# Patient Record
Sex: FEMALE | Race: WHITE | Employment: PART TIME | URBAN - METROPOLITAN AREA
[De-identification: names, ages, dates, MRNs, and addresses within clinical notes are randomized per-mention and may not be internally consistent; named-entity substitution may affect disease eponyms.]

---

## 2017-01-30 ENCOUNTER — HOSPITAL ENCOUNTER (EMERGENCY)
Age: 47
Discharge: HOME | End: 2017-01-30
Attending: EMERGENCY MEDICINE
Payer: SUBSIDIZED

## 2017-01-30 ENCOUNTER — APPOINTMENT (OUTPATIENT)
Dept: GENERAL RADIOLOGY | Age: 47
End: 2017-01-30
Attending: EMERGENCY MEDICINE
Payer: SUBSIDIZED

## 2017-01-30 VITALS
HEART RATE: 58 BPM | OXYGEN SATURATION: 96 % | BODY MASS INDEX: 32.14 KG/M2 | TEMPERATURE: 97.8 F | RESPIRATION RATE: 18 BRPM | SYSTOLIC BLOOD PRESSURE: 123 MMHG | DIASTOLIC BLOOD PRESSURE: 66 MMHG | WEIGHT: 200 LBS | HEIGHT: 66 IN

## 2017-01-30 DIAGNOSIS — S40.011A CONTUSION OF RIGHT SHOULDER, INITIAL ENCOUNTER: Primary | ICD-10-CM

## 2017-01-30 PROCEDURE — 74011250637 HC RX REV CODE- 250/637: Performed by: EMERGENCY MEDICINE

## 2017-01-30 PROCEDURE — L3650 SO 8 ABD RESTRAINT PRE OTS: HCPCS

## 2017-01-30 PROCEDURE — 73030 X-RAY EXAM OF SHOULDER: CPT

## 2017-01-30 PROCEDURE — 99283 EMERGENCY DEPT VISIT LOW MDM: CPT

## 2017-01-30 RX ORDER — ACETAMINOPHEN AND CODEINE PHOSPHATE 300; 30 MG/1; MG/1
1 TABLET ORAL
Status: DISCONTINUED | OUTPATIENT
Start: 2017-01-30 | End: 2017-01-30

## 2017-01-30 RX ORDER — TRAMADOL HYDROCHLORIDE 50 MG/1
50 TABLET ORAL
Status: COMPLETED | OUTPATIENT
Start: 2017-01-30 | End: 2017-01-30

## 2017-01-30 RX ORDER — HYDROCODONE BITARTRATE AND ACETAMINOPHEN 5; 325 MG/1; MG/1
1 TABLET ORAL
Qty: 8 TAB | Refills: 0 | Status: SHIPPED | OUTPATIENT
Start: 2017-01-30 | End: 2017-04-28

## 2017-01-30 RX ADMIN — TRAMADOL HYDROCHLORIDE 50 MG: 50 TABLET, FILM COATED ORAL at 05:07

## 2017-01-30 NOTE — ED NOTES
Patient given copy of dc instructions and script(s). Patient verbalized understanding of instructions and script (s). Patient given a current medication reconciliation form and verbalized understanding of their medications. Patient verbalized understanding of the importance of discussing medications with  his or her physician or clinic they will be following up with. Patient alert and oriented and in no acute distress. Patient discharged home ambulatory with self.

## 2017-01-30 NOTE — ED TRIAGE NOTES
Pt. States she fell trying to goto the bathroom about 1 hour ago and landed on her shoulder. She denies LOC and complains of pain to the right shoulder.

## 2017-01-30 NOTE — ED PROVIDER NOTES
HPI Comments: Pt c/o rt shoulder pain, says fell just pta, landing on rt shoulder. Denies other injury. No weakness or numbness. No neck or back pain. No elbow or other area of arm pain. No head injury. Patient is a 48 y.o. female presenting with shoulder pain and fall. Shoulder Pain      Fall   Pertinent negatives include no fever and no vomiting. Past Medical History:   Diagnosis Date    Psychiatric disorder      depression    Reflux        Past Surgical History:   Procedure Laterality Date    Pr abdomen surgery proc unlisted       gastric bypass    Hx cholecystectomy      Hx heent       tonsilectomy           Family History:   Problem Relation Age of Onset    Diabetes Mother     Ovarian Cancer Mother     Heart Disease Neg Hx     Hypertension Neg Hx     Stroke Neg Hx        Social History     Social History    Marital status:      Spouse name: N/A    Number of children: N/A    Years of education: N/A     Occupational History    Not on file. Social History Main Topics    Smoking status: Current Every Day Smoker     Packs/day: 0.50     Years: 20.00    Smokeless tobacco: Not on file    Alcohol use No    Drug use: No    Sexual activity: Yes     Partners: Male     Other Topics Concern    Not on file     Social History Narrative         ALLERGIES: Amoxicillin and Codeine    Review of Systems   Constitutional: Negative for fever. HENT: Negative for congestion. Cardiovascular: Negative for chest pain. Gastrointestinal: Negative for vomiting. Musculoskeletal: Positive for arthralgias. Negative for back pain. Skin: Negative for wound. Neurological: Negative for light-headedness. All other systems reviewed and are negative.       Vitals:    01/30/17 0452 01/30/17 0508   BP: 123/66    Pulse:  (!) 58   Resp: 18    Temp: 97.8 °F (36.6 °C)    SpO2: 96%    Weight: 90.7 kg (200 lb)    Height: 5' 6\" (1.676 m)             Physical Exam   Constitutional: She is oriented to person, place, and time. She appears well-developed. HENT:   Head: Normocephalic. Mouth/Throat: Oropharynx is clear and moist.   Eyes: Pupils are equal, round, and reactive to light. Neck: Normal range of motion. Pulmonary/Chest: Effort normal.   Abdominal: Soft. There is no tenderness. Musculoskeletal: Normal range of motion. She exhibits tenderness (+ rt shoulder diffuse ttp, dec abduction > 30 deg due to pain. nvi.  no swelling/erythema). She exhibits no edema. Neurological: She is alert and oriented to person, place, and time. Skin: Skin is warm and dry. Nursing note and vitals reviewed. [facility]  ED Course       Procedures    Vitals:  No data found. Medications ordered:   Medications   traMADol (ULTRAM) tablet 50 mg (50 mg Oral Given 1/30/17 0507)         Lab findings:  No results found for this or any previous visit (from the past 12 hour(s)). X-Ray, CT or other radiology findings or impressions:  XR SHOULDER RT AP/LAT MIN 2 V   Final Result          Progress notes, Consult notes or additional Procedure notes:   No emc, stable for dc and close f/u    Disposition:  Diagnosis:   1. Contusion of right shoulder, initial encounter        Disposition: home    Follow-up Information     Follow up With Details Comments Contact Info    Jose Marte MD Schedule an appointment as soon as possible for a visit in 1 day  [address] Dereck Churchill MD Schedule an appointment as soon as possible for a visit in 2 days  [address] [facility] [address]  [phone]             Discharge Medication List as of 1/30/2017  5:43 AM      START taking these medications    Details   HYDROcodone-acetaminophen (NORCO) 5-325 mg per tablet Take 1 Tab by mouth every six (6) hours as needed for Pain.  Max Daily Amount: 4 Tabs., Print, Disp-8 Tab, R-0         CONTINUE these medications which have NOT CHANGED    Details   baclofen (LIORESAL) 10 mg tablet Take  by mouth three (3) times daily. , Historical Med      multivitamin with iron (FLINTSTONES) chewable tablet Take 1 Tab by mouth daily. , Historical Med      CYANOCOBALAMIN, VITAMIN B-12, (VITAMIN B-12 PO) Take  by mouth daily. , Historical Med      ERGOCALCIFEROL, VITAMIN D2, (VITAMIN D2 PO) Take  by mouth every seven (7) days. , Historical Med      venlafaxine-SR (EFFEXOR-XR) 37.5 mg capsule Take 75 mg by mouth daily. , Historical Med      zolpidem (AMBIEN) 10 mg tablet Take 10 mg by mouth nightly as needed for Sleep., Historical Med      traZODone (DESYREL) 300 mg tablet Take 300 mg by mouth nightly., Historical Med      magnesium 250 mg tab Take  by mouth., Historical Med      ESOMEPRAZOLE MAGNESIUM (NEXIUM PO) Take  by mouth.  , Historical Med

## 2017-01-30 NOTE — DISCHARGE INSTRUCTIONS
Return for any new or worsening pain, shortness of breath, vomiting, decreased fluid intake, weakness, numbness, dizziness, or any change or concerns.

## 2017-02-03 ENCOUNTER — OFFICE VISIT (OUTPATIENT)
Dept: ORTHOPEDIC SURGERY | Facility: CLINIC | Age: 47
End: 2017-02-03

## 2017-02-03 VITALS
DIASTOLIC BLOOD PRESSURE: 63 MMHG | WEIGHT: 200 LBS | BODY MASS INDEX: 32.14 KG/M2 | SYSTOLIC BLOOD PRESSURE: 93 MMHG | HEIGHT: 66 IN | TEMPERATURE: 97.1 F | HEART RATE: 65 BPM

## 2017-02-03 DIAGNOSIS — M75.41 SHOULDER IMPINGEMENT, RIGHT: ICD-10-CM

## 2017-02-03 DIAGNOSIS — M25.511 RIGHT SHOULDER PAIN, UNSPECIFIED CHRONICITY: ICD-10-CM

## 2017-02-03 DIAGNOSIS — S43.401A SPRAIN OF RIGHT SHOULDER, UNSPECIFIED SHOULDER SPRAIN TYPE, INITIAL ENCOUNTER: ICD-10-CM

## 2017-02-03 DIAGNOSIS — M19.011 ARTHROSIS OF RIGHT ACROMIOCLAVICULAR JOINT: ICD-10-CM

## 2017-02-03 DIAGNOSIS — M54.31 BILATERAL SCIATICA: ICD-10-CM

## 2017-02-03 DIAGNOSIS — M54.32 BILATERAL SCIATICA: ICD-10-CM

## 2017-02-03 DIAGNOSIS — S42.291A: Primary | ICD-10-CM

## 2017-02-03 RX ORDER — BUPIVACAINE HYDROCHLORIDE 2.5 MG/ML
4 INJECTION, SOLUTION EPIDURAL; INFILTRATION; INTRACAUDAL ONCE
Qty: 4 ML | Refills: 0
Start: 2017-02-03 | End: 2017-02-03

## 2017-02-03 RX ORDER — BETAMETHASONE SODIUM PHOSPHATE AND BETAMETHASONE ACETATE 3; 3 MG/ML; MG/ML
3 INJECTION, SUSPENSION INTRA-ARTICULAR; INTRALESIONAL; INTRAMUSCULAR; SOFT TISSUE ONCE
Qty: 0.5 ML | Refills: 0
Start: 2017-02-03 | End: 2017-02-03

## 2017-02-03 RX ORDER — HYDROCODONE BITARTRATE AND ACETAMINOPHEN 7.5; 325 MG/1; MG/1
1-2 TABLET ORAL
Qty: 60 TAB | Refills: 0 | Status: SHIPPED | OUTPATIENT
Start: 2017-02-03 | End: 2017-04-28

## 2017-02-03 NOTE — PATIENT INSTRUCTIONS
Joint Injections: Care Instructions  Your Care Instructions  Joint injections are shots into a joint, such as the knee. They may be used to put in medicines, such as pain relievers. Or they can be used to take out fluid. Sometimes the fluid is tested in a lab. This can help find the cause of a joint problem. A corticosteroid, or steroid, shot is used to reduce inflammation in tendons or joints. It is often used to treat problems such as arthritis, tendinitis, and bursitis. Steroids can be injected directly into a painful, inflamed joint. They can also help reduce inflammation of a bursa. A bursa is a sac of fluid. It cushions and lubricates areas where tendons, ligaments, skin, muscles, or bones rub against each other. A steroid shot can sometimes help with short-term pain relief when other treatments haven't worked. If steroid shots help, pain may improve for weeks or months. Follow-up care is a key part of your treatment and safety. Be sure to make and go to all appointments, and call your doctor if you are having problems. It's also a good idea to know your test results and keep a list of the medicines you take. How can you care for yourself at home? · Put ice or a cold pack on the area for 10 to 20 minutes at a time. Put a thin cloth between the ice and your skin. · Take anti-inflammatory medicines to reduce pain, swelling, or inflammation. These include ibuprofen (Advil, Motrin) and naproxen (Aleve). Read and follow all instructions on the label. · Avoid strenuous activities for several days, especially those that put stress on the area where you got the shot. · If you have dressings over the area, keep them clean and dry. You may remove them when your doctor tells you to. When should you call for help? Call your doctor now or seek immediate medical care if:  · You have signs of infection, such as:  ¨ Increased pain, swelling, warmth, or redness. ¨ Red streaks leading from the site.   ¨ Pus draining from the site. ¨ A fever. Watch closely for changes in your health, and be sure to contact your doctor if you have any problems. Where can you learn more? Go to http://lonnie-gagan.info/. Enter N616 in the search box to learn more about \"Joint Injections: Care Instructions. \"  Current as of: May 23, 2016  Content Version: 11.1  © 5809-1851 [employer]. Care instructions adapted under license by [employer] (which disclaims liability or warranty for this information). If you have questions about a medical condition or this instruction, always ask your healthcare professional. [place] [address] any warranty or liability for your use of this information. Shoulder Bursitis: Exercises  Your Care Instructions  Here are some examples of typical rehabilitation exercises for your condition. Start each exercise slowly. Ease off the exercise if you start to have pain. Your doctor or physical therapist will tell you when you can start these exercises and which ones will work best for you. How to do the exercises  Posterior stretching exercise    1. Hold the elbow of your injured arm with your other hand. 2. Use your hand to pull your injured arm gently up and across your body. You will feel a gentle stretch across the back of your injured shoulder. 3. Hold for at least 15 to 30 seconds. Then slowly lower your arm. 4. Repeat 2 to 4 times. Up-the-back stretch    Note: Your doctor or physical therapist may want you to wait to do this stretch until you have regained most of your range of motion and strength. You can do this stretch in different ways. Hold any of these stretches for at least 15 to 30 seconds. Repeat them 2 to 4 times. 1. Put your hand in your back pocket. Let it rest there to stretch your shoulder.   2. With your other hand, hold your injured arm (palm outward) behind your back by the wrist. Pull your arm up gently to stretch your shoulder. 3. Next, put a towel over your other shoulder. Put the hand of your injured arm behind your back. Now hold the back end of the towel. With the other hand, hold the front end of the towel in front of your body. Pull gently on the front end of the towel. This will bring your hand farther up your back to stretch your shoulder. Overhead stretch    1. Standing about an arm's length away, grasp onto a solid surface. You could use a countertop, a doorknob, or the back of a sturdy chair. 2. With your knees slightly bent, bend forward with your arms straight. Lower your upper body, and let your shoulders stretch. 3. As your shoulders are able to stretch farther, you may need to take a step or two backward. 4. Hold for at least 15 to 30 seconds. Then stand up and relax. If you had stepped back during your stretch, step forward so you can keep your hands on the solid surface. 5. Repeat 2 to 4 times. Shoulder flexion (lying down)    Note: To make a wand for this exercise, use a piece of PVC pipe or a broom handle with the broom removed. Make the wand about a foot wider than your shoulders. 1. Lie on your back, holding a wand with both hands. Your palms should face down as you hold the wand. 2. Keeping your elbows straight, slowly raise your arms over your head. Raise them until you feel a stretch in your shoulders, upper back, and chest.  3. Hold for 15 to 30 seconds. 4. Repeat 2 to 4 times. Shoulder rotation (lying down)    Note: To make a wand for this exercise, use a piece of PVC pipe or a broom handle with the broom removed. Make the wand about a foot wider than your shoulders. 1. Lie on your back. Hold a wand with both hands with your elbows bent and palms up. 2. Keep your elbows close to your body, and move the wand across your body toward the sore arm. 3. Hold for 8 to 12 seconds. 4. Repeat 2 to 4 times. Shoulder blade squeeze    1.  Stand with your arms at your sides, and squeeze your shoulder blades together. Do not raise your shoulders up as you squeeze. 2. Hold 6 seconds. 3. Repeat 8 to 12 times. Shoulder flexor and extensor exercise    Note: These are isometric exercises. That means you contract your muscles without actually moving. · Push forward (flex): Stand facing a wall or doorjamb, about 6 inches or less back. Hold your injured arm against your body. Make a closed fist with your thumb on top. Then gently push your hand forward into the wall with about 25% to 50% of your strength. Don't let your body move backward as you push. Hold for about 6 seconds. Relax for a few seconds. Repeat 8 to 12 times. · Push backward (extend): Stand with your back flat against a wall. Your upper arm should be against the wall, with your elbow bent 90 degrees (your hand straight ahead). Push your elbow gently back against the wall with about 25% to 50% of your strength. Don't let your body move forward as you push. Hold for about 6 seconds. Relax for a few seconds. Repeat 8 to 12 times. Scapular exercise: Wall push-ups    Note: This exercise is best done with your fingers somewhat turned out, rather than straight up and down. 1. Stand facing a wall, about 12 inches to 18 inches away. 2. Place your hands on the wall at shoulder height. 3. Slowly bend your elbows and bring your face to the wall. Keep your back and hips straight. 4. Push back to where you started. 5. Repeat 8 to 12 times. 6. When you can do this exercise against a wall comfortably, you can try it against a counter. You can then slowly progress to the end of a couch, then to a sturdy chair, and finally to the floor. Scapular exercise: Retraction    Note: For this exercise, you will need elastic exercise material, such as surgical tubing or Thera-Band. 1. Put the band around a solid object at about waist level. (A bedpost will work well.) Each hand should hold an end of the band.   2. With your elbows at your sides and bent to 90 degrees, pull the band back. Your shoulder blades should move toward each other. Then move your arms back where you started. 3. Repeat 8 to 12 times. 4. If you have good range of motion in your shoulders, try this exercise with your arms lifted out to the sides. Keep your elbows at a 90-degree angle. Raise the elastic band up to about shoulder level. Pull the band back to move your shoulder blades toward each other. Then move your arms back where you started. Internal rotator strengthening exercise    1. Start by tying a piece of elastic exercise material to a doorknob. You can use surgical tubing or Thera-Band. 2. Stand or sit with your shoulder relaxed and your elbow bent 90 degrees. Your upper arm should rest comfortably against your side. Squeeze a rolled towel between your elbow and your body for comfort. This will help keep your arm at your side. 3. Hold one end of the elastic band in the hand of the painful arm. 4. Slowly rotate your forearm toward your body until it touches your belly. Slowly move it back to where you started. 5. Keep your elbow and upper arm firmly tucked against the towel roll or at your side. 6. Repeat 8 to 12 times. External rotator strengthening exercise    1. Start by tying a piece of elastic exercise material to a doorknob. You can use surgical tubing or Thera-Band. (You may also hold one end of the band in each hand.)  2. Stand or sit with your shoulder relaxed and your elbow bent 90 degrees. Your upper arm should rest comfortably against your side. Squeeze a rolled towel between your elbow and your body for comfort. This will help keep your arm at your side. 3. Hold one end of the elastic band with the hand of the painful arm. 4. Start with your forearm across your belly. Slowly rotate the forearm out away from your body. Keep your elbow and upper arm tucked against the towel roll or the side of your body until you begin to feel tightness in your shoulder.  Slowly move your arm back to where you started. 5. Repeat 8 to 12 times. Follow-up care is a key part of your treatment and safety. Be sure to make and go to all appointments, and call your doctor if you are having problems. It's also a good idea to know your test results and keep a list of the medicines you take. Where can you learn more? Go to http://lonnie-gagan.info/. Enter A200 in the search box to learn more about \"Shoulder Bursitis: Exercises. \"  Current as of: May 23, 2016  Content Version: 11.1  © 3005-3178 [employer]. Care instructions adapted under license by [employer] (which disclaims liability or warranty for this information). If you have questions about a medical condition or this instruction, always ask your healthcare professional. [address] any warranty or liability for your use of this information.

## 2017-02-03 NOTE — MR AVS SNAPSHOT
Visit Information Date & Time Provider Department Dept. Phone Encounter #  
 2/3/2017  1:15 PM Snyder Heading, [address] [facility] [phone] Follow-up Instructions Return if symptoms worsen or fail to improve. Upcoming Health Maintenance Date Due Pneumococcal 19-64 Medium Risk (1 of 1 - PPSV23) 5/30/1987 DTaP/Tdap/Td series (1 - Tdap) 5/30/1989 PAP AKA CERVICAL CYTOLOGY 5/30/1989 INFLUENZA AGE 9 TO ADULT 8/1/2016 Allergies as of 2/3/2017  Review Complete On: 2/3/2017 By: Kanejose Son Severity Noted Reaction Type Reactions Amoxicillin  12/29/2012    Other (comments) Does no work Codeine  12/29/2012    Nausea and Vomiting Current Immunizations  Never Reviewed No immunizations on file. Not reviewed this visit You Were Diagnosed With   
  
 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 
ICD-9-CM: 724.3 Arthrosis of right acromioclavicular joint     ICD-10-CM: M19.011 
ICD-9-CM: 715.91 Sprain of right shoulder, unspecified shoulder sprain type, initial encounter     ICD-10-CM: S43.401A ICD-9-CM: 568. 9 Shoulder impingement, right     ICD-10-CM: M75.41 
ICD-9-CM: 726.2 Vitals BP Pulse Temp Height(growth percentile) Weight(growth percentile) BMI  
 93/63 65 97.1 °F (36.2 °C) 5' 6\" (1.676 m) 200 lb (90.7 kg) 32.28 kg/m2 OB Status Smoking Status Having regular periods Current Every Day Smoker Vitals History BMI and BSA Data Body Mass Index Body Surface Area  
 32.28 kg/m 2 2.06 m 2 Your Updated Medication List  
  
   
 This list is accurate as of: 2/3/17  2:17 PM.  Always use your most recent med list.  
  
  
  
  
 baclofen 10 mg tablet Commonly known as:  LIORESAL Take  by mouth three (3) times daily. HYDROcodone-acetaminophen 5-325 mg per tablet Commonly known as:  Wayna Glaze Take 1 Tab by mouth every six (6) hours as needed for Pain. Max Daily Amount: 4 Tabs.  
  
 magnesium 250 mg Tab Take  by mouth.  
  
 multivitamin with iron chewable tablet Commonly known as:  Ruthellen Brush Take 1 Tab by mouth daily. NEXIUM PO Take  by mouth. traZODone 300 mg tablet Commonly known as:  Dara Au Take 300 mg by mouth nightly. venlafaxine-SR 37.5 mg capsule Commonly known as:  EFFEXOR-XR Take 75 mg by mouth daily. VITAMIN B-12 PO Take  by mouth daily. VITAMIN D2 PO Take  by mouth every seven (7) days. zolpidem 10 mg tablet Commonly known as:  AMBIEN Take 10 mg by mouth nightly as needed for Sleep. Follow-up Instructions Return if symptoms worsen or fail to improve. Patient Instructions Joint Injections: Care Instructions Your Care Instructions Joint injections are shots into a joint, such as the knee. They may be used to put in medicines, such as pain relievers. Or they can be used to take out fluid. Sometimes the fluid is tested in a lab. This can help find the cause of a joint problem. A corticosteroid, or steroid, shot is used to reduce inflammation in tendons or joints. It is often used to treat problems such as arthritis, tendinitis, and bursitis. Steroids can be injected directly into a painful, inflamed joint. They can also help reduce inflammation of a bursa. A bursa is a sac of fluid. It cushions and lubricates areas where tendons, ligaments, skin, muscles, or bones rub against each other.  
A steroid shot can sometimes help with short-term pain relief when other treatments haven't worked. If steroid shots help, pain may improve for weeks or months. Follow-up care is a key part of your treatment and safety. Be sure to make and go to all appointments, and call your doctor if you are having problems. It's also a good idea to know your test results and keep a list of the medicines you take. How can you care for yourself at home? · Put ice or a cold pack on the area for 10 to 20 minutes at a time. Put a thin cloth between the ice and your skin. · Take anti-inflammatory medicines to reduce pain, swelling, or inflammation. These include ibuprofen (Advil, Motrin) and naproxen (Aleve). Read and follow all instructions on the label. · Avoid strenuous activities for several days, especially those that put stress on the area where you got the shot. · If you have dressings over the area, keep them clean and dry. You may remove them when your doctor tells you to. When should you call for help? Call your doctor now or seek immediate medical care if: 
· You have signs of infection, such as: 
¨ Increased pain, swelling, warmth, or redness. ¨ Red streaks leading from the site. ¨ Pus draining from the site. ¨ A fever. Watch closely for changes in your health, and be sure to contact your doctor if you have any problems. Where can you learn more? Go to http://lonnie-gagan.info/. Enter N616 in the search box to learn more about \"Joint Injections: Care Instructions. \" Current as of: May 23, 2016 Content Version: 11.1 © 4777-4500 [employer]. Care instructions adapted under license by [employer] (which disclaims liability or warranty for this information). If you have questions about a medical condition or this instruction, always ask your healthcare professional. [address] any warranty or liability for your use of this information. Shoulder Bursitis: Exercises Your Care Instructions Here are some examples of typical rehabilitation exercises for your condition. Start each exercise slowly. Ease off the exercise if you start to have pain. Your doctor or physical therapist will tell you when you can start these exercises and which ones will work best for you. How to do the exercises Posterior stretching exercise 1. Hold the elbow of your injured arm with your other hand. 2. Use your hand to pull your injured arm gently up and across your body. You will feel a gentle stretch across the back of your injured shoulder. 3. Hold for at least 15 to 30 seconds. Then slowly lower your arm. 4. Repeat 2 to 4 times. Up-the-back stretch Note: Your doctor or physical therapist may want you to wait to do this stretch until you have regained most of your range of motion and strength. You can do this stretch in different ways. Hold any of these stretches for at least 15 to 30 seconds. Repeat them 2 to 4 times. 1. Put your hand in your back pocket. Let it rest there to stretch your shoulder. 2. With your other hand, hold your injured arm (palm outward) behind your back by the wrist. Pull your arm up gently to stretch your shoulder. 3. Next, put a towel over your other shoulder. Put the hand of your injured arm behind your back. Now hold the back end of the towel. With the other hand, hold the front end of the towel in front of your body. Pull gently on the front end of the towel. This will bring your hand farther up your back to stretch your shoulder. Overhead stretch 1. Standing about an arm's length away, grasp onto a solid surface. You could use a countertop, a doorknob, or the back of a sturdy chair. 2. With your knees slightly bent, bend forward with your arms straight. Lower your upper body, and let your shoulders stretch. 3. As your shoulders are able to stretch farther, you may need to take a step or two backward. 4. Hold for at least 15 to 30 seconds. Then stand up and relax. If you had stepped back during your stretch, step forward so you can keep your hands on the solid surface. 5. Repeat 2 to 4 times. Shoulder flexion (lying down) Note: To make a wand for this exercise, use a piece of PVC pipe or a broom handle with the broom removed. Make the wand about a foot wider than your shoulders. 1. Lie on your back, holding a wand with both hands. Your palms should face down as you hold the wand. 2. Keeping your elbows straight, slowly raise your arms over your head. Raise them until you feel a stretch in your shoulders, upper back, and chest. 
3. Hold for 15 to 30 seconds. 4. Repeat 2 to 4 times. Shoulder rotation (lying down) Note: To make a wand for this exercise, use a piece of PVC pipe or a broom handle with the broom removed. Make the wand about a foot wider than your shoulders. 1. Lie on your back. Hold a wand with both hands with your elbows bent and palms up. 2. Keep your elbows close to your body, and move the wand across your body toward the sore arm. 3. Hold for 8 to 12 seconds. 4. Repeat 2 to 4 times. Shoulder blade squeeze 1. Stand with your arms at your sides, and squeeze your shoulder blades together. Do not raise your shoulders up as you squeeze. 2. Hold 6 seconds. 3. Repeat 8 to 12 times. Shoulder flexor and extensor exercise Note: These are isometric exercises. That means you contract your muscles without actually moving. · Push forward (flex): Stand facing a wall or doorjamb, about 6 inches or less back. Hold your injured arm against your body. Make a closed fist with your thumb on top. Then gently push your hand forward into the wall with about 25% to 50% of your strength. Don't let your body move backward as you push. Hold for about 6 seconds. Relax for a few seconds. Repeat 8 to 12 times. · Push backward (extend): Stand with your back flat against a wall.  Your upper arm should be against the wall, with your elbow bent 90 degrees (your hand straight ahead). Push your elbow gently back against the wall with about 25% to 50% of your strength. Don't let your body move forward as you push. Hold for about 6 seconds. Relax for a few seconds. Repeat 8 to 12 times. Scapular exercise: Wall push-ups Note: This exercise is best done with your fingers somewhat turned out, rather than straight up and down. 1. Stand facing a wall, about 12 inches to 18 inches away. 2. Place your hands on the wall at shoulder height. 3. Slowly bend your elbows and bring your face to the wall. Keep your back and hips straight. 4. Push back to where you started. 5. Repeat 8 to 12 times. 6. When you can do this exercise against a wall comfortably, you can try it against a counter. You can then slowly progress to the end of a couch, then to a sturdy chair, and finally to the floor. Scapular exercise: Retraction Note: For this exercise, you will need elastic exercise material, such as surgical tubing or Thera-Band. 1. Put the band around a solid object at about waist level. (A bedpost will work well.) Each hand should hold an end of the band. 2. With your elbows at your sides and bent to 90 degrees, pull the band back. Your shoulder blades should move toward each other. Then move your arms back where you started. 3. Repeat 8 to 12 times. 4. If you have good range of motion in your shoulders, try this exercise with your arms lifted out to the sides. Keep your elbows at a 90-degree angle. Raise the elastic band up to about shoulder level. Pull the band back to move your shoulder blades toward each other. Then move your arms back where you started. Internal rotator strengthening exercise 1. Start by tying a piece of elastic exercise material to a doorknob. You can use surgical tubing or Thera-Band. 2. Stand or sit with your shoulder relaxed and your elbow bent 90 degrees. Your upper arm should rest comfortably against your side. Squeeze a rolled towel between your elbow and your body for comfort. This will help keep your arm at your side. 3. Hold one end of the elastic band in the hand of the painful arm. 4. Slowly rotate your forearm toward your body until it touches your belly. Slowly move it back to where you started. 5. Keep your elbow and upper arm firmly tucked against the towel roll or at your side. 6. Repeat 8 to 12 times. External rotator strengthening exercise 1. Start by tying a piece of elastic exercise material to a doorknob. You can use surgical tubing or Thera-Band. (You may also hold one end of the band in each hand.) 2. Stand or sit with your shoulder relaxed and your elbow bent 90 degrees. Your upper arm should rest comfortably against your side. Squeeze a rolled towel between your elbow and your body for comfort. This will help keep your arm at your side. 3. Hold one end of the elastic band with the hand of the painful arm. 4. Start with your forearm across your belly. Slowly rotate the forearm out away from your body. Keep your elbow and upper arm tucked against the towel roll or the side of your body until you begin to feel tightness in your shoulder. Slowly move your arm back to where you started. 5. Repeat 8 to 12 times. Follow-up care is a key part of your treatment and safety. Be sure to make and go to all appointments, and call your doctor if you are having problems. It's also a good idea to know your test results and keep a list of the medicines you take. Where can you learn more? Go to http://lonnie-gagan.info/. Enter L796 in the search box to learn more about \"Shoulder Bursitis: Exercises. \" Current as of: May 23, 2016 Content Version: 11.1 © 1396-6915 [employer].  Care instructions adapted under license by [employer] (which disclaims liability or warranty for this information). If you have questions about a medical condition or this instruction, always ask your healthcare professional. [address] any warranty or liability for your use of this information. Introducing [facility]! [facility] introduces Shhmooze patient portal. Now you can access parts of your medical record, email your doctor's office, and request medication refills online. 1. In your internet browser, go to [email or web address] 2. Click on the First Time User? Click Here link in the Sign In box. You will see the New Member Sign Up page. 3. Enter your Shhmooze Access Code exactly as it appears below. You will not need to use this code after youve completed the sign-up process. If you do not sign up before the expiration date, you must request a new code. · Shhmooze Access Code: [email or web address] Expires: 4/3/2017  1:31 PM 
 
4. Enter the last four digits of your Social Security Number (xxxx) and Date of Birth (mm/dd/yyyy) as indicated and click Submit. You will be taken to the next sign-up page. 5. Create a Shhmooze ID. This will be your Shhmooze login ID and cannot be changed, so think of one that is secure and easy to remember. 6. Create a Shhmooze password. You can change your password at any time. 7. Enter your Password Reset Question and Answer. This can be used at a later time if you forget your password. 8. Enter your e-mail address. You will receive e-mail notification when new information is available in [address] 9. Click Sign Up. You can now view and download portions of your medical record. 10. Click the Download Summary menu link to download a portable copy of your medical information. If you have questions, please visit the Frequently Asked Questions section of the Shhmooze website. Remember, Shhmooze is NOT to be used for urgent needs. For medical emergencies, dial 911. Now available from your iPhone and Android! Please provide this summary of care documentation to your next provider. Your primary care clinician is listed as Excell Spangle. If you have any questions after today's visit, please call [phone].

## 2017-02-03 NOTE — PROGRESS NOTES
Patient: Kiersten Castanon                MRN: [id number]       SSN: [id number]  YOB: 1970        AGE: 48 y.o. SEX: female    PCP: Shannan Gomez MD  02/03/17    Chief Complaint   Patient presents with    Shoulder Pain     right      HISTORY:  Kiersten Castanon is a 48 y.o. female who is seen for right shoulder pain. She notes shoulder pain with overhead activities and at night. She reports increased shoulder pain since she fell on her right arm on 1/30/17. She had x rays taken at [facility] ED the same day. She does not recall any shoulder dislocation. She has pain radiating from her right shoulder to her right upper arm. She is wearing a sling today. Occupation, etc:  Ms. Sofy Monk previously worked as a  at the [employer] in [place] until 2007. 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 She is not currently working. She is not diabetic or hypertensive. Current weight is 200 pounds. She says that she had a 1and a [de-identified] year old daughter who passed away in 2014 from [facility]. She lives with her  and mother in [place]. Weight Metrics 2/3/2017 1/30/2017 3/20/2016 5/29/2015 5/25/2015 1/9/2015 10/5/2014   Weight 200 lb 200 lb 205 lb 196 lb 198 lb 190 lb 182 lb   BMI 32.28 kg/m2 32.28 kg/m2 33.1 kg/m2 31.65 kg/m2 31.97 kg/m2 30.68 kg/m2 29.39 kg/m2     Patient Active Problem List   Diagnosis Code    Bilateral sciatica M54.31, M54.32     REVIEW OF SYSTEMS: All Below are Negative except: See HPI   Constitutional: negative for fever, chills, and weight loss. Cardiovascular: negative for chest pain, claudication, leg swelling, SOB, URBANO   Gastrointestinal: Negative for pain, N/V/C/D, Blood in stool or urine, dysuria,  hematuria, incontinence, pelvic pain. Musculoskeletal: See HPI   Neurological: Negative for dizziness and weakness.    Negative for headaches, Visual changes, confusion, seizures   Phychiatric/Behavioral: Negative for depression, memory loss, substance  abuse. Extremities: Negative for hair changes, rash, or skin lesion changes. Hematologic: Negative for bleeding problems, bruising, pallor or swollen lymph  nodes   Peripheral Vascular: No calf pain, no circulation deficits. Social History     Social History    Marital status:      Spouse name: N/A    Number of children: N/A    Years of education: N/A     Occupational History    Not on file. Social History Main Topics    Smoking status: Current Every Day Smoker     Packs/day: 0.50     Years: 20.00    Smokeless tobacco: Not on file    Alcohol use No    Drug use: No    Sexual activity: Yes     Partners: Male     Other Topics Concern    Not on file     Social History Narrative      Allergies   Allergen Reactions    Amoxicillin Other (comments)     Does no work    Codeine Nausea and Vomiting      Current Outpatient Prescriptions   Medication Sig    HYDROcodone-acetaminophen (NORCO) 5-325 mg per tablet Take 1 Tab by mouth every six (6) hours as needed for Pain. Max Daily Amount: 4 Tabs.  venlafaxine-SR (EFFEXOR-XR) 37.5 mg capsule Take 75 mg by mouth daily.  zolpidem (AMBIEN) 10 mg tablet Take 10 mg by mouth nightly as needed for Sleep.  traZODone (DESYREL) 300 mg tablet Take 300 mg by mouth nightly.  magnesium 250 mg tab Take  by mouth.  baclofen (LIORESAL) 10 mg tablet Take  by mouth three (3) times daily.  ESOMEPRAZOLE MAGNESIUM (NEXIUM PO) Take  by mouth.  multivitamin with iron (FLINTSTONES) chewable tablet Take 1 Tab by mouth daily.  CYANOCOBALAMIN, VITAMIN B-12, (VITAMIN B-12 PO) Take  by mouth daily.  ERGOCALCIFEROL, VITAMIN D2, (VITAMIN D2 PO) Take  by mouth every seven (7) days. No current facility-administered medications for this visit.        PHYSICAL EXAMINATION:  Visit Vitals    BP 93/63    Pulse 65    Temp 97.1 °F (36.2 °C)    Ht 5' 6\" (1.676 m)    Wt 200 lb (90.7 kg)    BMI 32.28 kg/m2      ORTHO EXAMINATION:  Examination Right shoulder Left shoulder   Skin Intact Intact   Effusion - -   Biceps deformity - -   Atrophy - -   AC joint tenderness + -   Acromial tenderness +, anterior +   Biceps tenderness - -   Forward flexion/Elevation  passive 170   Active abduction  passive 160   External rotation ROM 10 30   Internal rotation ROM 80 70   Apprehension - -   Impingement + -   Drop Arm Test + -   Neurovascular Intact Intact   Difficulty actively raising right arm    PROCEDURE:  After discussing treatment options, patient's right shoulder was injected with 4 cc Marcaine and 1/2 cc Celestone. Chart reviewed for the following:   Norah Castro MD, have reviewed the History, Physical and updated the Allergic reactions for [facility] performed immediately prior to start of procedure:  Norah Castro MD, have performed the following reviews on Kiersten Castanon prior to the start of the procedure:            * Patient was identified by name and date of birth   * Agreement on procedure being performed was verified  * Risks and Benefits explained to the patient  * Procedure site verified and marked as necessary  * Patient was positioned for comfort  * Consent was obtained     Time: 2:13 PM     Date of procedure: 2/3/2017    Procedure performed by:  Quincy Ibarra MD    Ms. Sanders tolerated the procedure well with no complications. RADIOGRAPHS:  XR RIGHT SHOULDER 1/30/17  IMPRESSION:  1. No acute fracture or dislocation. 2. Reverse [facility] deformity suggesting prior shoulder dislocation.     IMPRESSION:      ICD-10-CM ICD-9-CM    1. 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      DRAIN/INJECT LARGE JOINT/BURSA      bupivacaine, PF, (MARCAINE, PF,) 0.25 % (2.5 mg/mL) injection HYDROcodone-acetaminophen (NORCO) 7.5-325 mg per tablet    Reverse   2. Right shoulder pain, unspecified chronicity M25.511 719.41 betamethasone (CELESTONE SOLUSPAN) 6 mg/mL injection      BETAMETHASONE ACETATE & SODIUM PHOSPHATE INJECTION 3 MG EA.      DRAIN/INJECT LARGE JOINT/BURSA      bupivacaine, PF, (MARCAINE, PF,) 0.25 % (2.5 mg/mL) injection      XR INJ SHOULDER RT PRE CT/MRI ARTHRO      MRI SHOULDER RT W CONT      HYDROcodone-acetaminophen (NORCO) 7.5-325 mg per tablet   3. Bilateral sciatica M54.31 724.3 HYDROcodone-acetaminophen (NORCO) 7.5-325 mg per tablet    M54.32     4. Arthrosis of right acromioclavicular joint M19.011 715.91 betamethasone (CELESTONE SOLUSPAN) 6 mg/mL injection      BETAMETHASONE ACETATE & SODIUM PHOSPHATE INJECTION 3 MG EA.      DRAIN/INJECT LARGE JOINT/BURSA      bupivacaine, PF, (MARCAINE, PF,) 0.25 % (2.5 mg/mL) injection      HYDROcodone-acetaminophen (NORCO) 7.5-325 mg per tablet   5. Sprain of right shoulder, unspecified shoulder sprain type, initial encounter S43.401A 840.9 betamethasone (CELESTONE SOLUSPAN) 6 mg/mL injection      BETAMETHASONE ACETATE & SODIUM PHOSPHATE INJECTION 3 MG EA.      DRAIN/INJECT LARGE JOINT/BURSA      bupivacaine, PF, (MARCAINE, PF,) 0.25 % (2.5 mg/mL) injection      HYDROcodone-acetaminophen (NORCO) 7.5-325 mg per tablet   6. Shoulder impingement, right M75.41 726.2 betamethasone (CELESTONE SOLUSPAN) 6 mg/mL injection      BETAMETHASONE ACETATE & SODIUM PHOSPHATE INJECTION 3 MG EA.      DRAIN/INJECT LARGE JOINT/BURSA      bupivacaine, PF, (MARCAINE, PF,) 0.25 % (2.5 mg/mL) injection      HYDROcodone-acetaminophen (NORCO) 7.5-325 mg per tablet     PLAN:  After discussing treatment options, patient's right shoulder was injected with 4 cc Marcaine and 1/2 cc Celestone. She will follow up in 2 weeks with the results of her right shoulder MRI arthrogram.  She will take Norco for the pain as needed at night.   She will do shoulder exercises on her own at home.       Scribed by [employer] ([facility]) as dictated by Jaime Hernandez MD

## 2017-02-24 ENCOUNTER — HOSPITAL ENCOUNTER (OUTPATIENT)
Dept: MRI IMAGING | Age: 47
Discharge: HOME | End: 2017-02-24
Attending: SPECIALIST
Payer: MEDICAID

## 2017-02-24 ENCOUNTER — HOSPITAL ENCOUNTER (OUTPATIENT)
Dept: GENERAL RADIOLOGY | Age: 47
Discharge: HOME | End: 2017-02-24
Attending: SPECIALIST
Payer: MEDICAID

## 2017-02-24 DIAGNOSIS — M25.511 RIGHT SHOULDER PAIN, UNSPECIFIED CHRONICITY: ICD-10-CM

## 2017-02-24 PROCEDURE — 74011636320 HC RX REV CODE- 636/320: Performed by: SPECIALIST

## 2017-02-24 PROCEDURE — A9579 GAD-BASE MR CONTRAST NOS,1ML: HCPCS | Performed by: SPECIALIST

## 2017-02-24 PROCEDURE — 73222 MRI JOINT UPR EXTREM W/DYE: CPT

## 2017-02-24 PROCEDURE — 74011000250 HC RX REV CODE- 250: Performed by: SPECIALIST

## 2017-02-24 PROCEDURE — 77002 NEEDLE LOCALIZATION BY XRAY: CPT

## 2017-02-24 RX ORDER — SODIUM CHLORIDE 9 MG/ML
10 INJECTION INTRAMUSCULAR; INTRAVENOUS; SUBCUTANEOUS
Status: COMPLETED | OUTPATIENT
Start: 2017-02-24 | End: 2017-02-24

## 2017-02-24 RX ORDER — SODIUM BICARBONATE 84 MG/ML
50 INJECTION, SOLUTION INTRAVENOUS
Status: COMPLETED | OUTPATIENT
Start: 2017-02-24 | End: 2017-02-24

## 2017-02-24 RX ORDER — LIDOCAINE HYDROCHLORIDE 10 MG/ML
30 INJECTION, SOLUTION EPIDURAL; INFILTRATION; INTRACAUDAL; PERINEURAL
Status: COMPLETED | OUTPATIENT
Start: 2017-02-24 | End: 2017-02-24

## 2017-02-24 RX ADMIN — IOPAMIDOL 2 ML: 408 INJECTION, SOLUTION INTRATHECAL at 10:30

## 2017-02-24 RX ADMIN — LIDOCAINE HYDROCHLORIDE 15 ML: 10 INJECTION, SOLUTION EPIDURAL; INFILTRATION; INTRACAUDAL; PERINEURAL at 10:30

## 2017-02-24 RX ADMIN — SODIUM CHLORIDE 10 ML: 9 INJECTION INTRAMUSCULAR; INTRAVENOUS; SUBCUTANEOUS at 10:30

## 2017-02-24 RX ADMIN — GADOPENTETATE DIMEGLUMINE 0.15 ML: 469.01 INJECTION INTRAVENOUS at 10:30

## 2017-02-24 RX ADMIN — SODIUM BICARBONATE 3 MEQ: 84 INJECTION, SOLUTION INTRAVENOUS at 10:30

## 2017-03-01 ENCOUNTER — OFFICE VISIT (OUTPATIENT)
Dept: ORTHOPEDIC SURGERY | Age: 47
End: 2017-03-01

## 2017-03-01 VITALS
RESPIRATION RATE: 16 BRPM | OXYGEN SATURATION: 98 % | HEIGHT: 66 IN | DIASTOLIC BLOOD PRESSURE: 72 MMHG | BODY MASS INDEX: 33.75 KG/M2 | TEMPERATURE: 98 F | SYSTOLIC BLOOD PRESSURE: 99 MMHG | HEART RATE: 85 BPM | WEIGHT: 210 LBS

## 2017-03-01 DIAGNOSIS — M75.41 SHOULDER IMPINGEMENT, RIGHT: ICD-10-CM

## 2017-03-01 DIAGNOSIS — S43.401D SPRAIN OF RIGHT SHOULDER, UNSPECIFIED SHOULDER SPRAIN TYPE, SUBSEQUENT ENCOUNTER: ICD-10-CM

## 2017-03-01 DIAGNOSIS — M25.511 RIGHT SHOULDER PAIN, UNSPECIFIED CHRONICITY: ICD-10-CM

## 2017-03-01 DIAGNOSIS — M19.011 ARTHROSIS OF RIGHT ACROMIOCLAVICULAR JOINT: ICD-10-CM

## 2017-03-01 DIAGNOSIS — E66.9 OBESITY (BMI 30.0-34.9): ICD-10-CM

## 2017-03-01 DIAGNOSIS — M19.011 PRIMARY OSTEOARTHRITIS OF RIGHT SHOULDER: ICD-10-CM

## 2017-03-01 DIAGNOSIS — M75.121 COMPLETE TEAR OF RIGHT ROTATOR CUFF: Primary | ICD-10-CM

## 2017-03-01 RX ORDER — ESZOPICLONE 3 MG/1
3 TABLET, FILM COATED ORAL
COMMUNITY

## 2017-03-01 RX ORDER — CHLORPROMAZINE HYDROCHLORIDE 100 MG/1
100 TABLET, FILM COATED ORAL 2 TIMES DAILY
COMMUNITY

## 2017-03-01 RX ORDER — LAMOTRIGINE 100 MG/1
TABLET ORAL DAILY
COMMUNITY

## 2017-03-01 RX ORDER — BUPIVACAINE HYDROCHLORIDE 2.5 MG/ML
4 INJECTION, SOLUTION EPIDURAL; INFILTRATION; INTRACAUDAL ONCE
Qty: 4 ML | Refills: 0
Start: 2017-03-01 | End: 2017-03-01

## 2017-03-01 RX ORDER — BETAMETHASONE SODIUM PHOSPHATE AND BETAMETHASONE ACETATE 3; 3 MG/ML; MG/ML
3 INJECTION, SUSPENSION INTRA-ARTICULAR; INTRALESIONAL; INTRAMUSCULAR; SOFT TISSUE ONCE
Qty: 0.5 ML | Refills: 0
Start: 2017-03-01 | End: 2017-03-01

## 2017-03-01 RX ORDER — ARIPIPRAZOLE 10 MG/1
10 TABLET ORAL DAILY
COMMUNITY
End: 2018-05-16

## 2017-03-01 NOTE — PATIENT INSTRUCTIONS
Rotator Cuff Repair: Before Your Surgery  What is rotator cuff repair surgery? Rotator cuff repair surgery is done to fix a tear in the rotator cuff. Your doctor may also clean the space between the rotator cuff tendons and the shoulder blade. This is called subacromial smoothing. Your doctor will do either arthroscopic or open surgery. With arthroscopic surgery, your doctor uses a lighted tube with a tiny camera. This tube is called an arthroscope. Your doctor puts it and other surgical tools through small cuts (incisions) in your shoulder. Most people go home the same day they have this surgery. With open surgery, your doctor will make a 2- to 4-inch incision in your shoulder. Most people go home the same day they have this surgery. In both surgeries, the scars usually fade with time. You will wear a sling for a few weeks. You will need physical rehabilitation (rehab). At first, you will get help with the exercises. Later, your doctor or physical therapist will give you exercises to do on your own. Rehab will last several months. It will take 3 to 4 months before you will be able to use your arm normally. It will take 4 to 6 months before you will be able to throw objects or play sports. After surgery and rehab, you will probably have less pain and more strength in your shoulder. You should be able to lift and rotate your arm better. Some people have to avoid lifting heavy objects. If you have a desk job, you will probably be able to return to work or your normal routine in 1 to 2 weeks. If you push, pull, or lift at work, you may be away from work for 3 to 4 months or longer. If you work at a job that involves heavy manual labor, lifting your arms above your head, or using heavy tools, you may have to think about finding other work. Follow-up care is a key part of your treatment and safety. Be sure to make and go to all appointments, and call your doctor if you are having problems.  It's also a good idea to know your test results and keep a list of the medicines you take. What happens before surgery? Surgery can be stressful. This information will help you understand what you can expect. And it will help you safely prepare for surgery. Preparing for surgery  · Understand exactly what surgery is planned, along with the risks, benefits, and other options. · Tell your doctors ALL the medicines, vitamins, supplements, and herbal remedies you take. Some of these can increase the risk of bleeding or interact with anesthesia. · If you take blood thinners, such as warfarin (Coumadin), clopidogrel (Plavix), or aspirin, be sure to talk to your doctor. He or she will tell you if you should stop taking these medicines before your surgery. Make sure that you understand exactly what your doctor wants you to do. · Your doctor will tell you which medicines to take or stop before your surgery. You may need to stop taking certain medicines a week or more before surgery. So talk to your doctor as soon as you can. · If you have an advance directive, let your doctor know. It may include a living will and a durable power of  for health care. Bring a copy to the hospital. If you don't have one, you may want to prepare one. It lets your doctor and loved ones know your health care wishes. Doctors advise that everyone prepare these papers before any type of surgery or procedure. What happens on the day of surgery? · Follow the instructions exactly about when to stop eating and drinking. If you don't, your surgery may be canceled. If your doctor told you to take your medicines on the day of surgery, take them with only a sip of water. · Take a bath or shower before you come in for your surgery. Do not apply lotions, perfumes, deodorants, or nail polish. · Do not shave the surgical site yourself. · Take off all jewelry and piercings. And take out contact lenses, if you wear them.   At the hospital or surgery center  · Bring a picture ID. · The area for surgery is often marked to make sure there are no errors. · You will be kept comfortable and safe by your anesthesia provider. The anesthesia may make you sleep. Or it may just numb the area being worked on. · The surgery will take about 2 hours. Going home  · Be sure you have someone to drive you home. Anesthesia and pain medicine make it unsafe for you to drive. · You will be given more specific instructions about recovering from your surgery. They will cover things like diet, wound care, follow-up care, driving, and getting back to your normal routine. When should you call your doctor? · You have questions or concerns. · You don't understand how to prepare for your surgery. · You become ill before the surgery (such as fever, flu, or a cold). · You need to reschedule or have changed your mind about having the surgery. Where can you learn more? Go to http://lonnie-gagan.info/. Enter B272 in the search box to learn more about \"Rotator Cuff Repair: Before Your Surgery. \"  Current as of: May 23, 2016  Content Version: 11.1  © 8391-5470 [employer]. Care instructions adapted under license by [employer] (which disclaims liability or warranty for this information). If you have questions about a medical condition or this instruction, always ask your healthcare professional. [address] any warranty or liability for your use of this information. Rotator Cuff Injury: Care Instructions  Your Care Instructions  The rotator cuff is a group of tendons and muscles around the shoulder that keeps the upper arm bone in place. It keeps the shoulder joint stable and allows you to raise and rotate your arm. Damage to the rotator cuff can be caused by overuse, a fall, or a direct blow to the shoulder area, which can tear the tendons.  Over time, everyday wear can damage the tendons and make injury more likely. Treatment can depend upon the amount of damage to the tendons. In a younger person, surgery may be the first choice. But if you are older than 25, you likely have some wear on your rotator cuff. Surgery may not be the most effective treatment. Your doctor may have you try physical therapy and exercise first.  Follow-up care is a key part of your treatment and safety. Be sure to make and go to all appointments, and call your doctor if you are having problems. It's also a good idea to know your test results and keep a list of the medicines you take. How can you care for yourself at home? · Rest your shoulder as much as you can. If your doctor put your arm in a sling or shoulder immobilizer, wear it as directed. Do not take it off before your doctor tells you to. If it is too tight, loosen it. · Be safe with medicines. Read and follow all instructions on the label. ¨ If the doctor gave you a prescription medicine for pain, take it as prescribed. ¨ If you are not taking a prescription pain medicine, ask your doctor if you can take an over-the-counter medicine. · Put ice or a cold pack on your shoulder for 10 to 20 minutes at a time. Try to do this every 1 to 2 hours for the next 3 days (when you are awake). Put a thin cloth between the ice pack and your skin. · After 3 days, put a warm, wet towel on your shoulder. This is to relax the muscles and help blood flow. · While holding a warm, wet towel on your shoulder, lean forward so your arm hangs freely, and gently swing your arm back and forth like a pendulum. You also can do this standing under a warm shower. · Do not do anything that makes your pain worse. · Follow your doctor's advice about whether you need physical therapy. When should you call for help? Call your doctor now or seek immediate medical care if:  · You have severe pain. · You cannot move your shoulder or arm. · You have tingling or numbness in your arm or hand.   · Your arm or hand is cool or pale. Watch closely for changes in your health, and be sure to contact your doctor if:  · Your pain gets worse. · You have new or worse swelling in your arm or hand. · You do not get better as expected. Where can you learn more? Go to http://lonnie-gagan.info/. Enter 370 [id number] in the search box to learn more about \"Rotator Cuff Injury: Care Instructions. \"  Current as of: May 23, 2016  Content Version: 11.1  © 8488-5799 [employer]. Care instructions adapted under license by [employer] (which disclaims liability or warranty for this information). If you have questions about a medical condition or this instruction, always ask your healthcare professional. [address] any warranty or liability for your use of this information.

## 2017-03-01 NOTE — PROGRESS NOTES
Patient: Becki Salgado                MRN: [id number]       SSN: [id number]  YOB: 1970        AGE: 48 y.o. SEX: female    PCP: Alex Aguilar MD  03/01/17    Chief Complaint   Patient presents with    Shoulder Pain     MRI f/u right     HISTORY:  Becki Salgado is a 48 y.o. female who is seen for increased right shoulder pain. She notes shoulder pain with overhead activities and at night. She reports increased shoulder pain since she fell on her right arm at home in the dark on 1/30/17. She had x rays taken at [place] [address] ED the same day. She does not recall any shoulder dislocation. She has pain radiating from her right shoulder to her right upper arm. She responded to a previous right shoulder cortisone injection. She notes shoulder pain with overhead activities and at night. Pain Assessment  3/1/2017   Location of Pain Shoulder   Location Modifiers Right   Severity of Pain 7   Quality of Pain Throbbing   Duration of Pain -   Frequency of Pain Constant     Occupation, etc:  Ms. Rhoan Sanchez previously worked as a  at the [employer] in [place] until 2007. 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 She is not currently working. She is not diabetic or hypertensive. She reports that she has gained 10 pounds recently. Current weight is 210 pounds. She says that she had a 1and a [de-identified] year old daughter who passed away in 2014 from [facility]. She lives with her  and mother in [place]. Weight Metrics 3/1/2017 2/3/2017 1/30/2017 3/20/2016 5/29/2015 5/25/2015 1/9/2015   Weight 210 lb 200 lb 200 lb 205 lb 196 lb 198 lb 190 lb   BMI 33.89 kg/m2 32.28 kg/m2 32.28 kg/m2 33.1 kg/m2 31.65 kg/m2 31.97 kg/m2 30.68 kg/m2     Patient Active Problem List   Diagnosis Code    Bilateral sciatica M54.31, M54.32    Obesity (BMI 30.0-34. 9) E66.9     REVIEW OF SYSTEMS: All Below are Negative except: See HPI   Constitutional: negative for fever, chills, and weight loss.   Cardiovascular: negative for chest pain, claudication, leg swelling, SOB, URBANO   Gastrointestinal: Negative for pain, N/V/C/D, Blood in stool or urine, dysuria,  hematuria, incontinence, pelvic pain. Musculoskeletal: See HPI   Neurological: Negative for dizziness and weakness. Negative for headaches, Visual changes, confusion, seizures   Phychiatric/Behavioral: Negative for depression, memory loss, substance  abuse. Extremities: Negative for hair changes, rash, or skin lesion changes. Hematologic: Negative for bleeding problems, bruising, pallor or swollen lymph  nodes   Peripheral Vascular: No calf pain, no circulation deficits. Social History     Social History    Marital status:      Spouse name: N/A    Number of children: N/A    Years of education: N/A     Occupational History    Not on file. Social History Main Topics    Smoking status: Current Every Day Smoker     Packs/day: 0.50     Years: 20.00    Smokeless tobacco: Not on file    Alcohol use No    Drug use: No    Sexual activity: Yes     Partners: Male     Other Topics Concern    Not on file     Social History Narrative      Allergies   Allergen Reactions    Amoxicillin Other (comments)     Does no work    Codeine Nausea and Vomiting      Current Outpatient Prescriptions   Medication Sig    eszopiclone (LUNESTA) 3 mg tablet Take  by mouth nightly.  lamoTRIgine (LAMICTAL) 100 mg tablet Take  by mouth daily. 100 mg am and 200mg q hs    chlorproMAZINE (THORAZINE) 100 mg tablet Take 100 mg by mouth three (3) times daily.  ARIPiprazole (ABILIFY) 10 mg tablet Take 10 mg by mouth daily. 10mg am and 15mg q hs    betamethasone (CELESTONE SOLUSPAN) 6 mg/mL injection 0.5 mL by Intra artICUlar route once for 1 dose.  bupivacaine, PF, (MARCAINE, PF,) 0.25 % (2.5 mg/mL) injection 4 mL by Intra artICUlar route once for 1 dose.     HYDROcodone-acetaminophen (NORCO) 7.5-325 mg per tablet Take 1-2 Tabs by mouth nightly as needed for Pain. Max Daily Amount: 2 Tabs.  magnesium 250 mg tab Take  by mouth.  baclofen (LIORESAL) 10 mg tablet Take  by mouth three (3) times daily.  multivitamin with iron (FLINTSTONES) chewable tablet Take 1 Tab by mouth daily.  CYANOCOBALAMIN, VITAMIN B-12, (VITAMIN B-12 PO) Take  by mouth daily.  ERGOCALCIFEROL, VITAMIN D2, (VITAMIN D2 PO) Take  by mouth every seven (7) days.  HYDROcodone-acetaminophen (NORCO) 5-325 mg per tablet Take 1 Tab by mouth every six (6) hours as needed for Pain. Max Daily Amount: 4 Tabs.  venlafaxine-SR (EFFEXOR-XR) 37.5 mg capsule Take 75 mg by mouth daily.  zolpidem (AMBIEN) 10 mg tablet Take 10 mg by mouth nightly as needed for Sleep.  traZODone (DESYREL) 300 mg tablet Take 300 mg by mouth nightly.  ESOMEPRAZOLE MAGNESIUM (NEXIUM PO) Take  by mouth. No current facility-administered medications for this visit. PHYSICAL EXAMINATION:  Visit Vitals    BP 99/72 (BP 1 Location: Left arm, BP Patient Position: Sitting)    Pulse 85    Temp 98 °F (36.7 °C) (Oral)    Resp 16    Ht 5' 6\" (1.676 m)    Wt 210 lb (95.3 kg)    SpO2 98%    BMI 33.89 kg/m2      ORTHO EXAMINATION:  Examination Right shoulder Left shoulder   Skin Intact Intact   Effusion - -   Biceps deformity - -   Atrophy - -   AC joint tenderness + -   Acromial tenderness +, anterior +   Biceps tenderness - -   Forward flexion/Elevation  passive 170   Active abduction  passive 160   External rotation ROM 25 30   Internal rotation ROM 60 70   Apprehension - -   Impingement + -   Drop Arm Test - -   Neurovascular Intact Intact   Difficulty actively raising right arm    PROCEDURE:  After discussing treatment options, patient's right shoulder was injected with 4 cc Marcaine and 1/2 cc Celestone.     Chart reviewed for the following:   Shanna Mejia MD, have reviewed the History, Physical and updated the Allergic reactions for [address] [place] [address] performed immediately prior to start of procedure:  Magy Silva MD, have performed the following reviews on Stewart Garcia prior to the start of the procedure:            * Patient was identified by name and date of birth   * Agreement on procedure being performed was verified  * Risks and Benefits explained to the patient  * Procedure site verified and marked as necessary  * Patient was positioned for comfort  * Consent was obtained     Time: 11:00 AM     Date of procedure: 3/1/2017    Procedure performed by:  Aileen Leahy MD    Ms. Sanders tolerated the procedure well with no complications. MRI RIGHT SHOULDER W CONT 2/24/17  IMPRESSIONS:  1. Completely gapping tear with retraction involving the infraspinatus and supraspinatus components of rotator cuff all right shoulder. 2. The teres minor component and the subscapularis component of rotator cuff appear to be grossly intact but with some abnormal signals, suggestive of partial tear or sprain. 3. Extravasation of contrast and including portions of the deltoid muscle at the posterior and anterolateral aspect of right shoulder joint. Partial tear or sprain of the deltoid muscle suspected. 4. The tendon of long head of biceps brachii muscle appears to be subluxed medially but grossly intact. The tendon of short head of biceps brachii muscle appears to be intact. 5. Posterior aspect of capsule of right glenohumeral joint is poorly defined and likely to be torn. 6. The glenoid labrum is grossly intact. RADIOGRAPHS:  XR RIGHT SHOULDER 3/1/17  IMPRESSION:  No fractures, acromioclavicular narrowing, mild glenohumeral narrowing, no calcific densities, very small inferior humeral head osteophyte, subacromial narrowing with internal rotation. XR RIGHT SHOULDER 1/30/17  IMPRESSION:  1. No acute fracture or dislocation. 2. Reverse [facility] deformity suggesting prior shoulder dislocation. IMPRESSION:      ICD-10-CM ICD-9-CM    1.  Complete tear of right rotator cuff M75.121 727.61 betamethasone (CELESTONE SOLUSPAN) 6 mg/mL injection      BETAMETHASONE ACETATE & SODIUM PHOSPHATE INJECTION 3 MG EA.      DRAIN/INJECT LARGE JOINT/BURSA      bupivacaine, PF, (MARCAINE, PF,) 0.25 % (2.5 mg/mL) injection    Infraspinatus and supraspinatus tendons   2. Arthrosis of right acromioclavicular joint M19.011 715.91 betamethasone (CELESTONE SOLUSPAN) 6 mg/mL injection      BETAMETHASONE ACETATE & SODIUM PHOSPHATE INJECTION 3 MG EA.      DRAIN/INJECT LARGE JOINT/BURSA      bupivacaine, PF, (MARCAINE, PF,) 0.25 % (2.5 mg/mL) injection   3. Right shoulder pain, unspecified chronicity M25.511 719.41 betamethasone (CELESTONE SOLUSPAN) 6 mg/mL injection      BETAMETHASONE ACETATE & SODIUM PHOSPHATE INJECTION 3 MG EA.      DRAIN/INJECT LARGE JOINT/BURSA      bupivacaine, PF, (MARCAINE, PF,) 0.25 % (2.5 mg/mL) injection      AMB POC X-RAY SHOULDER 1 VW   4. Sprain of right shoulder, unspecified shoulder sprain type, subsequent encounter S43.401D 840.9 betamethasone (CELESTONE SOLUSPAN) 6 mg/mL injection      BETAMETHASONE ACETATE & SODIUM PHOSPHATE INJECTION 3 MG EA.      DRAIN/INJECT LARGE JOINT/BURSA      bupivacaine, PF, (MARCAINE, PF,) 0.25 % (2.5 mg/mL) injection   5. Shoulder impingement, right M75.41 726.2 betamethasone (CELESTONE SOLUSPAN) 6 mg/mL injection      BETAMETHASONE ACETATE & SODIUM PHOSPHATE INJECTION 3 MG EA.      DRAIN/INJECT LARGE JOINT/BURSA      bupivacaine, PF, (MARCAINE, PF,) 0.25 % (2.5 mg/mL) injection   6. Obesity (BMI 30.0-34. 9) E66.9 278.00    7. Primary osteoarthritis of right shoulder M19.011 715.11     Mild     PLAN:  After discussing treatment options, patient's right shoulder was injected with 4 cc Marcaine and 1/2 cc Celestone. She will be scheduled for open right RCR. Risks of surgery outlined and informed consent obtained. Based on the size of her tear she knows that her tear may not be repairable.      Scribed by [employer] ([place]pe [place]) as dictated by Brittanie Kaba MD

## 2017-03-29 DIAGNOSIS — Z01.810 PRE-OPERATIVE CARDIOVASCULAR EXAMINATION: ICD-10-CM

## 2017-03-29 DIAGNOSIS — Z01.812 BLOOD TESTS PRIOR TO TREATMENT OR PROCEDURE: ICD-10-CM

## 2017-03-29 DIAGNOSIS — M75.121 COMPLETE TEAR OF RIGHT ROTATOR CUFF: Primary | ICD-10-CM

## 2017-03-29 DIAGNOSIS — Z01.811 PRE-OPERATIVE RESPIRATORY EXAMINATION: ICD-10-CM

## 2017-04-01 ENCOUNTER — HOSPITAL ENCOUNTER (OUTPATIENT)
Dept: GENERAL RADIOLOGY | Age: 47
Discharge: HOME | End: 2017-04-01
Payer: MEDICAID

## 2017-04-01 ENCOUNTER — HOSPITAL ENCOUNTER (OUTPATIENT)
Dept: PREADMISSION TESTING | Age: 47
Discharge: HOME | End: 2017-04-01
Payer: MEDICAID

## 2017-04-01 DIAGNOSIS — Z01.811 PRE-OPERATIVE RESPIRATORY EXAMINATION: ICD-10-CM

## 2017-04-01 DIAGNOSIS — Z01.812 BLOOD TESTS PRIOR TO TREATMENT OR PROCEDURE: ICD-10-CM

## 2017-04-01 DIAGNOSIS — M75.121 COMPLETE TEAR OF RIGHT ROTATOR CUFF: ICD-10-CM

## 2017-04-01 DIAGNOSIS — Z01.810 PRE-OPERATIVE CARDIOVASCULAR EXAMINATION: ICD-10-CM

## 2017-04-01 LAB
ANION GAP BLD CALC-SCNC: 6 MMOL/L (ref 3–18)
BUN SERPL-MCNC: 7 MG/DL (ref 7–18)
BUN/CREAT SERPL: 9 (ref 12–20)
CALCIUM SERPL-MCNC: 9.1 MG/DL (ref 8.5–10.1)
CHLORIDE SERPL-SCNC: 107 MMOL/L (ref 100–108)
CO2 SERPL-SCNC: 31 MMOL/L (ref 21–32)
CREAT SERPL-MCNC: 0.74 MG/DL (ref 0.6–1.3)
GLUCOSE SERPL-MCNC: 48 MG/DL (ref 74–99)
POTASSIUM SERPL-SCNC: 3.5 MMOL/L (ref 3.5–5.5)
SODIUM SERPL-SCNC: 144 MMOL/L (ref 136–145)

## 2017-04-01 PROCEDURE — 93005 ELECTROCARDIOGRAM TRACING: CPT

## 2017-04-01 PROCEDURE — 36415 COLL VENOUS BLD VENIPUNCTURE: CPT | Performed by: PHYSICIAN ASSISTANT

## 2017-04-01 PROCEDURE — 71020 XR CHEST PA LAT: CPT

## 2017-04-01 PROCEDURE — 80048 BASIC METABOLIC PNL TOTAL CA: CPT | Performed by: PHYSICIAN ASSISTANT

## 2017-04-03 LAB
ATRIAL RATE: 75 BPM
CALCULATED P AXIS, ECG09: 26 DEGREES
CALCULATED R AXIS, ECG10: -18 DEGREES
CALCULATED T AXIS, ECG11: 18 DEGREES
DIAGNOSIS, 93000: NORMAL
P-R INTERVAL, ECG05: 150 MS
Q-T INTERVAL, ECG07: 418 MS
QRS DURATION, ECG06: 88 MS
QTC CALCULATION (BEZET), ECG08: 466 MS
VENTRICULAR RATE, ECG03: 75 BPM

## 2017-04-03 RX ORDER — OMEPRAZOLE 20 MG/1
20 CAPSULE, DELAYED RELEASE ORAL DAILY
COMMUNITY

## 2017-04-26 DIAGNOSIS — Z01.812 BLOOD TESTS PRIOR TO TREATMENT OR PROCEDURE: ICD-10-CM

## 2017-04-26 DIAGNOSIS — M75.121 COMPLETE TEAR OF RIGHT ROTATOR CUFF: Primary | ICD-10-CM

## 2017-04-26 NOTE — TELEPHONE ENCOUNTER
Last Visit: 03/01/2017 with MD Milli Moyer    Next Appointment: 05/15/2017 with HAMZAH Soto   Previous Refill Encounters: 02/03/2017 per MD Hemphill #[id number]     Requested Prescriptions     Pending Prescriptions Disp Refills    HYDROcodone-acetaminophen (NORCO) 7.5-325 mg per tablet 60 Tab 0     Sig: Take 1-2 Tabs by mouth nightly as needed for Pain. Max Daily Amount: 2 Tabs.

## 2017-04-27 RX ORDER — HYDROCODONE BITARTRATE AND ACETAMINOPHEN 7.5; 325 MG/1; MG/1
1-2 TABLET ORAL
Qty: 26 TAB | Refills: 0 | Status: SHIPPED | OUTPATIENT
Start: 2017-04-27 | End: 2017-04-28

## 2017-04-28 ENCOUNTER — HOSPITAL ENCOUNTER (OUTPATIENT)
Dept: PREADMISSION TESTING | Age: 47
Discharge: HOME | End: 2017-04-28
Payer: MEDICAID

## 2017-04-28 DIAGNOSIS — Z01.812 BLOOD TESTS PRIOR TO TREATMENT OR PROCEDURE: ICD-10-CM

## 2017-04-28 DIAGNOSIS — M75.121 COMPLETE TEAR OF RIGHT ROTATOR CUFF: ICD-10-CM

## 2017-04-28 LAB
ANION GAP BLD CALC-SCNC: 5 MMOL/L (ref 3–18)
BUN SERPL-MCNC: 8 MG/DL (ref 7–18)
BUN/CREAT SERPL: 12 (ref 12–20)
CALCIUM SERPL-MCNC: 8.9 MG/DL (ref 8.5–10.1)
CHLORIDE SERPL-SCNC: 101 MMOL/L (ref 100–108)
CO2 SERPL-SCNC: 34 MMOL/L (ref 21–32)
CREAT SERPL-MCNC: 0.67 MG/DL (ref 0.6–1.3)
GLUCOSE SERPL-MCNC: 87 MG/DL (ref 74–99)
POTASSIUM SERPL-SCNC: 4.2 MMOL/L (ref 3.5–5.5)
SODIUM SERPL-SCNC: 140 MMOL/L (ref 136–145)

## 2017-04-28 PROCEDURE — 36415 COLL VENOUS BLD VENIPUNCTURE: CPT | Performed by: PHYSICIAN ASSISTANT

## 2017-04-28 PROCEDURE — 80048 BASIC METABOLIC PNL TOTAL CA: CPT | Performed by: PHYSICIAN ASSISTANT

## 2017-04-28 RX ORDER — HYDROCODONE BITARTRATE AND ACETAMINOPHEN 7.5; 325 MG/1; MG/1
TABLET ORAL
Qty: 26 TAB | Refills: 0 | Status: SHIPPED | OUTPATIENT
Start: 2017-04-28 | End: 2017-05-17

## 2017-05-01 RX ORDER — HYDROCODONE BITARTRATE AND ACETAMINOPHEN 7.5; 325 MG/1; MG/1
1-2 TABLET ORAL
Qty: 26 TAB | Refills: 0 | Status: SHIPPED | OUTPATIENT
Start: 2017-05-01 | End: 2017-05-17

## 2017-05-01 NOTE — TELEPHONE ENCOUNTER
It appears the prescription printed at the wrong location. Pending order to HS print. Requested Prescriptions     Pending Prescriptions Disp Refills    HYDROcodone-acetaminophen (NORCO) 7.5-325 mg per tablet 26 Tab 0     Sig: Take 1-2 Tabs by mouth nightly as needed for Pain. Max Daily Amount: 2 Tabs.

## 2017-05-01 NOTE — TELEPHONE ENCOUNTER
Contacted pt at her home number. 2 pt identifiers confirmed. Pt informed of prescription available to be picked up at the  [address].

## 2017-05-15 ENCOUNTER — ANESTHESIA EVENT (OUTPATIENT)
Dept: SURGERY | Age: 47
DRG: 502 | End: 2017-05-15
Payer: SUBSIDIZED

## 2017-05-15 ENCOUNTER — OFFICE VISIT (OUTPATIENT)
Dept: ORTHOPEDIC SURGERY | Facility: CLINIC | Age: 47
End: 2017-05-15

## 2017-05-15 VITALS
TEMPERATURE: 97.1 F | SYSTOLIC BLOOD PRESSURE: 102 MMHG | WEIGHT: 213 LBS | BODY MASS INDEX: 34.23 KG/M2 | DIASTOLIC BLOOD PRESSURE: 63 MMHG | HEIGHT: 66 IN | HEART RATE: 73 BPM

## 2017-05-15 DIAGNOSIS — Z01.818 PREOP GENERAL PHYSICAL EXAM: Primary | ICD-10-CM

## 2017-05-15 DIAGNOSIS — M75.121 COMPLETE TEAR OF RIGHT ROTATOR CUFF: ICD-10-CM

## 2017-05-15 DIAGNOSIS — M25.511 RIGHT SHOULDER PAIN, UNSPECIFIED CHRONICITY: ICD-10-CM

## 2017-05-15 RX ORDER — PROMETHAZINE HYDROCHLORIDE 25 MG/1
25 TABLET ORAL
Qty: 24 TAB | Refills: 0 | Status: SHIPPED | OUTPATIENT
Start: 2017-05-15 | End: 2017-08-04 | Stop reason: CLARIF

## 2017-05-15 RX ORDER — HYDROMORPHONE HYDROCHLORIDE 2 MG/1
2 TABLET ORAL
Qty: 44 TAB | Refills: 0 | Status: SHIPPED | OUTPATIENT
Start: 2017-05-15 | End: 2017-05-24 | Stop reason: SDUPTHER

## 2017-05-15 NOTE — PROGRESS NOTES
Ms. Young Gonzales returns for History and Physical in preparation of upcoming right shoulder open rotator cuff exploration with probable repair. Please see the attached forms in Epic for History, Physical, and Review of systems.

## 2017-05-15 NOTE — H&P
History and Physical        46 year severely obese CC female seen in preparation for right shoulder open rotator cuff repair  Review of Systems   Constitutional: Positive for activity change (decreased use right shoulder secondary to pain with MRI evidence internal derangement). Negative for chills and fatigue. HENT: Negative for ear pain. Eyes: Negative for pain. Respiratory: Negative for shortness of breath. Nicotine use hx   Cardiovascular: Negative. Gastrointestinal: Negative for nausea and vomiting. Endocrine: Negative. Genitourinary: Negative for flank pain. Musculoskeletal: Positive for arthralgias, joint swelling and myalgias. Right shoulder   Skin: Negative. Allergic/Immunologic: Negative. Neurological: Positive for weakness (Right upper extremity secondary to disuse). Hematological: Negative. Psychiatric/Behavioral: Negative. Physical Exam   Nursing note and vitals reviewed. Constitutional: She is oriented to person, place, and time. She appears well-developed and well-nourished. HENT:   Head: Normocephalic and atraumatic. Eyes: Conjunctivae and EOM are normal. Pupils are equal, round, and reactive to light. Neck: Normal range of motion. Cardiovascular: Normal rate and regular rhythm. Murmur heard. Crescendo systolic murmur is present with a grade of 2/6   Pulmonary/Chest: Effort normal and breath sounds normal.   Musculoskeletal:        Right shoulder: She exhibits decreased range of motion, tenderness, swelling, pain and decreased strength. Arms:  Neurological: She is alert and oriented to person, place, and time. Skin: Skin is warm and dry. Psychiatric: She has a normal mood and affect.  Her behavior is normal. Judgment and thought content normal.     Right shoulder pain  Right shoulder internal derangement    Right shoulder open rotator cuff exploration with probable repair    Risk / Benefit: Surgeon will discuss in \"face to face\" encounter on day of surgery.

## 2017-05-16 ENCOUNTER — ANESTHESIA (OUTPATIENT)
Dept: SURGERY | Age: 47
DRG: 502 | End: 2017-05-16
Payer: SUBSIDIZED

## 2017-05-16 ENCOUNTER — HOSPITAL ENCOUNTER (INPATIENT)
Age: 47
LOS: 1 days | Discharge: HOME | DRG: 502 | End: 2017-05-17
Attending: SPECIALIST | Admitting: SPECIALIST
Payer: SUBSIDIZED

## 2017-05-16 PROBLEM — Z98.890 S/P ROTATOR CUFF REPAIR: Status: ACTIVE | Noted: 2017-05-16

## 2017-05-16 LAB — HCG SERPL QL: NEGATIVE

## 2017-05-16 PROCEDURE — 77030003666 HC NDL SPINAL BD -A: Performed by: SPECIALIST

## 2017-05-16 PROCEDURE — 74011250637 HC RX REV CODE- 250/637: Performed by: SPECIALIST

## 2017-05-16 PROCEDURE — 76010000171 HC OR TIME 2 TO 2.5 HR INTENSV-TIER 1: Performed by: SPECIALIST

## 2017-05-16 PROCEDURE — 76942 ECHO GUIDE FOR BIOPSY: CPT | Performed by: ANESTHESIOLOGY

## 2017-05-16 PROCEDURE — 77030011640 HC PAD GRND REM COVD -A: Performed by: SPECIALIST

## 2017-05-16 PROCEDURE — 77030008467 HC STPLR SKN COVD -B: Performed by: SPECIALIST

## 2017-05-16 PROCEDURE — A6214 FOAM DRG > 48 SQ IN W/BORDER: HCPCS | Performed by: SPECIALIST

## 2017-05-16 PROCEDURE — 76210000016 HC OR PH I REC 1 TO 1.5 HR: Performed by: SPECIALIST

## 2017-05-16 PROCEDURE — 74011258636 HC RX REV CODE- 258/636: Performed by: SPECIALIST

## 2017-05-16 PROCEDURE — 77030003029 HC SUT VCRL J&J -B: Performed by: SPECIALIST

## 2017-05-16 PROCEDURE — C1713 ANCHOR/SCREW BN/BN,TIS/BN: HCPCS | Performed by: SPECIALIST

## 2017-05-16 PROCEDURE — 74011250636 HC RX REV CODE- 250/636: Performed by: NURSE ANESTHETIST, CERTIFIED REGISTERED

## 2017-05-16 PROCEDURE — 77030004413 HC BUR OVL STRY -B: Performed by: SPECIALIST

## 2017-05-16 PROCEDURE — 77030033269 HC SLV COMPR SCD KNE2 CARD -B: Performed by: SPECIALIST

## 2017-05-16 PROCEDURE — 0PB90ZZ EXCISION OF RIGHT CLAVICLE, OPEN APPROACH: ICD-10-PCS | Performed by: SPECIALIST

## 2017-05-16 PROCEDURE — 74011250636 HC RX REV CODE- 250/636

## 2017-05-16 PROCEDURE — 74011250637 HC RX REV CODE- 250/637: Performed by: PHYSICIAN ASSISTANT

## 2017-05-16 PROCEDURE — 77030008189 HC RTVR SUT ARTH CNMD -B: Performed by: SPECIALIST

## 2017-05-16 PROCEDURE — 77030006773 HC BLD SAW OSC BRSM -A: Performed by: SPECIALIST

## 2017-05-16 PROCEDURE — 77030019908 HC STETH ESOPH SIMS -A: Performed by: ANESTHESIOLOGY

## 2017-05-16 PROCEDURE — 74011250636 HC RX REV CODE- 250/636: Performed by: PHYSICIAN ASSISTANT

## 2017-05-16 PROCEDURE — 77030018836 HC SOL IRR NACL ICUM -A: Performed by: SPECIALIST

## 2017-05-16 PROCEDURE — 65270000029 HC RM PRIVATE

## 2017-05-16 PROCEDURE — 77030013079 HC BLNKT BAIR HGGR 3M -A: Performed by: ANESTHESIOLOGY

## 2017-05-16 PROCEDURE — 0LM10ZZ REATTACHMENT OF RIGHT SHOULDER TENDON, OPEN APPROACH: ICD-10-PCS | Performed by: SPECIALIST

## 2017-05-16 PROCEDURE — 77030027138 HC INCENT SPIROMETER -A

## 2017-05-16 PROCEDURE — 77010033678 HC OXYGEN DAILY

## 2017-05-16 PROCEDURE — 76060000035 HC ANESTHESIA 2 TO 2.5 HR: Performed by: SPECIALIST

## 2017-05-16 PROCEDURE — 74011250636 HC RX REV CODE- 250/636: Performed by: SPECIALIST

## 2017-05-16 PROCEDURE — 74011250637 HC RX REV CODE- 250/637: Performed by: NURSE ANESTHETIST, CERTIFIED REGISTERED

## 2017-05-16 PROCEDURE — 84703 CHORIONIC GONADOTROPIN ASSAY: CPT | Performed by: SPECIALIST

## 2017-05-16 PROCEDURE — 64415 NJX AA&/STRD BRCH PLXS IMG: CPT | Performed by: ANESTHESIOLOGY

## 2017-05-16 PROCEDURE — 74011000250 HC RX REV CODE- 250

## 2017-05-16 PROCEDURE — 77030031139 HC SUT VCRL2 J&J -A: Performed by: SPECIALIST

## 2017-05-16 PROCEDURE — 77030008683 HC TU ET CUF COVD -A: Performed by: ANESTHESIOLOGY

## 2017-05-16 PROCEDURE — 77030002922 HC SUT FBRWRE ARTH -B: Performed by: SPECIALIST

## 2017-05-16 DEVICE — ANCHOR SUT DIA2.9MM NO2 MAXBRAID DBL LD JUGGERKNOT: Type: IMPLANTABLE DEVICE | Site: SHOULDER | Status: FUNCTIONAL

## 2017-05-16 RX ORDER — FENTANYL CITRATE 50 UG/ML
100 INJECTION, SOLUTION INTRAMUSCULAR; INTRAVENOUS ONCE
Status: COMPLETED | OUTPATIENT
Start: 2017-05-16 | End: 2017-05-16

## 2017-05-16 RX ORDER — PANTOPRAZOLE SODIUM 40 MG/1
40 TABLET, DELAYED RELEASE ORAL
Status: DISCONTINUED | OUTPATIENT
Start: 2017-05-17 | End: 2017-05-17 | Stop reason: HOSPADM

## 2017-05-16 RX ORDER — SODIUM CHLORIDE, SODIUM LACTATE, POTASSIUM CHLORIDE, CALCIUM CHLORIDE 600; 310; 30; 20 MG/100ML; MG/100ML; MG/100ML; MG/100ML
50 INJECTION, SOLUTION INTRAVENOUS CONTINUOUS
Status: DISCONTINUED | OUTPATIENT
Start: 2017-05-16 | End: 2017-05-16 | Stop reason: HOSPADM

## 2017-05-16 RX ORDER — CHLORPROMAZINE HYDROCHLORIDE 100 MG/1
100 TABLET, FILM COATED ORAL 3 TIMES DAILY
Status: DISCONTINUED | OUTPATIENT
Start: 2017-05-16 | End: 2017-05-17 | Stop reason: HOSPADM

## 2017-05-16 RX ORDER — SODIUM CHLORIDE 0.9 % (FLUSH) 0.9 %
5-10 SYRINGE (ML) INJECTION AS NEEDED
Status: DISCONTINUED | OUTPATIENT
Start: 2017-05-16 | End: 2017-05-17 | Stop reason: HOSPADM

## 2017-05-16 RX ORDER — PROMETHAZINE HYDROCHLORIDE 25 MG/1
25 TABLET ORAL
Status: DISCONTINUED | OUTPATIENT
Start: 2017-05-16 | End: 2017-05-17 | Stop reason: HOSPADM

## 2017-05-16 RX ORDER — ONDANSETRON 2 MG/ML
4 INJECTION INTRAMUSCULAR; INTRAVENOUS ONCE
Status: COMPLETED | OUTPATIENT
Start: 2017-05-16 | End: 2017-05-16

## 2017-05-16 RX ORDER — HYDROMORPHONE HYDROCHLORIDE 1 MG/ML
1 INJECTION, SOLUTION INTRAMUSCULAR; INTRAVENOUS; SUBCUTANEOUS
Status: DISCONTINUED | OUTPATIENT
Start: 2017-05-16 | End: 2017-05-17 | Stop reason: HOSPADM

## 2017-05-16 RX ORDER — SUCCINYLCHOLINE CHLORIDE 20 MG/ML
INJECTION INTRAMUSCULAR; INTRAVENOUS AS NEEDED
Status: DISCONTINUED | OUTPATIENT
Start: 2017-05-16 | End: 2017-05-16 | Stop reason: HOSPADM

## 2017-05-16 RX ORDER — ARIPIPRAZOLE 10 MG/1
10 TABLET ORAL DAILY
Status: DISCONTINUED | OUTPATIENT
Start: 2017-05-17 | End: 2017-05-17 | Stop reason: HOSPADM

## 2017-05-16 RX ORDER — LIDOCAINE HYDROCHLORIDE 20 MG/ML
INJECTION, SOLUTION EPIDURAL; INFILTRATION; INTRACAUDAL; PERINEURAL AS NEEDED
Status: DISCONTINUED | OUTPATIENT
Start: 2017-05-16 | End: 2017-05-16 | Stop reason: HOSPADM

## 2017-05-16 RX ORDER — UREA 10 %
1 LOTION (ML) TOPICAL 3 TIMES DAILY
Status: DISCONTINUED | OUTPATIENT
Start: 2017-05-16 | End: 2017-05-17 | Stop reason: HOSPADM

## 2017-05-16 RX ORDER — ROCURONIUM BROMIDE 10 MG/ML
INJECTION, SOLUTION INTRAVENOUS AS NEEDED
Status: DISCONTINUED | OUTPATIENT
Start: 2017-05-16 | End: 2017-05-16 | Stop reason: HOSPADM

## 2017-05-16 RX ORDER — MIDAZOLAM HYDROCHLORIDE 1 MG/ML
INJECTION, SOLUTION INTRAMUSCULAR; INTRAVENOUS AS NEEDED
Status: DISCONTINUED | OUTPATIENT
Start: 2017-05-16 | End: 2017-05-16 | Stop reason: HOSPADM

## 2017-05-16 RX ORDER — FAMOTIDINE 20 MG/1
20 TABLET, FILM COATED ORAL ONCE
Status: COMPLETED | OUTPATIENT
Start: 2017-05-16 | End: 2017-05-16

## 2017-05-16 RX ORDER — OXYCODONE AND ACETAMINOPHEN 5; 325 MG/1; MG/1
1 TABLET ORAL
Status: DISCONTINUED | OUTPATIENT
Start: 2017-05-16 | End: 2017-05-16

## 2017-05-16 RX ORDER — OXYCODONE HYDROCHLORIDE 5 MG/1
10-15 TABLET ORAL
Status: DISCONTINUED | OUTPATIENT
Start: 2017-05-16 | End: 2017-05-17 | Stop reason: HOSPADM

## 2017-05-16 RX ORDER — LIDOCAINE HYDROCHLORIDE 10 MG/ML
0.1 INJECTION, SOLUTION EPIDURAL; INFILTRATION; INTRACAUDAL; PERINEURAL AS NEEDED
Status: DISCONTINUED | OUTPATIENT
Start: 2017-05-16 | End: 2017-05-16 | Stop reason: HOSPADM

## 2017-05-16 RX ORDER — SODIUM CHLORIDE 0.9 % (FLUSH) 0.9 %
5-10 SYRINGE (ML) INJECTION EVERY 8 HOURS
Status: DISCONTINUED | OUTPATIENT
Start: 2017-05-16 | End: 2017-05-16 | Stop reason: HOSPADM

## 2017-05-16 RX ORDER — FENTANYL CITRATE 50 UG/ML
INJECTION, SOLUTION INTRAMUSCULAR; INTRAVENOUS AS NEEDED
Status: DISCONTINUED | OUTPATIENT
Start: 2017-05-16 | End: 2017-05-16 | Stop reason: HOSPADM

## 2017-05-16 RX ORDER — DIPHENHYDRAMINE HYDROCHLORIDE 50 MG/ML
12.5 INJECTION, SOLUTION INTRAMUSCULAR; INTRAVENOUS
Status: DISCONTINUED | OUTPATIENT
Start: 2017-05-16 | End: 2017-05-16 | Stop reason: HOSPADM

## 2017-05-16 RX ORDER — HYDROGEN PEROXIDE 3 %
20 SOLUTION, NON-ORAL MISCELLANEOUS DAILY
Status: DISCONTINUED | OUTPATIENT
Start: 2017-05-17 | End: 2017-05-16

## 2017-05-16 RX ORDER — SODIUM CHLORIDE 0.9 % (FLUSH) 0.9 %
5-10 SYRINGE (ML) INJECTION EVERY 8 HOURS
Status: DISCONTINUED | OUTPATIENT
Start: 2017-05-16 | End: 2017-05-17 | Stop reason: HOSPADM

## 2017-05-16 RX ORDER — GLYCOPYRROLATE 0.2 MG/ML
INJECTION INTRAMUSCULAR; INTRAVENOUS AS NEEDED
Status: DISCONTINUED | OUTPATIENT
Start: 2017-05-16 | End: 2017-05-16 | Stop reason: HOSPADM

## 2017-05-16 RX ORDER — PROPOFOL 10 MG/ML
INJECTION, EMULSION INTRAVENOUS AS NEEDED
Status: DISCONTINUED | OUTPATIENT
Start: 2017-05-16 | End: 2017-05-16 | Stop reason: HOSPADM

## 2017-05-16 RX ORDER — ALBUTEROL SULFATE 0.83 MG/ML
2.5 SOLUTION RESPIRATORY (INHALATION) AS NEEDED
Status: DISCONTINUED | OUTPATIENT
Start: 2017-05-16 | End: 2017-05-16 | Stop reason: HOSPADM

## 2017-05-16 RX ORDER — TRAZODONE HYDROCHLORIDE 100 MG/1
300 TABLET ORAL
Status: DISCONTINUED | OUTPATIENT
Start: 2017-05-16 | End: 2017-05-17 | Stop reason: HOSPADM

## 2017-05-16 RX ORDER — ONDANSETRON 2 MG/ML
INJECTION INTRAMUSCULAR; INTRAVENOUS AS NEEDED
Status: DISCONTINUED | OUTPATIENT
Start: 2017-05-16 | End: 2017-05-16 | Stop reason: HOSPADM

## 2017-05-16 RX ORDER — SODIUM CHLORIDE 0.9 % (FLUSH) 0.9 %
5-10 SYRINGE (ML) INJECTION AS NEEDED
Status: DISCONTINUED | OUTPATIENT
Start: 2017-05-16 | End: 2017-05-16 | Stop reason: HOSPADM

## 2017-05-16 RX ORDER — DEXAMETHASONE SODIUM PHOSPHATE 4 MG/ML
INJECTION, SOLUTION INTRA-ARTICULAR; INTRALESIONAL; INTRAMUSCULAR; INTRAVENOUS; SOFT TISSUE AS NEEDED
Status: DISCONTINUED | OUTPATIENT
Start: 2017-05-16 | End: 2017-05-16 | Stop reason: HOSPADM

## 2017-05-16 RX ORDER — SODIUM CHLORIDE, SODIUM LACTATE, POTASSIUM CHLORIDE, CALCIUM CHLORIDE 600; 310; 30; 20 MG/100ML; MG/100ML; MG/100ML; MG/100ML
75 INJECTION, SOLUTION INTRAVENOUS CONTINUOUS
Status: DISCONTINUED | OUTPATIENT
Start: 2017-05-16 | End: 2017-05-16 | Stop reason: HOSPADM

## 2017-05-16 RX ORDER — DEXTROSE, SODIUM CHLORIDE, SODIUM LACTATE, POTASSIUM CHLORIDE, AND CALCIUM CHLORIDE 5; .6; .31; .03; .02 G/100ML; G/100ML; G/100ML; G/100ML; G/100ML
75 INJECTION, SOLUTION INTRAVENOUS CONTINUOUS
Status: DISCONTINUED | OUTPATIENT
Start: 2017-05-16 | End: 2017-05-17 | Stop reason: HOSPADM

## 2017-05-16 RX ORDER — EPHEDRINE SULFATE/0.9% NACL/PF 25 MG/5 ML
SYRINGE (ML) INTRAVENOUS AS NEEDED
Status: DISCONTINUED | OUTPATIENT
Start: 2017-05-16 | End: 2017-05-16 | Stop reason: HOSPADM

## 2017-05-16 RX ORDER — ZOLPIDEM TARTRATE 5 MG/1
10 TABLET ORAL
Status: DISCONTINUED | OUTPATIENT
Start: 2017-05-16 | End: 2017-05-16

## 2017-05-16 RX ORDER — ACETAMINOPHEN 325 MG/1
650 TABLET ORAL
Status: DISCONTINUED | OUTPATIENT
Start: 2017-05-16 | End: 2017-05-17 | Stop reason: HOSPADM

## 2017-05-16 RX ORDER — HYDROMORPHONE HYDROCHLORIDE 2 MG/ML
0.5 INJECTION, SOLUTION INTRAMUSCULAR; INTRAVENOUS; SUBCUTANEOUS
Status: DISCONTINUED | OUTPATIENT
Start: 2017-05-16 | End: 2017-05-16 | Stop reason: HOSPADM

## 2017-05-16 RX ORDER — ROPIVACAINE HYDROCHLORIDE 5 MG/ML
30 INJECTION, SOLUTION EPIDURAL; INFILTRATION; PERINEURAL
Status: COMPLETED | OUTPATIENT
Start: 2017-05-16 | End: 2017-05-16

## 2017-05-16 RX ORDER — HYDROMORPHONE HYDROCHLORIDE 2 MG/1
2 TABLET ORAL
Status: DISCONTINUED | OUTPATIENT
Start: 2017-05-16 | End: 2017-05-16

## 2017-05-16 RX ORDER — MIDAZOLAM HYDROCHLORIDE 1 MG/ML
2 INJECTION, SOLUTION INTRAMUSCULAR; INTRAVENOUS ONCE
Status: COMPLETED | OUTPATIENT
Start: 2017-05-16 | End: 2017-05-16

## 2017-05-16 RX ORDER — NALOXONE HYDROCHLORIDE 0.4 MG/ML
0.4 INJECTION, SOLUTION INTRAMUSCULAR; INTRAVENOUS; SUBCUTANEOUS AS NEEDED
Status: DISCONTINUED | OUTPATIENT
Start: 2017-05-16 | End: 2017-05-16 | Stop reason: HOSPADM

## 2017-05-16 RX ORDER — ESZOPICLONE 1 MG/1
3 TABLET, FILM COATED ORAL
Status: DISCONTINUED | OUTPATIENT
Start: 2017-05-16 | End: 2017-05-16

## 2017-05-16 RX ORDER — LAMOTRIGINE 100 MG/1
100 TABLET ORAL DAILY
Status: DISCONTINUED | OUTPATIENT
Start: 2017-05-17 | End: 2017-05-17 | Stop reason: HOSPADM

## 2017-05-16 RX ORDER — BACLOFEN 10 MG/1
10 TABLET ORAL 3 TIMES DAILY
Status: DISCONTINUED | OUTPATIENT
Start: 2017-05-16 | End: 2017-05-17 | Stop reason: HOSPADM

## 2017-05-16 RX ORDER — DOCUSATE SODIUM 100 MG/1
100 CAPSULE, LIQUID FILLED ORAL 2 TIMES DAILY
Status: DISCONTINUED | OUTPATIENT
Start: 2017-05-16 | End: 2017-05-17 | Stop reason: HOSPADM

## 2017-05-16 RX ORDER — VENLAFAXINE HYDROCHLORIDE 75 MG/1
75 CAPSULE, EXTENDED RELEASE ORAL DAILY
Status: DISCONTINUED | OUTPATIENT
Start: 2017-05-17 | End: 2017-05-17 | Stop reason: HOSPADM

## 2017-05-16 RX ADMIN — CHLORPROMAZINE HYDROCHLORIDE 100 MG: 100 TABLET, SUGAR COATED ORAL at 17:37

## 2017-05-16 RX ADMIN — HYDROMORPHONE HYDROCHLORIDE 1 MG: 1 INJECTION, SOLUTION INTRAMUSCULAR; INTRAVENOUS; SUBCUTANEOUS at 17:34

## 2017-05-16 RX ADMIN — Medication 25 MG: at 11:56

## 2017-05-16 RX ADMIN — MIDAZOLAM HYDROCHLORIDE 1 MG: 1 INJECTION, SOLUTION INTRAMUSCULAR; INTRAVENOUS at 09:43

## 2017-05-16 RX ADMIN — TRAZODONE HYDROCHLORIDE 300 MG: 100 TABLET ORAL at 21:56

## 2017-05-16 RX ADMIN — SODIUM CHLORIDE, SODIUM LACTATE, POTASSIUM CHLORIDE, AND CALCIUM CHLORIDE: 600; 310; 30; 20 INJECTION, SOLUTION INTRAVENOUS at 12:00

## 2017-05-16 RX ADMIN — Medication 10 ML: at 17:00

## 2017-05-16 RX ADMIN — HYDROMORPHONE HYDROCHLORIDE 1 MG: 1 INJECTION, SOLUTION INTRAMUSCULAR; INTRAVENOUS; SUBCUTANEOUS at 22:06

## 2017-05-16 RX ADMIN — SODIUM CHLORIDE 1 G: 900 INJECTION, SOLUTION INTRAVENOUS at 11:00

## 2017-05-16 RX ADMIN — OXYCODONE HYDROCHLORIDE 15 MG: 5 TABLET ORAL at 20:52

## 2017-05-16 RX ADMIN — FENTANYL CITRATE 100 MCG: 50 INJECTION, SOLUTION INTRAMUSCULAR; INTRAVENOUS at 11:07

## 2017-05-16 RX ADMIN — FENTANYL CITRATE 50 MCG: 50 INJECTION, SOLUTION INTRAMUSCULAR; INTRAVENOUS at 12:45

## 2017-05-16 RX ADMIN — DEXAMETHASONE SODIUM PHOSPHATE 4 MG: 4 INJECTION, SOLUTION INTRA-ARTICULAR; INTRALESIONAL; INTRAMUSCULAR; INTRAVENOUS; SOFT TISSUE at 11:12

## 2017-05-16 RX ADMIN — ROCURONIUM BROMIDE 25 MG: 10 INJECTION, SOLUTION INTRAVENOUS at 11:12

## 2017-05-16 RX ADMIN — Medication 10 ML: at 21:59

## 2017-05-16 RX ADMIN — SODIUM CHLORIDE 1000 MG: 900 INJECTION, SOLUTION INTRAVENOUS at 09:54

## 2017-05-16 RX ADMIN — FAMOTIDINE 20 MG: 20 TABLET ORAL at 08:47

## 2017-05-16 RX ADMIN — ROPIVACAINE HYDROCHLORIDE 150 MG: 5 INJECTION, SOLUTION EPIDURAL; INFILTRATION; PERINEURAL at 09:51

## 2017-05-16 RX ADMIN — LIDOCAINE HYDROCHLORIDE 60 MG: 20 INJECTION, SOLUTION EPIDURAL; INFILTRATION; INTRACAUDAL; PERINEURAL at 11:07

## 2017-05-16 RX ADMIN — GLYCOPYRROLATE 0.2 MG: 0.2 INJECTION INTRAMUSCULAR; INTRAVENOUS at 11:00

## 2017-05-16 RX ADMIN — FENTANYL CITRATE 50 MCG: 50 INJECTION, SOLUTION INTRAMUSCULAR; INTRAVENOUS at 12:12

## 2017-05-16 RX ADMIN — BACLOFEN 10 MG: 10 TABLET ORAL at 17:37

## 2017-05-16 RX ADMIN — ONDANSETRON 4 MG: 2 INJECTION INTRAMUSCULAR; INTRAVENOUS at 13:18

## 2017-05-16 RX ADMIN — MIDAZOLAM HYDROCHLORIDE 2 MG: 1 INJECTION, SOLUTION INTRAMUSCULAR; INTRAVENOUS at 11:00

## 2017-05-16 RX ADMIN — SODIUM CHLORIDE, SODIUM LACTATE, POTASSIUM CHLORIDE, CALCIUM CHLORIDE, AND DEXTROSE MONOHYDRATE 75 ML/HR: 600; 310; 30; 20; 5 INJECTION, SOLUTION INTRAVENOUS at 17:39

## 2017-05-16 RX ADMIN — CHLORPROMAZINE HYDROCHLORIDE 100 MG: 100 TABLET, SUGAR COATED ORAL at 21:56

## 2017-05-16 RX ADMIN — Medication 27 MG: at 21:56

## 2017-05-16 RX ADMIN — SODIUM CHLORIDE, SODIUM LACTATE, POTASSIUM CHLORIDE, AND CALCIUM CHLORIDE 75 ML/HR: 600; 310; 30; 20 INJECTION, SOLUTION INTRAVENOUS at 08:47

## 2017-05-16 RX ADMIN — ONDANSETRON 4 MG: 2 INJECTION INTRAMUSCULAR; INTRAVENOUS at 11:00

## 2017-05-16 RX ADMIN — FENTANYL CITRATE 100 MCG: 50 INJECTION, SOLUTION INTRAMUSCULAR; INTRAVENOUS at 11:13

## 2017-05-16 RX ADMIN — SUCCINYLCHOLINE CHLORIDE 140 MG: 20 INJECTION INTRAMUSCULAR; INTRAVENOUS at 11:07

## 2017-05-16 RX ADMIN — Medication 27 MG: at 17:37

## 2017-05-16 RX ADMIN — FENTANYL CITRATE 50 MCG: 50 INJECTION INTRAMUSCULAR; INTRAVENOUS at 09:43

## 2017-05-16 RX ADMIN — ROCURONIUM BROMIDE 5 MG: 10 INJECTION, SOLUTION INTRAVENOUS at 11:07

## 2017-05-16 RX ADMIN — SODIUM CHLORIDE, SODIUM LACTATE, POTASSIUM CHLORIDE, AND CALCIUM CHLORIDE: 600; 310; 30; 20 INJECTION, SOLUTION INTRAVENOUS at 11:00

## 2017-05-16 RX ADMIN — DOCUSATE SODIUM 100 MG: 100 CAPSULE, LIQUID FILLED ORAL at 17:37

## 2017-05-16 RX ADMIN — PROPOFOL 150 MG: 10 INJECTION, EMULSION INTRAVENOUS at 11:07

## 2017-05-16 RX ADMIN — HYDROMORPHONE HYDROCHLORIDE 0.5 MG: 2 INJECTION, SOLUTION INTRAMUSCULAR; INTRAVENOUS; SUBCUTANEOUS at 13:16

## 2017-05-16 RX ADMIN — BACLOFEN 10 MG: 10 TABLET ORAL at 21:58

## 2017-05-16 NOTE — IP AVS SNAPSHOT
Current Discharge Medication List  
  
CONTINUE these medications which have NOT CHANGED Dose & Instructions Dispensing Information Comments Morning Noon Evening Bedtime ABILIFY 10 mg tablet Generic drug:  ARIPiprazole Your last dose was: Your next dose is:    
   
   
 Dose:  10 mg Take 10 mg by mouth daily. 10mg am and 15mg q hs Refills:  0  
     
   
   
   
  
 baclofen 10 mg tablet Commonly known as:  LIORESAL Your last dose was: Your next dose is: Take  by mouth three (3) times daily. Refills:  0  
     
   
   
   
  
 chlorproMAZINE 100 mg tablet Commonly known as:  THORAZINE Your last dose was: Your next dose is:    
   
   
 Dose:  100 mg Take 100 mg by mouth three (3) times daily. Refills:  0 HYDROmorphone 2 mg tablet Commonly known as:  DILAUDID Your last dose was: Your next dose is:    
   
   
 Dose:  2 mg Take 1 Tab by mouth every six (6) hours as needed for Pain. Max Daily Amount: 8 mg. Indications: Pain, Severe Pain Quantity:  44 Tab Refills:  0 LaMICtal 100 mg tablet Generic drug:  lamoTRIgine Your last dose was: Your next dose is: Take  by mouth daily. 100 mg am and 200mg q hs Refills:  0 LUNESTA 3 mg tablet Generic drug:  eszopiclone Your last dose was: Your next dose is: Take  by mouth nightly. Refills:  0  
     
   
   
   
  
 magnesium 250 mg Tab Your last dose was: Your next dose is: Take  by mouth. Refills:  0  
     
   
   
   
  
 multivitamin with iron chewable tablet Commonly known as:  Brunetta Matthew Your last dose was: Your next dose is:    
   
   
 Dose:  1 Tab Take 1 Tab by mouth daily. Refills:  0 NEXIUM PO Your last dose was: Your next dose is: Take  by mouth. Refills:  0 PriLOSEC 20 mg capsule Generic drug:  omeprazole Your last dose was: Your next dose is:    
   
   
 Dose:  20 mg Take 20 mg by mouth daily. Refills:  0  
     
   
   
   
  
 promethazine 25 mg tablet Commonly known as:  PHENERGAN Your last dose was: Your next dose is:    
   
   
 Dose:  25 mg Take 1 Tab by mouth every six (6) hours as needed for Nausea. Indications: POST-OPERATIVE NAUSEA AND VOMITING Quantity:  24 Tab Refills:  0  
     
   
   
   
  
 traZODone 300 mg tablet Commonly known as:  Memo Beauchamp Your last dose was: Your next dose is:    
   
   
 Dose:  300 mg Take 300 mg by mouth nightly. Refills:  0  
     
   
   
   
  
 venlafaxine-SR 37.5 mg capsule Commonly known as:  EFFEXOR-XR Your last dose was: Your next dose is:    
   
   
 Dose:  75 mg Take 75 mg by mouth daily. Refills:  0  
     
   
   
   
  
 VITAMIN B-12 PO Your last dose was: Your next dose is: Take  by mouth daily. Refills:  0  
     
   
   
   
  
 VITAMIN D2 PO Your last dose was: Your next dose is: Take  by mouth every seven (7) days. Refills:  0  
     
   
   
   
  
 zolpidem 10 mg tablet Commonly known as:  AMBIEN Your last dose was: Your next dose is:    
   
   
 Dose:  10 mg Take 10 mg by mouth nightly as needed for Sleep. Refills:  0 STOP taking these medications HYDROcodone-acetaminophen 7.5-325 mg per tablet Commonly known as:  Harman Caraballo

## 2017-05-16 NOTE — BRIEF OP NOTE
BRIEF OPERATIVE NOTE    Date of Procedure: 5/16/2017   Preoperative Diagnosis: Complete tear of right rotator cuff, impingement syndrome, acromioclavicular arthrosis  Postoperative Diagnosis: Same  Procedure(s):  OPEN RIGHT SHOULDER DISTAL CLAVICLE EXCISION/, ACROMIOPLASTY WITH ROTATOR CUFF REPAIR  Surgeon(s) and Role:     * Raúl Garcia MD - Primary         Assistant Staff:       Surgical Staff:  Circ-1: Patricia Melchor RN  Scrub Tech-Relief: Edu Poe  Scrub RN-1: Cristopher Medina RN  Surg Asst-1: Bud Atkins  Surg Asst-2: Sharee Huge  Event Time In   Incision Start 1140   Incision Close      Anesthesia: General   Estimated Blood Loss: 40 cc  Specimens: * No specimens in log *   Findings: above   Complications: none  Implants:   Implant Name Type Inv.  Item Serial No.  Lot No. LRB No. Used Action   ANCHOR SUT JUGGERKNOT 2.9MM --  - [id number]   ANCHOR SUT JUGGERKNOT 2.9MM --    [employer] [id number] Right 1 Implanted

## 2017-05-16 NOTE — ANESTHESIA POSTPROCEDURE EVALUATION
Post-Anesthesia Evaluation and Assessment    Patient: Claudine Valdes MRN: [id number]  SSN: [id number]    YOB: 1970  Age: 48 y.o. Sex: female       Cardiovascular Function/Vital Signs  Visit Vitals    /71    Pulse 82    Temp 36.8 °C (98.2 °F)    Resp 12    Ht 5' 6\" (1.676 m)    Wt 96.8 kg (213 lb 6 oz)    SpO2 96%    BMI 34.44 kg/m2       Patient is status post general, regional anesthesia for Procedure(s):  OPEN RIGHT SHOULDER DISTAL CLAVICLE EXCISION/, ACROMIOPLASTY WITH ROTATOR CUFF REPAIR. Nausea/Vomiting: None    Postoperative hydration reviewed and adequate. Pain:  Pain Scale 1: Numeric (0 - 10) (05/16/17 0826)  Pain Intensity 1: 6 (05/16/17 0826)   Managed    Neurological Status:   Neuro (WDL): Within Defined Limits (05/16/17 0859)   At baseline    Mental Status and Level of Consciousness: Arousable    Pulmonary Status:   O2 Device: Oxygen mask (05/16/17 1307)   Adequate oxygenation and airway patent    Complications related to anesthesia: None    Post-anesthesia assessment completed.  No concerns        Signed By: Tasneem Bowman MD     May 16, 2017

## 2017-05-16 NOTE — ANESTHESIA PREPROCEDURE EVALUATION
Anesthetic History   No history of anesthetic complications            Review of Systems / Medical History  Patient summary reviewed and pertinent labs reviewed    Pulmonary          Smoker         Neuro/Psych         Psychiatric history     Cardiovascular                  Exercise tolerance: >4 METS     GI/Hepatic/Renal     GERD           Endo/Other        Morbid obesity     Other Findings   Comments:   Risk Factors for Postoperative nausea/vomiting:       History of postoperative nausea/vomiting? NO       Female? YES       Motion sickness? NO       Intended opioid administration for postoperative analgesia? YES      Smoking Abstinence  Current Smoker? YES  Elective Surgery? YES  Seen preoperatively by anesthesiologist or proxy prior to day of surgery? YES  Pt abstained from smoking 24 hours prior to anesthesia?  NO           Physical Exam    Airway  Mallampati: II  TM Distance: 4 - 6 cm  Neck ROM: normal range of motion   Mouth opening: Normal     Cardiovascular  Regular rate and rhythm,  S1 and S2 normal,  no murmur, click, rub, or gallop             Dental  No notable dental hx       Pulmonary  Breath sounds clear to auscultation               Abdominal  GI exam deferred       Other Findings            Anesthetic Plan    ASA: 2  Anesthesia type: general and regional - interscalene block          Induction: Intravenous  Anesthetic plan and risks discussed with: Patient

## 2017-05-16 NOTE — ANESTHESIA PROCEDURE NOTES
Peripheral Block    Start time: 5/16/2017 9:49 AM  End time: 5/16/2017 9:57 AM  Performed by: Sherine Bautista  Authorized by: Sherine Bautista       Pre-procedure: Indications: at surgeon's request, post-op pain management and procedure for pain    Preanesthetic Checklist: patient identified, risks and benefits discussed, site marked, timeout performed, anesthesia consent given and patient being monitored      Block Type:   Block Type:  Brachial plexus  Laterality:  Right  Monitoring:  Standard ASA monitoring, continuous pulse ox, frequent vital sign checks, oxygen, responsive to questions and heart rate  Injection Technique:  Single shot  Procedures: ultrasound guided    Patient Position: seated  Prep: chlorhexidine    Needle Type:  Stimuplex  Needle Gauge:  22 G  Needle Localization:  Ultrasound guidance  Medication Injected:  0.5%  ropivacaine  Volume (mL):  25    Assessment:  Number of attempts:  1  Injection Assessment:  No intravascular symptoms, negative aspiration for blood, local visualized surrounding nerve on ultrasound, ultrasound image on chart, no paresthesia and incremental injection every 5 mL  Patient tolerance:  Patient tolerated the procedure well with no immediate complications  Location:  PREOP HOLDING    Patient given 1 mg IV Versed and 50 mcg IV Fentanyl for sedation.     5/16/2017     9:57 AM     Gilda Lugo MD

## 2017-05-16 NOTE — PERIOP NOTES
TRANSFER - OUT REPORT:    Verbal report given to [facility] RN on [facility]  being transferred to [address] [place] [address] for routine post - op       Report consisted of patients Situation, Background, Assessment and   Recommendations(SBAR). Information from the following report(s) SBAR, OR Summary and MAR was reviewed with the receiving nurse. Lines:   Peripheral IV 05/16/17 Left Hand (Active)   Site Assessment Clean, dry, & intact 5/16/2017  1:07 PM   Phlebitis Assessment 0 5/16/2017  1:07 PM   Infiltration Assessment 0 5/16/2017  1:07 PM   Dressing Status Clean, dry, & intact 5/16/2017  1:07 PM   Dressing Type Transparent;Tape 5/16/2017  1:07 PM   Hub Color/Line Status Pink; Infusing 5/16/2017  1:07 PM        Opportunity for questions and clarification was provided.       Patient transported with:   O2 @ 3 liters  Registered Nurse  [facility]

## 2017-05-16 NOTE — OP NOTES
Operative Note      Patient: Brandyn Talbot     Date of Surgery: 5/16/2017         YOB: 1970      Age:  48 y.o.        LOS:  LOS: 0 days       Preoperative Diagnosis:  Complete tear of right rotator cuff, impingement syndrome, acromioclavicular arthrosis    Postoperative Diagnosis: Same    Surgeon:  Yany Early MD    Anesthesia:  General anesthesia    Procedure:  Procedure(s):  OPEN RIGHT SHOULDER DISTAL CLAVICLE EXCISION/, ACROMIOPLASTY WITH ROTATOR CUFF REPAIR    Time out performed: YES    Estimated Blood Loss:  40 ML           Implants:    Implant Name Type Inv. Item Serial No.  Lot No. LRB No. Used Action   ANCHOR SUT JUGGERKNOT 2.9MM --  - [id number]   ANCHOR SUT JUGGERKNOT 2.9MM --    [employer] [id number] Right 1 Implanted       Specimens: None         Complications:  None    DESCRIPTION OF PROCEDURE: After satisfactory general plus interscalene nerve block anesthesia, with the patient in the supine beach chair position, the patient's shoulder and upper extremity were prepped with ChloraPrep solution and draped in the usual fashion for rotator cuff surgery. An anterolateral incision was made from the lateral  acromion to the coracoid process. The incision was carried down through the  subcutaneous tissues to the underlying deltoid. The deltoid was detached  from the distal clavicle and anterior acromion. The distal clavicle was  excised using an oscillating saw. An anterior inferior 1/3 acromionectomy  was performed using the oscillating saw, and a smooth undersurface of the  acromion was achieved. A supraspinatus tendon tear was noted. There was significantg retraction of the tendon edge to the glenoid rim. Adhesions were freed with digital palpation. The free edge of the rotator cuff was carefully trimmed with a #15 blade. A small bone trough was made just medial to the greater tuberosity.  The free edge of supraspinatus and infraspinatous tendons were repaired back into the bony trough using #2 FiberWire and #2 Vicryl sutures through bone tunnels made with the TxVia system. The sutures were tied over the greater tuberosity. A 2.9 Juggerknot suture anchor was used for double row fixation. The sutures were tied, and a strong repair was obtained. The wound was irrigated thoroughly. Closure was obtained using #2 Vicryl for repair of the deltoid to trapezius and deltoid to acromion. The subcutaneous tissue was closed with 2-0 Vicryl and staples were used for the skin. Sterile gauze dressings and a sling were applied. Ms. Baljinder Alas was transported back to the recovery room in good condition. Sponge and needle count was correct.

## 2017-05-16 NOTE — IP AVS SNAPSHOT
Chelsea Chang 
 
 
 [address] Patient: Bhupinder Gonzalez MRN: [id number] AFR:3/16/9580 You are allergic to the following Allergen Reactions Amoxicillin Other (comments) Does no work Codeine Nausea and Vomiting Recent Documentation Height Weight BMI OB Status Smoking Status 1.676 m 96.8 kg 34.44 kg/m2 Having regular periods Current Every Day Smoker Emergency Contacts Name Discharge Info Relation Home Work Mobile [facility] DISCHARGE CAREGIVER [3] Spouse [3] [phone] About your hospitalization You were admitted on:  May 16, 2017 You last received care in the:  [facility] [address] You were discharged on:  May 17, 2017 Unit phone number:  [phone] Why you were hospitalized Your primary diagnosis was:  Not on File Your diagnoses also included:  S/P Rotator Cuff Repair Providers Seen During Your Hospitalizations Provider Role Specialty Primary office phone James Garcia MD Attending Provider Orthopedic Surgery [phone] Your Primary Care Physician (PCP) Primary Care Physician Office Phone Office Fax Shelton Abbott [phone] Follow-up Information Follow up With Details Comments Contact Info Chas Mcdermott MD On 5/24/2017 Appointment at 2;20 pm [address] [place] [address] 
[phone] Mara Diallo PA-C On 5/18/2017 Appointment at 10:30 am [address] [facility] 
[phone] Your Appointments Thursday May 18, 2017 10:30 AM EDT HISTORY AND PHYSICAL with Mara Diallo PA-C  
[facility] [address], [address] [facility]  
[phone] Current Discharge Medication List  
  
 CONTINUE these medications which have NOT CHANGED Dose & Instructions Dispensing Information Comments Morning Noon Evening Bedtime ABILIFY 10 mg tablet Generic drug:  ARIPiprazole Your last dose was: Your next dose is:    
   
   
 Dose:  10 mg Take 10 mg by mouth daily. 10mg am and 15mg q hs Refills:  0  
     
   
   
   
  
 baclofen 10 mg tablet Commonly known as:  LIORESAL Your last dose was: Your next dose is: Take  by mouth three (3) times daily. Refills:  0  
     
   
   
   
  
 chlorproMAZINE 100 mg tablet Commonly known as:  THORAZINE Your last dose was: Your next dose is:    
   
   
 Dose:  100 mg Take 100 mg by mouth three (3) times daily. Refills:  0 HYDROmorphone 2 mg tablet Commonly known as:  DILAUDID Your last dose was: Your next dose is:    
   
   
 Dose:  2 mg Take 1 Tab by mouth every six (6) hours as needed for Pain. Max Daily Amount: 8 mg. Indications: Pain, Severe Pain Quantity:  44 Tab Refills:  0 LaMICtal 100 mg tablet Generic drug:  lamoTRIgine Your last dose was: Your next dose is: Take  by mouth daily. 100 mg am and 200mg q hs Refills:  0 LUNESTA 3 mg tablet Generic drug:  eszopiclone Your last dose was: Your next dose is: Take  by mouth nightly. Refills:  0  
     
   
   
   
  
 magnesium 250 mg Tab Your last dose was: Your next dose is: Take  by mouth. Refills:  0  
     
   
   
   
  
 multivitamin with iron chewable tablet Commonly known as:  [place] Your last dose was: Your next dose is:    
   
   
 Dose:  1 Tab Take 1 Tab by mouth daily. Refills:  0 NEXIUM PO Your last dose was: Your next dose is: Take  by mouth. Refills:  0 PriLOSEC 20 mg capsule Generic drug:  omeprazole Your last dose was: Your next dose is:    
   
   
 Dose:  20 mg Take 20 mg by mouth daily. Refills:  0  
     
   
   
   
  
 promethazine 25 mg tablet Commonly known as:  PHENERGAN Your last dose was: Your next dose is:    
   
   
 Dose:  25 mg Take 1 Tab by mouth every six (6) hours as needed for Nausea. Indications: POST-OPERATIVE NAUSEA AND VOMITING Quantity:  24 Tab Refills:  0  
     
   
   
   
  
 traZODone 300 mg tablet Commonly known as:  Ruthe Boga Your last dose was: Your next dose is:    
   
   
 Dose:  300 mg Take 300 mg by mouth nightly. Refills:  0  
     
   
   
   
  
 venlafaxine-SR 37.5 mg capsule Commonly known as:  EFFEXOR-XR Your last dose was: Your next dose is:    
   
   
 Dose:  75 mg Take 75 mg by mouth daily. Refills:  0  
     
   
   
   
  
 VITAMIN B-12 PO Your last dose was: Your next dose is: Take  by mouth daily. Refills:  0  
     
   
   
   
  
 VITAMIN D2 PO Your last dose was: Your next dose is: Take  by mouth every seven (7) days. Refills:  0  
     
   
   
   
  
 zolpidem 10 mg tablet Commonly known as:  AMBIEN Your last dose was: Your next dose is:    
   
   
 Dose:  10 mg Take 10 mg by mouth nightly as needed for Sleep. Refills:  0 STOP taking these medications HYDROcodone-acetaminophen 7.5-325 mg per tablet Commonly known as:  Reshma Mane Discharge Instructions Patient armband removed and shredded [employer] Activation Thank you for requesting access to "[employer]. Please follow the instructions below to securely access and download your online medical record.  "[employer] allows you to send messages to your doctor, view your test results, renew your prescriptions, schedule appointments, and more. How Do I Sign Up? 1. In your internet browser, go to [email or web address] 
2. Click on the First Time User? Click Here link in the Sign In box. You will be redirect to the New Member Sign Up page. 3. Enter your [employer] Access Code exactly as it appears below. You will not need to use this code after youve completed the sign-up process. If you do not sign up before the expiration date, you must request a new code. MyChart Access Code: Activation code not generated Current [employer] Status: Patient Declined (This is the date your MyChart access code will ) 4. Enter the last four digits of your Social Security Number (xxxx) and Date of Birth (mm/dd/yyyy) as indicated and click Submit. You will be taken to the next sign-up page. 5. Create a [employer] ID. This will be your [employer] login ID and cannot be changed, so think of one that is secure and easy to remember. 6. Create a [employer] password. You can change your password at any time. 7. Enter your Password Reset Question and Answer. This can be used at a later time if you forget your password. 8. Enter your e-mail address. You will receive e-mail notification when new information is available in [address] 9. Click Sign Up. You can now view and download portions of your medical record. 10. Click the Download Summary menu link to download a portable copy of your medical information. Additional Information If you have questions, please visit the Frequently Asked Questions section of the [employer] website at [email or web address] Remember, [employer] is NOT to be used for urgent needs. For medical emergencies, dial 911. DISCHARGE SUMMARY from Nurse The following personal items are in your possession at time of discharge: 
 
Dental Appliances: None Visual Aid: None Home Medications: None Jewelry: Ralph Elias Clothing: Jacket/Coat, Footwear, Pants, Shirt, Undergarments PATIENT INSTRUCTIONS: 
 
 
F-face looks uneven A-arms unable to move or move unevenly S-speech slurred or non-existent T-time-call 911 as soon as signs and symptoms begin-DO NOT go Back to bed or wait to see if you get better-TIME IS BRAIN. Warning Signs of HEART ATTACK Call 911 if you have these symptoms: 
? Chest discomfort. Most heart attacks involve discomfort in the center of the chest that lasts more than a few minutes, or that goes away and comes back. It can feel like uncomfortable pressure, squeezing, fullness, or pain. ? Discomfort in other areas of the upper body. Symptoms can include pain or discomfort in one or both arms, the back, neck, jaw, or stomach. ? Shortness of breath with or without chest discomfort. ? Other signs may include breaking out in a cold sweat, nausea, or lightheadedness. Don't wait more than five minutes to call [address]! Fast action can save your life. Calling 911 is almost always the fastest way to get lifesaving treatment. Emergency Medical Services staff can begin treatment when they arrive  up to an hour sooner than if someone gets to the hospital by car. The discharge information has been reviewed with the patient. The patient verbalized understanding. Discharge medications reviewed with the patient and appropriate educational materials and side effects teaching were provided. Discharge Orders None General Information Please provide this summary of care documentation to your next provider. Patient Signature:  ____________________________________________________________ Date:  ____________________________________________________________  
  
Konstantin Search  Provider Signature: ____________________________________________________________ Date:  ____________________________________________________________

## 2017-05-16 NOTE — INTERVAL H&P NOTE
H&P Update:  Claudine Valdes was seen and examined. History and physical has been reviewed. The patient has been examined. There have been no significant clinical changes since the completion of the originally dated History and Physical.  Patient identified by surgeon; surgical site was confirmed by patient and surgeon.     Signed By: Agusto Mar MD     May 16, 2017 10:05 AM

## 2017-05-17 VITALS
RESPIRATION RATE: 16 BRPM | WEIGHT: 213.38 LBS | DIASTOLIC BLOOD PRESSURE: 63 MMHG | HEIGHT: 66 IN | OXYGEN SATURATION: 95 % | SYSTOLIC BLOOD PRESSURE: 101 MMHG | BODY MASS INDEX: 34.29 KG/M2 | TEMPERATURE: 99.2 F | HEART RATE: 99 BPM

## 2017-05-17 PROCEDURE — 74011250637 HC RX REV CODE- 250/637: Performed by: PHYSICIAN ASSISTANT

## 2017-05-17 PROCEDURE — 74011250636 HC RX REV CODE- 250/636: Performed by: SPECIALIST

## 2017-05-17 PROCEDURE — 77030012935 HC DRSG AQUACEL BMS -B

## 2017-05-17 PROCEDURE — 74011250637 HC RX REV CODE- 250/637: Performed by: SPECIALIST

## 2017-05-17 RX ADMIN — BACLOFEN 10 MG: 10 TABLET ORAL at 08:35

## 2017-05-17 RX ADMIN — Medication 27 MG: at 08:35

## 2017-05-17 RX ADMIN — OXYCODONE HYDROCHLORIDE 15 MG: 5 TABLET ORAL at 01:18

## 2017-05-17 RX ADMIN — NALOXEGOL OXALATE 25 MG: 25 TABLET, FILM COATED ORAL at 08:36

## 2017-05-17 RX ADMIN — OXYCODONE HYDROCHLORIDE 15 MG: 5 TABLET ORAL at 12:58

## 2017-05-17 RX ADMIN — VANCOMYCIN HYDROCHLORIDE 1500 MG: 10 INJECTION, POWDER, LYOPHILIZED, FOR SOLUTION INTRAVENOUS at 01:23

## 2017-05-17 RX ADMIN — VENLAFAXINE HYDROCHLORIDE 75 MG: 75 CAPSULE, EXTENDED RELEASE ORAL at 08:36

## 2017-05-17 RX ADMIN — OXYCODONE HYDROCHLORIDE 15 MG: 5 TABLET ORAL at 08:35

## 2017-05-17 RX ADMIN — DOCUSATE SODIUM 100 MG: 100 CAPSULE, LIQUID FILLED ORAL at 08:35

## 2017-05-17 RX ADMIN — PANTOPRAZOLE SODIUM 40 MG: 40 TABLET, DELAYED RELEASE ORAL at 08:35

## 2017-05-17 RX ADMIN — LAMOTRIGINE 100 MG: 100 TABLET ORAL at 08:35

## 2017-05-17 RX ADMIN — CHLORPROMAZINE HYDROCHLORIDE 100 MG: 100 TABLET, SUGAR COATED ORAL at 08:35

## 2017-05-17 RX ADMIN — HYDROMORPHONE HYDROCHLORIDE 1 MG: 1 INJECTION, SOLUTION INTRAMUSCULAR; INTRAVENOUS; SUBCUTANEOUS at 04:42

## 2017-05-17 RX ADMIN — Medication 10 ML: at 08:36

## 2017-05-17 NOTE — ROUTINE PROCESS
Patient AOX4, pain 8/10, pain med given. denies SOB, pedal pulses palpable, cap refill < 3 sec, sensation present, denies tingling, trace edema noted, dressing to right shoulder is clean , dry and intact,  ice pack in in use, resting in bed in low position. No sign and symptoms of distress. Call bell in reach. Family with pt.

## 2017-05-17 NOTE — PROGRESS NOTES
Care Management Interventions  PCP Verified by CM: Yes ALINA Jarrell  Last Visit to PCP: 04/17/17  Palliative Care Consult (Criteria: CHF and RRAT>21): No (No order RRAT = 4)  Reason for No Palliative Care Consult:  Other (see comment)  Mode of Transport at Discharge: BLS  Transition of Care Consult (CM Consult): Discharge Planning  MyChart Signup: No  Discharge Durable Medical Equipment: No (No DME needs PTA or currently)  Physical Therapy Consult: No  Occupational Therapy Consult: No  Speech Therapy Consult: No  Current Support Network: Lives with Spouse (one stort home with a ramp with , mother nephew & 2 small dogs- huasband & mother are primary supports)  Confirm Follow Up Transport: Family ( - will assist with transportation to appointment tomorrow)  Plan discussed with Pt/Family/Caregiver: Yes (with pt ans with her permission her mother who was at bedside)  Discharge Location  Discharge Placement: Home with outpatient services

## 2017-05-17 NOTE — DISCHARGE INSTRUCTIONS
Patient armband removed and shredded  MyChart Activation    Thank you for requesting access to [employer]. Please follow the instructions below to securely access and download your online medical record. [employer] allows you to send messages to your doctor, view your test results, renew your prescriptions, schedule appointments, and more. How Do I Sign Up? 1. In your internet browser, go to [email or web address]  2. Click on the First Time User? Click Here link in the Sign In box. You will be redirect to the New Member Sign Up page. 3. Enter your [employer] Access Code exactly as it appears below. You will not need to use this code after youve completed the sign-up process. If you do not sign up before the expiration date, you must request a new code. [employer] Access Code: Activation code not generated  Current [employer] Status: Patient Declined (This is the date your [employer] access code will )    4. Enter the last four digits of your Social Security Number (xxxx) and Date of Birth (mm/dd/yyyy) as indicated and click Submit. You will be taken to the next sign-up page. 5. Create a [employer] ID. This will be your [employer] login ID and cannot be changed, so think of one that is secure and easy to remember. 6. Create a [employer] password. You can change your password at any time. 7. Enter your Password Reset Question and Answer. This can be used at a later time if you forget your password. 8. Enter your e-mail address. You will receive e-mail notification when new information is available in [address] 9. Click Sign Up. You can now view and download portions of your medical record. 10. Click the Download Summary menu link to download a portable copy of your medical information. Additional Information    If you have questions, please visit the Frequently Asked Questions section of the [employer] website at [email or web address] [email or web address] Remember, [employer] is NOT to be used for urgent needs.  For medical emergencies, dial 911. DISCHARGE SUMMARY from Nurse    The following personal items are in your possession at time of discharge:    Dental Appliances: None  Visual Aid: None     Home Medications: None  Jewelry: Bracelet  Clothing: Jacket/Coat, Footwear, Pants, Shirt, Undergarments                PATIENT INSTRUCTIONS:    After general anesthesia or intravenous sedation, for 24 hours or while taking prescription Narcotics:  · Limit your activities  · Do not drive and operate hazardous machinery  · Do not make important personal or business decisions  · Do  not drink alcoholic beverages  · If you have not urinated within 8 hours after discharge, please contact your surgeon on call. Report the following to your surgeon:  · Excessive pain, swelling, redness or odor of or around the surgical area  · Temperature over 100.5  · Nausea and vomiting lasting longer than 4 hours or if unable to take medications  · Any signs of decreased circulation or nerve impairment to extremity: change in color, persistent  numbness, tingling, coldness or increase pain  · Any questions        What to do at Home:  Recommended activity: Activity as tolerated,      *  Please give a list of your current medications to your Primary Care Provider. *  Please update this list whenever your medications are discontinued, doses are      changed, or new medications (including over-the-counter products) are added. *  Please carry medication information at all times in case of emergency situations. These are general instructions for a healthy lifestyle:    No smoking/ No tobacco products/ Avoid exposure to second hand smoke    Surgeon General's Warning:  Quitting smoking now greatly reduces serious risk to your health.     Obesity, smoking, and sedentary lifestyle greatly increases your risk for illness    A healthy diet, regular physical exercise & weight monitoring are important for maintaining a healthy lifestyle    You may be retaining fluid if you have a history of heart failure or if you experience any of the following symptoms:  Weight gain of 3 pounds or more overnight or 5 pounds in a week, increased swelling in our hands or feet or shortness of breath while lying flat in bed. Please call your doctor as soon as you notice any of these symptoms; do not wait until your next office visit. Recognize signs and symptoms of STROKE:    F-face looks uneven    A-arms unable to move or move unevenly    S-speech slurred or non-existent    T-time-call 911 as soon as signs and symptoms begin-DO NOT go       Back to bed or wait to see if you get better-TIME IS BRAIN. Warning Signs of HEART ATTACK     Call 911 if you have these symptoms:   Chest discomfort. Most heart attacks involve discomfort in the center of the chest that lasts more than a few minutes, or that goes away and comes back. It can feel like uncomfortable pressure, squeezing, fullness, or pain.  Discomfort in other areas of the upper body. Symptoms can include pain or discomfort in one or both arms, the back, neck, jaw, or stomach.  Shortness of breath with or without chest discomfort.  Other signs may include breaking out in a cold sweat, nausea, or lightheadedness. Don't wait more than five minutes to call 911 - MINUTES MATTER! Fast action can save your life. Calling 911 is almost always the fastest way to get lifesaving treatment. Emergency Medical Services staff can begin treatment when they arrive -- up to an hour sooner than if someone gets to the hospital by car. The discharge information has been reviewed with the patient. The patient verbalized understanding. Discharge medications reviewed with the patient and appropriate educational materials and side effects teaching were provided.

## 2017-05-17 NOTE — PROGRESS NOTES
Patient is not available to be assessed at this time.      [facility] Certified [address] [place] [address]   [phone]

## 2017-05-17 NOTE — ROUTINE PROCESS
Bedside and Verbal shift change report given to Sofía Baron (oncoming nurse) by Belle Cuellar RN (offgoing nurse). Report included the following information SBAR, Kardex, MAR and Recent Results.     SITUATION:    Code Status: No Order   Reason for Admission: Complete tear of right rotator cuff [address]: [address]   Problem List:       Hospital Problems  Date Reviewed: 5/15/2017          Codes Class Noted POA    S/P rotator cuff repair ICD-10-CM: U22.799  ICD-9-CM: V45.89  5/16/2017 Unknown              BACKGROUND:    Past Medical History:   Past Medical History:   Diagnosis Date    GERD (gastroesophageal reflux disease)     Obesity (BMI 30.0-34.9) 3/1/2017    Psychiatric disorder     depression    Reflux          Patient taking anticoagulants no     ASSESSMENT:    Changes in Assessment Throughout Shift: no     Patient has Central Line: no Reasons if yes: no   Patient has Dominguez Cath: no Reasons if yes: no      Last Vitals:     Vitals:    05/16/17 1619 05/16/17 2127 05/17/17 0142 05/17/17 0613   BP: 110/70 98/68 109/73 114/74   Pulse: 61 74 76 82   Resp: 18 16 14 16   Temp: 98.5 °F (36.9 °C) 98 °F (36.7 °C) 99 °F (37.2 °C) 97.6 °F (36.4 °C)   SpO2: 92% 90% 90% 90%   Weight:       Height:       LMP: 04/06/2017        IV and DRAINS (will only show if present)   Peripheral IV 05/16/17 Left Hand-Site Assessment: Clean, dry, & intact     WOUND (if present)   Wound Type:  none   Dressing present Dressing Present : Yes   Wound Concerns/Notes:  none     PAIN    Pain Assessment    Pain Intensity 1: 7 (05/16/17 2054)    Pain Location 1: Shoulder    Pain Intervention(s) 1: Medication (see MAR)    Patient Stated Pain Goal: 0  o Interventions for Pain:  none  o Intervention effective: no  o Time of last intervention: 0442   o Reassessment Completed: no      Last 3 Weights:  Last 3 Recorded Weights in this Encounter    04/03/17 1234 05/16/17 0826   Weight: 92.5 kg (204 lb) 96.8 kg (213 lb 6 oz)     Weight change:      INTAKE/OUPUT    Current Shift: 05/16 1901 - 05/17 0700  In: 1719.6 [P.O.:480; I.V.:1239.6]  Out: 900 [Urine:900]    Last three shifts: 05/15 0701 - 05/16 1900  In: 682.5 [I.V.:682.5]  Out: 0      LAB RESULTS   No results for input(s): WBC, HGB, HCT, PLT, HGBEXT, HCTEXT, PLTEXT in the last 72 hours. No results for input(s): NA, K, GLU, BUN, CREA, CA, MG, INR in the last 72 hours. No lab exists for component: PT, PTT, INREXT    RECOMMENDATIONS AND DISCHARGE PLANNING     1. Pending tests/procedures/ Plan of Care or Other Needs: PT     2. Discharge plan for patient and Needs/Barriers: Home    3. Estimated Discharge Date: 5/18/17 Posted on Whiteboard in Patients Room: no      4. The patient's care plan was reviewed with the oncoming nurse. \"HEALS\" SAFETY CHECK      Fall Risk    Total Score: 4    Safety Measures: Safety Measures: Bed in low position, Call light within reach    A safety check occurred in the patient's room between off going nurse and oncoming nurse listed above. The safety check included the below items  Area Items   H  High Alert Medications - Verify all high alert medication drips (heparin, PCA, etc.)   E  Equipment - Suction is set up for ALL patients (with janieker)  - Red plugs utilized for all equipment (IV pumps, etc.)  - WOWs wiped down at end of shift.  - Room stocked with oxygen, suction, and other unit-specific supplies   A  Alarms - Bed alarm is set for fall risk patients  - Ensure chair alarm is in place and activated if patient is up in a chair   L  Lines - Check IV for any infiltration  - Dominguez bag is empty if patient has a Dominguez   - Tubing and IV bags are labeled   S  Safety   - Room is clean, patient is clean, and equipment is clean. - Hallways are clear from equipment besides carts.    - Fall bracelet on for fall risk patients  - Ensure room is clear and free of clutter  - Suction is set up for ALL patients (with kimberly)  - Hallways are clear from equipment besides carts.    - Isolation precautions followed, supplies available outside room, sign posted     Mau Hanks RN

## 2017-05-17 NOTE — PROGRESS NOTES
Patient is alert and oriented x4. Denies any acute distress, SOB or chest pain. VSS. Palpable radial pulses. Denies any tingling. Dressing to right shoulder is CDI. Sling is on. Lungs are clear. Bowel sounds are active in all 4 quadrants. Passing flatus. Patient tolerating regular diet. No complaints of nausea or vomiting. Voiding without any difficulty. No neurological deficits noted. Bed is in low position, call light is within reach. Will continue to monitor.

## 2017-05-17 NOTE — ROUTINE PROCESS
Pt alert and oriented x 4. Pain of 10/10, pain med given. Dressing change done. The incision site is clean dry and intact. Incision site is Also well approximated. No redness or swelling noticed. Aquacel dressing applied. Pt tolerated fairly. The discharge information has been reviewed with the patient in the presence of the spouse and mother. They verbalized understanding. Discharge medications reviewed with the patient and appropriate educational materials and side effects teaching were provided. Pt taken to the main entrance in a wheelchair, pt stable.

## 2017-05-17 NOTE — DISCHARGE SUMMARY
Subjective: 48 y.o., female, 1 Day Post-Op, Procedure(s):  OPEN RIGHT SHOULDER DISTAL CLAVICLE EXCISION/, ACROMIOPLASTY WITH ROTATOR CUFF REPAIR     Admitting date:16 May 17    Date of Discharge/Transfer: 17 May 17    Physical Exam (day of transfer / discharge):17 May 17            History:  Past Medical History:   Diagnosis Date    GERD (gastroesophageal reflux disease)     Obesity (BMI 30.0-34.9) 3/1/2017    Psychiatric disorder     depression    Reflux        Allergies: Allergies   Allergen Reactions    Amoxicillin Other (comments)     Does no work    Codeine Nausea and Vomiting         History of Present Illness:     Ms. Betty Abebe is a pleasant female who suffered a well documented radiographic history of right shoulder internal derangement. Betty Abebe had failed all conservative measures as directed by Dr. Bryanna Lyles, and in light of Betty Abebe progressive pain and difficultly performing her  necessary Activities of Daily Living, Dr. Kirstie Hill recommended right shoulder open rotator cuff repair. Social History     Occupational History    Not on file. Social History Main Topics    Smoking status: Current Every Day Smoker     Packs/day: 1.00     Years: 20.00    Smokeless tobacco: Not on file    Alcohol use No    Drug use: No    Sexual activity: Yes     Partners: Male       Hospital Course:     Ms. Betty Abebe was admitted to [facility] on the morning of 16 May 17 under the care of Dr. Bryanna Lyles. she was taken to the operating theater under Dr. Mark Carballo direction, first assisted by trained surgical assistant's where she underwent a right shoulder open rotator cuff repair. she tolerated the procedure well, and there were no intra operative complications. Post operatively she was transferred medically stable to post op holding.  After all safety criteria had been met during her immediate post op recovery phase she was transferred medical stable to [address] [place] to begin comprehensive physical and occupational therapies, restorative nursing and thrombo embolism (DVT/PE) prophylaxis. Ms. Debi Alfred post operative course progressed slow but directed. The focus throughout the post op period focused on range of motion and pain control. . Medically she remained stable through the remainder of the hospital stay, and was deemed appropriate for discharge home on 17 May 17. DISCHARGE PLAN:      The patient will be discharged to home. DIET:  Low Calorie High Protein Diet    NUTRITION: OTC Nutritional supplements, multivitamins      ACTIVITY: No lifting, Driving, or Strenuous exercise and No heavy lifting, pushing, pulling. Weight Bearing/Range of Motion Restrictions: Per Protocol    WOUND / INCISION CARE: Keep wound clean and dry, Reinforce dressing PRN and Ice to area for comfort Keep the current dressings on and in place. There is no need to change these current dressings. Dressings to please be changed by home nurse or nursing home staff each day. Keep all pets away from any wound present in order to prevent infection. PAIN CONTROL: Prescriptions written: On chart    PRECAUTIONS: Weight Bearing/Range of Motion per Restrictions:     VTE PROPHYLAXIS: with ambulation     ANTIBIOTICS: None at this time. Physical and Occupational therapies:    will continue with attention to standard postop precautions / restrictions and below:    [XX] RUE [ ] RLE  [ ] LUE  [ ] LLE    [ ] Full WBAT   [ ] Partial WBAT   [ ] Toetouch WB   [XX] Non Weight Bearing: Follow up:    [ ] 1 week  [XX] 10 days  [ ] Specific Date:       Per [facility] Protocol this  case was discussed with attending surgeon and reflects their orders as confirmed by their co signature.      Very Respectfully,        Jef Ritchie, APA, APC, MPAS  Surical Physician Assistant-C  Department [place] [address] [address] [facility]   Contact Cell [phone]

## 2017-05-17 NOTE — PROGRESS NOTES
Patient arrived from PACU in stable condition. Alert and oriented x 4. Easily arousable. Speech is clear. Denies any acute distress, SOB or chest pain. Patient is on 3 L of O2 NC. Patient is oriented to the room, call bell use. Will continue to monitor.

## 2017-05-17 NOTE — PROGRESS NOTES
Bedside and Verbal shift change report given to Mery Montemayor RN (oncoming nurse) by Jason Miller RN (offgoing nurse). Report included the following information SBAR, Kardex, MAR and Recent Results.     SITUATION:    Code Status: No Order   Reason for Admission: Complete tear of right rotator cuff [address] day: 0   Problem List:       Hospital Problems  Date Reviewed: 5/15/2017          Codes Class Noted POA    S/P rotator cuff repair ICD-10-CM: Z32.998  ICD-9-CM: V45.89  5/16/2017 Unknown              BACKGROUND:    Past Medical History:   Past Medical History:   Diagnosis Date    GERD (gastroesophageal reflux disease)     Obesity (BMI 30.0-34.9) 3/1/2017    Psychiatric disorder     depression    Reflux          Patient taking anticoagulants no     ASSESSMENT:    Changes in Assessment Throughout Shift: none     Patient has Central Line: no Reasons if yes:    Patient has Dominguez Cath: no Reasons if yes:       Last Vitals:     Vitals:    05/16/17 1400 05/16/17 1404 05/16/17 1414 05/16/17 1619   BP:  115/66 117/67 110/70   Pulse: 70 67 68 61   Resp: 14 19 23 18   Temp:    98.5 °F (36.9 °C)   SpO2: 94% 93% 93% 92%   Weight:       Height:       LMP: 04/06/2017        IV and DRAINS (will only show if present)   Peripheral IV 05/16/17 Left Hand-Site Assessment: Clean, dry, & intact     WOUND (if present)   Wound Type:  Right shoulder incision   Dressing present Dressing Present : Yes   Wound Concerns/Notes:  none     PAIN    Pain Assessment    Pain Intensity 1: 3 (05/16/17 1722)    Pain Location 1: Shoulder    Pain Intervention(s) 1: Medication (see MAR)    Patient Stated Pain Goal: 0  o Interventions for Pain:  See MAR  o Intervention effective: yes  o Time of last intervention: 1734   o Reassessment Completed: yes      Last 3 Weights:  Last 3 Recorded Weights in this Encounter    04/03/17 1234 05/16/17 0826   Weight: 92.5 kg (204 lb) 96.8 kg (213 lb 6 oz)     Weight change:  INTAKE/OUPUT    Current Shift:      Last three shifts: 05/15 0701 - 05/16 1900  In: 682.5 [I.V.:682.5]  Out: 0      LAB RESULTS   No results for input(s): WBC, HGB, HCT, PLT, HGBEXT, HCTEXT, PLTEXT in the last 72 hours. No results for input(s): NA, K, GLU, BUN, CREA, CA, MG, INR in the last 72 hours. No lab exists for component: PT, PTT, INREXT    RECOMMENDATIONS AND DISCHARGE PLANNING     1. Pending tests/procedures/ Plan of Care or Other Needs: yes     2. Discharge plan for patient and Needs/Barriers: yes    3. Estimated Discharge Date: 5/17/17 Posted on Whiteboard in [address] [place] [address] Room: yes      4. The patient's care plan was reviewed with the oncoming nurse. \"HEALS\" SAFETY CHECK      Fall Risk    Total Score: 2    Safety Measures: Safety Measures: Bed/Chair-Wheels locked, Bed in low position, Call light within reach, Gripper socks    A safety check occurred in the patient's room between off going nurse and oncoming nurse listed above. The safety check included the below items  Area Items   H  High Alert Medications - Verify all high alert medication drips (heparin, PCA, etc.)   E  Equipment - Suction is set up for ALL patients (with kimberly)  - Red plugs utilized for all equipment (IV pumps, etc.)  - WOWs wiped down at end of shift.  - Room stocked with oxygen, suction, and other unit-specific supplies   A  Alarms - Bed alarm is set for fall risk patients  - Ensure chair alarm is in place and activated if patient is up in a chair   L  Lines - Check IV for any infiltration  - Dominguez bag is empty if patient has a Dominguez   - Tubing and IV bags are labeled   S  Safety   - Room is clean, patient is clean, and equipment is clean. - Hallways are clear from equipment besides carts. - Fall bracelet on for fall risk patients  - Ensure room is clear and free of clutter  - Suction is set up for ALL patients (with kimberly)  - Hallways are clear from equipment besides carts.    - Isolation precautions followed, supplies available outside room, sign posted     Arie Claire RN

## 2017-05-18 ENCOUNTER — OFFICE VISIT (OUTPATIENT)
Dept: ORTHOPEDIC SURGERY | Age: 47
End: 2017-05-18

## 2017-05-18 VITALS
SYSTOLIC BLOOD PRESSURE: 100 MMHG | WEIGHT: 217.4 LBS | TEMPERATURE: 98.9 F | HEIGHT: 66 IN | DIASTOLIC BLOOD PRESSURE: 64 MMHG | BODY MASS INDEX: 34.94 KG/M2 | HEART RATE: 87 BPM

## 2017-05-18 DIAGNOSIS — Z98.890 S/P ROTATOR CUFF REPAIR: ICD-10-CM

## 2017-05-18 DIAGNOSIS — M75.121 COMPLETE TEAR OF RIGHT ROTATOR CUFF: Primary | ICD-10-CM

## 2017-05-18 NOTE — PROGRESS NOTES
HISTORY OF PRESENT ILLNESS:  Cleve Larry returns two days status post her right shoulder open rotator cuff exploration with repair. She is doing fair today. Her pain is at 30/10. She is only using 2 mg of Dilaudid q 4 hours. She has enough medication to double the dose to 4 mg, and was recommended. The surgical site has remained covered. She is wearing her sling and participating in self-guided gentle stretching and Codman exercises. PHYSICAL EXAM:  The wound is uncovered today to reveal 9 cm of the proximal anterior shoulder. The wound borders are well approximated. The wound is clean and dry. Staples are noted. Over the right breast upper quadrant, there was an area of some skin tearing, likely secondary to the adhesive from the sterile field draping. The area was cleaned with alcohol. Neosporin was applied and a large Band-Aid was positioned over the area. A simple, short cover derm was applied over the right shoulder. PLAN:  The patient may shower. She will avoid getting the surgical site wet. She is to change the dressing daily. She is going to participate in physical therapy starting as early as this week and certainly no later than early next week. All her questions were answered to her satisfaction.

## 2017-05-22 ENCOUNTER — HOSPITAL ENCOUNTER (OUTPATIENT)
Dept: PHYSICAL THERAPY | Age: 47
Discharge: HOME | End: 2017-05-22
Payer: SUBSIDIZED

## 2017-05-22 PROCEDURE — 97163 PT EVAL HIGH COMPLEX 45 MIN: CPT

## 2017-05-22 PROCEDURE — 97140 MANUAL THERAPY 1/> REGIONS: CPT

## 2017-05-22 PROCEDURE — 97110 THERAPEUTIC EXERCISES: CPT

## 2017-05-22 NOTE — PROGRESS NOTES
PT DAILY TREATMENT NOTE     Patient Name: Malena Sheppard  Date:2017  : 1970  [x]  Patient  Verified  Payor: Abhay Hui / Plan: [facility] % / Product Type: [id number] /    In time:600  Out time:630  Total Treatment Time (min): 30  Visit #: 1 of 8    Treatment Area: Complete rotator cuff tear or rupture of right shoulder, not specified as traumatic [M75.121]    SUBJECTIVE  Pain Level (0-10 scale): 9  Any medication changes, allergies to medications, adverse drug reactions, diagnosis change, or new procedure performed?: [x] No    [] Yes (see summary sheet for update)  Subjective functional status/changes:   [] No changes reported  S/p open RTC . Patient is seen using her right arm extensively. She reports to therapist she was told to use it a lot and that she holds her right arm behind her back because that's the only way she can make it feel better. Extensively educated pt and clarified precautions. She responded back that she would do her best but she was right handed so she was going to have to use her right arm. Offered suggestions to assist.  Pt was resistant. OBJECTIVE    8 min Therapeutic Exercise:  [x] See flow sheet :   Rationale: increase ROM to improve the patients ability to prevent shoulder stiffness  8 min Manual Therapy:  Per flow sheet   Rationale: increase ROM and increase tissue extensibility to improve shoulder stiffness          With   [] TE   [] TA   [] neuro   [] other: Patient Education: [x] Review HEP    [] Progressed/Changed HEP based on:   [] positioning   [] body mechanics   [] transfers   [] heat/ice application    [] other:      Other Objective/Functional Measures: Heavy muscle guarding -pt doesn't understand how to relax. She held her arm up the entire PROM assessment. Pain Level (0-10 scale) post treatment: 9    ASSESSMENT/Changes in Function: Very poor understanding of RTC precautions.   Given her subjective accounts and what therapist observed during evaluation, pt has likely already compromised repair    Patient will continue to benefit from skilled PT services to modify and progress therapeutic interventions, address functional mobility deficits, address ROM deficits, address strength deficits, analyze and address soft tissue restrictions, analyze and cue movement patterns, analyze and modify body mechanics/ergonomics and assess and modify postural abnormalities to attain remaining goals. [x]  See Plan of Care  []  See progress note/recertification  []  See Discharge Summary         Progress towards goals  Short Term Goals: To be accomplished in 2 weeks:  1. Pt will be compliant with HEP to prevent shoulder stiffness and increase ease of ADLs  2. Pt will be able to demonstrate compliance with shoulder precautions including reaching behind her back to get dressed, carrying heavy objects in the right arm, and moving her elbow away from her side to ensure she regains use of her arm for ADLs     Long Term Goals: To be accomplished in 4 weeks:  1. Pt will improve FOTO to 37 to increase ease of ADLs  2.  Pt will increase passive flexion to 140 degrees to prepare for next phase of protocol    PLAN  []  Upgrade activities as tolerated     [x]  Continue plan of care  []  Update interventions per flow sheet       []  Discharge due to:_  []  Other:_      Vignesh Ambriz PT 5/22/2017  6:39 PM    Future Appointments  Date Time Provider Colt Jovel   5/24/2017 10:00 AM Vignesh Ambriz PT [facility]   5/24/2017 11:00 AM ERIC [address]ley [address]   5/31/2017 10:30 AM Bi Sanchez PTA [facility]   6/2/2017 4:00 PM Bi Sanchez PTA [facility]   6/5/2017 2:00 PM Fabrice Norwood PTA [facility]   6/8/2017 10:00 AM Fabrice Norwood PTA [facility]   6/12/2017 12:30 PM Bi Sanchez PTA [facility]   6/14/2017 12:00 PM Vignesh Ambriz PT [facility]

## 2017-05-22 NOTE — PROGRESS NOTES
[facility]  [address]  [place], [address]  [phone] fax    Plan of Care/ Statement of Necessity for Physical Therapy Services    Patient name: Hilario Goff Start of Care: 2017   Referral source: Darya Dunham,* : 1970    Medical Diagnosis: Complete rotator cuff tear or rupture of right shoulder, not specified as traumatic [M75.121]   Onset Date:2017    Treatment Diagnosis: right shoulder pain   Prior Hospitalization: see medical history Provider#: [id number]   Medications: Verified on Patient summary List    Comorbidities: anxiety, OA, asthma, obesity, depression, GERD, HA, hearing impairment, incontinence   Prior Level of Function: RHD female     The Plan of Care and following information is based on the information from the initial evaluation. Assessment/ key information: Pt presents s/p open RTC repair on 2017. Upon initial examination, pt is witness using her right shoulder for dressing and carrying objects including the act of lifting the right arm away from her body. Reviewed precautions with her and clarified that she should not be moving her elbow away from the body or lifting her arm. Pt reports she only gets pain relief with holding her right arm behind her back. Again therapist educated pt that this is a direct contraindication to her repair - especially with the open procedure where she could dislocate. Pt's only response was, \"Well I'm doing the best I can. \"  Continue PT to protect repair, restore PROM, and progress to active strengthening as medically necessary to restore functional use of the right UE.     Evaluation Complexity History HIGH Complexity :3+ comorbidities / personal factors will impact the outcome/ POC ; Examination LOW Complexity : 1-2 Standardized tests and measures addressing body structure, function, activity limitation and / or participation in recreation  ;Presentation MEDIUM Complexity : Evolving with changing characteristics  ; Clinical Decision Making MEDIUM Complexity : FOTO score of 26-74  Overall Complexity Rating: HIGH   Problem List: pain affecting function, decrease ROM, decrease ADL/ functional abilitiies, decrease activity tolerance and decrease flexibility/ joint mobility   Treatment Plan may include any combination of the following: Therapeutic exercise, Therapeutic activities, Neuromuscular re-education, Physical agent/modality, Manual therapy, Patient education and Self Care training  Patient / Family readiness to learn indicated by: asking questions, trying to perform skills and interest  Persons(s) to be included in education: patient (P)  Barriers to Learning/Limitations: None  Patient Goal (s): To regain the use of my arm  Patient Self Reported Health Status: good  Rehabilitation Potential: poor    Short Term Goals: To be accomplished in 2 weeks:  1. Pt will be compliant with HEP to prevent shoulder stiffness and increase ease of ADLs  2. Pt will be able to demonstrate compliance with shoulder precautions including reaching behind her back to get dressed, carrying heavy objects in the right arm, and moving her elbow away from her side to ensure she regains use of her arm for ADLs    Long Term Goals: To be accomplished in 4 weeks:  1. Pt will improve FOTO to 37 to increase ease of ADLs  2. Pt will increase passive flexion to 140 degrees to prepare for next phase of protocol    Frequency / Duration: Patient to be seen 2 times per week for 4 weeks. Patient/ Caregiver education and instruction: Diagnosis, prognosis, self care, activity modification, brace/ splint application and exercises. EXTENSIVE EDUCATION GIVEN ON SHOULDER PRECAUTIONS, DISLOCATION RISK, TECHNIQUES TO DON/DOFF CLOTHING APPROPRIATELY, AND HOW TO WEAR HER SLING. PATIENT WAS NOT RECEPTIVE.     [x]  Plan of care has been reviewed with DAYAMI Manzo PT 5/22/2017 6:28 PM    ________________________________________________________________________    I certify that the above Therapy Services are being furnished while the patient is under my care. I agree with the treatment plan and certify that this therapy is necessary.     [de-identified] Signature:____________________  Date:____________Time: _________    Please sign and return to [facility] [address] [address] Bridgett Huang [address] [place] [address], [facility] [address]  [phone] fax

## 2017-05-24 ENCOUNTER — OFFICE VISIT (OUTPATIENT)
Dept: ORTHOPEDIC SURGERY | Age: 47
End: 2017-05-24

## 2017-05-24 ENCOUNTER — HOSPITAL ENCOUNTER (OUTPATIENT)
Dept: PHYSICAL THERAPY | Age: 47
Discharge: HOME | End: 2017-05-24
Payer: SUBSIDIZED

## 2017-05-24 VITALS
BODY MASS INDEX: 32.78 KG/M2 | DIASTOLIC BLOOD PRESSURE: 63 MMHG | WEIGHT: 204 LBS | SYSTOLIC BLOOD PRESSURE: 108 MMHG | HEIGHT: 66 IN | HEART RATE: 73 BPM | TEMPERATURE: 97.7 F

## 2017-05-24 DIAGNOSIS — Z98.890 S/P ROTATOR CUFF REPAIR: ICD-10-CM

## 2017-05-24 DIAGNOSIS — M25.511 RIGHT SHOULDER PAIN, UNSPECIFIED CHRONICITY: Primary | ICD-10-CM

## 2017-05-24 PROCEDURE — 97140 MANUAL THERAPY 1/> REGIONS: CPT

## 2017-05-24 PROCEDURE — 97110 THERAPEUTIC EXERCISES: CPT

## 2017-05-24 RX ORDER — HYDROMORPHONE HYDROCHLORIDE 2 MG/1
2 TABLET ORAL
Qty: 44 TAB | Refills: 0 | Status: SHIPPED | OUTPATIENT
Start: 2017-05-24 | End: 2017-06-14 | Stop reason: SDUPTHER

## 2017-05-24 NOTE — PROGRESS NOTES
HISTORY OF PRESENT ILLNESS:  Moon Cordoba returns for postop wound check for her right, anterior shoulder. She is wearing a poorly-fitting sling on the right. She has begun participation in outpatient physical therapy per status post rotator cuff protocol. I did talk to the therapist this morning about the patients general noncompliance with ranging and the position she holds her arm in. The therapist was concerned that there may have been damage already in such a short time since surgery since the patient was noncompliant with direction. Ms. Shelton Skiff said she did slip down some steps within the past couple of days striking her upper arm against the side of the railing. She had her sling on. PHYSICAL EXAM:  She is a healthy-appearing, 57-year-old, morbidly obese, malodorous,  female, atraumatic, normocephalic, alert and oriented times three sitting on the table. She lies supine without difficulty. Her  is present. The right anterior proximal shoulder reveals a 10-cm surgical incision intact. Surgical staples are noted. All staples are removed with no complications. Steri-Strips were placed. RADIOGRAPHS:  X-rays today reveal right upper arm and shoulder degenerative changes noted in the glenohumeral joint. There is no evidence of fracture or dislocation associated with the shoulder. There is AC joint arthropathy well noted. PLAN:  The patient was placed in a new, well-fitting, well-padded, sling. She was shown appropriately which position the sling should be secured at. She will followup in about one month. She will continue physical therapy as previous with protocol discussed. She may get the surgical site wet but will avoid any prolonged submersion.

## 2017-05-24 NOTE — PROGRESS NOTES
PT DAILY TREATMENT NOTE 8-    Patient Name: Farshad Brooks  Date:2017  : 1970  [x]  Patient  Verified  Payor: SARA / Plan: [facility] % / Product Type: Sara /    In time:1002  Out time:1033  Total Treatment Time (min): 31  Visit #: 2 of 8    Treatment Area: Complete rotator cuff tear or rupture of right shoulder, not specified as traumatic [M75.121]    SUBJECTIVE  Pain Level (0-10 scale): 8  Any medication changes, allergies to medications, adverse drug reactions, diagnosis change, or new procedure performed?: [x] No    [] Yes (see summary sheet for update)  Subjective functional status/changes:   [] No changes reported  I fell right on my shoulder going up stairs yesterday.   Pt reported she was unable to perform her HEP secondary to issues with the height of her furniture/counters etc  Offered explanations as to how she could  modify    OBJECTIVE  10 min Manual Therapy:  Per flow sheet   Rationale: decrease pain, increase ROM and increase tissue extensibility to improve ROM  11 min Therapeutic Exercise:  [x] See flow sheet :   Rationale: increase ROM and increase strength to improve the patients ability to improve capsular mobility  Modality rationale: decrease edema and decrease pain to improve the patients ability to increase ease of ADLs   Min Type Additional Details    [] Estim:  []Unatt       []IFC  []Premod                        []Other:  []w/ice   []w/heat  Position:  Location:    [] Estim: []Att    []TENS instruct  []NMES                    []Other:  []w/US   []w/ice   []w/heat  Position:  Location:    []  Traction: [] Cervical       []Lumbar                       [] Prone          []Supine                       []Intermittent   []Continuous Lbs:  [] before manual  [] after manual    []  Ultrasound: []Continuous   [] Pulsed                           []1MHz   []3MHz Location:  W/cm2:    []  Iontophoresis with dexamethasone         Location: [] Take home patch   [] In clinic   10 [x]  Ice     []  heat  []  Ice massage  []  Laser   []  Anodyne Position:seated to right shoulder Location:    []  Laser with stim  []  Other: Position:  Location:    []  Vasopneumatic Device Pressure:       [] lo [] med [] hi   Temperature: [] lo [] med [] hi   [x] Skin assessment post-treatment:  [x]intact []redness- no adverse reaction    []redness  adverse reaction:             With   [] TE   [] TA   [] neuro   [] other: Patient Education: [x] Review HEP    [] Progressed/Changed HEP based on:   [] positioning   [] body mechanics   [] transfers   [] heat/ice application    [] other:      Other Objective/Functional Measures: Very poor capsular mobility. Less muscle guarding this session but patient is starting to adhese secondary to poor mobility, delayed start to PT and HEP noncompliance. Walked next door to inform PA of fall, poor mobility and delayed start to PT as obstacles     Pain Level (0-10 scale) post treatment: 8    ASSESSMENT/Changes in Function: Very poor shoulder mobility. Extensively educated pt on needs to self mobilize safely    Patient will continue to benefit from skilled PT services to modify and progress therapeutic interventions, address functional mobility deficits, address ROM deficits, address strength deficits, analyze and address soft tissue restrictions, analyze and cue movement patterns, analyze and modify body mechanics/ergonomics and assess and modify postural abnormalities to attain remaining goals. []  See Plan of Care  []  See progress note/recertification  []  See Discharge Summary         Progress towards goals  Short Term Goals: To be accomplished in 2 weeks:  1. Pt will be compliant with HEP to prevent shoulder stiffness and increase ease of ADLs  Not met- unable to figure out how to modify exercises given her furniture/counter height. Educated pt on modifications and requested she be more aggressive with HEP 5/24/2017  2.  Pt will be able to demonstrate compliance with shoulder precautions including reaching behind her back to get dressed, carrying heavy objects in the right arm, and moving her elbow away from her side to ensure she regains use of her arm for ADLs      Long Term Goals: To be accomplished in 4 weeks:  1. Pt will improve FOTO to 37 to increase ease of ADLs  2.  Pt will increase passive flexion to 140 degrees to prepare for next phase of protocol    PLAN  []  Upgrade activities as tolerated     [x]  Continue plan of care  []  Update interventions per flow sheet       []  Discharge due to:_  []  Other:_      Jzamyn Agrawal, PT 5/24/2017  10:29 AM    Future Appointments  Date Time Provider Colt Jovel   5/24/2017 11:00 AM ERIC [address]   5/31/2017 10:30 AM Jaclyn Chu, PTA [facility]   6/2/2017 4:00 PM Jaclyn Chu, PTA [facility]   6/5/2017 2:00 PM Bennie vAalos, PTA [facility]   6/8/2017 10:00 AM Bennie Avalos, PTA [facility]   6/12/2017 12:30 PM Jaclyn Chu, PTA [facility]   6/14/2017 12:00 PM Jazmyn Agrawal, MULU [facility]

## 2017-05-31 ENCOUNTER — HOSPITAL ENCOUNTER (OUTPATIENT)
Dept: PHYSICAL THERAPY | Age: 47
Discharge: HOME | End: 2017-05-31
Payer: SUBSIDIZED

## 2017-05-31 PROCEDURE — 97140 MANUAL THERAPY 1/> REGIONS: CPT

## 2017-05-31 PROCEDURE — 97110 THERAPEUTIC EXERCISES: CPT

## 2017-05-31 NOTE — PROGRESS NOTES
PT DAILY TREATMENT NOTE     Patient Name: Roberto Colvin  Date:2017  : 1970  [x]  Patient  Verified  Payor: SARA / Plan: [facility] / Product Type: Sara /    In time:10:31  Out time:11:09  Total Treatment Time (min): 38  Visit #: 3 of 8    Treatment Area: Complete rotator cuff tear or rupture of right shoulder, not specified as traumatic [M75.121]    SUBJECTIVE  Pain Level (0-10 scale): 610  Any medication changes, allergies to medications, adverse drug reactions, diagnosis change, or new procedure performed?: [x] No    [] Yes (see summary sheet for update)  Subjective functional status/changes:   [] No changes reported  Pt reports sleeping on her (R) shoulder by mistake. Pt also reports using (R) arm for things at home due to it being her dominant arm. OBJECTIVE    Modality rationale: decrease inflammation and decrease pain to improve the patients ability to tolerate ADLs.     Min Type Additional Details    [] Estim:  []Unatt       []IFC  []Premod                        []Other:  []w/ice   []w/heat  Position:  Location:    [] Estim: []Att    []TENS instruct  []NMES                    []Other:  []w/US   []w/ice   []w/heat  Position:  Location:    []  Traction: [] Cervical       []Lumbar                       [] Prone          []Supine                       []Intermittent   []Continuous Lbs:  [] before manual  [] after manual    []  Ultrasound: []Continuous   [] Pulsed                           []1MHz   []3MHz W/cm2:  Location:    []  Iontophoresis with dexamethasone         Location: [] Take home patch   [] In clinic   10 [x]  Ice     []  heat  []  Ice massage  []  Laser   []  Anodyne Position:supine  Location:(R) shoulder    []  Laser with stim  []  Other:  Position:  Location:    []  Vasopneumatic Device Pressure:       [] lo [] med [] hi   Temperature: [] lo [] med [] hi   [] Skin assessment post-treatment:  []intact []redness- no adverse reaction    []redness  adverse reaction:     20 min Therapeutic Exercise:  [x] See flow sheet :   Rationale: increase ROM and increase strength to improve the patients ability to tolerate ADLs. 8 min Manual Therapy:  PROM (R) shoulder per protocol   Rationale: decrease pain, increase ROM and increase tissue extensibility to improve tolerance tolerance to ADLs. With   [] TE   [] TA   [] neuro   [] other: Patient Education: [x] Review HEP    [] Progressed/Changed HEP based on:   [] positioning   [] body mechanics   [] transfers   [] heat/ice application    [] other:      Other Objective/Functional Measures: frequent vc's to reduce guarding. Pain Level (0-10 scale) post treatment:  5/10    ASSESSMENT/Changes in Function: Pt demonstrates poor understanding of instructions to avoid use of (R) UE and avoid sleeping on (R) side. Patient will continue to benefit from skilled PT services to modify and progress therapeutic interventions, address functional mobility deficits, address ROM deficits, address strength deficits, analyze and address soft tissue restrictions, analyze and cue movement patterns and analyze and modify body mechanics/ergonomics to attain remaining goals. []  See Plan of Care  []  See progress note/recertification  []  See Discharge Summary         Progress towards goals / Updated goals:  Short Term Goals: To be accomplished in 2 weeks:  1. Pt will be compliant with HEP to prevent shoulder stiffness and increase ease of ADLs Not met- unable to figure out how to modify exercises given her furniture/counter height. Educated pt on modifications and requested she be more aggressive with HEP 5/24/2017  2. Pt will be able to demonstrate compliance with shoulder precautions including reaching behind her back to get dressed, carrying heavy objects in the right arm, and moving her elbow away from her side to ensure she regains use of her arm for ADLs      Long Term Goals: To be accomplished in 4 weeks:  1.  Pt will improve FOTO to 37 to increase ease of ADLs  2.  Pt will increase passive flexion to 140 degrees to prepare for next phase of protocol    PLAN  []  Upgrade activities as tolerated     [x]  Continue plan of care  []  Update interventions per flow sheet       []  Discharge due to:_  []  Other:_      Leah Landa, DAYAMI 5/31/2017  11:15 AM    Future Appointments  Date Time Provider Colt Sanchezi   6/2/2017 4:00 PM Leah Landa PTA [facility]   6/5/2017 2:00 PM Trino Mortensen PTA [facility]   6/8/2017 10:00 AM Trino Mortensen PTA [facility]   6/12/2017 12:30 PM Leah Landa PTA [facility]   6/14/2017 11:00 AM ERIC Khan [address]   6/14/2017 12:00 PM Jayden Castillo, PT [facility]

## 2017-06-02 ENCOUNTER — HOSPITAL ENCOUNTER (OUTPATIENT)
Dept: PHYSICAL THERAPY | Age: 47
Discharge: HOME | End: 2017-06-02
Payer: SUBSIDIZED

## 2017-06-02 PROCEDURE — 97140 MANUAL THERAPY 1/> REGIONS: CPT

## 2017-06-02 PROCEDURE — 97110 THERAPEUTIC EXERCISES: CPT

## 2017-06-02 NOTE — PROGRESS NOTES
PT DAILY TREATMENT NOTE     Patient Name: Warden Stokes  Date:2017  : 1970  [x]  Patient  Verified  Payor: Marguerite Ma / Plan: [facility] % / Product Type: Celia /    In time:4:01  Out time:4:31  Total Treatment Time (min): 30  Visit #: 4 of 8    Treatment Area: Complete rotator cuff tear or rupture of right shoulder, not specified as traumatic [M75.121]    SUBJECTIVE  Pain Level (0-10 scale): 5/10  Any medication changes, allergies to medications, adverse drug reactions, diagnosis change, or new procedure performed?: [x] No    [] Yes (see summary sheet for update)  Subjective functional status/changes:   [] No changes reported  Pt reports compliance with HEP the past few days. Pt reports burning pain in shoulder    OBJECTIVE    Modality rationale: decrease inflammation and decrease pain to improve the patients ability to tolerate ADLs.     Min Type Additional Details    [] Estim:  []Unatt       []IFC  []Premod                        []Other:  []w/ice   []w/heat  Position:  Location:    [] Estim: []Att    []TENS instruct  []NMES                    []Other:  []w/US   []w/ice   []w/heat  Position:  Location:    []  Traction: [] Cervical       []Lumbar                       [] Prone          []Supine                       []Intermittent   []Continuous Lbs:  [] before manual  [] after manual    []  Ultrasound: []Continuous   [] Pulsed                           []1MHz   []3MHz W/cm2:  Location:    []  Iontophoresis with dexamethasone         Location: [] Take home patch   [] In clinic   10 [x]  Ice     []  heat  []  Ice massage  []  Laser   []  Anodyne Position:supine  Location:(R) shoulder    []  Laser with stim  []  Other:  Position:  Location:    []  Vasopneumatic Device Pressure:       [] lo [] med [] hi   Temperature: [] lo [] med [] hi   [] Skin assessment post-treatment:  []intact []redness- no adverse reaction    []redness  adverse reaction:     10 min Therapeutic Exercise:  [x] See flow sheet :   Rationale: increase ROM and increase strength to improve the patients ability to tolerate ADLs. 10 min Manual Therapy:  PROM (R) shoulder per protocol. Rationale: decrease pain, increase ROM and increase tissue extensibility to improve tolerance to functional activities. With   [] TE   [] TA   [] neuro   [] other: Patient Education: [x] Review HEP    [] Progressed/Changed HEP based on:   [] positioning   [] body mechanics   [] transfers   [] heat/ice application    [] other:      Other Objective/Functional Measures: minimal guarding with PROM. Pain Level (0-10 scale) post treatment: 5/10    ASSESSMENT/Changes in Function: educated patient about protocol and restrictions for (R) shoulder due to surgery with fair to poor comprehension and understanding from patient. Patient will continue to benefit from skilled PT services to modify and progress therapeutic interventions, address functional mobility deficits, address ROM deficits, address strength deficits, analyze and address soft tissue restrictions, analyze and cue movement patterns and analyze and modify body mechanics/ergonomics to attain remaining goals. []  See Plan of Care  []  See progress note/recertification  []  See Discharge Summary         Progress towards goals / Updated goals:  Short Term Goals: To be accomplished in 2 weeks:  1. Pt will be compliant with HEP to prevent shoulder stiffness and increase ease of ADLs Not met- unable to figure out how to modify exercises given her furniture/counter height. Educated pt on modifications and requested she be more aggressive with HEP 5/24/2017; Pt reports perform prescribed HEP daily. 06/02/17  2.  Pt will be able to demonstrate compliance with shoulder precautions including reaching behind her back to get dressed, carrying heavy objects in the right arm, and moving her elbow away from her side to ensure she regains use of her arm for ADLs- not met patient continues to report use of (R) UE.  06/02/17      Long Term Goals: To be accomplished in 4 weeks:  1. Pt will improve FOTO to 37 to increase ease of ADLs  2.  Pt will increase passive flexion to 140 degrees to prepare for next phase of protocol    PLAN  []  Upgrade activities as tolerated     [x]  Continue plan of care  []  Update interventions per flow sheet       []  Discharge due to:_  []  Other:_      Jaclyn Chu, DAYAMI 6/2/2017  4:27 PM    Future Appointments  Date Time Provider Colt Jovel   6/5/2017 2:00 PM [facility]   6/8/2017 10:00 AM Bennie Avalos PTA [facility]   6/12/2017 12:30 PM Jaclyn Chu PTA [facility]   6/14/2017 11:00 AM ERIC Swain 67   6/14/2017 12:00 PM Jazmyn Agrawal, PT [facility]

## 2017-06-05 ENCOUNTER — HOSPITAL ENCOUNTER (OUTPATIENT)
Dept: PHYSICAL THERAPY | Age: 47
Discharge: HOME | End: 2017-06-05
Payer: SUBSIDIZED

## 2017-06-05 PROCEDURE — 97140 MANUAL THERAPY 1/> REGIONS: CPT

## 2017-06-05 PROCEDURE — 97110 THERAPEUTIC EXERCISES: CPT

## 2017-06-05 NOTE — PROGRESS NOTES
PT DAILY TREATMENT NOTE     Patient Name: Tommy Acosta  Date:2017  : 1970  [x]  Patient  Verified  Payor: SARA / [facility] / Product Type: Sara /    In time:3:46  Out time:4:15  Total Treatment Time (min): 29  Visit #: 5 of 8    Treatment Area: Complete rotator cuff tear or rupture of right shoulder, not specified as traumatic [M75.121]    SUBJECTIVE  Pain Level (0-10 scale): 1/10  Any medication changes, allergies to medications, adverse drug reactions, diagnosis change, or new procedure performed?: [x] No    [] Yes (see summary sheet for update)  Subjective functional status/changes:   [x] No changes reported    OBJECTIVE    Modality rationale: PD   Min Type Additional Details    [] Estim:  []Unatt       []IFC  []Premod                        []Other:  []w/ice   []w/heat  Position:  Location:    [] Estim: []Att    []TENS instruct  []NMES                    []Other:  []w/US   []w/ice   []w/heat  Position:  Location:    []  Traction: [] Cervical       []Lumbar                       [] Prone          []Supine                       []Intermittent   []Continuous Lbs:  [] before manual  [] after manual    []  Ultrasound: []Continuous   [] Pulsed                           []1MHz   []3MHz W/cm2:  Location:    []  Iontophoresis with dexamethasone         Location: [] Take home patch   [] In clinic    []  Ice     []  heat  []  Ice massage  []  Laser   []  Anodyne Position:  Location:    []  Laser with stim  []  Other:  Position:  Location:    []  Vasopneumatic Device Pressure:       [] lo [] med [] hi   Temperature: [] lo [] med [] hi   [] Skin assessment post-treatment:  []intact []redness- no adverse reaction    []redness  adverse reaction:       21 min Therapeutic Exercise:  [x] See flow sheet :   Rationale: increase ROM and increase strength to improve the patients ability to perform ADL's.    8 min Manual Therapy:  (R) GH joint mobs, PROM per protocol.    Rationale: decrease pain, increase ROM and increase tissue extensibility to prepare for AROM. With   [x] TE   [] TA   [] neuro   [] other: Patient Education: [x] Review HEP    [] Progressed/Changed HEP based on:   [] positioning   [] body mechanics   [] transfers   [] heat/ice application    [] other:      Other Objective/Functional Measures: Reviewed protocol, pt reports occasionally lifting a water bottle with (R) hand. Pain Level (0-10 scale) post treatment: 3/10    ASSESSMENT/Changes in Function: FOTO 27%, no change. Patient will continue to benefit from skilled PT services to modify and progress therapeutic interventions, address functional mobility deficits, address ROM deficits and address strength deficits to attain remaining goals. [x]  See Plan of Care  []  See progress note/recertification  []  See Discharge Summary         Progress towards goals / Updated goals:  Short Term Goals: To be accomplished in 2 weeks:  1. Pt will be compliant with HEP to prevent shoulder stiffness and increase ease of ADLs Not met- unable to figure out how to modify exercises given her furniture/counter height. Educated pt on modifications and requested she be more aggressive with HEP 5/24/2017; Pt reports perform prescribed HEP daily. 06/02/17  2. Pt will be able to demonstrate compliance with shoulder precautions including reaching behind her back to get dressed, carrying heavy objects in the right arm, and moving her elbow away from her side to ensure she regains use of her arm for ADLs- not met patient continues to report use of (R) UE. 06/02/17      Long Term Goals: To be accomplished in 4 weeks:  1. Pt will improve FOTO to 37 to increase ease of ADLs - No change, 27%. 6/5/2017  2.  Pt will increase passive flexion to 140 degrees to prepare for next phase of protocol    PLAN  []  Upgrade activities as tolerated     [x]  Continue plan of care  []  Update interventions per flow sheet       []  Discharge due to:_  []  Other:_      Marquita Mcpherson Magy Ayala, PTA 6/5/2017  3:37 PM    Future Appointments  Date Time Provider Colt Jovel   6/8/2017 10:00 AM Katherine Rubin, PTA [facility]   6/12/2017 12:30 PM Mariajose Bautista, PTA [facility]   6/14/2017 11:00 AM ERIC Reeder Lee 67   6[facility]14/2017 12:00 [facility]

## 2017-06-08 ENCOUNTER — HOSPITAL ENCOUNTER (OUTPATIENT)
Dept: PHYSICAL THERAPY | Age: 47
Discharge: HOME | End: 2017-06-08
Payer: SUBSIDIZED

## 2017-06-08 PROCEDURE — 97110 THERAPEUTIC EXERCISES: CPT

## 2017-06-08 PROCEDURE — 97140 MANUAL THERAPY 1/> REGIONS: CPT

## 2017-06-08 NOTE — PROGRESS NOTES
PT DAILY TREATMENT NOTE 12    Patient Name: Rubina Dyer  Date:2017  : 1970  [x]  Patient  Verified  Payor: SARA / Plan: [facility] % / Product Type: [place] /    In time:10:02  Out time:10:38  Total Treatment Time (min): 36  Visit #: 6 of 8    Treatment Area: Complete rotator cuff tear or rupture of right shoulder, not specified as traumatic [M75.121]    SUBJECTIVE  Pain Level (0-10 scale): 3-4/10  Any medication changes, allergies to medications, adverse drug reactions, diagnosis change, or new procedure performed?: [x] No    [] Yes (see summary sheet for update)  Subjective functional status/changes:   [x] No changes reported    OBJECTIVE    Modality rationale: decrease pain to improve the patients ability to perform ADL's.    Min Type Additional Details    [] Estim:  []Unatt       []IFC  []Premod                        []Other:  []w/ice   []w/heat  Position:  Location:    [] Estim: []Att    []TENS instruct  []NMES                    []Other:  []w/US   []w/ice   []w/heat  Position:  Location:    []  Traction: [] Cervical       []Lumbar                       [] Prone          []Supine                       []Intermittent   []Continuous Lbs:  [] before manual  [] after manual    []  Ultrasound: []Continuous   [] Pulsed                           []1MHz   []3MHz W/cm2:  Location:    []  Iontophoresis with dexamethasone         Location: [] Take home patch   [] In clinic   10 [x]  Ice     []  heat  []  Ice massage  []  Laser   []  Anodyne Position: Seated  Location:(R) Shoulder    []  Laser with stim  []  Other:  Position:  Location:    []  Vasopneumatic Device Pressure:       [] lo [] med [] hi   Temperature: [] lo [] med [] hi   [] Skin assessment post-treatment:  []intact []redness- no adverse reaction    []redness  adverse reaction:     18 min Therapeutic Exercise:  [x] See flow sheet :   Rationale: increase ROM and increase strength to improve the patients ability to perform ADL's.    8 min Manual Therapy:  (R) GH joint mobs, shoulder PROM per protocol. Rationale: decrease pain, increase ROM and increase tissue extensibility to prepare for AAROM. With   [x] TE   [] TA   [] neuro   [] other: Patient Education: [x] Review HEP    [] Progressed/Changed HEP based on:   [] positioning   [] body mechanics   [] transfers   [] heat/ice application    [] other:      Other Objective/Functional Measures: Significant cueing to perform pendulums correctly, pt able to partially correct, significant guarding. PROM (R) Shoulder flexion 125 degrees. Pain Level (0-10 scale) post treatment: 4/10    ASSESSMENT/Changes in Function:     Patient will continue to benefit from skilled PT services to modify and progress therapeutic interventions, address functional mobility deficits, address ROM deficits, address strength deficits, analyze and address soft tissue restrictions and analyze and cue movement patterns to attain remaining goals. [x]  See Plan of Care  []  See progress note/recertification  []  See Discharge Summary         Progress towards goals / Updated goals:  Short Term Goals: To be accomplished in 2 weeks:  1. Pt will be compliant with HEP to prevent shoulder stiffness and increase ease of ADLs Not met- unable to figure out how to modify exercises given her furniture/counter height. Educated pt on modifications and requested she be more aggressive with HEP 5/24/2017; Pt reports perform prescribed HEP daily. 06/02/17  2. Pt will be able to demonstrate compliance with shoulder precautions including reaching behind her back to get dressed, carrying heavy objects in the right arm, and moving her elbow away from her side to ensure she regains use of her arm for ADLs- not met patient continues to report use of (R) UE. 06/02/17  Long Term Goals: To be accomplished in 4 weeks:  1. Pt will improve FOTO to 37 to increase ease of ADLs - No change, 27%. 6/5/2017  2.  Pt will increase passive flexion to 140 degrees to prepare for next phase of protocol - PROM (R) Shoulder flexion 125 degrees.  6/8/2017    PLAN  []  Upgrade activities as tolerated     [x]  Continue plan of care  []  Update interventions per flow sheet       []  Discharge due to:_  []  Other:_      Bam Weaver PTA 6/8/2017  10:04 AM    Future Appointments  Date Time Provider Colt Jovel   6/12/2017 12:30 PM [facility]   6/14/2017 11:00 AM ERIC Sheppard 67   6/14/2017 12:00 PM [facility]aitan[facility]

## 2017-06-12 ENCOUNTER — HOSPITAL ENCOUNTER (OUTPATIENT)
Dept: PHYSICAL THERAPY | Age: 47
Discharge: HOME | End: 2017-06-12
Payer: SUBSIDIZED

## 2017-06-12 PROCEDURE — 97110 THERAPEUTIC EXERCISES: CPT

## 2017-06-12 PROCEDURE — 97140 MANUAL THERAPY 1/> REGIONS: CPT

## 2017-06-12 NOTE — PROGRESS NOTES
PT DAILY TREATMENT NOTE     Patient Name: Ferdinand Saxena  Date:2017  : 1970  [x]  Patient  Verified  Payor: Esmer Mckinley / Plan: [facility] % / Product Type: Dorcas Godinez /    In time:12:30  Out time:1:00  Total Treatment Time (min): 30  Visit #: 7 of 8    Treatment Area: Complete rotator cuff tear or rupture of right shoulder, not specified as traumatic [M75.121]    SUBJECTIVE  Pain Level (0-10 scale): 1/10  Any medication changes, allergies to medications, adverse drug reactions, diagnosis change, or new procedure performed?: [x] No    [] Yes (see summary sheet for update)  Subjective functional status/changes:   [] No changes reported  Pt reports continued difficulty performing table slides at home because her table has \"too much junk on it. \"  Pt also states she was informed she was doing her scap squeezes incorrectly and should not do them at home because she was moving her (R) shoulder. Pt was actively using her (R) shoulder when walking into treatment area while trying to use her cell phone. OBJECTIVE    Modality rationale: decrease inflammation and decrease pain to improve the patients ability to tolerate ADLs.     Min Type Additional Details    [] Estim:  []Unatt       []IFC  []Premod                        []Other:  []w/ice   []w/heat  Position:  Location:    [] Estim: []Att    []TENS instruct  []NMES                    []Other:  []w/US   []w/ice   []w/heat  Position:  Location:    []  Traction: [] Cervical       []Lumbar                       [] Prone          []Supine                       []Intermittent   []Continuous Lbs:  [] before manual  [] after manual    []  Ultrasound: []Continuous   [] Pulsed                           []1MHz   []3MHz W/cm2:  Location:    []  Iontophoresis with dexamethasone         Location: [] Take home patch   [] In clinic   10 [x]  Ice     []  heat  []  Ice massage  []  Laser   []  Anodyne Position: supine  Location:(R) shoulder    []  Laser with stim  [] Other:  Position:  Location:    []  Vasopneumatic Device Pressure:       [] lo [] med [] hi   Temperature: [] lo [] med [] hi   [] Skin assessment post-treatment:  []intact []redness- no adverse reaction    []redness  adverse reaction:     12 min Therapeutic Exercise:  [x] See flow sheet :   Rationale: increase ROM and increase strength to improve the patients ability to tolerate ADLs. 8 min Manual Therapy:  PROM (R) shoulder per protocol   Rationale: decrease pain, increase ROM and increase tissue extensibility to improve tolerance to functional activities. With   [] TE   [] TA   [] neuro   [] other: Patient Education: [x] Review HEP    [] Progressed/Changed HEP based on:   [] positioning   [] body mechanics   [] transfers   [] heat/ice application    [] other:      Other Objective/Functional Measures: constant vc's to follow surgical precautions. Pain Level (0-10 scale) post treatment: 0/10    ASSESSMENT/Changes in Function: counseled patient about surgical precautions and bathing. Counseled patient about proper HEP compliance and form with all HEP exercises. Patient will continue to benefit from skilled PT services to modify and progress therapeutic interventions, address functional mobility deficits, address ROM deficits, address strength deficits, analyze and address soft tissue restrictions, analyze and cue movement patterns and analyze and modify body mechanics/ergonomics to attain remaining goals. []  See Plan of Care  []  See progress note/recertification  []  See Discharge Summary         Progress towards goals / Updated goals:  Short Term Goals: To be accomplished in 2 weeks:  1. Pt will be compliant with HEP to prevent shoulder stiffness and increase ease of ADLs Not met- unable to figure out how to modify exercises given her furniture/counter height.  Educated pt on modifications and requested she be more aggressive with HEP 5/24/2017; Pt reports perform prescribed HEP daily. 06/02/17  2. Pt will be able to demonstrate compliance with shoulder precautions including reaching behind her back to get dressed, carrying heavy objects in the right arm, and moving her elbow away from her side to ensure she regains use of her arm for ADLs- not met patient continues to report use of (R) UE. 06/02/17  Long Term Goals: To be accomplished in 4 weeks:  1. Pt will improve FOTO to 37 to increase ease of ADLs - No change, 27%. 6/5/2017  2. Pt will increase passive flexion to 140 degrees to prepare for next phase of protocol - PROM (R) Shoulder flexion 125 degrees.  6/8/2017    PLAN  []  Upgrade activities as tolerated     [x]  Continue plan of care  []  Update interventions per flow sheet       []  Discharge due to:_  []  Other:_      Kings Macdonald PTA 6/12/2017  1:51 PM    Future Appointments  Date Time Provider Colt Jovel   6/14/2017 11:00 AM ERIC Gao   6/14/2017 12:00 PM [id number] [facility]

## 2017-06-14 ENCOUNTER — HOSPITAL ENCOUNTER (OUTPATIENT)
Dept: PHYSICAL THERAPY | Age: 47
Discharge: HOME | End: 2017-06-14
Payer: SUBSIDIZED

## 2017-06-14 ENCOUNTER — OFFICE VISIT (OUTPATIENT)
Dept: ORTHOPEDIC SURGERY | Age: 47
End: 2017-06-14

## 2017-06-14 VITALS — HEART RATE: 69 BPM | SYSTOLIC BLOOD PRESSURE: 113 MMHG | DIASTOLIC BLOOD PRESSURE: 69 MMHG | TEMPERATURE: 98.6 F

## 2017-06-14 DIAGNOSIS — Z98.890 S/P ROTATOR CUFF REPAIR: Primary | ICD-10-CM

## 2017-06-14 PROCEDURE — 97016 VASOPNEUMATIC DEVICE THERAPY: CPT

## 2017-06-14 PROCEDURE — 97110 THERAPEUTIC EXERCISES: CPT

## 2017-06-14 PROCEDURE — 97140 MANUAL THERAPY 1/> REGIONS: CPT

## 2017-06-14 RX ORDER — HYDROMORPHONE HYDROCHLORIDE 2 MG/1
2 TABLET ORAL
Qty: 26 TAB | Refills: 0 | Status: SHIPPED | OUTPATIENT
Start: 2017-06-14 | End: 2017-08-04 | Stop reason: CLARIF

## 2017-06-14 NOTE — PROGRESS NOTES
PT DAILY TREATMENT NOTE     Patient Name: Tommy Acosta  Date:2017  : 1970  [x]  Patient  Verified  Payor: Hellen Lubin / Plan: [facility] % / Product Type: [place] /    In time:12:00  Out time:12:37  Total Treatment Time (min): 37  Visit #: 8 of 8    Treatment Area: Complete rotator cuff tear or rupture of right shoulder, not specified as traumatic [M75.121]    SUBJECTIVE  Pain Level (0-10 scale):  2  Any medication changes, allergies to medications, adverse drug reactions, diagnosis change, or new procedure performed?: [x] No    [] Yes (see summary sheet for update)  Subjective functional status/changes:   [] No changes reported  \"I just saw the doctor, he said he wants me to start lifting up to 3 to 5 pounds\"    OBJECTIVE    Modality rationale: decrease inflammation and decrease pain to improve the patients ability to perform daily tasks   Min Type Additional Details    [] Estim:  []Unatt       []IFC  []Premod                        []Other:  []w/ice   []w/heat  Position:  Location:    [] Estim: []Att    []TENS instruct  []NMES                    []Other:  []w/US   []w/ice   []w/heat  Position:  Location:    []  Traction: [] Cervical       []Lumbar                       [] Prone          []Supine                       []Intermittent   []Continuous Lbs:  [] before manual  [] after manual    []  Ultrasound: []Continuous   [] Pulsed                           []1MHz   []3MHz W/cm2:  Location:    []  Iontophoresis with dexamethasone         Location: [] Take home patch   [] In clinic    []  Ice     []  heat  []  Ice massage  []  Laser   []  Anodyne Position:  Location:    []  Laser with stim  []  Other:  Position:  Location:   10 [x]  Vasopneumatic Device Pressure:       [x] lo [] med [] hi   Temperature: [x] lo [] med [] hi   [] Skin assessment post-treatment:  []intact []redness- no adverse reaction    []redness  adverse reaction:       19 min Therapeutic Exercise:  [] See flow sheet : Rationale: increase ROM and improve coordination to improve the patients ability to perform self care activities    8 min Manual Therapy:  Per flowsheet   Rationale: increase ROM and increase tissue extensibility to improve functional mobility and joint mechanics          With   [] TE   [] TA   [] neuro   [] other: Patient Education: [x] Review HEP    [] Progressed/Changed HEP based on:   [] positioning   [] body mechanics   [] transfers   [] heat/ice application    [] other:      Other Objective/Functional Measures:   Held ulnar and radial deviation today as pt performs shoulder ER/IR actively    She actively uses R UE to hold and use phone pre and post session     Pain Level (0-10 scale) post treatment: 4    ASSESSMENT/Changes in Function: Advised pt to not begin lifting at this time but to wait until she has tried actively controlling arm in space. She is still tight into abduction at this time, gentle ER with arm by the side tolerated well. Continue PT progressing per protocol. Patient will continue to benefit from skilled PT services to modify and progress therapeutic interventions, address functional mobility deficits, address ROM deficits, address strength deficits, analyze and address soft tissue restrictions, analyze and cue movement patterns and analyze and modify body mechanics/ergonomics to attain remaining goals. []  See Plan of Care  [x]  See progress note/recertification  []  See Discharge Summary         Progress towards goals / Updated goals:  Short Term Goals: To be accomplished in 2 weeks:  1. Pt will be compliant with HEP to prevent shoulder stiffness and increase ease of ADLs Not met- unable to figure out how to modify exercises given her furniture/counter height. Educated pt on modifications and requested she be more aggressive with HEP 5/24/2017; Pt reports perform prescribed HEP daily. 06/02/17  2.  Pt will be able to demonstrate compliance with shoulder precautions including reaching behind her back to get dressed, carrying heavy objects in the right arm, and moving her elbow away from her side to ensure she regains use of her arm for ADLs- not met patient continues to report use of (R) UE. 06/02/17  Long Term Goals: To be accomplished in 4 weeks:  1. Pt will improve FOTO to 37 to increase ease of ADLs - No change, 27%. 6/5/2017  2. Pt will increase passive flexion to 140 degrees to prepare for next phase of protocol - PROM (R) Shoulder flexion 125 degrees.  6/8/2017    PLAN  []  Upgrade activities as tolerated     [x]  Continue plan of care  []  Update interventions per flow sheet       []  Discharge due to:_  []  Other:_      Remy Obregon DPT 6/14/2017  12:00 PM    Future Appointments  Date Time Provider Colt Jovel   7/6/2017 11:00 AM ERIC Henriquez Lee 67

## 2017-06-14 NOTE — MR AVS SNAPSHOT
Visit Information Date & Time Provider Department Dept. Phone Encounter #  
 6/14/2017 11:00 AM Ezequiel Ortiz, [address] [facility] [phone] [id number] Upcoming Health Maintenance Date Due Pneumococcal 19-64 Medium Risk (1 of 1 - PPSV23) 5/30/1987 DTaP/Tdap/Td series (1 - Tdap) 5/30/1989 PAP AKA CERVICAL CYTOLOGY 5/30/1989 INFLUENZA AGE 9 TO ADULT 8/1/2017 Allergies as of 6/14/2017  Review Complete On: 6/14/2017 By: Sylvester Llamas LPN Severity Noted Reaction Type Reactions Amoxicillin  12/29/2012    Other (comments) Does no work Codeine  12/29/2012    Nausea and Vomiting Current Immunizations  Never Reviewed No immunizations on file. Not reviewed this visit Vitals BP Pulse Temp OB Status Smoking Status 113/69 (BP 1 Location: Left arm, BP Patient Position: Sitting) 69 98.6 °F (37 °C) (Oral) Unknown Current Every Day Smoker Preferred Pharmacy Pharmacy Name Phone Svitlana Dash, [address] Your Updated Medication List  
  
   
This list is accurate as of: 6/14/17 11:12 AM.  Always use your most recent med list.  
  
  
  
  
 ABILIFY 10 mg tablet Generic drug:  ARIPiprazole Take 10 mg by mouth daily. 10mg am and 15mg q hs  
  
 baclofen 10 mg tablet Commonly known as:  LIORESAL Take  by mouth three (3) times daily. chlorproMAZINE 100 mg tablet Commonly known as:  THORAZINE Take 100 mg by mouth three (3) times daily. HYDROmorphone 2 mg tablet Commonly known as:  DILAUDID Take 1 Tab by mouth every six (6) hours as needed for Pain. Max Daily Amount: 8 mg. Indications: Pain, Severe Pain LaMICtal 100 mg tablet Generic drug:  lamoTRIgine Take  by mouth daily. 100 mg am and 200mg q hs LUNESTA 3 mg tablet Generic drug:  eszopiclone Take  by mouth nightly.  
  
 magnesium 250 mg Tab Take  by mouth.  
  
 multivitamin with iron chewable tablet Commonly known as:  Samella Citlalli Take 1 Tab by mouth daily. naloxegol 25 mg Tab tablet Commonly known as:  Shilpa Birch Take 1 Tab by mouth Daily (before breakfast). Indications: OPIOID-INDUCED CONSTIPATION  
  
 NEXIUM PO Take  by mouth. PriLOSEC 20 mg capsule Generic drug:  omeprazole Take 20 mg by mouth daily. promethazine 25 mg tablet Commonly known as:  PHENERGAN Take 1 Tab by mouth every six (6) hours as needed for Nausea. Indications: POST-OPERATIVE NAUSEA AND VOMITING  
  
 traZODone 300 mg tablet Commonly known as:  Lequita Gallus Take 300 mg by mouth nightly. venlafaxine-SR 37.5 mg capsule Commonly known as:  EFFEXOR-XR Take 75 mg by mouth daily. VITAMIN B-12 PO Take  by mouth daily. VITAMIN D2 PO Take  by mouth every seven (7) days. zolpidem 10 mg tablet Commonly known as:  AMBIEN Take 10 mg by mouth nightly as needed for Sleep. To-Do List   
 06/14/2017 12:00 PM  
  Appointment with Jojo Schultz at [facility]  [facility] ([phone]) Please provide this summary of care documentation to your next provider. Your primary care clinician is listed as Maria Del Rosario Infante. If you have any questions after today's visit, please call [phone].

## 2017-06-14 NOTE — PROGRESS NOTES
In [address]  [address]  [place], [address]  [phone] fax    Physical Therapy Progress Note  Patient name: Priyank Villegas Start of Care: 2017   Referral source: Gayle Vázquez,* : 1970    Medical Diagnosis: Complete rotator cuff tear or rupture of right shoulder, not specified as traumatic [M75.121] Onset Date:2017    Treatment Diagnosis: right shoulder pain   Prior Hospitalization: see medical history Provider#: [id number]   Medications: Verified on Patient summary List   Comorbidities: anxiety, OA, asthma, obesity, depression, GERD, HA, hearing impairment, incontinence  Prior Level of Function: RHD female  Visits from Start of Care: 8    Missed Visits: 0      Established Goals:        Excellent         Good         Limited            None  [x] Increased ROM   []  []  [x]  []  [] Increased Strength  []  []  []  []  [] Increased Mobility  []  []  []  []   [] Decreased Pain   []  []  []  []  [] Decreased Swelling  []  []  []  []    Key Functional Changes: some improvement in shoulder PROM flexion  Short Term Goals: To be accomplished in 2 weeks:  1. Pt will be compliant with HEP to prevent shoulder stiffness and increase ease of ADLs Not met- unable to figure out how to modify exercises given her furniture/counter height. Educated pt on modifications and requested she be more aggressive with HEP 2017; Pt reports perform prescribed HEP daily. 17  2. Pt will be able to demonstrate compliance with shoulder precautions including reaching behind her back to get dressed, carrying heavy objects in the right arm, and moving her elbow away from her side to ensure she regains use of her arm for ADLs- not met patient continues to report use of (R) UE. 17  Long Term Goals: To be accomplished in 4 weeks:  1. Pt will improve FOTO to 37 to increase ease of ADLs - No change, 27%. 2017  2.  Pt will increase passive flexion to 140 degrees to prepare for next phase of protocol - PROM (R) Shoulder flexion 125 degrees. 6/8/2017    Updated Goals: to be achieved in 4 weeks:  1. Pt will improve FOTO to 37 to increase ease of ADLs  2. Pt will improve AAROM shoulder elevation to 130 deg for improved ADL performance. 3. Pt will demonstrate PROM ER to 45 deg for improved capsular mobility with daily tasks. ASSESSMENT/RECOMMENDATIONS:  [x]Continue therapy per initial plan/protocol at a frequency of  2 x per week for 4 weeks  []Continue therapy with the following recommended changes:_____________________      _____________________________________________________________________  []Discontinue therapy progressing towards or have reached established goals  []Discontinue therapy due to lack of appreciable progress towards goals  []Discontinue therapy due to lack of attendance or compliance  []Await Physician's recommendations/decisions regarding therapy  []Other:________________________________________________________________    Thank you for this referral.    Juwan Everett DPT 6/14/2017 12:59 PM  NOTE TO PHYSICIAN:  PLEASE COMPLETE THE ORDERS BELOW AND   FAX TO [facility]: [phone]  If you are unable to process this request in 24 hours please contact our office: [phone]    ? I have read the above report and request that my patient continue as recommended. ? I have read the above report and request that my patient continue therapy with the following changes/special instructions:__________________________________________________________  ? I have read the above report and request that my patient be discharged from therapy.     Physicians signature: ______________________________Date: ______Time:______

## 2017-06-16 NOTE — PROGRESS NOTES
HISTORY:  Ignacio Han returns for follow-up regarding her right shoulder open rotator cuff repair. She is doing fair today. She still is using her sling and swath. She is continuing physical therapy. She would like to discontinue use of the sling as soon as possible. She has trouble getting it on and off by herself. She also has trouble with bathing and grooming, which her  is thankfully present to assist with. She is joined by her  today. EXAMINATION:  Physical examination today reveals a healthy appearing, obese, 20-year-old,  female, atraumatic and normocephalic. She is alert and oriented x 3. She is found sitting comfortably on the table. She is joined by her . Examination to the right shoulder reveals intact surgical incision. She has a small area of suture material protruding associated with the lower anterior third of the surgical site. The area was prepped with Betadine, and using sterile forceps gently tugged to reveal 3 mm in length. The suture was cut cleanly at its base. A small amount of Neosporin was placed with a Band-Aid over the area. There was no bleeding associated. There was no evidence of wound breakdown or infection. The patient's forward flexion is barely at 40 degrees actively with assistance. Her passive external rotation is 25 degrees. Her internal rotation, ASIS. Distal sensation intact fully, right upper extremity. The right elbow range of motion is 100 degrees - 5 today. PLAN:  Patient will continue physical therapy. She may discontinue the use of her sling. She is going to limit herself to no more than 3 to 5 pounds push-pull-lift-carry of the right upper extremity. We will see her back in about one month. She may continue her pain medication as previous. No driving.

## 2017-06-20 ENCOUNTER — HOSPITAL ENCOUNTER (OUTPATIENT)
Dept: PHYSICAL THERAPY | Age: 47
Discharge: HOME | End: 2017-06-20
Payer: SUBSIDIZED

## 2017-06-20 PROCEDURE — 97140 MANUAL THERAPY 1/> REGIONS: CPT

## 2017-06-20 PROCEDURE — 97110 THERAPEUTIC EXERCISES: CPT

## 2017-06-20 NOTE — PROGRESS NOTES
PT DAILY TREATMENT NOTE     Patient Name: Richy Banerjee  Date:2017  : 1970  [x]  Patient  Verified  [facility]or: Elizabeth Peterson / [facility] / Product Type: Tania Sis /    In time:2:00  Out time:2:41  Total Treatment Time (min): 41  Visit #: 1 of 8    Treatment Area: Complete rotator cuff tear or rupture of right shoulder, not specified as traumatic [M75.121]    SUBJECTIVE  Pain Level (0-10 scale): 3/10  Any medication changes, allergies to medications, adverse drug reactions, diagnosis change, or new procedure performed?: [x] No    [] Yes (see summary sheet for update)  Subjective functional status/changes:   [x] No changes reported  \"It's getting there. \"    OBJECTIVE    Modality rationale: decrease inflammation and decrease pain to improve the patients ability to perform ADL's. Pt requested ice pack, not game ready.    Min Type Additional Details    [] Estim:  []Unatt       []IFC  []Premod                        []Other:  []w/ice   []w/heat  Position:  Location:    [] Estim: []Att    []TENS instruct  []NMES                    []Other:  []w/US   []w/ice   []w/heat  Position:  Location:    []  Traction: [] Cervical       []Lumbar                       [] Prone          []Supine                       []Intermittent   []Continuous Lbs:  [] before manual  [] after manual    []  Ultrasound: []Continuous   [] Pulsed                           []1MHz   []3MHz W/cm2:  Location:    []  Iontophoresis with dexamethasone         Location: [] Take home patch   [] In clinic   10 [x]  Ice     []  heat  []  Ice massage  []  Laser   []  Anodyne Position: Seated  Location:(R) shoulder    []  Laser with stim  []  Other:  Position:  Location:    []  Vasopneumatic Device Pressure:       [] lo [] med [] hi   Temperature: [] lo [] med [] hi   [] Skin assessment post-treatment:  []intact []redness- no adverse reaction    []redness  adverse reaction:     23 min Therapeutic Exercise:  [x] See flow sheet :   Rationale: increase ROM, increase strength and increase proprioception to improve the patients ability to perform ADL's.    8 min Manual Therapy:  (R) Shoulder PROM. Rationale: decrease pain, increase ROM and increase tissue extensibility to improve ease of performing self care. With   [x] TE   [] TA   [] neuro   [] other: Patient Education: [x] Review HEP    [] Progressed/Changed HEP based on:   [] positioning   [] body mechanics   [] transfers   [] heat/ice application    [] other:      Other Objective/Functional Measures: Initiated AAROM phase. Added pulleys flexion/scaption, finger ladder flexion and isometric 6-way 5x3\". Pain Level (0-10 scale) post treatment: 8/10    ASSESSMENT/Changes in Function: Cueing to perform new exercises correctly without (R) UE guarding. Patient will continue to benefit from skilled PT services to modify and progress therapeutic interventions, address functional mobility deficits, address ROM deficits, address strength deficits, analyze and address soft tissue restrictions and analyze and cue movement patterns to attain remaining goals. [x]  See Plan of Care  []  See progress note/recertification  []  See Discharge Summary         Progress towards goals / Updated goals:  Updated Goals: to be achieved in 4 weeks:  1. Pt will improve FOTO to 37 to increase ease of ADLs  2. Pt will improve AAROM shoulder elevation to 130 deg for improved ADL performance. 3. Pt will demonstrate PROM ER to 45 deg for improved capsular mobility with daily tasks.     PLAN  []  Upgrade activities as tolerated     [x]  Continue plan of care  []  Update interventions per flow sheet       []  Discharge due to:_  []  Other:_      Cristine Montemayor PTA 6/20/2017  1:58 PM    Future Appointments  Date Time Provider Colt Jovel   6/20/2017 2:00 PM Cristine Montemayor PTA [facility]   6/22/2017 3:00 PM Chato Alva [facility]   6/27/2017 2:00 PM Cristine Montemayor [facility] 6/29/2017 1:00 PM Milady Blunt, PTA [facility]   7/3/2017 2:00 PM Adela Schillingsin, PTA [facility]   7/6/2017 9:30 AM Adela Schillingsin, PTA [facility]   7/6/2017 11:00 AM ERIC [address]   7/11/2017 12:00 PM Adela Schillingsin, PTA [facility]   7/13/2017 11:00 AM Milady Blunt, PTA [facility]

## 2017-06-22 ENCOUNTER — HOSPITAL ENCOUNTER (OUTPATIENT)
Dept: PHYSICAL THERAPY | Age: 47
Discharge: HOME | End: 2017-06-22
Payer: SUBSIDIZED

## 2017-06-22 PROCEDURE — 97110 THERAPEUTIC EXERCISES: CPT

## 2017-06-22 PROCEDURE — 97140 MANUAL THERAPY 1/> REGIONS: CPT

## 2017-06-22 NOTE — PROGRESS NOTES
PT DAILY TREATMENT NOTE 16    Patient Name: Debi Tima  Date:2017  : 1970  [x]  Patient  Verified  Payor: SARA / Plan: [facility] % / Product Type: Sara /    In time:3:01  Out time:3:33  Total Treatment Time (min): 32  Visit #: 2 of 8    Treatment Area: Complete rotator cuff tear or rupture of right shoulder, not specified as traumatic [M75.121]    SUBJECTIVE  Pain Level (0-10 scale): 5-6/10  Any medication changes, allergies to medications, adverse drug reactions, diagnosis change, or new procedure performed?: [x] No    [] Yes (see summary sheet for update)  Subjective functional status/changes:   [] No changes reported  Pt reports increased pain in shoulder since the day her sling was DC'd    OBJECTIVE    Modality rationale: decrease inflammation and decrease pain to improve the patients ability to tolerate ADLs.     Min Type Additional Details    [] Estim:  []Unatt       []IFC  []Premod                        []Other:  []w/ice   []w/heat  Position:  Location:    [] Estim: []Att    []TENS instruct  []NMES                    []Other:  []w/US   []w/ice   []w/heat  Position:  Location:    []  Traction: [] Cervical       []Lumbar                       [] Prone          []Supine                       []Intermittent   []Continuous Lbs:  [] before manual  [] after manual    []  Ultrasound: []Continuous   [] Pulsed                           []1MHz   []3MHz W/cm2:  Location:    []  Iontophoresis with dexamethasone         Location: [] Take home patch   [] In clinic   10 [x]  Ice     []  heat  []  Ice massage  []  Laser   []  Anodyne Position:supine  Location:(R) shoulder    []  Laser with stim  []  Other:  Position:  Location:    []  Vasopneumatic Device Pressure:       [] lo [] med [] hi   Temperature: [] lo [] med [] hi   [] Skin assessment post-treatment:  []intact []redness- no adverse reaction    []redness  adverse reaction:     14 min Therapeutic Exercise:  [x] See flow sheet : Rationale: increase ROM and increase strength to improve the patients ability to tolerate ADLs. 8 min Manual Therapy:  PROM (R) shoulder   Rationale: decrease pain, increase ROM and increase tissue extensibility to improve tolerance to functional activities. With   [] TE   [] TA   [] neuro   [] other: Patient Education: [x] Review HEP    [] Progressed/Changed HEP based on:   [] positioning   [] body mechanics   [] transfers   [] heat/ice application    [] other:      Other Objective/Functional Measures: continued guarding with manual treatment. Pain Level (0-10 scale) post treatment: 7/10    ASSESSMENT/Changes in Function: Pt requires constant vc's to reduce guarding with manual.  Counseled patient about hygiene ADLs and compliance with protocol. Patient will continue to benefit from skilled PT services to modify and progress therapeutic interventions, address functional mobility deficits, address ROM deficits, address strength deficits, analyze and address soft tissue restrictions and analyze and cue movement patterns to attain remaining goals. []  See Plan of Care  []  See progress note/recertification  []  See Discharge Summary         Progress towards goals / Updated goals:  Updated Goals: to be achieved in 4 weeks:  1. Pt will improve FOTO to 37 to increase ease of ADLs  2. Pt will improve AAROM shoulder elevation to 130 deg for improved ADL performance. 3. Pt will demonstrate PROM ER to 45 deg for improved capsular mobility with daily tasks.     PLAN  []  Upgrade activities as tolerated     [x]  Continue plan of care  []  Update interventions per flow sheet       []  Discharge due to:_  []  Other:_      Fabio Mcwilliams PTA 6/22/2017  3:07 PM    Future Appointments  Date Time Provider Colt Jovel   6/27/2017 2:00 PM Maribel Desouza PTA [facility]   6/29/2017 1:00 PM Fabio Mcwilliams PTA [facility]   7/3/2017 2:00 PM Maribel Desouza PTA [facility] 7/6/2017 9:30 AM Carlos Dejesus PTA [facility]   7/6/2017 11:00 AM Hortensia Lugo PA-C [id number]   7/11/2017 12:00 PM Carlos Dejesus PTA [facility]   7/13/2017 11:00 AM [facility]ast[facility] Clay [facility]

## 2017-06-27 ENCOUNTER — HOSPITAL ENCOUNTER (OUTPATIENT)
Dept: PHYSICAL THERAPY | Age: 47
Discharge: HOME | End: 2017-06-27
Payer: SUBSIDIZED

## 2017-06-27 PROCEDURE — 97140 MANUAL THERAPY 1/> REGIONS: CPT

## 2017-06-27 PROCEDURE — 97110 THERAPEUTIC EXERCISES: CPT

## 2017-06-27 NOTE — PROGRESS NOTES
PT DAILY TREATMENT NOTE     Patient Name: Candelario Sam  Date:2017  : 1970  [x]  Patient  Verified  Payor: Alysha Hawk / [facility] / Product Type: [employer] /    In time:2:00  Out time:2:33  Total Treatment Time (min): 33  Visit #: 3 of 8    Treatment Area: Complete rotator cuff tear or rupture of right shoulder, not specified as traumatic [M75.121]     SUBJECTIVE  Pain Level (0-10 scale): 1/10  Any medication changes, allergies to medications, adverse drug reactions, diagnosis change, or new procedure performed?: [x] No    [] Yes (see summary sheet for update)  Subjective functional status/changes:   [] No changes reported  \"Sore for about four days. \"    OBJECTIVE    Modality rationale: decrease inflammation and decrease pain to improve the patients ability to perform ADL's.    Min Type Additional Details    [] Estim:  []Unatt       []IFC  []Premod                        []Other:  []w/ice   []w/heat  Position:  Location:    [] Estim: []Att    []TENS instruct  []NMES                    []Other:  []w/US   []w/ice   []w/heat  Position:  Location:    []  Traction: [] Cervical       []Lumbar                       [] Prone          []Supine                       []Intermittent   []Continuous Lbs:  [] before manual  [] after manual    []  Ultrasound: []Continuous   [] Pulsed                           []1MHz   []3MHz W/cm2:  Location:    []  Iontophoresis with dexamethasone         Location: [] Take home patch   [] In clinic   10 [x]  Ice     []  heat  []  Ice massage  []  Laser   []  Anodyne Position: Seated  Location:(R) Shoulder    []  Laser with stim  []  Other:  Position:  Location:    []  Vasopneumatic Device Pressure:       [] lo [] med [] hi   Temperature: [] lo [] med [] hi   [] Skin assessment post-treatment:  []intact []redness- no adverse reaction    []redness  adverse reaction:     15 min Therapeutic Exercise:  [x] See flow sheet :   Rationale: increase ROM, increase strength and increase proprioception to improve the patients ability to perform ADL's.    8 min Manual Therapy:  (R) Shoulder PROM per protocol. Rationale: decrease pain, increase ROM and increase tissue extensibility to improve ease of AAROM phase. With   [x] TE   [] TA   [] neuro   [] other: Patient Education: [x] Review HEP    [] Progressed/Changed HEP based on:   [] positioning   [] body mechanics   [] transfers   [] heat/ice application    [] other:      Other Objective/Functional Measures: Frequent cueing to perform exercises correctly, able to partially correct form and posture with exercises. Significant guarding with pendulums and pulleys. Pain Level (0-10 scale) post treatment: 4/10    ASSESSMENT/Changes in Function: Pt stated \"I can't lift my arm, it hurts to lift it. \" Instructed pt she is not allowed to perform AROM at this time. Patient will continue to benefit from skilled PT services to modify and progress therapeutic interventions, address functional mobility deficits, address ROM deficits, address strength deficits, analyze and address soft tissue restrictions and analyze and cue movement patterns to attain remaining goals. [x]  See Plan of Care  []  See progress note/recertification  []  See Discharge Summary         Progress towards goals / Updated goals:  Updated Goals: to be achieved in 4 weeks:  1. Pt will improve FOTO to 37 to increase ease of ADLs  2. Pt will improve AAROM shoulder elevation to 130 deg for improved ADL performance. 3. Pt will demonstrate PROM ER to 45 deg for improved capsular mobility with daily tasks.     PLAN  []  Upgrade activities as tolerated     [x]  Continue plan of care  []  Update interventions per flow sheet       []  Discharge due to:_  []  Other:_      Thor Bernabe PTA 6/27/2017  1:55 PM    Future Appointments  Date Time Provider Colt Jovel   6/27/2017 2:00 PM Thor Bernabe PTA [facility]   6/29/2017 1:00 PM Matthew Holloway PTA [facility]   7/3/2017 2:00 PM Cathy Kj, PTA [facility]   7/6/2017 9:30 AM Cathy Kj, PTA [facility]   7/6/2017 11:00 AM ERIC [address]   7/11/2017 12:00 PM Cathy Kj, PTA [facility]   7/13/2017 11:00 AM Gerardo Rabago, PTA [facility]

## 2017-06-29 ENCOUNTER — HOSPITAL ENCOUNTER (OUTPATIENT)
Dept: PHYSICAL THERAPY | Age: 47
Discharge: HOME | End: 2017-06-29
Payer: SUBSIDIZED

## 2017-06-29 PROCEDURE — 97140 MANUAL THERAPY 1/> REGIONS: CPT

## 2017-06-29 PROCEDURE — 97110 THERAPEUTIC EXERCISES: CPT

## 2017-06-29 NOTE — PROGRESS NOTES
PT DAILY TREATMENT NOTE     Patient Name: Blanca Louise  Date:2017  : 1970  [x]  Patient  Verified  Payor: Brock Peng / Plan: [facility] % / Product Type: [place] [employer] /    In time:1:00  Out time:1:36  Total Treatment Time (min): 36  Visit #: 4 of 8    Treatment Area: Complete rotator cuff tear or rupture of right shoulder, not specified as traumatic [M75.121]    SUBJECTIVE  Pain Level (0-10 scale): 1/10  Any medication changes, allergies to medications, adverse drug reactions, diagnosis change, or new procedure performed?: [x] No    [] Yes (see summary sheet for update)  Subjective functional status/changes:   [] No changes reported  Pt reports no new complaints. Pt is concerned because she cannot lift her (R) arm away from her body. OBJECTIVE    Modality rationale: decrease inflammation and decrease pain to improve the patients ability to tolerate ADLs.     Min Type Additional Details    [] Estim:  []Unatt       []IFC  []Premod                        []Other:  []w/ice   []w/heat  Position:  Location:    [] Estim: []Att    []TENS instruct  []NMES                    []Other:  []w/US   []w/ice   []w/heat  Position:  Location:    []  Traction: [] Cervical       []Lumbar                       [] Prone          []Supine                       []Intermittent   []Continuous Lbs:  [] before manual  [] after manual    []  Ultrasound: []Continuous   [] Pulsed                           []1MHz   []3MHz W/cm2:  Location:    []  Iontophoresis with dexamethasone         Location: [] Take home patch   [] In clinic   10 [x]  Ice     []  heat  []  Ice massage  []  Laser   []  Anodyne Position: supine  Location:(R) shoulder    []  Laser with stim  []  Other:  Position:  Location:    []  Vasopneumatic Device Pressure:       [] lo [] med [] hi   Temperature: [] lo [] med [] hi   [] Skin assessment post-treatment:  []intact []redness- no adverse reaction    []redness  adverse reaction:     18 min Therapeutic Exercise:  [x] See flow sheet :   Rationale: increase ROM and increase strength to improve the patients ability to tolerate ADLs. 8 min Manual Therapy:  PROM per protocol   Rationale: decrease pain, increase ROM and increase tissue extensibility to improve tolerance to ADLs. With   [] TE   [] TA   [] neuro   [] other: Patient Education: [x] Review HEP    [] Progressed/Changed HEP based on:   [] positioning   [] body mechanics   [] transfers   [] heat/ice application    [] other:      Other Objective/Functional Measures: Frequent vc's to avoid guarding with manual treatment. Pain Level (0-10 scale) post treatment: 3/10    ASSESSMENT/Changes in Function: Educated patient about surgery performed and her precautions regarding surgical protocol. Unclear if patient comprehended instructions     Patient will continue to benefit from skilled PT services to modify and progress therapeutic interventions, address functional mobility deficits, address ROM deficits, address strength deficits, analyze and address soft tissue restrictions, analyze and cue movement patterns and analyze and modify body mechanics/ergonomics to attain remaining goals. []  See Plan of Care  []  See progress note/recertification  []  See Discharge Summary         Progress towards goals / Updated goals:  Updated Goals: to be achieved in 4 weeks:  1. Pt will improve FOTO to 37 to increase ease of ADLs  2. Pt will improve AAROM shoulder elevation to 130 deg for improved ADL performance. 3. Pt will demonstrate PROM ER to 45 deg for improved capsular mobility with daily tasks.     PLAN  []  Upgrade activities as tolerated     [x]  Continue plan of care  []  Update interventions per flow sheet       []  Discharge due to:_  []  Other:_      Gerardo Rabago PTA 6/29/2017  1:37 PM    Future Appointments  Date Time Provider Colt Jovel   7/3/2017 2:00 PM Cathy Underwood PTA [facility]   7/6/2017 9:30 AM Whit Garza Jaleesa, PTA [facility]   7/6/2017 11:00 AM ERIC Pimentel [address]   7/11/2017 12:00 PM [facility]   7/13/2017 11:00 AM [facility] Herrera[facility]

## 2017-07-03 ENCOUNTER — HOSPITAL ENCOUNTER (OUTPATIENT)
Dept: PHYSICAL THERAPY | Age: 47
Discharge: HOME | End: 2017-07-03
Payer: SUBSIDIZED

## 2017-07-03 PROCEDURE — 97110 THERAPEUTIC EXERCISES: CPT

## 2017-07-03 PROCEDURE — 97140 MANUAL THERAPY 1/> REGIONS: CPT

## 2017-07-03 NOTE — PROGRESS NOTES
PT DAILY TREATMENT NOTE     Patient Name: Jyotsna Ibrahim  Date:7/3/2017  : 1970  [x]  Patient  Verified  Payor: SARA / Plan: [facility] % / Product Type: Nicol Brown /    In time:2:00  Out time:2:43  Total Treatment Time (min): 43  Visit #: 5 of 8    Treatment Area: Complete rotator cuff tear or rupture of right shoulder, not specified as traumatic [M75.121]    SUBJECTIVE  Pain Level (0-10 scale): 210  Any medication changes, allergies to medications, adverse drug reactions, diagnosis change, or new procedure performed?: [x] No    [] Yes (see summary sheet for update)  Subjective functional status/changes:   [] No changes reported  \"I just want to be to lift my arm. \"    OBJECTIVE    Modality rationale: decrease pain to improve the patients ability to perform ADL's.    Min Type Additional Details    [] Estim:  []Unatt       []IFC  []Premod                        []Other:  []w/ice   []w/heat  Position:  Location:    [] Estim: []Att    []TENS instruct  []NMES                    []Other:  []w/US   []w/ice   []w/heat  Position:  Location:    []  Traction: [] Cervical       []Lumbar                       [] Prone          []Supine                       []Intermittent   []Continuous Lbs:  [] before manual  [] after manual    []  Ultrasound: []Continuous   [] Pulsed                           []1MHz   []3MHz W/cm2:  Location:    []  Iontophoresis with dexamethasone         Location: [] Take home patch   [] In clinic   10 [x]  Ice     []  heat  []  Ice massage  []  Laser   []  Anodyne Position: Seated  Location: (R) Shoulder    []  Laser with stim  []  Other:  Position:  Location:    []  Vasopneumatic Device Pressure:       [] lo [] med [] hi   Temperature: [] lo [] med [] hi   [] Skin assessment post-treatment:  []intact []redness- no adverse reaction    []redness  adverse reaction:     25 min Therapeutic Exercise:  [x] See flow sheet :   Rationale: increase ROM and increase strength to improve the patients ability to perform ADL's.    8 min Manual Therapy:  (R) GH joint mobs, PROM. Rationale: decrease pain, increase ROM and increase tissue extensibility to improve ease of ADL's. With   [x] TE   [] TA   [] neuro   [] other: Patient Education: [x] Review HEP    [] Progressed/Changed HEP based on:   [] positioning   [] body mechanics   [] transfers   [] heat/ice application    [] other:      Other Objective/Functional Measures: Added supine wand flexion, ER and scaption, required additional mod(A) to perform scaption. Pain Level (0-10 scale) post treatment: 3-4/10    ASSESSMENT/Changes in Function: Limited AAROM. Pt reports significant \"popping/crunching\" with AAROM. Patient will continue to benefit from skilled PT services to modify and progress therapeutic interventions, address functional mobility deficits, address ROM deficits, address strength deficits, analyze and address soft tissue restrictions and analyze and cue movement patterns to attain remaining goals. [x]  See Plan of Care  []  See progress note/recertification  []  See Discharge Summary         Progress towards goals / Updated goals:  Updated Goals: to be achieved in 4 weeks:  1. Pt will improve FOTO to 37 to increase ease of ADLs  2. Pt will improve AAROM shoulder elevation to 130 deg for improved ADL performance. - slow progress. 7/3/2017  3. Pt will demonstrate PROM ER to 45 deg for improved capsular mobility with daily tasks.     PLAN  []  Upgrade activities as tolerated     [x]  Continue plan of care  []  Update interventions per flow sheet       []  Discharge due to:_  []  Other:_      Daniela Hamilton PTA 7/3/2017  2:20 PM    Future Appointments  Date Time Provider Colt Jovel   7/6/2017 9:30 AM [facility]   7/6/2017 11:00 AM ERIC Betancur   7/11/2017 12:00 PM [facility]   7/13/2017 11:00 AM Ruben Bailey PTA [facility]

## 2017-07-06 ENCOUNTER — HOSPITAL ENCOUNTER (OUTPATIENT)
Dept: PHYSICAL THERAPY | Age: 47
Discharge: HOME | End: 2017-07-06
Payer: SUBSIDIZED

## 2017-07-06 PROCEDURE — 97110 THERAPEUTIC EXERCISES: CPT

## 2017-07-06 PROCEDURE — 97140 MANUAL THERAPY 1/> REGIONS: CPT

## 2017-07-06 NOTE — PROGRESS NOTES
PT DAILY TREATMENT NOTE     Patient Name: Antoine Martinez  Date:2017  : 1970  [x]  Patient  Verified  Payor: SARA / Plan: [facility] % / Product Type: Sara /    In time:9:32  Out time:10:14  Total Treatment Time (min): 42  Visit #: 6 of 8    Treatment Area: Complete rotator cuff tear or rupture of right shoulder, not specified as traumatic [M75.121]    SUBJECTIVE  Pain Level (0-10 scale): 1/10  Any medication changes, allergies to medications, adverse drug reactions, diagnosis change, or new procedure performed?: [x] No    [] Yes (see summary sheet for update)  Subjective functional status/changes:   [] No changes reported  \"\"I want to be able to lift my arm. \"    OBJECTIVE    Modality rationale: decrease inflammation and decrease pain to improve the patients ability to perform ADL's.    Min Type Additional Details    [] Estim:  []Unatt       []IFC  []Premod                        []Other:  []w/ice   []w/heat  Position:  Location:    [] Estim: []Att    []TENS instruct  []NMES                    []Other:  []w/US   []w/ice   []w/heat  Position:  Location:    []  Traction: [] Cervical       []Lumbar                       [] Prone          []Supine                       []Intermittent   []Continuous Lbs:  [] before manual  [] after manual    []  Ultrasound: []Continuous   [] Pulsed                           []1MHz   []3MHz W/cm2:  Location:    []  Iontophoresis with dexamethasone         Location: [] Take home patch   [] In clinic   10 [x]  Ice     []  heat  []  Ice massage  []  Laser   []  Anodyne Position: Seated  Location: (R) Shoulder    []  Laser with stim  []  Other:  Position:  Location:    []  Vasopneumatic Device Pressure:       [] lo [] med [] hi   Temperature: [] lo [] med [] hi   [] Skin assessment post-treatment:  []intact []redness- no adverse reaction    []redness  adverse reaction:     24 min Therapeutic Exercise:  [x] See flow sheet :   Rationale: increase ROM and increase strength to improve the patients ability to perform ADL's.    8 min Manual Therapy:  (R) GH joint mobs, PROM, (L) pec release. Rationale: decrease pain, increase ROM and increase tissue extensibility to improve AAROM activities. With   [x] TE   [] TA   [] neuro   [] other: Patient Education: [x] Review HEP    [] Progressed/Changed HEP based on:   [] positioning   [] body mechanics   [] transfers   [] heat/ice application    [] other:      Other Objective/Functional Measures: (R) UE strength grade 1-2/5 grossly. Poor tolerance with isometrics on wall and supine wand exercises. (R) Shoulder PROM ER 50 degrees. Pain Level (0-10 scale) post treatment: 2/10    ASSESSMENT/Changes in Function: Pain primarily at subacromial space. FOTO improved to 38%. Pt reports falling and occasionally using (R) UE shortly after surgery, surgical site my have been compromised. Patient will continue to benefit from skilled PT services to modify and progress therapeutic interventions, address functional mobility deficits, address ROM deficits, address strength deficits, analyze and address soft tissue restrictions and analyze and cue movement patterns to attain remaining goals. [x]  See Plan of Care  []  See progress note/recertification  []  See Discharge Summary         Progress towards goals / Updated goals:  Updated Goals: to be achieved in 4 weeks:  1. Pt will improve FOTO to 37 to increase ease of ADLs - FOTO improved to 38%. 7/6/2017  2. Pt will improve AAROM shoulder elevation to 130 deg for improved ADL performance. - slow progress. 7/3/2017  3. Pt will demonstrate PROM ER to 45 deg for improved capsular mobility with daily tasks. - Met, 50 degrees.  7/6/2017    PLAN  []  Upgrade activities as tolerated     [x]  Continue plan of care  []  Update interventions per flow sheet       []  Discharge due to:_  []  Other:_      Whitney Matthew PTA 7/6/2017  9:29 AM    Future Appointments  Date Time Provider Department [place]   7/6/2017 9:30 AM Epifanio Oscar PTA [facility]   7/11/2017 12:00 PM [facility]   7/13/2017 9:45 AM Alia Guzman PA-C [facility]   7/13/2017 11:00 AM [facility]

## 2017-07-11 ENCOUNTER — HOSPITAL ENCOUNTER (OUTPATIENT)
Dept: PHYSICAL THERAPY | Age: 47
Discharge: HOME | End: 2017-07-11
Payer: SUBSIDIZED

## 2017-07-11 PROCEDURE — 97110 THERAPEUTIC EXERCISES: CPT

## 2017-07-13 ENCOUNTER — HOSPITAL ENCOUNTER (OUTPATIENT)
Dept: PHYSICAL THERAPY | Age: 47
Discharge: HOME | End: 2017-07-13
Payer: SUBSIDIZED

## 2017-07-13 ENCOUNTER — OFFICE VISIT (OUTPATIENT)
Dept: ORTHOPEDIC SURGERY | Age: 47
End: 2017-07-13

## 2017-07-13 VITALS
TEMPERATURE: 96.9 F | WEIGHT: 197.8 LBS | DIASTOLIC BLOOD PRESSURE: 67 MMHG | HEIGHT: 66 IN | BODY MASS INDEX: 31.79 KG/M2 | SYSTOLIC BLOOD PRESSURE: 107 MMHG | HEART RATE: 72 BPM

## 2017-07-13 DIAGNOSIS — Z98.890 S/P ROTATOR CUFF REPAIR: Primary | ICD-10-CM

## 2017-07-13 DIAGNOSIS — R20.0 NUMBNESS AND TINGLING IN RIGHT HAND: ICD-10-CM

## 2017-07-13 DIAGNOSIS — R20.2 NUMBNESS AND TINGLING IN RIGHT HAND: ICD-10-CM

## 2017-07-13 PROCEDURE — 97110 THERAPEUTIC EXERCISES: CPT

## 2017-07-13 PROCEDURE — 97140 MANUAL THERAPY 1/> REGIONS: CPT

## 2017-07-13 NOTE — PROGRESS NOTES
PT DAILY TREATMENT NOTE     Patient Name: Josephine Gentile  Date:2017  : 1970  [x]  Patient  Verified  Payor: Isabella Abraham / Plan: [facility] % / Product Type: Mandi Fernandez /    In time:11:00  Out time:11:47  Total Treatment Time (min): 52  Visit #: 8 of 8    Treatment Area: Complete rotator cuff tear or rupture of right shoulder, not specified as traumatic [M75.121]    SUBJECTIVE  Pain Level (0-10 scale): 2/10  Any medication changes, allergies to medications, adverse drug reactions, diagnosis change, or new procedure performed?: [x] No    [] Yes (see summary sheet for update)  Subjective functional status/changes:   [] No changes reported  Pt reports she cannot move her arm on her own and she is unable to wash her own hair still. Pt states she is able     OBJECTIVE    Modality rationale: decrease inflammation and decrease pain to improve the patients ability to tolerate ADLs.     Min Type Additional Details    [] Estim:  []Unatt       []IFC  []Premod                        []Other:  []w/ice   []w/heat  Position:  Location:    [] Estim: []Att    []TENS instruct  []NMES                    []Other:  []w/US   []w/ice   []w/heat  Position:  Location:    []  Traction: [] Cervical       []Lumbar                       [] Prone          []Supine                       []Intermittent   []Continuous Lbs:  [] before manual  [] after manual    []  Ultrasound: []Continuous   [] Pulsed                           []1MHz   []3MHz W/cm2:  Location:    []  Iontophoresis with dexamethasone         Location: [] Take home patch   [] In clinic   10 [x]  Ice     []  heat  []  Ice massage  []  Laser   []  Anodyne Position: sitting  Location:(R) shoulder    []  Laser with stim  []  Other:  Position:  Location:    []  Vasopneumatic Device Pressure:       [] lo [] med [] hi   Temperature: [] lo [] med [] hi   [] Skin assessment post-treatment:  []intact []redness- no adverse reaction    []redness  adverse reaction:       29 min Therapeutic Exercise:  [x] See flow sheet :   Rationale: increase ROM and increase strength to improve the patients ability to tolerate ADLs. 8 min Manual Therapy:  PROM (R) shoulder   Rationale: decrease pain, increase ROM and increase tissue extensibility to improve functional mobility. With   [] TE   [] TA   [] neuro   [] other: Patient Education: [x] Review HEP    [] Progressed/Changed HEP based on:   [] positioning   [] body mechanics   [] transfers   [] heat/ice application    [] other:      Other Objective/Functional Measures: AAROM shoulder flexion with cane 170 degrees; AROM shoulder flexion approx 75 degrees. PROM ER 75 degrees. Pain Level (0-10 scale) post treatment: 2/10    ASSESSMENT/Changes in Function: Pt demonstrates poor proprioception and (R) shoulder weakness with inability to perform shoulder elevation against gravity. Pt was able to achieve 170 degrees of shoulder flexion with assistance while performing wall slides and cane exercises. Patient will continue to benefit from skilled PT services to modify and progress therapeutic interventions, address functional mobility deficits, address ROM deficits, address strength deficits, analyze and address soft tissue restrictions, analyze and cue movement patterns and analyze and modify body mechanics/ergonomics to attain remaining goals. []  See Plan of Care  []  See progress note/recertification  []  See Discharge Summary         Progress towards goals / Updated goals:  Updated Goals: to be achieved in 4 weeks:  1. Pt will improve FOTO to 37 to increase ease of ADLs - FOTO improved to 38%. 7/6/2017  2. Pt will improve AAROM shoulder elevation to 130 deg for improved ADL performance. - slow progress. 7/3/2017  3. Pt will demonstrate PROM ER to 45 deg for improved capsular mobility with daily tasks. - Met, 50 degrees.  7/6/2017       PLAN  []  Upgrade activities as tolerated     [x]  Continue plan of care  []  Update interventions per flow sheet       []  Discharge due to:_  []  Other:_      Jimmy Ray, DAYAMI 7/13/2017  11:24 AM    Future Appointments  Date Time Provider Colt Jovel   8/10/2017 10:45 AM Ovi MondayERIC 69

## 2017-07-13 NOTE — PROGRESS NOTES
HISTORY OF PRESENT ILLNESS:  Tereza Becerra returns two months status post her right shoulder open rotator cuff repair. She is having a tough time with her range of motion. Her pain, however, has improved to the surgical site. She has had some popping and cracking of the joint, which she was worried that the joint may be unstable. She attends physical therapy at [facility]. A discussion with the [facility] therapist today indicated that she is not participating in any carryover, no home exercises, and of course, immediately following surgery, she was fully using her right upper extremity actively, thereby, perhaps stressing the surgical construct. PHYSICAL EXAM:  Today, she can barely hold her arm in the forward flexion plane at 40°. Passively, I can get her to 160°. She has a tiny area of fluctuance associated with the right shoulder anteriorly. There is no evidence of cellulitis or skin breakdown. Passively, I can externally rotate her at 15°. Her right elbow range of motion is 110-10°. She still has some numbness, she complains pins and needles-like to the right fifth digit. She indicates this was not present prior to surgery. She is worried that the nerve is dead or dying. PLAN:  Today, for the pins and needles sensation that has been described, I am going to order an EMG nerve conduction test for the right upper extremity. She has no positive Tinels sign of the wrist or the ulnar groove. We are going to see her back following the EMG. PT is to continue in advanced with strengthening and range of motion of all modalities.

## 2017-07-13 NOTE — PROGRESS NOTES
[facility]  [address]  [place], [address]  [phone] fax    Physical Therapy Progress Note  Patient name: Carolene Boas Start of Care: 2017   Referral source: Dylon Sotelo MD,* : 1970    Medical Diagnosis: Complete rotator cuff tear or rupture of right shoulder, not specified as traumatic [M75.121] Onset Date:2017    Treatment Diagnosis: right shoulder pain   Prior Hospitalization: see medical history Provider#: [id number]   Medications: Verified on Patient summary List   Comorbidities: anxiety, OA, asthma, obesity, depression, GERD, HA, hearing impairment, incontinence  Prior Level of Function: RHD female    Visits from Start of Care: 16    Missed Visits: 0      Key Functional Changes: AAROM shoulder flexion with cane 170 degrees; AROM shoulder flexion approx 75 degrees. PROM ER 75 degrees. Pt demonstrates poor proprioception and (R) shoulder weakness with inability to perform shoulder elevation against gravity. Pt was able to achieve 170 degrees of shoulder flexion with assistance while performing wall slides and cane exercises. Updated Goals: to be achieved in 4 weeks:  1. Pt will improve FOTO to 37 to increase ease of ADLs - FOTO improved to 38%. 2017  2. Pt will improve AAROM shoulder elevation to 130 deg for improved ADL performance. - slow progress. 7/3/2017  3. Pt will demonstrate PROM ER to 45 deg for improved capsular mobility with daily tasks. - Met, 50 degrees. 2017    Updated Goals: to be achieved in 4 weeks:  1. Pt will improve active elevation to 90 degrees to increase ease of ADLs  2.   Pt will increase ER to 90 degrees to increase ease of ADLs    ASSESSMENT/RECOMMENDATIONS:  [x]Continue therapy per initial plan/protocol at a frequency of  2 x per week for 4 weeks  []Continue therapy with the following recommended changes:_____________________ _____________________________________________________________________  []Discontinue therapy progressing towards or have reached established goals  []Discontinue therapy due to lack of appreciable progress towards goals  []Discontinue therapy due to lack of attendance or compliance  []Await Physician's recommendations/decisions regarding therapy  []Other:________________________________________________________________    Thank you for this referral.    Brook Hooker, PT 7/13/2017 2:45 PM  NOTE TO PHYSICIAN:  PLEASE COMPLETE THE ORDERS BELOW AND   [facility]: [phone]  If you are unable to process this request in 24 hours please contact our office: [phone]    ? I have read the above report and request that my patient continue as recommended. ? I have read the above report and request that my patient continue therapy with the following changes/special instructions:__________________________________________________________  ? I have read the above report and request that my patient be discharged from therapy.     Physicians signature: ______________________________Date: ______Time:______

## 2017-07-13 NOTE — MR AVS SNAPSHOT
Visit Information Date & Time Provider Department Dept. Phone Encounter #  
 7/13/2017  9:45 AM Hanna Willoughby, [address] [facility] [phone] [id number] Upcoming Health Maintenance Date Due Pneumococcal 19-64 Medium Risk (1 of 1 - PPSV23) 5/30/1987 DTaP/Tdap/Td series (1 - Tdap) 5/30/1989 PAP AKA CERVICAL CYTOLOGY 5/30/1989 INFLUENZA AGE 9 TO ADULT 8/1/2017 Allergies as of 7/13/2017  Review Complete On: 7/13/2017 By: Hanna Willoughby PA-C Severity Noted Reaction Type Reactions Amoxicillin  12/29/2012    Other (comments) Does no work Codeine  12/29/2012    Nausea and Vomiting Current Immunizations  Never Reviewed No immunizations on file. Not reviewed this visit You Were Diagnosed With   
  
 Codes Comments S/P rotator cuff repair    -  Primary ICD-10-CM: U43.661 ICD-9-CM: V45.89 Numbness and tingling in right hand     ICD-10-CM: R20.0, R20.2 ICD-9-CM: 782.0 little finger Vitals BP Pulse Temp Height(growth percentile) Weight(growth percentile) BMI  
 107/67 72 96.9 °F (36.1 °C) (Oral) 5' 6\" (1.676 m) 197 lb 12.8 oz (89.7 kg) 31.93 kg/m2 OB Status Smoking Status Unknown Current Every Day Smoker BMI and BSA Data Body Mass Index Body Surface Area  
 31.93 kg/m 2 2.04 m 2 Preferred Pharmacy Pharmacy Name Phone Svitlana Dash, [address] Your Updated Medication List  
  
   
This list is accurate as of: 7/13/17 10:45 AM.  Always use your most recent med list.  
  
  
  
  
 ABILIFY 10 mg tablet Generic drug:  ARIPiprazole Take 10 mg by mouth daily. 10mg am and 15mg q hs  
  
 baclofen 10 mg tablet Commonly known as:  LIORESAL Take  by mouth three (3) times daily. chlorproMAZINE 100 mg tablet Commonly known as:  THORAZINE Take 100 mg by mouth three (3) times daily. HYDROmorphone 2 mg tablet Commonly known as:  DILAUDID Take 1 Tab by mouth two (2) times daily as needed for Pain. Max Daily Amount: 4 mg. Indications: Pain, Severe Pain LaMICtal 100 mg tablet Generic drug:  lamoTRIgine Take  by mouth daily. 100 mg am and 200mg q hs LUNESTA 3 mg tablet Generic drug:  eszopiclone Take  by mouth nightly.  
  
 magnesium 250 mg Tab Take  by mouth.  
  
 multivitamin with iron chewable tablet Commonly known as:  Alex Ange Take 1 Tab by mouth daily. naloxegol 25 mg Tab tablet Commonly known as:  Evalina Jonah Take 1 Tab by mouth Daily (before breakfast). Indications: OPIOID-INDUCED CONSTIPATION  
  
 NEXIUM PO Take  by mouth. PriLOSEC 20 mg capsule Generic drug:  omeprazole Take 20 mg by mouth daily. promethazine 25 mg tablet Commonly known as:  PHENERGAN Take 1 Tab by mouth every six (6) hours as needed for Nausea. Indications: POST-OPERATIVE NAUSEA AND VOMITING  
  
 traZODone 300 mg tablet Commonly known as:  Tejas Moron Take 300 mg by mouth nightly. venlafaxine-SR 37.5 mg capsule Commonly known as:  EFFEXOR-XR Take 75 mg by mouth daily. VITAMIN B-12 PO Take  by mouth daily. VITAMIN D2 PO Take  by mouth every seven (7) days. zolpidem 10 mg tablet Commonly known as:  AMBIEN Take 10 mg by mouth nightly as needed for Sleep. To-Do List   
 07/13/2017 11:00 AM  
  Appointment with Michaelle Hull PTA at [address] ([phone])  
  
 07/20/2017 Neurology:  EMG ONE EXTREMITY UPPER RT   
  
 07/20/2017 Neurology:  NCV/LAT MOTOR PER NERVE UP/RT Please provide this summary of care documentation to your next provider. Your primary care clinician is listed as Ramona Bailey. If you have any questions after today's visit, please call [phone].

## 2017-07-17 ENCOUNTER — HOSPITAL ENCOUNTER (OUTPATIENT)
Dept: PHYSICAL THERAPY | Age: 47
Discharge: HOME | End: 2017-07-17
Payer: SUBSIDIZED

## 2017-07-17 PROCEDURE — 97112 NEUROMUSCULAR REEDUCATION: CPT

## 2017-07-17 PROCEDURE — 97110 THERAPEUTIC EXERCISES: CPT

## 2017-07-17 NOTE — PROGRESS NOTES
PT DAILY TREATMENT NOTE     Patient Name: Delfina Powell  Date:2017  : 1970  [x]  Patient  Verified  Payor: Won Padron / Plan: [facility] % / Product Type: Irl Halsted /    In time:10:40  Out time:11:15  Total Treatment Time (min): 35  Visit #: 1 of 8    Treatment Area: Complete rotator cuff tear or rupture of right shoulder, not specified as traumatic [M75.121]    SUBJECTIVE  Pain Level (0-10 scale): 3/10  Any medication changes, allergies to medications, adverse drug reactions, diagnosis change, or new procedure performed?: [x] No    [] Yes (see summary sheet for update)  Subjective functional status/changes:   [] No changes reported  Pt reports no new complaints. Pt reports performing HEP daily. OBJECTIVE    Modality rationale: decrease inflammation and decrease pain to improve the patients ability to tolerate ADLs.     Min Type Additional Details    [] Estim:  []Unatt       []IFC  []Premod                        []Other:  []w/ice   []w/heat  Position:  Location:    [] Estim: []Att    []TENS instruct  []NMES                    []Other:  []w/US   []w/ice   []w/heat  Position:  Location:    []  Traction: [] Cervical       []Lumbar                       [] Prone          []Supine                       []Intermittent   []Continuous Lbs:  [] before manual  [] after manual    []  Ultrasound: []Continuous   [] Pulsed                           []1MHz   []3MHz W/cm2:  Location:    []  Iontophoresis with dexamethasone         Location: [] Take home patch   [] In clinic   10 [x]  Ice     []  heat  []  Ice massage  []  Laser   []  Anodyne Position: supine  Location:(R) shoulder      []  Laser with stim  []  Other:  Position:  Location:    []  Vasopneumatic Device Pressure:       [] lo [] med [] hi   Temperature: [] lo [] med [] hi   [] Skin assessment post-treatment:  []intact []redness- no adverse reaction    []redness  adverse reaction:     15 min Therapeutic Exercise:  [x] See flow sheet :   Rationale: increase ROM and increase strength to improve the patients ability to tolerate ADLs    10 min Neuromuscular Re-education:  [x]  See flow sheet :   Rationale: increase strength, improve coordination and increase proprioception  to improve the patients ability to perform functional activities. With   [] TE   [] TA   [] neuro   [] other: Patient Education: [x] Review HEP    [] Progressed/Changed HEP based on:   [] positioning   [] body mechanics   [] transfers   [] heat/ice application    [] other:      Other Objective/Functional Measures: Added UBE and bent over rows, shoulder extension and elbow extension. Pain Level (0-10 scale) post treatment: 4/10    ASSESSMENT/Changes in Function: Pt continues to demonstrate poor scapulohumeral rhythm when performing shoulder elevation. Patient will continue to benefit from skilled PT services to modify and progress therapeutic interventions, address functional mobility deficits, address ROM deficits, address strength deficits, analyze and address soft tissue restrictions, analyze and cue movement patterns and analyze and modify body mechanics/ergonomics to attain remaining goals. []  See Plan of Care  []  See progress note/recertification  []  See Discharge Summary         Progress towards goals / Updated goals:  1. Pt will improve active elevation to 90 degrees to increase ease of ADLs  2.   Pt will increase ER to 90 degrees to increase ease of ADLs       PLAN  []  Upgrade activities as tolerated     [x]  Continue plan of care  []  Update interventions per flow sheet       []  Discharge due to:_  []  Other:_      Chema Stapleton PTA 7/17/2017  10:43 AM    Future Appointments  Date Time Provider Colt Jovel   7/19/2017 11:00 AM Chema Stapleton PTA [facility]   7/24/2017 11:30 AM Milly Romano PTA [facility]   7/26/2017 12:00 PM Milly Romano PTA [facility]   7/31/2017 12:00 PM Milly Romano PTA [facility]   8/2/2017 12:00 PM Inés Ibarra, PTA [facility]   8/7/2017 12:00 PM Yamileth Yee, PTA [facility]   8/9/2017 12:00 PM Yamileth Yee, PTA [facility]   8/10/2017 10:45 AM ERIC Aly 67

## 2017-07-19 ENCOUNTER — HOSPITAL ENCOUNTER (OUTPATIENT)
Dept: PHYSICAL THERAPY | Age: 47
Discharge: HOME | End: 2017-07-19
Payer: SUBSIDIZED

## 2017-07-19 PROCEDURE — 97112 NEUROMUSCULAR REEDUCATION: CPT

## 2017-07-19 PROCEDURE — 97110 THERAPEUTIC EXERCISES: CPT

## 2017-07-19 NOTE — PROGRESS NOTES
PT DAILY TREATMENT NOTE     Patient Name: Delfina Powell  Date:2017  : 1970  [x]  Patient  Verified  Payor: Won Padron / Plan: [facility] % / Product Type: Irl Halsted /    In time:11:00  Out time:11:38  Total Treatment Time (min): 38  Visit #: 2 of 8    Treatment Area: Complete rotator cuff tear or rupture of right shoulder, not specified as traumatic [M75.121]    SUBJECTIVE  Pain Level (0-10 scale): 1/10  Any medication changes, allergies to medications, adverse drug reactions, diagnosis change, or new procedure performed?: [x] No    [] Yes (see summary sheet for update)  Subjective functional status/changes:   [] No changes reported  Pt reports no new complaints. Pt reports performing HEP daily. OBJECTIVE    Modality rationale: decrease inflammation and decrease pain to improve the patients ability to tolerate ADLs.     Min Type Additional Details    [] Estim:  []Unatt       []IFC  []Premod                        []Other:  []w/ice   []w/heat  Position:  Location:    [] Estim: []Att    []TENS instruct  []NMES                    []Other:  []w/US   []w/ice   []w/heat  Position:  Location:    []  Traction: [] Cervical       []Lumbar                       [] Prone          []Supine                       []Intermittent   []Continuous Lbs:  [] before manual  [] after manual    []  Ultrasound: []Continuous   [] Pulsed                           []1MHz   []3MHz W/cm2:  Location:    []  Iontophoresis with dexamethasone         Location: [] Take home patch   [] In clinic   10 [x]  Ice     []  heat  []  Ice massage  []  Laser   []  Anodyne Position: supine  Location:(R) shoulder    []  Laser with stim  []  Other:  Position:  Location:    []  Vasopneumatic Device Pressure:       [] lo [] med [] hi   Temperature: [] lo [] med [] hi   [] Skin assessment post-treatment:  []intact []redness- no adverse reaction    []redness  adverse reaction:     28 min Therapeutic Exercise:  [x] See flow sheet :   Rationale: increase ROM and increase strength to improve the patients ability to tolerate ADLs. 10 min Neuromuscular Re-education:  [x]  See flow sheet :   Rationale: increase strength, improve coordination and increase proprioception  to improve the patients ability to perform functional activities. With   [] TE   [] TA   [] neuro   [] other: Patient Education: [x] Review HEP    [] Progressed/Changed HEP based on:   [] positioning   [] body mechanics   [] transfers   [] heat/ice application    [] other:      Other Objective/Functional Measures: Decreased UT compensations with wall slides. Pain Level (0-10 scale) post treatment: 3/10    ASSESSMENT/Changes in Function: Pt demonstrates improved shoulder strength with AAROM shoulder elevation exercises with decreased compensations noted. Patient will continue to benefit from skilled PT services to modify and progress therapeutic interventions, address functional mobility deficits, address ROM deficits, address strength deficits, analyze and address soft tissue restrictions, analyze and cue movement patterns and analyze and modify body mechanics/ergonomics to attain remaining goals.      []  See Plan of Care  []  See progress note/recertification  []  See Discharge Summary         Progress towards goals / Updated goals:  1.  Pt will improve active elevation to 90 degrees to increase ease of ADLs  2.  Pt will increase ER to 90 degrees to increase ease of ADLs    PLAN  []  Upgrade activities as tolerated     [x]  Continue plan of care  []  Update interventions per flow sheet       []  Discharge due to:_  []  Other:_      Michaelle Hull PTA 7/19/2017  11:11 AM    Future Appointments  Date Time Provider Colt Jovel   7/24/2017 11:30 AM [facility]   7/26/2017 12:00 PM [facility]   7/31/2017 12:00 PM [facility]   [facility]/7/2017 12:00 PM Joanne Peers, PTA [facility]   8/9/2017 12:00 PM Joanne Peers, PTA [facility]   8/18/2017 11:00 AM Sonia Scott  [facility]   8/23/2017 11:00 AM ERIC Mckeon Lee 67

## 2017-07-24 ENCOUNTER — HOSPITAL ENCOUNTER (OUTPATIENT)
Dept: PHYSICAL THERAPY | Age: 47
Discharge: HOME | End: 2017-07-24
Payer: SUBSIDIZED

## 2017-07-24 PROCEDURE — 97112 NEUROMUSCULAR REEDUCATION: CPT

## 2017-07-24 PROCEDURE — 97110 THERAPEUTIC EXERCISES: CPT

## 2017-07-24 NOTE — PROGRESS NOTES
PT DAILY TREATMENT NOTE     Patient Name: Eleanor Brannon  Date:2017  : 1970  [x]  Patient  Verified  Payor: Mike Rodríguez / Plan: [facility] % / Product Type: Autumn Ortiz /    In time:11:29  Out time:12:07  Total Treatment Time (min): 38  Visit #: 3 of 8    Treatment Area: Complete rotator cuff tear or rupture of right shoulder, not specified as traumatic [M75.121]    SUBJECTIVE  Pain Level (0-10 scale): 3/10  Any medication changes, allergies to medications, adverse drug reactions, diagnosis change, or new procedure performed?: [x] No    [] Yes (see summary sheet for update)  Subjective functional status/changes:   [] No changes reported  \"I fell in the yard either Friday night or Saturday night. Landed in the grass on my shoulder. Not much pain. \"    OBJECTIVE    Modality rationale: decrease pain to improve the patients ability to perform ADL's.    Min Type Additional Details    [] Estim:  []Unatt       []IFC  []Premod                        []Other:  []w/ice   []w/heat  Position:  Location:    [] Estim: []Att    []TENS instruct  []NMES                    []Other:  []w/US   []w/ice   []w/heat  Position:  Location:    []  Traction: [] Cervical       []Lumbar                       [] Prone          []Supine                       []Intermittent   []Continuous Lbs:  [] before manual  [] after manual    []  Ultrasound: []Continuous   [] Pulsed                           []1MHz   []3MHz W/cm2:  Location:    []  Iontophoresis with dexamethasone         Location: [] Take home patch   [] In clinic   10 [x]  Ice     []  heat  []  Ice massage  []  Laser   []  Anodyne Position: Seated  Location:(R) shoulder    []  Laser with stim  []  Other:  Position:  Location:    []  Vasopneumatic Device Pressure:       [] lo [] med [] hi   Temperature: [] lo [] med [] hi   [] Skin assessment post-treatment:  []intact []redness- no adverse reaction    []redness  adverse reaction:     20 min Therapeutic Exercise:  [x] See flow sheet :   Rationale: increase ROM and increase strength to improve the patients ability to perform ADL's.    8 min Neuromuscular Re-education:  [x]  See flow sheet :   Rationale: increase strength and increase proprioception  to improve the patients ability to perform ADL's. With   [x] TE   [] TA   [] neuro   [] other: Patient Education: [x] Review HEP    [] Progressed/Changed HEP based on:   [] positioning   [] body mechanics   [] transfers   [] heat/ice application    [] other:      Other Objective/Functional Measures: Added 1# to bent of rows, ext and tricep kickbacks. Pain Level (0-10 scale) post treatment: 5    ASSESSMENT/Changes in Function: Very limited (R) shoulder AROM, 10-15 degrees flexion with wall slides, requires (L) UE assist to perform. PROM WNL's. Pt reports falling over the weekend, this is the 2nd fall noted. Surgery site may have been compromised     Patient will continue to benefit from skilled PT services to modify and progress therapeutic interventions, address functional mobility deficits, address ROM deficits, address strength deficits and analyze and modify body mechanics/ergonomics to attain remaining goals. [x]  See Plan of Care  []  See progress note/recertification  []  See Discharge Summary         Progress towards goals / Updated goals:  1.  Pt will improve active elevation to 90 degrees to increase ease of ADLs - Not met.  7/24/2017  2.  Pt will increase ER to 90 degrees to increase ease of ADLs     PLAN  []  Upgrade activities as tolerated     [x]  Continue plan of care  []  Update interventions per flow sheet       []  Discharge due to:_  []  Other:_      Shira Cardona PTA 7/24/2017  11:42 AM    Future Appointments  Date Time Provider Colt Jovel   7/26/2017 12:00 PM Shira Cardona PTA [facility]   7/31/2017 12:00 PM [address] [place] [address]   8/2/2017 12:00 PM Shira Cardona PTA [facility]   8/7/2017 12:00 PM Yoseph Castillo PTA [facility]   8/9/2017 12:00 PM Edmundo Leiva PTA [facility]   8/18/2017 11:00 AM Irene Arriaga  [address] [facility]   8/24/2017 11:15 AM ERIC Hernandez Lee 67

## 2017-07-26 ENCOUNTER — HOSPITAL ENCOUNTER (OUTPATIENT)
Dept: PHYSICAL THERAPY | Age: 47
Discharge: HOME | End: 2017-07-26
Payer: SUBSIDIZED

## 2017-07-26 PROCEDURE — 97110 THERAPEUTIC EXERCISES: CPT

## 2017-07-26 PROCEDURE — 97112 NEUROMUSCULAR REEDUCATION: CPT

## 2017-07-26 NOTE — PROGRESS NOTES
PT DAILY TREATMENT NOTE     Patient Name: Delfina Powell  Date:2017  : 1970  [x]  Patient  Verified  Payor: Won Padron / Plan: [facility] % / Product Type: Irl Halsted /    In time:12:03  Out time:12:47  Total Treatment Time (min): 44  Visit #: 4 of 8    Treatment Area: Complete rotator cuff tear or rupture of right shoulder, not specified as traumatic [M75.121]    SUBJECTIVE  Pain Level (0-10 scale): 3/10  Any medication changes, allergies to medications, adverse drug reactions, diagnosis change, or new procedure performed?: [x] No    [] Yes (see summary sheet for update)  Subjective functional status/changes:   [x] No changes reported    OBJECTIVE    Modality rationale: decrease pain to improve the patients ability to perform ADL's.    Min Type Additional Details    [] Estim:  []Unatt       []IFC  []Premod                        []Other:  []w/ice   []w/heat  Position:  Location:    [] Estim: []Att    []TENS instruct  []NMES                    []Other:  []w/US   []w/ice   []w/heat  Position:  Location:    []  Traction: [] Cervical       []Lumbar                       [] Prone          []Supine                       []Intermittent   []Continuous Lbs:  [] before manual  [] after manual    []  Ultrasound: []Continuous   [] Pulsed                           []1MHz   []3MHz W/cm2:  Location:    []  Iontophoresis with dexamethasone         Location: [] Take home patch   [] In clinic   10 [x]  Ice     []  heat  []  Ice massage  []  Laser   []  Anodyne Position: Reclined  Location: (R) Shoulder    []  Laser with stim  []  Other:  Position:  Location:    []  Vasopneumatic Device Pressure:       [] lo [] med [] hi   Temperature: [] lo [] med [] hi   [] Skin assessment post-treatment:  []intact []redness- no adverse reaction    []redness  adverse reaction:     26 min Therapeutic Exercise:  [x] See flow sheet :   Rationale: increase ROM and increase strength to improve the patients ability to perform ADL's.    8 min Neuromuscular Re-education:  [x]  See flow sheet :   Rationale: increase strength and increase proprioception  to improve the patients ability to perform functional activities. With   [x] TE   [] TA   [] neuro   [] other: Patient Education: [x] Review HEP    [] Progressed/Changed HEP based on:   [] positioning   [] body mechanics   [] transfers   [] heat/ice application    [] other:      Other Objective/Functional Measures:  AROM (R) shoulder flexion 15 degrees standing, ER 46 degrees while reclined. Pain Level (0-10 scale) post treatment: 5/10    ASSESSMENT/Changes in Function: Very limited eccentric control with supine wand flexion, unable to smoothly perform. Suspect surgical site may have been compromised by pt's reports of falling 3x since surgery. Patient will continue to benefit from skilled PT services to modify and progress therapeutic interventions, address functional mobility deficits, address ROM deficits, address strength deficits, analyze and cue movement patterns and analyze and modify body mechanics/ergonomics to attain remaining goals. [x]  See Plan of Care  []  See progress note/recertification  []  See Discharge Summary         Progress towards goals / Updated goals:  1.  Pt will improve active elevation to 90 degrees to increase ease of ADLs - Not met. 7/24/2017  2.  Pt will increase ER to 90 degrees to increase ease of ADLs  - ER 46 degrees while reclined.  7/26/2017    PLAN  []  Upgrade activities as tolerated     [x]  Continue plan of care  []  Update interventions per flow sheet       []  Discharge due to:_  []  Other:_      Francheska Rojo PTA 7/26/2017  12:04 PM    Future Appointments  Date Time Provider Colt Jovel   7/31/2017 12:00 PM [address] [place] [address]   8/2/2017 12:00 PM Francheska Rojo PTA [facility]   8/7/2017 12:00 PM Camilo Quinteros PTA [facility]   8/9/2017 12:00 PM Camilo Quinteros PTA [facility]   8/18/2017 11:00 AM Rainer Randolph  [address]   8/24/2017 11:15 AM ERIC Pimentel Lee 67

## 2017-07-31 ENCOUNTER — HOSPITAL ENCOUNTER (OUTPATIENT)
Dept: PHYSICAL THERAPY | Age: 47
Discharge: HOME | End: 2017-07-31
Payer: SUBSIDIZED

## 2017-07-31 ENCOUNTER — OFFICE VISIT (OUTPATIENT)
Dept: ORTHOPEDIC SURGERY | Facility: CLINIC | Age: 47
End: 2017-07-31

## 2017-07-31 VITALS
HEIGHT: 66 IN | DIASTOLIC BLOOD PRESSURE: 63 MMHG | RESPIRATION RATE: 16 BRPM | SYSTOLIC BLOOD PRESSURE: 93 MMHG | HEART RATE: 74 BPM

## 2017-07-31 DIAGNOSIS — M25.511 CHRONIC RIGHT SHOULDER PAIN: Primary | ICD-10-CM

## 2017-07-31 DIAGNOSIS — G89.29 CHRONIC RIGHT SHOULDER PAIN: Primary | ICD-10-CM

## 2017-07-31 DIAGNOSIS — Z98.890 STATUS POST ROTATOR CUFF REPAIR: ICD-10-CM

## 2017-07-31 PROCEDURE — 97112 NEUROMUSCULAR REEDUCATION: CPT

## 2017-07-31 PROCEDURE — 97110 THERAPEUTIC EXERCISES: CPT

## 2017-07-31 NOTE — PROGRESS NOTES
[facility]  [address]  [place], [address]  [phone] fax    Physical Therapy Discharge Summary  Patient name: Lucy Colin Start of Care: 2017   Referral source: Torey Estrella MD : 1970   Medical/Treatment Diagnosis: Complete rotator cuff tear or rupture of right shoulder, not specified as traumatic [M75.121] Onset Date:2017     Prior Hospitalization: see medical history Provider#: [id number]   Medications: Verified on Patient Summary List    Comorbidities: anxiety, OA, asthma, obesity, depression, GERD, HA, hearing impairment, incontinence  Prior Level of Function: RHD female  Visits from Start of Care: 21    Missed Visits: 0  Reporting Period : 2017 to 2017    Summary of Care:  Progress towards goals / Updated goals:  1.  Pt will improve active elevation to 90 degrees to increase ease of ADLs - Not met. 2017. Not met 14 degrees 2017  2.  Pt will increase ER to 90 degrees to increase ease of ADLs  - ER 46 degrees while reclined. 2017 Declined to 42 degrees while reclined 2017    ASSESSMENT/RECOMMENDATIONS: Pt demonstrates WFL shoulder passive ROM limited by pain with empty end feels, but demonstrates minimal AROM limited to 14 degrees elevation. At this time, d/t lack of progress, she has been advised to f/u with orthopaedics regarding the integrity of her shoulder repair, particularly given her multiple falls affecting that shoulder. At this time, she will be DC from skilled PT pending ortho f/u.       [x]Discontinue therapy: []Patient has reached or is progressing toward set goals      []Patient is non-compliant or has abdicated      [x]Due to lack of appreciable progress towards set goals    Janet Walters, PT, DPT, ATC, CSCS 2017 1:39 PM

## 2017-07-31 NOTE — MR AVS SNAPSHOT
Visit Information Date & Time Provider Department Dept. Phone Encounter #  
 7/31/2017  2:30 PM Kathleen Coffey, [address] [facility] - [facility] [phone] [id number] Your Appointments 8/18/2017 11:00 AM  
EMG with Ciara De Oliveira MD  
[facility]) Appt Note: Mandi Marsh, PAC; right upper extremity; order in cc; new pt pkt mld. ..ywp  
 [address] [id number] [phone]  
  
   
 [place] [id number]  
  
    
 8/24/2017 11:15 AM  
Follow Up with Kathleen Coffey PA-C  
[facility] ([facility]) Appt Note: rt arm 1 mo fu after emg - global; emg 8-18 gebel; .  
 [facility], [address] [place] [address]  
[phone] [address] [place] [address], [facility] [address] Upcoming Health Maintenance Date Due Pneumococcal 19-64 Medium Risk (1 of 1 - PPSV23) 5/30/1987 DTaP/Tdap/Td series (1 - Tdap) 5/30/1989 PAP AKA CERVICAL CYTOLOGY 5/30/1989 INFLUENZA AGE 9 TO ADULT 8/1/2017 Allergies as of 7/31/2017  Review Complete On: 7/31/2017 By: Kathleen Coffey PA-C Severity Noted Reaction Type Reactions Amoxicillin  12/29/2012    Other (comments) Does no work Codeine  12/29/2012    Nausea and Vomiting Current Immunizations  Never Reviewed No immunizations on file. Not reviewed this visit You Were Diagnosed With   
  
 Codes Comments Chronic right shoulder pain    -  Primary ICD-10-CM: M25.511, G89.29 ICD-9-CM: 719.41, 338.29 Status post rotator cuff repair     ICD-10-CM: A94.757 ICD-9-CM: V45.89 Vitals BP Pulse Resp Height(growth percentile) OB Status Smoking Status 93/63 74 16 5' 6\" (1.676 m) Unknown Current Every Day Smoker Preferred Pharmacy Pharmacy Name Phone [facility] [address] [place] [address] Your Updated Medication List  
  
   
This list is accurate as of: 7/31/17  2:54 PM.  Always use your most recent med list.  
  
  
  
  
 ABILIFY 10 mg tablet Generic drug:  ARIPiprazole Take 10 mg by mouth daily. 10mg am and 15mg q hs  
  
 baclofen 10 mg tablet Commonly known as:  LIORESAL Take  by mouth three (3) times daily. chlorproMAZINE 100 mg tablet Commonly known as:  THORAZINE Take 100 mg by mouth two (2) times a day. Take 100 mg in the am and 200 mg at night time. HYDROmorphone 2 mg tablet Commonly known as:  DILAUDID Take 1 Tab by mouth two (2) times daily as needed for Pain. Max Daily Amount: 4 mg. Indications: Pain, Severe Pain KLONOPIN PO Take  by mouth. LaMICtal 100 mg tablet Generic drug:  lamoTRIgine Take  by mouth daily. 100 mg am and 200mg q hs LUNESTA 3 mg tablet Generic drug:  eszopiclone Take  by mouth nightly.  
  
 magnesium 250 mg Tab Take  by mouth.  
  
 multivitamin with iron chewable tablet Commonly known as:  Barry Albert Take 1 Tab by mouth daily. naloxegol 25 mg Tab tablet Commonly known as:  Mackenzie Mention Take 1 Tab by mouth Daily (before breakfast). Indications: OPIOID-INDUCED CONSTIPATION  
  
 NEXIUM PO Take  by mouth. PriLOSEC 20 mg capsule Generic drug:  omeprazole Take 20 mg by mouth daily. promethazine 25 mg tablet Commonly known as:  PHENERGAN Take 1 Tab by mouth every six (6) hours as needed for Nausea. Indications: POST-OPERATIVE NAUSEA AND VOMITING  
  
 traZODone 300 mg tablet Commonly known as:  Sonny Dorsey Take 300 mg by mouth nightly. venlafaxine-SR 37.5 mg capsule Commonly known as:  EFFEXOR-XR Take 75 mg by mouth daily. VITAMIN B-12 PO Take  by mouth daily. VITAMIN D2 PO Take  by mouth every seven (7) days. zolpidem 10 mg tablet Commonly known as:  AMBIEN  
 Take 10 mg by mouth nightly as needed for Sleep. To-Do List   
 08/02/2017 12:00 PM  
  Appointment with Milly Romano PTA at [facility]  [address] ([phone])  
  
 08/07/2017 Imaging:  MRI SHOULDER RT W CONT   
  
 08/07/2017 12:00 PM  
  Appointment with Chema Stapleton PTA at [address] [facility] ([phone])  
  
 08/09/2017 12:00 PM  
  Appointment with Chema Stapleton PTA at [facility]  [address] ([phone]) Patient Instructions MRI of the Shoulder: About This Test 
What is it? MRI (magnetic resonance imaging) is a test that uses a magnetic field and pulses of radio wave energy to make pictures of the organs and structures inside the body. An MRI can give your doctor information about your shoulder, the bones around it, and the tissues around it, such as cartilage, ligaments, and tendons. When you have an MRI, you lie on a table and the table moves into the MRI machine. Why is this test done? An MRI of the shoulder can find problems such as damage to the cartilage, tendons, and ligaments around the shoulder. It can also look for the cause of shoulder pain and find rotator cuff problems. How can you prepare for the test? 
Talk to your doctor about all your health conditions before the test. For example, tell your doctor if: 
· You are allergic to any medicines. · You are or might be pregnant. · You have a pacemaker, an artificial limb, any metal pins or metal parts in your body, metal heart valves, metal clips in your brain, metal implants in your ears, or any other implanted or prosthetic medical device. · You have an intrauterine device (IUD) in place. · You get nervous in confined spaces. You may need medicine to help you relax. · You wear a patch that contains medicine. · You have kidney disease. What happens before the test? 
· You will remove all metal objects, such as hearing aids, dentures, jewelry, watches, and hairpins. · You will take off all or most of your clothes and change into a gown. If you do leave some clothes on, make sure you take everything out of your pockets. · You may have contrast material (dye) put into your arm through a tube called an IV or directly into your shoulder joint. Contrast material helps doctors see specific organs, blood vessels, and most tumors. What happens during the test? 
· You will lie on your back on a table that is part of the MRI scanner. Your head, chest, and arms may be held with straps to help you remain still. · The table will slide into the space that contains the magnet. A device called a coil may be placed over or wrapped around your shoulder. · Inside the scanner you will hear a fan and feel air moving. You may hear tapping, thumping, or snapping noises. You may be given earplugs or headphones to reduce the noise. · You will be asked to hold still during the scan. You may be asked to hold your breath for short periods. · You may be alone in the scanning room, but a technologist will be watching you through a window and talking with you during the test. 
What else should you know about the test? 
· An MRI does not hurt. · If a dye is used, you may feel a quick sting or pinch and some coolness when the IV is started. The dye may give you a metallic taste in your mouth. Some people feel sick to their stomach or get a headache. · If you breastfeed and are concerned about whether the dye used in this test is safe, talk to your doctor. Most experts believe that very little dye passes into breast milk and even less is passed on to the baby. But if you prefer, you can store some of your breast milk ahead of time and use it for a day or two after the test. 
· You may feel warmth in the area being examined. This is normal. 
How long does the test take? · The test usually takes 30 to 60 minutes but can take as long as 2 hours.  
What happens after the test? 
 · You will probably be able to go home right away, depending on the reason for the test. 
· You can go back to your usual activities right away. Follow-up care is a key part of your treatment and safety. Be sure to make and go to all appointments, and call your doctor if you are having problems. It's also a good idea to keep a list of the medicines you take. Ask your doctor when you can expect to have your test results. Where can you learn more? Go to http://lonnie-gagan.info/. Enter U122 in the search box to learn more about \"MRI of the Shoulder: About This Test.\" Current as of: October 14, 2016 Content Version: 11.3 © 5840-1005 [employer]. Care instructions adapted under license by [employer] (which disclaims liability or warranty for this information). If you have questions about a medical condition or this instruction, always ask your healthcare professional. [address] any warranty or liability for your use of this information. Please provide this summary of care documentation to your next provider. Your primary care clinician is listed as Marvel Garcia. If you have any questions after today's visit, please call [phone].

## 2017-07-31 NOTE — PROGRESS NOTES
HISTORY OF PRESENT ILLNESS:  Debra Ku returns 10 weeks status post her right shoulder open rotator cuff repair. She was noncompliant just following surgery with her activities and has failed to progress through outpatient physical therapy training with cueing and with her motion since the initiation of her care. She has pain with both passive and active motion and is severely limited in her range with any passive motion. PHYSICAL EXAM:  She has a very painful surgical site more anterior than posterior. Her passive external rotation is barely 10° with pain throughout. Internal rotation is to the posterior aspect of the right iliac crest with pain throughout. Her passive assisted forward flexion is barely 25° with pain throughout. Distal sensation is intact fully to the right upper extremity. PLAN:  Physical therapy discharge instructions note dated July 31, 2017, hold PT and followup with Orthopedics due to concerns regarding the repair integrity. Very poor active range of motion despite fair passive range of motion with pain. With the patients current presentation and her progressive difficulties with her motion and the noncompliance that occurred initially postoperatively, I am concerned there may be an integrity problem with the complex rotator cuff repair. I am going to order an MRI with contrast to assess. The patient is to follow once the MRI is completed.

## 2017-07-31 NOTE — PATIENT INSTRUCTIONS
MRI of the Shoulder: About This Test  What is it? MRI (magnetic resonance imaging) is a test that uses a magnetic field and pulses of radio wave energy to make pictures of the organs and structures inside the body. An MRI can give your doctor information about your shoulder, the bones around it, and the tissues around it, such as cartilage, ligaments, and tendons. When you have an MRI, you lie on a table and the table moves into the MRI machine. Why is this test done? An MRI of the shoulder can find problems such as damage to the cartilage, tendons, and ligaments around the shoulder. It can also look for the cause of shoulder pain and find rotator cuff problems. How can you prepare for the test?  Talk to your doctor about all your health conditions before the test. For example, tell your doctor if:  · You are allergic to any medicines. · You are or might be pregnant. · You have a pacemaker, an artificial limb, any metal pins or metal parts in your body, metal heart valves, metal clips in your brain, metal implants in your ears, or any other implanted or prosthetic medical device. · You have an intrauterine device (IUD) in place. · You get nervous in confined spaces. You may need medicine to help you relax. · You wear a patch that contains medicine. · You have kidney disease. What happens before the test?  · You will remove all metal objects, such as hearing aids, dentures, jewelry, watches, and hairpins. · You will take off all or most of your clothes and change into a gown. If you do leave some clothes on, make sure you take everything out of your pockets. · You may have contrast material (dye) put into your arm through a tube called an IV or directly into your shoulder joint. Contrast material helps doctors see specific organs, blood vessels, and most tumors. What happens during the test?  · You will lie on your back on a table that is part of the MRI scanner.  Your head, chest, and arms may be held with straps to help you remain still. · The table will slide into the space that contains the magnet. A device called a coil may be placed over or wrapped around your shoulder. · Inside the scanner you will hear a fan and feel air moving. You may hear tapping, thumping, or snapping noises. You may be given earplugs or headphones to reduce the noise. · You will be asked to hold still during the scan. You may be asked to hold your breath for short periods. · You may be alone in the scanning room, but a technologist will be watching you through a window and talking with you during the test.  What else should you know about the test?  · An MRI does not hurt. · If a dye is used, you may feel a quick sting or pinch and some coolness when the IV is started. The dye may give you a metallic taste in your mouth. Some people feel sick to their stomach or get a headache. · If you breastfeed and are concerned about whether the dye used in this test is safe, talk to your doctor. Most experts believe that very little dye passes into breast milk and even less is passed on to the baby. But if you prefer, you can store some of your breast milk ahead of time and use it for a day or two after the test.  · You may feel warmth in the area being examined. This is normal.  How long does the test take? · The test usually takes 30 to 60 minutes but can take as long as 2 hours. What happens after the test?  · You will probably be able to go home right away, depending on the reason for the test.  · You can go back to your usual activities right away. Follow-up care is a key part of your treatment and safety. Be sure to make and go to all appointments, and call your doctor if you are having problems. It's also a good idea to keep a list of the medicines you take. Ask your doctor when you can expect to have your test results. Where can you learn more? Go to http://lonnie-gagan.info/.   Enter G744 in the search box to learn more about \"MRI of the Shoulder: About This Test.\"  Current as of: October 14, 2016  Content Version: 11.3  © 8606-1805 [employer]. Care instructions adapted under license by [employer] (which disclaims liability or warranty for this information). If you have questions about a medical condition or this instruction, always ask your healthcare professional. [place] [address] any warranty or liability for your use of this information.

## 2017-07-31 NOTE — PROGRESS NOTES
PT DAILY TREATMENT NOTE     Patient Name: Delfina Powell  Date:2017  : 1970  [x]  Patient  Verified  Payor: SARA / Plan: [facility] 100% / Product Type: Sara /    In time:12:00pm  Out time:12:44pm  Total Treatment Time (min): 44  Visit #: 5 of 8    Treatment Area: Complete rotator cuff tear or rupture of right shoulder, not specified as traumatic [M75.121]    SUBJECTIVE  Pain Level (0-10 scale): 2  Any medication changes, allergies to medications, adverse drug reactions, diagnosis change, or new procedure performed?: [x] No    [] Yes (see summary sheet for update)  Subjective functional status/changes:   [] No changes reported  Pt reports continued difficulty lifting her shoulder. She reports she is f/u with orthopaedics this afternoon. OBJECTIVE    Modality rationale: decrease pain to improve the patients ability to improve QOL.    Min Type Additional Details    [] Estim:  []Unatt       []IFC  []Premod                        []Other:  []w/ice   []w/heat  Position:  Location:    [] Estim: []Att    []TENS instruct  []NMES                    []Other:  []w/US   []w/ice   []w/heat  Position:  Location:    []  Traction: [] Cervical       []Lumbar                       [] Prone          []Supine                       []Intermittent   []Continuous Lbs:  [] before manual  [] after manual    []  Ultrasound: []Continuous   [] Pulsed                           []1MHz   []3MHz W/cm2:  Location:    []  Iontophoresis with dexamethasone         Location: [] Take home patch   [] In clinic   10 [x]  Ice     []  heat  []  Ice massage  []  Laser   []  Anodyne Position: supine  Location: R shoulder    []  Laser with stim  []  Other:  Position:  Location:    []  Vasopneumatic Device Pressure:       [] lo [] med [] hi   Temperature: [] lo [] med [] hi   [] Skin assessment post-treatment:  []intact []redness- no adverse reaction    []redness  adverse reaction:   26 min Therapeutic Exercise:  [x] See flow sheet :   Rationale: increase ROM and increase strength to improve the patients ability to perform ADls. 8 min Neuromuscular Re-education:  [x]  See flow sheet :   Rationale: increase strength and increase proprioception  to improve the patients ability to perform functional activities. With   [] TE   [] TA   [] neuro   [] other: Patient Education: [x] Review HEP    [] Progressed/Changed HEP based on:   [] positioning   [] body mechanics   [] transfers   [] heat/ice application    [] other:      Other Objective/Functional Measures:    AROM elevation R shoulder 14 degrees with severe scapular elevation compensation    PROM R shoulder: ABD 78 pain limited empty end feel, ER in scaption 42 degrees empty end feel pain limited, flexion limited to 117 degrees pain limited empty end feel    Pain Level (0-10 scale) post treatment: 5    ASSESSMENT/Changes in Function: Pt demonstrates WFL shoulder passive ROM limited by pain with empty end feels, but demonstrates minimal AROM limited to 14 degrees elevation. At this time, d/t lack of progress, she has been advised to f/u with orthopaedics regarding the integrity of her shoulder repair, particularly given her multiple falls affecting that shoulder. At this time, she will be DC from skilled PT pending ortho f/u.     []  See Plan of Care  []  See progress note/recertification  []  See Discharge Summary         Progress towards goals / Updated goals:  1.  Pt will improve active elevation to 90 degrees to increase ease of ADLs - Not met. 7/24/2017. Not met 14 degrees 7/31/2017  2.  Pt will increase ER to 90 degrees to increase ease of ADLs  - ER 46 degrees while reclined.  7/26/2017 Declined to 42 degrees while reclined 7/31/2017    PLAN  []  Upgrade activities as tolerated     [x]  Continue plan of care  []  Update interventions per flow sheet       []  Discharge due to:_  []  Other:_      Rehana Cline, PT, DPT, ATC, CSCS 7/31/2017  12:18 PM    Future Appointments  Date Time Provider Colt Sanchezi   7/31/2017 2:30 PM [facility]   8/2/2017 12:00 PM [facility]   8/7/2017 12:00 PM Edmundo Leiva PTA [facility]   8/9/2017 12:00 PM Edmundo Leiva, DAYAMI [facility]   8/18/2017 11:00 AM Irene Arriaga  E [facility]   8/24/2017 11:15 AM Sharmaine Chen PA-C [facility] 69

## 2017-08-02 ENCOUNTER — APPOINTMENT (OUTPATIENT)
Dept: PHYSICAL THERAPY | Age: 47
End: 2017-08-02

## 2017-08-04 ENCOUNTER — APPOINTMENT (OUTPATIENT)
Dept: GENERAL RADIOLOGY | Age: 47
End: 2017-08-04
Attending: EMERGENCY MEDICINE
Payer: SUBSIDIZED

## 2017-08-04 ENCOUNTER — HOSPITAL ENCOUNTER (EMERGENCY)
Age: 47
Discharge: HOME | End: 2017-08-04
Attending: EMERGENCY MEDICINE
Payer: SUBSIDIZED

## 2017-08-04 VITALS
DIASTOLIC BLOOD PRESSURE: 86 MMHG | TEMPERATURE: 99.1 F | HEART RATE: 77 BPM | BODY MASS INDEX: 31.18 KG/M2 | SYSTOLIC BLOOD PRESSURE: 101 MMHG | WEIGHT: 194 LBS | HEIGHT: 66 IN | OXYGEN SATURATION: 98 % | RESPIRATION RATE: 16 BRPM

## 2017-08-04 DIAGNOSIS — L08.9 INFECTED BLISTER OF LEFT FOOT, INITIAL ENCOUNTER: Primary | ICD-10-CM

## 2017-08-04 DIAGNOSIS — S90.822A INFECTED BLISTER OF LEFT FOOT, INITIAL ENCOUNTER: Primary | ICD-10-CM

## 2017-08-04 PROCEDURE — 99283 EMERGENCY DEPT VISIT LOW MDM: CPT

## 2017-08-04 PROCEDURE — 77030013175 HC SHOE PSTOP DJOR -A

## 2017-08-04 PROCEDURE — 73620 X-RAY EXAM OF FOOT: CPT

## 2017-08-04 PROCEDURE — 74011250637 HC RX REV CODE- 250/637: Performed by: EMERGENCY MEDICINE

## 2017-08-04 RX ORDER — ACETAMINOPHEN AND CODEINE PHOSPHATE 300; 30 MG/1; MG/1
1 TABLET ORAL
Qty: 12 TAB | Refills: 0 | Status: SHIPPED | OUTPATIENT
Start: 2017-08-04 | End: 2018-05-16

## 2017-08-04 RX ORDER — DOXYCYCLINE HYCLATE 100 MG
100 TABLET ORAL 2 TIMES DAILY
Qty: 14 TAB | Refills: 0 | Status: SHIPPED | OUTPATIENT
Start: 2017-08-04 | End: 2017-08-04

## 2017-08-04 RX ORDER — DOXYCYCLINE HYCLATE 100 MG
100 TABLET ORAL 2 TIMES DAILY
Qty: 14 TAB | Refills: 0 | Status: SHIPPED | OUTPATIENT
Start: 2017-08-04 | End: 2017-08-11

## 2017-08-04 RX ORDER — OXYCODONE AND ACETAMINOPHEN 5; 325 MG/1; MG/1
1 TABLET ORAL
Status: COMPLETED | OUTPATIENT
Start: 2017-08-04 | End: 2017-08-04

## 2017-08-04 RX ADMIN — OXYCODONE HYDROCHLORIDE AND ACETAMINOPHEN 1 TABLET: 5; 325 TABLET ORAL at 09:13

## 2017-08-04 NOTE — ED NOTES
Harika Jiang is a 50 y.o. female that was discharged in stable condition. The patients diagnosis, condition and treatment were explained to  patient and aftercare instructions were given. The patient verbalized understanding. Patient armband removed and shredded.

## 2017-08-04 NOTE — ED NOTES
Purposeful rounding completed:    Side rails up x 2:  YES  Bed in low position and wheels locked: YES  Call bell within reach: YES  Comfort addressed: YES    Toileting needs addressed: YES  Plan of care reviewed/updated with patient and or family members: YES  IV site assessed: N/A  Pain assessed and addressed: YES    Family at bedside

## 2017-08-04 NOTE — ED PROVIDER NOTES
HPI Comments: 8:56 AM  Maci Call is a 50 y.o. female presents to the ED C/O left foot pain (rated 9/10), onset last night s/p stepping on a pinecone yesterday morning. Pain is worse with with walking. Notes last Tetanus shot was 3 years ago. States allergies to Codeine and Amoxicillin. Pt denies hx DM. Also denies itching and arthralgias. No other acute symptoms or complaints were noted. PCP: Dr. Brook Hwang           Past Medical History:   Diagnosis Date    Arthritis     Asthma     GERD (gastroesophageal reflux disease)     Obesity (BMI 30.0-34.9) 3/1/2017    Psychiatric disorder     depression    Reflux        Past Surgical History:   Procedure Laterality Date    ABDOMEN SURGERY PROC UNLISTED      gastric bypass    HX CHOLECYSTECTOMY      HX HEENT      tonsilectomy      CT ANESTH,SURGERY OF SHOULDER           Family History:   Problem Relation Age of Onset    Diabetes Mother     Ovarian Cancer Mother     Heart Disease Neg Hx     Hypertension Neg Hx     Stroke Neg Hx        Social History     Social History    Marital status:      Spouse name: N/A    Number of children: N/A    Years of education: N/A     Occupational History    Not on file. Social History Main Topics    Smoking status: Current Every Day Smoker     Packs/day: 1.00     Years: 20.00    Smokeless tobacco: Never Used    Alcohol use No    Drug use: No    Sexual activity: Yes     Partners: Male     Other Topics Concern    Not on file     Social History Narrative         ALLERGIES: Amoxicillin and Codeine    Review of Systems   Constitutional: Negative for fever. HENT: Negative for sore throat. Eyes: Negative for redness. Respiratory: Negative for shortness of breath and wheezing. Gastrointestinal: Negative for abdominal pain and nausea. Genitourinary: Negative for dysuria. Musculoskeletal: Negative for arthralgias and neck stiffness. Positive left foot pain.  Negative itching       Skin: Negative for pallor. Neurological: Negative for headaches. Hematological: Does not bruise/bleed easily. All other systems reviewed and are negative. Vitals:    08/04/17 0834 08/04/17 1026   BP: 104/68 101/86   Pulse: 92 77   Resp: 16 16   Temp: 99.1 °F (37.3 °C)    SpO2: 99% 98%   Weight: 88 kg (194 lb)    Height: 5' 6\" (1.676 m)             Physical Exam   Constitutional: She is oriented to person, place, and time. She appears well-developed and well-nourished. No distress. HENT:   Head: Normocephalic and atraumatic. Mouth/Throat: Oropharynx is clear and moist.   Eyes: Conjunctivae are normal. Pupils are equal, round, and reactive to light. No scleral icterus. Neck: Normal range of motion. Neck supple. Cardiovascular: Intact distal pulses. Capillary refill < 3 seconds    Good pedal pulses   Pulmonary/Chest: Effort normal and breath sounds normal. No respiratory distress. She has no wheezes. Abdominal: Soft. Bowel sounds are normal. She exhibits no distension. There is no tenderness. Musculoskeletal: Normal range of motion. She exhibits no edema. Left ankle: No tenderness. Left foot: There is tenderness ( on calcaneal area ). No drainage. Good desan plections. Lymphadenopathy:     She has no cervical adenopathy. Neurological: She is alert and oriented to person, place, and time. No cranial nerve deficit. Skin: Skin is warm and dry. Rash noted. She is not diaphoretic. Palular rash that is not new     Nursing note and vitals reviewed. MDM  Number of Diagnoses or Management Options  Infected blister of left foot, initial encounter:   Diagnosis management comments: ddx infected blister, abscess, FB, cellulits    Stepped on pine cone, now with fluctuant painful fluid collection on heal    Xray, analgesia    I&D    I have reassessed the patient. I have discussed the workup, results and plan with the patient and patient is in agreement. Patient has no new complaint. Patient will be prescribed doxycyclin, tylenol 3. Patient was discharge in stable condition. Patient was given outpatient follow up. Patient is to return to emergency department if any new or worsening condition. Amount and/or Complexity of Data Reviewed  Tests in the radiology section of CPT®: ordered and reviewed  Tests in the medicine section of CPT®: ordered and reviewed    Risk of Complications, Morbidity, and/or Mortality  Presenting problems: low  Diagnostic procedures: low  Management options: low    Patient Progress  Patient progress: stable    ED Course       I&D Abcess Simple  Date/Time: 8/4/2017 11:43 AM  Performed by: Nereyda Candelaria  Authorized by: Nereyda Candelaria     Consent:     Consent obtained:  Written    Consent given by:  Patient    Risks discussed:  Bleeding, pain and infection    Alternatives discussed:  Observation and referral  Location:     Type:  Fluid collection    Size:  3x3 cm    Location: left foot heel. Pre-procedure details:     Skin preparation:  Betadine  Anesthesia (see MAR for exact dosages): Anesthesia method:  Local infiltration    Local anesthetic:  Lidocaine 1% w/o epi  Procedure type:     Complexity:  Simple  Procedure details:     Needle aspiration: yes      Needle size:  18 G    Incision depth:  Dermal    Drainage:  Serosanguinous    Drainage amount: Moderate    Packing materials:  None  Post-procedure details:     Patient tolerance of procedure:   Tolerated well, no immediate complications        Vitals:  Patient Vitals for the past 12 hrs:   Temp Pulse Resp BP SpO2   08/04/17 1026 - 77 16 101/86 98 %   08/04/17 0834 99.1 °F (37.3 °C) 92 16 104/68 99 %       Medications Ordered:  Medications   oxyCODONE-acetaminophen (PERCOCET) 5-325 mg per tablet 1 Tab (1 Tab Oral Given 8/4/17 0913)         X-ray, CT or radiology findings or impressions:  Xr Foot Lt Ap/lat    Result Date: 8/4/2017  Left Foot Two Views CPT CODE: 22062 HISTORY: Left foot pain onset evening prior to arrival after stepping on parenchymal cone one day ago, blister, pain is exacerbated by weightbearing. COMPARISON: None. FINDINGS: Two views obtained. There is no evidence of fracture or dislocation. There is mild narrowing and spurring at the cuneiform metatarsal joints. Small calcaneal enthesophyte at the insertion of the plantar aponeurosis. Mineralization is normal. There is no radiopaque foreign body. IMPRESSION: No radiopaque foreign body. Degenerative arthritis. Diagnosis:   1. Infected blister of left foot, initial encounter          Disposition: discharged    Follow-up Information     Follow up With Details Comments Contact Info    Annie Vickers MD Schedule an appointment as soon as possible for a visit in 3 days for PCP follow up [address] [facility] [id number] [facility]      [address] [facility] EMERGENCY DEPT Go to As needed, If symptoms worsen [address] [facility]  [phone]          Discharge Medication List as of 8/4/2017 11:59 AM      START taking these medications    Details   acetaminophen-codeine (TYLENOL-CODEINE #3) 300-30 mg per tablet Take 1 Tab by mouth every four (4) hours as needed for Pain. Max Daily Amount: 6 Tabs., Print, Disp-12 Tab, R-0      doxycycline (VIBRA-TABS) 100 mg tablet Take 1 Tab by mouth two (2) times a day for 7 days. Indications: Skin and Skin Structure Infection, Print, Disp-14 Tab, R-0         CONTINUE these medications which have NOT CHANGED    Details   CLONAZEPAM (KLONOPIN PO) Take  by mouth., Historical Med      omeprazole (PRILOSEC) 20 mg capsule Take 20 mg by mouth daily. , Historical Med      eszopiclone (LUNESTA) 3 mg tablet Take  by mouth nightly., Historical Med      lamoTRIgine (LAMICTAL) 100 mg tablet Take  by mouth daily. 100 mg am and 200mg q hs, Historical Med      chlorproMAZINE (THORAZINE) 100 mg tablet Take 100 mg by mouth two (2) times a day. Take 100 mg in the am and 200 mg at night time. , Historical Med      ARIPiprazole (ABILIFY) 10 mg tablet Take 10 mg by mouth daily. 10mg am and 15mg q hs, Historical Med      venlafaxine-SR (EFFEXOR-XR) 37.5 mg capsule Take 75 mg by mouth daily. , Historical Med      zolpidem (AMBIEN) 10 mg tablet Take 10 mg by mouth nightly as needed for Sleep., Historical Med      traZODone (DESYREL) 300 mg tablet Take 300 mg by mouth nightly., Historical Med      baclofen (LIORESAL) 10 mg tablet Take  by mouth three (3) times daily. , Historical Med      CYANOCOBALAMIN, VITAMIN B-12, (VITAMIN B-12 PO) Take  by mouth daily. , Historical Med      ERGOCALCIFEROL, VITAMIN D2, (VITAMIN D2 PO) Take  by mouth every seven (7) days. , Historical Med      multivitamin with iron (FLINTSTONES) chewable tablet Take 1 Tab by mouth daily. , Historical Med             Scribe Attestation  Grant Snell for and in the presence of Susanna Ramirez DO (08/04/17)      Physician Attestation  I personally performed the services described in this documentation, reviewed and edited the documentation which was dictated to the scribe in my presence, and it accurately records my words and actions.     Susanna Ramirez DO (08/04/17)      Signed by: Marci Rey, August 04, 2017 at 8:54 AM

## 2017-08-07 ENCOUNTER — APPOINTMENT (OUTPATIENT)
Dept: PHYSICAL THERAPY | Age: 47
End: 2017-08-07

## 2017-08-09 ENCOUNTER — APPOINTMENT (OUTPATIENT)
Dept: PHYSICAL THERAPY | Age: 47
End: 2017-08-09

## 2017-08-11 DIAGNOSIS — M25.511 CHRONIC RIGHT SHOULDER PAIN: Primary | ICD-10-CM

## 2017-08-11 DIAGNOSIS — Z98.890 STATUS POST ROTATOR CUFF REPAIR: ICD-10-CM

## 2017-08-11 DIAGNOSIS — G89.29 CHRONIC RIGHT SHOULDER PAIN: Primary | ICD-10-CM

## 2017-08-18 ENCOUNTER — OFFICE VISIT (OUTPATIENT)
Dept: NEUROLOGY | Age: 47
End: 2017-08-18

## 2017-08-18 DIAGNOSIS — G56.20 ULNAR NEUROPATHY AT ELBOW, UNSPECIFIED LATERALITY: Primary | ICD-10-CM

## 2017-08-18 NOTE — COMMUNICATION BODY
[facility]  [address] [facility], [address] [phone]      [address]ank[address]    Neurophysiology Report      Patient: Avis Mass     ID: [id number] Physician: Pelon Wilson. Freida Demarco MD   Gender: Female Ref Phys: HAMZAH Cespedes   Handedness:      Study Date: August 18, 2017         Patient History: This 28-year-old woman has been complaining of numbness of digits 4 and 5 on both hands for the last several months. On brief exam she has tenderness of the ulnar nerve across the elbow bilaterally. Decreased sensation to pinprick in digits #4 and 5 bilaterally.         Nerve Conduction Studies  Anti Sensory Summary Table     Stim Site NR Peak (ms) Norm Peak (ms) O-P Amp (µV) Norm O-P Amp Dist (cm) Michael (m/s)   Left Median 2nd Digit Anti Sensory (2nd Digit)   Wrist    3.1 <3.5 22.4 >20 13.0 52.0   Site 2    3.1  23.3      Right Median 2nd Digit Anti Sensory (2nd Digit)   Wrist    3.2 <3.5 26.7 >20 13.0 54.2   Site 2    3.2  26.9      Left Ulnar Anti Sensory (5th Digit )   Wrist    3.0 <3.1 4.6 >17.0 11.0 44.0   Site 2    3.3  1.7      Site 3    3.1  5.2      Site 4    3.0  4.7      Site 5    2.9  2.7      Site 6    3.2  4.2      Right Ulnar Anti Sensory (5th Digit )   Wrist    3.4 <3.1 0.4 >17.0 11.0 39.3   Site 2    3.4  0.8      Site 3    3.2  0.7        Motor Summary Table     Stim Site NR Onset (ms) Norm Onset (ms) O-P Amp (mV) Norm O-P Amp Dist (cm) Michael (m/s) Norm Michael (m/s)   Left Median Motor (Abd Poll Brev)   Wrist    4.2 <4.4 8.0 >4.0 19.0 50.0 >49   Elbow    8.0  6.5       Right Median Motor (Abd Poll Brev)   Wrist    3.4 <4.4 8.9 >4.0 20.0 55.6 >49   Elbow    7.0  9.0       Left Ulnar Motor (Abd Dig Minimi )   Wrist    2.8 <3.3 3.6 >6.0 20.0 51.3 >49   B Elbow    6.7  3.2  10.0 28.6 >50   A Elbow    10.2  3.0       Right Ulnar Motor (Abd Dig Minimi )   Wrist    2.9 <3.3 3.6 >6.0 19.0 45.2 >49   B Elbow    7.1  1.6  10.0 28.6 >50   A Elbow    10.6  2.4           EMG     Side Muscle Nerve Root Ins Act Fibs Psw Fasc Amp Dur Poly Recrt Int Georgina Cramer Comment   Right Deltoid Axillary C5-6 Nml Nml Nml None Nml Nml 0 Nml Nml    Right Biceps Musculocut C5-6 Nml Nml Nml None Nml Nml 0 Nml Nml    Right Triceps Radial C6-7-8 Nml Nml Nml None Nml Nml 0 Nml Nml    Right FlexCarRad Median C6-7 Nml Nml Nml None Nml Nml 0 Nml Nml    Right 1stDorInt Ulnar C8-T1 Nml Nml Nml None Nml Nml 0 Nml Nml    Right Abd Poll Brev Median C8-T1 Nml Nml Nml None Nml Nml 0 Nml Nml    Right Cervical Parasp Up Rami C1-3 Nml Nml Nml          Right Cervical Parasp Mid Rami C4-6 Nml Nml Nml          Right Cervical Parasp Low Rami C7-8 Nml Nml Nml            NCS/EMG FINDINGS:     Evaluation of the Left median motor, the Right median motor, the Left Median 2nd Digit sensory, and the Right Median 2nd Digit sensory nerves were unremarkable.  The Left ulnar motor nerve showed reduced amplitude (3.6 mV) and decreased conduction velocity (A Elbow-B Elbow, 28.6 m/s).  The Right ulnar motor nerve showed reduced amplitude (3.6 mV), decreased conduction velocity (B Elbow-Wrist, 45.2 m/s), and decreased conduction velocity (A Elbow-B Elbow, 28.6 m/s).  The Left ulnar sensory nerve showed reduced amplitude (4.6 µV) and decreased conduction velocity (Wrist-5th Digit, 44.0 m/s).  The Right ulnar sensory nerve showed prolonged distal peak latency (3.4 ms), reduced amplitude (0.4 µV), and decreased conduction velocity (Wrist-5th Digit, 39.3 m/s). INTERPRETATION: This was an abnormal nerve conduction and EMG study showing slowing of the motor conduction of the ulnar nerve across the elbow bilaterally. 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 No sign of diffuse neuropathy was identified. ___________________________  Abbie Fountain MD          Waveforms:

## 2017-08-18 NOTE — PROGRESS NOTES
[facility]  [address] [facility] [address] [place], [address]  [phone]      [facility] [address]    Neurophysiology Report      Patient: Dariana Parrish     ID: [id number] Physician: Paula Sherman. Ny Howard MD   Gender: Female Ref Phys: HAMZAH Maloney   Handedness:      Study Date: August 18, 2017         Patient History: This 26-year-old woman has been complaining of numbness of digits 4 and 5 on both hands for the last several months. On brief exam she has tenderness of the ulnar nerve across the elbow bilaterally. Decreased sensation to pinprick in digits #4 and 5 bilaterally.         Nerve Conduction Studies  Anti Sensory Summary Table     Stim Site NR Peak (ms) Norm Peak (ms) O-P Amp (µV) Norm O-P Amp Dist (cm) Michael (m/s)   Left Median 2nd Digit Anti Sensory (2nd Digit)   Wrist    3.1 <3.5 22.4 >20 13.0 52.0   Site 2    3.1  23.3      Right Median 2nd Digit Anti Sensory (2nd Digit)   Wrist    3.2 <3.5 26.7 >20 13.0 54.2   Site 2    3.2  26.9      Left Ulnar Anti Sensory (5th Digit )   Wrist    3.0 <3.1 4.6 >17.0 11.0 44.0   Site 2    3.3  1.7      Site 3    3.1  5.2      Site 4    3.0  4.7      Site 5    2.9  2.7      Site 6    3.2  4.2      Right Ulnar Anti Sensory (5th Digit )   Wrist    3.4 <3.1 0.4 >17.0 11.0 39.3   Site 2    3.4  0.8      Site 3    3.2  0.7        Motor Summary Table     Stim Site NR Onset (ms) Norm Onset (ms) O-P Amp (mV) Norm O-P Amp Dist (cm) Michael (m/s) Norm Michael (m/s)   Left Median Motor (Abd Poll Brev)   Wrist    4.2 <4.4 8.0 >4.0 19.0 50.0 >49   Elbow    8.0  6.5       Right Median Motor (Abd Poll Brev)   Wrist    3.4 <4.4 8.9 >4.0 20.0 55.6 >49   Elbow    7.0  9.0       Left Ulnar Motor (Abd Dig Minimi )   Wrist    2.8 <3.3 3.6 >6.0 20.0 51.3 >49   B Elbow    6.7  3.2  10.0 28.6 >50   A Elbow    10.2  3.0       Right Ulnar Motor (Abd Dig Minimi )   Wrist    2.9 <3.3 3.6 >6.0 19.0 45.2 >49   B Elbow    7.1  1.6  10.0 28.6 >50   A Elbow    10.6  2.4           EMG     Side Muscle Nerve Root Ins Act Fibs Psw Fasc Amp Dur Poly Recrt Int Lala Cloud Comment   Right Deltoid Axillary C5-6 Nml Nml Nml None Nml Nml 0 Nml Nml    Right Biceps Musculocut C5-6 Nml Nml Nml None Nml Nml 0 Nml Nml    Right Triceps Radial C6-7-8 Nml Nml Nml None Nml Nml 0 Nml Nml    Right FlexCarRad Median C6-7 Nml Nml Nml None Nml Nml 0 Nml Nml    Right 1stDorInt Ulnar C8-T1 Nml Nml Nml None Nml Nml 0 Nml Nml    Right Abd Poll Brev Median C8-T1 Nml Nml Nml None Nml Nml 0 Nml Nml    Right Cervical Parasp Up Rami C1-3 Nml Nml Nml          Right Cervical Parasp Mid Rami C4-6 Nml Nml Nml          Right Cervical Parasp Low Rami C7-8 Nml Nml Nml            NCS/EMG FINDINGS:     Evaluation of the Left median motor, the Right median motor, the Left Median 2nd Digit sensory, and the Right Median 2nd Digit sensory nerves were unremarkable.  The Left ulnar motor nerve showed reduced amplitude (3.6 mV) and decreased conduction velocity (A Elbow-B Elbow, 28.6 m/s).  The Right ulnar motor nerve showed reduced amplitude (3.6 mV), decreased conduction velocity (B Elbow-Wrist, 45.2 m/s), and decreased conduction velocity (A Elbow-B Elbow, 28.6 m/s).  The Left ulnar sensory nerve showed reduced amplitude (4.6 µV) and decreased conduction velocity (Wrist-5th Digit, 44.0 m/s).  The Right ulnar sensory nerve showed prolonged distal peak latency (3.4 ms), reduced amplitude (0.4 µV), and decreased conduction velocity (Wrist-5th Digit, 39.3 m/s). INTERPRETATION: This was an abnormal nerve conduction and EMG study showing slowing of the motor conduction of the ulnar nerve across the elbow bilaterally. 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 No sign of diffuse neuropathy was identified. ___________________________  Sterling Santana MD          Waveforms:                      [facility] AT [facility]  OFFICE PROCEDURE PROGRESS NOTE        Chart reviewed for the following:   Ruddy Guerin MD, have reviewed the History, Physical and updated the Allergic reactions for [address] performed immediately prior to start of procedure:   Phyllis Butt MD, have performed the following reviews on Denis Jhaveri prior to the start of the procedure:            * Patient was identified by name and date of birth   * Agreement on procedure being performed was verified  * Risks and Benefits explained to the patient  * Procedure site verified and marked as necessary  * Patient was positioned for comfort  * Consent was signed and verified     Time: 1:19 PM    Date of procedure: 8/18/2017    Procedure performed by:  Erin Howe MD    Provider assisted by: Neymar Babin MA    Patient assisted by: self    How tolerated by patient: tolerated the procedure well with no complications    Post Procedural Pain Scale: 0 - No Hurt    Comments: none

## 2017-08-28 ENCOUNTER — HOSPITAL ENCOUNTER (OUTPATIENT)
Dept: GENERAL RADIOLOGY | Age: 47
Discharge: HOME | End: 2017-08-28
Attending: PHYSICIAN ASSISTANT
Payer: SUBSIDIZED

## 2017-08-28 ENCOUNTER — HOSPITAL ENCOUNTER (OUTPATIENT)
Dept: MRI IMAGING | Age: 47
Discharge: HOME | End: 2017-08-28
Attending: PHYSICIAN ASSISTANT
Payer: SUBSIDIZED

## 2017-08-28 DIAGNOSIS — M25.511 CHRONIC RIGHT SHOULDER PAIN: ICD-10-CM

## 2017-08-28 DIAGNOSIS — Z98.890 STATUS POST ROTATOR CUFF REPAIR: ICD-10-CM

## 2017-08-28 DIAGNOSIS — G89.29 CHRONIC RIGHT SHOULDER PAIN: ICD-10-CM

## 2017-08-28 PROCEDURE — A9579 GAD-BASE MR CONTRAST NOS,1ML: HCPCS | Performed by: PHYSICIAN ASSISTANT

## 2017-08-28 PROCEDURE — 23350 INJECTION FOR SHOULDER X-RAY: CPT

## 2017-08-28 PROCEDURE — 77002 NEEDLE LOCALIZATION BY XRAY: CPT

## 2017-08-28 PROCEDURE — 74011000250 HC RX REV CODE- 250: Performed by: PHYSICIAN ASSISTANT

## 2017-08-28 PROCEDURE — 73222 MRI JOINT UPR EXTREM W/DYE: CPT

## 2017-08-28 PROCEDURE — 74011636320 HC RX REV CODE- 636/320: Performed by: PHYSICIAN ASSISTANT

## 2017-08-28 RX ORDER — SODIUM CHLORIDE 9 MG/ML
10 INJECTION INTRAMUSCULAR; INTRAVENOUS; SUBCUTANEOUS
Status: COMPLETED | OUTPATIENT
Start: 2017-08-28 | End: 2017-08-28

## 2017-08-28 RX ORDER — LIDOCAINE HYDROCHLORIDE 10 MG/ML
30 INJECTION, SOLUTION EPIDURAL; INFILTRATION; INTRACAUDAL; PERINEURAL
Status: COMPLETED | OUTPATIENT
Start: 2017-08-28 | End: 2017-08-28

## 2017-08-28 RX ADMIN — IOPAMIDOL 10 ML: 408 INJECTION, SOLUTION INTRATHECAL at 09:00

## 2017-08-28 RX ADMIN — LIDOCAINE HYDROCHLORIDE 12 ML: 10 INJECTION, SOLUTION EPIDURAL; INFILTRATION; INTRACAUDAL; PERINEURAL at 09:00

## 2017-08-28 RX ADMIN — GADOPENTETATE DIMEGLUMINE 0.15 ML: 469.01 INJECTION INTRAVENOUS at 09:00

## 2017-08-28 RX ADMIN — SODIUM CHLORIDE 10 ML: 9 INJECTION, SOLUTION INTRAMUSCULAR; INTRAVENOUS; SUBCUTANEOUS at 09:00

## 2017-08-31 ENCOUNTER — OFFICE VISIT (OUTPATIENT)
Dept: ORTHOPEDIC SURGERY | Age: 47
End: 2017-08-31

## 2017-08-31 VITALS
DIASTOLIC BLOOD PRESSURE: 62 MMHG | WEIGHT: 188.4 LBS | HEIGHT: 66 IN | OXYGEN SATURATION: 98 % | TEMPERATURE: 98 F | SYSTOLIC BLOOD PRESSURE: 102 MMHG | HEART RATE: 60 BPM | BODY MASS INDEX: 30.28 KG/M2

## 2017-08-31 DIAGNOSIS — Z98.890 S/P ROTATOR CUFF REPAIR: ICD-10-CM

## 2017-08-31 DIAGNOSIS — M25.511 RIGHT SHOULDER PAIN, UNSPECIFIED CHRONICITY: Primary | ICD-10-CM

## 2017-08-31 DIAGNOSIS — Z98.890 HISTORY OF FAILED REPAIR OF ROTATOR CUFF: ICD-10-CM

## 2017-08-31 NOTE — PROGRESS NOTES
HISTORY OF PRESENT ILLNESS:  Katty Lewis returns just over three months status post her right shoulder open rotator cuff repair. She was ordered an MRI secondary to severe worsening pain to the right shoulder with a poor range of motion at just over two months postoperatively. The study findings demonstrate marked superior migration of the head of the humerus with significant widening of the glenohumeral joint. The contrast that was injected into the joint space extended into the subacromial/subdeltoid bursa. Therefore, indicating a large gapping tear of the rotator cuff. There is no evidence of spread of the injected contrast also without contrast inferiorly and into the muscles anteriorly indicating significant disruption of the capsule of the joint. The patient had no sentinel event postoperatively from her original surgery that explains the rotator cuff tear. She was noted to be noncompliant with her exercises and there is a strong believe, as reported by the therapist, that she was overdoing her activities at home immediately following the surgery. She continued to complete as much physical therapy, outpatient, as she could but had to stop due to increased pain. PHYSICAL EXAM:  Over the anterior portion of the right shoulder, the surgical site has healed nicely. The area is nontender. The wound borders are well approximated. She has very poor, active range of motion of the right shoulder. Forward flexion is barely 20°. Lateral raise is barely 10° actively. Internal rotation is to barely the anterior ASIS or just the anterior iliac crest.  Internal rotation is untested today. Passively, she can be raised to 140° on forward flexion but cannot hold the position. PLAN:  In light of, obviously, her failure of the open rotator cuff repair via arthrogram and clinically evident today, I would like her to see Dr. Lea Blanco next week for alternatives to her current condition.   Today, we discussed the possibility of an arthroscopic attempt to repair her rotator cuff versus a repeat open attempt versus a total shoulder/reverse total shoulder replacement. Today, all her questions were answered to her satisfaction. EMGs were also ordered, which reveals bilateral latencies across the ulnar nerve bilaterally.

## 2017-08-31 NOTE — MR AVS SNAPSHOT
Visit Information Date & Time Provider Department Dept. Phone Encounter #  
 8/31/2017  9:15 AM August Vo, [facility] [phone] Upcoming Health Maintenance Date Due Pneumococcal 19-64 Medium Risk (1 of 1 - PPSV23) 5/30/1987 DTaP/Tdap/Td series (1 - Tdap) 5/30/1989 PAP AKA CERVICAL CYTOLOGY 5/30/1989 INFLUENZA AGE 9 TO ADULT 8/1/2017 Allergies as of 8/31/2017  Review Complete On: 8/28/2017 By: Daniel Hernandez Severity Noted Reaction Type Reactions Amoxicillin  12/29/2012    Other (comments) Does no work Codeine  12/29/2012    Nausea and Vomiting Current Immunizations  Never Reviewed No immunizations on file. Not reviewed this visit Vitals BP Pulse Temp Height(growth percentile) Weight(growth percentile) SpO2  
 102/62 60 98 °F (36.7 °C) 5' 6\" (1.676 m) 188 lb 6.4 oz (85.5 kg) 98% BMI OB Status Smoking Status 30.41 kg/m2 Unknown Current Every Day Smoker Vitals History BMI and BSA Data Body Mass Index Body Surface Area  
 30.41 kg/m 2 2 m 2 [facility]erred Pharm[facility] [address] [facility] Your Updated Medication List  
  
   
This list is accurate as of: 8/31/17 10:03 AM.  Always use your most recent med list.  
  
  
  
  
 ABILIFY 10 mg tablet Generic drug:  ARIPiprazole Take 10 mg by mouth daily. 10mg am and 15mg q hs  
  
 acetaminophen-codeine 300-30 mg per tablet Commonly known as:  TYLENOL-CODEINE #3 Take 1 Tab by mouth every four (4) hours as needed for Pain. Max Daily Amount: 6 Tabs. baclofen 10 mg tablet Commonly known as:  LIORESAL Take  by mouth three (3) times daily. chlorproMAZINE 100 mg tablet Commonly known as:  THORAZINE Take 100 mg by mouth two (2) times a day. Take 100 mg in the am and 200 mg at night time. KLONOPIN PO Take  by mouth. LaMICtal 100 mg tablet Generic drug:  lamoTRIgine Take  by mouth daily. 100 mg am and 200mg q hs LUNESTA 3 mg tablet Generic drug:  eszopiclone Take  by mouth nightly. multivitamin with iron chewable tablet Commonly known as:  [place] Stair Take 1 Tab by mouth daily. PriLOSEC 20 mg capsule Generic drug:  omeprazole Take 20 mg by mouth daily. traZODone 300 mg tablet Commonly known as:  Andreia Faes Take 300 mg by mouth nightly. venlafaxine-SR 37.5 mg capsule Commonly known as:  EFFEXOR-XR Take 75 mg by mouth daily. VITAMIN B-12 PO Take  by mouth daily. VITAMIN D2 PO Take  by mouth every seven (7) days. zolpidem 10 mg tablet Commonly known as:  AMBIEN Take 10 mg by mouth nightly as needed for Sleep. Please provide this summary of care documentation to your next provider. Your primary care clinician is listed as Kishan Connelly. If you have any questions after today's visit, please call [phone].

## 2017-09-06 ENCOUNTER — OFFICE VISIT (OUTPATIENT)
Dept: ORTHOPEDIC SURGERY | Age: 47
End: 2017-09-06

## 2017-09-06 VITALS
DIASTOLIC BLOOD PRESSURE: 60 MMHG | BODY MASS INDEX: 30.79 KG/M2 | HEART RATE: 72 BPM | WEIGHT: 191.6 LBS | HEIGHT: 66 IN | RESPIRATION RATE: 18 BRPM | TEMPERATURE: 97.2 F | SYSTOLIC BLOOD PRESSURE: 98 MMHG | OXYGEN SATURATION: 98 %

## 2017-09-06 DIAGNOSIS — M25.511 CHRONIC RIGHT SHOULDER PAIN: Primary | ICD-10-CM

## 2017-09-06 DIAGNOSIS — G89.29 CHRONIC RIGHT SHOULDER PAIN: Primary | ICD-10-CM

## 2017-09-06 DIAGNOSIS — M12.811 ROTATOR CUFF ARTHROPATHY, RIGHT: ICD-10-CM

## 2017-09-06 DIAGNOSIS — M75.121 COMPLETE TEAR OF RIGHT ROTATOR CUFF: ICD-10-CM

## 2017-09-06 DIAGNOSIS — Z98.890 S/P ROTATOR CUFF REPAIR: ICD-10-CM

## 2017-09-06 DIAGNOSIS — G56.23 ULNAR NEUROPATHY OF BOTH UPPER EXTREMITIES: ICD-10-CM

## 2017-09-06 DIAGNOSIS — Z98.890 HISTORY OF FAILED REPAIR OF ROTATOR CUFF: ICD-10-CM

## 2017-09-06 RX ORDER — BUPIVACAINE HYDROCHLORIDE 2.5 MG/ML
4 INJECTION, SOLUTION EPIDURAL; INFILTRATION; INTRACAUDAL ONCE
Qty: 4 ML | Refills: 0
Start: 2017-09-06 | End: 2017-09-06

## 2017-09-06 RX ORDER — BETAMETHASONE SODIUM PHOSPHATE AND BETAMETHASONE ACETATE 3; 3 MG/ML; MG/ML
3 INJECTION, SUSPENSION INTRA-ARTICULAR; INTRALESIONAL; INTRAMUSCULAR; SOFT TISSUE ONCE
Qty: 0.5 ML | Refills: 0
Start: 2017-09-06 | End: 2017-09-06

## 2017-09-06 NOTE — PROGRESS NOTES
Patient: Danita Powell                MRN: [id number]       SSN: [id number]  YOB: 1970        AGE: 50 y.o. SEX: female    PCP: Kishan Connelly MD  09/06/17    Chief Complaint   Patient presents with    Shoulder Pain     Right     HISTORY:  Danita Powell is a 50 y.o. female who is seen for increased right shoulder pain. She is s/p right open rotator cuff repair on 5/16/17. She reports her pain is now 5/10. She is unable to raise her arm over her head. She notes shoulder pain with overhead activities and at night. She reports increased shoulder pain since she fell on her right arm at home in the dark on 1/30/17. She had x rays taken at [facility] ED the same day. She does not recall any shoulder dislocation. She has pain radiating from her right shoulder to her right upper arm. She responded to a previous right shoulder cortisone injection. She reports her pain is 5/10. Pain Assessment  9/6/2017   Location of Pain Shoulder   Location Modifiers Right   Severity of Pain 5   Quality of Pain Aching;Burning   Duration of Pain Persistent   Frequency of Pain Intermittent   Aggravating Factors Bending;Stretching;Straightening   Limiting Behavior Yes   Relieving Factors Rest   Relieving Factors Comment -   Result of Injury Yes   Work-Related Injury No   Type of Injury Fall     Occupation, etc:  Ms. Cecily Hendrix previously worked as a  at the [employer] in [place] until 2007. 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 She is not currently working. She is not diabetic or hypertensive. She reports that she has gained 10 pounds recently. Current weight is 191 pounds. She says that she had a 1and a [de-identified] year old daughter who passed away in 2014 from [address]. She lives with her  and mother in [place].       Weight Metrics 9/6/2017 8/31/2017 8/4/2017 7/13/2017 5/24/2017 5/18/2017 5/16/2017   Weight 191 lb 9.6 oz 188 lb 6.4 oz 194 lb 197 lb 12.8 oz 204 lb 217 lb 6.4 oz 213 lb 6 oz   BMI 30.93 kg/m2 30.41 kg/m2 31.31 kg/m2 31.93 kg/m2 32.93 kg/m2 35.09 kg/m2 34.44 kg/m2     Patient Active Problem List   Diagnosis Code    Bilateral sciatica M54.31, M54.32    Obesity (BMI 30.0-34. 9) E66.9    S/P rotator cuff repair Z98.890     REVIEW OF SYSTEMS: All Below are Negative except: See HPI   Constitutional: negative for fever, chills, and weight loss. Cardiovascular: negative for chest pain, claudication, leg swelling, SOB, URBANO   Gastrointestinal: Negative for pain, N/V/C/D, Blood in stool or urine, dysuria,  hematuria, incontinence, pelvic pain. Musculoskeletal: See HPI   Neurological: Negative for dizziness and weakness. Negative for headaches, Visual changes, confusion, seizures   Phychiatric/Behavioral: Negative for depression, memory loss, substance  abuse. Extremities: Negative for hair changes, rash, or skin lesion changes. Hematologic: Negative for bleeding problems, bruising, pallor or swollen lymph  nodes   Peripheral Vascular: No calf pain, no circulation deficits. Social History     Social History    Marital status:      Spouse name: N/A    Number of children: N/A    Years of education: N/A     Occupational History    Not on file. Social History Main Topics    Smoking status: Current Every Day Smoker     Packs/day: 1.00     Years: 20.00    Smokeless tobacco: Never Used    Alcohol use No    Drug use: No    Sexual activity: Yes     Partners: Male     Other Topics Concern    Not on file     Social History Narrative      Allergies   Allergen Reactions    Amoxicillin Other (comments)     Does no work    Codeine Nausea and Vomiting      Current Outpatient Prescriptions   Medication Sig    betamethasone (CELESTONE SOLUSPAN) 6 mg/mL injection 0.5 mL by Intra artICUlar route once for 1 dose.  bupivacaine, PF, (MARCAINE, PF,) 0.25 % (2.5 mg/mL) injection 4 mL by Intra artICUlar route once for 1 dose.     CLONAZEPAM (KLONOPIN PO) Take  by mouth.    omeprazole (PRILOSEC) 20 mg capsule Take 20 mg by mouth daily.  eszopiclone (LUNESTA) 3 mg tablet Take  by mouth nightly.  lamoTRIgine (LAMICTAL) 100 mg tablet Take  by mouth daily. 100 mg am and 200mg q hs    chlorproMAZINE (THORAZINE) 100 mg tablet Take 100 mg by mouth two (2) times a day. Take 100 mg in the am and 200 mg at night time.  traZODone (DESYREL) 300 mg tablet Take 300 mg by mouth nightly.  baclofen (LIORESAL) 10 mg tablet Take  by mouth three (3) times daily.  multivitamin with iron (FLINTSTONES) chewable tablet Take 1 Tab by mouth daily.  CYANOCOBALAMIN, VITAMIN B-12, (VITAMIN B-12 PO) Take  by mouth daily.  ERGOCALCIFEROL, VITAMIN D2, (VITAMIN D2 PO) Take  by mouth every seven (7) days.  acetaminophen-codeine (TYLENOL-CODEINE #3) 300-30 mg per tablet Take 1 Tab by mouth every four (4) hours as needed for Pain. Max Daily Amount: 6 Tabs.  ARIPiprazole (ABILIFY) 10 mg tablet Take 10 mg by mouth daily. 10mg am and 15mg q hs    venlafaxine-SR (EFFEXOR-XR) 37.5 mg capsule Take 75 mg by mouth daily.  zolpidem (AMBIEN) 10 mg tablet Take 10 mg by mouth nightly as needed for Sleep. No current facility-administered medications for this visit.        PHYSICAL EXAMINATION:  Visit Vitals    BP 98/60    Pulse 72    Temp 97.2 °F (36.2 °C) (Oral)    Resp 18    Ht 5' 6\" (1.676 m)    Wt 191 lb 9.6 oz (86.9 kg)    SpO2 98%    BMI 30.93 kg/m2      ORTHO EXAMINATION:  Examination Right shoulder Left shoulder   Skin Healed incision from RCR Intact   Effusion - -   Biceps deformity - -   Atrophy +, deltoid -   AC joint tenderness + -   Acromial tenderness +, anterior +   Biceps tenderness - -   Forward flexion/Elevation  passive 170   Active abduction  passive 160   External rotation ROM 45 30   Internal rotation ROM 75 70   Apprehension - -   Impingement + -   Drop Arm Test + -   Neurovascular Intact Intact   Difficulty actively raising right arm  Fasciculations in right deltoid     PROCEDURE:  After discussing treatment options, patient's right shoulder was injected with 4 cc Marcaine and 1/2 cc Celestone. Chart reviewed for the following:   Kory Cancino MD, have reviewed the History, Physical and updated the Allergic reactions for [address] performed immediately prior to start of procedure:  Kory Cancino MD, have performed the following reviews on Lucy Colin prior to the start of the procedure:            * Patient was identified by name and date of birth   * Agreement on procedure being performed was verified  * Risks and Benefits explained to the patient  * Procedure site verified and marked as necessary  * Patient was positioned for comfort  * Consent was obtained     Time: 12:13 PM     Date of procedure: 9/6/2017    Procedure performed by:  Cheryl Nieto MD    Ms. Sanders tolerated the procedure well with no complications. EMG/NCV STUDY BY DR. Igor De Guzman 8/18/17  INTERPRETATION: This was an abnormal nerve conduction and EMG study showing slowing of the motor conduction of the ulnar nerve across the elbow bilaterally. 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 No sign of diffuse neuropathy was identified. MRI RIGHT SHOULDER WO CONT 8/28/17  IMPRESSION:   Limited examination due to patient positioning. Massive rotator cuff tear is seen involving supraspinatus, infraspinatus and superior fibers subscapularis. There is retraction to the level of the glenoid. Marked atrophy of the associated muscle bellies is present. Additional interstitial tearing and tendinopathy of the teres minor noted. Associated communication between the glenohumeral joint and subacromial subdeltoid bursa is present with irregular bursal borders. Correlate for bursitis. Associated high riding humeral head also noted. Please see report for multiple additional findings and details.     MRI RIGHT SHOULDER W CONT 2/24/17  IMPRESSIONS:  1. Completely gapping tear with retraction involving the infraspinatus and supraspinatus components of rotator cuff all right shoulder. 2. The teres minor component and the subscapularis component of rotator cuff appear to be grossly intact but with some abnormal signals, suggestive of partial tear or sprain. 3. Extravasation of contrast and including portions of the deltoid muscle at the posterior and anterolateral aspect of right shoulder joint. Partial tear or sprain of the deltoid muscle suspected. 4. The tendon of long head of biceps brachii muscle appears to be subluxed medially but grossly intact. The tendon of short head of biceps brachii muscle appears to be intact. 5. Posterior aspect of capsule of right glenohumeral joint is poorly defined and likely to be torn. 6. The glenoid labrum is grossly intact. RADIOGRAPHS:  XR RIGHT SHOULDER 3/1/17  IMPRESSION:  No fractures, acromioclavicular narrowing, mild glenohumeral narrowing, no calcific densities, very small inferior humeral head osteophyte, subacromial narrowing with internal rotation. XR RIGHT SHOULDER 1/30/17  IMPRESSION:  1. No acute fracture or dislocation. 2. Reverse [facility] deformity suggesting prior shoulder dislocation. IMPRESSION:      ICD-10-CM ICD-9-CM    1. Chronic right shoulder pain M25.511 719.41 REFERRAL TO ORTHOPEDICS    G89.29 338.29 betamethasone (CELESTONE SOLUSPAN) 6 mg/mL injection      BETAMETHASONE ACETATE & SODIUM PHOSPHATE INJECTION 3 MG EA.      DRAIN/INJECT LARGE JOINT/BURSA      bupivacaine, PF, (MARCAINE, PF,) 0.25 % (2.5 mg/mL) injection   2. S/P rotator cuff repair Z98.890 V45.89    3. Ulnar neuropathy of both upper extremities G56.23 354.2 REFERRAL TO ORTHOPEDICS   4. History of failed repair of rotator cuff Z98.890 V45.89    5.  Complete tear of right rotator cuff M75.121 727.61 REFERRAL TO ORTHOPEDICS      betamethasone (CELESTONE SOLUSPAN) 6 mg/mL injection      BETAMETHASONE ACETATE & SODIUM PHOSPHATE INJECTION 3 MG EA.      DRAIN/INJECT LARGE JOINT/BURSA      bupivacaine, PF, (MARCAINE, PF,) 0.25 % (2.5 mg/mL) injection   6. Rotator cuff arthropathy, right M12.818 716.81      PLAN:  After discussing treatment options, patient's right shoulder was injected with 4 cc Marcaine and 1/2 cc Celestone. We discussed a possible reverse right shoulder replacement in the future. There is no need for further surgery at this time. She will follow up as needed. She will follow up with a hand/elbow surgeon for her ulnar neuropathy.      Scribed by Cristine Lopez  ([facility]) as dictated by Ginette Bryan MD

## 2017-09-06 NOTE — PATIENT INSTRUCTIONS
Rotator Cuff: Exercises  Your Care Instructions  Here are some examples of typical rehabilitation exercises for your condition. Start each exercise slowly. Ease off the exercise if you start to have pain. Your doctor or physical therapist will tell you when you can start these exercises and which ones will work best for you. How to do the exercises  Pendulum swing    Note: If you have pain in your back, do not do this exercise. 1. Hold on to a table or the back of a chair with your good arm. Then bend forward a little and let your sore arm hang straight down. This exercise does not use the arm muscles. Rather, use your legs and your hips to create movement that makes your arm swing freely. 2. Use the movement from your hips and legs to guide the slightly swinging arm back and forth like a pendulum (or elephant trunk). Then guide it in circles that start small (about the size of a dinner plate). Make the circles a bit larger each day, as your pain allows. 3. Do this exercise for 5 minutes, 5 to 7 times each day. 4. As you have less pain, try bending over a little farther to do this exercise. This will increase the amount of movement at your shoulder. Posterior stretching exercise    1. Hold the elbow of your injured arm with your other hand. 2. Use your hand to pull your injured arm gently up and across your body. You will feel a gentle stretch across the back of your injured shoulder. 3. Hold for at least 15 to 30 seconds. Then slowly lower your arm. 4. Repeat 2 to 4 times. Up-the-back stretch    Note: Your doctor or physical therapist may want you to wait to do this stretch until you have regained most of your range of motion and strength. You can do this stretch in different ways. Hold any of these stretches for at least 15 to 30 seconds. Repeat them 2 to 4 times. 1. Put your hand in your back pocket. Let it rest there to stretch your shoulder.   2. With your other hand, hold your injured arm (palm outward) behind your back by the wrist. Pull your arm up gently to stretch your shoulder. 3. Next, put a towel over your other shoulder. Put the hand of your injured arm behind your back. Now hold the back end of the towel. With the other hand, hold the front end of the towel in front of your body. Pull gently on the front end of the towel. This will bring your hand farther up your back to stretch your shoulder. Overhead stretch    1. Standing about an arm's length away, grasp onto a solid surface. You could use a countertop, a doorknob, or the back of a sturdy chair. 2. With your knees slightly bent, bend forward with your arms straight. Lower your upper body, and let your shoulders stretch. 3. As your shoulders are able to stretch farther, you may need to take a step or two backward. 4. Hold for at least 15 to 30 seconds. Then stand up and relax. If you had stepped back during your stretch, step forward so you can keep your hands on the solid surface. 5. Repeat 2 to 4 times. Shoulder flexion (lying down)    Note: To make a wand for this exercise, use a piece of PVC pipe or a broom handle with the broom removed. Make the wand about a foot wider than your shoulders. 1. Lie on your back, holding a wand with both hands. Your palms should face down as you hold the wand. 2. Keeping your elbows straight, slowly raise your arms over your head. Raise them until you feel a stretch in your shoulders, upper back, and chest.  3. Hold for 15 to 30 seconds. 4. Repeat 2 to 4 times. Shoulder rotation (lying down)    Note: To make a wand for this exercise, use a piece of PVC pipe or a broom handle with the broom removed. Make the wand about a foot wider than your shoulders. 1. Lie on your back. Hold a wand with both hands with your elbows bent and palms up. 2. Keep your elbows close to your body, and move the wand across your body toward the sore arm. 3. Hold for 8 to 12 seconds. 4. Repeat 2 to 4 times.   Nayeli Shorten climbing (to the side)    Note: Avoid any movement that is straight to your side, and be careful not to arch your back. Your arm should stay about 30 degrees to the front of your side. 1. Stand with your side to a wall so that your fingers can just touch it at an angle about 30 degrees toward the front of your body. 2. Walk the fingers of your injured arm up the wall as high as pain permits. Try not to shrug your shoulder up toward your ear as you move your arm up. 3. Hold that position for a count of at least 15 to 20.  4. Walk your fingers back down to the starting position. 5. Repeat at least 2 to 4 times. Try to reach higher each time. Wall climbing (to the front)    Note: During this stretching exercise, be careful not to arch your back. 1. Face a wall, and stand so your fingers can just touch it. 2. Keeping your shoulder down, walk the fingers of your injured arm up the wall as high as pain permits. (Don't shrug your shoulder up toward your ear.)  3. Hold your arm in that position for at least 15 to 30 seconds. 4. Slowly walk your fingers back down to where you started. 5. Repeat at least 2 to 4 times. Try to reach higher each time. Shoulder blade squeeze    1. Stand with your arms at your sides, and squeeze your shoulder blades together. Do not raise your shoulders up as you squeeze. 2. Hold 6 seconds. 3. Repeat 8 to 12 times. Scapular exercise: Arm reach    1. Lie flat on your back. This exercise is a very slight motion that starts with your arms raised (elbows straight, arms straight). 2. From this position, reach higher toward the jaylyn or ceiling. Keep your elbows straight. All motion should be from your shoulder blade only. 3. Relax your arms back to where you started. 4. Repeat 8 to 12 times. Arm raise to the side    Note: During this strengthening exercise, your arm should stay about 30 degrees to the front of your side.   1. Slowly raise your injured arm to the side, with your thumb facing up. Raise your arm 60 degrees at the most (shoulder level is 90 degrees). 2. Hold the position for 3 to 5 seconds. Then lower your arm back to your side. If you need to, bring your \"good\" arm across your body and place it under the elbow as you lower your injured arm. Use your good arm to keep your injured arm from dropping down too fast.  3. Repeat 8 to 12 times. 4. When you first start out, don't hold any extra weight in your hand. As you get stronger, you may use a 1-pound to 2-pound dumbbell or a small can of food. Shoulder flexor and extensor exercise    Note: These are isometric exercises. That means you contract your muscles without actually moving. · Push forward (flex): Stand facing a wall or doorjamb, about 6 inches or less back. Hold your injured arm against your body. Make a closed fist with your thumb on top. Then gently push your hand forward into the wall with about 25% to 50% of your strength. Don't let your body move backward as you push. Hold for about 6 seconds. Relax for a few seconds. Repeat 8 to 12 times. · Push backward (extend): Stand with your back flat against a wall. Your upper arm should be against the wall, with your elbow bent 90 degrees (your hand straight ahead). Push your elbow gently back against the wall with about 25% to 50% of your strength. Don't let your body move forward as you push. Hold for about 6 seconds. Relax for a few seconds. Repeat 8 to 12 times. Scapular exercise: Wall push-ups    Note: This exercise is best done with your fingers somewhat turned out, rather than straight up and down. 1. Stand facing a wall, about 12 inches to 18 inches away. 2. Place your hands on the wall at shoulder height. 3. Slowly bend your elbows and bring your face to the wall. Keep your back and hips straight. 4. Push back to where you started. 5. Repeat 8 to 12 times. 6. When you can do this exercise against a wall comfortably, you can try it against a counter.  You can then slowly progress to the end of a couch, then to a sturdy chair, and finally to the floor. Scapular exercise: Retraction    Note: For this exercise, you will need elastic exercise material, such as surgical tubing or Thera-Band. 1. Put the band around a solid object at about waist level. (A bedpost will work well.) Each hand should hold an end of the band. 2. With your elbows at your sides and bent to 90 degrees, pull the band back. Your shoulder blades should move toward each other. Then move your arms back where you started. 3. Repeat 8 to 12 times. 4. If you have good range of motion in your shoulders, try this exercise with your arms lifted out to the sides. Keep your elbows at a 90-degree angle. Raise the elastic band up to about shoulder level. Pull the band back to move your shoulder blades toward each other. Then move your arms back where you started. Internal rotator strengthening exercise    1. Start by tying a piece of elastic exercise material to a doorknob. You can use surgical tubing or Thera-Band. 2. Stand or sit with your shoulder relaxed and your elbow bent 90 degrees. Your upper arm should rest comfortably against your side. Squeeze a rolled towel between your elbow and your body for comfort. This will help keep your arm at your side. 3. Hold one end of the elastic band in the hand of the painful arm. 4. Slowly rotate your forearm toward your body until it touches your belly. Slowly move it back to where you started. 5. Keep your elbow and upper arm firmly tucked against the towel roll or at your side. 6. Repeat 8 to 12 times. External rotator strengthening exercise    1. Start by tying a piece of elastic exercise material to a doorknob. You can use surgical tubing or Thera-Band. (You may also hold one end of the band in each hand.)  2. Stand or sit with your shoulder relaxed and your elbow bent 90 degrees. Your upper arm should rest comfortably against your side.  Squeeze a rolled towel between your elbow and your body for comfort. This will help keep your arm at your side. 3. Hold one end of the elastic band with the hand of the painful arm. 4. Start with your forearm across your belly. Slowly rotate the forearm out away from your body. Keep your elbow and upper arm tucked against the towel roll or the side of your body until you begin to feel tightness in your shoulder. Slowly move your arm back to where you started. 5. Repeat 8 to 12 times. Follow-up care is a key part of your treatment and safety. Be sure to make and go to all appointments, and call your doctor if you are having problems. It's also a good idea to know your test results and keep a list of the medicines you take. Where can you learn more? Go to http://lonnie-gagan.info/. Enter Annemarie Saldivar in the search box to learn more about \"Rotator Cuff: Exercises. \"  Current as of: March 21, 2017  Content Version: 11.3  © 6026-2950 [employer]. Care instructions adapted under license by [employer] (which disclaims liability or warranty for this information). If you have questions about a medical condition or this instruction, always ask your healthcare professional. [address] any warranty or liability for your use of this information. Joint Injections: Care Instructions  Your Care Instructions  Joint injections are shots into a joint, such as the knee. They may be used to put in medicines, such as pain relievers. Or they can be used to take out fluid. Sometimes the fluid is tested in a lab. This can help find the cause of a joint problem. A corticosteroid, or steroid, shot is used to reduce inflammation in tendons or joints. It is often used to treat problems such as arthritis, tendinitis, and bursitis. Steroids can be injected directly into a painful, inflamed joint. They can also help reduce inflammation of a bursa. A bursa is a sac of fluid.  It cushions and lubricates areas where tendons, ligaments, skin, muscles, or bones rub against each other. A steroid shot can sometimes help with short-term pain relief when other treatments haven't worked. If steroid shots help, pain may improve for weeks or months. Follow-up care is a key part of your treatment and safety. Be sure to make and go to all appointments, and call your doctor if you are having problems. It's also a good idea to know your test results and keep a list of the medicines you take. How can you care for yourself at home? · Put ice or a cold pack on the area for 10 to 20 minutes at a time. Put a thin cloth between the ice and your skin. · Take anti-inflammatory medicines to reduce pain, swelling, or inflammation. These include ibuprofen (Advil, Motrin) and naproxen (Aleve). Read and follow all instructions on the label. · Avoid strenuous activities for several days, especially those that put stress on the area where you got the shot. · If you have dressings over the area, keep them clean and dry. You may remove them when your doctor tells you to. When should you call for help? Call your doctor now or seek immediate medical care if:  · You have signs of infection, such as:  ¨ Increased pain, swelling, warmth, or redness. ¨ Red streaks leading from the site. ¨ Pus draining from the site. ¨ A fever. Watch closely for changes in your health, and be sure to contact your doctor if you have any problems. Where can you learn more? Go to http://lonnie-gagan.info/. Enter N616 in the search box to learn more about \"Joint Injections: Care Instructions. \"  Current as of: March 21, 2017  Content Version: 11.3  © 3906-8805 [employer]. Care instructions adapted under license by [employer] (which disclaims liability or warranty for this information).  If you have questions about a medical condition or this instruction, always ask your healthcare professional. [employer] disclaims any warranty or liability for your use of this information.

## 2017-10-17 ENCOUNTER — OFFICE VISIT (OUTPATIENT)
Dept: ORTHOPEDIC SURGERY | Age: 47
End: 2017-10-17

## 2017-10-17 VITALS
SYSTOLIC BLOOD PRESSURE: 92 MMHG | BODY MASS INDEX: 31.31 KG/M2 | DIASTOLIC BLOOD PRESSURE: 62 MMHG | RESPIRATION RATE: 18 BRPM | TEMPERATURE: 98.2 F | WEIGHT: 194.8 LBS | HEART RATE: 84 BPM | OXYGEN SATURATION: 96 % | HEIGHT: 66 IN

## 2017-10-17 DIAGNOSIS — M75.121 COMPLETE TEAR OF RIGHT ROTATOR CUFF: Primary | ICD-10-CM

## 2017-10-17 NOTE — PROGRESS NOTES
Maurilio Schwartz  1970   Chief Complaint   Patient presents with    Shoulder Pain     Right        HISTORY OF PRESENT ILLNESS  Maurilio Schwartz is a 50 y.o. female who presents today for evaluation of right shoulder pain. She was initially seen by Dr. Teodora Wagner. She is s/p right open rotator cuff repair on 5/16/17. She states that she somehow re injured the rotator cuff but does not recall an injury. She states that Dr. Teodora Wagner suggested a shoulder replacement. She rates her pain 4/10 today. She has trouble with lifting the arm overhead. She has been in continued pain. She has attended PT for the problem. She feels the pain is severe enough to proceed with surgical intervention. She has had a cortisone injection in the right shoulder with no relief. Allergies   Allergen Reactions    Amoxicillin Other (comments)     Does no work    Codeine Nausea and Vomiting        Past Medical History:   Diagnosis Date    Arthritis     Asthma     GERD (gastroesophageal reflux disease)     Obesity (BMI 30.0-34.9) 3/1/2017    Psychiatric disorder     depression    Reflux       Social History     Social History    Marital status:      Spouse name: N/A    Number of children: N/A    Years of education: N/A     Occupational History    Not on file.      Social History Main Topics    Smoking status: Current Every Day Smoker     Packs/day: 1.00     Years: 20.00    Smokeless tobacco: Never Used    Alcohol use No    Drug use: No    Sexual activity: Yes     Partners: Male     Other Topics Concern    Not on file     Social History Narrative      Past Surgical History:   Procedure Laterality Date    ABDOMEN SURGERY PROC UNLISTED      gastric bypass    HX CHOLECYSTECTOMY      HX HEENT      tonsilectomy      MI ANESTH,SURGERY OF SHOULDER        Family History   Problem Relation Age of Onset    Diabetes Mother     Ovarian Cancer Mother     Heart Disease Neg Hx     Hypertension Neg Hx     Stroke Neg Hx Current Outpatient Prescriptions   Medication Sig    acetaminophen-codeine (TYLENOL-CODEINE #3) 300-30 mg per tablet Take 1 Tab by mouth every four (4) hours as needed for Pain. Max Daily Amount: 6 Tabs.  CLONAZEPAM (KLONOPIN PO) Take  by mouth.  omeprazole (PRILOSEC) 20 mg capsule Take 20 mg by mouth daily.  eszopiclone (LUNESTA) 3 mg tablet Take  by mouth nightly.  lamoTRIgine (LAMICTAL) 100 mg tablet Take  by mouth daily. 100 mg am and 200mg q hs    chlorproMAZINE (THORAZINE) 100 mg tablet Take 100 mg by mouth two (2) times a day. Take 100 mg in the am and 200 mg at night time.  ARIPiprazole (ABILIFY) 10 mg tablet Take 10 mg by mouth daily. 10mg am and 15mg q hs    venlafaxine-SR (EFFEXOR-XR) 37.5 mg capsule Take 75 mg by mouth daily.  zolpidem (AMBIEN) 10 mg tablet Take 10 mg by mouth nightly as needed for Sleep.  traZODone (DESYREL) 300 mg tablet Take 300 mg by mouth nightly.  baclofen (LIORESAL) 10 mg tablet Take  by mouth three (3) times daily.  multivitamin with iron (FLINTSTONES) chewable tablet Take 1 Tab by mouth daily.  CYANOCOBALAMIN, VITAMIN B-12, (VITAMIN B-12 PO) Take  by mouth daily.  ERGOCALCIFEROL, VITAMIN D2, (VITAMIN D2 PO) Take  by mouth every seven (7) days. No current facility-administered medications for this visit. REVIEW OF SYSTEM   Patient denies: Weight loss, Fever/Chills, HA, Visual changes, Fatigue, Chest pain, SOB, Abdominal pain, N/V/D/C, Blood in stool or urine, Edema. Pertinent positive as above in HPI. All others were negative    PHYSICAL EXAM:   Visit Vitals    BP 92/62 (BP 1 Location: Left arm, BP Patient Position: Sitting)    Pulse 84    Temp 98.2 °F (36.8 °C) (Oral)    Resp 18    Ht 5' 6\" (1.676 m)    Wt 194 lb 12.8 oz (88.4 kg)    SpO2 96%    BMI 31.44 kg/m2     The patient is a well-developed, well-nourished female   in no acute distress. The patient is alert and oriented times three.   The patient is alert and oriented times three. Mood and affect are normal.  LYMPHATIC: lymph nodes are not enlarged and are within normal limits  SKIN: normal in color and non tender to palpation. There are no bruises or abrasions noted. NEUROLOGICAL: Motor sensory exam is within normal limits. Reflexes are equal bilaterally. There is normal sensation to pinprick and light touch  MUSCULOSKELETAL:  Examination Right shoulder   Skin Intact   AC joint tenderness -   Biceps tenderness -   Forward flexion/Elevation ROM 60   Active abduction ROM 60   Glenohumeral abduction 90   External rotation ROM 30   Internal rotation ROM 0   Apprehension -   Tadeos Relocation -   Jerk -   Load and Shift -   Obriens -   Speeds -   Impingement sign -   Supraspinatus/Empty Can +++   External Rotation Strength +++   Lift Off/Belly Press -, 5/5   + Intact       IMAGING:   MRI arthrogram right shoulder dated 8/28/17 was reviewed and read: Impressions:  Arthrogram of right shoulder with injection of mixture of gadolinium contrast  and iodinated contrast, is been completed, as above. Finding consistent with with thickness tear of rotator cuff all right shoulder. There are also findings suggestive of significant disruption of the joint capsule. Following these arthrogram procedure, MRI of right shoulder would be performed and would be reported separately. IMPRESSION:      ICD-10-CM ICD-9-CM    1. Complete tear of right rotator cuff M75.121 727.61         PLAN:  1. I discussed the risks and benefits and potential adverse outcomes of both operative vs non operative treatment of right rotator cuff retear with the patient. Patient wishes to proceed with right arthroscopic superior capsule reconstruction.      Risks of operative intervention include but not limited to bleeding, infection, deep vein thrombosis, pulmonary embolism, death, limb length discrepancy, reflexive sympathetic dystrophy, fat embolism syndrome,damage to blood vessels and nerves, malunion, non-union, delayed union, failure of hardware, post traumatic arthritis, stroke, heart attack, and death. Patient understands that infection may arise and may require numerous surgeries. History and physical exam scheduled for a later date. Risk factors include: n/a  2. No cortisone injection indicated today   3. No Physical/Occupational Therapy indicated today  4. No diagnostic test indicated today  5. No durable medical equipment indicated today  6. No referral indicated today   7. No medications indicated today  8. No Narcotic indicated today    RTC H&P  Follow-up Disposition: Not on File    Scribed by Xander Mason [facility]) as dictated by MD MADDI Azar, Dr. Reynold Moore, confirm that all documentation is accurate.     Reynold Moore M.D.   [facility]

## 2017-10-18 ENCOUNTER — HOSPITAL ENCOUNTER (OUTPATIENT)
Dept: LAB | Age: 47
Discharge: HOME | End: 2017-10-18
Payer: MEDICAID

## 2017-10-18 DIAGNOSIS — Z01.818 PREOP EXAMINATION: ICD-10-CM

## 2017-10-18 DIAGNOSIS — M75.121 COMPLETE TEAR OF RIGHT ROTATOR CUFF: Primary | ICD-10-CM

## 2017-10-18 LAB
ANION GAP SERPL CALC-SCNC: 6 MMOL/L (ref 3–18)
ATRIAL RATE: 77 BPM
BASOPHILS # BLD: 0 K/UL (ref 0–0.1)
BASOPHILS NFR BLD: 0 % (ref 0–2)
BUN SERPL-MCNC: 7 MG/DL (ref 7–18)
BUN/CREAT SERPL: 11 (ref 12–20)
CALCIUM SERPL-MCNC: 8.7 MG/DL (ref 8.5–10.1)
CALCULATED P AXIS, ECG09: 31 DEGREES
CALCULATED R AXIS, ECG10: -18 DEGREES
CALCULATED T AXIS, ECG11: 21 DEGREES
CHLORIDE SERPL-SCNC: 103 MMOL/L (ref 100–108)
CO2 SERPL-SCNC: 31 MMOL/L (ref 21–32)
CREAT SERPL-MCNC: 0.64 MG/DL (ref 0.6–1.3)
DIAGNOSIS, 93000: NORMAL
DIFFERENTIAL METHOD BLD: ABNORMAL
EOSINOPHIL # BLD: 0.3 K/UL (ref 0–0.4)
EOSINOPHIL NFR BLD: 4 % (ref 0–5)
ERYTHROCYTE [DISTWIDTH] IN BLOOD BY AUTOMATED COUNT: 13.4 % (ref 11.6–14.5)
GLUCOSE SERPL-MCNC: 95 MG/DL (ref 74–99)
HCT VFR BLD AUTO: 38.1 % (ref 35–45)
HGB BLD-MCNC: 12.8 G/DL (ref 12–16)
LYMPHOCYTES # BLD: 1.6 K/UL (ref 0.9–3.6)
LYMPHOCYTES NFR BLD: 23 % (ref 21–52)
MCH RBC QN AUTO: 30.6 PG (ref 24–34)
MCHC RBC AUTO-ENTMCNC: 33.6 G/DL (ref 31–37)
MCV RBC AUTO: 91.1 FL (ref 74–97)
MONOCYTES # BLD: 0.5 K/UL (ref 0.05–1.2)
MONOCYTES NFR BLD: 7 % (ref 3–10)
NEUTS SEG # BLD: 4.6 K/UL (ref 1.8–8)
NEUTS SEG NFR BLD: 66 % (ref 40–73)
P-R INTERVAL, ECG05: 156 MS
PLATELET # BLD AUTO: 230 K/UL (ref 135–420)
PMV BLD AUTO: 11.9 FL (ref 9.2–11.8)
POTASSIUM SERPL-SCNC: 3.8 MMOL/L (ref 3.5–5.5)
Q-T INTERVAL, ECG07: 388 MS
QRS DURATION, ECG06: 92 MS
QTC CALCULATION (BEZET), ECG08: 439 MS
RBC # BLD AUTO: 4.18 M/UL (ref 4.2–5.3)
SODIUM SERPL-SCNC: 140 MMOL/L (ref 136–145)
VENTRICULAR RATE, ECG03: 77 BPM
WBC # BLD AUTO: 6.9 K/UL (ref 4.6–13.2)

## 2017-10-18 PROCEDURE — 85025 COMPLETE CBC W/AUTO DIFF WBC: CPT | Performed by: PHYSICIAN ASSISTANT

## 2017-10-18 PROCEDURE — 80048 BASIC METABOLIC PNL TOTAL CA: CPT | Performed by: PHYSICIAN ASSISTANT

## 2017-10-18 PROCEDURE — 93005 ELECTROCARDIOGRAM TRACING: CPT

## 2017-10-18 PROCEDURE — 36415 COLL VENOUS BLD VENIPUNCTURE: CPT | Performed by: PHYSICIAN ASSISTANT

## 2017-10-23 ENCOUNTER — OFFICE VISIT (OUTPATIENT)
Dept: ORTHOPEDIC SURGERY | Age: 47
End: 2017-10-23

## 2017-10-23 ENCOUNTER — OFFICE VISIT (OUTPATIENT)
Dept: CARDIOLOGY CLINIC | Age: 47
End: 2017-10-23

## 2017-10-23 VITALS
RESPIRATION RATE: 15 BRPM | WEIGHT: 199 LBS | BODY MASS INDEX: 31.98 KG/M2 | TEMPERATURE: 98.7 F | SYSTOLIC BLOOD PRESSURE: 105 MMHG | DIASTOLIC BLOOD PRESSURE: 61 MMHG | HEIGHT: 66 IN | HEART RATE: 66 BPM

## 2017-10-23 VITALS
DIASTOLIC BLOOD PRESSURE: 60 MMHG | HEIGHT: 66 IN | OXYGEN SATURATION: 98 % | SYSTOLIC BLOOD PRESSURE: 110 MMHG | HEART RATE: 61 BPM | WEIGHT: 200 LBS | BODY MASS INDEX: 32.14 KG/M2

## 2017-10-23 DIAGNOSIS — M75.121 COMPLETE TEAR OF RIGHT ROTATOR CUFF: Primary | ICD-10-CM

## 2017-10-23 DIAGNOSIS — R94.31 ABNORMAL EKG: Primary | ICD-10-CM

## 2017-10-23 DIAGNOSIS — Z72.0 TOBACCO ABUSE: ICD-10-CM

## 2017-10-23 RX ORDER — OXYCODONE AND ACETAMINOPHEN 7.5; 325 MG/1; MG/1
1-2 TABLET ORAL
Qty: 60 TAB | Refills: 0 | Status: SHIPPED | OUTPATIENT
Start: 2017-10-23 | End: 2018-05-16

## 2017-10-23 NOTE — PROGRESS NOTES
HISTORY OF PRESENT ILLNESS  Marleen Glez is a 50 y.o. female. HPI    Patient presents for new office visit. She does not have a prior cardiac history. She was referred here for evaluation of an abnormal echocardiogram which is done as part of a preoperative assessment. She does have a long-standing history of tobacco use with a 20-pack-year smoking history. She smokes nearly a pack of cigarettes a day currently. She is scheduled to undergo a redo right rotator cuff surgery at the end of this week. She underwent a initial rotator cuff repair in May 2017, but now is having recurrent pain and issues with the same shoulder. She did not have any cardiac issues following her initial rotator cuff surgery. She denies ever having any chest pain or chest pressure. No shortness of breath at rest with exertion. She does perform all her own housework and is able to climb up a flight of stairs or 2 without any difficulty. She is never noted any orthopnea, PND or leg swelling. Past Medical History:   Diagnosis Date    Arthritis     Asthma     GERD (gastroesophageal reflux disease)     Obesity (BMI 30.0-34.9) 3/1/2017    Psychiatric disorder     depression    Reflux      Current Outpatient Prescriptions   Medication Sig Dispense Refill    acetaminophen-codeine (TYLENOL-CODEINE #3) 300-30 mg per tablet Take 1 Tab by mouth every four (4) hours as needed for Pain. Max Daily Amount: 6 Tabs. 12 Tab 0    CLONAZEPAM (KLONOPIN PO) Take  by mouth.  omeprazole (PRILOSEC) 20 mg capsule Take 20 mg by mouth daily.  eszopiclone (LUNESTA) 3 mg tablet Take  by mouth nightly.  lamoTRIgine (LAMICTAL) 100 mg tablet Take  by mouth daily. 100 mg am and 200mg q hs      chlorproMAZINE (THORAZINE) 100 mg tablet Take 100 mg by mouth two (2) times a day. Take 100 mg in the am and 200 mg at night time.  ARIPiprazole (ABILIFY) 10 mg tablet Take 10 mg by mouth daily.  10mg am and 15mg q hs      venlafaxine-SR (EFFEXOR-XR) 37.5 mg capsule Take 75 mg by mouth daily.  zolpidem (AMBIEN) 10 mg tablet Take 10 mg by mouth nightly as needed for Sleep.  traZODone (DESYREL) 300 mg tablet Take 300 mg by mouth nightly.  baclofen (LIORESAL) 10 mg tablet Take  by mouth three (3) times daily.  multivitamin with iron (FLINTSTONES) chewable tablet Take 1 Tab by mouth daily.  CYANOCOBALAMIN, VITAMIN B-12, (VITAMIN B-12 PO) Take  by mouth daily.  ERGOCALCIFEROL, VITAMIN D2, (VITAMIN D2 PO) Take  by mouth every seven (7) days.  oxyCODONE-acetaminophen (PERCOCET 7.5) 7.5-325 mg per tablet Take 1-2 Tabs by mouth every four (4) hours as needed for Pain. Max Daily Amount: 12 Tabs. Do not take until after surgery 60 Tab 0     Allergies   Allergen Reactions    Amoxicillin Other (comments)     Does no work    Codeine Nausea and Vomiting      Social History   Substance Use Topics    Smoking status: Current Every Day Smoker     Packs/day: 1.00     Years: 20.00    Smokeless tobacco: Never Used    Alcohol use No     Family History   Problem Relation Age of Onset    Diabetes Mother     Ovarian Cancer Mother     Heart Disease Neg Hx     Hypertension Neg Hx     Stroke Neg Hx          Review of Systems   Constitutional: Negative for chills, fever and weight loss. HENT: Negative for nosebleeds. Eyes: Negative for blurred vision and double vision. Respiratory: Negative for cough, shortness of breath and wheezing. Cardiovascular: Negative for chest pain, palpitations, orthopnea, claudication, leg swelling and PND. Gastrointestinal: Negative for abdominal pain, heartburn, nausea and vomiting. Genitourinary: Negative for dysuria and hematuria. Musculoskeletal: Positive for joint pain. Negative for falls and myalgias. Skin: Negative for rash. Neurological: Negative for dizziness, focal weakness and headaches. Endo/Heme/Allergies: Does not bruise/bleed easily.    Psychiatric/Behavioral: Negative for substance abuse. Visit Vitals    /60    Pulse 61    Ht 5' 6\" (1.676 m)    Wt 90.7 kg (200 lb)    SpO2 98%    BMI 32.28 kg/m2       Physical Exam   Constitutional: She is oriented to person, place, and time. She appears well-developed and well-nourished. HENT:   Head: Normocephalic and atraumatic. Eyes: Conjunctivae are normal.   Neck: Neck supple. No JVD present. Carotid bruit is not present. Cardiovascular: Normal rate, regular rhythm, S1 normal, S2 normal and normal pulses. Exam reveals no gallop. No murmur heard. Pulmonary/Chest: Effort normal and breath sounds normal. She has no wheezes. She has no rales. Abdominal: Soft. Bowel sounds are normal. There is no tenderness. Musculoskeletal: She exhibits no edema, tenderness or deformity. Neurological: She is alert and oriented to person, place, and time. Skin: Skin is warm and dry. Psychiatric: She has a normal mood and affect. Her behavior is normal. Thought content normal.     EKG: Normal sinus rhythm, leftward axis, poor R-wave progression, nonspecific T-wave abnormality. No change compared to the previous EKG from last week. ASSESSMENT and PLAN  Encounter Diagnoses   Name Primary?  Abnormal EKG Yes    Tobacco abuse      Acceptable risk from a cardiac standpoint to proceed with orthopedic surgery as scheduled this week. She does not have any symptoms concerning for angina or decompensated heart failure. No further testing needed prior to her surgery. She underwent orthopedic surgery in May of this year without any cardiovascular complication. Abnormal EKG. Patient's EKG changes are unchanged today compared to an EKG from last week. Looking at an EKG from 2017, I suspect the changes were present at that time as well, however, the leads are probably likely in a slightly different pace in the precordium.   I have recommended an echocardiogram at some point within the next few months to rule out structural heart disease. Tobacco use disorder. Patient has nearly 20-pack-year smoking history. She was strongly encouraged to consider quitting smoking completely. Follow-up in 4 months, sooner if needed.

## 2017-10-23 NOTE — H&P
HISTORY AND PHYSICAL          Patient: Kiersten Castanon                MRN: [id number]       SSN: [id number]  YOB: 1970          AGE: 50 y.o. SEX: female      Patient scheduled for:  RIGHT SHOULDER ARTHROSCOPIC SUPERIOR CAPSULAR RECONSTRUCTION    Surgeon: Lucy Bangura MD    ANESTHESIA TYPE:  General    HISTORY:     The patient was seen in the office today for a preoperative history and physical for an upcoming above listed surgery. The patient is a pleasant 50 y.o. female who has a history of right shoulder pain. She was initially seen by Dr. Yessenia Kelly. She is s/p right open rotator cuff repair on 5/16/17. She states that she somehow re injured the rotator cuff but does not recall an injury. She states that Dr. Yessenia Kelly suggested a shoulder replacement. She rates her pain 3/10 today. She has trouble with lifting the arm overhead. She has been in continued pain. She has attended PT for the problem. She feels the pain is severe enough to proceed with surgical intervention. She has had a cortisone injection in the right shoulder with no relief. Due to the current findings, affected activity of daily living and continued pain and discomfort, surgical intervention is indicated. The alternatives, risks, and complications, including but not limited to infection, blood loss, need for blood transfusion, neurovascular damage, pippa-incisional numbness, subcutaneous hematoma, bone fracture, anesthetic complications, DVT, PE, death, RSD, postoperative stiffness and pain, possible surgical scar, delayed healing and nonhealing, reflexive sympathetic dystrophy, damage to blood vessels and nerves, need for more surgery, MI, and stroke,  failure of hardware, gait disturbances,have been discussed. The patient understands and wishes to proceed with surgery.      PAST MEDICAL HISTORY:     Past Medical History:   Diagnosis Date    Arthritis     Asthma     GERD (gastroesophageal reflux disease)     Obesity (BMI 30.0-34.9) 3/1/2017    Psychiatric disorder     depression    Reflux        CURRENT MEDICATIONS:     Current Outpatient Prescriptions   Medication Sig Dispense Refill    oxyCODONE-acetaminophen (PERCOCET 7.5) 7.5-325 mg per tablet Take 1-2 Tabs by mouth every four (4) hours as needed for Pain. Max Daily Amount: 12 Tabs. Do not take until after surgery 60 Tab 0    CLONAZEPAM (KLONOPIN PO) Take  by mouth.  omeprazole (PRILOSEC) 20 mg capsule Take 20 mg by mouth daily.  eszopiclone (LUNESTA) 3 mg tablet Take  by mouth nightly.  lamoTRIgine (LAMICTAL) 100 mg tablet Take  by mouth daily. 100 mg am and 200mg q hs      chlorproMAZINE (THORAZINE) 100 mg tablet Take 100 mg by mouth two (2) times a day. Take 100 mg in the am and 200 mg at night time.  ARIPiprazole (ABILIFY) 10 mg tablet Take 10 mg by mouth daily. 10mg am and 15mg q hs      venlafaxine-SR (EFFEXOR-XR) 37.5 mg capsule Take 75 mg by mouth daily.  traZODone (DESYREL) 300 mg tablet Take 300 mg by mouth nightly.  baclofen (LIORESAL) 10 mg tablet Take  by mouth three (3) times daily.  multivitamin with iron (FLINTSTONES) chewable tablet Take 1 Tab by mouth daily.  CYANOCOBALAMIN, VITAMIN B-12, (VITAMIN B-12 PO) Take  by mouth daily.  ERGOCALCIFEROL, VITAMIN D2, (VITAMIN D2 PO) Take  by mouth every seven (7) days.  acetaminophen-codeine (TYLENOL-CODEINE #3) 300-30 mg per tablet Take 1 Tab by mouth every four (4) hours as needed for Pain. Max Daily Amount: 6 Tabs. 12 Tab 0    zolpidem (AMBIEN) 10 mg tablet Take 10 mg by mouth nightly as needed for Sleep.          ALLERGIES:     Allergies   Allergen Reactions    Amoxicillin Other (comments)     Does no work    Codeine Nausea and Vomiting         SURGICAL HISTORY:     Past Surgical History:   Procedure Laterality Date    ABDOMEN SURGERY PROC UNLISTED  2008    gastric bypass    HX CHOLECYSTECTOMY      HX HEENT      tonsilectomy      TX ANESTH,SURGERY OF SHOULDER Right 05/2017    rotator cuff       SOCIAL HISTORY:     Social History     Social History    Marital status:      Spouse name: N/A    Number of children: N/A    Years of education: N/A     Social History Main Topics    Smoking status: Current Every Day Smoker     Packs/day: 1.00     Years: 20.00    Smokeless tobacco: Never Used    Alcohol use No    Drug use: No    Sexual activity: Yes     Partners: Male     Other Topics Concern    None     Social History Narrative       FAMILY HISTORY:     Family History   Problem Relation Age of Onset    Diabetes Mother     Ovarian Cancer Mother     Heart Disease Neg Hx     Hypertension Neg Hx     Stroke Neg Hx        REVIEW OF SYSTEMS:     Negative for fevers, chills, chest pain, shortness of breath, weight loss, recent illness     General: Negative for fever and chills. No unexpected change in weight. Denies fatigue. No change in appetite. Skin: Negative for rash or itching. HEENT: Negative for congestion, sore throat, neck pain and neck stiffness. No change in vision or hearing. Hasn't noted any enlarged lymph nodes in the neck. Cardiovascular:  Negative for chest pain and palpitations. Has not noted pedal edema. Respiratory: Negative for cough, colds, sinus, hemoptysis, shortness of breath and wheezing. Gastrointestinal: Negative for nausea and vomiting, rectal bleeding, coffee ground emesis, abdominal pain, diarrhea and constipation. Genitourinary: Negative for dysuria, frequency urgency, or burning on micturition. No flank pain, no foul smelling urine, no difficulty with initiating urination. Hematological: Negative for bleeding or easy bruising. Musculoskeletal: Negative  for arthralgias, back pain or neck pain. Neurological: Negative for dizziness, seizures or syncopal episodes. Denies headaches. Endocrine: Denies excessive thirst.  No heat/cold intolerance.   Psychiatric: Negative for depression or insomnia. PHYSICAL EXAMINATION:     VITALS:   Visit Vitals    /61    Pulse 66    Temp 98.7 °F (37.1 °C)    Resp 15    Ht 5' 6\" (1.676 m)    Wt 199 lb (90.3 kg)    BMI 32.12 kg/m2     GEN:  Well developed, well nourished 50 y.o. female in no acute distress. HEENT: Normocephalic and atraumatic. Eyes: Conjunctivae and EOM are normal.Pupils are equal, round, and reactive to light. External ear normal appearance, external nose normal appearing. Mouth/Throat: Oropharynx is clear and moist, able to handle oral secretions w/out difficulty, airway patent  NECK: Supple. Normal ROM, No lymphadenopathy. Trachea is midline. No bruising, swelling or deformity  RESP: Clear to auscultation bilaterally. No wheezes, rales, rhonchi. Normal effort and breath sounds. No respiratory distress  CARDIO: Normal rate, regular rhythm and normal heart sounds. No MGR. ABDOMEN: Soft, non-tender, non-distended, normoactive bowel sounds in all four quadrants. There is no tenderness. There is no rebound and no guarding. BACK: No CVA or spinal tenderness  BREAST:  Deferred  PELVIC:    Deferred   RECTAL:  Deferred   :           Deferred  EXTREMITIES: EXAMINATION OF: right shoulder  Examination Right shoulder   Skin Intact   AC joint tenderness -   Biceps tenderness -   Forward flexion/Elevation ROM 60   Active abduction ROM 60   Glenohumeral abduction 90   External rotation ROM 30   Internal rotation ROM 0   Apprehension -   Tadeos Relocation -   Jerk -   Load and Shift -   Obriens -   Speeds -   Impingement sign -   Supraspinatus/Empty Can +++   External Rotation Strength +++   Lift Off/Belly Press -, 5/5   + Intact        NEUROVASCULAR: Sensation intact to light touch and strength grossly intact and symmetrical. No nystagmus. Positive distal pulses and capillary refill. DVT ASSESSMENT:  There is not  calf tenderness.  No evidence of DVT seen on physical exam.  MOTOR: In tact  PSYCH: Alert an oriented to person, place and time. Mood, memory, affect, behavior and judgment normal       RADIOGRAPHS & DIAGNOSTIC STUDIES:     MRI/xray reveals :   MRI arthrogram right shoulder dated 8/28/17:   Impressions:  Arthrogram of right shoulder with injection of mixture of gadolinium contrast  and iodinated contrast, is been completed, as above. Finding consistent with with thickness tear of rotator cuff all right shoulder. There are also findings suggestive of significant disruption of the joint capsule. Following these arthrogram procedure, MRI of right shoulder would be performed and would be reported separately. LABS:       @  CBC:   Lab Results   Component Value Date/Time    WBC 6.9 10/18/2017 04:26 PM    RBC 4.18 10/18/2017 04:26 PM    HGB 12.8 10/18/2017 04:26 PM    HCT 38.1 10/18/2017 04:26 PM    PLATELET 226 20/79/9463 04:26 PM    and BMP:   Lab Results   Component Value Date/Time    Glucose 95 10/18/2017 04:26 PM    Sodium 140 10/18/2017 04:26 PM    Potassium 3.8 10/18/2017 04:26 PM    Chloride 103 10/18/2017 04:26 PM    CO2 31 10/18/2017 04:26 PM    BUN 7 10/18/2017 04:26 PM    Creatinine 0.64 10/18/2017 04:26 PM    Calcium 8.7 10/18/2017 04:26 PM   @    Preoperative labs were reviewed and are substantially within normal limits   EKG:   Sinus rhythm with premature atrial complexes   Cannot rule out Anterior infarct , age undetermined   Abnormal ECG   When compared with ECG of 01-APR-2017 07:19,   premature atrial complexes are now present       ASSESSMENT:       Encounter Diagnosis   Name Primary?  Complete tear of right rotator cuff Yes       PLAN:     Again, the alternatives, risks, and complications, as well as expected outcome were discussed. The patient understands and agrees to proceed with RIGHT SHOULDER ARTHROSCOPIC SUPERIOR CAPSULAR RECONSTRUCTION  Pending cardiac clearance.  Patient given orders listed below:    Orders Placed This Encounter    oxyCODONE-acetaminophen (PERCOCET 7.5) 7.5-325 mg per tablet Lina Mora PA-C  10/23/2017  2:11 PM

## 2017-10-23 NOTE — PROGRESS NOTES
History and Physical Performed today and documented in chart    Barbie Mitchellma  10/23/2017.  2:11 PM

## 2017-10-23 NOTE — LETTER
10/23/2017 1:48 PM 
 
Ms. Markus Bowman [facility] [address] [id number] Markus Bowman was seen in our office on 10/23/2017 for cardiac evaluation. From a cardiac standpoint she is cleared for her right shoulder surgery with Dr. Emma Ibarra Please feel free to contact our office if you have any questions regarding this patient. Sincerely, Chicho Nance MD

## 2017-10-23 NOTE — PROGRESS NOTES
1. Have you been to the ER, urgent care clinic since your last visit? Hospitalized since your last visit? No     2. Have you seen or consulted any other health care providers outside of the [address] [place] [address] since your last visit? Include any pap smears or colon screening.  No

## 2017-10-25 ENCOUNTER — TELEPHONE (OUTPATIENT)
Dept: ORTHOPEDIC SURGERY | Age: 47
End: 2017-10-25

## 2017-10-25 NOTE — TELEPHONE ENCOUNTER
Discussed with Dr. Lenny Lombard, patient should see PCP for cyst behind her right ear. Surgery will be cancelled and rescheduled at a later date. Patient was informed and states that she will follow up with PCP.

## 2017-10-25 NOTE — TELEPHONE ENCOUNTER
Spoke with patient ans she states that her sister found a cyst on the side of her head. Patient states that her sister said that the cyst was draining puss. Patient states that the cyst is behind her right ear. Patient denies any fevers or chills.

## 2017-10-25 NOTE — TELEPHONE ENCOUNTER
PATIENT CALLED FOR . PATIENT SAID SHE IS SUPPOSED TO HAVE SX DONE BY  THIS Friday 10/27/17. PATIENT SAID SHE HAS A CYST THAT HAS JUST COME OUT ON HER HEAD,AND WOULD LIKE TO KNOW IF  WILL STILL BE ABLE TO DO THE SX. PATIENT TEL. [phone].

## 2017-10-29 NOTE — DISCHARGE INSTRUCTIONS
Dr. Jazmyn Verduzco Shoulder Arthroscopy  Postoperative Information    You will be given a prescription for pain medication. It may be taken every 4-6 hours as needed for the first few days after surgery. You should place an ice bag over your shoulder to help with pain and reduce swelling. A soft bandage was placed on your shoulder. You may take the bandage off two days after surgery and clean the incision sites with peroxide. Band-aids should be placed over each incision site for at least four days. There are 2 or 3 small incisions in your shoulder and they may be sore and develop bruising over the next several days. The bruising should resolve and no special care will be needed. It is safe to take a shower or bathe two days after surgery. You will be given a sling to use. Continue to wear this at all times, unless you are given different instructions. No moving the arm away from your body on your own, reaching or carrying with the operated arm. You will be shown specific exercises when you see your physical therapist tomorrow. Even though your incisions are small, there has been an operation inside and around the shoulder joint. Complete healing may take weeks or several months. If you have a high temperature, unexpected pain, redness or swelling in your shoulder please contact my office immediately. Please call to make an appointment to see me in my office in 1 week. Dr. Jazmyn Verduzco office number [phone]      DISCHARGE SUMMARY from Nurse    PATIENT INSTRUCTIONS:    After general anesthesia or intravenous sedation, for 24 hours or while taking prescription Narcotics:  · Limit your activities  · Do not drive and operate hazardous machinery  · Do not make important personal or business decisions  · Do  not drink alcoholic beverages  · If you have not urinated within 8 hours after discharge, please contact your surgeon on call.     Report the following to your surgeon:  · Excessive pain, swelling, redness or odor of or around the surgical area  · Temperature over 100.5  · Nausea and vomiting lasting longer than 4 hours or if unable to take medications  · Any signs of decreased circulation or nerve impairment to extremity: change in color, persistent  numbness, tingling, coldness or increase pain  · Any questions    *  Please give a list of your current medications to your Primary Care Provider. *  Please update this list whenever your medications are discontinued, doses are      changed, or new medications (including over-the-counter products) are added. *  Please carry medication information at all times in case of emergency situations. These are general instructions for a healthy lifestyle:    No smoking/ No tobacco products/ Avoid exposure to second hand smoke  Surgeon General's Warning:  Quitting smoking now greatly reduces serious risk to your health. Obesity, smoking, and sedentary lifestyle greatly increases your risk for illness    A healthy diet, regular physical exercise & weight monitoring are important for maintaining a healthy lifestyle    You may be retaining fluid if you have a history of heart failure or if you experience any of the following symptoms:  Weight gain of 3 pounds or more overnight or 5 pounds in a week, increased swelling in our hands or feet or shortness of breath while lying flat in bed. Please call your doctor as soon as you notice any of these symptoms; do not wait until your next office visit. Recognize signs and symptoms of STROKE:    F-face looks uneven    A-arms unable to move or move unevenly    S-speech slurred or non-existent    T-time-call 911 as soon as signs and symptoms begin-DO NOT go       Back to bed or wait to see if you get better-TIME IS BRAIN. Warning Signs of HEART ATTACK     Call 911 if you have these symptoms:   Chest discomfort.  Most heart attacks involve discomfort in the center of the chest that lasts more than a few minutes, or that goes away and comes back. It can feel like uncomfortable pressure, squeezing, fullness, or pain.  Discomfort in other areas of the upper body. Symptoms can include pain or discomfort in one or both arms, the back, neck, jaw, or stomach.  Shortness of breath with or without chest discomfort.  Other signs may include breaking out in a cold sweat, nausea, or lightheadedness. Don't wait more than five minutes to call 911 - MINUTES MATTER! Fast action can save your life. Calling 911 is almost always the fastest way to get lifesaving treatment. Emergency Medical Services staff can begin treatment when they arrive -- up to an hour sooner than if someone gets to the hospital by car. The discharge information has been reviewed with the patient and spouse. The patient and spouse verbalized understanding. Discharge medications reviewed with the patient and spouse and appropriate educational materials and side effects teaching were provided.   ___________________________________________________________________________________________________________________________________

## 2017-11-28 ENCOUNTER — ANESTHESIA EVENT (OUTPATIENT)
Dept: SURGERY | Age: 47
End: 2017-11-28
Payer: SUBSIDIZED

## 2017-11-29 ENCOUNTER — HOSPITAL ENCOUNTER (OUTPATIENT)
Age: 47
Setting detail: OUTPATIENT SURGERY
Discharge: HOME | End: 2017-11-29
Attending: ORTHOPAEDIC SURGERY | Admitting: ORTHOPAEDIC SURGERY
Payer: SUBSIDIZED

## 2017-11-29 ENCOUNTER — ANESTHESIA (OUTPATIENT)
Dept: SURGERY | Age: 47
End: 2017-11-29
Payer: SUBSIDIZED

## 2017-11-29 VITALS
TEMPERATURE: 96.9 F | BODY MASS INDEX: 32.3 KG/M2 | OXYGEN SATURATION: 94 % | SYSTOLIC BLOOD PRESSURE: 117 MMHG | HEIGHT: 66 IN | WEIGHT: 201 LBS | DIASTOLIC BLOOD PRESSURE: 77 MMHG | HEART RATE: 61 BPM | RESPIRATION RATE: 20 BRPM

## 2017-11-29 LAB
BUN BLD-MCNC: 6 MG/DL (ref 7–18)
CHLORIDE BLD-SCNC: 101 MMOL/L (ref 100–108)
GLUCOSE BLD STRIP.AUTO-MCNC: 87 MG/DL (ref 74–106)
HCG UR QL: NEGATIVE
HCT VFR BLD CALC: 39 % (ref 36–49)
HGB BLD-MCNC: 13.3 G/DL (ref 12–16)
POTASSIUM BLD-SCNC: 3.8 MMOL/L (ref 3.5–5.5)
SODIUM BLD-SCNC: 139 MMOL/L (ref 136–145)

## 2017-11-29 PROCEDURE — 77030003666 HC NDL SPINAL BD -A: Performed by: ORTHOPAEDIC SURGERY

## 2017-11-29 PROCEDURE — 77030032490 HC SLV COMPR SCD KNE COVD -B: Performed by: ORTHOPAEDIC SURGERY

## 2017-11-29 PROCEDURE — 76210000016 HC OR PH I REC 1 TO 1.5 HR: Performed by: ORTHOPAEDIC SURGERY

## 2017-11-29 PROCEDURE — 82947 ASSAY GLUCOSE BLOOD QUANT: CPT

## 2017-11-29 PROCEDURE — 64415 NJX AA&/STRD BRCH PLXS IMG: CPT | Performed by: ANESTHESIOLOGY

## 2017-11-29 PROCEDURE — 74011250636 HC RX REV CODE- 250/636: Performed by: ORTHOPAEDIC SURGERY

## 2017-11-29 PROCEDURE — 77030029077: Performed by: ORTHOPAEDIC SURGERY

## 2017-11-29 PROCEDURE — 77030018836 HC SOL IRR NACL ICUM -A: Performed by: ORTHOPAEDIC SURGERY

## 2017-11-29 PROCEDURE — 77030011640 HC PAD GRND REM COVD -A: Performed by: ORTHOPAEDIC SURGERY

## 2017-11-29 PROCEDURE — 74011000250 HC RX REV CODE- 250

## 2017-11-29 PROCEDURE — 74011000250 HC RX REV CODE- 250: Performed by: NURSE ANESTHETIST, CERTIFIED REGISTERED

## 2017-11-29 PROCEDURE — C1713 ANCHOR/SCREW BN/BN,TIS/BN: HCPCS | Performed by: ORTHOPAEDIC SURGERY

## 2017-11-29 PROCEDURE — 76060000036 HC ANESTHESIA 2.5 TO 3 HR: Performed by: ORTHOPAEDIC SURGERY

## 2017-11-29 PROCEDURE — 77030020268 HC MISC GENERAL SUPPLY: Performed by: ORTHOPAEDIC SURGERY

## 2017-11-29 PROCEDURE — 77030010427: Performed by: ORTHOPAEDIC SURGERY

## 2017-11-29 PROCEDURE — 76010000132 HC OR TIME 2.5 TO 3 HR: Performed by: ORTHOPAEDIC SURGERY

## 2017-11-29 PROCEDURE — 77030002966 HC SUT PDS J&J -A: Performed by: ORTHOPAEDIC SURGERY

## 2017-11-29 PROCEDURE — 74011000258 HC RX REV CODE- 258: Performed by: ORTHOPAEDIC SURGERY

## 2017-11-29 PROCEDURE — 74011250636 HC RX REV CODE- 250/636

## 2017-11-29 PROCEDURE — 77030004453 HC BUR SHV STRY -B: Performed by: ORTHOPAEDIC SURGERY

## 2017-11-29 PROCEDURE — 74011250636 HC RX REV CODE- 250/636: Performed by: NURSE ANESTHETIST, CERTIFIED REGISTERED

## 2017-11-29 PROCEDURE — 81025 URINE PREGNANCY TEST: CPT

## 2017-11-29 PROCEDURE — 77030028520: Performed by: ORTHOPAEDIC SURGERY

## 2017-11-29 PROCEDURE — 77030020782 HC GWN BAIR PAWS FLX 3M -B: Performed by: ORTHOPAEDIC SURGERY

## 2017-11-29 PROCEDURE — 77030011930 HC WND ARTHRO ABLT S&N -C: Performed by: ORTHOPAEDIC SURGERY

## 2017-11-29 PROCEDURE — 77030022036 HC BLD SHV TOMCAT STRY -B: Performed by: ORTHOPAEDIC SURGERY

## 2017-11-29 PROCEDURE — 77030008574 HC TBNG SUC IRR STRY -B: Performed by: ORTHOPAEDIC SURGERY

## 2017-11-29 PROCEDURE — 77030020274 HC MISC IMPL ORTHOPEDIC: Performed by: ORTHOPAEDIC SURGERY

## 2017-11-29 PROCEDURE — 74011250637 HC RX REV CODE- 250/637: Performed by: NURSE ANESTHETIST, CERTIFIED REGISTERED

## 2017-11-29 PROCEDURE — 77030002933 HC SUT MCRYL J&J -A: Performed by: ORTHOPAEDIC SURGERY

## 2017-11-29 PROCEDURE — 77030031410 HC IMMOB SHLDR SLNG SUP BREG -B: Performed by: ORTHOPAEDIC SURGERY

## 2017-11-29 PROCEDURE — 76942 ECHO GUIDE FOR BIOPSY: CPT | Performed by: ANESTHESIOLOGY

## 2017-11-29 PROCEDURE — 76210000020 HC REC RM PH II FIRST 0.5 HR: Performed by: ORTHOPAEDIC SURGERY

## 2017-11-29 DEVICE — ANCHOR SUTURE BIOCOMP 4.75X19.1 MM SWIVELOCK C: Type: IMPLANTABLE DEVICE | Site: SHOULDER | Status: FUNCTIONAL

## 2017-11-29 DEVICE — GRAFT DERMIS DECEL 40X70MM -- ARTHROFLEX: Type: IMPLANTABLE DEVICE | Site: SHOULDER | Status: FUNCTIONAL

## 2017-11-29 DEVICE — ANCHOR SUT L14.5MM DIA3MM BIOCOMPOSITE W/ TWO SZ 2: Type: IMPLANTABLE DEVICE | Site: SHOULDER | Status: FUNCTIONAL

## 2017-11-29 DEVICE — ANCHOR SUT L19.1MM DIA6.25MM CLS EYELET TENODESIS FOR PROX: Type: IMPLANTABLE DEVICE | Site: SHOULDER | Status: FUNCTIONAL

## 2017-11-29 RX ORDER — ONDANSETRON 2 MG/ML
INJECTION INTRAMUSCULAR; INTRAVENOUS AS NEEDED
Status: DISCONTINUED | OUTPATIENT
Start: 2017-11-29 | End: 2017-11-29 | Stop reason: HOSPADM

## 2017-11-29 RX ORDER — FAMOTIDINE 20 MG/1
20 TABLET, FILM COATED ORAL ONCE
Status: COMPLETED | OUTPATIENT
Start: 2017-11-29 | End: 2017-11-29

## 2017-11-29 RX ORDER — FENTANYL CITRATE 50 UG/ML
100 INJECTION, SOLUTION INTRAMUSCULAR; INTRAVENOUS ONCE
Status: COMPLETED | OUTPATIENT
Start: 2017-11-29 | End: 2017-11-29

## 2017-11-29 RX ORDER — LIDOCAINE HYDROCHLORIDE 20 MG/ML
INJECTION, SOLUTION EPIDURAL; INFILTRATION; INTRACAUDAL; PERINEURAL AS NEEDED
Status: DISCONTINUED | OUTPATIENT
Start: 2017-11-29 | End: 2017-11-29 | Stop reason: HOSPADM

## 2017-11-29 RX ORDER — FENTANYL CITRATE 50 UG/ML
INJECTION, SOLUTION INTRAMUSCULAR; INTRAVENOUS AS NEEDED
Status: DISCONTINUED | OUTPATIENT
Start: 2017-11-29 | End: 2017-11-29 | Stop reason: HOSPADM

## 2017-11-29 RX ORDER — SODIUM CHLORIDE 0.9 % (FLUSH) 0.9 %
5-10 SYRINGE (ML) INJECTION EVERY 8 HOURS
Status: DISCONTINUED | OUTPATIENT
Start: 2017-11-29 | End: 2017-11-29 | Stop reason: HOSPADM

## 2017-11-29 RX ORDER — PROMETHAZINE HYDROCHLORIDE 25 MG/ML
25 INJECTION, SOLUTION INTRAMUSCULAR; INTRAVENOUS
Status: DISCONTINUED | OUTPATIENT
Start: 2017-11-29 | End: 2017-11-29 | Stop reason: HOSPADM

## 2017-11-29 RX ORDER — GLYCOPYRROLATE 0.2 MG/ML
INJECTION INTRAMUSCULAR; INTRAVENOUS AS NEEDED
Status: DISCONTINUED | OUTPATIENT
Start: 2017-11-29 | End: 2017-11-29 | Stop reason: HOSPADM

## 2017-11-29 RX ORDER — DEXAMETHASONE SODIUM PHOSPHATE 4 MG/ML
INJECTION, SOLUTION INTRA-ARTICULAR; INTRALESIONAL; INTRAMUSCULAR; INTRAVENOUS; SOFT TISSUE AS NEEDED
Status: DISCONTINUED | OUTPATIENT
Start: 2017-11-29 | End: 2017-11-29 | Stop reason: HOSPADM

## 2017-11-29 RX ORDER — ONDANSETRON 2 MG/ML
4 INJECTION INTRAMUSCULAR; INTRAVENOUS ONCE
Status: DISCONTINUED | OUTPATIENT
Start: 2017-11-29 | End: 2017-11-29 | Stop reason: HOSPADM

## 2017-11-29 RX ORDER — SODIUM CHLORIDE 0.9 % (FLUSH) 0.9 %
5-10 SYRINGE (ML) INJECTION AS NEEDED
Status: DISCONTINUED | OUTPATIENT
Start: 2017-11-29 | End: 2017-11-29 | Stop reason: HOSPADM

## 2017-11-29 RX ORDER — ROPIVACAINE HYDROCHLORIDE 5 MG/ML
30 INJECTION, SOLUTION EPIDURAL; INFILTRATION; PERINEURAL
Status: COMPLETED | OUTPATIENT
Start: 2017-11-29 | End: 2017-11-29

## 2017-11-29 RX ORDER — LIDOCAINE HYDROCHLORIDE 10 MG/ML
3 INJECTION, SOLUTION EPIDURAL; INFILTRATION; INTRACAUDAL; PERINEURAL ONCE
Status: COMPLETED | OUTPATIENT
Start: 2017-11-29 | End: 2017-11-29

## 2017-11-29 RX ORDER — MIDAZOLAM HYDROCHLORIDE 1 MG/ML
2 INJECTION, SOLUTION INTRAMUSCULAR; INTRAVENOUS ONCE
Status: COMPLETED | OUTPATIENT
Start: 2017-11-29 | End: 2017-11-29

## 2017-11-29 RX ORDER — KETOROLAC TROMETHAMINE 30 MG/ML
30 INJECTION, SOLUTION INTRAMUSCULAR; INTRAVENOUS
Status: COMPLETED | OUTPATIENT
Start: 2017-11-29 | End: 2017-11-29

## 2017-11-29 RX ORDER — EPHEDRINE SULFATE/0.9% NACL/PF 25 MG/5 ML
SYRINGE (ML) INTRAVENOUS AS NEEDED
Status: DISCONTINUED | OUTPATIENT
Start: 2017-11-29 | End: 2017-11-29 | Stop reason: HOSPADM

## 2017-11-29 RX ORDER — HYDROMORPHONE HYDROCHLORIDE 2 MG/ML
0.5 INJECTION, SOLUTION INTRAMUSCULAR; INTRAVENOUS; SUBCUTANEOUS
Status: DISCONTINUED | OUTPATIENT
Start: 2017-11-29 | End: 2017-11-29 | Stop reason: HOSPADM

## 2017-11-29 RX ORDER — SODIUM CHLORIDE, SODIUM LACTATE, POTASSIUM CHLORIDE, CALCIUM CHLORIDE 600; 310; 30; 20 MG/100ML; MG/100ML; MG/100ML; MG/100ML
75 INJECTION, SOLUTION INTRAVENOUS CONTINUOUS
Status: DISCONTINUED | OUTPATIENT
Start: 2017-11-29 | End: 2017-11-29 | Stop reason: HOSPADM

## 2017-11-29 RX ORDER — PROPOFOL 10 MG/ML
INJECTION, EMULSION INTRAVENOUS AS NEEDED
Status: DISCONTINUED | OUTPATIENT
Start: 2017-11-29 | End: 2017-11-29 | Stop reason: HOSPADM

## 2017-11-29 RX ORDER — SUCCINYLCHOLINE CHLORIDE 20 MG/ML
INJECTION INTRAMUSCULAR; INTRAVENOUS AS NEEDED
Status: DISCONTINUED | OUTPATIENT
Start: 2017-11-29 | End: 2017-11-29 | Stop reason: HOSPADM

## 2017-11-29 RX ADMIN — ONDANSETRON 4 MG: 2 INJECTION INTRAMUSCULAR; INTRAVENOUS at 11:06

## 2017-11-29 RX ADMIN — SODIUM CHLORIDE, SODIUM LACTATE, POTASSIUM CHLORIDE, AND CALCIUM CHLORIDE 75 ML/HR: 600; 310; 30; 20 INJECTION, SOLUTION INTRAVENOUS at 08:58

## 2017-11-29 RX ADMIN — HYDROMORPHONE HYDROCHLORIDE 0.5 MG: 2 INJECTION, SOLUTION INTRAMUSCULAR; INTRAVENOUS; SUBCUTANEOUS at 14:14

## 2017-11-29 RX ADMIN — DEXAMETHASONE SODIUM PHOSPHATE 8 MG: 4 INJECTION, SOLUTION INTRA-ARTICULAR; INTRALESIONAL; INTRAMUSCULAR; INTRAVENOUS; SOFT TISSUE at 11:06

## 2017-11-29 RX ADMIN — MIDAZOLAM HYDROCHLORIDE 2 MG: 1 INJECTION, SOLUTION INTRAMUSCULAR; INTRAVENOUS at 10:17

## 2017-11-29 RX ADMIN — SODIUM CHLORIDE 1000 MG: 900 INJECTION, SOLUTION INTRAVENOUS at 08:59

## 2017-11-29 RX ADMIN — FENTANYL CITRATE 100 MCG: 50 INJECTION INTRAMUSCULAR; INTRAVENOUS at 10:17

## 2017-11-29 RX ADMIN — GLYCOPYRROLATE 0.2 MG: 0.2 INJECTION INTRAMUSCULAR; INTRAVENOUS at 11:24

## 2017-11-29 RX ADMIN — ROPIVACAINE HYDROCHLORIDE 150 MG: 5 INJECTION, SOLUTION EPIDURAL; INFILTRATION; PERINEURAL at 10:20

## 2017-11-29 RX ADMIN — FAMOTIDINE 20 MG: 20 TABLET, FILM COATED ORAL at 08:58

## 2017-11-29 RX ADMIN — KETOROLAC TROMETHAMINE 30 MG: 30 INJECTION, SOLUTION INTRAMUSCULAR at 14:18

## 2017-11-29 RX ADMIN — SUCCINYLCHOLINE CHLORIDE 100 MG: 20 INJECTION INTRAMUSCULAR; INTRAVENOUS at 11:00

## 2017-11-29 RX ADMIN — FENTANYL CITRATE 100 MCG: 50 INJECTION, SOLUTION INTRAMUSCULAR; INTRAVENOUS at 10:59

## 2017-11-29 RX ADMIN — LIDOCAINE HYDROCHLORIDE 60 MG: 20 INJECTION, SOLUTION EPIDURAL; INFILTRATION; INTRACAUDAL; PERINEURAL at 10:59

## 2017-11-29 RX ADMIN — LIDOCAINE HYDROCHLORIDE 3 ML: 10 INJECTION, SOLUTION EPIDURAL; INFILTRATION; INTRACAUDAL; PERINEURAL at 10:33

## 2017-11-29 RX ADMIN — PROPOFOL 150 MG: 10 INJECTION, EMULSION INTRAVENOUS at 10:59

## 2017-11-29 RX ADMIN — Medication 5 MG: at 11:24

## 2017-11-29 NOTE — ANESTHESIA POSTPROCEDURE EVALUATION
Post-Anesthesia Evaluation and Assessment    Patient: Uziel Yoon MRN: [id number]  SSN: [id number]    YOB: 1970  Age: 50 y.o. Sex: female       Cardiovascular Function/Vital Signs  Visit Vitals    /77 (BP 1 Location: Left arm, BP Patient Position: At rest)    Pulse 61    Temp 36.1 °C (96.9 °F)    Resp 20    Ht 5' 6\" (1.676 m)    Wt 91.2 kg (201 lb)    SpO2 94%    BMI 32.44 kg/m2       Patient is status post general, regional anesthesia for Procedure(s):  RIGHT SHOULDER ARTHROSCOPIC SUPERIOR CAPSULAR RECONTRUCTION/AFLEX 301/ARTHREX. Nausea/Vomiting: None    Postoperative hydration reviewed and adequate. Pain:  Pain Scale 1: Numeric (0 - 10) (11/29/17 1502)  Pain Intensity 1: 4 (11/29/17 1502)   Managed    Neurological Status:   Neuro (WDL): Within Defined Limits (11/29/17 1342)   At baseline    Mental Status and Level of Consciousness: Arousable    Pulmonary Status:   O2 Device: Room air (11/29/17 1502)   Adequate oxygenation and airway patent    Complications related to anesthesia: None    Post-anesthesia assessment completed.  No concerns    Signed By: Rock Mark MD     November 29, 2017

## 2017-11-29 NOTE — BRIEF OP NOTE
BRIEF OPERATIVE NOTE    Date of Procedure: 11/29/2017   Preoperative Diagnosis: Complete tear of right rotator cuff [M75.121]  Postoperative Diagnosis: Complete tear of right rotator cuff [M75.121] , massive   Procedure(s):  RIGHT SHOULDER ARTHROSCOPIC SUPERIOR CAPSULAR RECONTRUCTION/AFLEX 301/ARTHREX, rotator cuff repair  Surgeon(s) and Role:     * Amber Crockett MD - Primary         Assistant Staff:       Surgical Staff:  Circ-1: Cabrera Valenzuela RN  Scrub Tech-1: Alvina Boast  Surg Asst-1: Lina Oliver  Event Time In   Incision Start 1126   Incision Close 1334     Anesthesia: General   Estimated Blood Loss: minimal  Specimens: * No specimens in log *   Findings: as above   Complications: none  Implants:   Implant Name Type Inv.  Item Serial No.  Lot No. LRB No. Used Action   ANCHOR BIOCOMP 4.75X19.1MM -- Chayo Rule C-VENT - [id number]  ANCHOR BIOCOMP 4.75X19.1MM -- SWIVELOCK C-VENT  ARTHREX [id number] Right 1 Implanted   ANCHOR SUT BIOCOMPSITE 6.25MM -- SWIVELOCK - [id number]  ANCHOR SUT BIOCOMPSITE 6.25MM -- Shree Hidden [id number] Right 1 Implanted   speedbidge implant sys with biocomposite swivel lock    ARTHREX [id number] Right 1 Implanted   ANCHOR SUT BIOCOMP 3X14.5MM --  - [id number]  ANCHOR SUT BIOCOMP 3X14.5MM --   ARTHREX [id number] Right 3 Implanted   GRAFT DERMIS DECEL [id number] -- ARTHROFLEX - [id number]   GRAFT DERMIS DECEL [id number] -- ARTHROFLEX   [facility] [place] [facility] [de-identified] Right 1 Implanted

## 2017-11-29 NOTE — PROGRESS NOTES
Date of Surgery Update:  Maryam Razo was seen and examined. History and physical has been reviewed. The patient has been examined.  There have been no significant clinical changes since the completion of the originally dated History and Physical.    Signed By: Katy Huber MD     November 29, 2017 9:53 AM

## 2017-11-29 NOTE — ANESTHESIA PROCEDURE NOTES
Peripheral Block    Start time: 11/29/2017 10:20 AM  End time: 11/29/2017 10:28 AM  Performed by: Marcus Barlow by: Mahi Erwin       Pre-procedure: Indications: at surgeon's request, post-op pain management and procedure for pain    Preanesthetic Checklist: patient identified, risks and benefits discussed, site marked, timeout performed, anesthesia consent given and patient being monitored      Block Type:   Block Type:  Brachial plexus and interscalene  Laterality:  Right  Monitoring:  Standard ASA monitoring, continuous pulse ox, frequent vital sign checks, oxygen, responsive to questions and heart rate  Injection Technique:  Single shot  Procedures: ultrasound guided and nerve stimulator    Prep: chlorhexidine    Location:  Interscalene  Needle Type:  Stimuplex  Needle Gauge:  22 G  Needle Localization:  Ultrasound guidance and nerve stimulator  Medication Injected:  0.5%  ropivacaine  Volume (mL):  23    Assessment:  Number of attempts:  1  Injection Assessment:  No intravascular symptoms, negative aspiration for blood, local visualized surrounding nerve on ultrasound, ultrasound image on chart, no paresthesia and incremental injection every 5 mL  Patient tolerance:  Patient tolerated the procedure well with no immediate complications  Location:  PREOP HOLDING    Patient given 2 mg IV Versed and 100 mcg IV Fentanyl for sedation.     11/29/2017     10:29 AM     Chinyere Cheng MD

## 2017-11-29 NOTE — IP AVS SNAPSHOT
[address] [address] 
[phone] Patient: Tasha Russo MRN: [id number] FOU:5/20/5240 About your hospitalization You were admitted on:  November 29, 2017 You last received care in the:  [facility] PACU You were discharged on:  November 29, 2017 Why you were hospitalized Your primary diagnosis was:  Not on File Things You Need To Do (next 8 weeks) Follow up with Anil Blackwood MD  
  
Phone:  [phone] Where:  [address], [address] [facility] [address] Schedule an appointment with Neeta Serrato MD as soon as possible for a visit in 1 week(s) Phone:  [phone] Where:  [address] [facility], [address] [facility] [address], [address] [facility] [address] Wednesday Dec 06, 2017 POST OP with HAMZAH Barlow at  3:30 PM  
Where:  [address] [facility] Thursday Dec 28, 2017 ECHO with HBV- [id number] MACHINE (WT ) at  1:30 PM  
Age Limit for ALL Heart procedures @ all [facility] facilities: 18 yrs and older only. Under the age of 25, refer to [facility] ([phone]). Wt Limit: 350lbs. This study requires patient to bring a written physician's order or MD office may fax the order to Central Scheduling at [phone]. Patient needs to bring a current list of all medications. No preparation is required for this study. Patients should report 15 minutes prior to their appointment time to the [address]. Where:  HBV NON-INVASIVE CARD ([facility]) Discharge Orders None A check samy indicates which time of day the medication should be taken. My Medications TAKE these medications as instructed Instructions Each Dose to Equal  
 Morning Noon Evening Bedtime ABILIFY 10 mg tablet Generic drug:  ARIPiprazole Your last dose was: Your next dose is: Take 10 mg by mouth daily. 10mg am and 15mg q hs  
 10 mg  
    
   
   
   
  
 acetaminophen-codeine 300-30 mg per tablet Commonly known as:  TYLENOL-CODEINE #3 Your last dose was: Your next dose is: Take 1 Tab by mouth every four (4) hours as needed for Pain. Max Daily Amount: 6 Tabs. 1 Tab  
    
   
   
   
  
 baclofen 10 mg tablet Commonly known as:  LIORESAL Your last dose was: Your next dose is: Take  by mouth three (3) times daily. chlorproMAZINE 100 mg tablet Commonly known as:  THORAZINE Your last dose was: Your next dose is: Take 100 mg by mouth two (2) times a day. Take 100 mg in the am and 200 mg at night time. 100 mg KLONOPIN PO Your last dose was: Your next dose is: Take  by mouth. LaMICtal 100 mg tablet Generic drug:  lamoTRIgine Your last dose was: Your next dose is: Take  by mouth daily. 100 mg am and 200mg q hs LUNESTA 3 mg tablet Generic drug:  eszopiclone Your last dose was: Your next dose is: Take  by mouth nightly. multivitamin with iron chewable tablet Commonly known as:  Sienna Code Your last dose was: Your next dose is: Take 1 Tab by mouth daily. 1 Tab  
    
   
   
   
  
 oxyCODONE-acetaminophen 7.5-325 mg per tablet Commonly known as:  PERCOCET 7.5 Your last dose was: Your next dose is: Take 1-2 Tabs by mouth every four (4) hours as needed for Pain. Max Daily Amount: 12 Tabs. Do not take until after surgery 1-2 Tab PriLOSEC 20 mg capsule Generic drug:  omeprazole Your last dose was: Your next dose is: Take 20 mg by mouth daily.   
 20 mg  
    
   
 traZODone 300 mg tablet Commonly known as:  Donny Denton Your last dose was: Your next dose is: Take 300 mg by mouth nightly. 300 mg  
    
   
   
   
  
 venlafaxine-SR 37.5 mg capsule Commonly known as:  EFFEXOR-XR Your last dose was: Your next dose is: Take 75 mg by mouth daily. 75 mg  
    
   
   
   
  
 VITAMIN B-12 PO Your last dose was: Your next dose is: Take  by mouth daily. VITAMIN D2 PO Your last dose was: Your next dose is: Take  by mouth every seven (7) days. zolpidem 10 mg tablet Commonly known as:  AMBIEN Your last dose was: Your next dose is: Take 10 mg by mouth nightly as needed for Sleep. 10 mg Discharge Instructions Dr. Sadiq Upton Shoulder Arthroscopy Postoperative Information You will be given a prescription for pain medication. It may be taken every 4-6 hours as needed for the first few days after surgery. You should place an ice bag over your shoulder to help with pain and reduce swelling. A soft bandage was placed on your shoulder. You may take the bandage off two days after surgery and clean the incision sites with peroxide. Band-aids should be placed over each incision site for at least four days. There are 2 or 3 small incisions in your shoulder and they may be sore and develop bruising over the next several days. The bruising should resolve and no special care will be needed. It is safe to take a shower or bathe two days after surgery. You will be given a sling to use. Continue to wear this at all times, unless you are given different instructions. No moving the arm away from your body on your own, reaching or carrying with the operated arm. You will be shown specific exercises when you see your physical therapist tomorrow. Even though your incisions are small, there has been an operation inside and around the shoulder joint. Complete healing may take weeks or several months. If you have a high temperature, unexpected pain, redness or swelling in your shoulder please contact my office immediately. Please call to make an appointment to see me in my office in 1 week. Dr. Marie Jacobs office number [phone] DISCHARGE SUMMARY from Nurse PATIENT INSTRUCTIONS: 
 
 
F-face looks uneven A-arms unable to move or move unevenly S-speech slurred or non-existent T-time-call 911 as soon as signs and symptoms begin-DO NOT go Back to bed or wait to see if you get better-TIME IS BRAIN. Warning Signs of HEART ATTACK Call 911 if you have these symptoms: 
? Chest discomfort. Most heart attacks involve discomfort in the center of the chest that lasts more than a few minutes, or that goes away and comes back. It can feel like uncomfortable pressure, squeezing, fullness, or pain. ? Discomfort in other areas of the upper body. Symptoms can include pain or discomfort in one or both arms, the back, neck, jaw, or stomach. ? Shortness of breath with or without chest discomfort. ? Other signs may include breaking out in a cold sweat, nausea, or lightheadedness. Don't wait more than five minutes to call [address]! Fast action can save your life. Calling 911 is almost always the fastest way to get lifesaving treatment. Emergency Medical Services staff can begin treatment when they arrive  up to an hour sooner than if someone gets to the hospital by car. The discharge information has been reviewed with the patient and spouse. The patient and spouse verbalized understanding. Discharge medications reviewed with the patient and spouse and appropriate educational materials and side effects teaching were provided.  
___________________________________________________________________________ ________________________________________________________ Providers Seen During Your Hospitalization Provider Specialty Primary office phone Lakisha Pickering MD Orthopedic Surgery [phone] Your Primary Care Physician (PCP) Primary Care Physician Office Phone Office Fax Kamla Gonsales [phone] You are allergic to the following Allergen Reactions Amoxicillin Other (comments) Does no work Codeine Nausea and Vomiting Recent Documentation Height Weight BMI OB Status Smoking Status 1.676 m 91.2 kg 32.44 kg/m2 Menopause Current Every Day Smoker Emergency Contacts Name Discharge Info Relation Home Work Mobile [facility] DISCHARGE CAREGIVER [3] Spouse [3] [phone] Patient Belongings The following personal items are in your possession at time of discharge: 
  Dental Appliances: None  Visual Aid: Glasses      Home Medications: None   Jewelry: None  Clothing: Pants, Socks, Shirt, Footwear, Undergarments, Jacket/Coat    Other Valuables: Eyeglasses Please provide this summary of care documentation to your next provider. Signatures-by signing, you are acknowledging that this After Visit Summary has been reviewed with you and you have received a copy. Patient Signature:  ____________________________________________________________ Date:  ____________________________________________________________  
  
Earnstine Pawan Provider Signature:  ____________________________________________________________ Date:  ____________________________________________________________

## 2017-11-29 NOTE — ANESTHESIA PREPROCEDURE EVALUATION
Anesthetic History   No history of anesthetic complications            Review of Systems / Medical History  Patient summary reviewed and pertinent labs reviewed    Pulmonary            Asthma   Pertinent negatives: No smoker     Neuro/Psych         Psychiatric history     Cardiovascular  Within defined limits                     GI/Hepatic/Renal     GERD           Endo/Other        Morbid obesity and arthritis     Other Findings   Comments:   Risk Factors for Postoperative nausea/vomiting:       History of postoperative nausea/vomiting? NO       Female? YES       Motion sickness? NO       Intended opioid administration for postoperative analgesia? NO      Smoking Abstinence  Current Smoker? YES  Elective Surgery? YES  Seen preoperatively by anesthesiologist or proxy prior to day of surgery? YES  Pt abstained from smoking 24 hours prior to anesthesia?  YES           Physical Exam    Airway  Mallampati: II  TM Distance: 4 - 6 cm  Neck ROM: normal range of motion   Mouth opening: Normal     Cardiovascular  Regular rate and rhythm,  S1 and S2 normal,  no murmur, click, rub, or gallop             Dental  No notable dental hx       Pulmonary  Breath sounds clear to auscultation               Abdominal  Abdominal exam normal       Other Findings            Anesthetic Plan    ASA: 3  Anesthesia type: general and regional - interscalene block      Post-op pain plan if not by surgeon: peripheral nerve block single    Induction: Intravenous  Anesthetic plan and risks discussed with: Patient

## 2017-11-30 ENCOUNTER — HOSPITAL ENCOUNTER (OUTPATIENT)
Dept: PHYSICAL THERAPY | Age: 47
Discharge: HOME | End: 2017-11-30
Payer: SUBSIDIZED

## 2017-11-30 DIAGNOSIS — M75.121 COMPLETE TEAR OF RIGHT ROTATOR CUFF: Primary | ICD-10-CM

## 2017-11-30 LAB
BUN BLD-MCNC: 8 MG/DL (ref 7–18)
CHLORIDE BLD-SCNC: 103 MMOL/L (ref 100–108)
GLUCOSE BLD STRIP.AUTO-MCNC: 89 MG/DL (ref 74–106)
HCT VFR BLD CALC: 38 % (ref 36–49)
HGB BLD-MCNC: 12.9 G/DL (ref 12–16)
POTASSIUM BLD-SCNC: 5.2 MMOL/L (ref 3.5–5.5)
SODIUM BLD-SCNC: 139 MMOL/L (ref 136–145)

## 2017-11-30 PROCEDURE — 97110 THERAPEUTIC EXERCISES: CPT

## 2017-11-30 PROCEDURE — 97140 MANUAL THERAPY 1/> REGIONS: CPT

## 2017-11-30 PROCEDURE — 97530 THERAPEUTIC ACTIVITIES: CPT

## 2017-11-30 PROCEDURE — 97162 PT EVAL MOD COMPLEX 30 MIN: CPT

## 2017-11-30 NOTE — PROGRESS NOTES
PT DAILY TREATMENT NOTE     Patient Name: Merary Lewis  Date:2017  : 1970  [x]  Patient  Verified  Payor: [place] / Plan: 1500 / Product Type: Medicaid /    In time:2:05  Out time:2:55  Total Treatment Time (min): 50  Visit #: 1 of     Treatment Area: Right shoulder pain [M25.511]    SUBJECTIVE  Pain Level (0-10 scale): 8/10  Any medication changes, allergies to medications, adverse drug reactions, diagnosis change, or new procedure performed?: [x] No    [] Yes (see summary sheet for update)  Subjective functional status/changes:   [] No changes reported  The patient has a chief complaint of shoulder pain. OBJECTIVE    Modality rationale: decrease edema, decrease inflammation and decrease pain to improve the patients ability to improve ADL ease.     Min Type Additional Details    [] Estim:  []Unatt       []IFC  []Premod                        []Other:  []w/ice   []w/heat  Position:  Location:    [] Estim: []Att    []TENS instruct  []NMES                    []Other:  []w/US   []w/ice   []w/heat  Position:  Location:    []  Traction: [] Cervical       []Lumbar                       [] Prone          []Supine                       []Intermittent   []Continuous Lbs:  [] before manual  [] after manual    []  Ultrasound: []Continuous   [] Pulsed                           []1MHz   []3MHz W/cm2:  Location:    []  Iontophoresis with dexamethasone         Location: [] Take home patch   [] In clinic   10 [x]  Ice     []  heat  []  Ice massage  []  Laser   []  Anodyne Position: seated  Location: R shoulder    []  Laser with stim  []  Other:  Position:  Location:    []  Vasopneumatic Device Pressure:       [] lo [] med [] hi   Temperature: [] lo [] med [] hi   [] Skin assessment post-treatment:  []intact []redness- no adverse reaction    []redness  adverse reaction:     10 min [x]Eval                  []Re-Eval       10 min Therapeutic Exercise:  [x] See flow sheet : Rationale: increase ROM and increase strength to improve the patients ability to improve ADL ease. 12 min Therapeutic Activity:  [x]  See flow sheet : Sling education and dressing education   Rationale: sling donning doffing, dressing instruction  to improve the patients ability to improve ADl ease. 8 min Manual Therapy:  PROM scapthoracic mobility   Rationale: decrease pain, increase ROM and increase tissue extensibility to improve ADL ease. With   [] TE   [] TA   [] neuro   [] other: Patient Education: [x] Review HEP    [] Progressed/Changed HEP based on:   [] positioning   [] body mechanics   [] transfers   [] heat/ice application    [] other:      Other Objective/Functional Measures: See IE     Pain Level (0-10 scale) post treatment: 7/10    ASSESSMENT/Changes in Function: See POC. Patient will continue to benefit from skilled PT services to modify and progress therapeutic interventions, address functional mobility deficits, address ROM deficits, address strength deficits, analyze and address soft tissue restrictions, analyze and cue movement patterns, analyze and modify body mechanics/ergonomics, assess and modify postural abnormalities and instruct in home and community integration to attain remaining goals. [x]  See Plan of Care  []  See progress note/recertification  []  See Discharge Summary         Progress towards goals / Updated goals:  Short Term Goals: To be accomplished in 2 weeks:                         1. The patient will be independent and compliant with HEP. 2. The patient will verbalize precautions to PT to reduce re-injury post-operatively. Long Term Goals: To be accomplished in 4 weeks:                         1. The patient will improve FOTO score to 44 to maximize quality of life. 2. The patient will improve passive scaption to 110 degrees to improve ease of ADLs.                          3. The patient will improve ER in scaption plane at 45 degrees elevation to 45 degrees in order to improve ease of performing grooming. 4. The patient will improve ABD to 90 degrees to improve ease of grooming.     PLAN  []  Upgrade activities as tolerated     [x]  Continue plan of care  []  Update interventions per flow sheet       []  Discharge due to:_  []  Other:_      López Nicholsonregor, PT 11/30/2017  3:09 PM    Future Appointments  Date Time Provider Colt Jovel   12/5/2017 2:30 PM [id number] [facility]   12/6/2017 3:30 PM HAMZAH Monk [address]   12/7/2017 1:00 PM Erwin Osborne [facility]   12/12/2017 12:30 PM [id number] [facility]   12/14/2017 2:00 PM [address] [facility]   12/19/2017 12:30 PM [address]15 [facility]   12/21/2017 12:00 PM [address] [facility]   12/26/2017 1:00 PM Joni Vinte E Bea [address] [facility]   12/28/2017 1:30 PM HBV- IE33 MACHINE (WT ) [facility]   12/29/2017 12:30 PM Augustine Burkett [facility]

## 2017-11-30 NOTE — PROGRESS NOTES
[facility]  [address]  [place], [address]  [phone] fax    Plan of Care/ Statement of Necessity for Physical Therapy Services    Patient name: Carlos Aquino Start of Care: 2017   Referral source: Eliz Busby,* : 1970    Medical Diagnosis: Right shoulder pain [M25.511]   Onset Date:2017    Treatment Diagnosis: R RTC repair with allograft s/p 2017   Prior Hospitalization: see medical history Provider#: [id number]   Medications: Verified on Patient summary List    Comorbidities: Tobacco use, Depression, Arthritis, BMI > 30, Asthma, Hearing impairment, Recent rotator cuff repair. Prior Level of Function: The patient states that she could not lift her arm or reach forward prior to surgery. The Plan of Care and following information is based on the information from the initial evaluation. Assessment/ key information: The patient is a 50year old female smoker that is s/p R RTC repair performed on 2017. She presents to clinic with sweat shirt improperly donned, and sling improperly donned with limited knowledge of precautions. Per Op report, the patient did have allograft tissue added in order to complete the cuff repair as well. Dressing changes were performed with very slight drainage and band-aides applied. Care was taken to educate the patient regarding precautions, and portal site care. There was no signs of infection with good tissue approximation with post-surgical glue. The patient has impairments related to the procedure consisting of pain, decreased ROM, decreased flexibility, decreased strength, and limited ADL ease. Held pendulums at this time due to concerns of acting outside of precautions and issued forward flexion passive bending.      Evaluation Complexity History HIGH Complexity :3+ comorbidities / personal factors will impact the outcome/ POC ; Examination MEDIUM Complexity : 3 Standardized tests and measures addressing body structure, function, activity limitation and / or participation in recreation  ;Presentation MEDIUM Complexity : Evolving with changing characteristics  ; Clinical Decision Making HIGH Complexity : FOTO score of 1- 25   Overall Complexity Rating: MEDIUM  Problem List: pain affecting function, decrease ROM, decrease strength, edema affecting function, decrease ADL/ functional abilitiies, decrease activity tolerance and decrease flexibility/ joint mobility   Treatment Plan may include any combination of the following: Therapeutic exercise, Therapeutic activities, Neuromuscular re-education, Physical agent/modality, Manual therapy and Patient education  Patient / Family readiness to learn indicated by: asking questions, trying to perform skills and interest  Persons(s) to be included in education: patient (P) (female technician was present during donning shirt education as well as dressing removal.)  Barriers to Learning/Limitations: None  Patient Goal (s): use of rt arm  Patient Self Reported Health Status: good  Rehabilitation Potential: good    Short Term Goals: To be accomplished in 2 weeks:   1. The patient will be independent and compliant with HEP. 2. The patient will verbalize precautions to PT to reduce re-injury post-operatively. Long Term Goals: To be accomplished in 4 weeks:   1. The patient will improve FOTO score to 44 to maximize quality of life. 2. The patient will improve passive scaption to 110 degrees to improve ease of ADLs. 3. The patient will improve ER in scaption plane at 45 degrees elevation to 45 degrees in order to improve ease of performing grooming. 4. The patient will improve ABD to 90 degrees to improve ease of grooming. Frequency / Duration: Patient to be seen 2-3 times per week for 4 weeks.     Patient/ Caregiver education and instruction: Diagnosis, prognosis, self care, activity modification, brace/ splint application and exercises      [x]  Plan of care has been reviewed with DAYAMI Vines, PT 11/30/2017 2:56 PM    ________________________________________________________________________    I certify that the above Therapy Services are being furnished while the patient is under my care. I agree with the treatment plan and certify that this therapy is necessary.     [de-identified] Signature:____________________  Date:____________Time: _________    Please sign and return to [facility] [address] [address] Teresa Huang [address] [place] [address], [address]  [phone] fax

## 2017-11-30 NOTE — OP NOTES
[address]    Name:  Juju Blanc  MR#:  [id number]  :  1970  Account #:  [de-identified]  Date of Adm:  2017  Date of Surgery:  2017      PREOPERATIVE DIAGNOSIS: Massive irreparable rotator cuff tear,  right shoulder. POSTOPERATIVE DIAGNOSIS: Massive irreparable rotator cuff tear,  right shoulder. PROCEDURES PERFORMED: Rotator cuff repair, superior capsule  reconstruction, right shoulder. SURGEON: Jhonathan Armstrong MD    ESTIMATED BLOOD LOSS: Minimal.    COMPLICATIONS: None. SPECIMENS REMOVED: None. FINDINGS: As above. REASON FOR INCREASED LEVEL OF DIFFICULTY: No rotator cuff,  minimal rotator cuff tissue present which necessitated reconstruction  with allograft increasing the time of surgery by at least 100%. ANESTHESIA: General.    BRIEF HISTORY: The patient has had significant amount of problems  with the right shoulder, no response to conservative treatment. She  consented for surgery after having discussed at length possible risks  and complications of surgery including infection, bleeding, recurrence  of pain, among other possible problems. DESCRIPTION IN DETAIL: The patient was taken to the operating  room, induced under general endotracheal anesthesia by the  anesthesia staff, placed in the lateral decubitus position. The right arm  prepped with DuraPrep solution and draped as a free sterile field. Posterior portal was used as arthroscopy portal. Portals were made  with an 11 blade, followed by blunt trocars. Once the arthroscope was  in place, the shoulder was inspected. Biceps tendon and subscapularis  were still intact. No rotator cuff tissue present, well retracted, well  medial to the glenoid rim. Moderate degenerative changes in the  humeral head. Evidence of previous sutures from previous surgery that  she had present. The greater tuberosity was shaved to bleeding bone using a shaver  and a bur.  The tissue medial to the glenoid rim was taken down to  bone using a shaver. Using nevaiser portal, 2 SutureTak anchors were  placed, followed by 2 FiberTape anchors at the edge of the articular  surface. Lateral portal was used as a working portal. One limb of each  SutureTak was exited laterally as well as the suture tape from the  anchor. The distances in the trapezoidal configuration were measured  and allograft which came from [employer] was shaped with 5 mm extra  over the 3 areas except for the lateral side which was left  as 1 cm. Four sutures were placed on the medial edge and the middle 2 sutures  were tied and the graft was brought into the joint using a double  pulley technique. Once it was in, the second suture was tied. One limb of  each FiberTape loaded through a SwiveLock which was impacted  laterally, effecting a stable configuration. One of the SwiveLock's had to  be changed to a 6.5 because the bone quality was so poor, stable  configuration achieved in this manner. The sides of the graft was tied  to rotator cuff tissue and tied with a knot pusher. Subcutaneous tissues  closed with 3-0 Vicryl and the skin with 4-0 Monocryl. Sterile dressings  were applied. The patient tolerated the procedure well, was taken to  the recovery room without problems.         MD JYOTHI Tobar / ROSALIA  D:  11/29/2017   13:57  T:  11/29/2017   23:57  Job #:  [id number]

## 2017-12-04 ENCOUNTER — APPOINTMENT (OUTPATIENT)
Dept: PHYSICAL THERAPY | Age: 47
End: 2017-12-04
Payer: SUBSIDIZED

## 2017-12-05 ENCOUNTER — HOSPITAL ENCOUNTER (OUTPATIENT)
Dept: PHYSICAL THERAPY | Age: 47
Discharge: HOME | End: 2017-12-05
Payer: SUBSIDIZED

## 2017-12-05 PROCEDURE — 97140 MANUAL THERAPY 1/> REGIONS: CPT

## 2017-12-05 PROCEDURE — 97110 THERAPEUTIC EXERCISES: CPT

## 2017-12-05 PROCEDURE — 97016 VASOPNEUMATIC DEVICE THERAPY: CPT

## 2017-12-05 NOTE — PROGRESS NOTES
PT DAILY TREATMENT NOTE     Patient Name: Tasha New  Date:2017  : 1970  [x]  Patient  Verified  Payor: [place] / Plan: 1500 / Product Type: Medicaid /    In time:2:30  Out time:3:05  Total Treatment Time (min): 35  Visit #: 2 of     Treatment Area: Right shoulder pain [M25.511]    SUBJECTIVE  Pain Level (0-10 scale): 7  Any medication changes, allergies to medications, adverse drug reactions, diagnosis change, or new procedure performed?: [x] No    [] Yes (see summary sheet for update)  Subjective functional status/changes:   [] No changes reported  Pt reports wearing her sling at all times unless performing HEP and avoid AROM with her R shoulder    OBJECTIVE    Modality rationale: decrease edema and decrease pain to improve the patients ability to perform daily tasks   Min Type Additional Details    [] Estim:  []Unatt       []IFC  []Premod                        []Other:  []w/ice   []w/heat  Position:  Location:    [] Estim: []Att    []TENS instruct  []NMES                    []Other:  []w/US   []w/ice   []w/heat  Position:  Location:    []  Traction: [] Cervical       []Lumbar                       [] Prone          []Supine                       []Intermittent   []Continuous Lbs:  [] before manual  [] after manual    []  Ultrasound: []Continuous   [] Pulsed                           []1MHz   []3MHz W/cm2:  Location:    []  Iontophoresis with dexamethasone         Location: [] Take home patch   [] In clinic    []  Ice     []  heat  []  Ice massage  []  Laser   []  Anodyne Position:  Location:    []  Laser with stim  []  Other:  Position:  Location:   10 [x]  Vasopneumatic Device Pressure:       [x] lo [] med [] hi   Temperature: [x] lo [] med [] hi   [] Skin assessment post-treatment:  []intact []redness- no adverse reaction    []redness  adverse reaction:         17 min Therapeutic Exercise:  [] See flow sheet : including surgical precaution review Rationale: increase ROM and increase strength to improve the patients ability to perform daily tasks    8 min Manual Therapy:  PROM L shoulder to first resistance   Rationale: increase ROM and increase tissue extensibility to improve ease of ADLs            With   [] TE   [] TA   [] neuro   [] other: Patient Education: [x] Review HEP    [] Progressed/Changed HEP based on:   [] positioning   [] body mechanics   [] transfers   [] heat/ice application    [] other:      Other Objective/Functional Measures: portholes appear well healing with scabs, no signs of infection     Pain Level (0-10 scale) post treatment: 8    ASSESSMENT/Changes in Function: Pt reports compliance with surgical precautions at this time wearing her sling at all times unless performing HEP. Progress gradually per protocol    Patient will continue to benefit from skilled PT services to modify and progress therapeutic interventions, address functional mobility deficits, address ROM deficits, address strength deficits, analyze and address soft tissue restrictions, analyze and cue movement patterns and analyze and modify body mechanics/ergonomics to attain remaining goals. []  See Plan of Care  []  See progress note/recertification  []  See Discharge Summary         Progress towards goals / Updated goals:  Short Term Goals: To be accomplished in 2 weeks:                         4. The patient will be independent and compliant with HEP. Pt reports compliance 12/5/17                         2. The patient will verbalize precautions to PT to reduce re-injury post-operatively. Long Term Goals: To be accomplished in 4 weeks:                         1. The patient will improve FOTO score to 44 to maximize quality of life.                         2. The patient will improve passive scaption to 110 degrees to improve ease of ADLs.                        1.  The patient will improve ER in scaption plane at 45 degrees elevation to 45 degrees in order to improve ease of performing grooming.                         4. The patient will improve ABD to 90 degrees to improve ease of grooming.     PLAN  []  Upgrade activities as tolerated     []  Continue plan of care  []  Update interventions per flow sheet       []  Discharge due to:_  []  Other:_      Hipolito Loera, DPT, CMTPT 12/5/2017  2:55 PM    Future Appointments  Date Time Provider Colt Jovel   12/6/2017 3:30 PM HAMZAH [address]   12/7/2017 1:00 PM Wandy Carney [facility]   12/12/2017 12:30 PM [address] [facility]   12/14/2017 2:00 PM [address]04[address] [facility]   12/19/2017 12:30 PM [address] [facility]   12/21/2017 12:00 PM [address]15[address] [facility]   12/26/2017 1:00 PM Joni Vinte E [address] [facility]   12/28/2017 1:30 PM HBV- IE33 MACHINE (WT ) [facility]   12/29/2017 12:30 [facility]

## 2017-12-06 ENCOUNTER — OFFICE VISIT (OUTPATIENT)
Dept: ORTHOPEDIC SURGERY | Age: 47
End: 2017-12-06

## 2017-12-06 VITALS
HEIGHT: 66 IN | TEMPERATURE: 97.9 F | WEIGHT: 211 LBS | HEART RATE: 66 BPM | DIASTOLIC BLOOD PRESSURE: 73 MMHG | BODY MASS INDEX: 33.91 KG/M2 | SYSTOLIC BLOOD PRESSURE: 106 MMHG | OXYGEN SATURATION: 98 %

## 2017-12-06 DIAGNOSIS — M75.121 COMPLETE TEAR OF RIGHT ROTATOR CUFF: Primary | ICD-10-CM

## 2017-12-06 RX ORDER — OXYCODONE AND ACETAMINOPHEN 7.5; 325 MG/1; MG/1
1 TABLET ORAL
Qty: 40 TAB | Refills: 0 | Status: SHIPPED | OUTPATIENT
Start: 2017-12-06 | End: 2018-05-16

## 2017-12-06 NOTE — PROGRESS NOTES
Yuko Gabriel  1970     HISTORY OF PRESENT ILLNESS  Yuko Gabriel is a 50 y.o. female who presents today for evaluation s/p Right shoulder arthroscopic Superior Capsular Reconstruction on 11/29/17. Patient has been going to PT she started yesterday. Describes pain as a 9/10. Has been taking percocet for pain. Still has night pain. Patient denies any fever, chills, chest pain, shortness of breath or calf pain. There are no new illness or injuries to report since last seen in the office. PHYSICAL EXAM:   Visit Vitals    /73    Pulse 66    Temp 97.9 °F (36.6 °C) (Oral)    Ht 5' 6\" (1.676 m)    Wt 211 lb (95.7 kg)    SpO2 98%    BMI 34.06 kg/m2      The patient is a well-developed, well-nourished female in no acute distress. The patient is alert and oriented times three. The patient appears to be well groomed. Mood and affect are normal.  ORTHOPEDIC EXAM of right shoulder:  Inspection: swelling present,  Bruising present  Incision, clean, dry, intact, sutures in place  Passive glenohumeral abduction 0-30 degrees  Stability: Stable  Strength: n/a  2+ distal pulses    IMPRESSION:  S/P Right shoulder arthroscopic superior capsular reconstruction    PLAN:   Incisions cleaned. Surgery was discussed at length today. Patient to continue with PROM and PT. Continue wearing sling. Stressed to patient that nothing causes an increase in pain. Gave refill on pain medication percocet 7.5 mg Patient given pain medication for short term acute pain relief. Goal is to treat patient according to above plan and to ultimately have patient off all pain medications once appropriate. If chronic pain management is required beyond what is expected for current orthopedic problem, will refer patient to pain management.   was reviewed and will be reviewed with every medication refill request.     RTC 3 weeks    Daya Lutz [facility]

## 2017-12-07 ENCOUNTER — HOSPITAL ENCOUNTER (OUTPATIENT)
Dept: PHYSICAL THERAPY | Age: 47
Discharge: HOME | End: 2017-12-07
Payer: SUBSIDIZED

## 2017-12-07 PROCEDURE — 97110 THERAPEUTIC EXERCISES: CPT

## 2017-12-07 PROCEDURE — 97140 MANUAL THERAPY 1/> REGIONS: CPT

## 2017-12-07 NOTE — PROGRESS NOTES
PT DAILY TREATMENT NOTE 3-16    Patient Name: Kirti Baires  Date:2017  : 1970  [x]  Patient  Verified  Payor: [place] / Plan: 1500 / Product Type: Medicaid /    In time:12:07  Out time:12:38  Total Treatment Time (min): 31  Visit #: 3 of     Treatment Area: Right shoulder pain [M25.511]    SUBJECTIVE  Pain Level (0-10 scale): 6 with meds  Any medication changes, allergies to medications, adverse drug reactions, diagnosis change, or new procedure performed?: [x] No    [] Yes (see summary sheet for update)  Subjective functional status/changes:   [] No changes reported  \"It is not hurting as bad as last time. \"    OBJECTIVE  Modality rationale: decrease pain to improve the patients ability to ease soreness after therapy   Min Type Additional Details    [] Estim:  []Unatt       []IFC  []Premod                        []Other:  []w/ice   []w/heat  Position:  Location:    [] Estim: []Att    []TENS instruct  []NMES                    []Other:  []w/US   []w/ice   []w/heat  Position:  Location:    []  Traction: [] Cervical       []Lumbar                       [] Prone          []Supine                       []Intermittent   []Continuous Lbs:  [] before manual  [] after manual    []  Ultrasound: []Continuous   [] Pulsed                           []1MHz   []3MHz Location:  W/cm2:    []  Iontophoresis with dexamethasone         Location: [] Take home patch   [] In clinic    []  Ice     []  heat  []  Ice massage  []  Laser   []  Anodyne Position:  Location:    []  Laser with stim  []  Other: Position:  Location:   10 [x]  Vasopneumatic Device Pressure:       [x] lo [] med [] hi   Temperature: [x] lo [] med [] hi   [] Skin assessment post-treatment:  []intact []redness- no adverse reaction    []redness  adverse reaction:     13 min Therapeutic Exercise:  [x] See flow sheet :   Rationale: increase ROM, increase strength and improve coordination to improve the patients ability to progress per protocol    8 min Manual Therapy: gentle PROM (flex/scaption) in pain free ranges    Rationale: decrease pain, increase ROM and increase tissue extensibility to progress per protocol           With   [] TE   [] TA   [] neuro   [] other: Patient Education: [x] Review HEP    [] Progressed/Changed HEP based on:   [] positioning   [] body mechanics   [] transfers   [] heat/ice application    [] other:      Other Objective/Functional Measures: Moderate cueing to decrease muscle guarding with manual     Pain Level (0-10 scale) post treatment: 5    ASSESSMENT/Changes in Function:   Patient reports continued compliance with sling and is able to verbalize precautions to therapist today. Decreased pain report post session. Continue per protocol. Patient will continue to benefit from skilled PT services to modify and progress therapeutic interventions, address functional mobility deficits, address ROM deficits, address strength deficits, analyze and address soft tissue restrictions, analyze and cue movement patterns, analyze and modify body mechanics/ergonomics and assess and modify postural abnormalities to attain remaining goals. []  See Plan of Care  []  See progress note/recertification  []  See Discharge Summary         Progress towards goals / Updated goals:  Short Term Goals: To be accomplished in 2 weeks:                         5. The patient will be independent and compliant with HEP. Pt reports compliance 12/5/17                         2. The patient will verbalize precautions to PT to reduce re-injury post-operatively. Met (12/7/2017)  Long Term Goals: To be accomplished in 4 weeks:                         1. The patient will improve FOTO score to 44 to maximize quality of life.                         2. The patient will improve passive scaption to 110 degrees to improve ease of ADLs.                        3.  The patient will improve ER in scaption plane at 45 degrees elevation to 45 degrees in order to improve ease of performing grooming.                         4. The patient will improve ABD to 90 degrees to improve ease of grooming.     PLAN  []  Upgrade activities as tolerated     []  Continue plan of care  []  Update interventions per flow sheet       []  Discharge due to:_  []  Other:_      Gurinder Corcovado 12/7/2017  12:08 PM    Future Appointments  Date Time Provider Colt Jovel   12/7/2017 12:30 PM Gurinder Corcovado [facility]   12/12/2017 12:30 PM [id number] [facility]   12/14/2017 2:00 PM Sue Anderson PTA [facility]   12/19/2017 12:30 PM Rodney Zully [facility]   12/21/2017 12:00 PM [id number] [facility]   12/26/2017 1:00 PM Lyssa Vazquez [facility]   12/27/2017 3:30 PM HAMZAH [address] Lee [address]   12/28/2017 1:30 PM HBV- IE33 MACHINE (WT ) [facility]   12/29/2017 12:30 PM Rodney Zully [facility]

## 2017-12-12 ENCOUNTER — HOSPITAL ENCOUNTER (OUTPATIENT)
Dept: PHYSICAL THERAPY | Age: 47
Discharge: HOME | End: 2017-12-12
Payer: SUBSIDIZED

## 2017-12-12 PROCEDURE — 97140 MANUAL THERAPY 1/> REGIONS: CPT

## 2017-12-12 PROCEDURE — 97110 THERAPEUTIC EXERCISES: CPT

## 2017-12-12 PROCEDURE — 97016 VASOPNEUMATIC DEVICE THERAPY: CPT

## 2017-12-12 NOTE — PROGRESS NOTES
PT DAILY TREATMENT NOTE     Patient Name: Nyla Payton  Date:2017  : 1970  [x]  Patient  Verified  Payor: [place] / Plan: 1500 / Product Type: Medicaid /    In time:12:30  Out time:1:03  Total Treatment Time (min): 33  Visit #: 4 of     Treatment Area: Right shoulder pain [M25.511]    SUBJECTIVE  Pain Level (0-10 scale): 5  Any medication changes, allergies to medications, adverse drug reactions, diagnosis change, or new procedure performed?: [x] No    [] Yes (see summary sheet for update)  Subjective functional status/changes:   [] No changes reported  \"The weather is driving it crazy\"    OBJECTIVE    Modality rationale: decrease inflammation and decrease pain to improve the patients ability to perform daily tasks   Min Type Additional Details    [] Estim:  []Unatt       []IFC  []Premod                        []Other:  []w/ice   []w/heat  Position:  Location:    [] Estim: []Att    []TENS instruct  []NMES                    []Other:  []w/US   []w/ice   []w/heat  Position:  Location:    []  Traction: [] Cervical       []Lumbar                       [] Prone          []Supine                       []Intermittent   []Continuous Lbs:  [] before manual  [] after manual    []  Ultrasound: []Continuous   [] Pulsed                           []1MHz   []3MHz W/cm2:  Location:    []  Iontophoresis with dexamethasone         Location: [] Take home patch   [] In clinic    []  Ice     []  heat  []  Ice massage  []  Laser   []  Anodyne Position:  Location:    []  Laser with stim  []  Other:  Position:  Location:   10 [x]  Vasopneumatic Device Pressure:       [x] lo [] med [] hi   Temperature: [x] lo [] med [] hi   [] Skin assessment post-treatment:  []intact []redness- no adverse reaction    []redness  adverse reaction:         15 min Therapeutic Exercise:  [] See flow sheet :   Rationale: increase ROM and increase strength to improve the patients ability to perform daily tasks    8 min Manual Therapy:  PROM L shoulder to first resistance point   Rationale: increase ROM and increase tissue extensibility to improve ease of ADLs          With   [] TE   [] TA   [] neuro   [] other: Patient Education: [x] Review HEP    [] Progressed/Changed HEP based on:   [] positioning   [] body mechanics   [] transfers   [] heat/ice application    [] other:      Other Objective/Functional Measures: palpable cavitation felt during descent from scaption plane PROM, pt with visible discomfort no lingering pain    Pt performed pendulums today with technician today, unable to assess form/mechanics     Pain Level (0-10 scale) post treatment: 7    ASSESSMENT/Changes in Function: Pt inquires about new HEP interventions, advised her to maintain with current program for now. Restriction noted around 75-80 deg scaption plane elevation passively and ~60 deg abduction. Continue with current POC    Patient will continue to benefit from skilled PT services to modify and progress therapeutic interventions, address functional mobility deficits, address ROM deficits, address strength deficits, analyze and address soft tissue restrictions, analyze and cue movement patterns and analyze and modify body mechanics/ergonomics to attain remaining goals. []  See Plan of Care  []  See progress note/recertification  []  See Discharge Summary         Progress towards goals / Updated goals:  Short Term Goals: To be accomplished in 2 weeks:                         2. The patient will be independent and compliant with HEP. Pt reports compliance 12/5/17                         2. The patient will verbalize precautions to PT to reduce re-injury post-operatively. Met (12/7/2017)  Long Term Goals: To be accomplished in 4 weeks:                         1. The patient will improve FOTO score to 44 to maximize quality of life.                         2.  The patient will improve passive scaption to 110 degrees to improve ease of ADLs. Not met <90 deg 12/12/17                         3. The patient will improve ER in scaption plane at 45 degrees elevation to 45 degrees in order to improve ease of performing grooming.                         4. The patient will improve ABD to 90 degrees to improve ease of grooming.     PLAN  []  Upgrade activities as tolerated     []  Continue plan of care  []  Update interventions per flow sheet       []  Discharge due to:_  []  Other:_      Rah Zhu, DPT, CMTPT 12/12/2017  12:41 PM    Future Appointments  Date Time Provider Colt Jovel   12/14/2017 2:00 PM Oletha Gowers, PTA [facility]   12/19/2017 12:30 PM Rah Zhu [facility]   12/21/2017 12:00 PM [id number] [facility]   12/26/2017 1:00 PM Joni Lucrecia E [address] [facility]   12/27/2017 3:30 PM HAMZAH Cardoso [address]   12/28/2017 1:30 PM [facility]- IE33 MACHINE (WT ) [facility]   12/29/2017 12:30 PM Rah Zhu [facility]

## 2017-12-15 ENCOUNTER — HOSPITAL ENCOUNTER (OUTPATIENT)
Dept: PHYSICAL THERAPY | Age: 47
Discharge: HOME | End: 2017-12-15
Payer: SUBSIDIZED

## 2017-12-15 PROCEDURE — 97140 MANUAL THERAPY 1/> REGIONS: CPT

## 2017-12-15 PROCEDURE — 97110 THERAPEUTIC EXERCISES: CPT

## 2017-12-15 NOTE — PROGRESS NOTES
PT DAILY TREATMENT NOTE 3-16    Patient Name: Olu Modi  Date:12/15/2017  : 1970  [x]  Patient  Verified  Payor: [place] / Plan: 1500 / Product Type: Medicaid /    In time:2:06  Out time:2:34  Total Treatment Time (min): 28  Visit #: 5 of     Treatment Area: Right shoulder pain [M25.511]    SUBJECTIVE  Pain Level (0-10 scale): 6  Any medication changes, allergies to medications, adverse drug reactions, diagnosis change, or new procedure performed?: [x] No    [] Yes (see summary sheet for update)  Subjective functional status/changes:   [] No changes reported  \"My shoulder 'pops' sometimes. \"    OBJECTIVE  Modality rationale: decrease pain to improve the patients ability to ease soreness after therapy   Min Type Additional Details    [] Estim:  []Unatt       []IFC  []Premod                        []Other:  []w/ice   []w/heat  Position:  Location:    [] Estim: []Att    []TENS instruct  []NMES                    []Other:  []w/US   []w/ice   []w/heat  Position:  Location:    []  Traction: [] Cervical       []Lumbar                       [] Prone          []Supine                       []Intermittent   []Continuous Lbs:  [] before manual  [] after manual    []  Ultrasound: []Continuous   [] Pulsed                           []1MHz   []3MHz Location:  W/cm2:    []  Iontophoresis with dexamethasone         Location: [] Take home patch   [] In clinic    []  Ice     []  heat  []  Ice massage  []  Laser   []  Anodyne Position:  Location:    []  Laser with stim  []  Other: Position:  Location:   10 [x]  Vasopneumatic Device Pressure:       [x] lo [] med [] hi   Temperature: [x] lo [] med [] hi   [] Skin assessment post-treatment:  []intact []redness- no adverse reaction    []redness  adverse reaction:     10 min Therapeutic Exercise:  [x] See flow sheet :   Rationale: increase ROM, increase strength and improve coordination to improve the patients ability to progress per protocol    8 min Manual Therapy:  PROM right shoulder to first resistance   Rationale: decrease pain, increase ROM and increase tissue extensibility to progress per protocol           With   [] TE   [] TA   [] neuro   [] other: Patient Education: [x] Review HEP    [] Progressed/Changed HEP based on:   [] positioning   [] body mechanics   [] transfers   [] heat/ice application    [] other:      Other Objective/Functional Measures:    Maximal cues to perform pendulums passively    FOTO score: 25    Pain Level (0-10 scale) post treatment: 8    ASSESSMENT/Changes in Function:   No new change in ROM. FOTO score increase. Patient with complaints that her shoulder 'pops' sometimes, however, no audible/palpable cavitation noted with manual/therex today. Continue per POC. Patient will continue to benefit from skilled PT services to modify and progress therapeutic interventions, address functional mobility deficits, address ROM deficits, address strength deficits, analyze and address soft tissue restrictions, analyze and cue movement patterns, analyze and modify body mechanics/ergonomics and assess and modify postural abnormalities to attain remaining goals. []  See Plan of Care  []  See progress note/recertification  []  See Discharge Summary         Progress towards goals / Updated goals:  Short Term Goals: To be accomplished in 2 weeks:                         9. The patient will be independent and compliant with HEP. Pt reports compliance 12/5/17                         2. The patient will verbalize precautions to PT to reduce re-injury post-operatively. Met (12/7/2017)  Long Term Goals: To be accomplished in 4 weeks:                         1. The patient will improve FOTO score to 44 to maximize quality of life.-progressing, score to 25 (12/15/2017)                         2. The patient will improve passive scaption to 110 degrees to improve ease of ADLs. Not met <90 deg 12/12/17                         3.  The patient will improve ER in scaption plane at 45 degrees elevation to 45 degrees in order to improve ease of performing grooming.                         4. The patient will improve ABD to 90 degrees to improve ease of grooming.     PLAN  []  Upgrade activities as tolerated     [x]  Continue plan of care  []  Update interventions per flow sheet       []  Discharge due to:_  []  Other:_      Yvette Doing 12/15/2017  2:09 PM    Future Appointments  Date Time Provider Colt Jovel   12/19/2017 12:30 PM [id number] [facility]   12/21/2017 12:00 PM [id number] [facility]   12/26/2017 1:00 PM Joni Vinte E [address] [facility]   12/27/2017 3:30 PM HAMZAH Levi Lee [address]   12/28/2017 1:30 PM [facility]- IE33 MACHINE (WT ) [facility]   12/29/2017 12:30 PM Morgan Stallings [facility]

## 2017-12-19 ENCOUNTER — HOSPITAL ENCOUNTER (OUTPATIENT)
Dept: PHYSICAL THERAPY | Age: 47
Discharge: HOME | End: 2017-12-19
Payer: SUBSIDIZED

## 2017-12-19 PROCEDURE — 97016 VASOPNEUMATIC DEVICE THERAPY: CPT

## 2017-12-19 PROCEDURE — 97140 MANUAL THERAPY 1/> REGIONS: CPT

## 2017-12-19 PROCEDURE — 97110 THERAPEUTIC EXERCISES: CPT

## 2017-12-19 NOTE — PROGRESS NOTES
PT DAILY TREATMENT NOTE     Patient Name: Bernard Owens  Date:2017  : 1970  [x]  Patient  Verified  Payor: [place] / Plan: 1500 / Product Type: Medicaid /    In time:12:30  Out time:1:07  Total Treatment Time (min): 37  Visit #: 6 of     Treatment Area: Right shoulder pain [M25.511]    SUBJECTIVE  Pain Level (0-10 scale): 5  Any medication changes, allergies to medications, adverse drug reactions, diagnosis change, or new procedure performed?: [x] No    [] Yes (see summary sheet for update)  Subjective functional status/changes:   [] No changes reported  \"I'm stiff today\"    OBJECTIVE    Modality rationale: decrease inflammation and decrease pain to improve the patients ability to perform ADLs   Min Type Additional Details    [] Estim:  []Unatt       []IFC  []Premod                        []Other:  []w/ice   []w/heat  Position:  Location:    [] Estim: []Att    []TENS instruct  []NMES                    []Other:  []w/US   []w/ice   []w/heat  Position:  Location:    []  Traction: [] Cervical       []Lumbar                       [] Prone          []Supine                       []Intermittent   []Continuous Lbs:  [] before manual  [] after manual    []  Ultrasound: []Continuous   [] Pulsed                           []1MHz   []3MHz W/cm2:  Location:    []  Iontophoresis with dexamethasone         Location: [] Take home patch   [] In clinic    []  Ice     []  heat  []  Ice massage  []  Laser   []  Anodyne Position:  Location:    []  Laser with stim  []  Other:  Position:  Location:   10 [x]  Vasopneumatic Device Pressure:       [x] lo [] med [] hi   Temperature: [x] lo [] med [] hi   [] Skin assessment post-treatment:  []intact []redness- no adverse reaction    []redness  adverse reaction:     19 min Therapeutic Exercise:  [] See flow sheet :   Rationale: increase ROM and increase strength to improve the patients ability to perform daily tasks and ADLs    8 min Manual Therapy:  PROM R shoulder and elbow   Rationale: increase ROM and increase tissue extensibility to improve ease of ADLs          With   [] TE   [] TA   [] neuro   [] other: Patient Education: [x] Review HEP    [] Progressed/Changed HEP based on:   [] positioning   [] body mechanics   [] transfers   [] heat/ice application    [] other:      Other Objective/Functional Measures: pt reports increased \"popping\" in her R shoulder during the day, some pain associated     Pain Level (0-10 scale) post treatment: 6    ASSESSMENT/Changes in Function: Pt with gradual progression of shoulder mobility into flexion, some limitations into abduction with 1 audible pop noted. Pt continues to report good compliance with surgical precautions, continue per POC    Patient will continue to benefit from skilled PT services to modify and progress therapeutic interventions, address functional mobility deficits, address ROM deficits, address strength deficits, analyze and address soft tissue restrictions, analyze and cue movement patterns and analyze and modify body mechanics/ergonomics to attain remaining goals. []  See Plan of Care  []  See progress note/recertification  []  See Discharge Summary               Progress towards goals / Updated goals:  Short Term Goals: To be accomplished in 2 weeks:                         2. The patient will be independent and compliant with HEP. Pt reports compliance 12/5/17                         2. The patient will verbalize precautions to PT to reduce re-injury post-operatively. Met (12/7/2017)  Long Term Goals: To be accomplished in 4 weeks:                         1. The patient will improve FOTO score to 44 to maximize quality of life.-progressing, score to 25 (12/15/2017)                         2. The patient will improve passive scaption to 110 degrees to improve ease of ADLs. Not met <90 deg 12/12/17                         3.  The patient will improve ER in scaption plane at 45 degrees elevation to 45 degrees in order to improve ease of performing grooming. Near met per gross visual assessment 12/19/17                         4. The patient will improve ABD to 90 degrees to improve ease of grooming.     PLAN  []  Upgrade activities as tolerated     []  Continue plan of care  []  Update interventions per flow sheet       []  Discharge due to:_  []  Other:_      Rah Zhu, DPT, CMTPT 12/19/2017  12:41 PM    Future Appointments  Date Time Provider Colt Jovel   12/21/2017 12:00 PM [id number] [facility]   12/26/2017 1:00 PM Joni Lucrecia E [address] [facility]   12/27/2017 3:30 PM HAMZAH Cardoso [address]   12/28/2017 1:30 PM [facility]- IE33 MACHINE (WT ) [facility]   12/29/2017 12:30 PM Rah Zhu [facility]

## 2017-12-21 ENCOUNTER — HOSPITAL ENCOUNTER (OUTPATIENT)
Dept: PHYSICAL THERAPY | Age: 47
Discharge: HOME | End: 2017-12-21
Payer: SUBSIDIZED

## 2017-12-21 PROCEDURE — 97016 VASOPNEUMATIC DEVICE THERAPY: CPT

## 2017-12-21 PROCEDURE — 97110 THERAPEUTIC EXERCISES: CPT

## 2017-12-21 PROCEDURE — 97140 MANUAL THERAPY 1/> REGIONS: CPT

## 2017-12-21 NOTE — PROGRESS NOTES
PT DAILY TREATMENT NOTE     Patient Name: Devante Partida  Date:2017  : 1970  [x]  Patient  Verified  Payor: [place] / Plan: 1500 / Product Type: Medicaid /    In time:12:02  Out time:12:34  Total Treatment Time (min): 32  Visit #: 7 of     Treatment Area: Right shoulder pain [M25.511]    SUBJECTIVE  Pain Level (0-10 scale): 5  Any medication changes, allergies to medications, adverse drug reactions, diagnosis change, or new procedure performed?: [x] No    [] Yes (see summary sheet for update)  Subjective functional status/changes:   [] No changes reported  \"Doing alright\"    OBJECTIVE    Modality rationale: decrease inflammation and decrease pain to improve the patients ability to perform daily tasks   Min Type Additional Details    [] Estim:  []Unatt       []IFC  []Premod                        []Other:  []w/ice   []w/heat  Position:  Location:    [] Estim: []Att    []TENS instruct  []NMES                    []Other:  []w/US   []w/ice   []w/heat  Position:  Location:    []  Traction: [] Cervical       []Lumbar                       [] Prone          []Supine                       []Intermittent   []Continuous Lbs:  [] before manual  [] after manual    []  Ultrasound: []Continuous   [] Pulsed                           []1MHz   []3MHz W/cm2:  Location:    []  Iontophoresis with dexamethasone         Location: [] Take home patch   [] In clinic    []  Ice     []  heat  []  Ice massage  []  Laser   []  Anodyne Position:  Location:    []  Laser with stim  []  Other:  Position:  Location:   10 [x]  Vasopneumatic Device Pressure:       [x] lo [] med [] hi   Temperature: [x] lo [] med [] hi   [] Skin assessment post-treatment:  []intact []redness- no adverse reaction    []redness  adverse reaction:       14 min Therapeutic Exercise:  [] See flow sheet :   Rationale: increase ROM and increase strength to improve the patients ability to perform daily tasks    8 min Manual Therapy:  PROM R shoulder, gentle capsule stretching in scaption plane   Rationale: increase ROM and increase tissue extensibility to improve ease of ADLs          With   [] TE   [] TA   [] neuro   [] other: Patient Education: [x] Review HEP    [] Progressed/Changed HEP based on:   [] positioning   [] body mechanics   [] transfers   [] heat/ice application    [] other:      Other Objective/Functional Measures: continued popping reported by patient, noted once today during manual therapy lowering UE     Pain Level (0-10 scale) post treatment: 6    ASSESSMENT/Changes in Function: Pt shows progression of ROM at this time. She reports continued frequent popping in the shoulder with one instance noted today. Continue cautious ROM of shoulder     Patient will continue to benefit from skilled PT services to modify and progress therapeutic interventions, address functional mobility deficits, address ROM deficits, address strength deficits, analyze and address soft tissue restrictions, analyze and cue movement patterns and analyze and modify body mechanics/ergonomics to attain remaining goals. []  See Plan of Care  []  See progress note/recertification  []  See Discharge Summary         Progress towards goals / Updated goals:  Short Term Goals: To be accomplished in 2 weeks:                         4. The patient will be independent and compliant with HEP. Pt reports compliance 12/5/17                         2. The patient will verbalize precautions to PT to reduce re-injury post-operatively. Met (12/7/2017)  Long Term Goals: To be accomplished in 4 weeks:                         1. The patient will improve FOTO score to 44 to maximize quality of life.-progressing, score to 25 (12/15/2017)                         2. The patient will improve passive scaption to 110 degrees to improve ease of ADLs. Not met <90 deg 12/12/17                         3.  The patient will improve ER in scaption plane at 45 degrees elevation to 45 degrees in order to improve ease of performing grooming. Near met per gross visual assessment 12/19/17                         4. The patient will improve ABD to 90 degrees to improve ease of grooming.  Progressing ~60 deg visual assessment 12/21/17    PLAN  []  Upgrade activities as tolerated     [x]  Continue plan of care  []  Update interventions per flow sheet       []  Discharge due to:_  []  Other:_      Marques Marquez, DPT, CMTPT 12/21/2017  12:12 PM    Future Appointments  Date Time Provider Colt Jovel   12/26/2017 1:00 PM Joni Vinte [address] [facility]   12/27/2017 3:30 PM Rescelinae HAMZAH Patrick [address]   12/28/2017 1:30 PM [facility]- IE33 MACHINE (WT ) [facility]   12/29/2017 12:30 PM Marques Marquez [facility]

## 2017-12-26 ENCOUNTER — HOSPITAL ENCOUNTER (OUTPATIENT)
Dept: PHYSICAL THERAPY | Age: 47
Discharge: HOME | End: 2017-12-26
Payer: SUBSIDIZED

## 2017-12-26 PROCEDURE — 97140 MANUAL THERAPY 1/> REGIONS: CPT

## 2017-12-26 PROCEDURE — 97110 THERAPEUTIC EXERCISES: CPT

## 2017-12-26 RX ORDER — OXYCODONE AND ACETAMINOPHEN 7.5; 325 MG/1; MG/1
1 TABLET ORAL
Qty: 40 TAB | Refills: 0 | OUTPATIENT
Start: 2017-12-26

## 2017-12-26 NOTE — TELEPHONE ENCOUNTER
Last Visit: 12/06/2017 with HAMZAH Cancino   Date of Surgery: 11/29/2017 Right shoulder arthroscopic Superior Capsular Reconstruction   Next Appointment: 12/27/2017 with HAMZAH Cancino   Previous Refill Encounters: 12/06/2017 per HAMZAH Cancino #[id number]     Requested Prescriptions     Pending Prescriptions Disp Refills    oxyCODONE-acetaminophen (PERCOCET) 7.5-325 mg per tablet 40 Tab 0     Sig: Take 1 Tab by mouth every six (6) hours as needed for Pain. Max Daily Amount: 4 Tabs.

## 2017-12-26 NOTE — TELEPHONE ENCOUNTER
Pain meds will be discussed at upcoming office visit. See refusal reason below. Refused by: HAMZAH Barlow  Refusal reason: other (i have an appt with the pt tomorrow.  will talk about it then)

## 2017-12-26 NOTE — PROGRESS NOTES
PT DAILY TREATMENT NOTE 3-16    Patient Name: Merary Lewis  Date:2017  : 1970  [x]  Patient  Verified  Payor: [place] / Plan: 1500 / Product Type: Medicaid /    In time:1:01  Out time:1:34  Total Treatment Time (min): 33  Visit #: 8 of     Treatment Area: Right shoulder pain [M25.511]    SUBJECTIVE  Pain Level (0-10 scale): 7  Any medication changes, allergies to medications, adverse drug reactions, diagnosis change, or new procedure performed?: [x] No    [] Yes (see summary sheet for update)  Subjective functional status/changes:   [] No changes reported  \"This weather is killing me. The pain goes down my arm now. \" Patient continues to report popping. Patient with ortho follow up tomorrow.      OBJECTIVE  Modality rationale: decrease pain to improve the patients ability to ease soreness after therapy   Min Type Additional Details    [] Estim:  []Unatt       []IFC  []Premod                        []Other:  []w/ice   []w/heat  Position:  Location:    [] Estim: []Att    []TENS instruct  []NMES                    []Other:  []w/US   []w/ice   []w/heat  Position:  Location:    []  Traction: [] Cervical       []Lumbar                       [] Prone          []Supine                       []Intermittent   []Continuous Lbs:  [] before manual  [] after manual    []  Ultrasound: []Continuous   [] Pulsed                           []1MHz   []3MHz Location:  W/cm2:    []  Iontophoresis with dexamethasone         Location: [] Take home patch   [] In clinic    []  Ice     []  heat  []  Ice massage  []  Laser   []  Anodyne Position:  Location:    []  Laser with stim  []  Other: Position:  Location:   10 [x]  Vasopneumatic Device Pressure:       [x] lo [] med [] hi   Temperature: [x] lo [] med [] hi   [] Skin assessment post-treatment:  []intact []redness- no adverse reaction    []redness  adverse reaction:     15 min Therapeutic Exercise:  [x] See flow sheet :   Rationale: increase ROM, increase strength and improve coordination to improve the patients ability to progress per protocol    8 min Manual Therapy:  Gentle PROM flexion/scatpion   Rationale: decrease pain, increase ROM and increase tissue extensibility to progress per protocol           With   [] TE   [] TA   [] neuro   [] other: Patient Education: [x] Review HEP    [] Progressed/Changed HEP based on:   [] positioning   [] body mechanics   [] transfers   [] heat/ice application    [] other:      Other Objective/Functional Measures:   Continues to require cueing to decrease muscle guarding     Pain Level (0-10 scale) post treatment: 7    ASSESSMENT/Changes in Function:   Patient continues to report popping though no popping noted during gentle manual. Patient outright informing therapist that she has \"accidentally\" been using her arm at home. States, \"I use it to help put on my pants, shirt, and coat. \" Patient with ortho follow up tomorrow. Increased pain today due to reports of \"weather\". Patient will continue to benefit from skilled PT services to modify and progress therapeutic interventions, address functional mobility deficits, address ROM deficits, address strength deficits, analyze and address soft tissue restrictions, analyze and cue movement patterns, analyze and modify body mechanics/ergonomics and assess and modify postural abnormalities to attain remaining goals. []  See Plan of Care  []  See progress note/recertification  []  See Discharge Summary         Progress towards goals / Updated goals:  Short Term Goals: To be accomplished in 2 weeks:                         5. The patient will be independent and compliant with HEP. Pt reports compliance 12/5/17                         2. The patient will verbalize precautions to PT to reduce re-injury post-operatively. Met (12/7/2017)  Long Term Goals: To be accomplished in 4 weeks:                         1.  The patient will improve FOTO score to 44 to maximize quality of life.-progressing, score to 25 (12/15/2017)                         2. The patient will improve passive scaption to 110 degrees to improve ease of ADLs. Not met <90 deg 12/12/17                         3. The patient will improve ER in scaption plane at 45 degrees elevation to 45 degrees in order to improve ease of performing grooming. Near met per gross visual assessment 12/19/17                         4. The patient will improve ABD to 90 degrees to improve ease of grooming.  Progressing ~60 deg visual assessment 12/21/17    PLAN  []  Upgrade activities as tolerated     [x]  Continue plan of care  []  Update interventions per flow sheet       []  Discharge due to:_  []  Other:_      Alli Betts 12/26/2017  12:57 PM    Future Appointments  Date Time Provider Colt Jovel   12/26/2017 1:00 PM Joni Lucrecia E [id number]   12/27/2017 3:30 PM HAMZAH Zamora Lee [facility]   12/28/2017 1:30 PM [facility]- [facility] MACHINE (WT ) [facility]   12/29/2017 12:30 PM Sarah Theodore [facility]

## 2017-12-27 ENCOUNTER — OFFICE VISIT (OUTPATIENT)
Dept: ORTHOPEDIC SURGERY | Age: 47
End: 2017-12-27

## 2017-12-27 VITALS
HEIGHT: 66 IN | OXYGEN SATURATION: 100 % | HEART RATE: 67 BPM | SYSTOLIC BLOOD PRESSURE: 103 MMHG | BODY MASS INDEX: 33.91 KG/M2 | WEIGHT: 211 LBS | DIASTOLIC BLOOD PRESSURE: 57 MMHG | TEMPERATURE: 95.9 F

## 2017-12-27 DIAGNOSIS — M75.121 COMPLETE TEAR OF RIGHT ROTATOR CUFF: Primary | ICD-10-CM

## 2017-12-27 RX ORDER — OXYCODONE AND ACETAMINOPHEN 5; 325 MG/1; MG/1
1 TABLET ORAL
Qty: 40 TAB | Refills: 0 | Status: SHIPPED | OUTPATIENT
Start: 2017-12-27 | End: 2018-05-16

## 2017-12-27 NOTE — PROGRESS NOTES
Yuko Gabriel  1970     HISTORY OF PRESENT ILLNESS  Yuko Gabriel is a 50 y.o. female who presents today for evaluation s/p Right shoulder arthroscopic Superior Capsular Reconstruction on 11/29/17. Patient has been going to PT and feels it is helping her ROM. She is worried about still having so much pain. She has a popping sensation with certain movements. She reports PT also mentioning the popping. Describes pain as a 7/10. Has been taking percocet for pain. Still has night pain. Patient denies any fever, chills, chest pain, shortness of breath or calf pain. There are no new illness or injuries to report since last seen in the office. PHYSICAL EXAM:   Visit Vitals    /57    Pulse 67    Temp 95.9 °F (35.5 °C) (Oral)    Ht 5' 6\" (1.676 m)    Wt 211 lb (95.7 kg)    SpO2 100%    BMI 34.06 kg/m2      The patient is a well-developed, well-nourished female in no acute distress. The patient is alert and oriented times three. The patient appears to be well groomed. Mood and affect are normal.  ORTHOPEDIC EXAM of right shoulder:  Inspection: swelling not present,  Bruising not present  Incision well healed  Passive glenohumeral abduction 0-80 degrees  Stability: Stable  Strength: n/a  2+ distal pulses    IMPRESSION:  S/P Right shoulder arthroscopic superior capsular reconstruction    PLAN:   Pt still having a lot of pain post operatively. Talked about pain and how the surgery was big, that still having some pain was to be expected and should get better as we go along in the recovery. Patient to continue with PT may advance to [facility] in 1 week, AROM in 2 weeks, D/C in 2 weeks  Stressed to patient that nothing causes an increase in pain. Gave refill on pain medication percocet 5 mg Patient given pain medication for short term acute pain relief. Goal is to treat patient according to above plan and to ultimately have patient off all pain medications once appropriate.  If chronic pain management is required beyond what is expected for current orthopedic problem, will refer patient to pain management.   was reviewed and will be reviewed with every medication refill request.     Patient seen and evaluated by Dr. Venita Wilson today who agrees with treatment plan    RTC 4 weeks    Daya Zamora [facility]

## 2017-12-29 ENCOUNTER — HOSPITAL ENCOUNTER (OUTPATIENT)
Dept: PHYSICAL THERAPY | Age: 47
Discharge: HOME | End: 2017-12-29
Payer: SUBSIDIZED

## 2017-12-29 PROCEDURE — 97110 THERAPEUTIC EXERCISES: CPT

## 2017-12-29 PROCEDURE — 97140 MANUAL THERAPY 1/> REGIONS: CPT

## 2017-12-29 NOTE — PROGRESS NOTES
PT DAILY TREATMENT NOTE     Patient Name: Hai Gain  Date:2017  : 1970  [x]  Patient  Verified  Payor: [place] / Plan: 1500 / Product Type: Medicaid /    In time:12:32  Out time:1:06  Total Treatment Time (min): 34  Visit #: 9 of     Treatment Area: Right shoulder pain [M25.511]    SUBJECTIVE  Pain Level (0-10 scale): 6  Any medication changes, allergies to medications, adverse drug reactions, diagnosis change, or new procedure performed?: [x] No    [] Yes (see summary sheet for update)  Subjective functional status/changes:   [] No changes reported  \"Its sore.  Dr. Jay Hernandez scared me yesterday, he said its a wing and prayer that my body may reject the cadaver\"    OBJECTIVE    Modality rationale: decrease pain to improve the patients ability to perform ADLs   Min Type Additional Details    [] Estim:  []Unatt       []IFC  []Premod                        []Other:  []w/ice   []w/heat  Position:  Location:    [] Estim: []Att    []TENS instruct  []NMES                    []Other:  []w/US   []w/ice   []w/heat  Position:  Location:    []  Traction: [] Cervical       []Lumbar                       [] Prone          []Supine                       []Intermittent   []Continuous Lbs:  [] before manual  [] after manual    []  Ultrasound: []Continuous   [] Pulsed                           []1MHz   []3MHz W/cm2:  Location:    []  Iontophoresis with dexamethasone         Location: [] Take home patch   [] In clinic   10 [x]  Ice     []  heat  []  Ice massage  []  Laser   []  Anodyne Position:L s/l  Location: R shoulder    []  Laser with stim  []  Other:  Position:  Location:    []  Vasopneumatic Device Pressure:       [] lo [] med [] hi   Temperature: [] lo [] med [] hi   [] Skin assessment post-treatment:  []intact []redness- no adverse reaction    []redness  adverse reaction:         16 min Therapeutic Exercise:  [] See flow sheet :   Rationale: increase ROM and increase strength to improve the patients ability to perform ADLs    8 min Manual Therapy:  PROM R shoulder, gentle STJ mobs   Rationale: increase ROM and increase tissue extensibility to improve ease of ADLs          With   [] TE   [] TA   [] neuro   [] other: Patient Education: [x] Review HEP    [] Progressed/Changed HEP based on:   [] positioning   [] body mechanics   [] transfers   [] heat/ice application    [] other:      Other Objective/Functional Measures: 65 deg R shoulder abduction PROM  40 deg ER @ 45 deg ABD PROM     Pain Level (0-10 scale) post treatment: 8    ASSESSMENT/Changes in Function: Pt just shy of LTG at this point, stopping at first resistance point with PROM. She presents with updated script to initiate AAROM in 1 week and AROM in 2 weeks. Patient will continue to benefit from skilled PT services to modify and progress therapeutic interventions, address functional mobility deficits, address ROM deficits, address strength deficits, analyze and address soft tissue restrictions, analyze and cue movement patterns and analyze and modify body mechanics/ergonomics to attain remaining goals. []  See Plan of Care  []  See progress note/recertification  []  See Discharge Summary         Progress towards goals / Updated goals:  Short Term Goals: To be accomplished in 2 weeks:                         6. The patient will be independent and compliant with HEP. Pt reports compliance 12/5/17                         2. The patient will verbalize precautions to PT to reduce re-injury post-operatively. Met (12/7/2017)  Long Term Goals: To be accomplished in 4 weeks:                         1. The patient will improve FOTO score to 44 to maximize quality of life.-progressing, score to 25 (12/15/2017)                         2. The patient will improve passive scaption to 110 degrees to improve ease of ADLs. Not met <90 deg 12/12/17; ~85 deg PROM visual assessment 12/29/17                         3.  The patient will improve ER in scaption plane at 45 degrees elevation to 45 degrees in order to improve ease of performing grooming. Near met per gross visual assessment 12/19/17; 40 deg 12/29/17                         4.  The patient will improve ABD to 90 degrees to improve ease of grooming. Progressing ~60 deg visual assessment 12/21/17 65 deg 12/29/17    PLAN  [x]  Upgrade activities as tolerated     [x]  Continue plan of care  []  Update interventions per flow sheet       []  Discharge due to:_   []  Other:_      Martin Tomlin DPT, CMTPT 12/29/2017  12:44 PM    Future Appointments  Date Time Provider Colt Jovel   1/24/2018 1:30 PM HAMZAH Lorenzana [address]   1/24/2018 3:30 PM [facility]- [facility] MACHINE (WT ) [facility]

## 2018-01-03 ENCOUNTER — HOSPITAL ENCOUNTER (OUTPATIENT)
Dept: PHYSICAL THERAPY | Age: 48
Discharge: HOME | End: 2018-01-03
Payer: SUBSIDIZED

## 2018-01-03 PROCEDURE — 97140 MANUAL THERAPY 1/> REGIONS: CPT

## 2018-01-03 PROCEDURE — 97110 THERAPEUTIC EXERCISES: CPT

## 2018-01-03 PROCEDURE — 97016 VASOPNEUMATIC DEVICE THERAPY: CPT

## 2018-01-03 NOTE — PROGRESS NOTES
PT DAILY TREATMENT NOTE     Patient Name: Pina Bradshaw  Date:1/3/2018  : 1970  [x]  Patient  Verified  Payor: [place] / Plan: 1500 / Product Type: Medicaid /    In time:10:30  Out time:11:02  Total Treatment Time (min): 32  Visit #: 1 of 8    Treatment Area: Right shoulder pain [M25.511]    SUBJECTIVE  Pain Level (0-10 scale): 5  Any medication changes, allergies to medications, adverse drug reactions, diagnosis change, or new procedure performed?: [x] No    [] Yes (see summary sheet for update)  Subjective functional status/changes:   [] No changes reported  \"Its sore\"    OBJECTIVE    Modality rationale: decrease inflammation and decrease pain to improve the patients ability to perform daily tasks   Min Type Additional Details    [] Estim:  []Unatt       []IFC  []Premod                        []Other:  []w/ice   []w/heat  Position:  Location:    [] Estim: []Att    []TENS instruct  []NMES                    []Other:  []w/US   []w/ice   []w/heat  Position:  Location:    []  Traction: [] Cervical       []Lumbar                       [] Prone          []Supine                       []Intermittent   []Continuous Lbs:  [] before manual  [] after manual    []  Ultrasound: []Continuous   [] Pulsed                           []1MHz   []3MHz W/cm2:  Location:    []  Iontophoresis with dexamethasone         Location: [] Take home patch   [] In clinic    []  Ice     []  heat  []  Ice massage  []  Laser   []  Anodyne Position:  Location:    []  Laser with stim  []  Other:  Position:  Location:   10 [x]  Vasopneumatic Device Pressure:       [x] lo [] med [] hi   Temperature: [x] lo [] med [] hi   [] Skin assessment post-treatment:  []intact []redness- no adverse reaction    []redness  adverse reaction:     14 min Therapeutic Exercise:  [] See flow sheet :   Rationale: increase ROM and increase strength to improve the patients ability to perform functional tasks    8 min Manual Therapy:  PROM L shoulder   Rationale: increase ROM and increase tissue extensibility to improve ease of ADLs            With   [] TE   [] TA   [] neuro   [] other: Patient Education: [x] Review HEP    [] Progressed/Changed HEP based on:   [] positioning   [] body mechanics   [] transfers   [] heat/ice application    [] other:      Other Objective/Functional Measures: 90 deg L shoulder abduction PROM     Pain Level (0-10 scale) post treatment: 6    ASSESSMENT/Changes in Function: Pt progressing slowly but well at this time. Her pain is moderate levels at this time but not limiting. Will initiate some AAROM interventions at next visit. Patient will continue to benefit from skilled PT services to modify and progress therapeutic interventions, address functional mobility deficits, address ROM deficits, address strength deficits, analyze and address soft tissue restrictions, analyze and cue movement patterns and analyze and modify body mechanics/ergonomics to attain remaining goals. []  See Plan of Care  []  See progress note/recertification  []  See Discharge Summary         Progress towards goals / Updated goals:  Updated Goals: to be achieved in 4 weeks:  1. The patient will improve FOTO score to 44 to maximize quality of life.   2. The patient will improve passive scaption to 110 degrees to improve ease of ADLs. 3. The patient will improve ABD to 90 degrees to improve ease of grooming  4. Pt will demonstrate AAROM elevation to 120 deg for progression of functional tasks.     PLAN  [x]  Upgrade activities as tolerated     []  Continue plan of care  []  Update interventions per flow sheet       []  Discharge due to:_  []  Other:_      Kim Rose DPT, CMTPT 1/3/2018  10:39 AM    Future Appointments  Date Time Provider Colt Jovel   1/5/2018 3:00 PM Jocelyne Kaufman [facility]   1/9/2018 1:00 PM [id number] [facility]   1/11/2018 12:00 PM [address] [place] [address]   1/16/2018 1:00 PM Kim Rose [facility]   1/18/2018 12:00 PM [id number] [facility]   1/23/2018 1:00 PM [id number] [facility]   1/24/2018 1:30 PM HAMZAH Arita [facility]   1/24/2018 3:30 PM [facility]- IE[facility] MACHINE (WT ) [facility]   1/25/2018 12:00 PM Angel Weston [facility]

## 2018-01-03 NOTE — PROGRESS NOTES
[facility] [employer] [address]  [place], [address]  [phone] fax    Physical Therapy Progress Note  Patient name: Bre Osborne Start of Care: 2017   Referral source: Melanie Lam,* : 1970                          Medical Diagnosis: Right shoulder pain [M25.511] Onset Date:2017                          Treatment Diagnosis: R RTC repair with allograft s/p 2017   Prior Hospitalization: see medical history Provider#: [id number]   Medications: Verified on Patient summary List    Comorbidities: Tobacco use, Depression, Arthritis, BMI > 30, Asthma, Hearing impairment, Recent rotator cuff repair. Prior Level of Function: The patient states that she could not lift her arm or reach forward prior to surgery. Visits from Start of Care: 9    Missed Visits: 0      Established Goals:        Excellent         Good         Limited            None  [x] Increased ROM   []  []  [x]  []  [] Increased Strength  []  []  []  []  [] Increased Mobility  []  []  []  []   [] Decreased Pain   []  []  []  []  [] Decreased Swelling  []  []  []  []    Key Functional Changes: slow progressing shoulder ROM  Short Term Goals: To be accomplished in 2 weeks:                         3. The patient will be independent and compliant with HEP. Pt reports compliance 17                         2. The patient will verbalize precautions to PT to reduce re-injury post-operatively. Met (2017)  Long Term Goals: To be accomplished in 4 weeks:                         1. The patient will improve FOTO score to 44 to maximize quality of life.-progressing, score to 25 (12/15/2017)                         2. The patient will improve passive scaption to 110 degrees to improve ease of ADLs. Not met <90 deg 17; ~85 deg PROM visual assessment 17                         3.  The patient will improve ER in scaption plane at 45 degrees elevation to 45 degrees in order to improve ease of performing grooming. Near met per gross visual assessment 12/19/17; 40 deg 12/29/17                         4. The patient will improve ABD to 90 degrees to improve ease of grooming. Progressing ~60 deg visual assessment 12/21/17 65 deg 12/29/17    Updated Goals: to be achieved in 4 weeks:  1. The patient will improve FOTO score to 44 to maximize quality of life.   2. The patient will improve passive scaption to 110 degrees to improve ease of ADLs. 3. The patient will improve ABD to 90 degrees to improve ease of grooming  4. Pt will demonstrate AAROM elevation to 120 deg for progression of functional tasks. ASSESSMENT/RECOMMENDATIONS: Pt progressing slowly with shoulder ROM at this time. She does report good compliance with surgical precautions. Will continue with PT to progress into AAROM and AROM as able. [x]Continue therapy per initial plan/protocol at a frequency of  2 x per week for 4 weeks  []Continue therapy with the following recommended changes:_____________________      _____________________________________________________________________  []Discontinue therapy progressing towards or have reached established goals  []Discontinue therapy due to lack of appreciable progress towards goals  []Discontinue therapy due to lack of attendance or compliance  []Await Physician's recommendations/decisions regarding therapy  []Other:________________________________________________________________    Thank you for this referral.    Ellen Clemente 1/3/2018 11:12 AM  NOTE TO PHYSICIAN:  Bridgett Kramer [address]   FAX TO [facility]: [phone]  If you are unable to process this request in 24 hours please contact our office: [phone]    ? I have read the above report and request that my patient continue as recommended.   ? I have read the above report and request that my patient continue therapy with the following changes/special instructions:__________________________________________________________  ? I have read the above report and request that my patient be discharged from therapy.     Physicians signature: ______________________________Date: ______Time:______

## 2018-01-05 ENCOUNTER — APPOINTMENT (OUTPATIENT)
Dept: PHYSICAL THERAPY | Age: 48
End: 2018-01-05
Payer: SUBSIDIZED

## 2018-01-09 ENCOUNTER — HOSPITAL ENCOUNTER (OUTPATIENT)
Dept: PHYSICAL THERAPY | Age: 48
Discharge: HOME | End: 2018-01-09
Payer: SUBSIDIZED

## 2018-01-09 PROCEDURE — 97016 VASOPNEUMATIC DEVICE THERAPY: CPT

## 2018-01-09 PROCEDURE — 97110 THERAPEUTIC EXERCISES: CPT

## 2018-01-09 PROCEDURE — 97140 MANUAL THERAPY 1/> REGIONS: CPT

## 2018-01-09 NOTE — PROGRESS NOTES
PT DAILY TREATMENT NOTE     Patient Name: Ashanti Goldberg  Date:2018  : 1970  [x]  Patient  Verified  Payor: [place] / Plan: 1500 / Product Type: Medicaid /    In time:1:00  Out time:1:38  Total Treatment Time (min): 38  Visit #: 2 of 8    Treatment Area: Right shoulder pain [M25.511]    SUBJECTIVE  Pain Level (0-10 scale): 3  Any medication changes, allergies to medications, adverse drug reactions, diagnosis change, or new procedure performed?: [x] No    [] Yes (see summary sheet for update)  Subjective functional status/changes:   [] No changes reported  \"Feeling pretty good today\"    OBJECTIVE    Modality rationale: decrease inflammation and decrease pain to improve the patients ability to perform daily tasks   Min Type Additional Details    [] Estim:  []Unatt       []IFC  []Premod                        []Other:  []w/ice   []w/heat  Position:  Location:    [] Estim: []Att    []TENS instruct  []NMES                    []Other:  []w/US   []w/ice   []w/heat  Position:  Location:    []  Traction: [] Cervical       []Lumbar                       [] Prone          []Supine                       []Intermittent   []Continuous Lbs:  [] before manual  [] after manual    []  Ultrasound: []Continuous   [] Pulsed                           []1MHz   []3MHz W/cm2:  Location:    []  Iontophoresis with dexamethasone         Location: [] Take home patch   [] In clinic    []  Ice     []  heat  []  Ice massage  []  Laser   []  Anodyne Position:  Location:    []  Laser with stim  []  Other:  Position:  Location:   10 []  Vasopneumatic Device Pressure:       [x] lo [] med [] hi   Temperature: [x] lo [] med [] hi   [] Skin assessment post-treatment:  []intact []redness- no adverse reaction    []redness  adverse reaction:       20 min Therapeutic Exercise:  [] See flow sheet :   Rationale: increase ROM and increase strength to improve the patients ability to perform daily tasks and ADLs    8 min Manual Therapy:  PROM R shoulder   Rationale: increase ROM and increase tissue extensibility to improve ADL ease            With   [] TE   [] TA   [] neuro   [] other: Patient Education: [x] Review HEP    [] Progressed/Changed HEP based on:   [] positioning   [] body mechanics   [] transfers   [] heat/ice application    [] other:      Other Objective/Functional Measures: performed ER PROM @ 60 deg abd    Initiated AAROM wand in supine      Pain Level (0-10 scale) post treatment: 6    ASSESSMENT/Changes in Function: Pt tolerates AAROM addition fairly well today with some visual signs of difficulty. No clear increased pain reports, still limited with forward elevation ROM. Continue with POC slowly progressing mobility through [facility]    Patient will continue to benefit from skilled PT services to modify and progress therapeutic interventions, address functional mobility deficits, address ROM deficits, address strength deficits, analyze and address soft tissue restrictions, analyze and cue movement patterns and analyze and modify body mechanics/ergonomics to attain remaining goals. []  See Plan of Care  []  See progress note/recertification  []  See Discharge Summary         Progress towards goals / Updated goals:  Updated Goals: to be achieved in 4 weeks:  1. The patient will improve FOTO score to 44 to maximize quality of life. Progressed to 32 1/9/18   2. The patient will improve passive scaption to 110 degrees to improve ease of ADLs. 3. The patient will improve ABD to 90 degrees to improve ease of grooming  4. Pt will demonstrate AAROM elevation to 120 deg for progression of functional tasks.  Initiated Mis Carloss today in supine 1/9/18    PLAN  []  Upgrade activities as tolerated     []  Continue plan of care  []  Update interventions per flow sheet       []  Discharge due to:_  []  Other:_      Arianna Anger 1/9/2018  1:07 PM    Future Appointments  Date Time Provider Colt Jovel 1/11/2018 12:00 PM [id number] [facility] [facility]   1/16/2018 1:00 PM [id number] [facility] [facility]   1/18/2018 12:00 PM [id number] [facility] [facility]   1/23/2018 1:00 PM [id number] [facility]   1/24/2018 1:30 PM HAMZAH Pino [facility]   1/24/2018 3:30 PM [facility]- IE33 MACHINE (WT ) [facility]   1/25/2018 12:00 PM Denise Barraza [facility]

## 2018-01-11 ENCOUNTER — HOSPITAL ENCOUNTER (OUTPATIENT)
Dept: PHYSICAL THERAPY | Age: 48
Discharge: HOME | End: 2018-01-11
Payer: SUBSIDIZED

## 2018-01-11 PROCEDURE — 97110 THERAPEUTIC EXERCISES: CPT

## 2018-01-11 PROCEDURE — 97140 MANUAL THERAPY 1/> REGIONS: CPT

## 2018-01-11 NOTE — PROGRESS NOTES
PT DAILY TREATMENT NOTE 3-16    Patient Name: Moriah Cline  Date:2018  : 1970  [x]  Patient  Verified  Payor: [place] / Plan: 1500 / Product Type: Medicaid /    In time:1200  Out time:1235  Total Treatment Time (min): 35  Visit #: 3 of 8    Treatment Area: Right shoulder pain [M25.511]    SUBJECTIVE  Pain Level (0-10 scale): 5  Any medication changes, allergies to medications, adverse drug reactions, diagnosis change, or new procedure performed?: [x] No    [] Yes (see summary sheet for update)  Subjective functional status/changes:   [] No changes reported  Pt reports she has more pain today, and attributes this to her being out of the sling for the first full day. She also reports she woke up on her R side today, and is considering sleeping in the sling again to prevent this. Instructed in trying a small towel or pillow under her R arm at night to help with positioning. OBJECTIVE     min []Eval                  []Re-Eval     15 min Therapeutic Exercise:  [x] See flow sheet :   Rationale: increase ROM and increase strength to improve the patients ability to perform ADLs    10 min Manual Therapy:  PROM R shld flex, PROM R shld ER at 25 degrees ABD; R scap mobility in all directions; R LS at superior angle MFR to patient tolerance.    Rationale: decrease pain, increase ROM and increase tissue extensibility to perform ADLs    Modality rationale: decrease inflammation and decrease pain to improve the patients ability to perform ADLs   Min Type Additional Details    [] Estim:  []Unatt       []IFC  []Premod                        []Other:  []w/ice   []w/heat  Position:  Location:    [] Estim: []Att    []TENS instruct  []NMES                    []Other:  []w/US   []w/ice   []w/heat  Position:  Location:    []  Traction: [] Cervical       []Lumbar                       [] Prone          []Supine                       []Intermittent   []Continuous Lbs:  [] before manual  [] after manual    []  Ultrasound: []Continuous   [] Pulsed                           []1MHz   []3MHz Location:  W/cm2:    []  Iontophoresis with dexamethasone         Location: [] Take home patch   [] In clinic   10 [x]  Ice     []  heat  []  Ice massage  []  Laser   []  Anodyne Position: L s/l  Location: R shld    []  Laser with stim  []  Other: Position:  Location:    []  Vasopneumatic Device Pressure:       [] lo [] med [] hi   Temperature: [] lo [] med [] hi   [x] Skin assessment post-treatment:  [x]intact []redness- no adverse reaction    []redness  adverse reaction:              With   [] TE   [] TA   [] neuro   [] other: Patient Education: [x] Review HEP    [] Progressed/Changed HEP based on:   [] positioning   [] body mechanics   [] transfers   [] heat/ice application    [] other:      Other Objective/Functional Measures:   Pt guarding with overhead motion  during AAROM with wand and with PROM into flex    Pain Level (0-10 scale) post treatment: 5    ASSESSMENT/Changes in Function:   Pt demonstrates limited AAROM with wand during chest press to ceiling, with increased guarding noted. Patient asked about what she can do now that she is out of the sling, and pt was advised to not perform any active motion with that shoulder. Reviewed the difference between the [address] exercises in the clinic and what AROM is. Pt was able to verbalize understanding. Pt requested an ice pack versus game ready today, stating that the game ready makes her \"too cold. \"    Patient will continue to benefit from skilled PT services to modify and progress therapeutic interventions, address functional mobility deficits, address ROM deficits, address strength deficits, analyze and address soft tissue restrictions, analyze and cue movement patterns, analyze and modify body mechanics/ergonomics, assess and modify postural abnormalities and instruct in home and community integration to attain remaining goals.      []  See Plan of Care  []  See progress note/recertification  []  See Discharge Summary         Progress towards goals / Updated goals:  Updated Goals: to be achieved in 4 weeks:  1. The patient will improve FOTO score to 44 to maximize quality of life. Progressed to 32 1/9/18   2. The patient will improve passive scaption to 110 degrees to improve ease of ADLs. 3. The patient will improve ABD to 90 degrees to improve ease of grooming  4. Pt will demonstrate AAROM elevation to 120 deg for progression of functional tasks.  Initiated Jt Don today in supine 1/9/18    PLAN  [x]  Upgrade activities as tolerated     [x]  Continue plan of care  [x]  Update interventions per flow sheet       []  Discharge due to:_  []  Other:_      Bonilla Michael, PT 1/11/2018  12:06 PM    Future Appointments  Date Time Provider Colt Jovel   1/16/2018 1:00 PM [id number] [facility]   1/18/2018 12:00 PM [id number] [facility]   1/23/2018 1:00 PM [id number] [facility]   1/24/2018 1:30 PM HAMZAH Ziegler [facility] SCHED   1/24/2018 3:30 PM [facility]- IE33 MACHINE (WT ) [facility]   1/25/2018 11:00 AM [facility]

## 2018-01-16 ENCOUNTER — HOSPITAL ENCOUNTER (OUTPATIENT)
Dept: PHYSICAL THERAPY | Age: 48
Discharge: HOME | End: 2018-01-16
Payer: SUBSIDIZED

## 2018-01-16 PROCEDURE — 97110 THERAPEUTIC EXERCISES: CPT

## 2018-01-16 PROCEDURE — 97140 MANUAL THERAPY 1/> REGIONS: CPT

## 2018-01-18 ENCOUNTER — HOSPITAL ENCOUNTER (OUTPATIENT)
Dept: PHYSICAL THERAPY | Age: 48
Discharge: HOME | End: 2018-01-18
Payer: SUBSIDIZED

## 2018-01-18 PROCEDURE — 97110 THERAPEUTIC EXERCISES: CPT

## 2018-01-18 PROCEDURE — 97140 MANUAL THERAPY 1/> REGIONS: CPT

## 2018-01-18 NOTE — PROGRESS NOTES
PT DAILY TREATMENT NOTE     Patient Name: Clair Sanchez  Date:2018  : 1970  [x]  Patient  Verified  Payor: [place] / Plan: 1500 / Product Type: Medicaid /    In time:12:00  Out time:12:44  Total Treatment Time (min): 44  Visit #: 5 of 8    Treatment Area: Right shoulder pain [M25.511]    SUBJECTIVE  Pain Level (0-10 scale): 4  Any medication changes, allergies to medications, adverse drug reactions, diagnosis change, or new procedure performed?: [x] No    [] Yes (see summary sheet for update)  Subjective functional status/changes:   [] No changes reported  \"It hurts, I think its more tension on it out of the sling\"    OBJECTIVE    Modality rationale: decrease pain to improve the patients ability to perform daily tasks   Min Type Additional Details    [] Estim:  []Unatt       []IFC  []Premod                        []Other:  []w/ice   []w/heat  Position:  Location:    [] Estim: []Att    []TENS instruct  []NMES                    []Other:  []w/US   []w/ice   []w/heat  Position:  Location:    []  Traction: [] Cervical       []Lumbar                       [] Prone          []Supine                       []Intermittent   []Continuous Lbs:  [] before manual  [] after manual    []  Ultrasound: []Continuous   [] Pulsed                           []1MHz   []3MHz W/cm2:  Location:    []  Iontophoresis with dexamethasone         Location: [] Take home patch   [] In clinic   10 [x]  Ice     []  heat  []  Ice massage  []  Laser   []  Anodyne Position: seated  Location: R shoulder    []  Laser with stim  []  Other:  Position:  Location:    []  Vasopneumatic Device Pressure:       [] lo [] med [] hi   Temperature: [] lo [] med [] hi   [] Skin assessment post-treatment:  []intact []redness- no adverse reaction    []redness  adverse reaction:       24 min Therapeutic Exercise:  [] See flow sheet :   Rationale: increase ROM and increase strength to improve the patients ability to perform daily tasks      10 min Manual Therapy:  PROM R shoulder, goal reassess   Rationale: increase ROM and increase tissue extensibility to improve ease of ADLs          With   [] TE   [] TA   [] neuro   [] other: Patient Education: [x] Review HEP    [] Progressed/Changed HEP based on:   [] positioning   [] body mechanics   [] transfers   [] heat/ice application    [] other:      Other Objective/Functional Measures: 120 deg PROM elevation   118 deg PROM abduction    Added tactile cue at proximal humerus to reduce excessive hiking     Pain Level (0-10 scale) post treatment: 5    ASSESSMENT/Changes in Function: Pt improving with PROM and AAROM in supine today. She continues to reports moderate pain levels outside of sling but no noted pain increase with therex. Continue to progress AAROM as tolerated. Patient will continue to benefit from skilled PT services to modify and progress therapeutic interventions, address functional mobility deficits, address ROM deficits, address strength deficits, analyze and address soft tissue restrictions, analyze and cue movement patterns and analyze and modify body mechanics/ergonomics to attain remaining goals. []  See Plan of Care  []  See progress note/recertification  []  See Discharge Summary         Progress towards goals / Updated goals:  Updated Goals: to be achieved in 4 weeks:  1. The patient will improve FOTO score to 44 to maximize quality of life. Progressed to 32 1/9/18   2. The patient will improve passive scaption to 110 degrees to improve ease of ADLs. Met 112 deg 1/16/18; 120 deg 1/18/18  3. The patient will improve ABD to 90 degrees to improve ease of grooming Met 90 deg 1/16/18; 118 deg 1/18/18  4. Pt will demonstrate AAROM elevation to 120 deg for progression of functional tasks.  Initiated Excell Jess today in supine 1/9/18; progressed to 85 deg 1/18/18    PLAN  [x]  Upgrade activities as tolerated     []  Continue plan of care  []  Update interventions per flow sheet       []  Discharge due to:_  []  Other:_      Joana Kovacs, DPT, CMTPT 1/18/2018  12:08 PM    Future Appointments  Date Time Provider Colt Jovel   1/23/2018 10:45 AM [facility]   1/23/2018 1:00 PM Evette Ulloa, PT [facility]   1/24/2018 3:30 PM [facility]- IE33 MACHINE (WT ) [facility]   1/25/2018 11:00 AM Joana Kovacs [facility]

## 2018-01-23 ENCOUNTER — HOSPITAL ENCOUNTER (OUTPATIENT)
Dept: PHYSICAL THERAPY | Age: 48
Discharge: HOME | End: 2018-01-23
Payer: SUBSIDIZED

## 2018-01-23 ENCOUNTER — OFFICE VISIT (OUTPATIENT)
Dept: ORTHOPEDIC SURGERY | Age: 48
End: 2018-01-23

## 2018-01-23 VITALS
OXYGEN SATURATION: 99 % | DIASTOLIC BLOOD PRESSURE: 65 MMHG | BODY MASS INDEX: 34.52 KG/M2 | HEART RATE: 66 BPM | HEIGHT: 66 IN | TEMPERATURE: 97.5 F | WEIGHT: 214.8 LBS | SYSTOLIC BLOOD PRESSURE: 119 MMHG

## 2018-01-23 DIAGNOSIS — M75.121 COMPLETE TEAR OF RIGHT ROTATOR CUFF: Primary | ICD-10-CM

## 2018-01-23 PROCEDURE — 97140 MANUAL THERAPY 1/> REGIONS: CPT

## 2018-01-23 PROCEDURE — 97110 THERAPEUTIC EXERCISES: CPT

## 2018-01-23 RX ORDER — HYDROCODONE BITARTRATE AND ACETAMINOPHEN 10; 325 MG/1; MG/1
1 TABLET ORAL
Qty: 40 TAB | Refills: 0 | Status: SHIPPED | OUTPATIENT
Start: 2018-01-23 | End: 2018-05-16

## 2018-01-23 NOTE — PROGRESS NOTES
PT DAILY TREATMENT NOTE     Patient Name: Piter Rudd  Date:2018  : 1970  [x]  Patient  Verified  Payor: [place] / Plan: 1500 / Product Type: Medicaid /    In time:12:00  Out time:12:37  Total Treatment Time (min): 37  Visit #: 6 of 8    Treatment Area: Right shoulder pain [M25.511]    SUBJECTIVE  Pain Level (0-10 scale): 3/10  Any medication changes, allergies to medications, adverse drug reactions, diagnosis change, or new procedure performed?: [x] No    [] Yes (see summary sheet for update)  Subjective functional status/changes:   [] No changes reported  \"It's sore and achy. \"    OBJECTIVE    Modality rationale: decrease pain to improve the patients ability to perform ADL's.    Min Type Additional Details    [] Estim:  []Unatt       []IFC  []Premod                        []Other:  []w/ice   []w/heat  Position:  Location:    [] Estim: []Att    []TENS instruct  []NMES                    []Other:  []w/US   []w/ice   []w/heat  Position:  Location:    []  Traction: [] Cervical       []Lumbar                       [] Prone          []Supine                       []Intermittent   []Continuous Lbs:  [] before manual  [] after manual    []  Ultrasound: []Continuous   [] Pulsed                           []1MHz   []3MHz W/cm2:  Location:    []  Iontophoresis with dexamethasone         Location: [] Take home patch   [] In clinic   10 [x]  Ice     []  heat  []  Ice massage  []  Laser   []  Anodyne Position: Seated  Location: (R) Shoulder    []  Laser with stim  []  Other:  Position:  Location:    []  Vasopneumatic Device Pressure:       [] lo [] med [] hi   Temperature: [] lo [] med [] hi   [] Skin assessment post-treatment:  []intact []redness- no adverse reaction    []redness  adverse reaction:     19 min Therapeutic Exercise:  [x] See flow sheet :   Rationale: increase ROM, increase strength and increase proprioception to improve the patients ability to perform ADL's.    8 min Manual Therapy:  (R) Shoulder PROM, STM (R) UT. Gentle AAROM PNF D1 flex/ext. Rationale: decrease pain, increase ROM and increase tissue extensibility to improve ease of performing ADL's. With   [x] TE   [] TA   [] neuro   [] other: Patient Education: [x] Review HEP    [] Progressed/Changed HEP based on:   [] positioning   [] body mechanics   [] transfers   [] heat/ice application    [] other:      Other Objective/Functional Measures: Added finger ladder flexion 3x, pain gradually increased but subsided quickly once resting. Pain Level (0-10 scale) post treatment: 3/10     ASSESSMENT/Changes in Function: Pt felt a \"pop\" in right shoulder with table slides abd, pt reported pain associated with pop however pain quickly subsided. Pt making gradual progress. Patient will continue to benefit from skilled PT services to modify and progress therapeutic interventions, address functional mobility deficits, address ROM deficits, address strength deficits, analyze and address soft tissue restrictions and analyze and cue movement patterns to attain remaining goals. [x]  See Plan of Care  []  See progress note/recertification  []  See Discharge Summary         Progress towards goals / Updated goals:  Updated Goals: to be achieved in 4 weeks:  1. The patient will improve FOTO score to 44 to maximize quality of life. Progressed to 32 1/9/18   2. The patient will improve passive scaption to 110 degrees to improve ease of ADLs. Met 112 deg 1/16/18; 120 deg 1/18/18  3. The patient will improve ABD to 90 degrees to improve ease of grooming Met 90 deg 1/16/18; 118 deg 1/18/18  4. Pt will demonstrate AAROM elevation to 120 deg for progression of functional tasks.  Initiated [facility] today in supine 1/9/18; progressed to 85 deg 1/18/18    PLAN  []  Upgrade activities as tolerated     [x]  Continue plan of care  []  Update interventions per flow sheet       []  Discharge due to:_  []  Other:_      Henriette Halsted Courtney Parmar, DAYAMI 1/23/2018  11:58 AM    Future Appointments  Date Time Provider Colt Jovel   1/23/2018 12:00 PM Alen Edwards PTA [facility]   1/24/2018 3:30 PM [facility]- IE33 MACHINE (WT ) [facility]   1/25/2018 11:00 AM Elzbieta Choi [facility]   2/20/2018 1:00 [facility]

## 2018-01-23 NOTE — PROGRESS NOTES
Leslie Morse  1970     HISTORY OF PRESENT ILLNESS  Leslie Morse is a 50 y.o. female who presents today for evaluation s/p Right shoulder arthroscopic Superior Capsular Reconstruction on 11/29/17. Patient has been going to PT and feels it is helping her ROM. Describes pain as a 3/10. She is having a hard time lifting her arm. Many questions on why her arm still has discomfort in it. Patient denies any fever, chills, chest pain, shortness of breath or calf pain. There are no new illness or injuries to report since last seen in the office. PHYSICAL EXAM:   Visit Vitals    /65    Pulse 66    Temp 97.5 °F (36.4 °C) (Oral)    Ht 5' 6\" (1.676 m)    Wt 214 lb 12.8 oz (97.4 kg)    SpO2 99%    BMI 34.67 kg/m2      The patient is a well-developed, well-nourished female in no acute distress. The patient is alert and oriented times three. The patient appears to be well groomed. Mood and affect are normal.  ORTHOPEDIC EXAM of right shoulder:  Inspection: swelling not present,  Bruising not present  Incision well healed  Passive glenohumeral abduction 0-80 degrees  Stability: Stable  Strength: n/a  2+ distal pulses    IMPRESSION:  S/P Right shoulder arthroscopic superior capsular reconstruction    PLAN:   1. Patient with some post operative pain slightly improved  2. Will cont with PT working on gentle strengthening. No lifting greater than 5lbs  3. rx for norco 10 given for acute post operative pain. Patient given pain medication for short term acute pain relief. Goal is to treat patient according to above plan and to ultimately have patient off all pain medications once appropriate. If chronic pain management is required beyond what is expected for current orthopedic problem, will refer patient to pain management.   was reviewed and will be reviewed with every medication refill request.      RTC 4 weeks    [facility]

## 2018-01-24 ENCOUNTER — TELEPHONE (OUTPATIENT)
Dept: ORTHOPEDIC SURGERY | Age: 48
End: 2018-01-24

## 2018-01-24 ENCOUNTER — HOSPITAL ENCOUNTER (OUTPATIENT)
Dept: NON INVASIVE DIAGNOSTICS | Age: 48
Discharge: HOME | End: 2018-01-24
Attending: INTERNAL MEDICINE
Payer: SUBSIDIZED

## 2018-01-24 DIAGNOSIS — R94.31 ABNORMAL EKG: ICD-10-CM

## 2018-01-24 PROCEDURE — 93306 TTE W/DOPPLER COMPLETE: CPT

## 2018-01-24 NOTE — TELEPHONE ENCOUNTER
Kieran Field from [id number] [facility] was calling to state that the patients morphine level was at 23.  Kieran Field stated that she is needing authorization before she mignon fill rx for the patient and can be reached at [phone]

## 2018-01-24 NOTE — PROGRESS NOTES
Completed echocardiogram. Report to follow. I removed the band off and placed in shred box.      Karina Arreola, RCS, RDCS

## 2018-01-25 ENCOUNTER — HOSPITAL ENCOUNTER (OUTPATIENT)
Dept: PHYSICAL THERAPY | Age: 48
Discharge: HOME | End: 2018-01-25
Payer: SUBSIDIZED

## 2018-01-25 PROCEDURE — 97140 MANUAL THERAPY 1/> REGIONS: CPT

## 2018-01-25 PROCEDURE — 97110 THERAPEUTIC EXERCISES: CPT

## 2018-01-25 NOTE — PROGRESS NOTES
PT DAILY TREATMENT NOTE     Patient Name: Misty Marcelo  Date:2018  : 1970  [x]  Patient  Verified  Payor: [place] / Plan: 1500 / Product Type: Medicaid /    In time:3:30pm  Out time:4:08pm  Total Treatment Time (min): 38  Visit #: 7 of 8    Treatment Area: Right shoulder pain [M25.511]    SUBJECTIVE  Pain Level (0-10 scale): 4  Any medication changes, allergies to medications, adverse drug reactions, diagnosis change, or new procedure performed?: [x] No    [] Yes (see summary sheet for update)  Subjective functional status/changes:   [] No changes reported  Pt reports continued shoulder aching pain. OBJECTIVE  20 min Therapeutic Exercise:  [x] See flow sheet :   Rationale: increase ROM, increase strength and increase proprioception to improve the patients ability to improve ease of [place] reach. 8 min Manual Therapy:  PROM R shoulder   Rationale: decrease pain, increase ROM and increase tissue extensibility to improve ease of ADLs. Modality rationale: decrease pain to improve the patients ability to improve ease of ADLs.    Min Type Additional Details    [] Estim:  []Unatt       []IFC  []Premod                        []Other:  []w/ice   []w/heat  Position:  Location:    [] Estim: []Att    []TENS instruct  []NMES                    []Other:  []w/US   []w/ice   []w/heat  Position:  Location:    []  Traction: [] Cervical       []Lumbar                       [] Prone          []Supine                       []Intermittent   []Continuous Lbs:  [] before manual  [] after manual    []  Ultrasound: []Continuous   [] Pulsed                           []1MHz   []3MHz Location:  W/cm2:    []  Iontophoresis with dexamethasone         Location: [] Take home patch   [] In clinic   10 [x]  Ice     []  heat  []  Ice massage  []  Laser   []  Anodyne Position: seated  Location: R shoulder    []  Laser with stim  []  Other: Position:  Location:    []  Vasopneumatic Device Pressure:       [] lo [] med [] hi   Temperature: [] lo [] med [] hi   [] Skin assessment post-treatment:  []intact []redness- no adverse reaction    []redness  adverse reaction:           With   [] TE   [] TA   [] neuro   [] other: Patient Education: [x] Review HEP    [] Progressed/Changed HEP based on:   [] positioning   [] body mechanics   [] transfers   [] heat/ice application    [] other:      Other Objective/Functional Measures:      Pain Level (0-10 scale) post treatment: 5.5/10    ASSESSMENT/Changes in Function: Pt reported limited tolerance to table slides, so limited repetitions today as per flow sheet. Limited efficacy of manual 2/2 intermittent pt guarding during PROM. Patient will continue to benefit from skilled PT services to modify and progress therapeutic interventions, address functional mobility deficits, address ROM deficits, address strength deficits, analyze and address soft tissue restrictions and analyze and cue movement patterns to attain remaining goals. []  See Plan of Care  []  See progress note/recertification  []  See Discharge Summary         Progress towards goals / Updated goals:  Updated Goals: to be achieved in 4 weeks:  1. The patient will improve FOTO score to 44 to maximize quality of life. Progressed to 32 1/9/18   2. The patient will improve passive scaption to 110 degrees to improve ease of ADLs. Met 112 deg 1/16/18; 120 deg 1/18/18  3. The patient will improve ABD to 90 degrees to improve ease of grooming Met 90 deg 1/16/18; 118 deg 1/18/18  4. Pt will demonstrate AAROM elevation to 120 deg for progression of functional tasks.  Initiated Ovi Carson today in supine 1/9/18; progressed to 85 deg 1/18/18    PLAN  []  Upgrade activities as tolerated     [x]  Continue plan of care  []  Update interventions per flow sheet       []  Discharge due to:_  []  Other:_      Stanislaw Leonardo, PT, DPT, ATC 1/25/2018  3:36 PM    Future Appointments  Date Time Provider Colt Jovel 1/30/2018 3:00 PM [id number] [facility]   2/1/2018 2:30 PM [id number] [facility]   2/6/2018 2:30 PM [id number] [facility]   2/8/2018 12:00 PM [id number] [facility]   2/13/2018 3:00 PM [id number] [facility]   2/15/2018 2:00 PM [id number] [facility]   2/20/2018 1:00 PM HAMZAH Col[address]res [address]

## 2018-01-26 NOTE — PROGRESS NOTES
Per your last note \" Abnormal EKG. Patient's EKG changes are unchanged today compared to an EKG from last week. Looking at an EKG from 2017, I suspect the changes were present at that time as well, however, the leads are probably likely in a slightly different pace in the precordium. I have recommended an echocardiogram at some point within the next few months to rule out structural heart disease.

## 2018-01-29 ENCOUNTER — TELEPHONE (OUTPATIENT)
Dept: CARDIOLOGY CLINIC | Age: 48
End: 2018-01-29

## 2018-01-29 NOTE — TELEPHONE ENCOUNTER
----- Message from Frieda Halsted, MD sent at 1/29/2018  8:13 AM EST -----  Please let the patient know that her echocardiogram was unremarkable. Heart function normal  ----- Message -----     From: Christiano Young RN     Sent: 1/26/2018   7:47 AM       To: Frieda Halsted, MD    Per your last note \" Abnormal EKG. Patient's EKG changes are unchanged today compared to an EKG from last week. Looking at an EKG from 2017, I suspect the changes were present at that time as well, however, the leads are probably likely in a slightly different pace in the precordium.   I have recommended an echocardiogram at some point within the next few months to rule out structural heart disease.

## 2018-01-30 ENCOUNTER — HOSPITAL ENCOUNTER (OUTPATIENT)
Dept: PHYSICAL THERAPY | Age: 48
Discharge: HOME | End: 2018-01-30
Payer: SUBSIDIZED

## 2018-01-30 PROCEDURE — 97110 THERAPEUTIC EXERCISES: CPT

## 2018-01-30 PROCEDURE — 97140 MANUAL THERAPY 1/> REGIONS: CPT

## 2018-01-30 NOTE — PROGRESS NOTES
[facility]  [address]  [place], [address]  [phone] fax    Physical Therapy Progress Note  Patient name: Yuri Carranza Start of Care: 2017   Referral source: Estella Byrd,* : 1970                          Medical Diagnosis: Right shoulder pain [M25.511] Onset Date:2017                          Treatment Diagnosis: R RTC repair with allograft s/p 2017   Prior Hospitalization: see medical history Provider#: [id number]   Medications: Verified on Patient summary List    Comorbidities: Tobacco use, Depression, Arthritis, BMI > 30, Asthma, Hearing impairment, Recent rotator cuff repair.   Prior Level of Function: The patient states that she could not lift her arm or reach forward prior to surgery. Visits from Start of Care: 17    Missed Visits: 1      Established Goals:        Excellent         Good         Limited            None  [x] Increased ROM   []  [x]  []  []  [x] Increased Strength  []  []  [x]  []  [] Increased Mobility  []  []  []  []   [] Decreased Pain   []  []  []  []  [] Decreased Swelling  []  []  []  []    Key Functional Changes: improved passive shoulder mobility, improving with AAROM   Updated Goals: to be achieved in 4 weeks:  1. The patient will improve FOTO score to 44 to maximize quality of life. Progressed to 32 18   2. The patient will improve passive scaption to 110 degrees to improve ease of ADLs. Met 112 deg 18; 120 deg 18  3. The patient will improve ABD to 90 degrees to improve ease of grooming Met 90 deg 18; 118 deg 18  4. Pt will demonstrate AAROM elevation to 120 deg for progression of functional tasks. Initiated [facility] today in supine 18; progressed to 85 deg 18 114 deg 18    Updated Goals: to be achieved in 4 weeks:  1. Pt will demonstrate AAROM elevation to 120 deg for progression of functional tasks.   2. Pt will demonstrate AROM R shoulder flexion to 100 deg for improved ADL performance. 3. Pt will improve R shoulder EMILEE to C2 for improved self care performance. ASSESSMENT/RECOMMENDATIONS: Pt improving slowly with AAROM strength and range at this time. Continue PT focusing on further functional range and strength actively. [x]Continue therapy per initial plan/protocol at a frequency of  2 x per week for 4 weeks  []Continue therapy with the following recommended changes:_____________________      _____________________________________________________________________  []Discontinue therapy progressing towards or have reached established goals  []Discontinue therapy due to lack of appreciable progress towards goals  []Discontinue therapy due to lack of attendance or compliance  []Await Physician's recommendations/decisions regarding therapy  []Other:________________________________________________________________    Thank you for this referral.    Jojo Noguera DPT, CMTPT 1/30/2018 4:07 PM  NOTE TO PHYSICIAN:  [address] AND   FAX TO [facility]: [phone]  If you are unable to process this request in 24 hours please contact our office: [phone]    ? I have read the above report and request that my patient continue as recommended. ? I have read the above report and request that my patient continue therapy with the following changes/special instructions:__________________________________________________________  ? I have read the above report and request that my patient be discharged from therapy.     Physicians signature: ______________________________Date: ______Time:______

## 2018-01-30 NOTE — PROGRESS NOTES
PT DAILY TREATMENT NOTE     Patient Name: Jade Martines  Date:2018  : 1970  [x]  Patient  Verified  Payor: [place] / Plan: 1500 / Product Type: Medicaid /    In time:3:00  Out time:3:40  Total Treatment Time (min): 40  Visit #: 8 of 8    Treatment Area: Right shoulder pain [M25.511]    SUBJECTIVE  Pain Level (0-10 scale): 8  Any medication changes, allergies to medications, adverse drug reactions, diagnosis change, or new procedure performed?: [x] No    [] Yes (see summary sheet for update)  Subjective functional status/changes:   [] No changes reported  \"I guess its the weather, but its hurting today\"    OBJECTIVE    Modality rationale: decrease inflammation and decrease pain to improve the patients ability to perform daily tasks and ADLs   Min Type Additional Details    [] Estim:  []Unatt       []IFC  []Premod                        []Other:  []w/ice   []w/heat  Position:  Location:    [] Estim: []Att    []TENS instruct  []NMES                    []Other:  []w/US   []w/ice   []w/heat  Position:  Location:    []  Traction: [] Cervical       []Lumbar                       [] Prone          []Supine                       []Intermittent   []Continuous Lbs:  [] before manual  [] after manual    []  Ultrasound: []Continuous   [] Pulsed                           []1MHz   []3MHz W/cm2:  Location:    []  Iontophoresis with dexamethasone         Location: [] Take home patch   [] In clinic   10 [x]  Ice     []  heat  []  Ice massage  []  Laser   []  Anodyne Position: seated  Location: R shoulder    []  Laser with stim  []  Other:  Position:  Location:    []  Vasopneumatic Device Pressure:       [] lo [] med [] hi   Temperature: [] lo [] med [] hi   [] Skin assessment post-treatment:  []intact []redness- no adverse reaction    []redness  adverse reaction:       22 min Therapeutic Exercise:  [] See flow sheet :   Rationale: increase ROM and increase strength to improve the patients ability to perform ADLs    8 min Manual Therapy:  PROM R shoulder, scapular mobs   Rationale: increase ROM and increase tissue extensibility to improve ease of ADLs          With   [] TE   [] TA   [] neuro   [] other: Patient Education: [x] Review HEP    [] Progressed/Changed HEP based on:   [] positioning   [] body mechanics   [] transfers   [] heat/ice application    [] other:      Other Objective/Functional Measures: 114 deg AAROM shoulder elevation      Pain Level (0-10 scale) post treatment: 8    ASSESSMENT/Changes in Function: Pt reports elevated pain levels today, good tolerance to all therex however. Pt improving slowly with AAROM strength and range at this time. Continue PT focusing on further functional range and strength    Patient will continue to benefit from skilled PT services to modify and progress therapeutic interventions, address functional mobility deficits, address ROM deficits, address strength deficits, analyze and address soft tissue restrictions, analyze and cue movement patterns and analyze and modify body mechanics/ergonomics to attain remaining goals. []  See Plan of Care  []  See progress note/recertification  []  See Discharge Summary         Progress towards goals / Updated goals:  Updated Goals: to be achieved in 4 weeks:  1. The patient will improve FOTO score to 44 to maximize quality of life. Progressed to 32 1/9/18   2. The patient will improve passive scaption to 110 degrees to improve ease of ADLs. Met 112 deg 1/16/18; 120 deg 1/18/18  3. The patient will improve ABD to 90 degrees to improve ease of grooming Met 90 deg 1/16/18; 118 deg 1/18/18  4. Pt will demonstrate AAROM elevation to 120 deg for progression of functional tasks.  Initiated Lester Chandler today in supine 1/9/18; progressed to 85 deg 1/18/18 114 deg 1/30/18    PLAN  [x]  Upgrade activities as tolerated     []  Continue plan of care  []  Update interventions per flow sheet       []  Discharge due to:_  [] Other:_      Jojo Noguera, DPT, CMTPT 1/30/2018  3:00 PM    Future Appointments  Date Time Provider Colt Becka   2/1/2018 2:30 PM [address] [facility]   2/6/2018 2:30 PM [id number] [facility]   2/8/2018 12:00 PM 13635 [facility]   2/13/2018 3:00 PM [id number] [facility]   2/15/2018 2:00 PM [id number]   2/20/2018 1:00 PM Ronaldo Rojo, [address] [facility]

## 2018-02-01 ENCOUNTER — HOSPITAL ENCOUNTER (OUTPATIENT)
Dept: PHYSICAL THERAPY | Age: 48
Discharge: HOME | End: 2018-02-01
Payer: SUBSIDIZED

## 2018-02-01 PROCEDURE — 97140 MANUAL THERAPY 1/> REGIONS: CPT

## 2018-02-01 PROCEDURE — 97110 THERAPEUTIC EXERCISES: CPT

## 2018-02-01 NOTE — PROGRESS NOTES
PT DAILY TREATMENT NOTE     Patient Name: Vladimir Aquino  Date:2018  : 1970  [x]  Patient  Verified  Payor: [place] / Plan: 1500 / Product Type: Medicaid /    In time:2:30  Out time:3:17  Total Treatment Time (min): 47  Visit #: 1 of 8    Treatment Area: Right shoulder pain [M25.511]    SUBJECTIVE  Pain Level (0-10 scale): 3  Any medication changes, allergies to medications, adverse drug reactions, diagnosis change, or new procedure performed?: [x] No    [] Yes (see summary sheet for update)  Subjective functional status/changes:   [] No changes reported  Pt reports improved pain levels today     OBJECTIVE    Modality rationale: decrease pain to improve the patients ability to perform daily tasks   Min Type Additional Details    [] Estim:  []Unatt       []IFC  []Premod                        []Other:  []w/ice   []w/heat  Position:  Location:    [] Estim: []Att    []TENS instruct  []NMES                    []Other:  []w/US   []w/ice   []w/heat  Position:  Location:    []  Traction: [] Cervical       []Lumbar                       [] Prone          []Supine                       []Intermittent   []Continuous Lbs:  [] before manual  [] after manual    []  Ultrasound: []Continuous   [] Pulsed                           []1MHz   []3MHz W/cm2:  Location:    []  Iontophoresis with dexamethasone         Location: [] Take home patch   [] In clinic   10 [x]  Ice     []  heat  []  Ice massage  []  Laser   []  Anodyne Position: seated  Location: ice    []  Laser with stim  []  Other:  Position:  Location:    []  Vasopneumatic Device Pressure:       [] lo [] med [] hi   Temperature: [] lo [] med [] hi   [] Skin assessment post-treatment:  []intact []redness- no adverse reaction    []redness  adverse reaction:       29 min Therapeutic Exercise:  [] See flow sheet :   Rationale: increase ROM and increase strength to improve the patients ability to perform daily tasks and ADLs    8 min Manual Therapy:  PROM R shoulder, STJ mobs, grade II post GH mobs   Rationale: increase ROM and increase tissue extensibility to improve ease of ADLs            With   [] TE   [] TA   [] neuro   [] other: Patient Education: [x] Review HEP    [] Progressed/Changed HEP based on:   [] positioning   [] body mechanics   [] transfers   [] heat/ice application    [] other:      Other Objective/Functional Measures: added UBE today with good tolerance    Pt still presenting with popping of R shoulder with supine wand flexion, reports mild pain      Pain Level (0-10 scale) post treatment: 5    ASSESSMENT/Changes in Function: Pt progressing with AAROM at this time, limited in non gravity reduced positions. Non-smooth elevation with supine wand AAROM at this time. Continue with AAROM progression into AROM as able    Patient will continue to benefit from skilled PT services to modify and progress therapeutic interventions, address functional mobility deficits, address ROM deficits, address strength deficits, analyze and address soft tissue restrictions, analyze and cue movement patterns and analyze and modify body mechanics/ergonomics to attain remaining goals. []  See Plan of Care  []  See progress note/recertification  []  See Discharge Summary         Progress towards goals / Updated goals:  Updated Goals: to be achieved in 4 weeks:  1. Pt will demonstrate AAROM elevation to 120 deg for progression of functional tasks. 2. Pt will demonstrate AROM R shoulder flexion to 100 deg for improved ADL performance. 3. Pt will improve R shoulder EMILEE to C2 for improved self care performance.     PLAN  [x]  Upgrade activities as tolerated     []  Continue plan of care  [x]  Update interventions per flow sheet       []  Discharge due to:_  []  Other:_      Marilyn Turner DPT, CMTPT 2/1/2018  2:38 PM    Future Appointments  Date Time Provider Colt Jovel   2/6/2018 2:30 PM [id number] [facility]   2/8/2018 12:00 PM [address] [facility]   2/13/2018 3:00 PM [facility] [facility]   2/15/2018 2:00 PM [address] [facility]   2/20/2018 1:00 PM HAMZAH Urias

## 2018-02-06 ENCOUNTER — HOSPITAL ENCOUNTER (OUTPATIENT)
Dept: PHYSICAL THERAPY | Age: 48
Discharge: HOME | End: 2018-02-06
Payer: SUBSIDIZED

## 2018-02-06 PROCEDURE — 97140 MANUAL THERAPY 1/> REGIONS: CPT

## 2018-02-06 PROCEDURE — 97110 THERAPEUTIC EXERCISES: CPT

## 2018-02-06 PROCEDURE — 97112 NEUROMUSCULAR REEDUCATION: CPT

## 2018-02-06 NOTE — PROGRESS NOTES
PT DAILY TREATMENT NOTE     Patient Name: Demetrio Catalan  Date:2018  : 1970  [x]  Patient  Verified  Payor: [place] / Plan: 1500 / Product Type: Medicaid /    In time:2:30  Out time:3:18  Total Treatment Time (min): 48  Visit #: 2 of 8    Treatment Area: Right shoulder pain [M25.511]    SUBJECTIVE  Pain Level (0-10 scale): 4  Any medication changes, allergies to medications, adverse drug reactions, diagnosis change, or new procedure performed?: [x] No    [] Yes (see summary sheet for update)  Subjective functional status/changes:   [] No changes reported  Pt reports her shoulder pops frequently but only painful with 1-2 out of every 5 instances    OBJECTIVE    Modality rationale: decrease pain to improve the patients ability to perform daily tasks   Min Type Additional Details    [] Estim:  []Unatt       []IFC  []Premod                        []Other:  []w/ice   []w/heat  Position:  Location:    [] Estim: []Att    []TENS instruct  []NMES                    []Other:  []w/US   []w/ice   []w/heat  Position:  Location:    []  Traction: [] Cervical       []Lumbar                       [] Prone          []Supine                       []Intermittent   []Continuous Lbs:  [] before manual  [] after manual    []  Ultrasound: []Continuous   [] Pulsed                           []1MHz   []3MHz W/cm2:  Location:    []  Iontophoresis with dexamethasone         Location: [] Take home patch   [] In clinic   10 [x]  Ice     []  heat  []  Ice massage  []  Laser   []  Anodyne Position: seated  Location: R shoulder    []  Laser with stim  []  Other:  Position:  Location:    []  Vasopneumatic Device Pressure:       [] lo [] med [] hi   Temperature: [] lo [] med [] hi   [] Skin assessment post-treatment:  []intact []redness- no adverse reaction    []redness  adverse reaction:       22 min Therapeutic Exercise:  [] See flow sheet :   Rationale: increase ROM and increase strength to improve the patients ability to perform daily tasks    8 min Neuromuscular Re-education:  []  See flow sheet :   Rationale: increase strength and increase proprioception  to improve the patients motor control of R shoulder with ADLs    8 min Manual Therapy:  GH post/inf mobs grade III   Rationale: increase ROM and increase tissue extensibility to improve ease of ADLs        With   [] TE   [] TA   [] neuro   [] other: Patient Education: [x] Review HEP    [] Progressed/Changed HEP based on:   [] positioning   [] body mechanics   [] transfers   [] heat/ice application    [] other:      Other Objective/Functional Measures: proximal gliding of R shoulder with inclined wand elevation     Pain Level (0-10 scale) post treatment: 5    ASSESSMENT/Changes in Function: Pt demonstrates much difficulty with AAROM movements against gravity including inclined wand AAROM and wall slides. Rather significant shoulder proximal translation during wand scaption. Continue to attempt AAROM and AROM progression as able    Patient will continue to benefit from skilled PT services to modify and progress therapeutic interventions, address functional mobility deficits, address ROM deficits, address strength deficits, analyze and address soft tissue restrictions, analyze and cue movement patterns and analyze and modify body mechanics/ergonomics to attain remaining goals. []  See Plan of Care  []  See progress note/recertification  []  See Discharge Summary         Progress towards goals / Updated goals:  Updated Goals: to be achieved in 4 weeks:  1. Pt will demonstrate AAROM elevation to 120 deg for progression of functional tasks. Not met in inclined position 2/6/18  2. Pt will demonstrate AROM R shoulder flexion to 100 deg for improved ADL performance. 3. Pt will improve R shoulder EMILEE to C2 for improved self care performance.     PLAN  []  Upgrade activities as tolerated     []  Continue plan of care  []  Update interventions per flow sheet       []  Discharge due to:_  []  Other:_      Joana Kovacs, DPT, CMTPT 2/6/2018  2:48 PM    Future Appointments  Date Time Provider Colt Jovel   2/8/2018 12:00 PM [address] [facility]   2/13/2018 3:00 PM 16000 [facility]   2/15/2018 2:00 PM 16000 [facility]   2/20/2018 1:00 PM HAMZAH Medley [address]

## 2018-02-08 ENCOUNTER — TELEPHONE (OUTPATIENT)
Dept: ORTHOPEDIC SURGERY | Age: 48
End: 2018-02-08

## 2018-02-08 ENCOUNTER — HOSPITAL ENCOUNTER (OUTPATIENT)
Dept: PHYSICAL THERAPY | Age: 48
Discharge: HOME | End: 2018-02-08
Payer: SUBSIDIZED

## 2018-02-08 DIAGNOSIS — M75.121 COMPLETE TEAR OF RIGHT ROTATOR CUFF: Primary | ICD-10-CM

## 2018-02-08 PROCEDURE — 97140 MANUAL THERAPY 1/> REGIONS: CPT

## 2018-02-08 PROCEDURE — 97110 THERAPEUTIC EXERCISES: CPT

## 2018-02-08 NOTE — PROGRESS NOTES
PT DAILY TREATMENT NOTE 1216    Patient Name: Froylan Myers  Date:2018  : 1970  [x]  Patient  Verified  Payor: [place] / Plan: 1500 / Product Type: Medicaid /    In time:11:57  Out time:12:42  Total Treatment Time (min): 45  Visit #: 3 of 8    Treatment Area: Right shoulder pain [M25.511]    SUBJECTIVE  Pain Level (0-10 scale): 5   Any medication changes, allergies to medications, adverse drug reactions, diagnosis change, or new procedure performed?: [x] No    [] Yes (see summary sheet for update)  Subjective functional status/changes:   [] No changes reported  Pt reports delayed pain post exercise performance    OBJECTIVE    Modality rationale: decrease pain to improve the patients ability to perform daily tasks   Min Type Additional Details    [] Estim:  []Unatt       []IFC  []Premod                        []Other:  []w/ice   []w/heat  Position:  Location:    [] Estim: []Att    []TENS instruct  []NMES                    []Other:  []w/US   []w/ice   []w/heat  Position:  Location:    []  Traction: [] Cervical       []Lumbar                       [] Prone          []Supine                       []Intermittent   []Continuous Lbs:  [] before manual  [] after manual    []  Ultrasound: []Continuous   [] Pulsed                           []1MHz   []3MHz W/cm2:  Location:    []  Iontophoresis with dexamethasone         Location: [] Take home patch   [] In clinic   10 [x]  Ice     []  heat  []  Ice massage  []  Laser   []  Anodyne Position: seated  Location: R shoulder    []  Laser with stim  []  Other:  Position:  Location:    []  Vasopneumatic Device Pressure:       [] lo [] med [] hi   Temperature: [] lo [] med [] hi   [] Skin assessment post-treatment:  []intact []redness- no adverse reaction    []redness  adverse reaction:       27 min Therapeutic Exercise:  [] See flow sheet :   Rationale: increase ROM and increase strength to improve the patients ability to perform daily tasks     8 min Manual Therapy:  PROM R shoulder, capsule stretching   Rationale: increase ROM and increase tissue extensibility to improve joint mobility with ADLs          With   [] TE   [] TA   [] neuro   [] other: Patient Education: [x] Review HEP    [] Progressed/Changed HEP based on:   [] positioning   [] body mechanics   [] transfers   [] heat/ice application    [] other:      Other Objective/Functional Measures: initiated s/l AROM today ER & ABD, limited range but good tolerance     Pain Level (0-10 scale) post treatment: 6    ASSESSMENT/Changes in Function: Pt with improved performance of AAROM interventions against gravity today. She still has sticking point near 110 deg elevation where motor control is lacking. Will attempt further progression of A/AROM and assess response    Patient will continue to benefit from skilled PT services to modify and progress therapeutic interventions, address functional mobility deficits, address ROM deficits, address strength deficits, analyze and address soft tissue restrictions, analyze and cue movement patterns and analyze and modify body mechanics/ergonomics to attain remaining goals. []  See Plan of Care  []  See progress note/recertification  []  See Discharge Summary         Progress towards goals / Updated goals:  Updated Goals: to be achieved in 4 weeks:  1. Pt will demonstrate AAROM elevation to 120 deg for progression of functional tasks. Not met in inclined position 2/6/18  2. Pt will demonstrate AROM R shoulder flexion to 100 deg for improved ADL performance. 3. Pt will improve R shoulder EMILEE to C2 for improved self care performance.     PLAN  []  Upgrade activities as tolerated     []  Continue plan of care  []  Update interventions per flow sheet       []  Discharge due to:_  []  Other:_      Arianna Guerra DPT, CMTPT 2/8/2018  12:01 PM    Future Appointments  Date Time Provider Colt Jovel   2/13/2018 3:00 PM [facility] 2/15/2018 2:00 PM [id number] [facility]   2/20/2018 1:00 PM Yolande Eckert, [address] [facility]

## 2018-02-08 NOTE — TELEPHONE ENCOUNTER
Pt left refill request on refill line:  Post op global 02-27-18    Pt requesting for norco 10/325  Pt to  at the [facility] location    Please call when ready [phone]

## 2018-02-09 RX ORDER — HYDROCODONE BITARTRATE AND ACETAMINOPHEN 7.5; 325 MG/1; MG/1
1 TABLET ORAL
Qty: 40 TAB | Refills: 0 | Status: SHIPPED | OUTPATIENT
Start: 2018-02-09 | End: 2018-05-16

## 2018-02-09 NOTE — TELEPHONE ENCOUNTER
Spoke with patient and informed her that rx is ready to be picked up at our [facility] location.

## 2018-02-13 ENCOUNTER — HOSPITAL ENCOUNTER (OUTPATIENT)
Dept: PHYSICAL THERAPY | Age: 48
Discharge: HOME | End: 2018-02-13
Payer: SUBSIDIZED

## 2018-02-13 PROCEDURE — 97110 THERAPEUTIC EXERCISES: CPT

## 2018-02-13 PROCEDURE — 97140 MANUAL THERAPY 1/> REGIONS: CPT

## 2018-02-13 NOTE — PROGRESS NOTES
PT DAILY TREATMENT NOTE     Patient Name: Liane Lindsey  Date:2018  : 1970  [x]  Patient  Verified  Payor: [place] / Plan: 1500 / Product Type: Medicaid /    In time:3:00  Out time:3:47  Total Treatment Time (min): 47  Visit #: 4 of 8    Treatment Area: Right shoulder pain [M25.511]    SUBJECTIVE  Pain Level (0-10 scale): 4  Any medication changes, allergies to medications, adverse drug reactions, diagnosis change, or new procedure performed?: [x] No    [] Yes (see summary sheet for update)  Subjective functional status/changes:   [] No changes reported  Pt reports her shoulder is about the same. She reports she forgot how to do some of her wand exercises.     OBJECTIVE    Modality rationale: decrease pain to improve the patients ability to perform daily tasks   Min Type Additional Details    [] Estim:  []Unatt       []IFC  []Premod                        []Other:  []w/ice   []w/heat  Position:  Location:    [] Estim: []Att    []TENS instruct  []NMES                    []Other:  []w/US   []w/ice   []w/heat  Position:  Location:    []  Traction: [] Cervical       []Lumbar                       [] Prone          []Supine                       []Intermittent   []Continuous Lbs:  [] before manual  [] after manual    []  Ultrasound: []Continuous   [] Pulsed                           []1MHz   []3MHz W/cm2:  Location:    []  Iontophoresis with dexamethasone         Location: [] Take home patch   [] In clinic   10 [x]  Ice     []  heat  []  Ice massage  []  Laser   []  Anodyne Position: seated  Location: R shoulder    []  Laser with stim  []  Other:  Position:  Location:    []  Vasopneumatic Device Pressure:       [] lo [] med [] hi   Temperature: [] lo [] med [] hi   [] Skin assessment post-treatment:  []intact []redness- no adverse reaction    []redness  adverse reaction:     29 min Therapeutic Exercise:  [] See flow sheet :   Rationale: increase ROM and increase strength to improve the patients ability to perform functional tasks    8 min Manual Therapy:  PROM R shoulder, GH inf mobs grade III   Rationale: increase ROM and increase tissue extensibility to improve ease of ADLs              With   [] TE   [] TA   [] neuro   [] other: Patient Education: [x] Review HEP    [] Progressed/Changed HEP based on:   [] positioning   [] body mechanics   [] transfers   [] heat/ice application    [] other:      Other Objective/Functional Measures: pt still unable to perform wall slides without L UE assist to elevate from start position     S/l flexion and ABD AROM limited to ~75 deg each    Pain Level (0-10 scale) post treatment: 5    ASSESSMENT/Changes in Function: Pt still demonstrating difficulty with AAROM against gravity. Improved with ability to attain OH flexion with wand but notable shift around 90 deg, especially lowering. Limited strength against gravity requiring assist with wall slides. Pt to f/u with MD on 2/20 may await MD f/u to decide further POC    Patient will continue to benefit from skilled PT services to modify and progress therapeutic interventions, address functional mobility deficits, address ROM deficits, address strength deficits, analyze and address soft tissue restrictions, analyze and cue movement patterns and analyze and modify body mechanics/ergonomics to attain remaining goals. []  See Plan of Care  []  See progress note/recertification  []  See Discharge Summary         Progress towards goals / Updated goals:  Updated Goals: to be achieved in 4 weeks:  1. Pt will demonstrate AAROM elevation to 120 deg for progression of functional tasks. Not met in inclined position 2/6/18, met for elevation in inclined position 2/13/18  2. Pt will demonstrate AROM R shoulder flexion to 100 deg for improved ADL performance. 3. Pt will improve R shoulder EMILEE to C2 for improved self care performance.     PLAN  [x]  Upgrade activities as tolerated     [x]  Continue plan of care  []  Update interventions per flow sheet       []  Discharge due to:_  []  Other:_      Elzbieta Choi DPT, CMTPT 2/13/2018  2:53 PM    Future Appointments  Date Time Provider Colt Jovel   2/13/2018 3:00 PM [address] [facility]   2/15/2018 12:00 PM [address] [facility]   2/20/2018 1:00 PM HAMZAH Urias [place] 67

## 2018-02-15 ENCOUNTER — HOSPITAL ENCOUNTER (OUTPATIENT)
Dept: PHYSICAL THERAPY | Age: 48
Discharge: HOME | End: 2018-02-15
Payer: SUBSIDIZED

## 2018-02-15 PROCEDURE — 97110 THERAPEUTIC EXERCISES: CPT

## 2018-02-15 PROCEDURE — 97140 MANUAL THERAPY 1/> REGIONS: CPT

## 2018-02-15 NOTE — PROGRESS NOTES
PT DAILY TREATMENT NOTE     Patient Name: Moriah Cline  Date:2/15/2018  : 1970  [x]  Patient  Verified  Payor: [place] / Plan: 1500 / Product Type: Medicaid /    In time:10:30  Out time:11:18  Total Treatment Time (min): 48  Visit #: 5 of 8    Treatment Area: Right shoulder pain [M25.511]    SUBJECTIVE  Pain Level (0-10 scale): 4  Any medication changes, allergies to medications, adverse drug reactions, diagnosis change, or new procedure performed?: [x] No    [] Yes (see summary sheet for update)  Subjective functional status/changes:   [] No changes reported  Pt reports attempting some of her updated HEP but needs more instruction on s/l abd.  Pt reports performing her HEP 2x/day    OBJECTIVE    Modality rationale: decrease pain to improve the patients ability to perform daily tasks   Min Type Additional Details    [] Estim:  []Unatt       []IFC  []Premod                        []Other:  []w/ice   []w/heat  Position:  Location:    [] Estim: []Att    []TENS instruct  []NMES                    []Other:  []w/US   []w/ice   []w/heat  Position:  Location:    []  Traction: [] Cervical       []Lumbar                       [] Prone          []Supine                       []Intermittent   []Continuous Lbs:  [] before manual  [] after manual    []  Ultrasound: []Continuous   [] Pulsed                           []1MHz   []3MHz W/cm2:  Location:    []  Iontophoresis with dexamethasone         Location: [] Take home patch   [] In clinic   10 [x]  Ice     []  heat  []  Ice massage  []  Laser   []  Anodyne Position: seated  Location: R shoulder    []  Laser with stim  []  Other:  Position:  Location:    []  Vasopneumatic Device Pressure:       [] lo [] med [] hi   Temperature: [] lo [] med [] hi   [] Skin assessment post-treatment:  []intact []redness- no adverse reaction    []redness  adverse reaction:     30 min Therapeutic Exercise:  [] See flow sheet :   Rationale: increase ROM and increase strength to improve the patients ability to perform daily tasks and ADLs     8 min Manual Therapy:  PROM R shoulder, GH post/inf mobs grade III   Rationale: increase ROM and increase tissue extensibility to improve ease of ADLs          With   [] TE   [] TA   [] neuro   [] other: Patient Education: [x] Review HEP    [] Progressed/Changed HEP based on:   [] positioning   [] body mechanics   [] transfers   [] heat/ice application    [] other:      Other Objective/Functional Measures:      Pain Level (0-10 scale) post treatment: 5    ASSESSMENT/Changes in Function: Pt still quite limited with scaption AAROM and wall slides. Limited s/l AROM past 50-60 deg. Pt to see MD on 2/20. Will hold PT until f/u completed    Patient will continue to benefit from skilled PT services to modify and progress therapeutic interventions, address functional mobility deficits, address ROM deficits, address strength deficits, analyze and address soft tissue restrictions, analyze and cue movement patterns and analyze and modify body mechanics/ergonomics to attain remaining goals. []  See Plan of Care  []  See progress note/recertification  []  See Discharge Summary         Progress towards goals / Updated goals:  Updated Goals: to be achieved in 4 weeks:  1. Pt will demonstrate AAROM elevation to 120 deg for progression of functional tasks. Not met in inclined position 2/6/18, met for elevation in inclined position 2/13/18  2. Pt will demonstrate AROM R shoulder flexion to 100 deg for improved ADL performance. 3. Pt will improve R shoulder EMILEE to C2 for improved self care performance.     PLAN  [x]  Upgrade activities as tolerated     []  Continue plan of care  []  Update interventions per flow sheet       []  Discharge due to:_  []  Other:_      Kamilah Hernandez DPT, CMTPT 2/15/2018  10:36 AM    Future Appointments  Date Time Provider Colt Jovel   2/20/2018 1:00 PM HAMZAH Rousseau [place] 67

## 2018-02-20 ENCOUNTER — OFFICE VISIT (OUTPATIENT)
Dept: ORTHOPEDIC SURGERY | Age: 48
End: 2018-02-20

## 2018-02-20 VITALS
BODY MASS INDEX: 33.27 KG/M2 | DIASTOLIC BLOOD PRESSURE: 52 MMHG | TEMPERATURE: 98.2 F | HEIGHT: 66 IN | WEIGHT: 207 LBS | SYSTOLIC BLOOD PRESSURE: 112 MMHG | HEART RATE: 65 BPM | OXYGEN SATURATION: 97 %

## 2018-02-20 DIAGNOSIS — M75.121 COMPLETE TEAR OF RIGHT ROTATOR CUFF: Primary | ICD-10-CM

## 2018-02-20 RX ORDER — HYDROCODONE BITARTRATE AND ACETAMINOPHEN 5; 325 MG/1; MG/1
1 TABLET ORAL
Qty: 40 TAB | Refills: 0 | Status: SHIPPED | OUTPATIENT
Start: 2018-02-20 | End: 2018-03-15 | Stop reason: SDUPTHER

## 2018-02-20 NOTE — PROGRESS NOTES
Hai Newman  1970     HISTORY OF PRESENT ILLNESS  Hai Newman is a 50 y.o. female who presents today for evaluation s/p Right shoulder arthroscopic Superior Capsular Reconstruction on 11/29/17. Patient has been going to PT and feels it is helping her ROM. Describes pain as a 7/10. She feels a crunching in her shoulder that does not hurt. Her biggest complaint is weakness. She states she feels slightly better than her last follow up visit. Patient denies any fever, chills, chest pain, shortness of breath or calf pain. There are no new illness or injuries to report since last seen in the office. PHYSICAL EXAM:   Visit Vitals    /52    Pulse 65    Temp 98.2 °F (36.8 °C)    Ht 5' 6\" (1.676 m)    Wt 207 lb (93.9 kg)    SpO2 97%    BMI 33.41 kg/m2      The patient is a well-developed, well-nourished female in no acute distress. The patient is alert and oriented times three. The patient appears to be well groomed. Mood and affect are normal.  ORTHOPEDIC EXAM of right shoulder:  Inspection: swelling not present,  Bruising not present  Incision well healed  Passive glenohumeral abduction 0-90 degrees, able to reach to side of face  Stability: Stable  Strength: n/a  2+ distal pulses    IMPRESSION:  S/P Right shoulder arthroscopic superior capsular reconstruction    PLAN:   1. Patient with some post operative pain slightly improved. Healing delayed secondary to smoking  2. Will cont with PT working on gentle strengthening  3.  Encouraged patient to try to use arm more on ADLS       RTC 6 weeks    ERIC [facility]

## 2018-02-21 ENCOUNTER — HOSPITAL ENCOUNTER (OUTPATIENT)
Dept: PHYSICAL THERAPY | Age: 48
Discharge: HOME | End: 2018-02-21
Payer: SUBSIDIZED

## 2018-02-21 PROCEDURE — 97110 THERAPEUTIC EXERCISES: CPT

## 2018-02-21 PROCEDURE — 97140 MANUAL THERAPY 1/> REGIONS: CPT

## 2018-02-21 NOTE — PROGRESS NOTES
PT DAILY TREATMENT NOTE 3-16    Patient Name: Ankush Lundberg  Date:2018  : 1970  [x]  Patient  Verified  Payor: [place] / Plan: 1500 / Product Type: Medicaid /    In time:1:00  Out time:1:44  Total Treatment Time (min): 44  Visit #: 6 of 8    Treatment Area: Right shoulder pain [M25.511]    SUBJECTIVE  Pain Level (0-10 scale): 6  Any medication changes, allergies to medications, adverse drug reactions, diagnosis change, or new procedure performed?: [x] No    [] Yes (see summary sheet for update)  Subjective functional status/changes:   [] No changes reported  Patient with MD follow up yesterday. Per script, will continue per POC initiating gentle strengthening as patient is able.      OBJECTIVE  Modality rationale: decrease inflammation and decrease pain to improve the patients ability to ease ADL tolerance   Min Type Additional Details    [] Estim:  []Unatt       []IFC  []Premod                        []Other:  []w/ice   []w/heat  Position:  Location:    [] Estim: []Att    []TENS instruct  []NMES                    []Other:  []w/US   []w/ice   []w/heat  Position:  Location:    []  Traction: [] Cervical       []Lumbar                       [] Prone          []Supine                       []Intermittent   []Continuous Lbs:  [] before manual  [] after manual    []  Ultrasound: []Continuous   [] Pulsed                           []1MHz   []3MHz Location:  W/cm2:    []  Iontophoresis with dexamethasone         Location: [] Take home patch   [] In clinic   10 [x]  Ice     []  heat  []  Ice massage  []  Laser   []  Anodyne Position: seated  Location: right shoulder    []  Laser with stim  []  Other: Position:  Location:    []  Vasopneumatic Device Pressure:       [] lo [] med [] hi   Temperature: [] lo [] med [] hi   [] Skin assessment post-treatment:  []intact []redness- no adverse reaction    []redness  adverse reaction:     26 min Therapeutic Exercise:  [x] See flow sheet :   Rationale: increase ROM, increase strength and improve coordination to improve the patients ability to increase ease with ADLs    8 min Manual Therapy:  PROM right shoulder, grade II-III posterior/inferior GHJ mobs   Rationale: decrease pain, increase ROM and increase tissue extensibility to ease ADL tolerance           With   [] TE   [] TA   [] neuro   [] other: Patient Education: [x] Review HEP    [] Progressed/Changed HEP based on:   [] positioning   [] body mechanics   [] transfers   [] heat/ice application    [] other:      Other Objective/Functional Measures:   Significantly limited AROM range, observationally: ABD/flex ~45 degrees     Pain Level (0-10 scale) post treatment: 6    ASSESSMENT/Changes in Function:   Continues to have significant difficulty with AAROM and AROM. Patient will continue to benefit from skilled PT services to modify and progress therapeutic interventions, address functional mobility deficits, address ROM deficits, address strength deficits, analyze and address soft tissue restrictions, analyze and cue movement patterns, analyze and modify body mechanics/ergonomics and assess and modify postural abnormalities to attain remaining goals. []  See Plan of Care  []  See progress note/recertification  []  See Discharge Summary         Progress towards goals / Updated goals:  Updated Goals: to be achieved in 4 weeks:  1. Pt will demonstrate AAROM elevation to 120 deg for progression of functional tasks. Not met in inclined position 2/6/18, met for elevation in inclined position 2/13/18  2. Pt will demonstrate AROM R shoulder flexion to 100 deg for improved ADL performance.-not kylie, s/l AROM right flexion observationally ~45 degrees (2/21/2018)  3. Pt will improve R shoulder EMILEE to C2 for improved self care performance.     PLAN  []  Upgrade activities as tolerated     [x]  Continue plan of care  []  Update interventions per flow sheet       []  Discharge due to:_  []  Other:_ Daxa Ospina 2/21/2018  1:03 PM    Future Appointments  Date Time Provider Colt Jovel   2/23/2018 3:30 PM [id number] [facility]   2[facility]26[facility]2018 9[facility]30 [facility]   2/28/2018 10:30 AM Glorine Councilman [facility]   4/3/2018 1:00 PM HAMZAH Jimenez

## 2018-02-23 ENCOUNTER — HOSPITAL ENCOUNTER (OUTPATIENT)
Dept: PHYSICAL THERAPY | Age: 48
Discharge: HOME | End: 2018-02-23
Payer: SUBSIDIZED

## 2018-02-23 PROCEDURE — 97140 MANUAL THERAPY 1/> REGIONS: CPT

## 2018-02-23 PROCEDURE — 97110 THERAPEUTIC EXERCISES: CPT

## 2018-02-23 NOTE — PROGRESS NOTES
PT DAILY TREATMENT NOTE     Patient Name: Devyn Xavier  Date:2018  : 1970  [x]  Patient  Verified  Payor: [place] / Plan: 1500 / Product Type: Medicaid /    In time:3:31  Out time:4:18  Total Treatment Time (min): 52  Visit #: 7 of 8    Treatment Area: Right shoulder pain [M25.511]    SUBJECTIVE  Pain Level (0-10 scale): 4  Any medication changes, allergies to medications, adverse drug reactions, diagnosis change, or new procedure performed?: [x] No    [] Yes (see summary sheet for update)  Subjective functional status/changes:   [] No changes reported  \"She wasn't as impressed as she hoped to be\" pt reports in regards to her MD f/u    OBJECTIVE    Modality rationale: decrease pain to improve the patients ability to perform daily tasks and ADLs   Min Type Additional Details    [] Estim:  []Unatt       []IFC  []Premod                        []Other:  []w/ice   []w/heat  Position:  Location:    [] Estim: []Att    []TENS instruct  []NMES                    []Other:  []w/US   []w/ice   []w/heat  Position:  Location:    []  Traction: [] Cervical       []Lumbar                       [] Prone          []Supine                       []Intermittent   []Continuous Lbs:  [] before manual  [] after manual    []  Ultrasound: []Continuous   [] Pulsed                           []1MHz   []3MHz W/cm2:  Location:    []  Iontophoresis with dexamethasone         Location: [] Take home patch   [] In clinic   10 [x]  Ice     []  heat  []  Ice massage  []  Laser   []  Anodyne Position: seated   Location: R shoulder    []  Laser with stim  []  Other:  Position:  Location:    []  Vasopneumatic Device Pressure:       [] lo [] med [] hi   Temperature: [] lo [] med [] hi   [] Skin assessment post-treatment:  []intact []redness- no adverse reaction    []redness  adverse reaction:       29 min Therapeutic Exercise:  [] See flow sheet :   Rationale: increase ROM and increase strength to improve the patients ability to perform ADLs    8 min Manual Therapy:  PROM R shoulder GH post/inf mobs grade III   Rationale: increase ROM and increase tissue extensibility to perform daily tasks and ADLs          With   [] TE   [] TA   [] neuro   [] other: Patient Education: [x] Review HEP    [] Progressed/Changed HEP based on:   [] positioning   [] body mechanics   [] transfers   [] heat/ice application    [] other:      Other Objective/Functional Measures: added standing wand ER/IR/EXT    Attempted hoop today with high chair but pt unable to achieve AROM elevation high enough to complete     Pain Level (0-10 scale) post treatment: 5    ASSESSMENT/Changes in Function: Pt still quite challenged with AAROM against gravity as well as AROM in s/l limited to partial range. PROM good at this time, will continue to attempt further strengthening as able. Patient will continue to benefit from skilled PT services to modify and progress therapeutic interventions, address functional mobility deficits, address ROM deficits, address strength deficits, analyze and address soft tissue restrictions, analyze and cue movement patterns and analyze and modify body mechanics/ergonomics to attain remaining goals. []  See Plan of Care  []  See progress note/recertification  []  See Discharge Summary         Progress towards goals / Updated goals:  Updated Goals: to be achieved in 4 weeks:  1. Pt will demonstrate AAROM elevation to 120 deg for progression of functional tasks. Not met in inclined position 2/6/18, met for elevation in inclined position 2/13/18  2. Pt will demonstrate AROM R shoulder flexion to 100 deg for improved ADL performance.-not kylie, s/l AROM right flexion observationally ~45 degrees (2/21/2018)  3. Pt will improve R shoulder EMILEE to C2 for improved self care performance.     PLAN  []  Upgrade activities as tolerated     []  Continue plan of care  []  Update interventions per flow sheet       []  Discharge due to:_  []  Other:_      Troy King DPT, CMTPT 2/23/2018  3:38 PM    Future Appointments  Date Time Provider Colt Jovel   2/26/2018 9:30 AM [facility]   2/28/2018 10:30 AM [facility]   4/3/2018 1:00 PM HAMZAH Herman [facility]

## 2018-02-26 ENCOUNTER — HOSPITAL ENCOUNTER (OUTPATIENT)
Dept: PHYSICAL THERAPY | Age: 48
Discharge: HOME | End: 2018-02-26
Payer: SUBSIDIZED

## 2018-02-26 PROCEDURE — 97110 THERAPEUTIC EXERCISES: CPT

## 2018-02-26 PROCEDURE — 97140 MANUAL THERAPY 1/> REGIONS: CPT

## 2018-02-26 NOTE — PROGRESS NOTES
[facility]  [address]  [place], [address]  [phone] fax    Physical Therapy Progress Note  Patient name: Froylan Myers Start of Care: 2017   Referral source: Brad Francescaosmar,* : 1970                          Medical Diagnosis: Right shoulder pain [M25.511] Onset Date:2017                          Treatment Diagnosis: R RTC repair with allograft s/p 2017   Prior Hospitalization: see medical history Provider#: [id number]   Medications: Verified on Patient summary List    Comorbidities: Tobacco use, Depression, Arthritis, BMI > 30, Asthma, Hearing impairment, Recent rotator cuff repair.   Prior Level of Function: The patient states that she could not lift her arm or reach forward prior to surgery. Visits from Start of Care: 25    Missed Visits: 1      Established Goals:        Excellent         Good         Limited            None  [x] Increased ROM   []  []  [x]  []  [x] Increased Strength  []  []  [x]  []  [x] Increased Mobility  []  []  [x]  []   [] Decreased Pain   []  []  []  []  [] Decreased Swelling  []  []  []  []    Key Functional Changes: slow progression of A/AROM R shoulder  Updated Goals: to be achieved in 4 weeks:  1. Pt will demonstrate AAROM elevation to 120 deg for progression of functional tasks. Not met in inclined position 18, met for elevation in inclined position 18 not met in standing ~110 deg 18  2. Pt will demonstrate AROM R shoulder flexion to 100 deg for improved ADL performance.-not kylie, s/l AROM right flexion observationally ~45 degrees (2018)  3. Pt will improve R shoulder EMILEE to C2 for improved self care performance. Not met unable to reach face 18    Updated Goals: to be achieved in 3 weeks:  1. Pt will demonstrate R shoulder elevation and abd to 90 deg for improved ease of ADLs.   2. Pt will improve R shoulder EMILEE to face for improved self care performance. ASSESSMENT/RECOMMENDATIONS: Pt still limited with progression of A/AROM at this time. She reports continued pain with elevation above 90 deg with poor gliding mechanics of humerus. Will continue PT to attempt further improvement in functional mobility/strength. [x]Continue therapy per initial plan/protocol at a frequency of  2 x per week for 3 weeks  []Continue therapy with the following recommended changes:_____________________      _____________________________________________________________________  []Discontinue therapy progressing towards or have reached established goals  []Discontinue therapy due to lack of appreciable progress towards goals  []Discontinue therapy due to lack of attendance or compliance  []Await Physician's recommendations/decisions regarding therapy  []Other:________________________________________________________________    Thank you for this referral.    Gabriela Ashley DPT, CMTPT 2/26/2018 11:03 AM  NOTE TO PHYSICIAN:  PLEASE COMPLETE THE ORDERS BELOW AND   FAX TO [facility]: [phone]  If you are unable to process this request in 24 hours please contact our office: [phone]    ? I have read the above report and request that my patient continue as recommended. ? I have read the above report and request that my patient continue therapy with the following changes/special instructions:__________________________________________________________  ? I have read the above report and request that my patient be discharged from therapy.     Physicians signature: ______________________________Date: ______Time:______

## 2018-02-26 NOTE — PROGRESS NOTES
PT DAILY TREATMENT NOTE     Patient Name: Hailey Moore  Date:2018  : 1970  [x]  Patient  Verified  Payor: [place] / Plan: 1500 / Product Type: Medicaid /    In time:9:30  Out time:10:23  Total Treatment Time (min): 53  Visit #: 8 of 8    Treatment Area: Right shoulder pain [M25.511]    SUBJECTIVE  Pain Level (0-10 scale): 4  Any medication changes, allergies to medications, adverse drug reactions, diagnosis change, or new procedure performed?: [x] No    [] Yes (see summary sheet for update)  Subjective functional status/changes:   [] No changes reported  \"I'm getting very discouraged\"    OBJECTIVE    Modality rationale: decrease pain to improve the patients ability to perform ADLs   Min Type Additional Details    [] Estim:  []Unatt       []IFC  []Premod                        []Other:  []w/ice   []w/heat  Position:  Location:    [] Estim: []Att    []TENS instruct  []NMES                    []Other:  []w/US   []w/ice   []w/heat  Position:  Location:    []  Traction: [] Cervical       []Lumbar                       [] Prone          []Supine                       []Intermittent   []Continuous Lbs:  [] before manual  [] after manual    []  Ultrasound: []Continuous   [] Pulsed                           []1MHz   []3MHz W/cm2:  Location:    []  Iontophoresis with dexamethasone         Location: [] Take home patch   [] In clinic   10 [x]  Ice     []  heat  []  Ice massage  []  Laser   []  Anodyne Position: seated  Location: R shoulder    []  Laser with stim  []  Other:  Position:  Location:    []  Vasopneumatic Device Pressure:       [] lo [] med [] hi   Temperature: [] lo [] med [] hi   [] Skin assessment post-treatment:  []intact []redness- no adverse reaction    []redness  adverse reaction:         35 min Therapeutic Exercise:  [] See flow sheet :   Rationale: increase ROM and increase strength to improve the patients ability to perform daily tasks    8 min Manual Therapy:  PROM R shoulder [address] [facility] mobs post/inf grade III, STJ mobs    Rationale: increase ROM and increase tissue extensibility to improve functional mobility          With   [] TE   [] TA   [] neuro   [] other: Patient Education: [x] Review HEP    [] Progressed/Changed HEP based on:   [] positioning   [] body mechanics   [] transfers   [] heat/ice application    [] other:      Other Objective/Functional Measures: ~110 deg AAROM elevation in standing    EMILEE unable to reach face     Pain Level (0-10 scale) post treatment: 6    ASSESSMENT/Changes in Function: Pt still limited with progression of A/AROM at this time. She reports continued pain with elevation above 90 deg with poor gliding mechanics of humerus. Will continue PT to attempt further improvement in functional mobility/strength. Patient will continue to benefit from skilled PT services to modify and progress therapeutic interventions, address functional mobility deficits, address ROM deficits, address strength deficits, analyze and address soft tissue restrictions, analyze and cue movement patterns and analyze and modify body mechanics/ergonomics to attain remaining goals. []  See Plan of Care  [x]  See progress note/recertification  []  See Discharge Summary         Progress towards goals / Updated goals:  Updated Goals: to be achieved in 4 weeks:  1. Pt will demonstrate AAROM elevation to 120 deg for progression of functional tasks. Not met in inclined position 2/6/18, met for elevation in inclined position 2/13/18 not met in standing ~110 deg 2/26/18  2. Pt will demonstrate AROM R shoulder flexion to 100 deg for improved ADL performance.-not kylie, s/l AROM right flexion observationally ~45 degrees (2/21/2018)  3. Pt will improve R shoulder EMILEE to C2 for improved self care performance.  Not met unable to reach face 2/26/18    PLAN  [x]  Upgrade activities as tolerated     []  Continue plan of care  []  Update interventions per flow sheet       [] Discharge due to:_  []  Other:_      Ailyn Christie DPT, CMTPT 2/26/2018  9:44 AM    Future Appointments  Date Time Provider Colt Jovel   2/28/2018 10:30 [facility]   3/5/2018 2:00 PM [id number] [facility]   3/8/2018 4:30 PM [facility]   3/12/2018 11:30 AM [facility]   3/14/2018 11:30 AM [facility]   4/3/2018 1:00 PM HAMZAH Cespedes 67

## 2018-02-28 ENCOUNTER — HOSPITAL ENCOUNTER (OUTPATIENT)
Dept: PHYSICAL THERAPY | Age: 48
Discharge: HOME | End: 2018-02-28
Payer: SUBSIDIZED

## 2018-02-28 PROCEDURE — 97112 NEUROMUSCULAR REEDUCATION: CPT

## 2018-02-28 PROCEDURE — 97140 MANUAL THERAPY 1/> REGIONS: CPT

## 2018-02-28 PROCEDURE — 97110 THERAPEUTIC EXERCISES: CPT

## 2018-02-28 NOTE — PROGRESS NOTES
PT DAILY TREATMENT NOTE     Patient Name: Clair Sanchez  Date:2018  : 1970  [x]  Patient  Verified  Payor: [place] / Plan: 1500 / Product Type: Medicaid /    In time:10:30  Out time:11:25  Total Treatment Time (min): 55  Visit #: 1 of 6    Treatment Area: Right shoulder pain [M25.511]    SUBJECTIVE  Pain Level (0-10 scale): 5  Any medication changes, allergies to medications, adverse drug reactions, diagnosis change, or new procedure performed?: [x] No    [] Yes (see summary sheet for update)  Subjective functional status/changes:   [] No changes reported  \"it doesn't feel too bad today\" Pt reports she is a little discouraged she didn't get her normal height on the pulleys today    OBJECTIVE    Modality rationale: decrease pain to improve the patients ability to perform ADLs   Min Type Additional Details    [] Estim:  []Unatt       []IFC  []Premod                        []Other:  []w/ice   []w/heat  Position:  Location:    [] Estim: []Att    []TENS instruct  []NMES                    []Other:  []w/US   []w/ice   []w/heat  Position:  Location:    []  Traction: [] Cervical       []Lumbar                       [] Prone          []Supine                       []Intermittent   []Continuous Lbs:  [] before manual  [] after manual    []  Ultrasound: []Continuous   [] Pulsed                           []1MHz   []3MHz W/cm2:  Location:    []  Iontophoresis with dexamethasone         Location: [] Take home patch   [] In clinic   10 [x]  Ice     []  heat  []  Ice massage  []  Laser   []  Anodyne Position: seated  Location: R shoulder    []  Laser with stim  []  Other:  Position:  Location:    []  Vasopneumatic Device Pressure:       [] lo [] med [] hi   Temperature: [] lo [] med [] hi   [] Skin assessment post-treatment:  []intact []redness- no adverse reaction    []redness  adverse reaction:         29 min Therapeutic Exercise:  [] See flow sheet :   Rationale: increase ROM and increase strength to improve the patients ability to perform daily tasks    8 min Neuromuscular Re-education:  []  See flow sheet : eccentric lowering of R shoulder flexion and ABD   Rationale: increase strength  to improve the patients ability to improve R shoulder strength    8 min Manual Therapy:  R shoulder PROM, post & inf GH mobs grade III, STJ mobs   Rationale: increase ROM and increase tissue extensibility to improve ease of ADLs          With   [] TE   [] TA   [] neuro   [] other: Patient Education: [x] Review HEP    [] Progressed/Changed HEP based on:   [] positioning   [] body mechanics   [] transfers   [] heat/ice application    [] other:      Other Objective/Functional Measures: pt unable to actively flex or abduct R UE to 90 deg, little to no eccentric control with lowering until reaching ~45 deg from rest      Pain Level (0-10 scale) post treatment: 6    ASSESSMENT/Changes in Function: Pt with limited progress in AROM. Still demonstrates difficulty with [facility] as well. Attempted more eccentric lowering exercises for strength improvement, poor ability noted. Patient will continue to benefit from skilled PT services to modify and progress therapeutic interventions, address functional mobility deficits, address ROM deficits, address strength deficits, analyze and address soft tissue restrictions, analyze and cue movement patterns and analyze and modify body mechanics/ergonomics to attain remaining goals. []  See Plan of Care  []  See progress note/recertification  []  See Discharge Summary         Progress towards goals / Updated goals:  Updated Goals: to be achieved in 3 weeks:  1. Pt will demonstrate R shoulder elevation and abd to 90 deg for improved ease of ADLs. 2. Pt will improve R shoulder EMILEE to face for improved self care performance.     PLAN  [x]  Upgrade activities as tolerated     []  Continue plan of care  []  Update interventions per flow sheet       []  Discharge due to:_  []  Other:_      Bryon Peralta, [facility]  2/28/2018  10:45 AM    Future Appointments  Date Time Provider Colt Jovel   3/5/2018 2:00 PM [id number] [address] [facility]   3/8/2018 4:30 PM [id number] [facility]   3/12/2018 11:30 [facility]   3/14/2018 11:30 AM [id number] [address] [facility]   4/3/2018 1:00 PM HAMZAH Ramirez [id number] [facility]

## 2018-03-05 ENCOUNTER — HOSPITAL ENCOUNTER (OUTPATIENT)
Dept: PHYSICAL THERAPY | Age: 48
Discharge: HOME | End: 2018-03-05
Payer: SUBSIDIZED

## 2018-03-05 PROCEDURE — 97140 MANUAL THERAPY 1/> REGIONS: CPT

## 2018-03-05 PROCEDURE — 97110 THERAPEUTIC EXERCISES: CPT

## 2018-03-05 NOTE — PROGRESS NOTES
PT DAILY TREATMENT NOTE     Patient Name: Estiven Marrero  Date:3/5/2018  : 1970  [x]  Patient  Verified  Payor: [place] / Plan: 1500 / Product Type: Medicaid /    In time:2:00  Out time:2:45  Total Treatment Time (min): 45  Visit #: 2 of 6    Treatment Area: Right shoulder pain [M25.511]    SUBJECTIVE  Pain Level (0-10 scale): 4  Any medication changes, allergies to medications, adverse drug reactions, diagnosis change, or new procedure performed?: [x] No    [] Yes (see summary sheet for update)  Subjective functional status/changes:   [] No changes reported  Pt reports her R UE AROM is unchanged outside of therapy.  Says she spoke to the PA-C in the waiting room and is considering setting up an appointment this week    OBJECTIVE    Modality rationale: decrease pain to improve the patients ability to perform ADLs   Min Type Additional Details    [] Estim:  []Unatt       []IFC  []Premod                        []Other:  []w/ice   []w/heat  Position:  Location:    [] Estim: []Att    []TENS instruct  []NMES                    []Other:  []w/US   []w/ice   []w/heat  Position:  Location:    []  Traction: [] Cervical       []Lumbar                       [] Prone          []Supine                       []Intermittent   []Continuous Lbs:  [] before manual  [] after manual    []  Ultrasound: []Continuous   [] Pulsed                           []1MHz   []3MHz W/cm2:  Location:    []  Iontophoresis with dexamethasone         Location: [] Take home patch   [] In clinic   10 [x]  Ice     []  heat  []  Ice massage  []  Laser   []  Anodyne Position: seated  Location: R shoulder    []  Laser with stim  []  Other:  Position:  Location:    []  Vasopneumatic Device Pressure:       [] lo [] med [] hi   Temperature: [] lo [] med [] hi   [] Skin assessment post-treatment:  []intact []redness- no adverse reaction    []redness  adverse reaction:         27 min Therapeutic Exercise:  [] See flow sheet :   Rationale: increase ROM and increase strength to improve the patients ability to perform daily tasks    8 min Manual Therapy:  PROM R shoulder, GH post mobs, post capsule stretching   Rationale: increase ROM and increase tissue extensibility to improve ADL ease            With   [] TE   [] TA   [] neuro   [] other: Patient Education: [x] Review HEP    [] Progressed/Changed HEP based on:   [] positioning   [] body mechanics   [] transfers   [] heat/ice application    [] other:      Other Objective/Functional Measures: 60 deg R shoulder flexion in supine  85 deg R shoulder ABD in supine    Pt unable to eccentrically lower past 45 deg elevation with PT assist to max elevation in standing     Pain Level (0-10 scale) post treatment: 6    ASSESSMENT/Changes in Function: Pt with minimal change in condition over the past 2+ weeks. She reports considering scheduling f/u with MD, discussed with pt that this is probably a good idea due to limited progress. Patient will continue to benefit from skilled PT services to modify and progress therapeutic interventions, address functional mobility deficits, address ROM deficits, address strength deficits, analyze and address soft tissue restrictions, analyze and cue movement patterns and analyze and modify body mechanics/ergonomics to attain remaining goals. []  See Plan of Care  []  See progress note/recertification  []  See Discharge Summary         Updated Goals: to be achieved in 3 weeks:  1. Pt will demonstrate R shoulder elevation and abd to 90 deg for improved ease of ADLs. Not met see above 3/5/18  2. Pt will improve R shoulder EMILEE to face for improved self care performance.     PLAN  [x]  Upgrade activities as tolerated     []  Continue plan of care  []  Update interventions per flow sheet       []  Discharge due to:_  []  Other:_      Marilyn Turner DPT, CMTPT 3/5/2018  2:01 PM    Future Appointments  Date Time Provider Colt Jovel   3/8/2018 4:30 PM Yumiko Noguera [facility]   3/12/2018 11:30 AM Yumiko Noguera [facility]   3/14/2018 11:30 AM Yumiko Noguera [facility]   4/3/2018 1:00 PM HAMZAH Ziegler [address]

## 2018-03-07 ENCOUNTER — APPOINTMENT (OUTPATIENT)
Dept: PHYSICAL THERAPY | Age: 48
End: 2018-03-07
Payer: SUBSIDIZED

## 2018-03-08 ENCOUNTER — OFFICE VISIT (OUTPATIENT)
Dept: ORTHOPEDIC SURGERY | Age: 48
End: 2018-03-08

## 2018-03-08 ENCOUNTER — APPOINTMENT (OUTPATIENT)
Dept: PHYSICAL THERAPY | Age: 48
End: 2018-03-08
Payer: SUBSIDIZED

## 2018-03-08 VITALS
HEIGHT: 66 IN | BODY MASS INDEX: 32.43 KG/M2 | SYSTOLIC BLOOD PRESSURE: 121 MMHG | DIASTOLIC BLOOD PRESSURE: 70 MMHG | WEIGHT: 201.8 LBS | HEART RATE: 75 BPM | TEMPERATURE: 97.3 F | OXYGEN SATURATION: 99 %

## 2018-03-08 DIAGNOSIS — M25.511 ACUTE PAIN OF RIGHT SHOULDER: ICD-10-CM

## 2018-03-08 DIAGNOSIS — M75.121 COMPLETE TEAR OF RIGHT ROTATOR CUFF: Primary | ICD-10-CM

## 2018-03-08 NOTE — PROGRESS NOTES
Darby Saeed  1970     HISTORY OF PRESENT ILLNESS  Darby Saeed is a 50 y.o. female who presents today for evaluation s/p Right shoulder arthroscopic Superior Capsular Reconstruction on 11/29/17. Patient has been going to PT and feels like she has gone \"down hill\" in the last 2 weeks. She states her pain has increased and her motion has significantly declined. Describes pain as a 7/10. She feels a crunching in her shoulder that does not hurt. Her biggest complaint is weakness. Patient is very discouraged. Denies trauma or increase in pain following PT. Patient denies any fever, chills, chest pain, shortness of breath or calf pain. There are no new illness or injuries to report since last seen in the office. PHYSICAL EXAM:   Visit Vitals    /70    Pulse 75    Temp 97.3 °F (36.3 °C)    Ht 5' 6\" (1.676 m)    Wt 201 lb 12.8 oz (91.5 kg)    SpO2 99%    BMI 32.57 kg/m2      The patient is a well-developed, well-nourished female in no acute distress. The patient is alert and oriented times three. The patient appears to be well groomed. Mood and affect are normal.  ORTHOPEDIC EXAM of right shoulder:  Inspection: swelling not present,  Bruising not present  , patient with noted high rising humerus on exam,Incision well healed  Passive glenohumeral abduction 0-50 degrees, unable to reach over head  Stability: Stable  Strength: n/a  2+ distal pulses    IMAGING:  3 view xray images of right shoulder read and reviewed by myself reveal high riding humerus consistent with cuff tear arthropathy     IMPRESSION:  S/P Right shoulder arthroscopic superior capsular reconstruction, cuff tear arthropathy    PLAN:   1. Patient with likely failure of superior capsule reconstruction likely from her smoking history  2. MRI to eval status of subscapularis tendon  3. discussed with patient how surgery had failed and will require a reverse total shoulder arthroplasty.  Patient has failed 2 arthroscopies in this shoulder.    4. Ok to hold on PT for now, cont with rom as tolerated       RTC following MRI    ERIC [facility]

## 2018-03-09 NOTE — PROGRESS NOTES
[facility] [address]  [place], [address]  [phone] fax    Physical Therapy Discharge Summary  Patient name: Lucy Tran Start of Care: 2017   Referral source: Champ Conner,* : 1970                          Medical Diagnosis: Right shoulder pain [M25.511] Onset Date:2017                          Treatment Diagnosis: R RTC repair with allograft s/p 2017   Prior Hospitalization: see medical history Provider#: [id number]   Medications: Verified on Patient summary List    Comorbidities: Tobacco use, Depression, Arthritis, BMI > 30, Asthma, Hearing impairment, Recent rotator cuff repair.   Prior Level of Function: The patient states that she could not lift her arm or reach forward prior to surgery. Visits from Start of Care: 27    Missed Visits: 1  Reporting Period : 2018 to 3/5/2018      Summary of Care:  Updated Goals: to be achieved in 3 weeks:  1. Pt will demonstrate R shoulder elevation and abd to 90 deg for improved ease of ADLs. Not met see above 3/5/18  2. Pt will improve R shoulder EMILEE to face for improved self care performance. ASSESSMENT/RECOMMENDATIONS: Pt with limited progress over the past 2-3 weeks, recent MD f/u note states failed healing of surgical site. Will D/C at this time in preparation for shoulder replacement surgery.     [x]Discontinue therapy: []Patient has reached or is progressing toward set goals      []Patient is non-compliant or has abdicated      [x]Due to lack of appreciable progress towards set goals    Maryse Patiño DPT, CMTPT 3/9/2018 9:44 AM

## 2018-03-12 ENCOUNTER — APPOINTMENT (OUTPATIENT)
Dept: PHYSICAL THERAPY | Age: 48
End: 2018-03-12
Payer: SUBSIDIZED

## 2018-03-14 ENCOUNTER — APPOINTMENT (OUTPATIENT)
Dept: PHYSICAL THERAPY | Age: 48
End: 2018-03-14
Payer: SUBSIDIZED

## 2018-03-15 DIAGNOSIS — M75.121 COMPLETE TEAR OF RIGHT ROTATOR CUFF: ICD-10-CM

## 2018-03-15 RX ORDER — HYDROCODONE BITARTRATE AND ACETAMINOPHEN 5; 325 MG/1; MG/1
1 TABLET ORAL
Qty: 21 TAB | Refills: 0 | Status: SHIPPED | OUTPATIENT
Start: 2018-03-15 | End: 2018-03-30 | Stop reason: SDUPTHER

## 2018-03-15 NOTE — TELEPHONE ENCOUNTER
[facility] reports the last fill date for Norco as 02/20/2018 for a 10 d/s. There appears to be no inconsistencies in regards to the prescribing of this medication. Last Visit: 03/08/2018 with PA Marylen Marina   Date of Surgery: 11/29/2017 Right shoulder arthroscopic Superior Capsular Reconstruction     Next Appointment: 04/03/2018 with PA Marylen Marina   Previous Refill Encounters: 02/20/2018 per PA Marylen Marina #[id number]    Requested Prescriptions     Pending Prescriptions Disp Refills    HYDROcodone-acetaminophen (NORCO) 5-325 mg per tablet 40 Tab 0     Sig: Take 1 Tab by mouth every six (6) hours as needed for Pain. Max Daily Amount: 4 Tabs.

## 2018-03-19 ENCOUNTER — HOSPITAL ENCOUNTER (OUTPATIENT)
Age: 48
Discharge: HOME | End: 2018-03-19
Attending: PHYSICIAN ASSISTANT
Payer: SUBSIDIZED

## 2018-03-19 DIAGNOSIS — M75.121 COMPLETE TEAR OF RIGHT ROTATOR CUFF: ICD-10-CM

## 2018-03-19 DIAGNOSIS — M25.511 ACUTE PAIN OF RIGHT SHOULDER: ICD-10-CM

## 2018-03-19 PROCEDURE — 73221 MRI JOINT UPR EXTREM W/O DYE: CPT

## 2018-03-21 ENCOUNTER — OFFICE VISIT (OUTPATIENT)
Dept: ORTHOPEDIC SURGERY | Age: 48
End: 2018-03-21

## 2018-03-21 VITALS
WEIGHT: 205.8 LBS | HEART RATE: 67 BPM | RESPIRATION RATE: 18 BRPM | OXYGEN SATURATION: 99 % | DIASTOLIC BLOOD PRESSURE: 61 MMHG | SYSTOLIC BLOOD PRESSURE: 111 MMHG | HEIGHT: 66 IN | BODY MASS INDEX: 33.07 KG/M2 | TEMPERATURE: 98.1 F

## 2018-03-21 DIAGNOSIS — M75.121 COMPLETE TEAR OF RIGHT ROTATOR CUFF: Primary | ICD-10-CM

## 2018-03-21 NOTE — PROGRESS NOTES
Nash Chakraborty  1970   Chief Complaint   Patient presents with    Shoulder Pain     Right        HISTORY OF PRESENT ILLNESS  Nash Chakraborty is a 50 y.o. female who presents today for reevaluation of right shoulder. She is s/p Right shoulder arthroscopic Superior Capsular Reconstruction on 11/29/17. Patient has been going to PT and feels like she has gone \"down hill\" in the last month. She states her pain has increased and her motion has significantly declined. Describes pain as a 7/10. She feels a crunching in her shoulder that does not hurt. Her biggest complaint is weakness. Patient is very discouraged. Denies trauma or increase in pain following PT. Patient denies any fever, chills, chest pain, shortness of breath or calf pain. There are no new illness or injuries to report since last seen in the office. .     Patient denies any fever, chills, chest pain, shortness of breath or calf pain. There are no new illness or injuries to report since last seen in the office. No changes in medications, allergies, social or family history. PHYSICAL EXAM:   Visit Vitals    /61    Pulse 67    Temp 98.1 °F (36.7 °C) (Oral)    Resp 18    Ht 5' 6\" (1.676 m)    Wt 205 lb 12.8 oz (93.4 kg)    SpO2 99%    BMI 33.22 kg/m2     The patient is a well-developed, well-nourished female   in no acute distress. The patient is alert and oriented times three. The patient is alert and oriented times three. Mood and affect are normal.  LYMPHATIC: lymph nodes are not enlarged and are within normal limits  SKIN: normal in color and non tender to palpation. There are no bruises or abrasions noted. NEUROLOGICAL: Motor sensory exam is within normal limits. Reflexes are equal bilaterally.  There is normal sensation to pinprick and light touch  MUSCULOSKELETAL:  Inspection: swelling not present,  Bruising not present  patient with noted high rising humerus on exam,Incision well healed  Passive glenohumeral abduction 0-50 degrees, unable to reach over head  Stability: Stable  Strength: n/a  2+ distal pulses       IMAGING: MRI right shoulder: 1. Interval superior glenohumeral capsule reconstruction superimposed on chronic  massive supraspinatus and infraspinatus tendon rupture with retraction and  muscular atrophy.     2. Complete bucket-handle tear of the superior labrum with displacement of the  superior labrum and the degenerated intra-articular biceps tendon as discussed. -Degeneration and split tear of the extra-articular biceps tendon which is  partially displaced within the substance of the subscapularis tendon deep to the  transverse ligament.     3. Subscapularis tendon degeneration with partial tearing extent of which  difficult to evaluate due to the degeneration and the presence of the displaced  biceps tendon.     4. Moderate glenohumeral joint effusion with synovitis and chondral loss. Chronic superior subluxation/dislocation of the glenohumeral joint. IMPRESSION:      ICD-10-CM ICD-9-CM    1. Complete tear of right rotator cuff M75.121 727.61         PLAN:   1. Patient with failure of previous SCR. Given failure next step would be reverse total shoulder arthroplasty. Risk factors include: smoking  I discussed the risks and benefits and potential adverse outcomes of both operative vs non operative treatment of right shoulder cuff tear arthropathy with the patient and patient wishes to proceed with right shoulder reverse arthroplasty. Risks of operative intervention include but not limited to bleeding, infection, deep vein thrombosis, pulmonary embolism, death, limb length discrepancy, reflexive sympathetic dystrophy, fat embolism syndrome,damage to blood vessels and nerves, malunion, non-union, delayed union, failure of hardware, post traumatic arthritis, stroke, heart attack, and death. Patient understands that infection may arise and may require numerous surgeries.     History and physical exam to be preformed at a later date. 2. No cortisone injection indicated today   3. No Physical/Occupational Therapy indicated today  4. No diagnostic test indicated today:   5. No durable medical equipment indicated today  6. No referral indicated today   7. No medications indicated today:   8.  No Narcotic indicated today      RTC H&P    Follow-up Disposition: Not on [facility], ERIC [facility] and [facility]

## 2018-03-30 DIAGNOSIS — M75.121 COMPLETE TEAR OF RIGHT ROTATOR CUFF: ICD-10-CM

## 2018-03-30 RX ORDER — HYDROCODONE BITARTRATE AND ACETAMINOPHEN 5; 325 MG/1; MG/1
1 TABLET ORAL
Qty: 21 TAB | Refills: 0 | Status: SHIPPED | OUTPATIENT
Start: 2018-03-30 | End: 2018-04-09 | Stop reason: SDUPTHER

## 2018-03-30 NOTE — TELEPHONE ENCOUNTER
Last Visit: 03/21/2018 with HAMZAH Woody    Next Appointment: [facility] H & P  Previous Refill Encounters: 03/15/2018 per HAMZAH Lima #21     Requested Prescriptions     Pending Prescriptions Disp Refills    HYDROcodone-acetaminophen (NORCO) 5-325 mg per tablet 21 Tab 0     Sig: Take 1 Tab by mouth every eight (8) hours as needed for Pain. Max Daily Amount: 3 Tabs.

## 2018-04-09 DIAGNOSIS — M75.121 COMPLETE TEAR OF RIGHT ROTATOR CUFF: ICD-10-CM

## 2018-04-09 RX ORDER — HYDROCODONE BITARTRATE AND ACETAMINOPHEN 5; 325 MG/1; MG/1
1 TABLET ORAL
Qty: 21 TAB | Refills: 0 | Status: SHIPPED | OUTPATIENT
Start: 2018-04-09 | End: 2018-05-16

## 2018-04-09 NOTE — TELEPHONE ENCOUNTER
Spoke with patient and informed her that rx is ready to be picked up at the [facility] location.

## 2018-04-09 NOTE — TELEPHONE ENCOUNTER
Last Visit: 03/21/2018 with HAMZAH Chand    Next Appointment: [facility] H [facility] P  Previous Refill Encounters: 03/30/2018 with HAMZAH Chand #21    Requested Prescriptions     Pending Prescriptions Disp Refills    HYDROcodone-acetaminophen (NORCO) 5-325 mg per tablet 21 Tab 0     Sig: Take 1 Tab by mouth every eight (8) hours as needed for Pain. Max Daily Amount: 3 Tabs.

## 2018-05-16 ENCOUNTER — OFFICE VISIT (OUTPATIENT)
Dept: CARDIOLOGY CLINIC | Age: 48
End: 2018-05-16

## 2018-05-16 VITALS
HEART RATE: 71 BPM | OXYGEN SATURATION: 97 % | SYSTOLIC BLOOD PRESSURE: 112 MMHG | DIASTOLIC BLOOD PRESSURE: 80 MMHG | WEIGHT: 210 LBS | BODY MASS INDEX: 33.75 KG/M2 | HEIGHT: 66 IN

## 2018-05-16 DIAGNOSIS — R94.31 ABNORMAL EKG: Primary | ICD-10-CM

## 2018-05-16 DIAGNOSIS — Z72.0 TOBACCO ABUSE: ICD-10-CM

## 2018-05-16 NOTE — MR AVS SNAPSHOT
[address] [place] [address] Gale Christensen [address] 
[phone] Patient: Agnieszka Camilo MRN: [id number] [facility]:[birth year] Visit Information Date & Time Provider Department Dept. Phone Encounter #  
 5/16/2018 10:40 AM Jolly Tompkins MD [facility] [address] [id number] Upcoming Health Maintenance Date Due Pneumococcal 19-64 Medium Risk (1 of 1 - PPSV23) 5/30/1987 DTaP/Tdap/Td series (1 - Tdap) 5/30/1989 PAP AKA CERVICAL CYTOLOGY 5/30/1989 Influenza Age 5 to Adult 8/1/2018 Allergies as of 5/16/2018  Review Complete On: 3/21/2018 By: HAMZAH Montiel Severity Noted Reaction Type Reactions Amoxicillin  12/29/2012    Other (comments) Does no work Codeine  12/29/2012    Nausea and Vomiting Current Immunizations  Never Reviewed No immunizations on file. Not reviewed this visit You Were Diagnosed With   
  
 Codes Comments Abnormal EKG    -  Primary ICD-10-CM: R94.31 
ICD-9-CM: 794.31 Vitals BP Pulse Height(growth percentile) Weight(growth percentile) SpO2 BMI  
 112/80 71 5' 6\" (1.676 m) 210 lb (95.3 kg) 97% 33.89 kg/m2 OB Status Smoking Status Menopause Current Every Day Smoker BMI and BSA Data Body Mass Index Body Surface Area  
 33.89 kg/m 2 2.11 m 2 Preferred Pharmacy Pharmacy Name Phone [address], [place] [id number] [facility] Your Updated Medication List  
  
   
This list is accurate as of 5/16/18 11:23 AM.  Always use your most recent med list.  
  
  
  
  
 baclofen 10 mg tablet Commonly known as:  LIORESAL Take  by mouth three (3) times daily. chlorproMAZINE 100 mg tablet Commonly known as:  THORAZINE Take 100 mg by mouth two (2) times a day. Take 100 mg in the am and 200 mg at night time. KLONOPIN PO Take 10 mg by mouth. LaMICtal 100 mg tablet Generic drug:  lamoTRIgine Take  by mouth daily. 100 mg am and 200mg q hs LUNESTA 3 mg tablet Generic drug:  eszopiclone Take  by mouth nightly. multivitamin with iron chewable tablet Commonly known as:  [place] Marrero Take 1 Tab by mouth daily. PriLOSEC 20 mg capsule Generic drug:  omeprazole Take 20 mg by mouth daily. traZODone 300 mg tablet Commonly known as:  [place] Ort Take 300 mg by mouth nightly. VITAMIN B-12 PO Take  by mouth daily. VITAMIN D2 PO Take  by mouth every seven (7) days. We Performed the Following AMB POC EKG ROUTINE W/ 12 LEADS, INTER & REP [40909 CPT(R)] Introducing [facility]! Carline Beth introduces [employer] patient portal. Now you can access parts of your medical record, email your doctor's office, and request medication refills online. 1. In your internet browser, go to [email or web address] 2. Click on the First Time User? Click Here link in the Sign In box. You will see the New Member Sign Up page. 3. Enter your [employer] Access Code exactly as it appears below. You will not need to use this code after youve completed the sign-up process. If you do not sign up before the expiration date, you must request a new code. · [employer] Access Code: [email or web address] Expires: 6/5/2018  6:22 AM 
 
4. Enter the last four digits of your Social Security Number (xxxx) and Date of Birth (mm/dd/yyyy) as indicated and click Submit. You will be taken to the next sign-up page. 5. Create a Webs[employer] ID. This will be your [employer] login ID and cannot be changed, so think of one that is secure and easy to remember. 6. Create a [employer] password. You can change your password at any time. 7. Enter your Password Reset Question and Answer. This can be used at a later time if you forget your password. 8. Enter your e-mail address.  You will receive e-mail notification when new information is available in [employer]. 9. Click Sign Up. You can now view and download portions of your medical record. 10. Click the Download Summary menu link to download a portable copy of your medical information. If you have questions, please visit the Frequently Asked Questions section of the [employer] website. Remember, [employer] is NOT to be used for urgent needs. For medical emergencies, dial 911. Now available from your iPhone and Android! Please provide this summary of care documentation to your next provider. Your primary care clinician is listed as Lavon Goodman. If you have any questions after today's visit, please call [phone].

## 2018-05-16 NOTE — PROGRESS NOTES
HISTORY OF PRESENT ILLNESS  Denis Jhaveri is a 50 y.o. female. Follow-up   Pertinent negatives include no chest pain, no abdominal pain, no headaches and no shortness of breath. Leg Swelling   Pertinent negatives include no chest pain, no abdominal pain, no headaches and no shortness of breath. Patient presents for a follow-up office visit. She was initially referred here for evaluation of an abnormal echocardiogram which is done as part of a preoperative assessment. She does have a long-standing history of tobacco use with a 20-pack-year smoking history. Patient subsequently underwent an echocardiogram in January 2018 which was essentially normal.  EF 55%, no regional wall motion abnormalities, normal diastolic function, no valvular heart disease. The patient was last seen in the office 6-7 months ago. She returns today for preoperative cardiac evaluation prior to undergoing a right shoulder replacement surgery which is scheduled for later this summer. She denies any major change in her activity level. No exertional chest pain or shortness of breath. No orthopnea, PND. She does get mild leg swelling in her feet and ankles throughout the day, but this will improve at night or when she elevates her legs. Past Medical History:   Diagnosis Date    Arthritis     Asthma     GERD (gastroesophageal reflux disease)     Obesity (BMI 30.0-34.9) 3/1/2017    Psychiatric disorder     depression    Reflux      Current Outpatient Prescriptions   Medication Sig Dispense Refill    CLONAZEPAM (KLONOPIN PO) Take 10 mg by mouth.  omeprazole (PRILOSEC) 20 mg capsule Take 20 mg by mouth daily.  eszopiclone (LUNESTA) 3 mg tablet Take  by mouth nightly.  lamoTRIgine (LAMICTAL) 100 mg tablet Take  by mouth daily. 100 mg am and 200mg q hs      chlorproMAZINE (THORAZINE) 100 mg tablet Take 100 mg by mouth two (2) times a day. Take 100 mg in the am and 200 mg at night time.       traZODone (DESYREL) 300 mg tablet Take 300 mg by mouth nightly.  baclofen (LIORESAL) 10 mg tablet Take  by mouth three (3) times daily.  multivitamin with iron (FLINTSTONES) chewable tablet Take 1 Tab by mouth daily.  CYANOCOBALAMIN, VITAMIN B-12, (VITAMIN B-12 PO) Take  by mouth daily.  ERGOCALCIFEROL, VITAMIN D2, (VITAMIN D2 PO) Take  by mouth every seven (7) days. Allergies   Allergen Reactions    Amoxicillin Other (comments)     Does no work    Codeine Nausea and Vomiting      Social History   Substance Use Topics    Smoking status: Current Every Day Smoker     Packs/day: 1.00     Years: 20.00    Smokeless tobacco: Never Used    Alcohol use No     Family History   Problem Relation Age of Onset    Diabetes Mother     Ovarian Cancer Mother     Heart Disease Neg Hx     Hypertension Neg Hx     Stroke Neg Hx          Review of Systems   Constitutional: Negative for chills, fever and weight loss. HENT: Negative for nosebleeds. Eyes: Negative for blurred vision and double vision. Respiratory: Negative for cough, shortness of breath and wheezing. Cardiovascular: Positive for leg swelling. Negative for chest pain, palpitations, orthopnea, claudication and PND. Gastrointestinal: Negative for abdominal pain, heartburn, nausea and vomiting. Genitourinary: Negative for dysuria and hematuria. Musculoskeletal: Positive for joint pain. Negative for falls and myalgias. Skin: Negative for rash. Neurological: Negative for dizziness, focal weakness and headaches. Endo/Heme/Allergies: Does not bruise/bleed easily. Psychiatric/Behavioral: Negative for substance abuse. Visit Vitals    /80    Pulse 71    Ht 5' 6\" (1.676 m)    Wt 95.3 kg (210 lb)    SpO2 97%    BMI 33.89 kg/m2       Physical Exam   Constitutional: She is oriented to person, place, and time. She appears well-developed and well-nourished. HENT:   Head: Normocephalic and atraumatic.    Eyes: Conjunctivae are normal.   Neck: Neck supple. No JVD present. Carotid bruit is not present. Cardiovascular: Normal rate, regular rhythm, S1 normal, S2 normal and normal pulses. Exam reveals no gallop. No murmur heard. Pulmonary/Chest: Effort normal and breath sounds normal. She has no wheezes. She has no rales. Abdominal: Soft. Bowel sounds are normal. There is no tenderness. Musculoskeletal: She exhibits no edema, tenderness or deformity. Neurological: She is alert and oriented to person, place, and time. Skin: Skin is warm and dry. Psychiatric: She has a normal mood and affect. Her behavior is normal. Thought content normal.     EKG: Normal sinus rhythm, leftward axis, poor R-wave progression, nonspecific T-wave abnormality. No change compared to the previous EKG. ASSESSMENT and PLAN    Low risk from a cardiac standpoint to proceed with orthopedic surgery as scheduled later this summer. No further cardiac testing necessary. She does not take any scheduled cardiac medications. Abnormal EKG. Patient's EKG changes are chronic in nature and have been present for well over a year. She underwent an echocardiogram in January 2018 which was essentially normal.  No further workup needed. Tobacco use disorder. Patient has nearly 20-pack-year smoking history. She was strongly encouraged to consider quitting smoking completely. Dependent edema. No significant change. Patient was encouraged to wear compression stockings if she is to be sitting or standing for long periods of time. Patient can follow-up annually, sooner if needed.

## 2018-05-16 NOTE — PROGRESS NOTES
1. Have you been to the ER, urgent care clinic since your last visit? Hospitalized since your last visit? No    2. Have you seen or consulted any other health care providers outside of the [address] [place] [address] since your last visit? Include any pap smears or colon screening.  No

## 2018-06-19 ENCOUNTER — HOSPITAL ENCOUNTER (OUTPATIENT)
Dept: MAMMOGRAPHY | Age: 48
Discharge: HOME | End: 2018-06-19
Attending: FAMILY MEDICINE
Payer: SUBSIDIZED

## 2018-06-19 DIAGNOSIS — Z12.31 VISIT FOR SCREENING MAMMOGRAM: ICD-10-CM

## 2018-06-19 PROCEDURE — 77067 SCR MAMMO BI INCL CAD: CPT

## 2018-06-22 ENCOUNTER — HOSPITAL ENCOUNTER (EMERGENCY)
Age: 48
Discharge: HOME | End: 2018-06-22
Attending: EMERGENCY MEDICINE
Payer: SUBSIDIZED

## 2018-06-22 VITALS
RESPIRATION RATE: 16 BRPM | OXYGEN SATURATION: 98 % | HEART RATE: 67 BPM | WEIGHT: 203 LBS | BODY MASS INDEX: 32.62 KG/M2 | HEIGHT: 66 IN | SYSTOLIC BLOOD PRESSURE: 96 MMHG | DIASTOLIC BLOOD PRESSURE: 59 MMHG | TEMPERATURE: 99.1 F

## 2018-06-22 DIAGNOSIS — M54.41 ACUTE RIGHT-SIDED LOW BACK PAIN WITH RIGHT-SIDED SCIATICA: Primary | ICD-10-CM

## 2018-06-22 PROCEDURE — 99282 EMERGENCY DEPT VISIT SF MDM: CPT

## 2018-06-22 RX ORDER — TRAMADOL HYDROCHLORIDE 50 MG/1
50 TABLET ORAL
Qty: 10 TAB | Refills: 0 | Status: SHIPPED | OUTPATIENT
Start: 2018-06-22 | End: 2018-07-11

## 2018-06-22 RX ORDER — METHYLPREDNISOLONE 4 MG/1
TABLET ORAL
Qty: 1 DOSE PACK | Refills: 0 | Status: SHIPPED | OUTPATIENT
Start: 2018-06-22 | End: 2018-07-11

## 2018-06-22 RX ORDER — IBUPROFEN 800 MG/1
800 TABLET ORAL
Qty: 20 TAB | Refills: 0 | Status: SHIPPED | OUTPATIENT
Start: 2018-06-22 | End: 2018-06-29

## 2018-06-22 NOTE — ED PROVIDER NOTES
EMERGENCY DEPARTMENT HISTORY AND PHYSICAL EXAM    4:39 PM      Date: 6/22/2018  Patient Name: Kolby Gordillo    History of Presenting Illness     Chief Complaint   Patient presents with    Hip Pain         History Provided By: Patient    Chief Complaint: right hip pain   Duration: 1 Days (off and on for the past month)   Timing:  Acute   Location: right hip  Quality: Aching  Severity: 8 out of 10  Modifying Factors: tylenol   Associated Symptoms: denies any other associated signs or symptoms      Additional History (Context): Kolby Gordillo is a 46 y.o. female with asthma and GERD who presents with right hip pain. Intermittent x 1 month, much worse this morning. Worse with walking and sitting. Shooting down back of right thigh. Patient denies incontinence, numbness in the groin, weakness/ numbness in the lower extremities. No history of similar problems. No recent fall or trauma. PCP: Lou Victoria MD    Current Outpatient Prescriptions   Medication Sig Dispense Refill    traMADol (ULTRAM) 50 mg tablet Take 1 Tab by mouth every six (6) hours as needed for Pain. Max Daily Amount: 200 mg. 10 Tab 0    ibuprofen (MOTRIN) 800 mg tablet Take 1 Tab by mouth every six (6) hours as needed for Pain for up to 7 days. 20 Tab 0    methylPREDNISolone (MEDROL, DANDRE,) 4 mg tablet Take as directed 1 Dose Pack 0    CLONAZEPAM (KLONOPIN PO) Take 10 mg by mouth.  omeprazole (PRILOSEC) 20 mg capsule Take 20 mg by mouth daily.  eszopiclone (LUNESTA) 3 mg tablet Take  by mouth nightly.  lamoTRIgine (LAMICTAL) 100 mg tablet Take  by mouth daily. 100 mg am and 200mg q hs      traZODone (DESYREL) 300 mg tablet Take 300 mg by mouth nightly.  baclofen (LIORESAL) 10 mg tablet Take  by mouth three (3) times daily.  multivitamin with iron (FLINTSTONES) chewable tablet Take 1 Tab by mouth daily.  CYANOCOBALAMIN, VITAMIN B-12, (VITAMIN B-12 PO) Take  by mouth daily.         ERGOCALCIFEROL, VITAMIN D2, (VITAMIN D2 PO) Take  by mouth every seven (7) days.  chlorproMAZINE (THORAZINE) 100 mg tablet Take 100 mg by mouth two (2) times a day. Take 100 mg in the am and 200 mg at night time. Past History     Past Medical History:  Past Medical History:   Diagnosis Date    Arthritis     Asthma     GERD (gastroesophageal reflux disease)     Obesity (BMI 30.0-34.9) 3/1/2017    Psychiatric disorder     depression    Reflux        Past Surgical History:  Past Surgical History:   Procedure Laterality Date    ABDOMEN SURGERY PROC UNLISTED  2008    gastric bypass    HX CHOLECYSTECTOMY      HX HEENT      tonsilectomy      HX ORTHOPAEDIC      right shoulder     OR ANESTH,SURGERY OF SHOULDER Right 05/2017    rotator cuff       Family History:  Family History   Problem Relation Age of Onset    Diabetes Mother     Ovarian Cancer Mother     Heart Disease Neg Hx     Hypertension Neg Hx     Stroke Neg Hx        Social History:  Social History   Substance Use Topics    Smoking status: Current Every Day Smoker     Packs/day: 1.00     Years: 20.00    Smokeless tobacco: Never Used    Alcohol use No       Allergies: Allergies   Allergen Reactions    Amoxicillin Other (comments)     Does no work    Codeine Nausea and Vomiting         Review of Systems       Review of Systems   Constitutional: Negative for fever. HENT: Negative for facial swelling. Eyes: Negative for visual disturbance. Respiratory: Negative for shortness of breath. Cardiovascular: Negative for chest pain. Gastrointestinal: Negative for abdominal pain. Genitourinary: Negative for dysuria. Musculoskeletal: Positive for arthralgias. Negative for neck pain. Skin: Negative for rash. Neurological: Negative for dizziness. Psychiatric/Behavioral: Negative for confusion. All other systems reviewed and are negative.         Physical Exam     Visit Vitals    BP 96/59    Pulse 67    Temp 99.1 °F (37.3 °C)    Resp 16    Ht 5' 6\" (1.676 m)    Wt 92.1 kg (203 lb)    SpO2 98%    BMI 32.77 kg/m2         Physical Exam   Constitutional: She is oriented to person, place, and time. She appears well-developed and well-nourished. No distress. HENT:   Head: Normocephalic and atraumatic. Eyes: Conjunctivae are normal.   Neck: Normal range of motion. Cardiovascular: Normal rate and regular rhythm. Pulmonary/Chest: Effort normal.   Abdominal: She exhibits no distension. Musculoskeletal: Normal range of motion. Point tenderness to right SI joint. Mild tenderness to right buttocks, right hip. No lumbar tenderness.  + SLRT on right. Neurological: She is alert and oriented to person, place, and time. No sensory or motor deficits. Strength and sensation equal to bilateral upper and lower extremities. Skin: Skin is warm and dry. She is not diaphoretic. Psychiatric: She has a normal mood and affect. Nursing note and vitals reviewed. Diagnostic Study Results     Labs -  No results found for this or any previous visit (from the past 12 hour(s)). Radiologic Studies -   No orders to display         Medical Decision Making   I am the first provider for this patient. I reviewed the vital signs, available nursing notes, past medical history, past surgical history, family history and social history. Vital Signs-Reviewed the patient's vital signs. Records Reviewed: Nursing Notes (Time of Review: 4:39 PM)    ED Course: Progress Notes, Reevaluation, and Consults:      Provider Notes (Medical Decision Making): MDM  Number of Diagnoses or Management Options  Acute right-sided low back pain with right-sided sciatica:   Diagnosis management comments: Symptoms typical of sciatica. No red flag signs of cauda equina or abscess. Treat inflammation. Diagnosis     Clinical Impression:   1.  Acute right-sided low back pain with right-sided sciatica        Disposition:     Follow-up Information     Follow up With Details Comments Contact Info    Desiree Evans MD Schedule an appointment as soon as possible for a visit If symptoms do not improve [address] [facility] [address] [facility]      [id number] [facility] EMERGENCY DEPT  Immediately if symptoms worsen [address] [facility]  [phone]           Patient's Medications   Start Taking    IBUPROFEN (MOTRIN) 800 MG TABLET    Take 1 Tab by mouth every six (6) hours as needed for Pain for up to 7 days. METHYLPREDNISOLONE (MEDROL, DANDRE,) 4 MG TABLET    Take as directed    TRAMADOL (ULTRAM) 50 MG TABLET    Take 1 Tab by mouth every six (6) hours as needed for Pain. Max Daily Amount: 200 mg. Continue Taking    BACLOFEN (LIORESAL) 10 MG TABLET    Take  by mouth three (3) times daily. CHLORPROMAZINE (THORAZINE) 100 MG TABLET    Take 100 mg by mouth two (2) times a day. Take 100 mg in the am and 200 mg at night time. CLONAZEPAM (KLONOPIN PO)    Take 10 mg by mouth. CYANOCOBALAMIN, VITAMIN B-12, (VITAMIN B-12 PO)    Take  by mouth daily. ERGOCALCIFEROL, VITAMIN D2, (VITAMIN D2 PO)    Take  by mouth every seven (7) days. ESZOPICLONE (LUNESTA) 3 MG TABLET    Take  by mouth nightly. LAMOTRIGINE (LAMICTAL) 100 MG TABLET    Take  by mouth daily. 100 mg am and 200mg q hs    MULTIVITAMIN WITH IRON (FLINTSTONES) CHEWABLE TABLET    Take 1 Tab by mouth daily. OMEPRAZOLE (PRILOSEC) 20 MG CAPSULE    Take 20 mg by mouth daily. TRAZODONE (DESYREL) 300 MG TABLET    Take 300 mg by mouth nightly.    These Medications have changed    No medications on file   Stop Taking    No medications on file     _______________________________    Attestations:  Silvia Robertson PA-C acting as a scribe for and in the presence of AAYUSH/ERIC Sheehan      June 22, 2018 at 4:57 PM       Provider Attestation:      I personally performed the services described in the documentation, reviewed the documentation, as recorded by the scribe in my presence, and it accurately and completely records my words and actions.  June 22, 2018 at 4:57 PM - Debbie Chavez PA-C  _______________________________

## 2018-06-22 NOTE — DISCHARGE INSTRUCTIONS
Take anti-inflammatories such as tylenol or motrin with food for pain relief. Do not drive while taking narcotics or muscle relaxants. Massage the muscles that are tight and apply heating pads. Ice can help reduce swelling and inflammation. Apply in 20 minute intervals throughout the day. Return to ED for any significant worsening of pain, or neurologic changes such as loss of bowel or bladder control, weakness or numbness in the extremities. If this is a chronic issue, you may want to consider seeing a specialist or pain management physician. Learning About Relief for Back Pain  What is back tension and strain? Back strain happens when you overstretch, or pull, a muscle in your back. You may hurt your back in an accident or when you exercise or lift something. Most back pain will get better with rest and time. You can take care of yourself at home to help your back heal.  What can you do first to relieve back pain? When you first feel back pain, try these steps:  · Walk. Take a short walk (10 to 20 minutes) on a level surface (no slopes, hills, or stairs) every 2 to 3 hours. Walk only distances you can manage without pain, especially leg pain. · Relax. Find a comfortable position for rest. Some people are comfortable on the floor or a medium-firm bed with a small pillow under their head and another under their knees. Some people prefer to lie on their side with a pillow between their knees. Don't stay in one position for too long. · Try heat or ice. Try using a heating pad on a low or medium setting, or take a warm shower, for 15 to 20 minutes every 2 to 3 hours. Or you can buy single-use heat wraps that last up to 8 hours. You can also try an ice pack for 10 to 15 minutes every 2 to 3 hours. You can use an ice pack or a bag of frozen vegetables wrapped in a thin towel. There is not strong evidence that either heat or ice will help, but you can try them to see if they help.  You may also want to try switching between heat and cold. · Take pain medicine exactly as directed. ¨ If the doctor gave you a prescription medicine for pain, take it as prescribed. ¨ If you are not taking a prescription pain medicine, ask your doctor if you can take an over-the-counter medicine. What else can you do? · Stretch and exercise. Exercises that increase flexibility may relieve your pain and make it easier for your muscles to keep your spine in a good, neutral position. And don't forget to keep walking. · Do self-massage. You can use self-massage to unwind after work or school or to energize yourself in the morning. You can easily massage your feet, hands, or neck. Self-massage works best if you are in comfortable clothes and are sitting or lying in a comfortable position. Use oil or lotion to massage bare skin. · Reduce stress. Back pain can lead to a vicious [place]: Distress about the pain tenses the muscles in your back, which in turn causes more pain. Learn how to relax your mind and your muscles to lower your stress. Where can you learn more? Go to http://lonnie-gagan.info/. Enter H834 in the search box to learn more about \"Learning About Relief for Back Pain. \"  Current as of: March 21, 2017  Content Version: 11.4  © 0589-2289 [employer]. Care instructions adapted under license by [employer] (which disclaims liability or warranty for this information). If you have questions about a medical condition or this instruction, always ask your healthcare professional. [place] [address] any warranty or liability for your use of this information.

## 2018-06-29 DIAGNOSIS — Z01.818 PREOP EXAMINATION: Primary | ICD-10-CM

## 2018-07-03 DIAGNOSIS — M75.121 COMPLETE TEAR OF RIGHT ROTATOR CUFF: Primary | ICD-10-CM

## 2018-07-06 ENCOUNTER — HOSPITAL ENCOUNTER (OUTPATIENT)
Dept: GENERAL RADIOLOGY | Age: 48
Discharge: HOME | End: 2018-07-06
Payer: SUBSIDIZED

## 2018-07-06 ENCOUNTER — TELEPHONE (OUTPATIENT)
Dept: ORTHOPEDIC SURGERY | Age: 48
End: 2018-07-06

## 2018-07-06 ENCOUNTER — HOSPITAL ENCOUNTER (OUTPATIENT)
Dept: PREADMISSION TESTING | Age: 48
Discharge: HOME | End: 2018-07-06
Payer: SUBSIDIZED

## 2018-07-06 DIAGNOSIS — Z01.818 PREOP EXAMINATION: ICD-10-CM

## 2018-07-06 DIAGNOSIS — N30.00 ACUTE CYSTITIS WITHOUT HEMATURIA: Primary | ICD-10-CM

## 2018-07-06 LAB
ABO + RH BLD: NORMAL
ALBUMIN SERPL-MCNC: 3.8 G/DL (ref 3.4–5)
ALBUMIN/GLOB SERPL: 1.7 {RATIO} (ref 0.8–1.7)
ALP SERPL-CCNC: 78 U/L (ref 45–117)
ALT SERPL-CCNC: 24 U/L (ref 13–56)
ANION GAP SERPL CALC-SCNC: 4 MMOL/L (ref 3–18)
APPEARANCE UR: CLEAR
APTT PPP: 26.9 SEC (ref 23–36.4)
AST SERPL-CCNC: 15 U/L (ref 15–37)
ATRIAL RATE: 53 BPM
BACTERIA URNS QL MICRO: ABNORMAL /HPF
BASOPHILS # BLD: 0 K/UL (ref 0–0.06)
BASOPHILS NFR BLD: 0 % (ref 0–2)
BILIRUB SERPL-MCNC: 0.4 MG/DL (ref 0.2–1)
BILIRUB UR QL: NEGATIVE
BLOOD GROUP ANTIBODIES SERPL: NORMAL
BUN SERPL-MCNC: 9 MG/DL (ref 7–18)
BUN/CREAT SERPL: 13 (ref 12–20)
CALCIUM SERPL-MCNC: 9.1 MG/DL (ref 8.5–10.1)
CALCULATED P AXIS, ECG09: 48 DEGREES
CALCULATED R AXIS, ECG10: 20 DEGREES
CALCULATED T AXIS, ECG11: 35 DEGREES
CHLORIDE SERPL-SCNC: 106 MMOL/L (ref 100–108)
CO2 SERPL-SCNC: 33 MMOL/L (ref 21–32)
COLOR UR: YELLOW
CREAT SERPL-MCNC: 0.67 MG/DL (ref 0.6–1.3)
DIAGNOSIS, 93000: NORMAL
DIFFERENTIAL METHOD BLD: NORMAL
EOSINOPHIL # BLD: 0.1 K/UL (ref 0–0.4)
EOSINOPHIL NFR BLD: 1 % (ref 0–5)
EPITH CASTS URNS QL MICRO: ABNORMAL /LPF (ref 0–5)
ERYTHROCYTE [DISTWIDTH] IN BLOOD BY AUTOMATED COUNT: 13.7 % (ref 11.6–14.5)
GLOBULIN SER CALC-MCNC: 2.3 G/DL (ref 2–4)
GLUCOSE SERPL-MCNC: 79 MG/DL (ref 74–99)
GLUCOSE UR STRIP.AUTO-MCNC: NEGATIVE MG/DL
HCT VFR BLD AUTO: 42.1 % (ref 35–45)
HGB BLD-MCNC: 14.1 G/DL (ref 12–16)
HGB UR QL STRIP: ABNORMAL
INR PPP: 1 (ref 0.8–1.2)
KETONES UR QL STRIP.AUTO: NEGATIVE MG/DL
LEUKOCYTE ESTERASE UR QL STRIP.AUTO: ABNORMAL
LYMPHOCYTES # BLD: 1.8 K/UL (ref 0.9–3.6)
LYMPHOCYTES NFR BLD: 33 % (ref 21–52)
MCH RBC QN AUTO: 30.5 PG (ref 24–34)
MCHC RBC AUTO-ENTMCNC: 33.5 G/DL (ref 31–37)
MCV RBC AUTO: 91.1 FL (ref 74–97)
MONOCYTES # BLD: 0.3 K/UL (ref 0.05–1.2)
MONOCYTES NFR BLD: 5 % (ref 3–10)
NEUTS SEG # BLD: 3.3 K/UL (ref 1.8–8)
NEUTS SEG NFR BLD: 61 % (ref 40–73)
NITRITE UR QL STRIP.AUTO: NEGATIVE
P-R INTERVAL, ECG05: 164 MS
PH UR STRIP: 5.5 [PH] (ref 5–8)
PLATELET # BLD AUTO: 177 K/UL (ref 135–420)
PMV BLD AUTO: 11.8 FL (ref 9.2–11.8)
POTASSIUM SERPL-SCNC: 3.9 MMOL/L (ref 3.5–5.5)
PROT SERPL-MCNC: 6.1 G/DL (ref 6.4–8.2)
PROT UR STRIP-MCNC: NEGATIVE MG/DL
PROTHROMBIN TIME: 12.7 SEC (ref 11.5–15.2)
Q-T INTERVAL, ECG07: 464 MS
QRS DURATION, ECG06: 92 MS
QTC CALCULATION (BEZET), ECG08: 435 MS
RBC # BLD AUTO: 4.62 M/UL (ref 4.2–5.3)
RBC #/AREA URNS HPF: ABNORMAL /HPF (ref 0–5)
SODIUM SERPL-SCNC: 143 MMOL/L (ref 136–145)
SP GR UR REFRACTOMETRY: 1.02 (ref 1–1.03)
SPECIMEN EXP DATE BLD: NORMAL
TRICHOMONAS UR QL MICRO: ABNORMAL
UROBILINOGEN UR QL STRIP.AUTO: 0.2 EU/DL (ref 0.2–1)
VENTRICULAR RATE, ECG03: 53 BPM
WBC # BLD AUTO: 5.5 K/UL (ref 4.6–13.2)
WBC URNS QL MICRO: ABNORMAL /HPF (ref 0–4)

## 2018-07-06 PROCEDURE — 36415 COLL VENOUS BLD VENIPUNCTURE: CPT | Performed by: PHYSICIAN ASSISTANT

## 2018-07-06 PROCEDURE — 85610 PROTHROMBIN TIME: CPT | Performed by: PHYSICIAN ASSISTANT

## 2018-07-06 PROCEDURE — 85730 THROMBOPLASTIN TIME PARTIAL: CPT | Performed by: PHYSICIAN ASSISTANT

## 2018-07-06 PROCEDURE — 80053 COMPREHEN METABOLIC PANEL: CPT | Performed by: PHYSICIAN ASSISTANT

## 2018-07-06 PROCEDURE — 86900 BLOOD TYPING SEROLOGIC ABO: CPT | Performed by: PHYSICIAN ASSISTANT

## 2018-07-06 PROCEDURE — 81001 URINALYSIS AUTO W/SCOPE: CPT | Performed by: PHYSICIAN ASSISTANT

## 2018-07-06 PROCEDURE — 87086 URINE CULTURE/COLONY COUNT: CPT | Performed by: PHYSICIAN ASSISTANT

## 2018-07-06 PROCEDURE — 85025 COMPLETE CBC W/AUTO DIFF WBC: CPT | Performed by: PHYSICIAN ASSISTANT

## 2018-07-06 PROCEDURE — 71046 X-RAY EXAM CHEST 2 VIEWS: CPT

## 2018-07-06 PROCEDURE — 93005 ELECTROCARDIOGRAM TRACING: CPT

## 2018-07-06 RX ORDER — METRONIDAZOLE 500 MG/1
500 TABLET ORAL 2 TIMES DAILY
Qty: 14 TAB | Refills: 0 | Status: SHIPPED | OUTPATIENT
Start: 2018-07-06 | End: 2018-07-13

## 2018-07-06 RX ORDER — CIPROFLOXACIN 500 MG/1
500 TABLET ORAL 2 TIMES DAILY
Qty: 14 TAB | Refills: 0 | Status: SHIPPED | OUTPATIENT
Start: 2018-07-06 | End: 2018-07-13

## 2018-07-06 NOTE — TELEPHONE ENCOUNTER
Contacted pt at her home number. Informed her that prescription for antibiotics have been sent to [facility]. Also informed her that would have to go and have her urine rechecked on the 13th of July. Pt thanked writer for call back.

## 2018-07-06 NOTE — TELEPHONE ENCOUNTER
Please let patient know that she has a UTI. I have sent 2 antibiotics to her pharmacy she needs to start taking today. She needs to avoid all alcohol while taking. She will also need to get a recheck of her urine next Friday 7/13.

## 2018-07-07 LAB
BACTERIA SPEC CULT: NORMAL
SERVICE CMNT-IMP: NORMAL

## 2018-07-10 ENCOUNTER — OFFICE VISIT (OUTPATIENT)
Dept: ORTHOPEDIC SURGERY | Age: 48
End: 2018-07-10

## 2018-07-10 VITALS
TEMPERATURE: 98 F | HEIGHT: 66 IN | OXYGEN SATURATION: 97 % | HEART RATE: 66 BPM | DIASTOLIC BLOOD PRESSURE: 64 MMHG | WEIGHT: 207 LBS | SYSTOLIC BLOOD PRESSURE: 100 MMHG | BODY MASS INDEX: 33.27 KG/M2

## 2018-07-10 DIAGNOSIS — M75.101 ROTATOR CUFF TEAR ARTHROPATHY OF RIGHT SHOULDER: ICD-10-CM

## 2018-07-10 DIAGNOSIS — M12.811 ROTATOR CUFF TEAR ARTHROPATHY OF RIGHT SHOULDER: ICD-10-CM

## 2018-07-10 DIAGNOSIS — M75.121 COMPLETE TEAR OF RIGHT ROTATOR CUFF: Primary | ICD-10-CM

## 2018-07-10 RX ORDER — CELECOXIB 100 MG/1
400 CAPSULE ORAL ONCE
Status: CANCELLED | OUTPATIENT
Start: 2018-07-10 | End: 2018-07-10

## 2018-07-10 RX ORDER — GABAPENTIN 100 MG/1
400 CAPSULE ORAL ONCE
Status: CANCELLED | OUTPATIENT
Start: 2018-07-10 | End: 2018-07-10

## 2018-07-10 RX ORDER — OXCARBAZEPINE 300 MG/1
300 TABLET, FILM COATED ORAL 2 TIMES DAILY
COMMUNITY

## 2018-07-10 RX ORDER — ACETAMINOPHEN 325 MG/1
1000 TABLET ORAL ONCE
Status: CANCELLED | OUTPATIENT
Start: 2018-07-10 | End: 2018-07-10

## 2018-07-10 NOTE — H&P
HISTORY AND PHYSICAL          Patient: Hansel Simmons                MRN: [id number]       SSN: [id number]  YOB: 1970          AGE: 46 y.o. SEX: female      Patient scheduled for:  Right reverse total shoulder arthroplasty    Surgeon: Jordi Concepcion MD    ANESTHESIA TYPE:  General    HISTORY:     The patient was seen in the office today for a preoperative history and physical for an upcoming above listed surgery. The patient is a pleasant 46 y.o. female who has a history of right shoulder pain. She is  s/p Right shoulder arthroscopic Superior Capsular Reconstruction on 11/29/17. Patient has been going to PT and feels like she has gone \"down hill\" in the last month. She states her pain has increased and her motion has significantly declined.  Describes pain as a 4/10. She feels a crunching in her shoulder that does not hurt. Her biggest complaint is weakness. Patient is very discouraged. Denies trauma or increase in pain following PT. Due to the current findings, affected activity of daily living and continued pain and discomfort, surgical intervention is indicated. The alternatives, risks, and complications, including but not limited to infection, blood loss, need for blood transfusion, neurovascular damage, pippa-incisional numbness, subcutaneous hematoma, bone fracture, anesthetic complications, DVT, PE, death, RSD, postoperative stiffness and pain, possible surgical scar, delayed healing and nonhealing, reflexive sympathetic dystrophy, damage to blood vessels and nerves, need for more surgery, MI, and stroke,  failure of hardware, gait disturbances,have been discussed. The patient understands and wishes to proceed with surgery.      PAST MEDICAL HISTORY:     Past Medical History:   Diagnosis Date    Arthritis     Asthma     GERD (gastroesophageal reflux disease)     Obesity (BMI 30.0-34.9) 3/1/2017    Psychiatric disorder     depression    Reflux        CURRENT MEDICATIONS:     Current Outpatient Prescriptions   Medication Sig Dispense Refill    cariprazine (VRAYLAR) 6 mg capsule Take  by mouth daily.  OXcarbazepine (TRILEPTAL) 300 mg tablet Take  by mouth.  CLONAZEPAM (KLONOPIN PO) Take 10 mg by mouth.  omeprazole (PRILOSEC) 20 mg capsule Take 20 mg by mouth daily.  eszopiclone (LUNESTA) 3 mg tablet Take  by mouth nightly.  lamoTRIgine (LAMICTAL) 100 mg tablet Take  by mouth daily. 100 mg am and 200mg q hs      traZODone (DESYREL) 300 mg tablet Take 300 mg by mouth nightly.  baclofen (LIORESAL) 10 mg tablet Take  by mouth three (3) times daily.  multivitamin with iron (FLINTSTONES) chewable tablet Take 1 Tab by mouth daily.  ERGOCALCIFEROL, VITAMIN D2, (VITAMIN D2 PO) Take  by mouth every seven (7) days.  ciprofloxacin HCl (CIPRO) 500 mg tablet Take 1 Tab by mouth two (2) times a day for 7 days. 14 Tab 0    metroNIDAZOLE (FLAGYL) 500 mg tablet Take 1 Tab by mouth two (2) times a day for 7 days. 14 Tab 0    traMADol (ULTRAM) 50 mg tablet Take 1 Tab by mouth every six (6) hours as needed for Pain. Max Daily Amount: 200 mg. 10 Tab 0    methylPREDNISolone (MEDROL, DANDRE,) 4 mg tablet Take as directed 1 Dose Pack 0    chlorproMAZINE (THORAZINE) 100 mg tablet Take 100 mg by mouth two (2) times a day. Take 100 mg in the am and 200 mg at night time.  CYANOCOBALAMIN, VITAMIN B-12, (VITAMIN B-12 PO) Take  by mouth daily.            ALLERGIES:     Allergies   Allergen Reactions    Amoxicillin Other (comments)     Does no work    Codeine Nausea and Vomiting         SURGICAL HISTORY:     Past Surgical History:   Procedure Laterality Date    ABDOMEN SURGERY PROC UNLISTED  2008    gastric bypass    HX CHOLECYSTECTOMY      HX HEENT      tonsilectomy      HX ORTHOPAEDIC      right shoulder     [place] ANESTH,SURGERY OF SHOULDER Right 05/2017    rotator cuff       SOCIAL HISTORY:     Social History     Social History    Marital status:      Spouse name: N/A    Number of children: N/A    Years of education: N/A     Social History Main Topics    Smoking status: Current Every Day Smoker     Packs/day: 1.00     Years: 20.00    Smokeless tobacco: Never Used    Alcohol use No    Drug use: No    Sexual activity: Yes     Partners: Male     Other Topics Concern    None     Social History Narrative       FAMILY HISTORY:     Family History   Problem Relation Age of Onset    Diabetes Mother     Ovarian Cancer Mother     Heart Disease Neg Hx     Hypertension Neg Hx     Stroke Neg Hx        REVIEW OF SYSTEMS:     Negative for fevers, chills, chest pain, shortness of breath, weight loss, recent illness     General: Negative for fever and chills. No unexpected change in weight. Denies fatigue. No change in appetite. Skin: Negative for rash or itching. HEENT: Negative for congestion, sore throat, neck pain and neck stiffness. No change in vision or hearing. Hasn't noted any enlarged lymph nodes in the neck. Cardiovascular:  Negative for chest pain and palpitations. Has not noted pedal edema. Respiratory: Negative for cough, colds, sinus, hemoptysis, shortness of breath and wheezing. Gastrointestinal: Negative for nausea and vomiting, rectal bleeding, coffee ground emesis, abdominal pain, diarrhea and constipation. Genitourinary: Negative for dysuria, frequency urgency, or burning on micturition. No flank pain, no foul smelling urine, no difficulty with initiating urination. Hematological: Negative for bleeding or easy bruising. Musculoskeletal: Negative  for arthralgias, back pain or neck pain. Neurological: Negative for dizziness, seizures or syncopal episodes. Denies headaches. Endocrine: Denies excessive thirst.  No heat/cold intolerance. Psychiatric: Negative for depression or insomnia.     PHYSICAL EXAMINATION:     VITALS:   Visit Vitals    /64 (BP 1 Location: Left arm, BP Patient Position: Sitting)    Pulse 66    Temp 98 °F (36.7 °C)    Ht 5' 6\" (1.676 m)    Wt 207 lb (93.9 kg)    SpO2 97%    BMI 33.41 kg/m2     GEN:  Well developed, well nourished 46 y.o. female in no acute distress. HEENT: Normocephalic and atraumatic. Eyes: Conjunctivae and EOM are normal.Pupils are equal, round, and reactive to light. External ear normal appearance, external nose normal appearing. Mouth/Throat: Oropharynx is clear and moist, able to handle oral secretions w/out difficulty, airway patent  NECK: Supple. Normal ROM, No lymphadenopathy. Trachea is midline. No bruising, swelling or deformity  RESP: Clear to auscultation bilaterally. No wheezes, rales, rhonchi. Normal effort and breath sounds. No respiratory distress  CARDIO:  Normal rate, regular rhythm and normal heart sounds. No MGR. ABDOMEN: Soft, non-tender, non-distended, normoactive bowel sounds in all four quadrants. There is no tenderness. There is no rebound and no guarding. BACK: No CVA or spinal tenderness  BREAST:  Deferred  PELVIC:    Deferred   RECTAL:  Deferred   :           Deferred  EXTREMITIES: EXAMINATION OF:   Inspection: swelling not present,  Bruising not present  patient with noted high rising humerus on exam,Incision well healed  Passive glenohumeral abduction 0-50 degrees, unable to reach over head  Stability: Stable  Strength: 3+/5  2+ distal pulses      NEUROVASCULAR: Sensation intact to light touch and strength grossly intact and symmetrical. No nystagmus. Positive distal pulses and capillary refill. DVT ASSESSMENT:  There is not  calf tenderness. No evidence of DVT seen on physical exam.  MOTOR: In tact  PSYCH: Alert an oriented to person, place and time. Mood, memory, affect, behavior and judgment normal       RADIOGRAPHS & DIAGNOSTIC STUDIES:     MRI/xray reveals : MRI right shoulder: 1.  Interval superior glenohumeral capsule reconstruction superimposed on chronic  massive supraspinatus and infraspinatus tendon rupture with retraction and  muscular atrophy.      2. Complete bucket-handle tear of the superior labrum with displacement of the  superior labrum and the degenerated intra-articular biceps tendon as discussed. -Degeneration and split tear of the extra-articular biceps tendon which is  partially displaced within the substance of the subscapularis tendon deep to the  transverse ligament.      3. Subscapularis tendon degeneration with partial tearing extent of which  difficult to evaluate due to the degeneration and the presence of the displaced  biceps tendon.      4. Moderate glenohumeral joint effusion with synovitis and chondral loss. Chronic superior subluxation/dislocation of the glenohumeral joint. LABS:       @  CBC:   Lab Results   Component Value Date/Time    WBC 5.5 07/06/2018 10:42 AM    RBC 4.62 07/06/2018 10:42 AM    HGB 14.1 07/06/2018 10:42 AM    HCT 42.1 07/06/2018 10:42 AM    PLATELET 976 88/32/5750 10:42 AM   , BMP:   Lab Results   Component Value Date/Time    Glucose 79 07/06/2018 10:42 AM    Sodium 143 07/06/2018 10:42 AM    Potassium 3.9 07/06/2018 10:42 AM    Chloride 106 07/06/2018 10:42 AM    CO2 33 (H) 07/06/2018 10:42 AM    BUN 9 07/06/2018 10:42 AM    Creatinine 0.67 07/06/2018 10:42 AM    Calcium 9.1 07/06/2018 10:42 AM    and Coagulation:   Lab Results   Component Value Date/Time    Prothrombin time 12.7 07/06/2018 10:42 AM    INR 1.0 07/06/2018 10:42 AM    aPTT 26.9 07/06/2018 10:42 AM   @    Preoperative labs were reviewed and are substantially within normal limits   Urinalysis: + UTI and trichomonas  EKG: Sinus bradycardia   Cannot rule out Anterior infarct (cited on or before 18-OCT-2017)   Abnormal ECG   When compared with ECG of 18-OCT-2017 16:32,   premature atrial complexes are no longer present   Confirmed by Salvador Clark MD, Jumana Westbrook ([id number]) on 7/6/2018 6:04:53 PM       ASSESSMENT:       Encounter Diagnoses   Name Primary?     Complete tear of right rotator cuff Yes    Rotator cuff tear arthropathy of right shoulder        PLAN:     Again, the alternatives, risks, and complications, as well as expected outcome were discussed. The patient understands and agrees to proceed with right reverse total shoulder arthroplasty. She has been cleared by cardiology. Currently under treatment for both UTI and trichomonas.  Patient given orders listed below:    Orders Placed This Encounter    cariprazine (VRAYLAR) 6 mg capsule    OXcarbazepine (TRILEPTAL) 300 mg tablet         Aundrea Soares PA-C  7/10/2018  2:55 PM

## 2018-07-10 NOTE — DISCHARGE INSTRUCTIONS
Dr. Eldridge Bailey Total Shoulder Postoperative Information    How to manage your pain after your Surgery:  After your surgery it is expected that you will have some pain. In efforts to reduce the amounts of side effects from pain medications we would like you to follow the below medication regimen after surgery:  1. Take 1000mg of Tylenol by mouth every 8 hours x 5 days. This is 4 tabs of regular strength Tylenol or 2 tabs of Extra Strength Tylenol  2. 300mg of Gabapentin every night x 5 days starting the evening you get home from the hospital  DO NOT TAKE THIS IF YOU ALREADY TAKE LYRICA OR GABAPENTIN (TAKE YOUR NORMAL DOSE) OR HAVE ALLERGY TO GABAPENTIN  3. We would like you to take a NSAID medication for the first 3 days following surgery. In efforts to avoid additional prescription costs we recommend one of the following over the counter medications. 600mg of Ibuprofen every 8 hours x 3 days    OR   500mg of Naproxen (Aleve) every 12 hours x 3 days  If you have a prescription for Meloxicam or Celebrex already you may resume this as previously prescribed instead of the Over the Counter Medications. DO NOT TAKE ANY OF THESE IF YOU HAVE CHRONIC RENAL FAILURE, ARE TAKING A BLOOD THINNER, HAVE A HISTORY OF A GASTRIC BLEED OR ARE ALLERGIC TO ASPIRIN. IT IS OK TO TAKE IF YOU HAVE HISTORY OF GASTRIC BYPASS SURGERY. 4. Then ONLY IF YOUR PAIN IS UNCONTROLLED you may take your Narcotic Pain medication as prescribed. THIS IS THE PRESCRIPTION THAT WAS GIVEN TO YOU IN THE OFFICE. You should place an ice bag over your shoulder to help with pain and reduce swelling. Your shoulder may be sore and develop bruising over the next several days. The bruising should resolve and no special care will be needed. Leave your bandage on until we see you in the office next week. It is safe to take a shower when you get home    You will be given a sling to use. Continue to wear this while you are active or up and moving around. OK to take off if you are sedentary. No moving the arm away from your body on your own, reaching or carrying with the operated arm. There has been an operation inside and around the shoulder joint. Complete healing may take weeks or several months. You will start Physical therapy next Wednesday as scheduled    If you have a high temperature, unexpected pain, redness or swelling in your shoulder please contact my office immediately. Please call to make an appointment to see me in my office in 1 week.      Dr. Corazon Fox office number [phone]

## 2018-07-13 ENCOUNTER — HOSPITAL ENCOUNTER (OUTPATIENT)
Dept: LAB | Age: 48
Discharge: HOME | End: 2018-07-13
Payer: SUBSIDIZED

## 2018-07-13 ENCOUNTER — TELEPHONE (OUTPATIENT)
Dept: ORTHOPEDIC SURGERY | Age: 48
End: 2018-07-13

## 2018-07-13 DIAGNOSIS — N30.00 ACUTE CYSTITIS WITHOUT HEMATURIA: ICD-10-CM

## 2018-07-13 LAB
APPEARANCE UR: ABNORMAL
BACTERIA URNS QL MICRO: ABNORMAL /HPF
BILIRUB UR QL: NEGATIVE
COLOR UR: YELLOW
EPITH CASTS URNS QL MICRO: ABNORMAL /LPF (ref 0–5)
GLUCOSE UR STRIP.AUTO-MCNC: NEGATIVE MG/DL
HGB UR QL STRIP: ABNORMAL
KETONES UR QL STRIP.AUTO: NEGATIVE MG/DL
LEUKOCYTE ESTERASE UR QL STRIP.AUTO: ABNORMAL
NITRITE UR QL STRIP.AUTO: NEGATIVE
PH UR STRIP: 6.5 [PH] (ref 5–8)
PROT UR STRIP-MCNC: NEGATIVE MG/DL
RBC #/AREA URNS HPF: ABNORMAL /HPF (ref 0–5)
SP GR UR REFRACTOMETRY: 1.02 (ref 1–1.03)
UROBILINOGEN UR QL STRIP.AUTO: 0.2 EU/DL (ref 0.2–1)
WBC URNS QL MICRO: ABNORMAL /HPF (ref 0–4)
YEAST URNS QL MICRO: ABNORMAL

## 2018-07-13 PROCEDURE — 81001 URINALYSIS AUTO W/SCOPE: CPT | Performed by: PHYSICIAN ASSISTANT

## 2018-07-13 PROCEDURE — 36415 COLL VENOUS BLD VENIPUNCTURE: CPT | Performed by: PHYSICIAN ASSISTANT

## 2018-07-13 NOTE — TELEPHONE ENCOUNTER
Patient seen July 10 for H&P for surgery 07/20 and was told she had an particular infection which her  needs to be treated for also. Please call her back as her  physician needs to know the name of the infection.  Please call her asap today at [phone]

## 2018-07-16 ENCOUNTER — TELEPHONE (OUTPATIENT)
Dept: ORTHOPEDIC SURGERY | Age: 48
End: 2018-07-16

## 2018-07-16 RX ORDER — SULFAMETHOXAZOLE AND TRIMETHOPRIM 800; 160 MG/1; MG/1
1 TABLET ORAL 2 TIMES DAILY
Qty: 12 TAB | Refills: 0 | Status: SHIPPED | OUTPATIENT
Start: 2018-07-16 | End: 2018-07-22

## 2018-07-16 NOTE — TELEPHONE ENCOUNTER
Please let patient know that her UTI is improving but still present. Need to switch antibiotics that she is on. I'm sending to her pharmacy.  Have her start taking today and I will cont when she is in hospital.

## 2018-07-17 ENCOUNTER — OFFICE VISIT (OUTPATIENT)
Dept: ORTHOPEDIC SURGERY | Age: 48
End: 2018-07-17

## 2018-07-17 ENCOUNTER — ANESTHESIA EVENT (OUTPATIENT)
Dept: SURGERY | Age: 48
DRG: 483 | End: 2018-07-17
Payer: SUBSIDIZED

## 2018-07-17 VITALS
HEART RATE: 68 BPM | WEIGHT: 207 LBS | DIASTOLIC BLOOD PRESSURE: 74 MMHG | OXYGEN SATURATION: 97 % | SYSTOLIC BLOOD PRESSURE: 107 MMHG | HEIGHT: 66 IN | BODY MASS INDEX: 33.27 KG/M2 | TEMPERATURE: 98.8 F

## 2018-07-17 DIAGNOSIS — M75.121 COMPLETE TEAR OF RIGHT ROTATOR CUFF: Primary | ICD-10-CM

## 2018-07-17 NOTE — PROGRESS NOTES
Carter Sanz  1970   Chief Complaint   Patient presents with    Shoulder Pain     RIGHT        HISTORY OF PRESENT ILLNESS  Carter Sanz is a 46 y.o. female who presents today for reevaluation of right shoulder pain. Patient rates pain as 4/10 today. Patient had a UTI but she does not have any symptoms. Patient would like to go forward with the shoulder replacement. She is s/p Right shoulder arthroscopic Superior Capsular Reconstruction on 11/29/17. Patient denies any fever, chills, chest pain, shortness of breath or calf pain. There are no new illness or injuries to report since last seen in the office. There are no changes to medications, allergies, family or social history. PHYSICAL EXAM:   Visit Vitals    /74    Pulse 68    Temp 98.8 °F (37.1 °C) (Oral)    Ht 5' 6\" (1.676 m)    Wt 207 lb (93.9 kg)    SpO2 97%    BMI 33.41 kg/m2     The patient is a well-developed, well-nourished female   in no acute distress. The patient is alert and oriented times three. The patient is alert and oriented times three. Mood and affect are normal.  LYMPHATIC: lymph nodes are not enlarged and are within normal limits  SKIN: normal in color and non tender to palpation. There are no bruises or abrasions noted. NEUROLOGICAL: Motor sensory exam is within normal limits. Reflexes are equal bilaterally. There is normal sensation to pinprick and light touch  MUSCULOSKELETAL:      IMAGING: MRI of right shoulder dated 3/19/18 was reviewed and read:   IMPRESSION:   1. Interval superior glenohumeral capsule reconstruction superimposed on chronic  massive supraspinatus and infraspinatus tendon rupture with retraction and  muscular atrophy. 2. Complete bucket-handle tear of the superior labrum with displacement of the  superior labrum and the degenerated intra-articular biceps tendon as discussed.   -Degeneration and split tear of the extra-articular biceps tendon which is  partially displaced within the substance of the subscapularis tendon deep to the  transverse ligament. 3. Subscapularis tendon degeneration with partial tearing extent of which  difficult to evaluate due to the degeneration and the presence of the displaced  biceps tenon. 4. Moderate glenohumeral joint effusion with synovitis and chondral loss. Chronic superior subluxation/dislocation of the glenohumeral joint. IMPRESSION:      ICD-10-CM ICD-9-CM    1. Complete tear of right rotator cuff M75.121 727.61         PLAN:   1. Patient's UTI is not a problem and I would like to go ahead with the surgery. Risk factors include: n/a  2. No ultrasound exam indicated today  3. No cortisone injection indicated today   4. No Physical/Occupational Therapy indicated today  5. No diagnostic test indicated today:   6. No durable medical equipment indicated today  7. No referral indicated today   8. No medications indicated today:   9. No Narcotic indicated today     RTC after surgery  Follow-up Disposition: Not on File    Scribed by Aurea Hastings [facility]) as dictated by Susan Fenton MD    I, Dr. Susan Fenton, confirm that all documentation is accurate.     Susan Fenton M.D.   [facility]

## 2018-07-18 ENCOUNTER — ANESTHESIA (OUTPATIENT)
Dept: SURGERY | Age: 48
DRG: 483 | End: 2018-07-18
Payer: SUBSIDIZED

## 2018-07-18 ENCOUNTER — HOSPITAL ENCOUNTER (INPATIENT)
Age: 48
LOS: 1 days | Discharge: HOME | DRG: 483 | End: 2018-07-19
Attending: ORTHOPAEDIC SURGERY | Admitting: ORTHOPAEDIC SURGERY
Payer: SUBSIDIZED

## 2018-07-18 DIAGNOSIS — M75.101 ROTATOR CUFF TEAR ARTHROPATHY, RIGHT: Primary | ICD-10-CM

## 2018-07-18 DIAGNOSIS — M12.811 ROTATOR CUFF TEAR ARTHROPATHY, RIGHT: Primary | ICD-10-CM

## 2018-07-18 LAB — HCG UR QL: NEGATIVE

## 2018-07-18 PROCEDURE — 77030018074 HC RTVR SUT ARTH4 S&N -B: Performed by: ORTHOPAEDIC SURGERY

## 2018-07-18 PROCEDURE — 88304 TISSUE EXAM BY PATHOLOGIST: CPT | Performed by: ORTHOPAEDIC SURGERY

## 2018-07-18 PROCEDURE — 77030032497 HC WRP SHLDR WO BGS SOLM -B: Performed by: ORTHOPAEDIC SURGERY

## 2018-07-18 PROCEDURE — 0RCJ0ZZ EXTIRPATION OF MATTER FROM RIGHT SHOULDER JOINT, OPEN APPROACH: ICD-10-PCS | Performed by: ORTHOPAEDIC SURGERY

## 2018-07-18 PROCEDURE — 97162 PT EVAL MOD COMPLEX 30 MIN: CPT

## 2018-07-18 PROCEDURE — 77030013708 HC HNDPC SUC IRR PULS STRY –B: Performed by: ORTHOPAEDIC SURGERY

## 2018-07-18 PROCEDURE — 77030032490 HC SLV COMPR SCD KNE COVD -B: Performed by: ORTHOPAEDIC SURGERY

## 2018-07-18 PROCEDURE — 3E0T3BZ INTRODUCTION OF ANESTHETIC AGENT INTO PERIPHERAL NERVES AND PLEXI, PERCUTANEOUS APPROACH: ICD-10-PCS | Performed by: ANESTHESIOLOGY

## 2018-07-18 PROCEDURE — 76060000037 HC ANESTHESIA 3 TO 3.5 HR: Performed by: ORTHOPAEDIC SURGERY

## 2018-07-18 PROCEDURE — C1713 ANCHOR/SCREW BN/BN,TIS/BN: HCPCS | Performed by: ORTHOPAEDIC SURGERY

## 2018-07-18 PROCEDURE — 74011250636 HC RX REV CODE- 250/636: Performed by: NURSE ANESTHETIST, CERTIFIED REGISTERED

## 2018-07-18 PROCEDURE — C1776 JOINT DEVICE (IMPLANTABLE): HCPCS | Performed by: ORTHOPAEDIC SURGERY

## 2018-07-18 PROCEDURE — 74011000250 HC RX REV CODE- 250

## 2018-07-18 PROCEDURE — 74011250637 HC RX REV CODE- 250/637: Performed by: PHYSICIAN ASSISTANT

## 2018-07-18 PROCEDURE — 77030019605: Performed by: ORTHOPAEDIC SURGERY

## 2018-07-18 PROCEDURE — 77030003827 HC BIT DRL J&J -B: Performed by: ORTHOPAEDIC SURGERY

## 2018-07-18 PROCEDURE — 88305 TISSUE EXAM BY PATHOLOGIST: CPT | Performed by: ORTHOPAEDIC SURGERY

## 2018-07-18 PROCEDURE — 77030002922 HC SUT FBRWRE ARTH -B: Performed by: ORTHOPAEDIC SURGERY

## 2018-07-18 PROCEDURE — 77030018836 HC SOL IRR NACL ICUM -A: Performed by: ORTHOPAEDIC SURGERY

## 2018-07-18 PROCEDURE — 77030038020 HC MANFLD NEPTUNE STRY -B: Performed by: ORTHOPAEDIC SURGERY

## 2018-07-18 PROCEDURE — 76210000017 HC OR PH I REC 1.5 TO 2 HR: Performed by: ORTHOPAEDIC SURGERY

## 2018-07-18 PROCEDURE — 77030020788: Performed by: ORTHOPAEDIC SURGERY

## 2018-07-18 PROCEDURE — 76010000173 HC OR TIME 3 TO 3.5 HR INTENSV-TIER 1: Performed by: ORTHOPAEDIC SURGERY

## 2018-07-18 PROCEDURE — 74011250636 HC RX REV CODE- 250/636: Performed by: ORTHOPAEDIC SURGERY

## 2018-07-18 PROCEDURE — 74011250637 HC RX REV CODE- 250/637: Performed by: ORTHOPAEDIC SURGERY

## 2018-07-18 PROCEDURE — 77030002933 HC SUT MCRYL J&J -A: Performed by: ORTHOPAEDIC SURGERY

## 2018-07-18 PROCEDURE — 77030012547: Performed by: ORTHOPAEDIC SURGERY

## 2018-07-18 PROCEDURE — 88311 DECALCIFY TISSUE: CPT | Performed by: ORTHOPAEDIC SURGERY

## 2018-07-18 PROCEDURE — 76942 ECHO GUIDE FOR BIOPSY: CPT | Performed by: ANESTHESIOLOGY

## 2018-07-18 PROCEDURE — 77030020782 HC GWN BAIR PAWS FLX 3M -B: Performed by: ORTHOPAEDIC SURGERY

## 2018-07-18 PROCEDURE — 77030031139 HC SUT VCRL2 J&J -A: Performed by: ORTHOPAEDIC SURGERY

## 2018-07-18 PROCEDURE — 0RRJ00Z REPLACEMENT OF RIGHT SHOULDER JOINT WITH REVERSE BALL AND SOCKET SYNTHETIC SUBSTITUTE, OPEN APPROACH: ICD-10-PCS | Performed by: ORTHOPAEDIC SURGERY

## 2018-07-18 PROCEDURE — 74011250636 HC RX REV CODE- 250/636

## 2018-07-18 PROCEDURE — L3650 SO 8 ABD RESTRAINT PRE OTS: HCPCS | Performed by: ORTHOPAEDIC SURGERY

## 2018-07-18 PROCEDURE — C9290 INJ, BUPIVACAINE LIPOSOME: HCPCS | Performed by: ORTHOPAEDIC SURGERY

## 2018-07-18 PROCEDURE — 77030003666 HC NDL SPINAL BD -A: Performed by: ORTHOPAEDIC SURGERY

## 2018-07-18 PROCEDURE — 74011000250 HC RX REV CODE- 250: Performed by: NURSE ANESTHETIST, CERTIFIED REGISTERED

## 2018-07-18 PROCEDURE — 65270000029 HC RM PRIVATE

## 2018-07-18 PROCEDURE — 77030006835 HC BLD SAW SAG STRY -B: Performed by: ORTHOPAEDIC SURGERY

## 2018-07-18 PROCEDURE — 81025 URINE PREGNANCY TEST: CPT

## 2018-07-18 PROCEDURE — 74011250637 HC RX REV CODE- 250/637: Performed by: NURSE ANESTHETIST, CERTIFIED REGISTERED

## 2018-07-18 PROCEDURE — 64415 NJX AA&/STRD BRCH PLXS IMG: CPT | Performed by: ANESTHESIOLOGY

## 2018-07-18 PROCEDURE — 77030026091 HC NDL SUT TAPR HMRK -A: Performed by: ORTHOPAEDIC SURGERY

## 2018-07-18 DEVICE — SCREW BNE L36MM DIA4.5MM GLEN SHLDR METAGLENE LOK FOR DELT: Type: IMPLANTABLE DEVICE | Site: SHOULDER | Status: FUNCTIONAL

## 2018-07-18 DEVICE — SCREW BNE L30MM DIA4.5MM GLEN SHLDR METAGLENE LOK FOR DELT: Type: IMPLANTABLE DEVICE | Site: SHOULDER | Status: FUNCTIONAL

## 2018-07-18 DEVICE — SCREW BNE L18MM DIA4.5MM SHLDR TI NONLOCKING FULL THRD FOR: Type: IMPLANTABLE DEVICE | Site: SHOULDER | Status: FUNCTIONAL

## 2018-07-18 DEVICE — SPHERE GLEN DIA38MM STD SHLDR FOR DELT XTEND REV SYS: Type: IMPLANTABLE DEVICE | Site: SHOULDER | Status: FUNCTIONAL

## 2018-07-18 DEVICE — COMPONENT TOT SHLDR ARTHRPLSTY TOT REVERSED: Type: IMPLANTABLE DEVICE | Status: FUNCTIONAL

## 2018-07-18 DEVICE — CUP HUM DIA38MM +3MM OFFSET STD SHLDR POLYETH DELT XTEND: Type: IMPLANTABLE DEVICE | Site: SHOULDER | Status: FUNCTIONAL

## 2018-07-18 DEVICE — STEM HUM SZ 1 DIA20MM R SHLDR HA CEMENTLESS MOD ECC: Type: IMPLANTABLE DEVICE | Site: SHOULDER | Status: FUNCTIONAL

## 2018-07-18 DEVICE — STEM HUM SZ 0 L144MM DIA12MM STD SHLDR CO CHROM HA: Type: IMPLANTABLE DEVICE | Site: SHOULDER | Status: FUNCTIONAL

## 2018-07-18 DEVICE — IMPLANTABLE DEVICE: Type: IMPLANTABLE DEVICE | Site: SHOULDER | Status: FUNCTIONAL

## 2018-07-18 RX ORDER — GABAPENTIN 300 MG/1
300 CAPSULE ORAL
Status: DISCONTINUED | OUTPATIENT
Start: 2018-07-18 | End: 2018-07-19 | Stop reason: HOSPADM

## 2018-07-18 RX ORDER — NEOSTIGMINE METHYLSULFATE 5 MG/5 ML
SYRINGE (ML) INTRAVENOUS AS NEEDED
Status: DISCONTINUED | OUTPATIENT
Start: 2018-07-18 | End: 2018-07-18 | Stop reason: HOSPADM

## 2018-07-18 RX ORDER — FENTANYL CITRATE 50 UG/ML
INJECTION, SOLUTION INTRAMUSCULAR; INTRAVENOUS AS NEEDED
Status: DISCONTINUED | OUTPATIENT
Start: 2018-07-18 | End: 2018-07-18 | Stop reason: HOSPADM

## 2018-07-18 RX ORDER — OXCARBAZEPINE 300 MG/1
300 TABLET, FILM COATED ORAL 2 TIMES DAILY
Status: DISCONTINUED | OUTPATIENT
Start: 2018-07-18 | End: 2018-07-19 | Stop reason: HOSPADM

## 2018-07-18 RX ORDER — AMOXICILLIN 250 MG
1 CAPSULE ORAL DAILY
Status: DISCONTINUED | OUTPATIENT
Start: 2018-07-19 | End: 2018-07-19 | Stop reason: HOSPADM

## 2018-07-18 RX ORDER — ESZOPICLONE 1 MG/1
3 TABLET, FILM COATED ORAL
Status: DISCONTINUED | OUTPATIENT
Start: 2018-07-18 | End: 2018-07-18

## 2018-07-18 RX ORDER — CELECOXIB 100 MG/1
200 CAPSULE ORAL 2 TIMES DAILY
Status: DISCONTINUED | OUTPATIENT
Start: 2018-07-18 | End: 2018-07-19 | Stop reason: HOSPADM

## 2018-07-18 RX ORDER — CLONAZEPAM 0.5 MG/1
0.5 TABLET ORAL 2 TIMES DAILY
Status: DISCONTINUED | OUTPATIENT
Start: 2018-07-18 | End: 2018-07-19 | Stop reason: HOSPADM

## 2018-07-18 RX ORDER — ROCURONIUM BROMIDE 10 MG/ML
INJECTION, SOLUTION INTRAVENOUS AS NEEDED
Status: DISCONTINUED | OUTPATIENT
Start: 2018-07-18 | End: 2018-07-18 | Stop reason: HOSPADM

## 2018-07-18 RX ORDER — LAMOTRIGINE 100 MG/1
100 TABLET ORAL DAILY
Status: DISCONTINUED | OUTPATIENT
Start: 2018-07-19 | End: 2018-07-19 | Stop reason: HOSPADM

## 2018-07-18 RX ORDER — SODIUM CHLORIDE 0.9 % (FLUSH) 0.9 %
5-10 SYRINGE (ML) INJECTION AS NEEDED
Status: DISCONTINUED | OUTPATIENT
Start: 2018-07-18 | End: 2018-07-18 | Stop reason: HOSPADM

## 2018-07-18 RX ORDER — LIDOCAINE HYDROCHLORIDE 10 MG/ML
0.1 INJECTION, SOLUTION EPIDURAL; INFILTRATION; INTRACAUDAL; PERINEURAL AS NEEDED
Status: DISCONTINUED | OUTPATIENT
Start: 2018-07-18 | End: 2018-07-18 | Stop reason: HOSPADM

## 2018-07-18 RX ORDER — OXYCODONE HYDROCHLORIDE 5 MG/1
5 TABLET ORAL
Status: DISCONTINUED | OUTPATIENT
Start: 2018-07-18 | End: 2018-07-19 | Stop reason: HOSPADM

## 2018-07-18 RX ORDER — FENTANYL CITRATE 50 UG/ML
100 INJECTION, SOLUTION INTRAMUSCULAR; INTRAVENOUS ONCE
Status: COMPLETED | OUTPATIENT
Start: 2018-07-18 | End: 2018-07-18

## 2018-07-18 RX ORDER — MIDAZOLAM HYDROCHLORIDE 1 MG/ML
2 INJECTION, SOLUTION INTRAMUSCULAR; INTRAVENOUS ONCE
Status: COMPLETED | OUTPATIENT
Start: 2018-07-18 | End: 2018-07-18

## 2018-07-18 RX ORDER — FENTANYL CITRATE 50 UG/ML
50 INJECTION, SOLUTION INTRAMUSCULAR; INTRAVENOUS AS NEEDED
Status: DISCONTINUED | OUTPATIENT
Start: 2018-07-18 | End: 2018-07-18 | Stop reason: HOSPADM

## 2018-07-18 RX ORDER — VANCOMYCIN 1.75 GRAM/500 ML IN 0.9 % SODIUM CHLORIDE INTRAVENOUS
1750 EVERY 12 HOURS
Status: COMPLETED | OUTPATIENT
Start: 2018-07-18 | End: 2018-07-19

## 2018-07-18 RX ORDER — ZOLPIDEM TARTRATE 5 MG/1
5 TABLET ORAL
Status: DISCONTINUED | OUTPATIENT
Start: 2018-07-18 | End: 2018-07-19 | Stop reason: HOSPADM

## 2018-07-18 RX ORDER — ACETAMINOPHEN 500 MG
1000 TABLET ORAL ONCE
Status: COMPLETED | OUTPATIENT
Start: 2018-07-18 | End: 2018-07-18

## 2018-07-18 RX ORDER — ONDANSETRON 2 MG/ML
INJECTION INTRAMUSCULAR; INTRAVENOUS AS NEEDED
Status: DISCONTINUED | OUTPATIENT
Start: 2018-07-18 | End: 2018-07-18 | Stop reason: HOSPADM

## 2018-07-18 RX ORDER — FAMOTIDINE 20 MG/1
20 TABLET, FILM COATED ORAL ONCE
Status: COMPLETED | OUTPATIENT
Start: 2018-07-18 | End: 2018-07-18

## 2018-07-18 RX ORDER — ROPIVACAINE HYDROCHLORIDE 5 MG/ML
30 INJECTION, SOLUTION EPIDURAL; INFILTRATION; PERINEURAL
Status: COMPLETED | OUTPATIENT
Start: 2018-07-18 | End: 2018-07-18

## 2018-07-18 RX ORDER — SODIUM CHLORIDE 0.9 % (FLUSH) 0.9 %
5-10 SYRINGE (ML) INJECTION EVERY 8 HOURS
Status: DISCONTINUED | OUTPATIENT
Start: 2018-07-18 | End: 2018-07-18 | Stop reason: HOSPADM

## 2018-07-18 RX ORDER — PROPOFOL 10 MG/ML
INJECTION, EMULSION INTRAVENOUS AS NEEDED
Status: DISCONTINUED | OUTPATIENT
Start: 2018-07-18 | End: 2018-07-18 | Stop reason: HOSPADM

## 2018-07-18 RX ORDER — PANTOPRAZOLE SODIUM 40 MG/1
40 TABLET, DELAYED RELEASE ORAL
Status: DISCONTINUED | OUTPATIENT
Start: 2018-07-18 | End: 2018-07-19 | Stop reason: HOSPADM

## 2018-07-18 RX ORDER — GABAPENTIN 400 MG/1
400 CAPSULE ORAL ONCE
Status: COMPLETED | OUTPATIENT
Start: 2018-07-18 | End: 2018-07-18

## 2018-07-18 RX ORDER — SUCCINYLCHOLINE CHLORIDE 20 MG/ML
INJECTION INTRAMUSCULAR; INTRAVENOUS AS NEEDED
Status: DISCONTINUED | OUTPATIENT
Start: 2018-07-18 | End: 2018-07-18 | Stop reason: HOSPADM

## 2018-07-18 RX ORDER — GABAPENTIN 300 MG/1
300 CAPSULE ORAL
Qty: 4 CAP | Refills: 0 | Status: SHIPPED | OUTPATIENT
Start: 2018-07-18 | End: 2018-07-22

## 2018-07-18 RX ORDER — ONDANSETRON 2 MG/ML
4 INJECTION INTRAMUSCULAR; INTRAVENOUS ONCE
Status: DISCONTINUED | OUTPATIENT
Start: 2018-07-18 | End: 2018-07-18 | Stop reason: HOSPADM

## 2018-07-18 RX ORDER — CELECOXIB 400 MG/1
400 CAPSULE ORAL ONCE
Status: COMPLETED | OUTPATIENT
Start: 2018-07-18 | End: 2018-07-18

## 2018-07-18 RX ORDER — LIDOCAINE HYDROCHLORIDE 20 MG/ML
INJECTION, SOLUTION EPIDURAL; INFILTRATION; INTRACAUDAL; PERINEURAL AS NEEDED
Status: DISCONTINUED | OUTPATIENT
Start: 2018-07-18 | End: 2018-07-18 | Stop reason: HOSPADM

## 2018-07-18 RX ORDER — POLYETHYLENE GLYCOL 3350 17 G/17G
17 POWDER, FOR SOLUTION ORAL DAILY
Status: DISCONTINUED | OUTPATIENT
Start: 2018-07-19 | End: 2018-07-19 | Stop reason: HOSPADM

## 2018-07-18 RX ORDER — CHLORPROMAZINE HYDROCHLORIDE 100 MG/1
100 TABLET, FILM COATED ORAL 2 TIMES DAILY
Status: DISCONTINUED | OUTPATIENT
Start: 2018-07-18 | End: 2018-07-19 | Stop reason: HOSPADM

## 2018-07-18 RX ORDER — TRAZODONE HYDROCHLORIDE 100 MG/1
300 TABLET ORAL
Status: DISCONTINUED | OUTPATIENT
Start: 2018-07-18 | End: 2018-07-19 | Stop reason: HOSPADM

## 2018-07-18 RX ORDER — SODIUM CHLORIDE, SODIUM LACTATE, POTASSIUM CHLORIDE, CALCIUM CHLORIDE 600; 310; 30; 20 MG/100ML; MG/100ML; MG/100ML; MG/100ML
75 INJECTION, SOLUTION INTRAVENOUS CONTINUOUS
Status: DISCONTINUED | OUTPATIENT
Start: 2018-07-18 | End: 2018-07-18 | Stop reason: HOSPADM

## 2018-07-18 RX ORDER — INSULIN LISPRO 100 [IU]/ML
INJECTION, SOLUTION INTRAVENOUS; SUBCUTANEOUS ONCE
Status: DISCONTINUED | OUTPATIENT
Start: 2018-07-18 | End: 2018-07-18 | Stop reason: HOSPADM

## 2018-07-18 RX ORDER — ACETAMINOPHEN 500 MG
1000 TABLET ORAL EVERY 8 HOURS
Status: DISCONTINUED | OUTPATIENT
Start: 2018-07-18 | End: 2018-07-19 | Stop reason: HOSPADM

## 2018-07-18 RX ORDER — EPHEDRINE SULFATE/0.9% NACL/PF 25 MG/5 ML
SYRINGE (ML) INTRAVENOUS AS NEEDED
Status: DISCONTINUED | OUTPATIENT
Start: 2018-07-18 | End: 2018-07-18 | Stop reason: HOSPADM

## 2018-07-18 RX ORDER — GLYCOPYRROLATE 0.2 MG/ML
INJECTION INTRAMUSCULAR; INTRAVENOUS AS NEEDED
Status: DISCONTINUED | OUTPATIENT
Start: 2018-07-18 | End: 2018-07-18 | Stop reason: HOSPADM

## 2018-07-18 RX ORDER — OXYCODONE HYDROCHLORIDE 5 MG/1
5 TABLET ORAL
Qty: 40 TAB | Refills: 0 | Status: SHIPPED | OUTPATIENT
Start: 2018-07-18 | End: 2018-08-21

## 2018-07-18 RX ORDER — BACLOFEN 10 MG/1
10 TABLET ORAL 3 TIMES DAILY
Status: DISCONTINUED | OUTPATIENT
Start: 2018-07-18 | End: 2018-07-19 | Stop reason: HOSPADM

## 2018-07-18 RX ORDER — SULFAMETHOXAZOLE AND TRIMETHOPRIM 800; 160 MG/1; MG/1
1 TABLET ORAL EVERY 12 HOURS
Status: DISCONTINUED | OUTPATIENT
Start: 2018-07-18 | End: 2018-07-19 | Stop reason: HOSPADM

## 2018-07-18 RX ORDER — DEXAMETHASONE SODIUM PHOSPHATE 4 MG/ML
INJECTION, SOLUTION INTRA-ARTICULAR; INTRALESIONAL; INTRAMUSCULAR; INTRAVENOUS; SOFT TISSUE AS NEEDED
Status: DISCONTINUED | OUTPATIENT
Start: 2018-07-18 | End: 2018-07-18 | Stop reason: HOSPADM

## 2018-07-18 RX ORDER — MORPHINE SULFATE 2 MG/ML
1-2 INJECTION, SOLUTION INTRAMUSCULAR; INTRAVENOUS
Status: DISCONTINUED | OUTPATIENT
Start: 2018-07-18 | End: 2018-07-19 | Stop reason: HOSPADM

## 2018-07-18 RX ORDER — NALOXONE HYDROCHLORIDE 0.4 MG/ML
1 INJECTION, SOLUTION INTRAMUSCULAR; INTRAVENOUS; SUBCUTANEOUS
Status: ACTIVE | OUTPATIENT
Start: 2018-07-18 | End: 2018-07-19

## 2018-07-18 RX ORDER — LAMOTRIGINE 100 MG/1
200 TABLET ORAL EVERY EVENING
Status: DISCONTINUED | OUTPATIENT
Start: 2018-07-18 | End: 2018-07-19 | Stop reason: HOSPADM

## 2018-07-18 RX ADMIN — CELECOXIB 200 MG: 100 CAPSULE ORAL at 18:36

## 2018-07-18 RX ADMIN — ZOLPIDEM TARTRATE 5 MG: 5 TABLET ORAL at 22:03

## 2018-07-18 RX ADMIN — CELECOXIB 400 MG: 400 CAPSULE ORAL at 06:43

## 2018-07-18 RX ADMIN — ACETAMINOPHEN 1000 MG: 500 TABLET, FILM COATED ORAL at 15:26

## 2018-07-18 RX ADMIN — ONDANSETRON 4 MG: 2 INJECTION INTRAMUSCULAR; INTRAVENOUS at 07:52

## 2018-07-18 RX ADMIN — FENTANYL CITRATE 50 MCG: 50 INJECTION, SOLUTION INTRAMUSCULAR; INTRAVENOUS at 10:15

## 2018-07-18 RX ADMIN — FENTANYL CITRATE 100 MCG: 50 INJECTION, SOLUTION INTRAMUSCULAR; INTRAVENOUS at 07:44

## 2018-07-18 RX ADMIN — Medication 4 MG: at 10:19

## 2018-07-18 RX ADMIN — BACLOFEN 10 MG: 10 TABLET ORAL at 22:04

## 2018-07-18 RX ADMIN — GABAPENTIN 300 MG: 300 CAPSULE ORAL at 22:04

## 2018-07-18 RX ADMIN — Medication 10 MG: at 07:53

## 2018-07-18 RX ADMIN — GLYCOPYRROLATE 0.8 MG: 0.2 INJECTION INTRAMUSCULAR; INTRAVENOUS at 10:19

## 2018-07-18 RX ADMIN — MIDAZOLAM HYDROCHLORIDE 2 MG: 1 INJECTION, SOLUTION INTRAMUSCULAR; INTRAVENOUS at 07:16

## 2018-07-18 RX ADMIN — LIDOCAINE HYDROCHLORIDE 80 MG: 20 INJECTION, SOLUTION EPIDURAL; INFILTRATION; INTRACAUDAL; PERINEURAL at 07:44

## 2018-07-18 RX ADMIN — ROPIVACAINE HYDROCHLORIDE 150 MG: 5 INJECTION, SOLUTION EPIDURAL; INFILTRATION; PERINEURAL at 07:20

## 2018-07-18 RX ADMIN — DEXAMETHASONE SODIUM PHOSPHATE 8 MG: 4 INJECTION, SOLUTION INTRA-ARTICULAR; INTRALESIONAL; INTRAMUSCULAR; INTRAVENOUS; SOFT TISSUE at 07:52

## 2018-07-18 RX ADMIN — LIDOCAINE HYDROCHLORIDE 0.1 ML: 10 INJECTION, SOLUTION EPIDURAL; INFILTRATION; INTRACAUDAL; PERINEURAL at 07:18

## 2018-07-18 RX ADMIN — OXYCODONE HYDROCHLORIDE 5 MG: 5 TABLET ORAL at 15:34

## 2018-07-18 RX ADMIN — TRAZODONE HYDROCHLORIDE 300 MG: 100 TABLET ORAL at 22:03

## 2018-07-18 RX ADMIN — GABAPENTIN 400 MG: 400 CAPSULE ORAL at 06:44

## 2018-07-18 RX ADMIN — BACLOFEN 10 MG: 10 TABLET ORAL at 15:26

## 2018-07-18 RX ADMIN — SUCCINYLCHOLINE CHLORIDE 120 MG: 20 INJECTION INTRAMUSCULAR; INTRAVENOUS at 07:45

## 2018-07-18 RX ADMIN — FENTANYL CITRATE 50 MCG: 50 INJECTION INTRAMUSCULAR; INTRAVENOUS at 07:16

## 2018-07-18 RX ADMIN — ROCURONIUM BROMIDE 20 MG: 10 INJECTION, SOLUTION INTRAVENOUS at 09:19

## 2018-07-18 RX ADMIN — FENTANYL CITRATE 50 MCG: 50 INJECTION, SOLUTION INTRAMUSCULAR; INTRAVENOUS at 09:15

## 2018-07-18 RX ADMIN — FAMOTIDINE 20 MG: 20 TABLET, FILM COATED ORAL at 06:43

## 2018-07-18 RX ADMIN — SODIUM CHLORIDE, SODIUM LACTATE, POTASSIUM CHLORIDE, AND CALCIUM CHLORIDE: 600; 310; 30; 20 INJECTION, SOLUTION INTRAVENOUS at 07:32

## 2018-07-18 RX ADMIN — ACETAMINOPHEN 1000 MG: 500 TABLET, FILM COATED ORAL at 06:43

## 2018-07-18 RX ADMIN — VANCOMYCIN HYDROCHLORIDE 1750 MG: 10 INJECTION, POWDER, LYOPHILIZED, FOR SOLUTION INTRAVENOUS at 18:39

## 2018-07-18 RX ADMIN — FENTANYL CITRATE 50 MCG: 50 INJECTION, SOLUTION INTRAMUSCULAR; INTRAVENOUS at 08:15

## 2018-07-18 RX ADMIN — SULFAMETHOXAZOLE AND TRIMETHOPRIM 1 TABLET: 800; 160 TABLET ORAL at 22:04

## 2018-07-18 RX ADMIN — PROPOFOL 140 MG: 10 INJECTION, EMULSION INTRAVENOUS at 07:44

## 2018-07-18 RX ADMIN — ACETAMINOPHEN 1000 MG: 500 TABLET, FILM COATED ORAL at 22:04

## 2018-07-18 RX ADMIN — SODIUM CHLORIDE 1000 MG: 900 INJECTION, SOLUTION INTRAVENOUS at 06:42

## 2018-07-18 NOTE — ANESTHESIA PROCEDURE NOTES
Peripheral Block    Start time: 7/18/2018 7:15 AM  End time: 7/18/2018 7:20 AM  Performed by: Isabella Ron by: Dot Aldridge       Pre-procedure: Indications: at surgeon's request and post-op pain management    Preanesthetic Checklist: patient identified, risks and benefits discussed, site marked, timeout performed, anesthesia consent given and patient being monitored    Timeout Time: 07:15          Block Type:   Block Type:   Interscalene  Laterality:  Right  Monitoring:  Standard ASA monitoring, responsive to questions, oxygen, continuous pulse ox, frequent vital sign checks and heart rate  Injection Technique:  Single shot  Procedures: ultrasound guided and nerve stimulator    Patient Position: supine  Prep: chlorhexidine    Location:  Interscalene  Needle Type:  Stimuplex  Needle Gauge:  20 G  Needle Localization:  Anatomical landmarks, nerve stimulator and ultrasound guidance  Motor Response: minimal motor response >0.4 mA    Medication Injected:  0.5%  ropivacaine  Volume (mL):  25    Assessment:  Number of attempts:  1  Injection Assessment:  Incremental injection every 5 mL, negative aspiration for CSF, no paresthesia, ultrasound image on chart, local visualized surrounding nerve on ultrasound, negative aspiration for blood and no intravascular symptoms  Patient tolerance:  Patient tolerated the procedure well with no immediate complications

## 2018-07-18 NOTE — PROGRESS NOTES
Problem: Mobility Impaired (Adult and Pediatric)  Goal: *Acute Goals and Plan of Care (Insert Text)  Physical Therapy Goals  Initiated 7/18/2018 and to be accomplished within 7 day(s)  1. Patient will move from supine to sit and sit to supine  and scoot up and down in bed with supervision/set-up. 2.  Patient will transfer from bed to chair and chair to bed with supervision/set-up using the least restrictive device. 3.  Patient will perform sit to stand with supervision/set-up. 4.  Patient will ambulate with supervision/set-up for >200 feet with the least restrictive device. Outcome: Progressing Towards Goal  physical Therapy EVALUATION    Patient: Nyla Payton (85 y.o. female)  Date: 7/18/2018  Primary Diagnosis: Complete tear of right rotator cuff [M75.121]  Procedure(s) (LRB):  RIGHT REVERSE TOTAL SHOULDER REPLACEMENT/REMOVAL OF FOREIGN BODY (Right) Day of Surgery   Precautions: Fall (Total shoulder - PROM, no ER)    OBJECTIVE/ASSESSMENT :  Based on the objective data described below, the patient presents with impaired functional mobility including bed mobility, transfers, ambulation, and general activity tolerance s/p R reverse total shoulder. Patient presented today semi-reclined in bed, alert and agreeable to PT evaluation with mother in room. Patient educated on shoulder precautions including PROM only and no ER. Patient transferred to sitting EOB with SBA, RUE sling adjusted for proper fit and function. Patient then stood with SBA, ambulated 250 ft in hallway with SBA/occasional CGA for safety d/t path deviations and impulsiveness. Patient completed toilet transfer with CGA for safety d/t impulsive changes in position. At conclusion of session, patient left sitting up in recliner with call bell in reach, needs met, mother in room, and nurse notified. Patient instructed to call for assistance prior to standing, verbalized agreement.   Education: bed mobility, transfers, ambulation, safety awareness, shoulder precautions -- patient verbalized/demonstrated understanding    Patient will benefit from skilled intervention to address the above impairments. Patients rehabilitation potential is considered to be Good  Factors which may influence rehabilitation potential include:   []         None noted  []         Mental ability/status  []         Medical condition  []         Home/family situation and support systems  []         Safety awareness  []         Pain tolerance/management  []         Other:      PLAN :  Recommendations and Planned Interventions:  [x]           Bed Mobility Training             [x]    Neuromuscular Re-Education  [x]           Transfer Training                   []    Orthotic/Prosthetic Training  [x]           Gait Training                          []    Modalities  [x]           Therapeutic Exercises          []    Edema Management/Control  [x]           Therapeutic Activities            [x]    Patient and Family Training/Education  []           Other (comment):    Frequency/Duration: Patient will be followed by physical therapy 1-2 times per day, 4-7 days per week to address goals. Discharge Recommendations: Home Health with supervision  Further Equipment Recommendations for Discharge: N/A     SUBJECTIVE:   Patient stated This is the third surgery I've had on this shoulder.     OBJECTIVE DATA SUMMARY:     Past Medical History:   Diagnosis Date    Arthritis     Asthma     GERD (gastroesophageal reflux disease)     Obesity (BMI 30.0-34.9) 3/1/2017    Psychiatric disorder     depression    Reflux      Past Surgical History:   Procedure Laterality Date    ABDOMEN SURGERY PROC UNLISTED  2008    gastric bypass    HX CHOLECYSTECTOMY      HX HEENT      tonsilectomy      HX ORTHOPAEDIC      right shoulder     MS ANESTH,SURGERY OF SHOULDER Right 05/2017    rotator cuff     Barriers to Learning/Limitations: None  Compensate with: N/A  Prior Level of Function/Home Situation: Patient lives with mother in single story home. She was independent with all functional mobility PTA. Home Situation  Home Environment: Private residence  Wheelchair Ramp: Yes  One/Two Story Residence: One story  Living Alone: No  Support Systems: Parent  Patient Expects to be Discharged to[de-identified] Private residence  Current DME Used/Available at Home: None  Critical Behavior:  Neurologic State: Alert  Psychosocial  Patient Behaviors: Calm; Cooperative  Family  Behaviors: Calm  Purposeful Interaction: Yes  Pt Identified Daily Priority: Clinical issues (comment)  Caritas Process: Nurture loving kindness;Enable brenda/hope;Establish trust;Teaching/learning;Create healing environment  Caring Interventions: Reassure  Reassure: Instilling brenda and hope; Acceptance; Therapeutic listening  Strength:    Strength: Within functional limits (BLE)  Tone & Sensation:   Tone: Normal (BLE)  Sensation: Intact (BLE)   Range Of Motion:  AROM: Within functional limits (BLE)  Functional Mobility:  Bed Mobility:  Supine to Sit: Stand-by assistance  Scooting: Stand-by assistance  Transfers:  Sit to Stand: Stand-by assistance  Stand to Sit: Stand-by assistance  Balance:   Sitting: Intact  Standing: Impaired; With support  Standing - Static: Good  Standing - Dynamic : Fair  Ambulation/Gait Training:  Distance (ft): 250 Feet (ft)  Assistive Device:  (None)  Ambulation - Level of Assistance: Stand-by assistance;Contact guard assistance  Base of Support: Center of gravity altered  Speed/Evelyn: Fluctuations  Pain:  Pre session: 0/10  Post session: 0/10  Activity Tolerance:   Good  Please refer to the flowsheet for vital signs taken during this treatment.   After treatment:   [x] Patient left in no apparent distress sitting up in chair  [] Patient left sitting on EOB  [] Patient left in no apparent distress in bed  [] Patient declined to be OOB at this time due to  [x] Call bell left within reach  [x] Nursing notified(Clara)  [x] Caregiver present  [] Bed alarm activated  [x] SCDs in place    COMMUNICATION/EDUCATION:   [x]         Fall prevention education was provided and the patient/caregiver indicated understanding. [x]         Patient/family have participated as able in goal setting and plan of care. [x]         Patient/family agree to work toward stated goals and plan of care. []         Patient understands intent and goals of therapy, but is neutral about his/her participation. []         Patient is unable to participate in goal setting and plan of care. Thank you for this referral.  Nabeel Polanco   Time Calculation: 17 mins      Mobility  Current  CI= 1-19%   Goal  CI= 1-19%. The severity rating is based on the Level of Assistance required for Functional Mobility and ADLs.     Eval Complexity: History: MEDIUM  Complexity : 1-2 comorbidities / personal factors will impact the outcome/ POC Exam:MEDIUM Complexity : 3 Standardized tests and measures addressing body structure, function, activity limitation and / or participation in recreation  Presentation: MEDIUM Complexity : Evolving with changing characteristics  Clinical Decision Making:Medium Complexity   Overall Complexity:MEDIUM

## 2018-07-18 NOTE — IP AVS SNAPSHOT
[address] Patient: Disha Guzman MRN: [id number] EDC:[birth year] A check samy indicates which time of day the medication should be taken. My Medications START taking these medications Instructions Each Dose to Equal  
 Morning Noon Evening Bedtime  
 acetaminophen 500 mg tablet Commonly known as:  TYLENOL Take 2 Tabs by mouth every eight (8) hours for 4 days. 1000 mg  
    
 7/19/2018  2:40 PM  
   
  
   
  
 gabapentin 300 mg capsule Commonly known as:  NEURONTIN Take 1 Cap by mouth nightly for 4 days. START NIGHT OF SURGERY  
 300 mg  
    
   
   
7/18/2018 10:04 PM  
   
  
 * oxyCODONE IR 5 mg immediate release tablet Commonly known as:  Magaly Mrarero Take 1 Tab by mouth every four (4) hours as needed for Pain. Max Daily Amount: 30 mg. Indications: Pain  
 5 mg  
  7/19/2018 10:30 AM  
   
   
   
  
 * oxyCODONE IR 10 mg Tab immediate release tablet Commonly known as:  Magaly Marrero Take 0.5 Tabs by mouth every four (4) hours as needed. Max Daily Amount: 30 mg. Indications: Pain  
 5 mg  
  7/19/2018 10:30 AM  
   
   
  
   
  
 * Notice: This list has 2 medication(s) that are the same as other medications prescribed for you. Read the directions carefully, and ask your doctor or other care provider to review them with you. CONTINUE taking these medications Instructions Each Dose to Equal  
 Morning Noon Evening Bedtime  
 baclofen 10 mg tablet Commonly known as:  LIORESAL Take  by mouth three (3) times daily. 7/19/2018 10:19 AM  
   
   
  
   
  
 chlorproMAZINE 100 mg tablet Commonly known as:  THORAZINE Take 100 mg by mouth two (2) times a day. 100 mg  
  7/19/2018 10:19 AM  
   
   
  
   
  
 KLONOPIN PO Take 5 mg by mouth two (2) times a day. Indications: anxiety  5 mg  
  7/19/2018 10:19 AM  
   
   
  
   
  
 LaMICtal 100 mg tablet Generic drug:  lamoTRIgine Take  by mouth daily. 100 mg am and 200mg q hs   Indications: BIPOLAR DISORDER IN REMISSION  
   7/19/2018 10:19 AM  
   
   
   
  
  
 LUNESTA 3 mg tablet Generic drug:  eszopiclone Take  by mouth nightly. multivitamin with iron chewable tablet Commonly known as:  Tonia Repine Take 1 Tab by mouth daily. 1 Tab PriLOSEC 20 mg capsule Generic drug:  omeprazole Take 20 mg by mouth daily. 20 mg  
    
   
   
   
  
 traZODone 300 mg tablet Commonly known as:  Kit Bump Take 300 mg by mouth nightly. 300 mg  
    
   
   
   
7/18/2018 10:03 PM  
  
 TRILEPTAL 300 mg tablet Generic drug:  OXcarbazepine Take  by mouth two (2) times a day. 7/19/2018 10:19 AM  
   
   
  
   
  
 trimethoprim-sulfamethoxazole 160-800 mg per tablet Commonly known as:  BACTRIM DS, SEPTRA DS Take 1 Tab by mouth two (2) times a day for 6 days. 1 Tab  
  7/19/2018 10:19 AM  
   
   
  
   
  
 VITAMIN B-12 PO Take  by mouth daily. VITAMIN D2 PO Take  by mouth every seven (7) days. VRAYLAR 6 mg capsule Generic drug:  cariprazine Take  by mouth nightly. 7/18/2018 10:00 PM  
   
  
  
  
Where to Get Your Medications Information on where to get these meds will be given to you by the nurse or doctor. ! Ask your nurse or doctor about these medications  
  acetaminophen 500 mg tablet  
 gabapentin 300 mg capsule  
 oxyCODONE IR 10 mg Tab immediate release tablet  
 oxyCODONE IR 5 mg immediate release tablet

## 2018-07-18 NOTE — PROGRESS NOTES
Date of Surgery Update:  Carlos Aquino was seen and examined. History and physical has been reviewed. The patient has been examined.  There have been no significant clinical changes since the completion of the originally dated History and Physical.    Signed By: El Floyd MD     July 18, 2018 7:21 AM

## 2018-07-18 NOTE — ANESTHESIA POSTPROCEDURE EVALUATION
Post-Anesthesia Evaluation and Assessment    Patient: Kumar Roque MRN: [id number]  SSN: [id number]    YOB: 1970  Age: 46 y.o. Sex: female      Data from PACU flowsheet    Cardiovascular Function/Vital Signs  Visit Vitals    BP 94/59 (BP 1 Location: Left arm, BP Patient Position: At rest)    Pulse 60    Temp 36.8 °C (98.3 °F)    Resp 16    Ht 5' 6\" (1.676 m)    Wt 98.2 kg (216 lb 8 oz)    SpO2 91%    BMI 34.94 kg/m2       Patient is status post general, regional anesthesia for Procedure(s):  RIGHT REVERSE TOTAL SHOULDER REPLACEMENT/REMOVAL OF FOREIGN BODY. Nausea/Vomiting: controlled    Postoperative hydration reviewed and adequate. Pain:  Pain Scale 1: Numeric (0 - 10) (07/18/18 1534)  Pain Intensity 1: 4 (07/18/18 1534)   Managed      Mental Status and Level of Consciousness: Alert and oriented     Pulmonary Status:   O2 Device: Room air (07/18/18 1325)   Adequate oxygenation and airway patent    Complications related to anesthesia: None    Post-anesthesia assessment completed.  No concerns    Signed By: Elena Nieves MD     July 18, 2018

## 2018-07-18 NOTE — PERIOP NOTES
TRANSFER - OUT REPORT:    Verbal report given to Elda Mcfadden RN(name) on Merary Lewis  being transferred to [facility](unit) for routine post - op       Report consisted of patients Situation, Background, Assessment and   Recommendations(SBAR). Information from the following report(s) SBAR, OR Summary and MAR was reviewed with the receiving nurse. Lines:   Peripheral IV 07/18/18 Left Hand (Active)   Site Assessment Clean, dry, & intact 7/18/2018 11:05 AM   Phlebitis Assessment 0 7/18/2018 11:05 AM   Infiltration Assessment 0 7/18/2018 11:05 AM   Dressing Status Clean, dry, & intact 7/18/2018 11:05 AM   Dressing Type Transparent;Tape 7/18/2018 11:05 AM   Hub Color/Line Status Pink 7/18/2018 11:05 AM        Opportunity for questions and clarification was provided.       Patient transported with:   O2 @ 3 liters  Tech

## 2018-07-18 NOTE — PROGRESS NOTES
conducted a pre-surgery visit with Nyla Payton, who is a 46 y.o.,female. The  provided the following Interventions:  Initiated a relationship of care and support. Plan:  Chaplains will continue to follow and will provide pastoral care on an as needed/requested basis.  recommends bedside caregivers page  on duty if patient shows signs of acute spiritual or emotional distress.     [address] Certified [address] [place] [address]   [phone]

## 2018-07-18 NOTE — BRIEF OP NOTE
BRIEF OPERATIVE NOTE    Date of Procedure: 7/18/2018   Preoperative Diagnosis: Complete tear of right rotator cuff [M75.121]  Postoperative Diagnosis: Complete tear of right rotator cuff [M75.121]    Procedure(s):  RIGHT REVERSE TOTAL SHOULDER REPLACEMENT/REMOVAL OF FOREIGN BODY  Surgeon(s) and Role:     * Ted Oreilly MD - Primary         Surgical Assistant: Neeta Anguiano    Surgical Staff:  Circ-1: Karol Mir RN  Physician Assistant: HAMZAH [facility]  Scrub Tech-1: Chloe Osman  Surg Asst-1: Bertha Kline  Event Time In   Incision Start 3327   Incision Close 1054     Anesthesia: General   Estimated Blood Loss: 75mL  Findings: torn Superior capsule reconstruction graft, cuff tear arthropathy  Specimens:   ID Type Source Tests Collected by Time Destination   1 : right humeral head Preservative Shoulder  Ted Oreilly MD 7/18/2018 9595 Pathology   2 : graft right shoulder Preservative Shoulder  Ted Oreilly MD 7/18/2018 1012 Pathology      Complications: none patient to recovery room stable  Implants:   Implant Name Type Inv.  Item Serial No.  Lot No. LRB No. Used Action   COMPNT ISAC METAGLENE -- DELTA EXTEND - [id number]  COMPNT ISAC METAGLENE -- DELTA EXTEND  [employer] [id number] Right 1 Implanted   SCR BNE LCK GLENOID 4.5X36MM -- DELTA EXTEND - [id number]  SCR BNE LCK GLENOID 4.5X36MM -- DELTA EXTEND  [employer] [id number] Right 1 Implanted   COMPNT GLENOSPHERE STD 38MM -- DELTA EXTEND - [id number]  COMPNT GLENOSPHERE STD 38MM -- DELTA EXTEND  [employer] [id number] Right 1 Implanted   SCR BNE LCK GLENOID 4.5X36MM -- DELTA EXTEND - [id number]  SCR BNE LCK GLENOID 4.5X36MM -- DELTA EXTEND  [employer] [id number] Right 1 Implanted   SCR BNE LCK GLENOID 4.5X30MM -- DELTA EXTEND - [id number]  SCR BNE LCK GLENOID 4.5X30MM -- DELTA EXTEND  [employer] [id number] Right 1 Implanted   SCR GLENOID THRD 4.5X18MM -- DELTA EXTEND - [id number]  SCR GLENOID THRD 4.5X18MM -- DELTA EXTEND  [facility] [id number] Right 1 Implanted   CUP HUM PE STAND 38MM +3MM -- DELTA XTEND - [id number]  CUP HUM PE STAND 38MM +3MM -- DELTA XTEND  [facility] [employer] [id number] Right 1 Implanted   STEM HUM MOD CEMTLS HA 12MM -- DELTA XTEND - [id number]  STEM HUM MOD CEMTLS HA 12MM -- DELTA XTEND  [facility] [employer] [id number] Right 1 Implanted   HUM MOD CEMTLS ECCEN HA SZ1 RT -- DELTA XTEND - [id number]   HUM MOD CEMTLS ECCEN HA SZ1 RT -- DELTA XTEND   [facility] [employer] [id number] Right 1 Implanted

## 2018-07-18 NOTE — IP AVS SNAPSHOT
[address] Patient: Bernard Owens MRN: [id number] About your hospitalization You were admitted on:  2018 You last received care in the:  [facility] [address] You were discharged on:  2018 Why you were hospitalized Your primary diagnosis was:  Not on File Your diagnoses also included:  Rotator Cuff Tear Arthropathy, Right Follow-up Information Follow up With Details Comments Contact Info Modesto Taylor MD On 2018 Appointment at 1:40 pm [address] [facility] [address] 
[phone] HAMZAH Weber On 2018 Appointment at 9:30 am [address] [place] [facility] [address] [facility] [address] 
[phone] Your Scheduled Appointments 2018 12:00 PM EDT  
pt eval with Brennen Weir [facility] [place] ([facility]) [address] [facility] [address]  [place] [address]  
[phone] 2018  9:30 AM EDT  
POST OP with Lynda Weber [facility] ([facility]) [facility] [address], [address] [facility] [address]  
[phone]   9:30 AM EDT New Patient with Lesley Edouard MD  
[facility] ([facility]) [facility] [address], [place] [address] [place] [address]  
[phone] Discharge Orders None A check samy indicates which time of day the medication should be taken. My Medications START taking these medications Instructions Each Dose to Equal  
 Morning Noon Evening Bedtime  
 acetaminophen 500 mg tablet Commonly known as:  TYLENOL Take 2 Tabs by mouth every eight (8) hours for 4 days. 1000 mg  
    
 2018  2:40 PM  
   
  
   
  
 gabapentin 300 mg capsule Commonly known as:  NEURONTIN Take 1 Cap by mouth nightly for 4 days. START NIGHT OF SURGERY  
 300 mg  
    
   
   
7/18/2018 10:04 PM  
   
  
 * oxyCODONE IR 5 mg immediate release tablet Commonly known as:  Joana Boers Take 1 Tab by mouth every four (4) hours as needed for Pain. Max Daily Amount: 30 mg. Indications: Pain  
 5 mg  
  7/19/2018 10:30 AM  
   
   
   
  
 * oxyCODONE IR 10 mg Tab immediate release tablet Commonly known as:  Joana Boers Take 0.5 Tabs by mouth every four (4) hours as needed. Max Daily Amount: 30 mg. Indications: Pain  
 5 mg  
  7/19/2018 10:30 AM  
   
   
  
   
  
 * Notice: This list has 2 medication(s) that are the same as other medications prescribed for you. Read the directions carefully, and ask your doctor or other care provider to review them with you. CONTINUE taking these medications Instructions Each Dose to Equal  
 Morning Noon Evening Bedtime  
 baclofen 10 mg tablet Commonly known as:  LIORESAL Take  by mouth three (3) times daily. 7/19/2018 10:19 AM  
   
   
  
   
  
 chlorproMAZINE 100 mg tablet Commonly known as:  THORAZINE Take 100 mg by mouth two (2) times a day. 100 mg  
  7/19/2018 10:19 AM  
   
   
  
   
  
 KLONOPIN PO Take 5 mg by mouth two (2) times a day. Indications: anxiety 5 mg  
  7/19/2018 10:19 AM  
   
   
  
   
  
 LaMICtal 100 mg tablet Generic drug:  lamoTRIgine Take  by mouth daily. 100 mg am and 200mg q hs   Indications: BIPOLAR DISORDER IN REMISSION  
   7/19/2018 10:19 AM  
   
   
   
  
  
 LUNESTA 3 mg tablet Generic drug:  eszopiclone Take  by mouth nightly. multivitamin with iron chewable tablet Commonly known as:  Aden Shelling Take 1 Tab by mouth daily. 1 Tab PriLOSEC 20 mg capsule Generic drug:  omeprazole Take 20 mg by mouth daily.   
 20 mg  
    
   
   
   
  
 traZODone 300 mg tablet Commonly known as:  Emaline Sober Take 300 mg by mouth nightly. 300 mg  
    
   
   
   
7/18/2018 10:03 PM  
  
 TRILEPTAL 300 mg tablet Generic drug:  OXcarbazepine Take  by mouth two (2) times a day. 7/19/2018 10:19 AM  
   
   
  
   
  
 trimethoprim-sulfamethoxazole 160-800 mg per tablet Commonly known as:  BACTRIM DS, SEPTRA DS Take 1 Tab by mouth two (2) times a day for 6 days. 1 Tab  
  7/19/2018 10:19 AM  
   
   
  
   
  
 VITAMIN B-12 PO Take  by mouth daily. VITAMIN D2 PO Take  by mouth every seven (7) days. VRAYLAR 6 mg capsule Generic drug:  cariprazine Take  by mouth nightly. 7/18/2018 10:00 PM  
   
  
  
  
Where to Get Your Medications Information on where to get these meds will be given to you by the nurse or doctor. ! Ask your nurse or doctor about these medications  
  acetaminophen 500 mg tablet  
 gabapentin 300 mg capsule  
 oxyCODONE IR 10 mg Tab immediate release tablet  
 oxyCODONE IR 5 mg immediate release tablet Opioid Education Prescription Opioids: What You Need to Know: 
 
Prescription opioids can be used to help relieve moderate-to-severe pain and are often prescribed following a surgery or injury, or for certain health conditions. These medications can be an important part of treatment but also come with serious risks. Opioids are strong pain medicines. Examples include hydrocodone, oxycodone, fentanyl, and morphine. Heroin is an example of an illegal opioid. It is important to work with your health care provider to make sure you are getting the safest, most effective care. WHAT ARE THE RISKS AND SIDE EFFECTS OF OPIOID USE? Prescription opioids carry serious risks of addiction and overdose, especially with prolonged use.   An opioid overdose, often marked by slow breathing, can cause sudden death. The use of prescription opioids can have a number of side effects as well, even when taken as directed. · Tolerance-meaning you might need to take more of a medication for the same pain relief · Physical dependence-meaning you have symptoms of withdrawal when the medication is stopped. Withdrawal symptoms can include nausea, sweating, chills, diarrhea, stomach cramps, and muscle aches. Withdrawal can last up to several weeks, depending on which drug you took and how long you took it. · Increased sensitivity to pain · Constipation · Nausea, vomiting, and dry mouth · Sleepiness and dizziness · Confusion · Depression · Low levels of testosterone that can result in lower sex drive, energy, and strength · Itching and sweating RISKS ARE GREATER WITH:      
· History of drug misuse, substance use disorder, or overdose · Mental health conditions (such as depression or anxiety) · Sleep apnea · Older age (72 years or older) · Pregnancy Avoid alcohol while taking prescription opioids. Also, unless specifically advised by your health care provider, medications to avoid include: · Benzodiazepines (such as Xanax or Valium) · Muscle relaxants (such as Soma or Flexeril) · Hypnotics (such as Ambien or Lunesta) · Other prescription opioids KNOW YOUR OPTIONS Talk to your health care provider about ways to manage your pain that don't involve prescription opioids. Some of these options may actually work better and have fewer risks and side effects. Options may include: 
· Pain relievers such as acetaminophen, ibuprofen, and naproxen · Some medications that are also used for depression or seizures · Physical therapy and exercise · Counseling to help patients learn how to cope better with triggers of pain and stress. · Application of heat or cold compress · Massage therapy · Relaxation techniques Be Informed Make sure you know the name of your medication, how much and how often to take it, and its potential risks & side effects. IF YOU ARE PRESCRIBED OPIOIDS FOR PAIN: 
· Never take opioids in greater amounts or more often than prescribed. Remember the goal is not to be pain-free but to manage your pain at a tolerable level. · Follow up with your primary care provider to: · Work together to create a plan on how to manage your pain. · Talk about ways to help manage your pain that don't involve prescription opioids. · Talk about any and all concerns and side effects. · Help prevent misuse and abuse. · Never sell or share prescription opioids · Help prevent misuse and abuse. · Store prescription opioids in a secure place and out of reach of others (this may include visitors, children, friends, and family). · Safely dispose of unused/unwanted prescription opioids: Find your community drug take-back program or your pharmacy mail-back program, or flush them down the toilet, following guidance from the Food and Drug Administration (www.fda.gov/Drugs/ResourcesForYou). · Visit www.cdc.gov/drugoverdose to learn about the risks of opioid abuse and overdose. · If you believe you may be struggling with addiction, tell your health care provider and ask for guidance or call [address] [place], [place] [address] at 4-870-340-RJTQ. Discharge Instructions Dr. Miller De La Rosa Total Shoulder Postoperative Information How to manage your pain after your Surgery: After your surgery it is expected that you will have some pain. In efforts to reduce the amounts of side effects from pain medications we would like you to follow the below medication regimen after surgery: 
1. Take 1000mg of Tylenol by mouth every 8 hours x 5 days. This is 4 tabs of regular strength Tylenol or 2 tabs of Extra Strength Tylenol 2. 300mg of Gabapentin every night x 5 days starting the evening you get home from the hospital 
DO NOT TAKE THIS IF YOU ALREADY TAKE LYRICA OR GABAPENTIN (TAKE YOUR NORMAL DOSE) OR HAVE ALLERGY TO GABAPENTIN 3. We would like you to take a NSAID medication for the first 3 days following surgery. In efforts to avoid additional prescription costs we recommend one of the following over the counter medications. 600mg of Ibuprofen every 8 hours x 3 days OR 
 500mg of Naproxen (Aleve) every 12 hours x 3 days If you have a prescription for Meloxicam or Celebrex already you may resume this as previously prescribed instead of the Over the Counter Medications. DO NOT TAKE ANY OF THESE IF YOU HAVE CHRONIC RENAL FAILURE, ARE TAKING A BLOOD THINNER, HAVE A HISTORY OF A GASTRIC BLEED OR ARE ALLERGIC TO ASPIRIN. IT IS OK TO TAKE IF YOU HAVE HISTORY OF GASTRIC BYPASS SURGERY. 4. Then ONLY IF YOUR PAIN IS UNCONTROLLED you may take your Narcotic Pain medication as prescribed. THIS IS THE PRESCRIPTION THAT WAS GIVEN TO YOU IN THE OFFICE. You should place an ice bag over your shoulder to help with pain and reduce swelling. Your shoulder may be sore and develop bruising over the next several days. The bruising should resolve and no special care will be needed. Leave your bandage on until we see you in the office next week. It is safe to take a shower when you get home You will be given a sling to use. Continue to wear this while you are active or up and moving around. OK to take off if you are sedentary. No moving the arm away from your body on your own, reaching or carrying with the operated arm. There has been an operation inside and around the shoulder joint. Complete healing may take weeks or several months. You will start Physical therapy next Wednesday as scheduled If you have a high temperature, unexpected pain, redness or swelling in your shoulder please contact my office immediately. Please call to make an appointment to see me in my office in 1 week. Dr. Carmen Gibbons office number [phone] [place] Announcement We are excited to announce that we are making your provider's discharge notes available to you in [facility]. You will see these notes when they are completed and signed by the physician that discharged you from your recent hospital stay. If you have any questions or concerns about any information you see in [employer], please call the Health Information Department where you were seen or reach out to your Primary Care Provider for more information about your plan of care. Introducing Jose J Winter As a [employer] patient, I wanted to make you aware of our electronic visit tool called [facility] [employer]. [employer] 24/7 allows you to connect within minutes with a medical provider 24 hours a day, seven days a week via a mobile device or tablet or logging into a secure website from your computer. You can access Jose J Winter from anywhere in the [place]. A virtual visit might be right for you when you have a simple condition and feel like you just dont want to get out of bed, or cant get away from work for an appointment, when your regular [employer] provider is not available (evenings, weekends or holidays), or when youre out of town and need minor care. Electronic visits cost only $49 and if the [employer] 24/7 provider determines a prescription is needed to treat your condition, one can be electronically transmitted to a nearby pharmacy*. Please take a moment to enroll today if you have not already done so. The enrollment process is free and takes just a few minutes. To enroll, please download the [employer] 24/7 aki to your tablet or phone, or visit [email or web address] to enroll on your computer.    
And, as an [address] [place] [address] patient with a [employer] account, the results of your visits will be scanned into your electronic medical record and your primary care provider will be able to view the scanned results. We urge you to continue to see your regular [facility] provider for your ongoing medical care. And while your primary care provider may not be the one available when you seek a [employer] virtual visit, the peace of mind you get from getting a real diagnosis real time can be priceless. For more information on [employer], view our Frequently Asked Questions (FAQs) at [email or web address]. Sincerely, 
 
Carisa Ventura MD 
Chief Medical Officer Prashant Bettencourt *:  certain medications cannot be prescribed via [employer] Providers Seen During Your Hospitalization Provider Specialty Primary office phone El Floyd MD Orthopedic Surgery [phone] Your Primary Care Physician (PCP) Primary Care Physician Office Phone Office Fax Es Hughes [phone] You are allergic to the following Allergen Reactions Amoxicillin Other (comments) Does no work Codeine Nausea and Vomiting Recent Documentation Height Weight BMI OB Status Smoking Status 1.676 m 98.2 kg 34.94 kg/m2 Menopause Current Every Day Smoker Emergency Contacts Name Discharge Info Relation Home Work Mobile Jose Sanders DISCHARGE CAREGIVER [3] Spouse [3] [phone] Patient Belongings The following personal items are in your possession at time of discharge: 
  Dental Appliances: None  Visual Aid: Glasses      Home Medications: None   Jewelry: None  Clothing: Shirt, Shorts, Undergarments, Footwear    Other Valuables: Eyeglasses, [employer] Please provide this summary of care documentation to your next provider.  
  
  
 
  
Signatures-by signing, you are acknowledging that this After Visit Summary has been reviewed with you and you have received a copy. Patient Signature:  ____________________________________________________________ Date:  ____________________________________________________________  
  
Vignesh Fountain Provider Signature:  ____________________________________________________________ Date:  ____________________________________________________________

## 2018-07-19 VITALS
DIASTOLIC BLOOD PRESSURE: 65 MMHG | HEIGHT: 66 IN | TEMPERATURE: 98.3 F | RESPIRATION RATE: 16 BRPM | BODY MASS INDEX: 34.79 KG/M2 | SYSTOLIC BLOOD PRESSURE: 97 MMHG | HEART RATE: 74 BPM | OXYGEN SATURATION: 97 % | WEIGHT: 216.5 LBS

## 2018-07-19 LAB
ANION GAP SERPL CALC-SCNC: 5 MMOL/L (ref 3–18)
BUN SERPL-MCNC: 10 MG/DL (ref 7–18)
BUN/CREAT SERPL: 15 (ref 12–20)
CALCIUM SERPL-MCNC: 7.9 MG/DL (ref 8.5–10.1)
CHLORIDE SERPL-SCNC: 106 MMOL/L (ref 100–108)
CO2 SERPL-SCNC: 30 MMOL/L (ref 21–32)
CREAT SERPL-MCNC: 0.66 MG/DL (ref 0.6–1.3)
ERYTHROCYTE [DISTWIDTH] IN BLOOD BY AUTOMATED COUNT: 13.7 % (ref 11.6–14.5)
GLUCOSE SERPL-MCNC: 113 MG/DL (ref 74–99)
HCT VFR BLD AUTO: 31.4 % (ref 35–45)
HGB BLD-MCNC: 10.5 G/DL (ref 12–16)
MCH RBC QN AUTO: 30.4 PG (ref 24–34)
MCHC RBC AUTO-ENTMCNC: 33.4 G/DL (ref 31–37)
MCV RBC AUTO: 91 FL (ref 74–97)
PLATELET # BLD AUTO: 182 K/UL (ref 135–420)
PMV BLD AUTO: 11.6 FL (ref 9.2–11.8)
POTASSIUM SERPL-SCNC: 4.2 MMOL/L (ref 3.5–5.5)
RBC # BLD AUTO: 3.45 M/UL (ref 4.2–5.3)
SODIUM SERPL-SCNC: 141 MMOL/L (ref 136–145)
WBC # BLD AUTO: 7.2 K/UL (ref 4.6–13.2)

## 2018-07-19 PROCEDURE — 80048 BASIC METABOLIC PNL TOTAL CA: CPT | Performed by: PHYSICIAN ASSISTANT

## 2018-07-19 PROCEDURE — 77030012935 HC DRSG AQUACEL BMS -B

## 2018-07-19 PROCEDURE — 74011250636 HC RX REV CODE- 250/636: Performed by: ORTHOPAEDIC SURGERY

## 2018-07-19 PROCEDURE — 36415 COLL VENOUS BLD VENIPUNCTURE: CPT | Performed by: PHYSICIAN ASSISTANT

## 2018-07-19 PROCEDURE — 97116 GAIT TRAINING THERAPY: CPT

## 2018-07-19 PROCEDURE — 97530 THERAPEUTIC ACTIVITIES: CPT

## 2018-07-19 PROCEDURE — 97535 SELF CARE MNGMENT TRAINING: CPT

## 2018-07-19 PROCEDURE — 97165 OT EVAL LOW COMPLEX 30 MIN: CPT

## 2018-07-19 PROCEDURE — 74011250637 HC RX REV CODE- 250/637: Performed by: PHYSICIAN ASSISTANT

## 2018-07-19 PROCEDURE — 85027 COMPLETE CBC AUTOMATED: CPT | Performed by: PHYSICIAN ASSISTANT

## 2018-07-19 RX ORDER — ACETAMINOPHEN 500 MG
1000 TABLET ORAL EVERY 8 HOURS
Qty: 24 TAB | Refills: 0 | Status: SHIPPED | OUTPATIENT
Start: 2018-07-19 | End: 2018-07-23

## 2018-07-19 RX ORDER — OXYCODONE HYDROCHLORIDE 10 MG/1
5 TABLET ORAL
Qty: 40 TAB | Refills: 0 | Status: SHIPPED | OUTPATIENT
Start: 2018-07-19 | End: 2018-08-21

## 2018-07-19 RX ADMIN — OXYCODONE HYDROCHLORIDE 5 MG: 5 TABLET ORAL at 10:30

## 2018-07-19 RX ADMIN — ACETAMINOPHEN 1000 MG: 500 TABLET, FILM COATED ORAL at 14:40

## 2018-07-19 RX ADMIN — LAMOTRIGINE 100 MG: 100 TABLET ORAL at 10:19

## 2018-07-19 RX ADMIN — VANCOMYCIN HYDROCHLORIDE 1750 MG: 10 INJECTION, POWDER, LYOPHILIZED, FOR SOLUTION INTRAVENOUS at 06:12

## 2018-07-19 RX ADMIN — CHLORPROMAZINE HYDROCHLORIDE 100 MG: 100 TABLET, SUGAR COATED ORAL at 10:19

## 2018-07-19 RX ADMIN — OXYCODONE HYDROCHLORIDE 5 MG: 5 TABLET ORAL at 06:51

## 2018-07-19 RX ADMIN — CELECOXIB 200 MG: 100 CAPSULE ORAL at 10:19

## 2018-07-19 RX ADMIN — POLYETHYLENE GLYCOL 3350 17 G: 17 POWDER, FOR SOLUTION ORAL at 10:19

## 2018-07-19 RX ADMIN — SULFAMETHOXAZOLE AND TRIMETHOPRIM 1 TABLET: 800; 160 TABLET ORAL at 10:19

## 2018-07-19 RX ADMIN — ACETAMINOPHEN 1000 MG: 500 TABLET, FILM COATED ORAL at 06:12

## 2018-07-19 RX ADMIN — OXCARBAZEPINE 300 MG: 300 TABLET ORAL at 10:19

## 2018-07-19 RX ADMIN — BACLOFEN 10 MG: 10 TABLET ORAL at 10:19

## 2018-07-19 RX ADMIN — CLONAZEPAM 0.5 MG: 0.5 TABLET ORAL at 10:19

## 2018-07-19 RX ADMIN — STANDARDIZED SENNA CONCENTRATE AND DOCUSATE SODIUM 1 TABLET: 8.6; 5 TABLET, FILM COATED ORAL at 10:19

## 2018-07-19 RX ADMIN — PANTOPRAZOLE SODIUM 40 MG: 40 TABLET, DELAYED RELEASE ORAL at 06:52

## 2018-07-19 NOTE — PROGRESS NOTES
Problem: Self Care Deficits Care Plan (Adult)  Goal: *Acute Goals and Plan of Care (Insert Text)  Outcome: Resolved/Met Date Met: 07/19/18  Occupational Therapy EVALUATION/discharge    Patient: Joie Phoenix (24 y.o. female)  Date: 7/19/2018  Primary Diagnosis: Complete tear of right rotator cuff [M75.121]  Procedure(s) (LRB):  RIGHT REVERSE TOTAL SHOULDER REPLACEMENT/REMOVAL OF FOREIGN BODY (Right) 1 Day Post-Op   Precautions:   Fall (total shoulder)    ASSESSMENT AND RECOMMENDATIONS:  Based on the objective data described below, the patient is able to perform basic self care tasks and functional transfers without assistance using one handed technique. She was educated on total shoulder precautions and patient verbalized understanding. Supportive mother in the room as well. She was able to don/doff sling with initial VCs for efficiency. Pendulum exercises reviewed and patient verbalized understanding from prior shoulder surgeries. She has a supportive mother at home to assist her prn after discharge. Skilled occupational therapy is not indicated at this time. Discharge Recommendations: Outpatient  Further Equipment Recommendations for Discharge: N/A      Barriers to Learning/Limitations: None  Compensate with: visual, verbal, tactile, kinesthetic cues/model     COMPLEXITY     Eval Complexity: History: LOW Complexity : Brief history review ; Examination: LOW Complexity : 1-3 performance deficits relating to physical, cognitive , or psychosocial skils that result in activity limitations and / or participation restrictions ; Decision Making:LOW Complexity : No comorbidities that affect functional and no verbal or physical assistance needed to complete eval tasks  Assessment: Low Complexity        G-CODES:     Self Care  Current  CI= 1-19%   Goal  CI= 1-19%   D/C  CI= 1-19%. The severity rating is based on the Level of Assistance required for Functional Mobility and ADLs.     SUBJECTIVE:   Patient stated I have been dealing with this for months.     OBJECTIVE DATA SUMMARY:     Past Medical History:   Diagnosis Date    Arthritis     Asthma     GERD (gastroesophageal reflux disease)     Obesity (BMI 30.0-34.9) 3/1/2017    Psychiatric disorder     depression    Reflux      Past Surgical History:   Procedure Laterality Date    ABDOMEN SURGERY PROC UNLISTED  2008    gastric bypass    HX CHOLECYSTECTOMY      HX HEENT      tonsilectomy      HX ORTHOPAEDIC      right shoulder     IN ANESTH,SURGERY OF SHOULDER Right 05/2017    rotator cuff     Prior Level of Function/Home Situation: Pt was modified independent with basic self care tasks and functional mobility PTA. Home Situation  Home Environment: Private residence  Wheelchair Ramp: Yes  One/Two Story Residence: One story  Living Alone: No  Support Systems: Parent  Patient Expects to be Discharged to[de-identified] Private residence  Current DME Used/Available at Home: None  Tub or Shower Type: Tub/Shower combination  [x]     Right hand dominant   []     Left hand dominant  Cognitive/Behavioral Status:  Neurologic State: Alert  Orientation Level: Oriented X4  Cognition: Follows commands  Safety/Judgement: Fall prevention    Skin: Intact on UEs    Edema: None noted in UEs    Vision/Perceptual:    Acuity: Within Defined Limits    Corrective Lenses: Glasses    Coordination:  Fine Motor Skills-Upper: Left Intact; Right Intact    Gross Motor Skills-Upper: Left Intact; Right Intact    Balance:  Sitting: Intact  Standing: Intact  Standing - Static: Good  Standing - Dynamic : Good    Strength:  Strength:  Within functional limits (UEs; R shoulder NT)    Tone & Sensation:  Tone: Normal (UEs)  Sensation: Intact (UEs)    Range of Motion:  AROM: Within functional limits (UEs; R shoulder NT)    Functional Mobility and Transfers for ADLs:  Bed Mobility:  Supine to Sit: Modified independent  Sit to Supine: Modified independent  Scooting: Stand-by assistance;Supervision  Transfers:  Sit to Stand: Supervision   Toilet Transfer : Supervision    ADL Assessment:  Feeding: Modified independent    Oral Facial Hygiene/Grooming: Modified Independent    Bathing: Supervision    Upper Body Dressing: Modified independent    Lower Body Dressing: Modified independent    Toileting: Modified independent    ADL Intervention:  Patient practiced UB/LB dressing while seated on EOB and in standing without assistance after initial VCs given for one handed technique. She was educated on proper sling application and practice donning/doffing sling completed as well. Cognitive Retraining  Safety/Judgement: Fall prevention    Pain:  Pt reports 9/10 pain or discomfort prior to treatment, in right shoulder. Nursing notified.    Pt reports 9/10 pain or discomfort post treatment, in right shoulder. Patient resting in bed at end of session. Activity Tolerance:   Good    Please refer to the flowsheet for vital signs taken during this treatment. After treatment:   []  Patient left in no apparent distress sitting up in chair  [x]  Patient left in no apparent distress in bed  [x]  Call bell left within reach  []  Nursing notified  [x]  Caregiver (mother) present  []  Bed alarm activated    COMMUNICATION/EDUCATION:   Communication/Collaboration:  [x]      Home safety education was provided and the patient/caregiver indicated understanding. [x]      Patient/family have participated as able and agree with findings and recommendations. []      Patient is unable to participate in plan of care at this time.     Tao Brown MS OTR/L  Time Calculation: 25 mins

## 2018-07-19 NOTE — OP NOTES
[address] [place] [address]   OPERATIVE REPORT    Justin Che  MR#: [id number]  : 1970  ACCOUNT #: [de-identified]   DATE OF SERVICE: 2018    PREOPERATIVE DIAGNOSES:  Cuff tear arthropathy, right shoulder, failed rotator cuff repair. POSTOPERATIVE DIAGNOSES:  Cuff tear arthropathy, right shoulder, failed rotator cuff repair. PROCEDURES PERFORMED:  Removal of foreign body, right shoulder, right reverse total shoulder arthroplasty. SURGEON:  Cathy Wall MD      ASSISTANT:  Leopold Bigger, PA-C      ESTIMATED BLOOD LOSS:  30 mL. COMPLICATIONS:  None. SPECIMENS REMOVED:  Humeral head and dermal graft from a previous superior capsule reconstruction. FINDINGS:  As above. IMPLANTS:  Per brief op note. ANESTHESIA:  General.      BRIEF HISTORY:  The patient has had significant amount of problems with the right shoulder, not responding to conservative treatment. She had a previous superior capsule reconstruction for massive irreparable rotator cuff tear that failed. She was consented for surgery after having discussed at length possible risks and complications of surgery including infection, bleeding, recurrence of pain, among other possible problems. PROCEDURE IN DETAIL:  The patient was taken to the operating room, placed under general endotracheal anesthesia with the anesthesia staff, placed in a standard beach chair position. The right arm was prepped with ChloraPrep solution and draped as a free sterile field. A deltopectoral approach was used. The subcutaneous tissue was dissected bluntly to the level of the deltopectoral interval, which was developed protecting the cephalic vein medially. Clavipectoral fascia was incised. The conjoined tendon was retracted medially, protecting the musculocutaneous and axillary nervesat all times.   Subscapularis tenotomy was very tenuous as subscapularis tissue was torn subscapularis was not firmly attached. The dermal graft was still intact, but the humeral head escaped anteriorly. Access to the canal was then made from the top and reamers were used from 6-12 mm reamer. A cutting guide was then placed and the humeral head was cut and then covered and the glenoid was exposed. The dermal graft was removed sharply. The capsule was then removed, our capsulotomy performed, surrounding the glenoid rim. The glenoid was exposed and a guide pin was placed with the template followed by concentric reaming with central peg and then 4 screws for the metaglene. The metaglene was then impacted and then screwed in. The screws were locked. The 38 mm glenosphere was placed and secured. The proximal humerus was exposed. A broach was used. It was reamed for the platform and a size 1 +3 humeral cup with a trial was tried and felt to be excellent fit. The trials were removed. The humeral prosthesis was placed, +3 humeral cup size 1 HA stem epiphysis. The shoulder was relocated and felt to be stable in all planes. Subscapularis was repaired with FiberWire, 9 sutures. The deltopectoral interval was closed with 0 Vicryl, subcutaneous tissue with 2-0 Vicryl, and the skin with 4-0 Monocryl. Sterile dressings were applied. The patient tolerated the procedure well and was taken to recovery room without problems.       MD JYOTHI Frausto /   D: 07/19/2018 08:24     T: 07/19/2018 09:26  JOB #: [id number]

## 2018-07-19 NOTE — PROGRESS NOTES
Problem: Mobility Impaired (Adult and Pediatric)  Goal: *Acute Goals and Plan of Care (Insert Text)  Physical Therapy Goals  Initiated 7/18/2018 and to be accomplished within 7 day(s)  1. Patient will move from supine to sit and sit to supine  and scoot up and down in bed with supervision/set-up. 2.  Patient will transfer from bed to chair and chair to bed with supervision/set-up using the least restrictive device. 3.  Patient will perform sit to stand with supervision/set-up. 4.  Patient will ambulate with supervision/set-up for >200 feet with the least restrictive device. Outcome: Progressing Towards Goal  physical Therapy TREATMENT    Patient: Iggy Gann (54 y.o. female)  Date: 7/19/2018  Diagnosis: Complete tear of right rotator cuff [M75.121] <principal problem not specified>  Procedure(s) (LRB):  RIGHT REVERSE TOTAL SHOULDER REPLACEMENT/REMOVAL OF FOREIGN BODY (Right) 1 Day Post-Op  Precautions: Fall (Total shoulder - PROM, no ER)   Chart, physical therapy assessment, plan of care and goals were reviewed. OBJECTIVE/ ASSESSMENT:  Patient found standing in room w/ IV pole in left hand willing to work with PT. Pt educated importance of safety and assistance w/ IV pole when amb. Pt amb out of room into paz w/o AD initially pushing IV pole as she stated she amb twice last night w/ pole. Pt cont to kick foot of IV pole during amb, thus increasing risk of falling. IV pole navigated by PTA and pt amb in paz 500' w/o assistance. During amb, pt initially demonstrated slight path deviation, however corrected w/ VC and increased distance. Pt displayed no LOB or unsteadiness during amb, even when turning and navigating room. Pt returned to room to supine SBA displaying good adherence to shoulder precautions as well as good trunk and LE strength.  Pt voiced need use of bathroom, thus pt performed sup to sit to stand SBA again w/ good adherence to precautions and amb to bathroom only needing assistance w/ navigation of IV pole. In bathroom, pt performed stand <> sit sup and returned to supine req supervision. Pt provided education on precautions, safety, and brace. Pt instructed to ask for assistance in need to use bathroom when attached to IV pole for safety and if encouraged pt to ask nurse to disconnect from IV prior to amb in paz to maximize safety. Pt provided there-ex and left in room w/ all needs in reach. Nurse notified of pt progress and what was discussed. Education:safety, importance of mobility and shoulder precautions, brace alignment   Progression toward goals:  [x]      Improving appropriately and progressing toward goals  []      Improving slowly and progressing toward goals  []      Not making progress toward goals and plan of care will be adjusted     PLAN:  Patient continues to benefit from skilled intervention to address the above impairments. Continue treatment per established plan of care. Discharge Recommendations:  Home Health w/ sup  Further Equipment Recommendations for Discharge:  N/A     SUBJECTIVE:   Patient stated I haven't been able to open a door for a long time.     OBJECTIVE DATA SUMMARY:   Critical Behavior:  Neurologic State: Alert  Orientation Level: Oriented X4  Cognition: Appropriate decision making  Safety/Judgement: Decreased awareness of need for assistance, Fall prevention  Functional Mobility Training:  Bed Mobility:  Supine to Sit: Stand-by assistance;Supervision  Scooting: Stand-by assistance;Supervision  Transfers:  Sit to Stand: Stand-by assistance;Supervision  Stand to Sit: Stand-by assistance;Supervision  Balance:  Sitting: Intact  Standing: Intact; Without support  Standing - Static: Good  Standing - Dynamic : Good  Ambulation/Gait Training:  Distance (ft): 500 Feet (ft)  Assistive Device:  (none)  Ambulation - Level of Assistance: Supervision  Gait Abnormalities: Decreased step clearance; Path deviations  Base of Support: Center of gravity altered  Speed/Evelyn: Fluctuations  Step Length: Right shortened;Left shortened  Therapeutic Exercises:       EXERCISE   Sets   Reps   Active Active Assist   Passive Self- assited ROM   Comments   Ankle Pumps 1 10  [x] [] [] []    Quad Sets   [] [] [] []    Glut Sets   [] [] [] []    Short Arc Quads   [] [] [] []    Heel Slides 1 10 [x] [] [] []    Straight Leg Raises   [] [] [] []    Hip Abd   [] [] [] []    Long Arc Quads 1 10 [x] [] [] []    Seated Marching   [] [] [] []    Seated Knee Flexion   [] [] [] []    Standing Marching   [] [] [] []      Pain: did not verbalize number  Activity Tolerance: Tolerated functional mobility well w/ no need of rest break   Please refer to the flowsheet for vital signs taken during this treatment. After treatment:   [] Patient left in no apparent distress sitting up in chair  [x] Patient left in no apparent distress in bed  [x] Call bell left within reach  [x] Nursing notified  [x] Caregiver present  [] Bed alarm activated  [] SCDs applied  [] Ice applied      Darren Perkins PTA   Time Calculation: 23 mins    Mobility  Current  CI= 1-19%. The severity rating is based on the Level of Assistance required for Functional Mobility and ADLs. Mobility   Goal  CI= 1-19%. The severity rating is based on the Level of Assistance required for Functional Mobility and ADLs.

## 2018-07-19 NOTE — ROUTINE PROCESS
Bedside and Verbal shift change report given to Tyler Kate (oncoming nurse) by Gabby Shields RN (offgoing nurse). Report included the following information SBAR, Kardex, MAR and Recent Results.     SITUATION:    Code Status: No Order   Reason for Admission: Complete tear of right rotator cuff [address] day: 1   Problem List:       Hospital Problems  Date Reviewed: 7/17/2018          Codes Class Noted POA    Rotator cuff tear arthropathy, right ICD-10-CM: M12.811  ICD-9-CM: 716.81  7/18/2018 Unknown              BACKGROUND:    Past Medical History:   Past Medical History:   Diagnosis Date    Arthritis     Asthma     GERD (gastroesophageal reflux disease)     Obesity (BMI 30.0-34.9) 3/1/2017    Psychiatric disorder     depression    Reflux          Patient taking anticoagulants no     ASSESSMENT:    Changes in Assessment Throughout Shift: No     Patient has Central Line: no Reasons if yes: No   Patient has Dominguez Cath: no Reasons if yes: No      Last Vitals:     Vitals:    07/18/18 1835 07/18/18 2117 07/18/18 2357 07/19/18 0326   BP: 105/60 91/58 94/63 95/60   Pulse: 65 74 67 67   Resp: 18 17 16 15   Temp: 98.3 °F (36.8 °C) 99.1 °F (37.3 °C) 98.4 °F (36.9 °C) 98 °F (36.7 °C)   SpO2:   90% 94%   Weight:       Height:            IV and DRAINS (will only show if present)   Peripheral IV 07/18/18 Left Hand-Site Assessment: Clean, dry, & intact     WOUND (if present)   Wound Type:  none   Dressing present Dressing Present : Yes   Wound Concerns/Notes:  none     PAIN    Pain Assessment    Pain Intensity 1: 4 (07/18/18 1534)    Pain Location 1: Shoulder    Pain Intervention(s) 1: Medication (see MAR)    Patient Stated Pain Goal: 6  o Interventions for Pain:  none  o Intervention effective: no  o Time of last intervention: 0700   o Reassessment Completed: no      Last 3 Weights:  Last 3 Recorded Weights in this Encounter    07/11/18 1343 07/18/18 0604   Weight: 93.9 kg (207 lb) 98.2 kg (216 lb 8 oz)     Weight change:      INTAKE/OUPUT    Current Shift: 07/18 1901 - 07/19 0700  In: 400 [P.O.:400]  Out: 630 [Urine:630]    Last three shifts: 07/17 0701 - 07/18 1900  In: 2000 [I.V.:2000]  Out: 450 [Urine:450]     LAB RESULTS   No results for input(s): WBC, HGB, HCT, PLT, HGBEXT, HCTEXT, PLTEXT in the last 72 hours. No results for input(s): NA, K, GLU, BUN, CREA, CA, MG, INR in the last 72 hours. No lab exists for component: PT, PTT, INREXT    RECOMMENDATIONS AND DISCHARGE PLANNING     1. Pending tests/procedures/ Plan of Care or Other Needs: TBD     2. Discharge plan for patient and Needs/Barriers: TBD    3. Estimated Discharge Date: TBD Posted on Whiteboard in Patients Room: no      4. The patient's care plan was reviewed with the oncoming nurse. \"HEALS\" SAFETY CHECK      Fall Risk    Total Score: 4    Safety Measures: Safety Measures: Bed/Chair-Wheels locked, Caregiver at bedside, Bed in low position, Call light within reach, Gripper socks    A safety check occurred in the patient's room between off going nurse and oncoming nurse listed above. The safety check included the below items  Area Items   H  High Alert Medications - Verify all high alert medication drips (heparin, PCA, etc.)   E  Equipment - Suction is set up for ALL patients (with yanker)  - Red plugs utilized for all equipment (IV pumps, etc.)  - WOWs wiped down at end of shift.  - Room stocked with oxygen, suction, and other unit-specific supplies   A  Alarms - Bed alarm is set for fall risk patients  - Ensure chair alarm is in place and activated if patient is up in a chair   L  Lines - Check IV for any infiltration  - Dominguez bag is empty if patient has a Dominguez   - Tubing and IV bags are labeled   S  Safety   - Room is clean, patient is clean, and equipment is clean. - Hallways are clear from equipment besides carts.    - Fall bracelet on for fall risk patients  - Ensure room is clear and free of clutter  - Suction is set up for ALL patients (with yanker)  - Hallways are clear from equipment besides carts.    - Isolation precautions followed, supplies available outside room, sign posted     Arjun Silva RN

## 2018-07-19 NOTE — DISCHARGE SUMMARY
Discharge Summary    Patient: Iveth Nayak               Sex: female          DOA: 7/18/2018         YOB: 1970      Age:  46 y.o.        LOS:  LOS: 1 day                Admit Date: 7/18/2018    Discharge Date: 7/19/2018    Admission Diagnoses: Complete tear of right rotator cuff [M75.121]    Discharge Diagnoses:    Problem List as of 7/19/2018  Date Reviewed: 7/17/2018          Codes Class Noted - Resolved    Rotator cuff tear arthropathy, right ICD-10-CM: M12.811  ICD-9-CM: 716.81  7/18/2018 - Present        Abnormal EKG ICD-10-CM: R94.31  ICD-9-CM: 794.31  10/23/2017 - Present        Tobacco abuse ICD-10-CM: Z72.0  ICD-9-CM: 305.1  10/23/2017 - Present        S/P rotator cuff repair ICD-10-CM: Z98.890  ICD-9-CM: V45.89  5/16/2017 - Present        Obesity (BMI 30.0-34.9) ICD-10-CM: E66.9  ICD-9-CM: 278.00  3/1/2017 - Present        Bilateral sciatica ICD-10-CM: M54.31, M54.32  ICD-9-CM: 724.3  2/4/2014 - Present              Discharge Condition: Good    Discharge Destination: Home    Hospital Course: 46 y.o. female who underwent an uncomplicated reverse total shoulder arthroplasty on the right shoulder after a failed arthroscopic superior capsule reconstruction. She was admitted to floor for pain control, IV antibiotics, and PT. She did well post operatively. Unable to receive narcotic pain meds secondary to hypotension. Patient has h/o hypotension. Once bp was stable it was determined that she could go home. CBC was stable post operatively. Consults: None    Significant Diagnostic Studies: labs: CBC    Discharge Medications:     Current Discharge Medication List      START taking these medications    Details   !! oxyCODONE IR (ROXICODONE) 10 mg tab immediate release tablet Take 0.5 Tabs by mouth every four (4) hours as needed. Max Daily Amount: 30 mg.  Indications: Pain  Qty: 40 Tab, Refills: 0    Associated Diagnoses: Rotator cuff tear arthropathy, right      acetaminophen (TYLENOL) 500 mg tablet Take 2 Tabs by mouth every eight (8) hours for 4 days. Qty: 24 Tab, Refills: 0      gabapentin (NEURONTIN) 300 mg capsule Take 1 Cap by mouth nightly for 4 days. START NIGHT OF SURGERY  Qty: 4 Cap, Refills: 0      !! oxyCODONE IR (ROXICODONE) 5 mg immediate release tablet Take 1 Tab by mouth every four (4) hours as needed for Pain. Max Daily Amount: 30 mg. Indications: Pain  Qty: 40 Tab, Refills: 0    Associated Diagnoses: Rotator cuff tear arthropathy, right       !! - Potential duplicate medications found. Please discuss with provider. CONTINUE these medications which have NOT CHANGED    Details   trimethoprim-sulfamethoxazole (BACTRIM DS, SEPTRA DS) 160-800 mg per tablet Take 1 Tab by mouth two (2) times a day for 6 days. Qty: 12 Tab, Refills: 0      cariprazine (VRAYLAR) 6 mg capsule Take  by mouth nightly. OXcarbazepine (TRILEPTAL) 300 mg tablet Take  by mouth two (2) times a day. CLONAZEPAM (KLONOPIN PO) Take 5 mg by mouth two (2) times a day. Indications: anxiety      omeprazole (PRILOSEC) 20 mg capsule Take 20 mg by mouth daily. eszopiclone (LUNESTA) 3 mg tablet Take  by mouth nightly. lamoTRIgine (LAMICTAL) 100 mg tablet Take  by mouth daily. 100 mg am and 200mg q hs   Indications: BIPOLAR DISORDER IN REMISSION      chlorproMAZINE (THORAZINE) 100 mg tablet Take 100 mg by mouth two (2) times a day. traZODone (DESYREL) 300 mg tablet Take 300 mg by mouth nightly. baclofen (LIORESAL) 10 mg tablet Take  by mouth three (3) times daily. multivitamin with iron (FLINTSTONES) chewable tablet Take 1 Tab by mouth daily. CYANOCOBALAMIN, VITAMIN B-12, (VITAMIN B-12 PO) Take  by mouth daily. ERGOCALCIFEROL, VITAMIN D2, (VITAMIN D2 PO) Take  by mouth every seven (7) days.                Activity: No heavy lifting, pushing, pulling with the implant side for 2 months, No driving while on analgesics, PT/OT Eval and Treat and See surgical instructions    Diet: Regular Diet    Wound Care: Keep wound clean and dry, Reinforce dressing PRN and Ice to area for comfort    Follow-up: 1 week in office      Earnestine Weiss PA-C  [facility]

## 2018-07-19 NOTE — PROGRESS NOTES
Patient tried to leave unit via elevators. Patient told a staff member that she had permission to leave floor. Patient escorted back to room and educated on the importance of not leaving unit. Patient encourage to walk only throughout unit and in room with stand-by assistance. Bed alarm in use.  Will continue to monitor

## 2018-07-19 NOTE — ROUTINE PROCESS
Mobility Intervention:       [x] Pt dangled at edge of bed    [] Pt assisted OOB to bedside commode    [] Pt assisted OOB to chair    [x] Pt ambulated to bathroom    [x] Patient was ambulated in room/hallway    Assistive Device Utilized:       [] Rolling walker   [] Crutches   [] Straight Cane   [] Knee immobilizer   [x] IV pole    After Mobilization:     [x] Patient left in no apparent distress sitting up in chair  [x] Patient left in no apparent distress in bed  [x] Call bell left within reach  [x] SCDs on & machine turned on  [x] Ice applied  [] RN notified  [] Caregiver present  [] Bed alarm activated    Reason patient not mobilized:      [] Patient refused   [] Nausea/vomiting   [x] Low blood pressure   [] Drowsy/lethargic    Pain Rating:     [] 0  [] 1  Assistive Device:        [] 2  [] 3  [] 4  [] 5  [] 6  Assistive Device:        [x] 7  [] 8  [] 9  [] 10    Comments:

## 2018-07-19 NOTE — ROUTINE PROCESS
0315: Pt has dilan sleeping. Requesting for pain medication but BP remains significantly low. Pt educated about the relationship between BP and pain medication. No sign of distress. Ice remains in place. Will continue to monitor the pt.

## 2018-07-19 NOTE — PROGRESS NOTES
Patient and family educated on discharge instructions and prescriptions. Patient wheeled downstairs by staff to car with family.

## 2018-07-19 NOTE — PROGRESS NOTES
Reason for Admission:  Complete tear of right rotator cuff                    RRAT Score:     0                Plan for utilizing home health:                          Likelihood of Readmission:  Green/Low                         Transition of Care Plan:     Home    Care Management Interventions  PCP Verified by CM: Yes  Current Support Network: Lives with Spouse  Confirm Follow Up Transport: Family  Plan discussed with Pt/Family/Caregiver: Yes          Pt is alert and oriented. She resides in the home with her spouse Savannah Moreira and her plan is to return home at discharge. Pt indicates being independent with ADLs and ambulation prior to admission. Pt request for outpatient therapy. Pt assisted with indigent meds.

## 2018-07-23 ENCOUNTER — TELEPHONE (OUTPATIENT)
Dept: ORTHOPEDIC SURGERY | Age: 48
End: 2018-07-23

## 2018-07-23 DIAGNOSIS — M12.819 ROTATOR CUFF TEAR ARTHROPATHY, UNSPECIFIED LATERALITY: Primary | ICD-10-CM

## 2018-07-23 DIAGNOSIS — M75.100 ROTATOR CUFF TEAR ARTHROPATHY, UNSPECIFIED LATERALITY: Primary | ICD-10-CM

## 2018-07-23 RX ORDER — OXYCODONE HYDROCHLORIDE 10 MG/1
10 TABLET ORAL
Qty: 40 TAB | Refills: 0 | Status: SHIPPED | OUTPATIENT
Start: 2018-07-23 | End: 2018-07-27 | Stop reason: SDUPTHER

## 2018-07-23 NOTE — TELEPHONE ENCOUNTER
Spoke with patient and informed her that rx is ready to be picked up at the [facility] location

## 2018-07-23 NOTE — TELEPHONE ENCOUNTER
Patient called stating she was given the prescription oxyCODONE IR (ROXICODONE) 5 mg immediate release tablet by Eric Mejia but she was supposed to get 10 mg. She states she is now out of the medication and she is wondering if she is supposed to get a refill or if she needs a new script for 10 mg. instead. Please advise patient regarding this at [phone].

## 2018-07-25 ENCOUNTER — HOSPITAL ENCOUNTER (OUTPATIENT)
Dept: PHYSICAL THERAPY | Age: 48
Discharge: HOME | End: 2018-07-25
Payer: SUBSIDIZED

## 2018-07-25 PROCEDURE — 97140 MANUAL THERAPY 1/> REGIONS: CPT

## 2018-07-25 PROCEDURE — 97161 PT EVAL LOW COMPLEX 20 MIN: CPT

## 2018-07-25 PROCEDURE — 97016 VASOPNEUMATIC DEVICE THERAPY: CPT

## 2018-07-25 PROCEDURE — 97110 THERAPEUTIC EXERCISES: CPT

## 2018-07-25 NOTE — PROGRESS NOTES
[facility]  [address]  [facility], [address]  [phone] fax    Plan of Care/ Statement of Necessity for Physical Therapy Services    Patient name: Becki Salgado Start of Care: 2018   Referral source: Bernabe Richards,* : 1970    Medical Diagnosis: Complete rotator cuff tear or rupture of right shoulder, not specified as traumatic [M75.121]   Onset Date:2018    Treatment Diagnosis: Right rTSA   Prior Hospitalization: see medical history Provider#: [id number]   Medications: Verified on Patient summary List    Comorbidities: previous failed right RTC repair x 2, depression, arthritis, asthma, tobacco use   Prior Level of Function: Pt reports sedentary lifestyle but I with ADLs and self care     The Plan of Care and following information is based on the information from the initial evaluation. Assessment/ key information: Pt is a 47 y/o F presenting s/p right reverse TSA on . The patient did have 2 previous RTC repairs that both failed, resulting TSA. Pt presents today with good overall tolerance to PROM but does require cues to reduce guarding. Educated pt thoroughly on post-surgical precautions and activity modification. She does report some tingling in her right hand at this time, no other adverse responses noted. Pt would benefit from PT to improve ROM and functional mobility to improve independence with ADLs and self care.     Evaluation Complexity History HIGH Complexity :3+ comorbidities / personal factors will impact the outcome/ POC ; Examination LOW Complexity : 1-2 Standardized tests and measures addressing body structure, function, activity limitation and / or participation in recreation  ;Presentation LOW Complexity : Stable, uncomplicated  ;Clinical Decision Making HIGH Complexity : FOTO score of 1- 25   Overall Complexity Rating: LOW   Problem List: pain affecting function, decrease ROM, decrease strength, decrease ADL/ functional abilitiies, decrease activity tolerance and decrease flexibility/ joint mobility   Treatment Plan may include any combination of the following: Therapeutic exercise, Therapeutic activities, Neuromuscular re-education, Physical agent/modality, Manual therapy, Patient education, Self Care training and Functional mobility training  Patient / Family readiness to learn indicated by: asking questions and trying to perform skills  Persons(s) to be included in education: patient (P)  Barriers to Learning/Limitations: yes;  other pain  Patient Goal (s): To get it right and actually graduate this time  Patient Self Reported Health Status: good  Rehabilitation Potential: fair-good    Short Term Goals: To be accomplished in 2 weeks:  1. Pt will demonstrate I and compliance with HEP to promote active role in rehabilitation. 2. Pt will demonstrate full AROM of right elbow for improved self care. Long Term Goals: To be accomplished in 5 weeks:  1. Pt will demonstrate 90 deg PROM shoulder flexion and ABD for progression of activity. 2. Pt will successfully initiate AAROM interventions to progress with functional activities. 3. Pt will improve right shoulder ER PROM to 45 deg for improved self care and grooming. 4. Pt will improve FOTO score to 49 to demonstrate improved quality of life. Frequency / Duration: Patient to be seen 2 times per week for 5 weeks. Patient/ Caregiver education and instruction: Diagnosis, prognosis, self care, activity modification and exercises   [x]  Plan of care has been reviewed with DAYAMI Norwood DPT, CMTPT 7/25/2018 12:58 PM    ________________________________________________________________________    I certify that the above Therapy Services are being furnished while the patient is under my care. I agree with the treatment plan and certify that this therapy is necessary.     [de-identified] Signature:____________________  Date:____________Time: _________    Please sign and return to [facility] [address] [address] [address]  [place], [address]  [phone] fax

## 2018-07-25 NOTE — PROGRESS NOTES
PT DAILY TREATMENT NOTE     Patient Name: Tasha Russo  Date:2018  : 1970  [x]  Patient  Verified  Payor: SARA / Plan: [facility] % / Product Type: [employer] /    In time:12:11  Out time:12:47  Total Treatment Time (min): 36  Visit #: 1 of 10    Treatment Area: Complete rotator cuff tear or rupture of right shoulder, not specified as traumatic [M75.121]    SUBJECTIVE  Pain Level (0-10 scale): 8  Any medication changes, allergies to medications, adverse drug reactions, diagnosis change, or new procedure performed?: [x] No    [] Yes (see summary sheet for update)  Subjective functional status/changes:   [] No changes reported  Pt reports her pain medication has not kicked in    OBJECTIVE    Modality rationale: decrease inflammation and decrease pain to improve the patients ability to perform ADLs   Min Type Additional Details    [] Estim:  []Unatt       []IFC  []Premod                        []Other:  []w/ice   []w/heat  Position:  Location:    [] Estim: []Att    []TENS instruct  []NMES                    []Other:  []w/US   []w/ice   []w/heat  Position:  Location:    []  Traction: [] Cervical       []Lumbar                       [] Prone          []Supine                       []Intermittent   []Continuous Lbs:  [] before manual  [] after manual    []  Ultrasound: []Continuous   [] Pulsed                           []1MHz   []3MHz W/cm2:  Location:    []  Iontophoresis with dexamethasone         Location: [] Take home patch   [] In clinic    []  Ice     []  heat  []  Ice massage  []  Laser   []  Anodyne Position:  Location:    []  Laser with stim  []  Other:  Position:  Location:   10 [x]  Vasopneumatic Device Pressure:       [x] lo [] med [] hi   Temperature: [x] lo [] med [] hi   [] Skin assessment post-treatment:  []intact []redness- no adverse reaction    []redness  adverse reaction:     8 min [x]Eval                  []Re-Eval       10 min Therapeutic Exercise:  [] See flow sheet : Rationale: increase ROM to improve the patients ability to perform ADLs    8 min Manual Therapy:  PROM right elbow and shoulder, flex and abd to first resistance   Rationale: increase ROM and increase tissue extensibility to improve ease of ADLs          With   [] TE   [] TA   [] neuro   [] other: Patient Education: [x] Review HEP    [] Progressed/Changed HEP based on:   [] positioning   [] body mechanics   [] transfers   [] heat/ice application    [] other:      Other Objective/Functional Measures: good overall tolerance to PROM, guarding noted however     Pain Level (0-10 scale) post treatment: 8    ASSESSMENT/Changes in Function: see POC    Patient will continue to benefit from skilled PT services to modify and progress therapeutic interventions, address functional mobility deficits, address ROM deficits, address strength deficits, analyze and address soft tissue restrictions, analyze and cue movement patterns and analyze and modify body mechanics/ergonomics to attain remaining goals. [x]  See Plan of Care  []  See progress note/recertification  []  See Discharge Summary         Progress towards goals / Updated goals:  Short Term Goals: To be accomplished in 2 weeks:  1. Pt will demonstrate I and compliance with HEP to promote active role in rehabilitation. 2. Pt will demonstrate full AROM of right elbow for improved self care. Long Term Goals: To be accomplished in 5 weeks:  1. Pt will demonstrate 90 deg PROM shoulder flexion and ABD for progression of activity. 2. Pt will successfully initiate AAROM interventions to progress with functional activities. 3. Pt will improve right shoulder ER PROM to 45 deg for improved self care and grooming. 4. Pt will improve FOTO score to 49 to demonstrate improved quality of life.     PLAN  []  Upgrade activities as tolerated     [x]  Continue plan of care  []  Update interventions per flow sheet       []  Discharge due to:_  []  Other:_      Compa Pulido DPT, CMTPT 7/25/2018  1:08 PM    Future Appointments  Date Time Provider Colt Jovel   7/27/2018 9:30 AM HAMZAH [place] [address]   7/30/2018 11:00 AM [address] [facility]   8/3/2018 11:30 AM [address] [facility]   8/7/2018 3:00 PM [id number] [place] [address] [facility] HBV   8/9/2018 9:30 AM Sasha Laws MD [facility]MY SCHED   8/9/2018 12:00 PM [id number] [place] [address] [employer] [facility] HBV   8/14/2018 11:00 AM Linzie Ano [facility]   8/16/2018 10:30 AM Linzie Ano [facility]   8/21/2018 10:30 AM Linzie Ano [facility]   5/20/2019 1:40 PM Mary Ellen Manzo MD [address] [place] [address]

## 2018-07-27 ENCOUNTER — OFFICE VISIT (OUTPATIENT)
Dept: ORTHOPEDIC SURGERY | Age: 48
End: 2018-07-27

## 2018-07-27 VITALS
WEIGHT: 217 LBS | OXYGEN SATURATION: 97 % | BODY MASS INDEX: 34.87 KG/M2 | DIASTOLIC BLOOD PRESSURE: 65 MMHG | TEMPERATURE: 97.1 F | RESPIRATION RATE: 16 BRPM | HEART RATE: 64 BPM | HEIGHT: 66 IN | SYSTOLIC BLOOD PRESSURE: 110 MMHG

## 2018-07-27 DIAGNOSIS — M75.101 ROTATOR CUFF TEAR ARTHROPATHY OF RIGHT SHOULDER: Primary | ICD-10-CM

## 2018-07-27 DIAGNOSIS — M12.811 ROTATOR CUFF TEAR ARTHROPATHY OF RIGHT SHOULDER: Primary | ICD-10-CM

## 2018-07-27 PROBLEM — E66.01 SEVERE OBESITY (BMI 35.0-39.9): Status: ACTIVE | Noted: 2018-07-27

## 2018-07-30 ENCOUNTER — HOSPITAL ENCOUNTER (OUTPATIENT)
Dept: PHYSICAL THERAPY | Age: 48
Discharge: HOME | End: 2018-07-30
Payer: SUBSIDIZED

## 2018-07-30 DIAGNOSIS — M12.811 ROTATOR CUFF TEAR ARTHROPATHY, RIGHT: Primary | ICD-10-CM

## 2018-07-30 DIAGNOSIS — M75.101 ROTATOR CUFF TEAR ARTHROPATHY, RIGHT: Primary | ICD-10-CM

## 2018-07-30 PROCEDURE — 97140 MANUAL THERAPY 1/> REGIONS: CPT

## 2018-07-30 PROCEDURE — 97110 THERAPEUTIC EXERCISES: CPT

## 2018-07-30 PROCEDURE — 97016 VASOPNEUMATIC DEVICE THERAPY: CPT

## 2018-07-30 RX ORDER — OXYCODONE HYDROCHLORIDE 10 MG/1
5 TABLET ORAL
Qty: 40 TAB | Refills: 0 | Status: CANCELLED | OUTPATIENT
Start: 2018-07-30

## 2018-07-30 RX ORDER — OXYCODONE HYDROCHLORIDE 5 MG/1
5 TABLET ORAL
Qty: 40 TAB | Refills: 0 | Status: SHIPPED | OUTPATIENT
Start: 2018-07-30 | End: 2018-08-21

## 2018-07-30 NOTE — PROGRESS NOTES
PT DAILY TREATMENT NOTE  Patient Name: Avel Fox 
Date:2018 : 1970 [x]  Patient  Verified Payor: Maninder Sommers / Plan: [facility] % / Product Type: [employer] / In time:11:03  Out time:11:45 Total Treatment Time (min): 42 Visit #: 2 of 10 Treatment Area: Complete rotator cuff tear or rupture of right shoulder, not specified as traumatic [M75.121] SUBJECTIVE Pain Level (0-10 scale): 6 Any medication changes, allergies to medications, adverse drug reactions, diagnosis change, or new procedure performed?: [x] No    [] Yes (see summary sheet for update) Subjective functional status/changes:   [] No changes reported \"hurting\" OBJECTIVE Modality rationale: decrease inflammation and decrease pain to improve the patients ability to perform ADLs Min Type Additional Details  
 [] Estim:  []Unatt       []IFC  []Premod []Other:  []w/ice   []w/heat Position: Location:  
 [] Estim: []Att    []TENS instruct  []NMES []Other:  []w/US   []w/ice   []w/heat Position: Location:  
 []  Traction: [] Cervical       []Lumbar 
                     [] Prone          []Supine []Intermittent   []Continuous Lbs: 
[] before manual 
[] after manual  
 []  Ultrasound: []Continuous   [] Pulsed []1MHz   []3MHz W/cm2: 
Location:  
 []  Iontophoresis with dexamethasone Location: [] Take home patch  
[] In clinic  
 []  Ice     []  heat 
[]  Ice massage 
[]  Laser  
[]  Anodyne Position: Location:  
 []  Laser with stim 
[]  Other:  Position: Location:  
10 [x]  Vasopneumatic Device Pressure:       [x] lo [] med [] hi  
Temperature: [x] lo [] med [] hi  
[] Skin assessment post-treatment:  []intact []redness- no adverse reaction 
  []redness  adverse reaction:  
 
 
 
22 min Therapeutic Exercise:  [] See flow sheet :  
Rationale: increase ROM and increase strength to improve the patients ability to perform self care 10 min Manual Therapy:  Right STJ mobs, right shoulder PROM, flex/abd <90 deg/ER to first resistance Rationale: increase ROM and increase tissue extensibility to improve ease of ADLs With 
 [] TE 
 [] TA 
 [] neuro 
 [] other: Patient Education: [x] Review HEP [] Progressed/Changed HEP based on:  
[] positioning   [] body mechanics   [] transfers   [] heat/ice application   
[] other:   
 
Other Objective/Functional Measures: pt tight into ABD and ER, good flexion PROM Pain Level (0-10 scale) post treatment: 7 ASSESSMENT/Changes in Function: Pt limited with guarding and some capsular tightness today. Overall good tolerance to first follow up, progress with AAROM as PROM improves. Patient will continue to benefit from skilled PT services to modify and progress therapeutic interventions, address functional mobility deficits, address ROM deficits, address strength deficits, analyze and address soft tissue restrictions, analyze and cue movement patterns and analyze and modify body mechanics/ergonomics to attain remaining goals. []  See Plan of Care 
[]  See progress note/recertification 
[]  See Discharge Summary Progress towards goals / Updated goals: 
Short Term Goals: To be accomplished in 2 weeks: 1. Pt will demonstrate I and compliance with HEP to promote active role in rehabilitation. Pt reports compliance 7/30/18 2. Pt will demonstrate full AROM of right elbow for improved self care. Long Term Goals: To be accomplished in 5 weeks: 1. Pt will demonstrate 90 deg PROM shoulder flexion and ABD for progression of activity. Partially met for flexion 7/30/18 2. Pt will successfully initiate AAROM interventions to progress with functional activities. 3. Pt will improve right shoulder ER PROM to 45 deg for improved self care and grooming. 4. Pt will improve FOTO score to 49 to demonstrate improved quality of life.  
 
PLAN 
[]  Upgrade activities as tolerated [x]  Continue plan of care 
[]  Update interventions per flow sheet      
[]  Discharge due to:_ 
[]  Other:_ Wynona Blizzard, DPT, CMTPT 7/30/2018  11:06 AM 
 
Future Appointments Date Time Provider Colt Jovel 8/3/2018 11:30 [facility]  
8/7/2018 3:00 PM [facility]  
8/9/2018 12:00 PM [id number] [facility] HBV  
8/14/2018 11:00 AM [id number] [place] [address] HBV  
8/16/2018 10:30 AM [facility]  
8/21/2018 10:30 AM [facility]  
8/21/2018 11:30 AM HAMZAH [place] [facility] SCHED  
8/31/2018 11:00 AM Tri Yancey MD [facility] AMMY SCHED  
5/20/2019 1:40 PM Linda Carrel, MD [address] [place] [address]

## 2018-07-30 NOTE — TELEPHONE ENCOUNTER
Last Visit: 07/27/2018 with HAMZAH Perez   Date of Surgery: 07/18/2018 Right reverse total shoulder arthroplasty Next Appointment: 08/21/2018 with HAMZAH Donaldson   Previous Refill Encounters: 07/19/2018 per HAMZAH Perez #[id number]    Requested Prescriptions     Pending Prescriptions Disp Refills    oxyCODONE IR (ROXICODONE) 10 mg tab immediate release tablet 40 Tab 0     Sig: Take 0.5 Tabs by mouth every four (4) hours as needed. Max Daily Amount: 30 mg.

## 2018-08-03 ENCOUNTER — HOSPITAL ENCOUNTER (OUTPATIENT)
Dept: PHYSICAL THERAPY | Age: 48
Discharge: HOME | End: 2018-08-03
Payer: SUBSIDIZED

## 2018-08-03 PROCEDURE — 97016 VASOPNEUMATIC DEVICE THERAPY: CPT

## 2018-08-03 PROCEDURE — 97110 THERAPEUTIC EXERCISES: CPT

## 2018-08-03 PROCEDURE — 97140 MANUAL THERAPY 1/> REGIONS: CPT

## 2018-08-03 NOTE — PROGRESS NOTES
PT DAILY TREATMENT NOTE  Patient Name: Chano Wallis 
Date:8/3/2018 : 1970 [x]  Patient  Verified Payor: Alejandrina Jolly / Plan: [facility] % / Product Type: Doreen Chuin / In time:11:35  Out time:12:10 Total Treatment Time (min): 35 Visit #: 3 of 10 Treatment Area: Complete rotator cuff tear or rupture of right shoulder, not specified as traumatic [M75.121] SUBJECTIVE Pain Level (0-10 scale): 5 Any medication changes, allergies to medications, adverse drug reactions, diagnosis change, or new procedure performed?: [x] No    [] Yes (see summary sheet for update) Subjective functional status/changes:   [] No changes reported \"It hurts but its alright\" OBJECTIVE Modality rationale: decrease inflammation and decrease pain to improve the patients ability to perform ADLs Min Type Additional Details  
 [] Estim:  []Unatt       []IFC  []Premod []Other:  []w/ice   []w/heat Position: Location:  
 [] Estim: []Att    []TENS instruct  []NMES []Other:  []w/US   []w/ice   []w/heat Position: Location:  
 []  Traction: [] Cervical       []Lumbar 
                     [] Prone          []Supine []Intermittent   []Continuous Lbs: 
[] before manual 
[] after manual  
 []  Ultrasound: []Continuous   [] Pulsed []1MHz   []3MHz W/cm2: 
Location:  
 []  Iontophoresis with dexamethasone Location: [] Take home patch  
[] In clinic  
 []  Ice     []  heat 
[]  Ice massage 
[]  Laser  
[]  Anodyne Position: Location:  
 []  Laser with stim 
[]  Other:  Position: Location:  
10 [x]  Vasopneumatic Device Pressure:       [x] lo [] med [] hi  
Temperature: [x] lo [] med [] hi  
[] Skin assessment post-treatment:  []intact []redness- no adverse reaction 
  []redness  adverse reaction:  
 
 
15 min Therapeutic Exercise:  [] See flow sheet :  
Rationale: increase ROM and increase strength to improve the patients ability to perform ADLs 10 min Manual Therapy:  STJ mobs right shoulder, PROM right shoulder flex/abd to 90 deg/ER&IR in scaption plane to first resistance Rationale: increase ROM and increase tissue extensibility to perform ADLs and self care With 
 [] TE 
 [] TA 
 [] neuro 
 [] other: Patient Education: [x] Review HEP [] Progressed/Changed HEP based on:  
[] positioning   [] body mechanics   [] transfers   [] heat/ice application   
[] other:   
 
Other Objective/Functional Measures: improving PROM Pain Level (0-10 scale) post treatment: 6 
 
ASSESSMENT/Changes in Function: Pt progressing well with PROM at this time, decreasing pain reports. Plan to progress into AAROM in supine next visit if pt still tolerating well. Patient will continue to benefit from skilled PT services to modify and progress therapeutic interventions, address functional mobility deficits, address ROM deficits, address strength deficits, analyze and address soft tissue restrictions, analyze and cue movement patterns and analyze and modify body mechanics/ergonomics to attain remaining goals. []  See Plan of Care 
[]  See progress note/recertification 
[]  See Discharge Summary Progress towards goals / Updated goals: 
Short Term Goals: To be accomplished in 2 weeks: 1. Pt will demonstrate I and compliance with HEP to promote active role in rehabilitation. Pt reports compliance 7/30/18 2. Pt will demonstrate full AROM of right elbow for improved self care. Progressing lacking terminal extension 8/3/18 Long Term Goals: To be accomplished in 5 weeks: 1. Pt will demonstrate 90 deg PROM shoulder flexion and ABD for progression of activity. Partially met for flexion 7/30/18 2. Pt will successfully initiate AAROM interventions to progress with functional activities. 3. Pt will improve right shoulder ER PROM to 45 deg for improved self care and grooming.  
4. Pt will improve FOTO score to 49 to demonstrate improved quality of life. PLAN [x]  Upgrade activities as tolerated     [x]  Continue plan of care 
[]  Update interventions per flow sheet      
[]  Discharge due to:_ 
[]  Other:_ BILLY [facility]aezT, [facility] 8/3/2018  11:43 AM 
 
Future Appointments Date Time Provider Colt Jovel 8/7/2018 3:00 PM Gabe Silva [facility] HBV  
8/9/2018 12:00 PM [id number] [place] [address]  
8/14/2018 11:00 AM [address] [place] [address]  
8/16/2018 10:30 AM Gabe Silva [facility] HBV  
8/21/2018 10:30 AM Gabe Silva [facility] HBV  
8/21/2018 11:30 AM HAMZAH Sanders [facility] SCHED  
8/31/2018 11:00 AM Jaja Blum MD [facility] SCHED  
5/20/2019 1:40 PM Bella Burt MD [address] [place] [address]

## 2018-08-07 ENCOUNTER — HOSPITAL ENCOUNTER (OUTPATIENT)
Dept: PHYSICAL THERAPY | Age: 48
Discharge: HOME | End: 2018-08-07
Payer: SUBSIDIZED

## 2018-08-07 PROCEDURE — 97140 MANUAL THERAPY 1/> REGIONS: CPT

## 2018-08-07 PROCEDURE — 97110 THERAPEUTIC EXERCISES: CPT

## 2018-08-07 PROCEDURE — 97016 VASOPNEUMATIC DEVICE THERAPY: CPT

## 2018-08-07 NOTE — PROGRESS NOTES
PT DAILY TREATMENT NOTE     Patient Name: Stewart Garcia  Date:2018  : 1970  [x]  Patient  Verified  Payor: SARA / Plan: [facility] % / Product Type: Nano Alvarado /    In time:3:00  Out time:3:40  Total Treatment Time (min): 40  Visit #: 4 of 10    Treatment Area: Complete rotator cuff tear or rupture of right shoulder, not specified as traumatic [M75.121]    SUBJECTIVE  Pain Level (0-10 scale): 5  Any medication changes, allergies to medications, adverse drug reactions, diagnosis change, or new procedure performed?: [x] No    [] Yes (see summary sheet for update)  Subjective functional status/changes:   [] No changes reported  \"It hurts\"    OBJECTIVE    Modality rationale: decrease inflammation and decrease pain to improve the patients ability to perform ADLs and daily tasks   Min Type Additional Details    [] Estim:  []Unatt       []IFC  []Premod                        []Other:  []w/ice   []w/heat  Position:  Location:    [] Estim: []Att    []TENS instruct  []NMES                    []Other:  []w/US   []w/ice   []w/heat  Position:  Location:    []  Traction: [] Cervical       []Lumbar                       [] Prone          []Supine                       []Intermittent   []Continuous Lbs:  [] before manual  [] after manual    []  Ultrasound: []Continuous   [] Pulsed                           []1MHz   []3MHz W/cm2:  Location:    []  Iontophoresis with dexamethasone         Location: [] Take home patch   [] In clinic    []  Ice     []  heat  []  Ice massage  []  Laser   []  Anodyne Position:  Location:    []  Laser with stim  []  Other:  Position:  Location:   10 [x]  Vasopneumatic Device Pressure:       [x] lo [] med [] hi   Temperature: [x] lo [] med [] hi   [] Skin assessment post-treatment:  []intact []redness- no adverse reaction    []redness  adverse reaction:       22 min Therapeutic Exercise:  [] See flow sheet :   Rationale: increase ROM and increase strength to improve the patients ability to perform ADLs     8 min Manual Therapy:  Right shoulder STJ mob and PROM flex/abd/ER&IR in scaption plane   Rationale: increase ROM and increase tissue extensibility to improve ease of self care and ADLs          With   [] TE   [] TA   [] neuro   [] other: Patient Education: [x] Review HEP    [] Progressed/Changed HEP based on:   [] positioning   [] body mechanics   [] transfers   [] heat/ice application    [] other:      Other Objective/Functional Measures:      Pain Level (0-10 scale) post treatment: 7    ASSESSMENT/Changes in Function: Initiated supine AAROM with wanannalisa today with good tolerance. She does have some discomfort with new interventions but will continue to progress as tolerated. Patient will continue to benefit from skilled PT services to modify and progress therapeutic interventions, address functional mobility deficits, address ROM deficits, address strength deficits, analyze and address soft tissue restrictions, analyze and cue movement patterns and analyze and modify body mechanics/ergonomics to attain remaining goals. []  See Plan of Care  []  See progress note/recertification  []  See Discharge Summary         Progress towards goals / Updated goals:  Short Term Goals: To be accomplished in 2 weeks:  1. Pt will demonstrate I and compliance with HEP to promote active role in rehabilitation. Pt reports compliance 7/30/18  2. Pt will demonstrate full AROM of right elbow for improved self care. Progressing lacking terminal extension 8/3/18  Long Term Goals: To be accomplished in 5 weeks:  1. Pt will demonstrate 90 deg PROM shoulder flexion and ABD for progression of activity. Partially met for flexion 7/30/18  2. Pt will successfully initiate AAROM interventions to progress with functional activities. Met 8/7/18  3. Pt will improve right shoulder ER PROM to 45 deg for improved self care and grooming.   4. Pt will improve FOTO score to 49 to demonstrate improved quality of life.    PLAN  [x]  Upgrade activities as tolerated     [x]  Continue plan of care  []  Update interventions per flow sheet       []  Discharge due to:_  []  Other:_      BILLY VegasT, CMTPT 8/7/2018  3:11 PM    Future Appointments  Date Time Provider Colt Jovel   8/9/2018 12:00 PM [id number] [facility]   8/14/2018 11:00 AM [address] [place] [address]   8/16/2018 10:30 AM Kiki Brooks [facility]   8/21/2018 10:30 AM Kiki Brooks [facility]   8/21/2018 11:30 AM HAMZAH Lazo [facility] SCHED   8/31/2018 11:00 AM Faby Aquino MD [facility] SCHED   5/20/2019 1:40 PM Chicho Nance MD [address] [place] [address]

## 2018-08-09 ENCOUNTER — HOSPITAL ENCOUNTER (OUTPATIENT)
Dept: PHYSICAL THERAPY | Age: 48
Discharge: HOME | End: 2018-08-09
Payer: SUBSIDIZED

## 2018-08-09 PROCEDURE — 97016 VASOPNEUMATIC DEVICE THERAPY: CPT

## 2018-08-09 PROCEDURE — 97140 MANUAL THERAPY 1/> REGIONS: CPT

## 2018-08-09 PROCEDURE — 97110 THERAPEUTIC EXERCISES: CPT

## 2018-08-09 NOTE — PROGRESS NOTES
PT DAILY TREATMENT NOTE     Patient Name: Norma Singh  Date:2018  : 1970  [x]  Patient  Verified  Payor: SARA / Plan: [facility] % / Product Type: Bernadette Troncoso /    In time:12:00  Out time:12:45  Total Treatment Time (min): 45  Visit #: 5 of 10    Treatment Area: Complete rotator cuff tear or rupture of right shoulder, not specified as traumatic [M75.121]    SUBJECTIVE  Pain Level (0-10 scale): 5  Any medication changes, allergies to medications, adverse drug reactions, diagnosis change, or new procedure performed?: [x] No    [] Yes (see summary sheet for update)  Subjective functional status/changes:   [] No changes reported  \"I can only do the stick 1 time a day or it hurts \"    OBJECTIVE    Modality rationale: decrease inflammation and decrease pain to improve the patients ability to perform daily tasks and ADLs   Min Type Additional Details    [] Estim:  []Unatt       []IFC  []Premod                        []Other:  []w/ice   []w/heat  Position:  Location:    [] Estim: []Att    []TENS instruct  []NMES                    []Other:  []w/US   []w/ice   []w/heat  Position:  Location:    []  Traction: [] Cervical       []Lumbar                       [] Prone          []Supine                       []Intermittent   []Continuous Lbs:  [] before manual  [] after manual    []  Ultrasound: []Continuous   [] Pulsed                           []1MHz   []3MHz W/cm2:  Location:    []  Iontophoresis with dexamethasone         Location: [] Take home patch   [] In clinic    []  Ice     []  heat  []  Ice massage  []  Laser   []  Anodyne Position:  Location:    []  Laser with stim  []  Other:  Position:  Location:   10 [x]  Vasopneumatic Device Pressure:       [x] lo [] med [] hi   Temperature: [x] lo [] med [] hi   [] Skin assessment post-treatment:  []intact []redness- no adverse reaction    []redness  adverse reaction:       25 min Therapeutic Exercise:  [] See flow sheet :   Rationale: increase ROM and increase strength to improve the patients ability to perform ADLs    10 min Manual Therapy:  Right STJ mobs, PROM right shoulder flex/ABD/IR&ER   Rationale: increase ROM and increase tissue extensibility to improve ease of ADL performance         With   [] TE   [] TA   [] neuro   [] other: Patient Education: [x] Review HEP    [] Progressed/Changed HEP based on:   [] positioning   [] body mechanics   [] transfers   [] heat/ice application    [] other:      Other Objective/Functional Measures:      Pain Level (0-10 scale) post treatment: 6    ASSESSMENT/Changes in Function: Pt progressing into AAROM at this time, good tolerance to supine AAROM so far. Progress to various positions of AAROM towards AROM as tolerated. Patient will continue to benefit from skilled PT services to modify and progress therapeutic interventions, address functional mobility deficits, address ROM deficits, address strength deficits, analyze and address soft tissue restrictions, analyze and cue movement patterns and analyze and modify body mechanics/ergonomics to attain remaining goals. []  See Plan of Care  []  See progress note/recertification  []  See Discharge Summary         Progress towards goals / Updated goals:  Short Term Goals: To be accomplished in 2 weeks:  1. Pt will demonstrate I and compliance with HEP to promote active role in rehabilitation. Pt reports compliance 7/30/18  2. Pt will demonstrate full AROM of right elbow for improved self care. Progressing lacking terminal extension 8/3/18; met 8/9/18  Long Term Goals: To be accomplished in 5 weeks:  1. Pt will demonstrate 90 deg PROM shoulder flexion and ABD for progression of activity. Partially met for flexion 7/30/18  2. Pt will successfully initiate AAROM interventions to progress with functional activities. Met 8/7/18  3. Pt will improve right shoulder ER PROM to 45 deg for improved self care and grooming.   4. Pt will improve FOTO score to 49 to demonstrate improved quality of life.  Progressed to 28     PLAN  [x]  Upgrade activities as tolerated     [x]  Continue plan of care  []  Update interventions per flow sheet       []  Discharge due to:_  []  Other:_      BILLY ChuadhryT, CMTPT 8/9/2018  12:12 PM    Future Appointments  Date Time Provider Colt Becka   8/14/2018 11:00 AM [id number] [facility]   8/16/2018 10:30 AM [address] [place] [address]   8/21/2018 10:30 [facility]   8/21/2018 11:30 AM HAMZAH Song [facility] SCHED   8/31/2018 11:00 AM Rylie Calvillo MD [facility] SCHED   5/20/2019 1:40 PM Ranjana Vieyra MD [address] [place] [address]

## 2018-08-13 DIAGNOSIS — M12.811 ROTATOR CUFF TEAR ARTHROPATHY, RIGHT: ICD-10-CM

## 2018-08-13 DIAGNOSIS — M75.101 ROTATOR CUFF TEAR ARTHROPATHY, RIGHT: ICD-10-CM

## 2018-08-13 RX ORDER — OXYCODONE HYDROCHLORIDE 5 MG/1
5 TABLET ORAL
Qty: 40 TAB | Refills: 0 | Status: CANCELLED | OUTPATIENT
Start: 2018-08-13

## 2018-08-13 RX ORDER — HYDROCODONE BITARTRATE AND ACETAMINOPHEN 10; 325 MG/1; MG/1
1 TABLET ORAL
Qty: 40 TAB | Refills: 0 | Status: SHIPPED | OUTPATIENT
Start: 2018-08-13 | End: 2018-08-24 | Stop reason: SDUPTHER

## 2018-08-13 NOTE — TELEPHONE ENCOUNTER
Pt notified that Rx is ready at the [facility] location. Patient verbalized understanding.

## 2018-08-13 NOTE — TELEPHONE ENCOUNTER
Last Visit: 07/27/2018 with HAMZAH Pinedo   Date of Surgery: 07/18/2018 Right reverse total shoulder arthroplasty  Next Appointment: 08/21/2018 with HAMZAH Donaldson   Previous Refill Encounters: 07/30/2018 per HAMZAH Piendo #40    Requested Prescriptions     Pending Prescriptions Disp Refills    oxyCODONE IR (ROXICODONE) 5 mg immediate release tablet 40 Tab 0     Sig: Take 1 Tab by mouth every six (6) hours as needed for Pain. Max Daily Amount: 20 mg.

## 2018-08-14 ENCOUNTER — HOSPITAL ENCOUNTER (OUTPATIENT)
Dept: PHYSICAL THERAPY | Age: 48
Discharge: HOME | End: 2018-08-14
Payer: SUBSIDIZED

## 2018-08-14 PROCEDURE — 97110 THERAPEUTIC EXERCISES: CPT

## 2018-08-14 PROCEDURE — 97016 VASOPNEUMATIC DEVICE THERAPY: CPT

## 2018-08-14 PROCEDURE — 97140 MANUAL THERAPY 1/> REGIONS: CPT

## 2018-08-14 NOTE — PROGRESS NOTES
PT DAILY TREATMENT NOTE     Patient Name: Kiersten Castanon  Date:2018  : 1970  [x]  Patient  Verified  Payor: SARA / Plan: [facility] % / Product Type: Sara /    In time:2:32  Out time:3:15  Total Treatment Time (min): 43  Visit #: 6 of 10    Treatment Area: Complete rotator cuff tear or rupture of right shoulder, not specified as traumatic [M75.121]    SUBJECTIVE  Pain Level (0-10 scale): 5  Any medication changes, allergies to medications, adverse drug reactions, diagnosis change, or new procedure performed?: [x] No    [] Yes (see summary sheet for update)  Subjective functional status/changes:   [] No changes reported  \"Its been hurting more\"    OBJECTIVE    Modality rationale: decrease inflammation and decrease pain to improve the patients ability to perform daily tasks and ADLs   Min Type Additional Details    [] Estim:  []Unatt       []IFC  []Premod                        []Other:  []w/ice   []w/heat  Position:  Location:    [] Estim: []Att    []TENS instruct  []NMES                    []Other:  []w/US   []w/ice   []w/heat  Position:  Location:    []  Traction: [] Cervical       []Lumbar                       [] Prone          []Supine                       []Intermittent   []Continuous Lbs:  [] before manual  [] after manual    []  Ultrasound: []Continuous   [] Pulsed                           []1MHz   []3MHz W/cm2:  Location:    []  Iontophoresis with dexamethasone         Location: [] Take home patch   [] In clinic    []  Ice     []  heat  []  Ice massage  []  Laser   []  Anodyne Position:  Location:    []  Laser with stim  []  Other:  Position:  Location:   10 [x]  Vasopneumatic Device Pressure:       [x] lo [] med [] hi   Temperature: [x] lo [] med [] hi   [] Skin assessment post-treatment:  []intact []redness- no adverse reaction    []redness  adverse reaction:       23 min Therapeutic Exercise:  [] See flow sheet :   Rationale: increase ROM and increase strength to improve the patients ability to perform daily tasks and self care    10 min Manual Therapy:  Right shoulder STJ mobs and PROM flex/abd/ER&IR   Rationale: increase ROM and increase tissue extensibility to improve ease of ADL performance          With   [] TE   [] TA   [] neuro   [] other: Patient Education: [x] Review HEP    [] Progressed/Changed HEP based on:   [] positioning   [] body mechanics   [] transfers   [] heat/ice application    [] other:      Other Objective/Functional Measures: slight visual appearance of anterior-inferior humeral position in relation to [facility] joint. Possibly  muscle atrophy vs joint malposition    Pain Level (0-10 scale) post treatment: 6    ASSESSMENT/Changes in Function: Pt progressing gradually with shoulder mobility at this time, she does report a little increased pain as of late. Able to progress repetitions without adverse response, continue to progress AAROM and passive capsular mobility as tolerated. Patient will continue to benefit from skilled PT services to modify and progress therapeutic interventions, address functional mobility deficits, address ROM deficits, address strength deficits, analyze and address soft tissue restrictions, analyze and cue movement patterns and analyze and modify body mechanics/ergonomics to attain remaining goals. []  See Plan of Care  []  See progress note/recertification  []  See Discharge Summary         Progress towards goals / Updated goals:  Short Term Goals: To be accomplished in 2 weeks:  1. Pt will demonstrate I and compliance with HEP to promote active role in rehabilitation. Pt reports compliance 7/30/18  2. Pt will demonstrate full AROM of right elbow for improved self care. Progressing lacking terminal extension 8/3/18; met 8/9/18  Long Term Goals: To be accomplished in 5 weeks:  1. Pt will demonstrate 90 deg PROM shoulder flexion and ABD for progression of activity. Partially met for flexion 7/30/18  2.  Pt will successfully initiate AAROM interventions to progress with functional activities. Met 8/7/18  3. Pt will improve right shoulder ER PROM to 45 deg for improved self care and grooming. Slow progress 8/14/18  4. Pt will improve FOTO score to 49 to demonstrate improved quality of life.  Progressed to 28     PLAN  [x]  Upgrade activities as tolerated     [x]  Continue plan of care  []  Update interventions per flow sheet       []  Discharge due to:_  []  Other:_      Kathy Pacheco DPT, CMTPT 8/14/2018  2:41 PM    Future Appointments  Date Time Provider Colt Sanchezi   8/16/2018 10:30 AM [id number] [facility]   8/21/2018 10:30 [facility]   8/21/2018 11:30 AM HAMZAH Pro [facility]MY SCHED   8/31/2018 11:00 AM Clinton Calixto MD [facility] AMMY SCHED   5/20/2019 1:40 PM Karyle Fairly, MD [address] [place] [address]

## 2018-08-16 ENCOUNTER — TELEPHONE (OUTPATIENT)
Dept: ORTHOPEDIC SURGERY | Age: 48
End: 2018-08-16

## 2018-08-16 ENCOUNTER — HOSPITAL ENCOUNTER (OUTPATIENT)
Dept: PHYSICAL THERAPY | Age: 48
Discharge: HOME | End: 2018-08-16
Payer: SUBSIDIZED

## 2018-08-16 PROCEDURE — 97110 THERAPEUTIC EXERCISES: CPT

## 2018-08-16 PROCEDURE — 97016 VASOPNEUMATIC DEVICE THERAPY: CPT

## 2018-08-16 PROCEDURE — 97140 MANUAL THERAPY 1/> REGIONS: CPT

## 2018-08-16 PROCEDURE — 97112 NEUROMUSCULAR REEDUCATION: CPT

## 2018-08-16 NOTE — TELEPHONE ENCOUNTER
Patient is checking status of her prior authorization for Hydrocodone 10-325mg that was faxed on 8/14/18.     Pharmacy [facility] [phone]    Her# [phone]

## 2018-08-16 NOTE — PROGRESS NOTES
PT DAILY TREATMENT NOTE     Patient Name: Iveth Nayak  Date:2018  : 1970  [x]  Patient  Verified  Payor: SARA / Plan: [facility] % / Product Type: [facility] /    In time:10:30  Out time:11:10  Total Treatment Time (min): 40  Visit #: 7 of 10    Treatment Area: Complete rotator cuff tear or rupture of right shoulder, not specified as traumatic [M75.121]    SUBJECTIVE  Pain Level (0-10 scale): 4  Any medication changes, allergies to medications, adverse drug reactions, diagnosis change, or new procedure performed?: [x] No    [] Yes (see summary sheet for update)  Subjective functional status/changes:   [] No changes reported  \"A little bit better today\"    OBJECTIVE    Modality rationale: decrease inflammation and decrease pain to improve the patients ability to perform daily tasks   Min Type Additional Details    [] Estim:  []Unatt       []IFC  []Premod                        []Other:  []w/ice   []w/heat  Position:  Location:    [] Estim: []Att    []TENS instruct  []NMES                    []Other:  []w/US   []w/ice   []w/heat  Position:  Location:    []  Traction: [] Cervical       []Lumbar                       [] Prone          []Supine                       []Intermittent   []Continuous Lbs:  [] before manual  [] after manual    []  Ultrasound: []Continuous   [] Pulsed                           []1MHz   []3MHz W/cm2:  Location:    []  Iontophoresis with dexamethasone         Location: [] Take home patch   [] In clinic    []  Ice     []  heat  []  Ice massage  []  Laser   []  Anodyne Position:  Location:    []  Laser with stim  []  Other:  Position:  Location:   10 [x]  Vasopneumatic Device Pressure:       [x] lo [] med [] hi   Temperature: [x] lo [] med [] hi   [] Skin assessment post-treatment:  []intact []redness- no adverse reaction    []redness  adverse reaction:       12 min Therapeutic Exercise:  [] See flow sheet :   Rationale: increase ROM and increase strength to improve the patients ability to perform ADLs    8 min Neuromuscular Re-education:  []  See flow sheet :   Rationale: increase ROM and improve coordination  to improve the patients ability to perform OH activities with improved shoulder mechanics    10 min Manual Therapy:  PROM right shoulder flex/Abd to first resistance/ER&IR   Rationale: increase ROM and increase tissue extensibility to improve ease of self care        With   [] TE   [] TA   [] neuro   [] other: Patient Education: [x] Review HEP    [] Progressed/Changed HEP based on:   [] positioning   [] body mechanics   [] transfers   [] heat/ice application    [] other:      Other Objective/Functional Measures:      Pain Level (0-10 scale) post treatment: 5    ASSESSMENT/Changes in Function: Pt progressing with PROM and AAROM. Will likely wait to progress AAROM until MD f/pati on 8/21, anticipating progression at that time. Patient will continue to benefit from skilled PT services to modify and progress therapeutic interventions, address functional mobility deficits, address ROM deficits, address strength deficits, analyze and address soft tissue restrictions, analyze and cue movement patterns and analyze and modify body mechanics/ergonomics to attain remaining goals. []  See Plan of Care  []  See progress note/recertification  []  See Discharge Summary         Progress towards goals / Updated goals:  Short Term Goals: To be accomplished in 2 weeks:  1. Pt will demonstrate I and compliance with HEP to promote active role in rehabilitation. Pt reports compliance 7/30/18  2. Pt will demonstrate full AROM of right elbow for improved self care. Progressing lacking terminal extension 8/3/18; met 8/9/18  Long Term Goals: To be accomplished in 5 weeks:  1. Pt will demonstrate 90 deg PROM shoulder flexion and ABD for progression of activity. Partially met for flexion 7/30/18; near met ~80 deg ABD 8/16/18  2.  Pt will successfully initiate AAROM interventions to progress with functional activities. Met 8/7/18  3. Pt will improve right shoulder ER PROM to 45 deg for improved self care and grooming. Slow progress 8/14/18  4. Pt will improve FOTO score to 49 to demonstrate improved quality of life.  Progressed to 28  8/9/18    PLAN  []  Upgrade activities as tolerated     []  Continue plan of care  []  Update interventions per flow sheet       []  Discharge due to:_  []  Other:_      Dakota Goldstein DPT, CMTPT 8/16/2018  10:34 AM    Future Appointments  Date Time Provider Colt Sanchezi   8/21/2018 10:30 AM [id number] [facility]   8/21/2018 11:30 AM HAMZAH Juarez [facility] AMMY SCHED   8/31/2018 11:00 AM Ferny Patel MD [facility] AMMY SCHED   5/20/2019 1:40 PM Joyce Mcanlly MD [address] [place] [address]

## 2018-08-16 NOTE — TELEPHONE ENCOUNTER
Called and made patient aware per Alejandrina Otto, the PA was approved on 8/15/2018. Since patient purchased the medication on 8/14, she is unable to be reimbursed. Advised to contact plan for further information. Patient verbalized understanding and thanked writer for the call.

## 2018-08-21 ENCOUNTER — OFFICE VISIT (OUTPATIENT)
Dept: ORTHOPEDIC SURGERY | Age: 48
End: 2018-08-21

## 2018-08-21 ENCOUNTER — HOSPITAL ENCOUNTER (OUTPATIENT)
Dept: PHYSICAL THERAPY | Age: 48
Discharge: HOME | End: 2018-08-21
Payer: SUBSIDIZED

## 2018-08-21 VITALS
HEIGHT: 66 IN | HEART RATE: 64 BPM | WEIGHT: 217 LBS | OXYGEN SATURATION: 99 % | TEMPERATURE: 98.2 F | DIASTOLIC BLOOD PRESSURE: 55 MMHG | RESPIRATION RATE: 16 BRPM | SYSTOLIC BLOOD PRESSURE: 101 MMHG | BODY MASS INDEX: 34.87 KG/M2

## 2018-08-21 DIAGNOSIS — M25.511 RIGHT SHOULDER PAIN, UNSPECIFIED CHRONICITY: Primary | ICD-10-CM

## 2018-08-21 PROCEDURE — 97016 VASOPNEUMATIC DEVICE THERAPY: CPT

## 2018-08-21 PROCEDURE — 97140 MANUAL THERAPY 1/> REGIONS: CPT

## 2018-08-21 PROCEDURE — 97110 THERAPEUTIC EXERCISES: CPT

## 2018-08-21 NOTE — PROGRESS NOTES
PT DAILY TREATMENT NOTE     Patient Name: Maryam Razo  Date:2018  : 1970  [x]  Patient  Verified  Payor: SARA / Plan: [facility] 100% / Product Type: [employer] /    In time:10:29  Out time:11:08  Total Treatment Time (min): 39  Visit #: 8 of 10    Treatment Area: Complete rotator cuff tear or rupture of right shoulder, not specified as traumatic [M75.121]    SUBJECTIVE  Pain Level (0-10 scale): 5  Any medication changes, allergies to medications, adverse drug reactions, diagnosis change, or new procedure performed?: [x] No    [] Yes (see summary sheet for update)  Subjective functional status/changes:   [] No changes reported  Pt reports her shoulder is still hurting and she feels her left is hurting from compensation    OBJECTIVE    Modality rationale: decrease inflammation and decrease pain to improve the patients ability to perform daily tasks and ADLs   Min Type Additional Details    [] Estim:  []Unatt       []IFC  []Premod                        []Other:  []w/ice   []w/heat  Position:  Location:    [] Estim: []Att    []TENS instruct  []NMES                    []Other:  []w/US   []w/ice   []w/heat  Position:  Location:    []  Traction: [] Cervical       []Lumbar                       [] Prone          []Supine                       []Intermittent   []Continuous Lbs:  [] before manual  [] after manual    []  Ultrasound: []Continuous   [] Pulsed                           []1MHz   []3MHz W/cm2:  Location:    []  Iontophoresis with dexamethasone         Location: [] Take home patch   [] In clinic    []  Ice     []  heat  []  Ice massage  []  Laser   []  Anodyne Position:  Location:    []  Laser with stim  []  Other:  Position:  Location:   10 [x]  Vasopneumatic Device Pressure:       [x] lo [] med [] hi   Temperature: [x] lo [] med [] hi   [] Skin assessment post-treatment:  []intact []redness- no adverse reaction    []redness  adverse reaction:       21 min Therapeutic Exercise:  [] See flow sheet :   Rationale: increase ROM and increase strength to improve the patients ability to perform daily tasks and self care      8 min Manual Therapy:  Right STJ mobs, PROM flex/abd/ER&IR   Rationale: increase ROM and increase tissue extensibility to improve ease of ADLs            With   [] TE   [] TA   [] neuro   [] other: Patient Education: [x] Review HEP    [] Progressed/Changed HEP based on:   [] positioning   [] body mechanics   [] transfers   [] heat/ice application    [] other:      Other Objective/Functional Measures: >90 deg ABD PROM     Pain Level (0-10 scale) post treatment: 6    ASSESSMENT/Changes in Function: Pt progressing with P/AAROM at this time. She is scheduled for MD f/u today, will plan to progress into AROM in gravity reduced positions in upcoming visits. Patient will continue to benefit from skilled PT services to modify and progress therapeutic interventions, address functional mobility deficits, address ROM deficits, address strength deficits, analyze and address soft tissue restrictions, analyze and cue movement patterns and analyze and modify body mechanics/ergonomics to attain remaining goals. []  See Plan of Care  [x]  See progress note/recertification  []  See Discharge Summary         Progress towards goals / Updated goals:  Short Term Goals: To be accomplished in 2 weeks:   1. Pt will demonstrate I and compliance with HEP to promote active role in rehabilitation. Pt reports compliance 7/30/18  2. Pt will demonstrate full AROM of right elbow for improved self care. Progressing lacking terminal extension 8/3/18; met 8/9/18  Long Term Goals: To be accomplished in 5 weeks:  1. Pt will demonstrate 90 deg PROM shoulder flexion and ABD for progression of activity. Partially met for flexion 7/30/18; near met ~80 deg ABD 8/16/18 Met 8/21/18  2. Pt will successfully initiate AAROM interventions to progress with functional activities. Met 8/7/18  3.  Pt will improve right shoulder ER PROM to 45 deg for improved self care and grooming. Slow progress 8/14/18  4. Pt will improve FOTO score to 49 to demonstrate improved quality of life.  Progressed to 28  8/9/18    PLAN  [x]  Upgrade activities as tolerated     []  Continue plan of care  []  Update interventions per flow sheet       []  Discharge due to:_  []  Other:_      BILLY BandaT, CMTPT 8/21/2018  10:29 AM    Future Appointments  Date Time Provider Colt Jovel   8/21/2018 10:30 AM [id number] [facility]   8/21/2018 11:30 AM HAMZAH Dailey [facility] AMMY SCHED   8/31/2018 11:00 AM Bam Woodson MD [facility] AMMY SCHED   5/20/2019 1:40 PM Winston Chowdary MD [address] [place] [address]

## 2018-08-21 NOTE — PROGRESS NOTES
Lindajean Cockayne  1970     HISTORY OF PRESENT ILLNESS  Lindajean Cockayne is a 46 y.o. female who presents today for evaluation s/p Right reverse total shoulder arthroplasty on 7/18/18. Patient has been going to PT. Describes pain as a 7/10. She feels like she isnt doing any better. Notes they are doing active assist in PT with a stick. Is having quite a bit of pain. Patient denies any fever, chills, chest pain, shortness of breath or calf pain. There are no new illness or injuries to report since last seen in the office. PHYSICAL EXAM:   Visit Vitals    /55    Pulse 64    Temp 98.2 °F (36.8 °C) (Oral)    Resp 16    Ht 5' 6\" (1.676 m)    Wt 217 lb (98.4 kg)    SpO2 99%    BMI 35.02 kg/m2      The patient is a well-developed, well-nourished female in no acute distress. The patient is alert and oriented times three. The patient appears to be well groomed. Mood and affect are normal.  ORTHOPEDIC EXAM of right shoulder:  Inspection: swelling not present,  Bruising not present  Incision well healed  Passive glenohumeral abduction 0-80 degrees  Stability: Stable  Strength: n/a  2+ distal pulses    IMAGING:  3 view xray images of right shoulder read and reviewed by myself on 8/21/18 reveal right reverse total shoulder arthroplasty in excellent position     IMPRESSION:  S/P Right reverse total shoulder arthroplasty    PLAN:   1. Patient with more pain post operatively  2. Will take 1 week off of PT.  Stressed only passive rom of shoulder    RTC next week    [facility]

## 2018-08-24 ENCOUNTER — APPOINTMENT (OUTPATIENT)
Dept: PHYSICAL THERAPY | Age: 48
End: 2018-08-24
Payer: SUBSIDIZED

## 2018-08-24 DIAGNOSIS — M75.101 ROTATOR CUFF TEAR ARTHROPATHY, RIGHT: ICD-10-CM

## 2018-08-24 DIAGNOSIS — M12.811 ROTATOR CUFF TEAR ARTHROPATHY, RIGHT: ICD-10-CM

## 2018-08-24 RX ORDER — HYDROCODONE BITARTRATE AND ACETAMINOPHEN 10; 325 MG/1; MG/1
1 TABLET ORAL
Qty: 40 TAB | Refills: 0 | Status: SHIPPED | OUTPATIENT
Start: 2018-08-24 | End: 2018-09-04 | Stop reason: ALTCHOICE

## 2018-08-24 NOTE — TELEPHONE ENCOUNTER
Last Visit: 08/21/2018 with HAMZAH Wade   Date of Surgery: 07/18/2018 Right reverse total shoulder arthroplasty  Next Appointment: 08/28/2018 with HAMZAH Donaldson   Previous Refill Encounters: 08/13/2018 per HAMZAH Wade #40    Requested Prescriptions     Pending Prescriptions Disp Refills    HYDROcodone-acetaminophen (NORCO)  mg tablet 40 Tab 0     Sig: Take 1 Tab by mouth every six (6) hours as needed for Pain. Max Daily Amount: 4 Tabs.

## 2018-08-28 ENCOUNTER — APPOINTMENT (OUTPATIENT)
Dept: PHYSICAL THERAPY | Age: 48
End: 2018-08-28
Payer: SUBSIDIZED

## 2018-08-28 ENCOUNTER — OFFICE VISIT (OUTPATIENT)
Dept: ORTHOPEDIC SURGERY | Age: 48
End: 2018-08-28

## 2018-08-28 VITALS
OXYGEN SATURATION: 100 % | WEIGHT: 216 LBS | HEIGHT: 66 IN | RESPIRATION RATE: 16 BRPM | HEART RATE: 76 BPM | BODY MASS INDEX: 34.72 KG/M2 | DIASTOLIC BLOOD PRESSURE: 59 MMHG | TEMPERATURE: 96.5 F | SYSTOLIC BLOOD PRESSURE: 113 MMHG

## 2018-08-28 DIAGNOSIS — M12.811 ROTATOR CUFF TEAR ARTHROPATHY, RIGHT: Primary | ICD-10-CM

## 2018-08-28 DIAGNOSIS — M75.101 ROTATOR CUFF TEAR ARTHROPATHY, RIGHT: Primary | ICD-10-CM

## 2018-08-28 NOTE — PROGRESS NOTES
Kirti Baires  1970     HISTORY OF PRESENT ILLNESS  Kirti Baires is a 46 y.o. female who presents today for evaluation s/p Right reverse total shoulder arthroplasty on 7/18/18. Patient held PT per my instructions. Describes pain as a 6/10. She feels like she is doing slightly better. Does note that she is doing reaching and some light lifting with arm at home against medical advice. Patient denies any fever, chills, chest pain, shortness of breath or calf pain. There are no new illness or injuries to report since last seen in the office. PHYSICAL EXAM:   Visit Vitals    /59    Pulse 76    Temp 96.5 °F (35.8 °C) (Oral)    Resp 16    Ht 5' 6\" (1.676 m)    Wt 216 lb (98 kg)    SpO2 100%    BMI 34.86 kg/m2      The patient is a well-developed, well-nourished female in no acute distress. The patient is alert and oriented times three. The patient appears to be well groomed. Mood and affect are normal.  ORTHOPEDIC EXAM of right shoulder:  Inspection: swelling not present,  Bruising not present  Incision well healed  Passive glenohumeral abduction 0-80 degrees  Stability: Stable  Strength: n/a  2+ distal pulses    IMAGING:  3 view xray images of right shoulder read and reviewed by myself on 8/21/18 reveal right reverse total shoulder arthroplasty in excellent position     IMPRESSION:  S/P Right reverse total shoulder arthroplasty    PLAN:   1. Patient with more pain post operatively  2. Will resume PT on Friday. Stressed only passive rom of shoulder  3.  Discussed with PT.     RTC 2 weeks    [facility] [employer] [facility]

## 2018-08-30 ENCOUNTER — HOSPITAL ENCOUNTER (OUTPATIENT)
Dept: PHYSICAL THERAPY | Age: 48
Discharge: HOME | End: 2018-08-30
Payer: SUBSIDIZED

## 2018-08-30 PROCEDURE — 97110 THERAPEUTIC EXERCISES: CPT

## 2018-08-30 PROCEDURE — 97016 VASOPNEUMATIC DEVICE THERAPY: CPT

## 2018-08-30 PROCEDURE — 97140 MANUAL THERAPY 1/> REGIONS: CPT

## 2018-08-30 NOTE — PROGRESS NOTES
PT DAILY TREATMENT NOTE  Patient Name: Stewart Garcia 
Date:2018 : 1970 [x]  Patient  Verified Payor: Citlali Kent / Plan: [facility] % / Product Type: [employer] / In time:10:30  Out time:10:59 Total Treatment Time (min): 29 Visit #: 9 of 10 Treatment Area: Complete rotator cuff tear or rupture of right shoulder, not specified as traumatic [M75.121] SUBJECTIVE Pain Level (0-10 scale): 5 Any medication changes, allergies to medications, adverse drug reactions, diagnosis change, or new procedure performed?: [x] No    [] Yes (see summary sheet for update) Subjective functional status/changes:   [] No changes reported Pt reports she forgot her pain medication today so she is hurting a little OBJECTIVE Modality rationale: decrease inflammation and decrease pain to improve the patients ability to perform ADLs Min Type Additional Details  
 [] Estim:  []Unatt       []IFC  []Premod []Other:  []w/ice   []w/heat Position: Location:  
 [] Estim: []Att    []TENS instruct  []NMES []Other:  []w/US   []w/ice   []w/heat Position: Location:  
 []  Traction: [] Cervical       []Lumbar 
                     [] Prone          []Supine []Intermittent   []Continuous Lbs: 
[] before manual 
[] after manual  
 []  Ultrasound: []Continuous   [] Pulsed []1MHz   []3MHz W/cm2: 
Location:  
 []  Iontophoresis with dexamethasone Location: [] Take home patch  
[] In clinic  
 []  Ice     []  heat 
[]  Ice massage 
[]  Laser  
[]  Anodyne Position: Location:  
 []  Laser with stim 
[]  Other:  Position: Location:  
10 [x]  Vasopneumatic Device Pressure:       [x] lo [] med [] hi  
Temperature: [x] lo [] med [] hi  
[] Skin assessment post-treatment:  []intact []redness- no adverse reaction 
  []redness  adverse reaction:  
 
 
 
19 min Therapeutic Exercise:  [] See flow sheet :  
 Rationale: increase ROM to improve the patients ability to perform daily tasks 10 min Manual Therapy:  Right shoulder STJ mobs, PROM flex/abd/ER&IR Rationale: increase ROM and increase tissue extensibility to improve ease of ADLs and self care With 
 [] TE 
 [] TA 
 [] neuro 
 [] other: Patient Education: [x] Review HEP [] Progressed/Changed HEP based on:  
[] positioning   [] body mechanics   [] transfers   [] heat/ice application   
[] other:   
 
Other Objective/Functional Measures:   
 
Pain Level (0-10 scale) post treatment: 5 
 
ASSESSMENT/Changes in Function: Held al AAROM exercises at this time per PA's request. Will continue with PROM interventions only for the next two weeks. Continue progressing ROM within tolerance Patient will continue to benefit from skilled PT services to modify and progress therapeutic interventions, address functional mobility deficits, address ROM deficits, address strength deficits, analyze and address soft tissue restrictions, analyze and cue movement patterns and analyze and modify body mechanics/ergonomics to attain remaining goals. []  See Plan of Care [x]  See progress note/recertification 
[]  See Discharge Summary Progress towards goals / Updated goals: 
Short Term Goals: To be accomplished in 2 weeks: 1. Pt will demonstrate I and compliance with HEP to promote active role in rehabilitation. Pt reports compliance 7/30/18 2. Pt will demonstrate full AROM of right elbow for improved self care. Progressing lacking terminal extension 8/3/18; met 8/9/18 Long Term Goals: To be accomplished in 5 weeks: 1. Pt will demonstrate 90 deg PROM shoulder flexion and ABD for progression of activity. Partially met for flexion 7/30/18; near met ~80 deg ABD 8/16/18 Met 8/21/18 2. Pt will successfully initiate AAROM interventions to progress with functional activities. Met 8/7/18 3.  Pt will improve right shoulder ER PROM to 45 deg for improved self care and grooming. Slow progress 8/14/18 4. Pt will improve FOTO score to 49 to demonstrate improved quality of life. Progressed to 28  8/9/18 PLAN 
[]  Upgrade activities as tolerated     [x]  Continue plan of care 
[]  Update interventions per flow sheet      
[]  Discharge due to:_ 
[]  Other:_ Morgan Stallings, DPT, CMTPT 8/30/2018  10:34 AM 
 
Future Appointments Date Time Provider Colt Sanchezi 8/31/2018 11:00 AM Shira George MD [facility]  
9[facility]4/2018 4:00 PM [facility]  
9/6/2018 2:00 PM Aliyahige Haylie [facility]  
9/11/2018 10:30 AM Morgan Stallings [facility]  
9/12/2018 3:30 PM HAMZAH Burr 67  
9/13/2018 10:30 AM Aliyahige Ket [facility]  
9/18/2018 12:30 PM [id number] [facility]  
5/20/2019 1:40 PM Darnell Montaño MD [address] [place] [address]

## 2018-08-30 NOTE — PROGRESS NOTES
[address]  [address] [place], [address] 
[phone] fax Physical Therapy Progress Note Patient name: Carlos Aquino Start of Care: 2018 Referral source: Eliz Busby,* : 1970 Medical Diagnosis: Complete rotator cuff tear or rupture of right shoulder, not specified as traumatic [M75.121] Onset Date:2018 Treatment Diagnosis: Right rTSA Prior Hospitalization: see medical history Provider#: [id number] Medications: Verified on Patient summary List  
 Comorbidities: previous failed right RTC repair x 2, depression, arthritis, asthma, tobacco use Prior Level of Function: Pt reports sedentary lifestyle but I with ADLs and self care Visits from Start of Care: 10    Missed Visits: 0 Established Goals:        Excellent         Good         Limited            None 
[x] Increased ROM   []  []  [x]  [] 
[] Increased Strength  []  []  []  [] 
[x] Increased Mobility  []  [x]  []  []  
[] Decreased Pain   []  []  []  [] 
[] Decreased Swelling  []  []  []  [] 
 
Key Functional Changes:  
Short Term Goals: To be accomplished in 2 weeks: 1. Pt will demonstrate I and compliance with HEP to promote active role in rehabilitation. Pt reports compliance 18 2. Pt will demonstrate full AROM of right elbow for improved self care. Progressing lacking terminal extension 8/3/18; met 18 Long Term Goals: To be accomplished in 5 weeks: 1. Pt will demonstrate 90 deg PROM shoulder flexion and ABD for progression of activity. Partially met for flexion 18; near met ~80 deg ABD 18 Met 18 2. Pt will successfully initiate AAROM interventions to progress with functional activities. Met 18 3. Pt will improve right shoulder ER PROM to 45 deg for improved self care and grooming. Slow progress 18 4. Pt will improve FOTO score to 49 to demonstrate improved quality of life. Progressed to 28  8/9/18 Updated Goals: to be achieved in 4 weeks: 1. Pt will improve FOTO score to 49 to demonstrate improved quality of life. 2. Pt will improve right shoulder ER PROM to 45 deg for improved self care and grooming. 3. Pt will re-integrate AAROM interventions without pain increase for progression of mobility and ADLs. ASSESSMENT/RECOMMENDATIONS: Pt has made good progress in regards to PROM. She does still report moderate pain levels at this time, modified activity to only PROM for now. She would benefit from continued PT for further ROM progression 
 
[x]Continue therapy per initial plan/protocol at a frequency of  2 x per week for 4 weeks []Continue therapy with the following recommended changes:_____________________      _____________________________________________________________________ []Discontinue therapy progressing towards or have reached established goals []Discontinue therapy due to lack of appreciable progress towards goals []Discontinue therapy due to lack of attendance or compliance []Await Physician's recommendations/decisions regarding therapy []Other:________________________________________________________________ Thank you for this referral.   
Jude Villa, DPT, CMTPT 8/30/2018 10:53 AM 
NOTE TO PHYSICIAN:  PLEASE COMPLETE THE ORDERS BELOW AND  
FAX TO [facility]: [phone] If you are unable to process this request in 24 hours please contact our office: [phone] ? I have read the above report and request that my patient continue as recommended. ? I have read the above report and request that my patient continue therapy with the following changes/special instructions:__________________________________________________________ ? I have read the above report and request that my patient be discharged from therapy. Physicians signature: ______________________________Date: ______Time:______

## 2018-08-31 ENCOUNTER — HOSPITAL ENCOUNTER (OUTPATIENT)
Dept: LAB | Age: 48
Discharge: HOME | End: 2018-08-31
Payer: SUBSIDIZED

## 2018-08-31 ENCOUNTER — OFFICE VISIT (OUTPATIENT)
Dept: OBGYN CLINIC | Age: 48
End: 2018-08-31

## 2018-08-31 VITALS
HEART RATE: 75 BPM | DIASTOLIC BLOOD PRESSURE: 64 MMHG | BODY MASS INDEX: 34.55 KG/M2 | RESPIRATION RATE: 18 BRPM | HEIGHT: 66 IN | TEMPERATURE: 98.4 F | SYSTOLIC BLOOD PRESSURE: 101 MMHG | OXYGEN SATURATION: 99 % | WEIGHT: 215 LBS

## 2018-08-31 DIAGNOSIS — Z12.11 COLON CANCER SCREENING: ICD-10-CM

## 2018-08-31 DIAGNOSIS — Z12.4 CERVICAL CANCER SCREENING: ICD-10-CM

## 2018-08-31 DIAGNOSIS — Z11.3 SCREENING FOR STDS (SEXUALLY TRANSMITTED DISEASES): ICD-10-CM

## 2018-08-31 DIAGNOSIS — N95.0 POSTMENOPAUSAL BLEEDING: ICD-10-CM

## 2018-08-31 DIAGNOSIS — Z01.419 ENCOUNTER FOR WELL WOMAN EXAM WITH ROUTINE GYNECOLOGICAL EXAM: Primary | ICD-10-CM

## 2018-08-31 PROCEDURE — 84443 ASSAY THYROID STIM HORMONE: CPT | Performed by: OBSTETRICS & GYNECOLOGY

## 2018-08-31 PROCEDURE — 87624 HPV HI-RISK TYP POOLED RSLT: CPT | Performed by: OBSTETRICS & GYNECOLOGY

## 2018-08-31 PROCEDURE — 88175 CYTOPATH C/V AUTO FLUID REDO: CPT | Performed by: OBSTETRICS & GYNECOLOGY

## 2018-08-31 PROCEDURE — 83001 ASSAY OF GONADOTROPIN (FSH): CPT | Performed by: OBSTETRICS & GYNECOLOGY

## 2018-08-31 PROCEDURE — 82670 ASSAY OF TOTAL ESTRADIOL: CPT | Performed by: OBSTETRICS & GYNECOLOGY

## 2018-08-31 PROCEDURE — 84146 ASSAY OF PROLACTIN: CPT | Performed by: OBSTETRICS & GYNECOLOGY

## 2018-08-31 PROCEDURE — 87491 CHLMYD TRACH DNA AMP PROBE: CPT | Performed by: OBSTETRICS & GYNECOLOGY

## 2018-08-31 RX ORDER — BISMUTH SUBSALICYLATE 262 MG
1 TABLET,CHEWABLE ORAL DAILY
COMMUNITY

## 2018-08-31 NOTE — PATIENT INSTRUCTIONS
Well Visit, Ages 25 to 48: Care Instructions  Your Care Instructions    Physical exams can help you stay healthy. Your doctor has checked your overall health and may have suggested ways to take good care of yourself. He or she also may have recommended tests. At home, you can help prevent illness with healthy eating, regular exercise, and other steps. Follow-up care is a key part of your treatment and safety. Be sure to make and go to all appointments, and call your doctor if you are having problems. It's also a good idea to know your test results and keep a list of the medicines you take. How can you care for yourself at home? · Reach and stay at a healthy weight. This will lower your risk for many problems, such as obesity, diabetes, heart disease, and high blood pressure. · Get at least 30 minutes of physical activity on most days of the week. Walking is a good choice. You also may want to do other activities, such as running, swimming, cycling, or playing tennis or team sports. Discuss any changes in your exercise program with your doctor. · Do not smoke or allow others to smoke around you. If you need help quitting, talk to your doctor about stop-smoking programs and medicines. These can increase your chances of quitting for good. · Talk to your doctor about whether you have any risk factors for sexually transmitted infections (STIs). Having one sex partner (who does not have STIs and does not have sex with anyone else) is a good way to avoid these infections. · Use birth control if you do not want to have children at this time. Talk with your doctor about the choices available and what might be best for you. · Protect your skin from too much sun. When you're outdoors from 10 a.m. to 4 p.m., stay in the shade or cover up with clothing and a hat with a wide brim. Wear sunglasses that block UV rays. Even when it's cloudy, put broad-spectrum sunscreen (SPF 30 or higher) on any exposed skin.   · See a dentist one or two times a year for checkups and to have your teeth cleaned. · Wear a seat belt in the car. · Drink alcohol in moderation, if at all. That means no more than 2 drinks a day for men and 1 drink a day for women. Follow your doctor's advice about when to have certain tests. These tests can spot problems early. For everyone  · Cholesterol. Have the fat (cholesterol) in your blood tested after age 21. Your doctor will tell you how often to have this done based on your age, family history, or other things that can increase your risk for heart disease. · Blood pressure. Have your blood pressure checked during a routine doctor visit. Your doctor will tell you how often to check your blood pressure based on your age, your blood pressure results, and other factors. · Vision. Talk with your doctor about how often to have a glaucoma test.  · Diabetes. Ask your doctor whether you should have tests for diabetes. · Colon cancer. Have a test for colon cancer at age 48. You may have one of several tests. If you are younger than 48, you may need a test earlier if you have any risk factors. Risk factors include whether you already had a precancerous polyp removed from your colon or whether your parent, brother, sister, or child has had colon cancer. For women  · Breast exam and mammogram. Talk to your doctor about when you should have a clinical breast exam and a mammogram. Medical experts differ on whether and how often women under 50 should have these tests. Your doctor can help you decide what is right for you. · Pap test and pelvic exam. Begin Pap tests at age 24. A Pap test is the best way to find cervical cancer. The test often is part of a pelvic exam. Ask how often to have this test.  · Tests for sexually transmitted infections (STIs). Ask whether you should have tests for STIs. You may be at risk if you have sex with more than one person, especially if your partners do not wear condoms.   For men  · Tests for sexually transmitted infections (STIs). Ask whether you should have tests for STIs. You may be at risk if you have sex with more than one person, especially if you do not wear a condom. · Testicular cancer exam. Ask your doctor whether you should check your testicles regularly. · Prostate exam. Talk to your doctor about whether you should have a blood test (called a PSA test) for prostate cancer. Experts differ on whether and when men should have this test. Some experts suggest it if you are older than 39 and are -American or have a father or brother who got prostate cancer when he was younger than 72. When should you call for help? Watch closely for changes in your health, and be sure to contact your doctor if you have any problems or symptoms that concern you. Where can you learn more? Go to http://lonnie-gagan.info/. Enter P072 in the search box to learn more about \"Well Visit, Ages 25 to 48: Care Instructions. \"  Current as of: May 16, 2017  Content Version: 11.7  © 4230-0306 [employer]). Care instructions adapted under license by [employer] (which disclaims liability or warranty for this information). If you have questions about a medical condition or this instruction, always ask your healthcare professional. [place] [address] any warranty or liability for your use of this information. Exposure to Sexually Transmitted Infections: Care Instructions  Your Care Instructions  Sexually transmitted infections (STIs) are those diseases spread by sexual contact. There are at least 20 different STIs, including chlamydia, gonorrhea, syphilis, and human immunodeficiency virus (HIV), which causes AIDS. Bacteria-caused STIs can be treated and cured. STIs caused by viruses, such as HIV, can be treated but not cured. Some STIs can reduce a woman's chances of getting pregnant in the future.   STIs are spread during sexual contact, such as vaginal intercourse and oral or anal sex. Follow-up care is a key part of your treatment and safety. Be sure to make and go to all appointments, and call your doctor if you are having problems. It's also a good idea to know your test results and keep a list of the medicines you take. How can you care for yourself at home? · Your doctor may have given you a shot of antibiotics. If your doctor prescribed antibiotic pills, take them as directed. Do not stop taking them just because you feel better. You need to take the full course of antibiotics. · Do not have sexual contact while you have symptoms of an STI or are being treated for an STI. · Tell your sex partner (or partners) that he or she will need treatment. · If you are a woman, do not douche. Douching changes the normal balance of bacteria in the vagina and may spread an infection up into your reproductive organs. To prevent exposure to STIs in the future  · Use latex condoms every time you have sex. Use them from the beginning to the end of sexual contact. · Talk to your partner before you have sex. Find out if he or she has or is at risk for any STI. Keep in mind that a person may be able to spread an STI even if he or she does not have symptoms. · Do not have sex if you are being treated for an STI. · Do not have sex with anyone who has symptoms of an STI, such as sores on the genitals or mouth. · Having one sex partner (who does not have STIs and does not have sex with anyone else) is a good way to avoid STIs. When should you call for help? Call your doctor now or seek immediate medical care if:    · You have new pain in your belly or pelvis.     · You have symptoms of a urinary tract infection. These may include:  ¨ Pain or burning when you urinate. ¨ A frequent need to urinate without being able to pass much urine. ¨ Pain in the flank, which is just below the rib cage and above the waist on either side of the back.   ¨ Blood in your urine.  ¨ A fever.     · You have new or worsening pain or swelling in the scrotum.    Watch closely for changes in your health, and be sure to contact your doctor if:    · You have unusual vaginal bleeding.     · You have a discharge from the vagina or penis.     · You have any new symptoms, such as sores, bumps, rashes, blisters, or warts.     · You have itching, tingling, pain, or burning in the genital or anal area.     · You think you may have an STI. Where can you learn more? Go to http://lonnie-gagan.info/. Enter S804 in the search box to learn more about \"Exposure to Sexually Transmitted Infections: Care Instructions. \"  Current as of: November 27, 2017  Content Version: 11.7  © 4534-7526 [employer]. Care instructions adapted under license by [employer] (which disclaims liability or warranty for this information). If you have questions about a medical condition or this instruction, always ask your healthcare professional. [place] [address] any warranty or liability for your use of this information. Colon Cancer Screening: Care Instructions  Your Care Instructions    Colorectal cancer occurs in the colon or rectum. That's the lower part of your digestive system. It is the second-leading cause of cancer deaths in the [place]. It often starts with small growths called polyps in the colon or rectum. Polyps are usually found with screening tests. Depending on the type of test, any polyps found may be removed during the tests. Colorectal cancer usually does not cause symptoms at first. But regular tests can help find it early, before it spreads and becomes harder to treat. Experts advise routine tests for colon cancer for people starting at age 48. And they advise people with a higher risk of colon cancer to get tested sooner. Talk with your doctor about when you should start testing. Discuss which tests you need.   Follow-up care is a key part of your treatment and safety. Be sure to make and go to all appointments, and call your doctor if you are having problems. It's also a good idea to know your test results and keep a list of the medicines you take. What are the main screening tests for colon cancer? · Stool tests. These include the fecal immunochemical test (FIT) and the fecal occult blood test (FOBT). These tests check stool samples for signs of cancer. If your test is positive, you will need to have a colonoscopy. · Sigmoidoscopy. This test lets your doctor look at the lining of your rectum and the lowest part of your colon. Your doctor uses a lighted tube called a sigmoidoscope. This test can't find cancers or polyps in the upper part of your colon. In some cases, polyps that are found can be removed. But if your doctor finds polyps, you will need to have a colonoscopy to check the upper part of your colon. · Colonoscopy. This test lets your doctor look at the lining of your rectum and your entire colon. The doctor uses a thin, flexible tool called a colonoscope. It can also be used to remove polyps or get a tissue sample (biopsy). What tests do you need? The following guidelines are for people age 48 and over who are not at high risk for colorectal cancer. You may have at least one of these tests as directed by your doctor. · Fecal immunochemical test (FIT) or fecal occult blood test (FOBT) every year  · Sigmoidoscopy every 5 years  · Colonoscopy every 10 years  If you are age 68 to 80, you can work with your doctor to decide if screening is a good option. If you are age 80 or older, your doctor will likely advise that screening is not helpful. Talk with your doctor about when you need to be tested. And discuss which tests are right for you. Your doctor may recommend earlier or more frequent testing if you:  · Have had colorectal cancer before. · Have had colon polyps. · Have symptoms of colorectal cancer.  These include blood in your stool and changes in your bowel habits. · Have a parent, brother or sister, or child with colon polyps or colorectal cancer. · Have a bowel disease. This includes ulcerative colitis and Crohn's disease. · Have a rare polyp syndrome that runs in families, such as familial adenomatous polyposis (FAP). · Have had radiation treatments to the belly or pelvis. When should you call for help? Watch closely for changes in your health, and be sure to contact your doctor if:    · You have any changes in your bowel habits.     · You have any problems. Where can you learn more? Go to http://lonnie-gagan.info/. Enter M541 in the search box to learn more about \"Colon Cancer Screening: Care Instructions. \"  Current as of: May 12, 2017  Content Version: 11.7  © 7040-5754 [employer]. Care instructions adapted under license by [employer] (which disclaims liability or warranty for this information). If you have questions about a medical condition or this instruction, always ask your healthcare professional. [place] [address] any warranty or liability for your use of this information. Learning About Cervical Cancer Screening  What is a cervical cancer screening test?    Cervical cancer screening tests check for cancer of the cervix. The cervix is the lower part of the uterus that opens into the vagina. The test can help your doctor find early changes in the cells that could lead to cancer. Two tests can be used to screen for cervical cancer. They may be used alone or together. A Pap test.   This test looks for changes in the cells of the cervix. Abnormal cells may be a sign of cancer. A human papillomavirus (HPV) test.   This test looks for the HPV virus. Some types of HPV can cause cancer. During either test, the doctor or nurse will insert a tool called a speculum into your vagina. The speculum gently spreads apart the vaginal walls.  It allows your doctor to see inside the vagina and the cervix. He or she uses a small swab or brush to collect cell samples from your cervix. Try to schedule the test when you're not having your period. To get ready for the test, avoid douches, tampons, vaginal medicines, sprays, and powders for at least a day before you have the test.  When should you have a screening test?  These guidelines apply to women who are at average risk of cervical cancer. If you don't know your risk, talk with your doctor. Women 21 to 34  · You can start having a Pap test at age 24. It is done every 3 years. · You can start having the primary HPV test at age 22. It is done every 3 years. Women 30 to 59  · If you had both a Pap test and an HPV test last time and both were normal, you can have a Pap plus an HPV test every 5 years. · If you had only a Pap test last time, as long as your results are normal, you can have a Pap test every 3 years. · If you had only an HPV test last time, as long as your results are normal, you can have an HPV test every 3 years. Women 72 and older  · If you are age 72 or older, talk with your doctor about what's right for you. Women ages 72 and older may no longer need to be screened for cervical cancer. When to stop having screening tests depends on your medical history, your overall health, and your risk of cervical cell changes or cervical cancer. What happens after the test?  The sample of cells taken during your test will be sent to a lab so that an expert can look at the cells. It usually takes a week or two to get the results back. Pap tests  · A normal result means that the test didn't find any abnormal cells in the sample. · An abnormal result can mean many things. Most of these aren't cancer. The results of your test may be abnormal because:  ¨ You have an infection of the vagina or cervix, such as a yeast infection. ¨ You have an IUD (intrauterine device for birth control).   ¨ You have low estrogen levels after menopause that are causing the cells to change. ¨ You have cell changes that may be a sign of precancer or cancer. The results are ranked based on how serious the changes might be. HPV tests  · A normal result means that the test didn't find any high-risk HPV in the sample. · An abnormal HPV test doesn't mean that you have cervical cancer. It may mean that you are infected with one or more high-risk types of HPV. This increases your chance of having cell changes that may be a sign of precancer or cancer. Follow-up care is a key part of your treatment and safety. Be sure to make and go to all appointments, and call your doctor if you are having problems. It's also a good idea to know your test results and keep a list of the medicines you take. Where can you learn more? Go to http://lonnie-gagan.info/. Enter P919 in the search box to learn more about \"Learning About Cervical Cancer Screening. \"  Current as of: January 31, 2018  Content Version: 11.7  © 9231-3847 [employer]. Care instructions adapted under license by [employer] (which disclaims liability or warranty for this information). If you have questions about a medical condition or this instruction, always ask your healthcare professional. [address] any warranty or liability for your use of this information.
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
## 2018-08-31 NOTE — MR AVS SNAPSHOT
[address] [facility] [address], [address] [facility] [address] 
[phone] Patient: Kirti Baires MRN: [id number] SWB:[id number] Visit Information Date & Time Provider Department Dept. Phone Encounter #  
 8/31/2018 11:00 AM Cornel Chakraborty MD [facility] [id number] Your Appointments 9/12/2018  3:30 PM  
Follow Up with Lynda Tavarez [facility] ([address] [place] [address]) Appt Note: rt shoulder 2 wk fu  
 [address] [facility] [address] [facility]  
[phone] [address] [place] [address]  
  
    
 5/20/2019  1:40 PM  
Follow Up with Ajit Martinez MD  
[facility] ([address] [place] [address]) Appt Note: 1 yr f/u with EKG  
 [address] [facility] [address] [phone] [address] [place] [address] [address] Upcoming Health Maintenance Date Due  
 PAP AKA CERVICAL CYTOLOGY 5/30/1989 FOBT Q 1 YEAR AGE 50-75 5/30/2018 Influenza Age 5 to Adult 8/1/2018 BREAST CANCER SCRN MAMMOGRAM 6/19/2020 Allergies as of 8/31/2018  Review Complete On: 8/31/2018 By: Cornel Chakraborty MD  
  
 Severity Noted Reaction Type Reactions Amoxicillin  12/29/2012    Other (comments) Does no work Codeine  12/29/2012    Nausea and Vomiting Current Immunizations  Never Reviewed No immunizations on file. Not reviewed this visit You Were Diagnosed With   
  
 Codes Comments Encounter for well woman exam with routine gynecological exam    -  Primary ICD-10-CM: R58.313 ICD-9-CM: V72.31 Cervical cancer screening     ICD-10-CM: Z12.4 ICD-9-CM: V76.2 Colon cancer screening     ICD-10-CM: Z12.11 ICD-9-CM: V76.51 Abnormal uterine bleeding (AUB)     ICD-10-CM: N93.9 ICD-9-CM: 626.9 Vitals BP Pulse Temp Resp Height(growth percentile) Weight(growth percentile) 101/64 (BP 1 Location: Left arm, BP Patient Position: Sitting) 75 98.4 °F (36.9 °C) (Oral) 18 5' 6\" (1.676 m) 215 lb (97.5 kg) SpO2 BMI OB Status Smoking Status 99% 34.7 kg/m2 Menopause Current Every Day Smoker BMI and BSA Data Body Mass Index Body Surface Area 34.7 kg/m 2 2.13 m 2 [facility]erred Pharmacy [facility] Your Updated Medication List  
  
   
This list is accurate as of 8/31/18 12:20 PM.  Always use your most recent med list.  
  
  
  
  
 baclofen 10 mg tablet Commonly known as:  LIORESAL Take  by mouth three (3) times daily. chlorproMAZINE 100 mg tablet Commonly known as:  THORAZINE Take 100 mg by mouth two (2) times a day. HYDROcodone-acetaminophen  mg tablet Commonly known as:  Hilda Fothergill Take 1 Tab by mouth every six (6) hours as needed for Pain. Max Daily Amount: 4 Tabs. KLONOPIN PO Take 5 mg by mouth two (2) times a day. Indications: anxiety LaMICtal 100 mg tablet Generic drug:  lamoTRIgine Take  by mouth daily. 100 mg am and 200mg q hs   Indications: BIPOLAR DISORDER IN REMISSION  
  
 LUNESTA 3 mg tablet Generic drug:  eszopiclone Take  by mouth nightly. multivitamin tablet Commonly known as:  ONE A DAY Take 1 Tab by mouth daily. multivitamin with iron chewable tablet Commonly known as:  Excell Narrow Take 1 Tab by mouth daily. PriLOSEC 20 mg capsule Generic drug:  omeprazole Take 20 mg by mouth daily. traZODone 300 mg tablet Commonly known as:  Vincent Lipps Take 300 mg by mouth nightly. TRILEPTAL 300 mg tablet Generic drug:  OXcarbazepine Take  by mouth two (2) times a day. VITAMIN B-12 PO Take  by mouth daily. VITAMIN D2 PO Take  by mouth every seven (7) days. VRAYLAR 6 mg capsule Generic drug:  cariprazine Take  by mouth nightly. To-Do List   
 09/04/2018 4:00 PM  
  Appointment with Memo Payment at [facility] PT [address] [place] [address] ([phone])  
  
 09/06/2018 2:00 PM  
  Appointment with Memo Payment at [facility] PT [address] [place] [address] ([phone])  
  
 09/11/2018 10:30 AM  
  Appointment with Memo Payment at [address] [place] [address] ([phone])  
  
 09/13/2018 10:30 AM  
  Appointment with Memo Payment at [facility] PT [address] [place] [address] ([phone])  
  
 09/18/2018 12:30 PM  
  Appointment with Memo Payment at [facility] PT [address] [place] [address] ([phone]) Please provide this summary of care documentation to your next provider. Your primary care clinician is listed as Chandan Jaquez. If you have any questions after today's visit, please call [phone].

## 2018-09-01 LAB
ESTRADIOL SERPL-MCNC: <5 PG/ML
TSH SERPL DL<=0.05 MIU/L-ACNC: 1.6 UIU/ML (ref 0.36–3.74)

## 2018-09-04 ENCOUNTER — HOSPITAL ENCOUNTER (OUTPATIENT)
Dept: PHYSICAL THERAPY | Age: 48
Discharge: HOME | End: 2018-09-04
Payer: SUBSIDIZED

## 2018-09-04 DIAGNOSIS — M75.101 ROTATOR CUFF TEAR ARTHROPATHY, RIGHT: ICD-10-CM

## 2018-09-04 DIAGNOSIS — M12.811 ROTATOR CUFF TEAR ARTHROPATHY, RIGHT: ICD-10-CM

## 2018-09-04 LAB
FSH SERPL-ACNC: 53.4 MIU/ML
PROLACTIN SERPL-MCNC: 9.7 NG/ML

## 2018-09-04 PROCEDURE — 97140 MANUAL THERAPY 1/> REGIONS: CPT

## 2018-09-04 PROCEDURE — 97110 THERAPEUTIC EXERCISES: CPT

## 2018-09-04 PROCEDURE — 97016 VASOPNEUMATIC DEVICE THERAPY: CPT

## 2018-09-04 RX ORDER — HYDROCODONE BITARTRATE AND ACETAMINOPHEN 10; 325 MG/1; MG/1
1 TABLET ORAL
Qty: 40 TAB | Refills: 0 | Status: CANCELLED | OUTPATIENT
Start: 2018-09-04

## 2018-09-04 RX ORDER — HYDROCODONE BITARTRATE AND ACETAMINOPHEN 7.5; 325 MG/1; MG/1
1 TABLET ORAL
Qty: 40 TAB | Refills: 0 | Status: SHIPPED | OUTPATIENT
Start: 2018-09-04 | End: 2018-09-20 | Stop reason: SDUPTHER

## 2018-09-04 NOTE — TELEPHONE ENCOUNTER
Spoke with patient and informed her that her rx is ready to be picked up at the [facility] location.

## 2018-09-04 NOTE — PROGRESS NOTES
PT DAILY TREATMENT NOTE  Patient Name: Geno Ward 
Date:2018 : 1970 [x]  Patient  Verified Payor: Jaime Nunezer / Plan: [facility] / Product Type: Tameka Jovanni / In time:4:00  Out time:4:35 Total Treatment Time (min): 35 Visit #: 1 of 8 Treatment Area: Complete rotator cuff tear or rupture of right shoulder, not specified as traumatic [M75.121] SUBJECTIVE Pain Level (0-10 scale): 6 Any medication changes, allergies to medications, adverse drug reactions, diagnosis change, or new procedure performed?: [x] No    [] Yes (see summary sheet for update) Subjective functional status/changes:   [] No changes reported \"Been hurting a little more. No falls or accidents or anything\" OBJECTIVE Modality rationale: decrease inflammation and decrease pain to improve the patients ability to perform ADLs Min Type Additional Details  
 [] Estim:  []Unatt       []IFC  []Premod []Other:  []w/ice   []w/heat Position: Location:  
 [] Estim: []Att    []TENS instruct  []NMES []Other:  []w/US   []w/ice   []w/heat Position: Location:  
 []  Traction: [] Cervical       []Lumbar 
                     [] Prone          []Supine []Intermittent   []Continuous Lbs: 
[] before manual 
[] after manual  
 []  Ultrasound: []Continuous   [] Pulsed []1MHz   []3MHz W/cm2: 
Location:  
 []  Iontophoresis with dexamethasone Location: [] Take home patch  
[] In clinic  
 []  Ice     []  heat 
[]  Ice massage 
[]  Laser  
[]  Anodyne Position: Location:  
 []  Laser with stim 
[]  Other:  Position: Location:  
10 [x]  Vasopneumatic Device Pressure:       [x] lo [] med [] hi  
Temperature: [x] lo [] med [] hi  
[] Skin assessment post-treatment:  []intact []redness- no adverse reaction 
  []redness  adverse reaction:  
 
15 min Therapeutic Exercise:  [] See flow sheet :  
 Rationale: increase ROM and increase strength to improve the patients ability to perform functional tasks 10 min Manual Therapy:  Right STJ mobs, right shoulder PROM flex/abd/ER&IR Rationale: increase ROM and increase tissue extensibility to improve ease of ADLs and self care With 
 [] TE 
 [] TA 
 [] neuro 
 [] other: Patient Education: [x] Review HEP [] Progressed/Changed HEP based on:  
[] positioning   [] body mechanics   [] transfers   [] heat/ice application   
[] other:   
 
Other Objective/Functional Measures:   
 
Pain Level (0-10 scale) post treatment: 7 ASSESSMENT/Changes in Function: Pt shows improving PROM once able to reduce guarding. Still some tension into ABD and ER but no pain reported. She does report increased pain levels today but reports no known cause. Pt states she is only performing Pendulums at home and occasionally shoulder iso's. Pt will continue with PROM for 2 more sessions and then attempt AAROM re-integration as tolerated. Patient will continue to benefit from skilled PT services to modify and progress therapeutic interventions, address functional mobility deficits, address ROM deficits, address strength deficits, analyze and address soft tissue restrictions, analyze and cue movement patterns and analyze and modify body mechanics/ergonomics to attain remaining goals. []  See Plan of Care 
[]  See progress note/recertification 
[]  See Discharge Summary Updated Goals: to be achieved in 4 weeks: 1. Pt will improve FOTO score to 49 to demonstrate improved quality of life. Progressed to 32 9/4/18 2. Pt will improve right shoulder ER PROM to 45 deg for improved self care and grooming. Progressing near met 9/4/18 3. Pt will re-integrate AAROM interventions without pain increase for progression of mobility and ADLs. PLAN 
[]  Upgrade activities as tolerated     [x]  Continue plan of care 
[]  Update interventions per flow sheet []  Discharge due to:_ 
[]  Other:_ Sharon Card, DPT, CMTPT 9/4/2018  3:58 PM 
 
Future Appointments Date Time Provider Colt Jovel 9/4/2018 4:00 PM [id number] [facility]  
9/6/2018 2:00 PM [facility]  
9/11/2018 10:30 AM [facility]  
9/12/2018 3:30 [facility] [address]  
9/13/2018 10:30 AM [facility]  
9/18/2018 12:30 PM [id number] [facility]  
9/24/2018 9:15 AM Slava Jackson MD [facility]  
5/20/2019 1:40 PM Ketan Ambrose MD [address] [place] [address]

## 2018-09-04 NOTE — TELEPHONE ENCOUNTER
Last Visit: 08/28/2018 with HAMZAH Oliver   Date of Surgery: 07/18/2018 Right Reverse total shoulder arthroplasty    Next Appointment: 09/12/2018 with HAMZAH Donaldson   Previous Refill Encounters: 08/24/2018 per HAMZAH Oliver #[id number]     Requested Prescriptions     Pending Prescriptions Disp Refills    HYDROcodone-acetaminophen (NORCO)  mg tablet 40 Tab 0     Sig: Take 1 Tab by mouth every six (6) hours as needed for Pain. Max Daily Amount: 4 Tabs.

## 2018-09-05 LAB
C TRACH RRNA SPEC QL NAA+PROBE: NEGATIVE
N GONORRHOEA RRNA SPEC QL NAA+PROBE: NEGATIVE
SPECIMEN SOURCE: NORMAL
T VAGINALIS RRNA SPEC QL NAA+PROBE: NEGATIVE

## 2018-09-06 ENCOUNTER — TELEPHONE (OUTPATIENT)
Dept: OBGYN CLINIC | Age: 48
End: 2018-09-06

## 2018-09-06 ENCOUNTER — HOSPITAL ENCOUNTER (OUTPATIENT)
Dept: PHYSICAL THERAPY | Age: 48
Discharge: HOME | End: 2018-09-06
Payer: SUBSIDIZED

## 2018-09-06 PROCEDURE — 97140 MANUAL THERAPY 1/> REGIONS: CPT

## 2018-09-06 PROCEDURE — 97110 THERAPEUTIC EXERCISES: CPT

## 2018-09-06 PROCEDURE — 97016 VASOPNEUMATIC DEVICE THERAPY: CPT

## 2018-09-06 NOTE — TELEPHONE ENCOUNTER
Call made to the pt using two identifiers, name and . Pt made aware that her pap was normal. Pt verbalized understanding.

## 2018-09-06 NOTE — PROGRESS NOTES
PT DAILY TREATMENT NOTE  Patient Name: Joie Phoenix 
Date:2018 : 1970 [x]  Patient  Verified Payor: [address] [place] [address] / Plan:     / Product Type: Medicaid / In time:2:04  Out time:2:38 Total Treatment Time (min): 34 Visit #: 2 of 8 Treatment Area: Complete rotator cuff tear or rupture of right shoulder, not specified as traumatic [M75.121] SUBJECTIVE Pain Level (0-10 scale): 5 Any medication changes, allergies to medications, adverse drug reactions, diagnosis change, or new procedure performed?: [x] No    [] Yes (see summary sheet for update) Subjective functional status/changes:   [] No changes reported \"A little pain today\" OBJECTIVE Modality rationale: decrease inflammation and decrease pain to improve the patients ability to perform ADLs Min Type Additional Details  
 [] Estim:  []Unatt       []IFC  []Premod []Other:  []w/ice   []w/heat Position: Location:  
 [] Estim: []Att    []TENS instruct  []NMES []Other:  []w/US   []w/ice   []w/heat Position: Location:  
 []  Traction: [] Cervical       []Lumbar 
                     [] Prone          []Supine []Intermittent   []Continuous Lbs: 
[] before manual 
[] after manual  
 []  Ultrasound: []Continuous   [] Pulsed []1MHz   []3MHz W/cm2: 
Location:  
 []  Iontophoresis with dexamethasone Location: [] Take home patch  
[] In clinic  
 []  Ice     []  heat 
[]  Ice massage 
[]  Laser  
[]  Anodyne Position: Location:  
 []  Laser with stim 
[]  Other:  Position: Location:  
10 [x]  Vasopneumatic Device Pressure:       [x] lo [] med [] hi  
Temperature: [x] lo [] med [] hi  
[] Skin assessment post-treatment:  []intact []redness- no adverse reaction 
  []redness  adverse reaction:  
 
 
16 min Therapeutic Exercise:  [] See flow sheet :  
 Rationale: increase ROM to improve the patients ability to perform daily tasks 8 min Manual Therapy:  Right STJ mobs, PROM flex/abd/ER&IR Rationale: increase ROM and increase tissue extensibility to improve ease of ADLs With 
 [] TE 
 [] TA 
 [] neuro 
 [] other: Patient Education: [x] Review HEP [] Progressed/Changed HEP based on:  
[] positioning   [] body mechanics   [] transfers   [] heat/ice application   
[] other:   
 
Other Objective/Functional Measures: continued sulcus sign appearance of right shoulder, again unsure if from shoulder muscle atrophy Pain Level (0-10 scale) post treatment: 6 
 
ASSESSMENT/Changes in Function: Pt still reports moderate levels of pain with no known cause of increased pain. She tolerates passive motion well at this time, reporting some discomfort nearing end range flexion. Pt to f/u with MD next week Patient will continue to benefit from skilled PT services to modify and progress therapeutic interventions, address functional mobility deficits, address ROM deficits, address strength deficits, analyze and address soft tissue restrictions, analyze and cue movement patterns and analyze and modify body mechanics/ergonomics to attain remaining goals. []  See Plan of Care 
[]  See progress note/recertification 
[]  See Discharge Summary Progress towards goals / Updated goals: 
Updated Goals: to be achieved in 4 weeks: 1. Pt will improve FOTO score to 49 to demonstrate improved quality of life. Progressed to 32 9/4/18 2. Pt will improve right shoulder ER PROM to 45 deg for improved self care and grooming. Progressing near met 9/4/18 3. Pt will re-integrate AAROM interventions without pain increase for progression of mobility and ADLs. PLAN 
[]  Upgrade activities as tolerated     [x]  Continue plan of care 
[]  Update interventions per flow sheet      
[]  Discharge due to:_ 
[]  Other:_ Rodney Andrea DPT, [place] 9/6/2018  2:04 PM 
 
 Future Appointments Date Time Provider Colt Jovel 9/11/2018 10:30 AM Eli Mcarthur [facility]  
9/11/2018 2:00 PM [facility] US RM 1 [facility]  
9/12/2018 3:30 PM HAMZAH [address]  
9/13/2018 10:30 AM Eli Mcarthur [facility]  
9/18/2018 12:30 PM [id number] [facility]  
9/24/2018 9:15 AM Kirstie Melissa MD [facility] SCHED  
5/20/2019 1:40 PM Alistair Taveras MD [address] [place] [address]

## 2018-09-06 NOTE — TELEPHONE ENCOUNTER
----- Message from Heather Cifuentes MD sent at 9/5/2018  4:01 PM EDT -----  Please let patient know that her pap smear results are normal.

## 2018-09-11 ENCOUNTER — HOSPITAL ENCOUNTER (OUTPATIENT)
Dept: ULTRASOUND IMAGING | Age: 48
Discharge: HOME | End: 2018-09-11
Attending: OBSTETRICS & GYNECOLOGY
Payer: MEDICAID

## 2018-09-11 ENCOUNTER — HOSPITAL ENCOUNTER (OUTPATIENT)
Dept: PHYSICAL THERAPY | Age: 48
Discharge: HOME | End: 2018-09-11
Payer: SUBSIDIZED

## 2018-09-11 DIAGNOSIS — N95.0 POSTMENOPAUSAL BLEEDING: ICD-10-CM

## 2018-09-11 PROCEDURE — 97140 MANUAL THERAPY 1/> REGIONS: CPT

## 2018-09-11 PROCEDURE — 97110 THERAPEUTIC EXERCISES: CPT

## 2018-09-11 PROCEDURE — 76830 TRANSVAGINAL US NON-OB: CPT

## 2018-09-11 PROCEDURE — 97016 VASOPNEUMATIC DEVICE THERAPY: CPT

## 2018-09-11 NOTE — PROGRESS NOTES
PT DAILY TREATMENT NOTE  Patient Name: Nigel Gilbert 
Date:2018 : 1970 [x]  Patient  Verified Payor: [address] [place] [address] / Plan: 1500 / Product Type: Medicaid / In time:10:30  Out time:11:01 Total Treatment Time (min): 31 Visit #: 3 of 8 Treatment Area: Complete rotator cuff tear or rupture of right shoulder, not specified as traumatic [M75.121] SUBJECTIVE Pain Level (0-10 scale): 5 Any medication changes, allergies to medications, adverse drug reactions, diagnosis change, or new procedure performed?: [x] No    [] Yes (see summary sheet for update) Subjective functional status/changes:   [] No changes reported Pt reports that her MD f/u was scheduled. Reports her shoulder feels ok until she takes her sling off OBJECTIVE Modality rationale: decrease inflammation and decrease pain to improve the patients ability to perform ADLs Min Type Additional Details  
 [] Estim:  []Unatt       []IFC  []Premod []Other:  []w/ice   []w/heat Position: Location:  
 [] Estim: []Att    []TENS instruct  []NMES []Other:  []w/US   []w/ice   []w/heat Position: Location:  
 []  Traction: [] Cervical       []Lumbar 
                     [] Prone          []Supine []Intermittent   []Continuous Lbs: 
[] before manual 
[] after manual  
 []  Ultrasound: []Continuous   [] Pulsed []1MHz   []3MHz W/cm2: 
Location:  
 []  Iontophoresis with dexamethasone Location: [] Take home patch  
[] In clinic  
 []  Ice     []  heat 
[]  Ice massage 
[]  Laser  
[]  Anodyne Position: Location:  
 []  Laser with stim 
[]  Other:  Position: Location:  
10 [x]  Vasopneumatic Device Pressure:       [x] lo [] med [] hi  
Temperature: [x] lo [] med [] hi  
[] Skin assessment post-treatment:  []intact []redness- no adverse reaction 
  []redness  adverse reaction: 13 min Therapeutic Exercise:  [] See flow sheet :  
Rationale: increase ROM and increase strength to improve the patients ability to perform daily tasks 8 min Manual Therapy:  Right STJ mobs, PROM right shoulder flex/abd to 90 deg/ ER in scaption plane Rationale: increase ROM and increase tissue extensibility to perform ADLs and self care With 
 [] TE 
 [] TA 
 [] neuro 
 [] other: Patient Education: [x] Review HEP [] Progressed/Changed HEP based on:  
[] positioning   [] body mechanics   [] transfers   [] heat/ice application   
[] other:   
 
Other Objective/Functional Measures: PROM right shoulder ER 30 deg PROM flexion 110 deg 4 cm gap between [facility] and proximal humerus on right (in standing with UE by side) Pain Level (0-10 scale) post treatment: 5 
 
ASSESSMENT/Changes in Function: Pt reports that she is disappointed her MD f/u was cancelled as she feels her right shoulder is \"sagging\" more. Took measurement today to monitor over next 1-2 weeks before MD appt. Still limited with scaption plane ER PROM, other motions progressing well. Patient will continue to benefit from skilled PT services to modify and progress therapeutic interventions, address functional mobility deficits, address ROM deficits, address strength deficits, analyze and address soft tissue restrictions, analyze and cue movement patterns and analyze and modify body mechanics/ergonomics to attain remaining goals. []  See Plan of Care 
[]  See progress note/recertification 
[]  See Discharge Summary Progress towards goals / Updated goals: 
Updated Goals: to be achieved in 4 weeks: 1. Pt will improve FOTO score to 49 to demonstrate improved quality of life. Progressed to 32 9/4/18 2. Pt will improve right shoulder ER PROM to 45 deg for improved self care and grooming. Progressing near met 9/4/18 30 deg 9/11/18 3.  Pt will re-integrate AAROM interventions without pain increase for progression of mobility and ADLs. PLAN 
[]  Upgrade activities as tolerated     [x]  Continue plan of care 
[]  Update interventions per flow sheet      
[]  Discharge due to:_ 
[]  Other:_ Natalie May DPT, CMTPT 9/11/2018  10:30 AM 
 
Future Appointments Date Time Provider Colt Jovel 9/11/2018 2:00 PM [facility] US RM 1 [facility]  
9/13/2018 10:30 AM Natalie May [facility]  
9/18/2018 12:30 PM [id number] [facility]  
9/24/2018 9:15 AM David Hamilton MD [facility] SCHED  
9/25/2018 11:30 AM HAMZAH Redd Lee 67  
5/20/2019 1:40 PM Maria E Helms MD [address] [place] [address]

## 2018-09-13 ENCOUNTER — HOSPITAL ENCOUNTER (OUTPATIENT)
Dept: PHYSICAL THERAPY | Age: 48
Discharge: HOME | End: 2018-09-13
Payer: SUBSIDIZED

## 2018-09-13 PROCEDURE — 97016 VASOPNEUMATIC DEVICE THERAPY: CPT

## 2018-09-13 PROCEDURE — 97140 MANUAL THERAPY 1/> REGIONS: CPT

## 2018-09-13 PROCEDURE — 97110 THERAPEUTIC EXERCISES: CPT

## 2018-09-13 NOTE — PROGRESS NOTES
PT DAILY TREATMENT NOTE  Patient Name: Nigel Gilbert 
Date:2018 : 1970 [x]  Patient  Verified Payor: [address] [place] [address] / Plan: 1500 / Product Type: Medicaid / In time:10:36  Out time:11:07 Total Treatment Time (min): 31 Visit #: 4 of 8 Treatment Area: Complete rotator cuff tear or rupture of right shoulder, not specified as traumatic [M75.121] SUBJECTIVE Pain Level (0-10 scale): 6 Any medication changes, allergies to medications, adverse drug reactions, diagnosis change, or new procedure performed?: [x] No    [] Yes (see summary sheet for update) Subjective functional status/changes:   [] No changes reported \"It hurts, I can tell something is gonna happen with the weather\" OBJECTIVE Modality rationale: decrease inflammation and decrease pain to improve the patients ability to perform ADLs Min Type Additional Details  
 [] Estim:  []Unatt       []IFC  []Premod []Other:  []w/ice   []w/heat Position: Location:  
 [] Estim: []Att    []TENS instruct  []NMES []Other:  []w/US   []w/ice   []w/heat Position: Location:  
 []  Traction: [] Cervical       []Lumbar 
                     [] Prone          []Supine []Intermittent   []Continuous Lbs: 
[] before manual 
[] after manual  
 []  Ultrasound: []Continuous   [] Pulsed []1MHz   []3MHz W/cm2: 
Location:  
 []  Iontophoresis with dexamethasone Location: [] Take home patch  
[] In clinic  
 []  Ice     []  heat 
[]  Ice massage 
[]  Laser  
[]  Anodyne Position: Location:  
 []  Laser with stim 
[]  Other:  Position: Location:  
10 [x]  Vasopneumatic Device Pressure:       [x] lo [] med [] hi  
Temperature: [x] lo [] med [] hi  
[] Skin assessment post-treatment:  []intact []redness- no adverse reaction 
  []redness  adverse reaction:  
 
 
13 min Therapeutic Exercise:  [] See flow sheet :  
 Rationale: increase ROM to improve the patients ability to perform daily tasks 8 min Manual Therapy:  PROM right shoulder flex/ABD/ER in scaption plane, right STJ mobs Rationale: increase ROM and increase tissue extensibility to improve ease of ADLs With 
 [] TE 
 [] TA 
 [] neuro 
 [] other: Patient Education: [x] Review HEP [] Progressed/Changed HEP based on:  
[] positioning   [] body mechanics   [] transfers   [] heat/ice application   
[] other:   
 
Other Objective/Functional Measures:   
 
Pain Level (0-10 scale) post treatment: 5 
 
ASSESSMENT/Changes in Function: Pt still reporting moderate pain levels at this time. She reports she is attempting to get in to MD's office next week. Will continue PROM at this time pending PA-C staff message response Patient will continue to benefit from skilled PT services to modify and progress therapeutic interventions, address functional mobility deficits, address ROM deficits, address strength deficits, analyze and address soft tissue restrictions, analyze and cue movement patterns and analyze and modify body mechanics/ergonomics to attain remaining goals. []  See Plan of Care 
[]  See progress note/recertification 
[]  See Discharge Summary Progress towards goals / Updated goals: 
Updated Goals: to be achieved in 4 weeks: 1. Pt will improve FOTO score to 49 to demonstrate improved quality of life. Progressed to 32 9/4/18 2. Pt will improve right shoulder ER PROM to 45 deg for improved self care and grooming. Progressing near met 9/4/18 30 deg 9/11/18 3. Pt will re-integrate AAROM interventions without pain increase for progression of mobility and ADLs. PLAN 
[]  Upgrade activities as tolerated     []  Continue plan of care 
[]  Update interventions per flow sheet      
[]  Discharge due to:_ 
[]  Other:_ Aristides Segundo DPT, CMTPT 9/13/2018  10:41 AM 
 
Future Appointments Date Time Provider Colt Jovel 9/18/2018 12:30 PM Earl Meeks [facility]  
9/24/2018 9:15 AM Germania Us MD [facility] SCHED  
9/25/2018 11:30 AM HAMZAH Ochoa [place] [id number]  
5/20/2019 1:40 PM Stacey Onofre MD [address] [place] [address]

## 2018-09-18 ENCOUNTER — HOSPITAL ENCOUNTER (OUTPATIENT)
Dept: PHYSICAL THERAPY | Age: 48
Discharge: HOME | End: 2018-09-18
Payer: SUBSIDIZED

## 2018-09-18 PROCEDURE — 97110 THERAPEUTIC EXERCISES: CPT

## 2018-09-18 PROCEDURE — 97016 VASOPNEUMATIC DEVICE THERAPY: CPT

## 2018-09-18 PROCEDURE — 97140 MANUAL THERAPY 1/> REGIONS: CPT

## 2018-09-18 NOTE — PROGRESS NOTES
PT DAILY TREATMENT NOTE  Patient Name: Dari Long 
Date:2018 : 1970 [x]  Patient  Verified Payor: [address] [place] [address] / Plan: 1500 / Product Type: Medicaid / In time:12:30  Out time:11:03 Total Treatment Time (min): 33 Visit #: 5 of 8 Treatment Area: Complete rotator cuff tear or rupture of right shoulder, not specified as traumatic [M75.121] SUBJECTIVE Pain Level (0-10 scale): 4 Any medication changes, allergies to medications, adverse drug reactions, diagnosis change, or new procedure performed?: [x] No    [] Yes (see summary sheet for update) Subjective functional status/changes:   [] No changes reported Pt reports her shoulder feels a little better today OBJECTIVE Modality rationale: decrease inflammation and decrease pain to improve the patients ability to perform daily tasks and self care Min Type Additional Details  
 [] Estim:  []Unatt       []IFC  []Premod []Other:  []w/ice   []w/heat Position: Location:  
 [] Estim: []Att    []TENS instruct  []NMES []Other:  []w/US   []w/ice   []w/heat Position: Location:  
 []  Traction: [] Cervical       []Lumbar 
                     [] Prone          []Supine []Intermittent   []Continuous Lbs: 
[] before manual 
[] after manual  
 []  Ultrasound: []Continuous   [] Pulsed []1MHz   []3MHz W/cm2: 
Location:  
 []  Iontophoresis with dexamethasone Location: [] Take home patch  
[] In clinic  
 []  Ice     []  heat 
[]  Ice massage 
[]  Laser  
[]  Anodyne Position: Location:  
 []  Laser with stim 
[]  Other:  Position: Location:  
10 [x]  Vasopneumatic Device Pressure:       [x] lo [] med [] hi  
Temperature: [x] lo [] med [] hi  
[] Skin assessment post-treatment:  []intact []redness- no adverse reaction 
  []redness  adverse reaction:  
 
 
13 min Therapeutic Exercise:  [] See flow sheet :  
 Rationale: increase ROM and increase strength to improve the patients ability to perform daily tasks 10 min Manual Therapy:  PROM right shoulder flex/abd/ER&IR to resistance Rationale: increase ROM and increase tissue extensibility to improve ease of ADLs With 
 [] TE 
 [] TA 
 [] neuro 
 [] other: Patient Education: [x] Review HEP [] Progressed/Changed HEP based on:  
[] positioning   [] body mechanics   [] transfers   [] heat/ice application   
[] other:   
 
Other Objective/Functional Measures:   
 
Pain Level (0-10 scale) post treatment: 5 
 
ASSESSMENT/Changes in Function: Pt tolerating PROM well at this time, she is a little more limited into abduction/scaption plane. Pt scheduled to f/u with MD next week. She seems to be having some decrease in pain at this time. Patient will continue to benefit from skilled PT services to modify and progress therapeutic interventions, address functional mobility deficits, address ROM deficits, address strength deficits, analyze and address soft tissue restrictions, analyze and cue movement patterns and analyze and modify body mechanics/ergonomics to attain remaining goals. []  See Plan of Care 
[]  See progress note/recertification 
[]  See Discharge Summary Updated Goals: to be achieved in 4 weeks: 1. Pt will improve FOTO score to 49 to demonstrate improved quality of life. Progressed to 32 9/4/18 2. Pt will improve right shoulder ER PROM to 45 deg for improved self care and grooming. Progressing near met 9/4/18 30 deg 9/11/18 3. Pt will re-integrate AAROM interventions without pain increase for progression of mobility and ADLs. PLAN 
[]  Upgrade activities as tolerated     [x]  Continue plan of care 
[]  Update interventions per flow sheet      
[]  Discharge due to:_ 
[]  Other:_ Marvella Fabry, DPT, CMTPT 9/18/2018  12:40 PM 
 
Future Appointments Date Time Provider Colt Jovel 9/24/2018 9:15 AM MD Karan Brothers  
9/25/2018 11:30 AM HAMZAH Weber 67  
5/20/2019 1:40 PM Yohan Espitia MD [address] [place] [address]

## 2018-09-20 ENCOUNTER — HOSPITAL ENCOUNTER (OUTPATIENT)
Dept: PHYSICAL THERAPY | Age: 48
Discharge: HOME | End: 2018-09-20
Payer: SUBSIDIZED

## 2018-09-20 DIAGNOSIS — M12.811 ROTATOR CUFF TEAR ARTHROPATHY, RIGHT: ICD-10-CM

## 2018-09-20 DIAGNOSIS — G89.18 POST-OP PAIN: Primary | ICD-10-CM

## 2018-09-20 DIAGNOSIS — M75.101 ROTATOR CUFF TEAR ARTHROPATHY, RIGHT: ICD-10-CM

## 2018-09-20 PROCEDURE — 97140 MANUAL THERAPY 1/> REGIONS: CPT

## 2018-09-20 PROCEDURE — 97016 VASOPNEUMATIC DEVICE THERAPY: CPT

## 2018-09-20 PROCEDURE — 97110 THERAPEUTIC EXERCISES: CPT

## 2018-09-20 RX ORDER — HYDROCODONE BITARTRATE AND ACETAMINOPHEN 7.5; 325 MG/1; MG/1
1 TABLET ORAL
Qty: 28 TAB | Refills: 0 | Status: SHIPPED | OUTPATIENT
Start: 2018-09-20 | End: 2018-10-01 | Stop reason: SDUPTHER

## 2018-09-20 NOTE — TELEPHONE ENCOUNTER
[facility]    Last Visit: 08/28/2018 with HAMZAH Schwab   Date of Surgery: 07/18/2018 Right Reverse total shoulder arthroplasty   Next Appointment: 09/25/2018 with HAMZAH Donaldson   Previous Refill Encounters: 09/04/2018 per HAMZAH Schwab #[id number]    Requested Prescriptions     Pending Prescriptions Disp Refills    HYDROcodone-acetaminophen (NORCO) 7.5-325 mg per tablet 40 Tab 0     Sig: Take 1 Tab by mouth every eight (8) hours as needed for Pain. Max Daily Amount: 3 Tabs.

## 2018-09-20 NOTE — PROGRESS NOTES
PT DAILY TREATMENT NOTE  Patient Name: Keshav Valdes 
Date:2018 : 1970 [x]  Patient  Verified Payor: [address] [place] [address] / Plan: 1500 / Product Type: Medicaid / In time:2:01  Out time:2:35 Total Treatment Time (min): 34 Visit #: 6 of 8 Treatment Area: Complete rotator cuff tear or rupture of right shoulder, not specified as traumatic [M75.121] SUBJECTIVE Pain Level (0-10 scale): 5 Any medication changes, allergies to medications, adverse drug reactions, diagnosis change, or new procedure performed?: [] No    [] Yes (see summary sheet for update) Subjective functional status/changes:   [] No changes reported \"My arm doesn't hurt really until I take it out of the sling then the whole thing hurts\" OBJECTIVE Modality rationale: decrease inflammation and decrease pain to improve the patients ability to perform ADLs Min Type Additional Details  
 [] Estim:  []Unatt       []IFC  []Premod []Other:  []w/ice   []w/heat Position: Location:  
 [] Estim: []Att    []TENS instruct  []NMES []Other:  []w/US   []w/ice   []w/heat Position: Location:  
 []  Traction: [] Cervical       []Lumbar 
                     [] Prone          []Supine []Intermittent   []Continuous Lbs: 
[] before manual 
[] after manual  
 []  Ultrasound: []Continuous   [] Pulsed []1MHz   []3MHz W/cm2: 
Location:  
 []  Iontophoresis with dexamethasone Location: [] Take home patch  
[] In clinic  
 []  Ice     []  heat 
[]  Ice massage 
[]  Laser  
[]  Anodyne Position: Location:  
 []  Laser with stim 
[]  Other:  Position: Location:  
10 [x]  Vasopneumatic Device Pressure:       [x] lo [] med [] hi  
Temperature: [x] lo [] med [] hi  
[] Skin assessment post-treatment:  []intact []redness- no adverse reaction 
  []redness  adverse reaction: 17 min Therapeutic Exercise:  [] See flow sheet :  
Rationale: increase ROM and increase strength to improve the patients ability to perform daily tasks and self care 8 min Manual Therapy:  PROM right shoulder, STJ mobs Rationale: increase ROM and increase tissue extensibility to improve ease of ADLs With 
 [] TE 
 [] TA 
 [] neuro 
 [] other: Patient Education: [x] Review HEP [] Progressed/Changed HEP based on:  
[] positioning   [] body mechanics   [] transfers   [] heat/ice application   
[] other:   
 
Other Objective/Functional Measures: no increase in right shoulder potential sulcus appreciated today still ~4cm in standing Pain Level (0-10 scale) post treatment: 6 
 
ASSESSMENT/Changes in Function: Pt still demonstrates good overall shoulder PROM flexion and ABD, limited with ER even in scaption plane. Patient will continue to benefit from skilled PT services to modify and progress therapeutic interventions, address functional mobility deficits, address ROM deficits, address strength deficits, analyze and address soft tissue restrictions, analyze and cue movement patterns and analyze and modify body mechanics/ergonomics to attain remaining goals. []  See Plan of Care 
[]  See progress note/recertification 
[]  See Discharge Summary Progress towards goals / Updated goals: 
Updated Goals: to be achieved in 4 weeks: 1. Pt will improve FOTO score to 49 to demonstrate improved quality of life. Progressed to 32 9/4/18 2. Pt will improve right shoulder ER PROM to 45 deg for improved self care and grooming. Progressing near met 9/4/18 30 deg 9/11/18; not changed 9/20/18 3. Pt will re-integrate AAROM interventions without pain increase for progression of mobility and ADLs.   
 
PLAN 
[]  Upgrade activities as tolerated     [x]  Continue plan of care 
[]  Update interventions per flow sheet      
[]  Discharge due to:_ 
[]  Other:_   
 
 Patricia Dumas DPT, CMTPT 9/20/2018  2:02 PM 
 
Future Appointments Date Time Provider Colt Jovel 9/24/2018 9:15 AM Sudheer Parish MD [facility]  
9/25/2018 11:30 AM HAMZAH Ignacio [address] [facility]  
9/25/2018 1:00 PM [id number] [facility]  
9/27/2018 11:30 AM [id number] [facility]  
10/2/2018 10:30 AM Patricia Dumas [facility]  
10/4/2018 10:30 AM Patricia Dumas [facility]  
10/9/2018 1:00 PM [id number] [facility]  
10/11/2018 12:00 PM [id number] [facility]  
10/16/2018 10:30 AM [id number] [facility]  
5/20/2019 1:40 PM Oliver Rae MD [address] [place] [address]

## 2018-09-24 ENCOUNTER — OFFICE VISIT (OUTPATIENT)
Dept: OBGYN CLINIC | Age: 48
End: 2018-09-24

## 2018-09-24 VITALS
DIASTOLIC BLOOD PRESSURE: 74 MMHG | WEIGHT: 216.2 LBS | OXYGEN SATURATION: 97 % | TEMPERATURE: 96.7 F | BODY MASS INDEX: 34.75 KG/M2 | SYSTOLIC BLOOD PRESSURE: 122 MMHG | HEIGHT: 66 IN | RESPIRATION RATE: 18 BRPM | HEART RATE: 68 BPM

## 2018-09-24 DIAGNOSIS — N95.0 PMB (POSTMENOPAUSAL BLEEDING): Primary | ICD-10-CM

## 2018-09-24 NOTE — PATIENT INSTRUCTIONS
Vaginal Bleeding After Menopause: Care Instructions  Your Care Instructions    Vaginal bleeding after menopause can have many causes. Causes may include infection, inflammation, prescription hormones, abnormal growths, and injury. Your doctor may want you to have more tests to find the cause of your vaginal bleeding. Follow-up care is a key part of your treatment and safety. Be sure to make and go to all appointments, and call your doctor if you are having problems. It's also a good idea to know your test results and keep a list of the medicines you take. How can you care for yourself at home? · If your doctor gave you medicine, take it exactly as prescribed. Call your doctor if you think you are having a problem with your medicine. · Do not have sex or put anything inside your vagina until you talk with your doctor. · Do not douche. When should you call for help? Call 911 anytime you think you may need emergency care. For example, call if:    · You passed out (lost consciousness).    Call your doctor now or seek immediate medical care if:    · You have severe vaginal bleeding.     · You are dizzy or lightheaded, or you feel like you may faint.     · You have new or worse belly or pelvic pain.    Watch closely for changes in your health, and be sure to contact your doctor if:    · Your bleeding gets worse.     · You think you might be pregnant.     · You do not get better as expected. Where can you learn more? Go to http://lonnie-gagan.info/. Enter N304 in the search box to learn more about \"Vaginal Bleeding After Menopause: Care Instructions. \"  Current as of: October 6, 2017  Content Version: 11.7  © 9178-2993 [employer]. Care instructions adapted under license by [employer] (which disclaims liability or warranty for this information).  If you have questions about a medical condition or this instruction, always ask your healthcare professional. [employer] disclaims any warranty or liability for your use of this information.

## 2018-09-24 NOTE — PROGRESS NOTES
Name: Stewart Garcia MRN: [id number]    YOB: 1970  Age: 46 y.o. Sex: female        Chief Complaint   Patient presents with    Follow-up     labs and US, haing some pain in lower abdominal area 3/10,        HPI  50P0 ? menopause 2015? Patient states her periods just stopped. She is here for follow up for labs and ultrasound for possible postmenopausal bleeding. Pt states last time she bled was 2017. For which she bleed for 3 days. She has not bled since then. She denies pain or bleeding. She has no complaints at this time. OB History      Para Term  AB Living    2 0   2 0    SAB TAB Ectopic Molar Multiple Live Births    1 1            Obstetric Comments    Stopped having periods at , bled for 3 days in 2017  H/o trich 2 mos ago  Denies h/o fibroids/cysts  Last pap: 2017 pt thinks and it was nl per pt.             PGyn    History   Sexual Activity    Sexual activity: Yes    Partners: Male    Birth control/ protection: None         Past Medical History:   Diagnosis Date    Arthritis     Asthma     Bipolar disorder ([facility] [address]     GERD (gastroesophageal reflux disease)     Obesity (BMI 30.0-34.9) 3/1/2017    Psychiatric disorder     depression    Psychotic disorder     PTSD (post-traumatic stress disorder)     Reflux        Past Surgical History:   Procedure Laterality Date    ABDOMEN SURGERY PROC UNLISTED      gastric bypass    HX CHOLECYSTECTOMY      HX GASTRIC BYPASS      HX HEENT      tonsilectomy      HX ORTHOPAEDIC      right shoulder     HX SHOULDER REPLACEMENT Right 2018    AR ANESTH,SURGERY OF SHOULDER Right 2017    rotator cuff       Allergies   Allergen Reactions    Amoxicillin Other (comments)     Does no work    Codeine Nausea and Vomiting       Current Outpatient Prescriptions on File Prior to Visit   Medication Sig Dispense Refill    HYDROcodone-acetaminophen (NORCO) 7.5-325 mg per tablet Take 1 Tab by mouth every eight (8) hours as needed for Pain. Max Daily Amount: 3 Tabs. 28 Tab 0    multivitamin (ONE A DAY) tablet Take 1 Tab by mouth daily.  cariprazine (VRAYLAR) 6 mg capsule Take  by mouth nightly.  OXcarbazepine (TRILEPTAL) 300 mg tablet Take  by mouth two (2) times a day.  CLONAZEPAM (KLONOPIN PO) Take 5 mg by mouth two (2) times a day. Indications: anxiety      omeprazole (PRILOSEC) 20 mg capsule Take 20 mg by mouth daily.  eszopiclone (LUNESTA) 3 mg tablet Take  by mouth nightly.  lamoTRIgine (LAMICTAL) 100 mg tablet Take  by mouth daily. 100 mg am and 200mg q hs   Indications: BIPOLAR DISORDER IN REMISSION      chlorproMAZINE (THORAZINE) 100 mg tablet Take 100 mg by mouth two (2) times a day.  traZODone (DESYREL) 300 mg tablet Take 300 mg by mouth nightly.  baclofen (LIORESAL) 10 mg tablet Take  by mouth three (3) times daily.  CYANOCOBALAMIN, VITAMIN B-12, (VITAMIN B-12 PO) Take  by mouth daily.  ERGOCALCIFEROL, VITAMIN D2, (VITAMIN D2 PO) Take  by mouth every seven (7) days.  multivitamin with iron (FLINTSTONES) chewable tablet Take 1 Tab by mouth daily. No current facility-administered medications on file prior to visit. Review of Systems   Constitutional: Negative. Gastrointestinal: Negative. Genitourinary: Negative. Neurological: Negative. Endo/Heme/Allergies: Negative. Psychiatric/Behavioral: Negative.             Visit Vitals    /74 (BP 1 Location: Left arm, BP Patient Position: Sitting)    Pulse 68    Temp 96.7 °F (35.9 °C) (Oral)    Resp 18    Ht 5' 6\" (1.676 m)    Wt 216 lb 3.2 oz (98.1 kg)    SpO2 97%    BMI 34.9 kg/m2       GENERAL:  Well developed, well nourished, in no distress    EXAM: PELVIC ULTRASOUND TRANSVAGINAL     CLINICAL HISTORY/INDICATION:  Painful intercourse x1 year, postmenopausal  bleeding     COMPARISON: None.     TECHNIQUE: Images were obtained with the endovaginal probe.     FINDINGS:     The uterus is anteverted. It measures 5.3 x 2 x 3.6 cm. It is normal in size  configuration and echogenicity. The endometrial echo is well seen with the  double thickness endometrial echo measuring 0.3 cm. There is no fluid within  the endometrial canal. Myometrial echo pattern is normal.  The right ovary measures 2.4 x 1.6 x 1.2 cm. With a 1.2 cm simple cyst  . The  left ovary measures 2.3 x 0.8 x 1.4 cm. There is normal color flow and spectral  Doppler tracing from both ovaries. There is a tiny amount of simple fluid in the  cul de sac.     IMPRESSION  IMPRESSION:     Minimal free fluid in the cul-de-sac. Simple right ovarian 1.2 cm cyst. Most certainly benign and no imaging follow-up  Indicated. FSH 53.4  E2: <5.0  PRL: 9.7  TSH: 1.60    No results found for this or any previous visit (from the past 24 hour(s)). 1. PMB (postmenopausal bleeding)  Reviewed lab results and ultrasound images with patient. Labs suggest that patient is menopausal. Also, with thin endometrial strip 3 mm. Do not have records from when she bled >1yr ago. But currently low risk for cancer or hyperplasia given ultrasound results. Discussed this with patient. Also discussed with patient that she should return if she has any more bleeding or worsening pain for further evaluation. Pt expressed understanding. Answered all of her questions.    Face to face time 15 mins with greater than 50% counseling        F/U prn

## 2018-09-25 ENCOUNTER — HOSPITAL ENCOUNTER (OUTPATIENT)
Dept: PHYSICAL THERAPY | Age: 48
Discharge: HOME | End: 2018-09-25
Payer: SUBSIDIZED

## 2018-09-25 ENCOUNTER — OFFICE VISIT (OUTPATIENT)
Dept: ORTHOPEDIC SURGERY | Age: 48
End: 2018-09-25

## 2018-09-25 VITALS
OXYGEN SATURATION: 96 % | DIASTOLIC BLOOD PRESSURE: 66 MMHG | WEIGHT: 218 LBS | SYSTOLIC BLOOD PRESSURE: 97 MMHG | BODY MASS INDEX: 35.03 KG/M2 | TEMPERATURE: 97.8 F | HEART RATE: 70 BPM | HEIGHT: 66 IN | RESPIRATION RATE: 16 BRPM

## 2018-09-25 DIAGNOSIS — M75.101 ROTATOR CUFF TEAR ARTHROPATHY, RIGHT: Primary | ICD-10-CM

## 2018-09-25 DIAGNOSIS — M12.811 ROTATOR CUFF TEAR ARTHROPATHY, RIGHT: Primary | ICD-10-CM

## 2018-09-25 PROCEDURE — 97016 VASOPNEUMATIC DEVICE THERAPY: CPT

## 2018-09-25 PROCEDURE — 97140 MANUAL THERAPY 1/> REGIONS: CPT

## 2018-09-25 PROCEDURE — 97110 THERAPEUTIC EXERCISES: CPT

## 2018-09-25 NOTE — PROGRESS NOTES
Stewart Garcia  1970     HISTORY OF PRESENT ILLNESS  Stewart Garcia is a 46 y.o. female who presents today for evaluation s/p Right reverse total shoulder arthroplasty on 7/18/18. Patient is back in PT. She is still wearing her sling. States pain is still present especially around her incision. Patient denies any fever, chills, chest pain, shortness of breath or calf pain. There are no new illness or injuries to report since last seen in the office. PHYSICAL EXAM:   Visit Vitals    BP 97/66    Pulse 70    Temp 97.8 °F (36.6 °C) (Oral)    Resp 16    Ht 5' 6\" (1.676 m)    Wt 218 lb (98.9 kg)    SpO2 96%    BMI 35.19 kg/m2      The patient is a well-developed, well-nourished female in no acute distress. The patient is alert and oriented times three. The patient appears to be well groomed. Mood and affect are normal.  ORTHOPEDIC EXAM of right shoulder:  Inspection: swelling not present,  Bruising not present  Incision well healed  Passive glenohumeral abduction 0-90 degrees, able to reach to side of her face  Stability: Stable  Strength: 3/5  2+ distal pulses    IMAGING:  3 view xray images of right shoulder read and reviewed by myself on 8/21/18 reveal right reverse total shoulder arthroplasty in excellent position     IMPRESSION:  S/P Right reverse total shoulder arthroplasty    PLAN:   1.patient is improving  2. Will cont with PT. Ok for arom phase. D/c sling  3.  Stressed no lifting greater than 5lbs  RTC 6 weeks    Patient seen and evaluated by Dr. Lenny Lombard today who agrees with treatment plan     ERIC [facility]

## 2018-09-25 NOTE — PROGRESS NOTES
PT DAILY TREATMENT NOTE  Patient Name: Kumar Roque 
Date:2018 : 1970 [x]  Patient  Verified Payor: [address] [place] [address] / Plan: 1500  / Product Type: Medicaid / In time:1:00  Out time:1:35 Total Treatment Time (min): 35 Visit #: 7 of 8 Treatment Area: Complete rotator cuff tear or rupture of right shoulder, not specified as traumatic [M75.121] SUBJECTIVE Pain Level (0-10 scale): 5 Any medication changes, allergies to medications, adverse drug reactions, diagnosis change, or new procedure performed?: [x] No    [] Yes (see summary sheet for update) Subjective functional status/changes:   [] No changes reported Pt reports her MD visit went well, presents with updated script to D/C sling and progress to AROM. Pt reports imaging taken that showed good positioning of shoulder OBJECTIVE Modality rationale: decrease inflammation and decrease pain to improve the patients ability to perform daily tasks Min Type Additional Details  
 [] Estim:  []Unatt       []IFC  []Premod []Other:  []w/ice   []w/heat Position: Location:  
 [] Estim: []Att    []TENS instruct  []NMES []Other:  []w/US   []w/ice   []w/heat Position: Location:  
 []  Traction: [] Cervical       []Lumbar 
                     [] Prone          []Supine []Intermittent   []Continuous Lbs: 
[] before manual 
[] after manual  
 []  Ultrasound: []Continuous   [] Pulsed []1MHz   []3MHz W/cm2: 
Location:  
 []  Iontophoresis with dexamethasone Location: [] Take home patch  
[] In clinic  
 []  Ice     []  heat 
[]  Ice massage 
[]  Laser  
[]  Anodyne Position: Location:  
 []  Laser with stim 
[]  Other:  Position: Location:  
10 [x]  Vasopneumatic Device Pressure:       [x] lo [] med [] hi  
Temperature: [x] lo [] med [] hi  
[] Skin assessment post-treatment:  []intact []redness- no adverse reaction 
  []redness  adverse reaction:  
 
 
15 min Therapeutic Exercise:  [] See flow sheet :  
Rationale: increase ROM and increase strength to improve the patients ability to perform daily tasks and ADLs 10 min Manual Therapy:  Right STJ mobs, PROM right shoulder flex/abd/ER&IR Rationale: increase ROM and increase tissue extensibility to improve ease of ADLs and self care With 
 [] TE 
 [] TA 
 [] neuro 
 [] other: Patient Education: [x] Review HEP [] Progressed/Changed HEP based on:  
[] positioning   [] body mechanics   [] transfers   [] heat/ice application   
[] other:   
 
Other Objective/Functional Measures:   
 
Pain Level (0-10 scale) post treatment: 5 
 
ASSESSMENT/Changes in Function: Pt presents today with updated script to initiate AROM. Re-integrated AAROM today with good tolerance noted. Will progress with AAROM towards AROM as tolerated. Patient will continue to benefit from skilled PT services to modify and progress therapeutic interventions, address functional mobility deficits, address ROM deficits, address strength deficits, analyze and address soft tissue restrictions, analyze and cue movement patterns and analyze and modify body mechanics/ergonomics to attain remaining goals. []  See Plan of Care 
[]  See progress note/recertification 
[]  See Discharge Summary Progress towards goals / Updated goals: 
Updated Goals: to be achieved in 4 weeks: 1. Pt will improve FOTO score to 49 to demonstrate improved quality of life. Progressed to 32 9/4/18 2. Pt will improve right shoulder ER PROM to 45 deg for improved self care and grooming. Progressing near met 9/4/18 30 deg 9/11/18; not changed 9/20/18 3. Pt will re-integrate AAROM interventions without pain increase for progression of mobility and ADLs. PLAN [x]  Upgrade activities as tolerated     [x]  Continue plan of care 
[]  Update interventions per flow sheet      
[]  Discharge due to:_ 
 []  Other:_ Karlo Hyatt DPT, CMTPT 9/25/2018  1:00 PM 
 
Future Appointments Date Time Provider Colt Jovel 9/27/2018 11:30 [facility]  
10/2/2018 10:30 AM Karlo NGUYENPT [facility]  
10/4/2018 10:30 AM [facility]  
10/9/2018 1:00 PM [id number] [facility]  
10/11/2018 12:00 PM [address] [place] [address] [facility]  
10/16/2018 10:30 AM [facility]  
11/6/2018 1:00 PM HAMZAH Walker [facility]  
5/20/2019 1:40 PM Sara Rosen MD [address] [place] [address]

## 2018-09-27 ENCOUNTER — HOSPITAL ENCOUNTER (OUTPATIENT)
Dept: PHYSICAL THERAPY | Age: 48
Discharge: HOME | End: 2018-09-27
Payer: SUBSIDIZED

## 2018-09-27 PROCEDURE — 97140 MANUAL THERAPY 1/> REGIONS: CPT

## 2018-09-27 PROCEDURE — 97110 THERAPEUTIC EXERCISES: CPT

## 2018-09-27 PROCEDURE — 97016 VASOPNEUMATIC DEVICE THERAPY: CPT

## 2018-09-27 NOTE — PROGRESS NOTES
In [address]  [place] [address], [address] 
[phone] fax Physical Therapy Progress Note Patient name: Brent Brito Start of Care: 2018 Referral source: Ekat Dunbar : 1970                         
Medical Diagnosis: Complete rotator cuff tear or rupture of right shoulder, not specified as traumatic [M75.121] Onset Date:2018                         
Treatment Diagnosis: Right rTSA Prior Hospitalization: see medical history Provider#: [id number] Medications: Verified on Patient summary List  
 Comorbidities: previous failed right RTC repair x 2, depression, arthritis, asthma, tobacco use 
 Prior Level of Function: Pt reports sedentary lifestyle but I with ADLs and self care Visits from Start of Care: 17    Missed Visits: 0 Established Goals:        Excellent         Good         Limited            None 
[x] Increased ROM   []  []  [x]  [] 
[] Increased Strength  []  []  []  [] 
[x] Increased Mobility  []  [x]  []  []  
[] Decreased Pain   []  []  []  [] 
[] Decreased Swelling  []  []  []  [] 
 
Key Functional Changes:  
Updated Goals: to be achieved in 4 weeks: 1. Pt will improve FOTO score to 49 to demonstrate improved quality of life. Progressed to 32 18 2. Pt will improve right shoulder ER PROM to 45 deg for improved self care and grooming. Progressing near met 18 30 deg 18; not changed 18 3. Pt will re-integrate AAROM interventions without pain increase for progression of mobility and ADLs. Progressing 18 Updated Goals: to be achieved in 4 weeks: 1. Pt will improve FOTO score to 49 to demonstrate improved quality of life. 2. Pt will right shoulder ER PROM to 45 deg for improved self care and grooming ease. 3. Pt will demonstrate AROM flexion right shoulder to 110 deg for improved ease of ADLs.  
4. Pt will improve right shoulder strength to 4-/5 for improved ease of daily tasks. ASSESSMENT/RECOMMENDATIONS: Pt has progressed back into re-introduction of AAROM phase. She appears to tolerate fairly well, she does report some increased pain today that she attributes to recently D/C'ing sling. Will progress with AAROM towards AROM as tolerated. [x]Continue therapy per initial plan/protocol at a frequency of  2 x per week for 4 weeks []Continue therapy with the following recommended changes:_____________________      _____________________________________________________________________ []Discontinue therapy progressing towards or have reached established goals []Discontinue therapy due to lack of appreciable progress towards goals []Discontinue therapy due to lack of attendance or compliance []Await Physician's recommendations/decisions regarding therapy []Other:________________________________________________________________ Thank you for this referral.   
Augustus Michael DPT, CMTPT 9/27/2018 12:35 PM 
NOTE TO PHYSICIAN:  PLEASE COMPLETE THE ORDERS BELOW AND  
FAX TO [facility]: [phone] If you are unable to process this request in 24 hours please contact our office: [phone] ? I have read the above report and request that my patient continue as recommended. ? I have read the above report and request that my patient continue therapy with the following changes/special instructions:__________________________________________________________ ? I have read the above report and request that my patient be discharged from therapy. Physicians signature: ______________________________Date: ______Time:______

## 2018-09-27 NOTE — PROGRESS NOTES
PT DAILY TREATMENT NOTE  Patient Name: Chanda Mcfarlane 
Date:2018 : 1970 [x]  Patient  Verified Payor: [address] [place] [address] / Plan: 1500 / Product Type: Medicaid / In time:11:30  Out time:12:07 Total Treatment Time (min): 37 Visit #: 8 of 8 Treatment Area: Complete rotator cuff tear or rupture of right shoulder, not specified as traumatic [M75.121] SUBJECTIVE Pain Level (0-10 scale): 6 Any medication changes, allergies to medications, adverse drug reactions, diagnosis change, or new procedure performed?: [x] No    [] Yes (see summary sheet for update) Subjective functional status/changes:   [] No changes reported Pt reports slight increase in pain today but she feels it may be just getting used to no sling and the weather change OBJECTIVE Modality rationale: decrease inflammation and decrease pain to improve the patients ability to perform daily tasks Min Type Additional Details  
 [] Estim:  []Unatt       []IFC  []Premod []Other:  []w/ice   []w/heat Position: Location:  
 [] Estim: []Att    []TENS instruct  []NMES []Other:  []w/US   []w/ice   []w/heat Position: Location:  
 []  Traction: [] Cervical       []Lumbar 
                     [] Prone          []Supine []Intermittent   []Continuous Lbs: 
[] before manual 
[] after manual  
 []  Ultrasound: []Continuous   [] Pulsed []1MHz   []3MHz W/cm2: 
Location:  
 []  Iontophoresis with dexamethasone Location: [] Take home patch  
[] In clinic  
 []  Ice     []  heat 
[]  Ice massage 
[]  Laser  
[]  Anodyne Position: Location:  
 []  Laser with stim 
[]  Other:  Position: Location:  
10 [x]  Vasopneumatic Device Pressure:       [x] lo [] med [] hi  
Temperature: [x] lo [] med [] hi  
[] Skin assessment post-treatment:  []intact []redness- no adverse reaction 
  []redness  adverse reaction: 17 min Therapeutic Exercise:  [] See flow sheet :  
Rationale: increase ROM and increase strength to improve the patients ability to perform self care and daily tasks 10 min Manual Therapy:  PROM right shoulder flex/abd/IR&ER, right STJ mobs Rationale: increase ROM and increase tissue extensibility to improve ease of ADLs With 
 [] TE 
 [] TA 
 [] neuro 
 [] other: Patient Education: [x] Review HEP [] Progressed/Changed HEP based on:  
[] positioning   [] body mechanics   [] transfers   [] heat/ice application   
[] other:   
 
Other Objective/Functional Measures: still tight into end range ER at abducted position Pain Level (0-10 scale) post treatment: 6 
 
ASSESSMENT/Changes in Function: Pt has progressed back into re-introduction of AAROM phase. She appears to tolerate fairly well, she does report some increased pain today that she attributes to recently D/C'ing sling. Will progress with AAROM towards AROM as tolerated. Patient will continue to benefit from skilled PT services to modify and progress therapeutic interventions, address functional mobility deficits, address ROM deficits, address strength deficits, analyze and address soft tissue restrictions, analyze and cue movement patterns and analyze and modify body mechanics/ergonomics to attain remaining goals. []  See Plan of Care [x]  See progress note/recertification 
[]  See Discharge Summary Progress towards goals / Updated goals: 
Updated Goals: to be achieved in 4 weeks: 1. Pt will improve FOTO score to 49 to demonstrate improved quality of life. Progressed to 32 9/4/18 2. Pt will improve right shoulder ER PROM to 45 deg for improved self care and grooming. Progressing near met 9/4/18 30 deg 9/11/18; not changed 9/20/18 3. Pt will re-integrate AAROM interventions without pain increase for progression of mobility and ADLs. Progressing 9/27/18 PLAN 
 [x]  Upgrade activities as tolerated     []  Continue plan of care 
[]  Update interventions per flow sheet      
[]  Discharge due to:_ 
[]  Other:_ BILLY MartinT, CMTPT 9/27/2018  11:35 AM 
 
Future Appointments Date Time Provider Colt Jovel 10/2/2018 10:30 AM [facility]  
10/4/2018 10:30 AM [facility]  
10/9/2018 1:00 PM [address] [place] [address]  
10/11/2018 12:00 PM [address] [place] [address]  
10/16/2018 10:30 AM [facility]  
11/6/2018 1:00 PM [facility]  
5/20/2019 1:40 PM Parker Jimenez MD [address] [place] [address]

## 2018-10-01 DIAGNOSIS — G89.18 POST-OP PAIN: ICD-10-CM

## 2018-10-01 DIAGNOSIS — M12.811 ROTATOR CUFF TEAR ARTHROPATHY, RIGHT: ICD-10-CM

## 2018-10-01 DIAGNOSIS — M75.101 ROTATOR CUFF TEAR ARTHROPATHY, RIGHT: ICD-10-CM

## 2018-10-01 RX ORDER — HYDROCODONE BITARTRATE AND ACETAMINOPHEN 7.5; 325 MG/1; MG/1
1 TABLET ORAL
Qty: 28 TAB | Refills: 0 | Status: SHIPPED | OUTPATIENT
Start: 2018-10-01 | End: 2018-10-16 | Stop reason: SDUPTHER

## 2018-10-01 NOTE — TELEPHONE ENCOUNTER
Last Visit: 09/25/2018 with HAMZAH Casey   Date of Surgery: 07/18/2018 Right Reverse total shoulder arthroplasty  Next Appointment: 11/06/2018 with HAMZAH Donaldson   Previous Refill Encounters: 09/20/2018 per HAMZAH Weaver #[id number]     Requested Prescriptions     Pending Prescriptions Disp Refills    HYDROcodone-acetaminophen (NORCO) 7.5-325 mg per tablet 28 Tab 0     Sig: Take 1 Tab by mouth every eight (8) hours as needed for Pain. Max Daily Amount: 3 Tabs.

## 2018-10-01 NOTE — TELEPHONE ENCOUNTER
Left message for patient informing her that rx is ready to be picked up at the [facility] location.

## 2018-10-02 ENCOUNTER — APPOINTMENT (OUTPATIENT)
Dept: PHYSICAL THERAPY | Age: 48
End: 2018-10-02
Payer: MEDICAID

## 2018-10-04 ENCOUNTER — HOSPITAL ENCOUNTER (OUTPATIENT)
Dept: PHYSICAL THERAPY | Age: 48
Discharge: HOME | End: 2018-10-04
Payer: MEDICAID

## 2018-10-04 PROCEDURE — 97140 MANUAL THERAPY 1/> REGIONS: CPT

## 2018-10-04 PROCEDURE — 97110 THERAPEUTIC EXERCISES: CPT

## 2018-10-04 PROCEDURE — 97016 VASOPNEUMATIC DEVICE THERAPY: CPT

## 2018-10-04 NOTE — PROGRESS NOTES
PT DAILY TREATMENT NOTE  Patient Name: Filiberto Bennett 
Date:10/4/2018 : 1970 [x]  Patient  Verified Payor: [address] [place] [address] / Plan: 1500  / Product Type: Medicaid / In time:10:30  Out time:11:07 Total Treatment Time (min): 37 Visit #: 1 of 8 Treatment Area: Complete rotator cuff tear or rupture of right shoulder, not specified as traumatic [M75.121] SUBJECTIVE Pain Level (0-10 scale): 8 Any medication changes, allergies to medications, adverse drug reactions, diagnosis change, or new procedure performed?: [x] No    [] Yes (see summary sheet for update) Subjective functional status/changes:   [] No changes reported Pt reports she has been having increased pain for the last 3 days. 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 Advised pt to utilize assistance with lifting and hold child in left UE OBJECTIVE Modality rationale: decrease inflammation and decrease pain to improve the patients ability to perform daily tasks and ADLs Min Type Additional Details  
 [] Estim:  []Unatt       []IFC  []Premod []Other:  []w/ice   []w/heat Position: Location:  
 [] Estim: []Att    []TENS instruct  []NMES []Other:  []w/US   []w/ice   []w/heat Position: Location:  
 []  Traction: [] Cervical       []Lumbar 
                     [] Prone          []Supine []Intermittent   []Continuous Lbs: 
[] before manual 
[] after manual  
 []  Ultrasound: []Continuous   [] Pulsed []1MHz   []3MHz W/cm2: 
Location:  
 []  Iontophoresis with dexamethasone Location: [] Take home patch  
[] In clinic  
 []  Ice     []  heat 
[]  Ice massage 
[]  Laser  
[]  Anodyne Position: Location:  
 []  Laser with stim 
[]  Other:  Position: Location:  
10 [x]  Vasopneumatic Device Pressure:       [x] lo [] med [] hi  
Temperature: [x] lo [] med [] hi  
 [] Skin assessment post-treatment:  []intact []redness- no adverse reaction 
  []redness  adverse reaction:  
 
19 min Therapeutic Exercise:  [] See flow sheet :  
Rationale: increase ROM and increase strength to improve the patients ability to perform daily tasks 8 min Manual Therapy:  PROM right shoulder flex/abd/ER&IR; right STJ mobs Rationale: increase ROM and increase tissue extensibility to improve ease of ADLs With 
 [] TE 
 [] TA 
 [] neuro 
 [] other: Patient Education: [x] Review HEP [] Progressed/Changed HEP based on:  
[] positioning   [] body mechanics   [] transfers   [] heat/ice application   
[] other:   
 
Other Objective/Functional Measures: good PROM appreciated Pain Level (0-10 scale) post treatment: 7 ASSESSMENT/Changes in Function: Pt reports increased pain today thus held some AAROM interventions. 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 Continue with ROM and strength progression as tolerated Patient will continue to benefit from skilled PT services to modify and progress therapeutic interventions, address functional mobility deficits, address ROM deficits, address strength deficits, analyze and address soft tissue restrictions, analyze and cue movement patterns and analyze and modify body mechanics/ergonomics to attain remaining goals. []  See Plan of Care 
[]  See progress note/recertification 
[]  See Discharge Summary Updated Goals: to be achieved in 4 weeks: 1. Pt will improve FOTO score to 49 to demonstrate improved quality of life. 2. Pt will right shoulder ER PROM to 45 deg for improved self care and grooming ease. Progressing at 90 deg ABD 10/4/18 3. Pt will demonstrate AROM flexion right shoulder to 110 deg for improved ease of ADLs. 4. Pt will improve right shoulder strength to 4-/5 for improved ease of daily tasks.  
 
PLAN 
 []  Upgrade activities as tolerated     [x]  Continue plan of care 
[]  Update interventions per flow sheet      
[]  Discharge due to:_ 
[]  Other:_ Marquis Marte, BILLYT, CMTPT 10/4/2018  10:37 AM 
 
Future Appointments Date Time Provider Colt Jovel 10/9/2018 1:00 PM [id number] [facility]  
10/11/2018 12:00 PM [address] [place] [address]  
10/16/2018 10:30 AM Marquis Marte [facility]  
11/6/2018 1:00 PM HAMZAH Glynn [address] [facility]  
5/20/2019 1:40 PM Sadie Hernandez MD [address] [place] [address]

## 2018-10-09 ENCOUNTER — HOSPITAL ENCOUNTER (OUTPATIENT)
Dept: PHYSICAL THERAPY | Age: 48
Discharge: HOME | End: 2018-10-09
Payer: MEDICAID

## 2018-10-09 PROCEDURE — 97016 VASOPNEUMATIC DEVICE THERAPY: CPT

## 2018-10-09 PROCEDURE — 97110 THERAPEUTIC EXERCISES: CPT

## 2018-10-09 PROCEDURE — 97140 MANUAL THERAPY 1/> REGIONS: CPT

## 2018-10-09 NOTE — PROGRESS NOTES
PT DAILY TREATMENT NOTE  Patient Name: Jignesh Cardona 
Date:10/9/2018 : 1970 [x]  Patient  Verified Payor: [address] [place] [address] / Plan:     / Product Type: Medicaid / In time:1:09  Out time:1:44 Total Treatment Time (min): 35 Visit #: 2 of 8 Treatment Area: Complete rotator cuff tear or rupture of right shoulder, not specified as traumatic [M75.121] SUBJECTIVE Pain Level (0-10 scale): 5 Any medication changes, allergies to medications, adverse drug reactions, diagnosis change, or new procedure performed?: [x] No    [] Yes (see summary sheet for update) Subjective functional status/changes:   [] No changes reported Patient reports she is feeling better this week compared to last visit OBJECTIVE Modality rationale: decrease inflammation and decrease pain to improve the patients ability to perform ADLs Min Type Additional Details  
 [] Estim:  []Unatt       []IFC  []Premod []Other:  []w/ice   []w/heat Position: Location:  
 [] Estim: []Att    []TENS instruct  []NMES []Other:  []w/US   []w/ice   []w/heat Position: Location:  
 []  Traction: [] Cervical       []Lumbar 
                     [] Prone          []Supine []Intermittent   []Continuous Lbs: 
[] before manual 
[] after manual  
 []  Ultrasound: []Continuous   [] Pulsed []1MHz   []3MHz W/cm2: 
Location:  
 []  Iontophoresis with dexamethasone Location: [] Take home patch  
[] In clinic  
 []  Ice     []  heat 
[]  Ice massage 
[]  Laser  
[]  Anodyne Position: Location:  
 []  Laser with stim 
[]  Other:  Position: Location:  
10 [x]  Vasopneumatic Device Pressure:       [x] lo [] med [] hi  
Temperature: [x] lo [] med [] hi  
[] Skin assessment post-treatment:  []intact []redness- no adverse reaction 
  []redness  adverse reaction:  
 
15 min Therapeutic Exercise:  [] See flow sheet :  
 Rationale: increase ROM and increase strength to improve the patients ability to perform daily tasks 10 min Manual Therapy:  Right STJ mobs, PROM right shoulder flex/Abd/ER&IR Rationale: increase ROM and increase tissue extensibility to improve ease of self care and ADLs With 
 [] TE 
 [] TA 
 [] neuro 
 [] other: Patient Education: [x] Review HEP [] Progressed/Changed HEP based on:  
[] positioning   [] body mechanics   [] transfers   [] heat/ice application   
[] other:   
 
Other Objective/Functional Measures: progressed to unilateral UE wall slide and added finger ladder Pain Level (0-10 scale) post treatment: 5 
 
ASSESSMENT/Changes in Function: Pt tolerated AAROM very well today, some discomfort with finger ladder and end of set unilateral wall slides. Progress with AAROM towards AROM as tolerated. Patient will continue to benefit from skilled PT services to modify and progress therapeutic interventions, address functional mobility deficits, address ROM deficits, address strength deficits, analyze and address soft tissue restrictions, analyze and cue movement patterns and analyze and modify body mechanics/ergonomics to attain remaining goals. []  See Plan of Care 
[]  See progress note/recertification 
[]  See Discharge Summary Updated Goals: to be achieved in 4 weeks: 1. Pt will improve FOTO score to 49 to demonstrate improved quality of life. Near met increased to 40 10/9/18 2. Pt will right shoulder ER PROM to 45 deg for improved self care and grooming ease. Progressing at 90 deg ABD 10/4/18 3. Pt will demonstrate AROM flexion right shoulder to 110 deg for improved ease of ADLs. 4. Pt will improve right shoulder strength to 4-/5 for improved ease of daily tasks. PLAN [x]  Upgrade activities as tolerated     []  Continue plan of care 
[]  Update interventions per flow sheet      
[]  Discharge due to:_ 
[]  Other:_   
 
 Donald Saul DPT, CMTPT 10/9/2018  1:28 PM 
 
Future Appointments Date Time Provider Colt Jovel 10[facility]11[facility]2018 11:30 [facility]  
10/16/2018 10:30 AM Donald Saul [facility]  
11/6/2018 1:00 PM HAMZAH Esteb[address]  
5/20/2019 1:40 PM Xiao Mills MD [address] [place] [address]

## 2018-10-11 ENCOUNTER — HOSPITAL ENCOUNTER (OUTPATIENT)
Dept: PHYSICAL THERAPY | Age: 48
Discharge: HOME | End: 2018-10-11
Payer: MEDICAID

## 2018-10-11 PROCEDURE — 97140 MANUAL THERAPY 1/> REGIONS: CPT

## 2018-10-11 PROCEDURE — 97016 VASOPNEUMATIC DEVICE THERAPY: CPT

## 2018-10-11 PROCEDURE — 97110 THERAPEUTIC EXERCISES: CPT

## 2018-10-11 NOTE — PROGRESS NOTES
PT DAILY TREATMENT NOTE  Patient Name: Tiana Silva 
Date:10/11/2018 : 1970 [x]  Patient  Verified Payor: [address] [place] [address] / Plan:    / Product Type: Medicaid / In time:11:30  Out time:12:12 Total Treatment Time (min): 42 Visit #: 3 of 8 Treatment Area: Complete rotator cuff tear or rupture of right shoulder, not specified as traumatic [M75.121] SUBJECTIVE Pain Level (0-10 scale): 5 Any medication changes, allergies to medications, adverse drug reactions, diagnosis change, or new procedure performed?: [x] No    [] Yes (see summary sheet for update) Subjective functional status/changes:   [] No changes reported Pt reports no increase in pain OBJECTIVE Modality rationale: decrease inflammation and decrease pain to improve the patients ability to perform ADLs Min Type Additional Details  
 [] Estim:  []Unatt       []IFC  []Premod []Other:  []w/ice   []w/heat Position: Location:  
 [] Estim: []Att    []TENS instruct  []NMES []Other:  []w/US   []w/ice   []w/heat Position: Location:  
 []  Traction: [] Cervical       []Lumbar 
                     [] Prone          []Supine []Intermittent   []Continuous Lbs: 
[] before manual 
[] after manual  
 []  Ultrasound: []Continuous   [] Pulsed []1MHz   []3MHz W/cm2: 
Location:  
 []  Iontophoresis with dexamethasone Location: [] Take home patch  
[] In clinic  
 []  Ice     []  heat 
[]  Ice massage 
[]  Laser  
[]  Anodyne Position: Location:  
 []  Laser with stim 
[]  Other:  Position: Location:  
10 [x]  Vasopneumatic Device Pressure:       [x] lo [] med [] hi  
Temperature: [x] lo [] med [] hi  
[] Skin assessment post-treatment:  []intact []redness- no adverse reaction 
  []redness  adverse reaction:  
 
 
24 min Therapeutic Exercise:  [] See flow sheet :  
 Rationale: increase ROM and increase strength to improve the patients ability to perform daily tasks 8 min Manual Therapy:  Right STJ mobs, PROM right shoulder flex/abd/ER&IR Rationale: increase ROM and increase tissue extensibility to improve ease of ADLs and self care With 
 [] TE 
 [] TA 
 [] neuro 
 [] other: Patient Education: [x] Review HEP [] Progressed/Changed HEP based on:  
[] positioning   [] body mechanics   [] transfers   [] heat/ice application   
[] other:   
 
Other Objective/Functional Measures: 50 deg right shoulder ER PROM (at 90 deg ABD) Pain Level (0-10 scale) post treatment: 5 
 
ASSESSMENT/Changes in Function: Pt progressing well with AAROM, initiated gravity reduced AROM with good tolerance. Progress with shoulder mobility and strength as tolerated. Patient will continue to benefit from skilled PT services to modify and progress therapeutic interventions, address functional mobility deficits, address ROM deficits, address strength deficits, analyze and address soft tissue restrictions, analyze and cue movement patterns and analyze and modify body mechanics/ergonomics to attain remaining goals. []  See Plan of Care 
[]  See progress note/recertification 
[]  See Discharge Summary Updated Goals: to be achieved in 4 weeks: 1. Pt will improve FOTO score to 49 to demonstrate improved quality of life. Near met increased to 40 10/9/18 2. Pt will right shoulder ER PROM to 45 deg for improved self care and grooming ease. Progressing at 90 deg ABD 10/4/18; Met 10/11/18 3. Pt will demonstrate AROM flexion right shoulder to 110 deg for improved ease of ADLs. Nearing in s/l 10/11/18 4. Pt will improve right shoulder strength to 4-/5 for improved ease of daily tasks. PLAN [x]  Upgrade activities as tolerated     []  Continue plan of care 
[]  Update interventions per flow sheet      
[]  Discharge due to:_ 
[]  Other:_   
 
 Boom Jones DPT, CMTPT 10/11/2018  11:40 AM 
 
Future Appointments Date Time Provider Colt Becka 10/16/2018 10:30 AM Boom Jones [facility]  
11/6/2018 1:00 PM [facility]  
5/20/2019 1:40 PM Tyrel Shah MD [address] [place] [address]

## 2018-10-16 ENCOUNTER — HOSPITAL ENCOUNTER (OUTPATIENT)
Dept: PHYSICAL THERAPY | Age: 48
Discharge: HOME | End: 2018-10-16
Payer: MEDICAID

## 2018-10-16 DIAGNOSIS — G89.18 POST-OP PAIN: ICD-10-CM

## 2018-10-16 DIAGNOSIS — M12.811 ROTATOR CUFF TEAR ARTHROPATHY, RIGHT: ICD-10-CM

## 2018-10-16 DIAGNOSIS — M75.101 ROTATOR CUFF TEAR ARTHROPATHY, RIGHT: ICD-10-CM

## 2018-10-16 PROCEDURE — 97110 THERAPEUTIC EXERCISES: CPT

## 2018-10-16 PROCEDURE — 97016 VASOPNEUMATIC DEVICE THERAPY: CPT

## 2018-10-16 PROCEDURE — 97140 MANUAL THERAPY 1/> REGIONS: CPT

## 2018-10-16 RX ORDER — HYDROCODONE BITARTRATE AND ACETAMINOPHEN 7.5; 325 MG/1; MG/1
1 TABLET ORAL
Qty: 28 TAB | Refills: 0 | Status: SHIPPED | OUTPATIENT
Start: 2018-10-16 | End: 2018-10-29 | Stop reason: SDUPTHER

## 2018-10-16 NOTE — TELEPHONE ENCOUNTER
Spoke with patient and informed her that her rx is ready to be picked up at the [facility] location.

## 2018-10-16 NOTE — TELEPHONE ENCOUNTER
Last Visit: 09/25/2018 with HAMZAH Quiroz   Date of Surgery: 07/18/2018 Right reverse total shoulder arthroplasty  Next Appointment: 11/06/2018 with HAMZAH Donaldson   Previous Refill Encounters: 10/01/2018 per HAMZAH Quiroz #[id number]    Requested Prescriptions     Pending Prescriptions Disp Refills    HYDROcodone-acetaminophen (NORCO) 7.5-325 mg per tablet 28 Tab 0     Sig: Take 1 Tab by mouth every eight (8) hours as needed for Pain. Max Daily Amount: 3 Tabs.

## 2018-10-16 NOTE — PROGRESS NOTES
PT DAILY TREATMENT NOTE  Patient Name: Javed Douglas 
Date:10/16/2018 : 1970 [x]  Patient  Verified Payor: [address] [place] [address] / Plan:     / Product Type: Medicaid / In time:10:30  Out time:11:15 Total Treatment Time (min): 45 Visit #: 4 of 8 Treatment Area: Complete rotator cuff tear or rupture of right shoulder, not specified as traumatic [M75.121] SUBJECTIVE Pain Level (0-10 scale): 5 Any medication changes, allergies to medications, adverse drug reactions, diagnosis change, or new procedure performed?: [x] No    [] Yes (see summary sheet for update) Subjective functional status/changes:   [] No changes reported \"I can't seem to get this pain under control\" OBJECTIVE Modality rationale: decrease inflammation and decrease pain to improve the patients ability to perform ADLs Min Type Additional Details  
 [] Estim:  []Unatt       []IFC  []Premod []Other:  []w/ice   []w/heat Position: Location:  
 [] Estim: []Att    []TENS instruct  []NMES []Other:  []w/US   []w/ice   []w/heat Position: Location:  
 []  Traction: [] Cervical       []Lumbar 
                     [] Prone          []Supine []Intermittent   []Continuous Lbs: 
[] before manual 
[] after manual  
 []  Ultrasound: []Continuous   [] Pulsed []1MHz   []3MHz W/cm2: 
Location:  
 []  Iontophoresis with dexamethasone Location: [] Take home patch  
[] In clinic  
 []  Ice     []  heat 
[]  Ice massage 
[]  Laser  
[]  Anodyne Position: Location:  
 []  Laser with stim 
[]  Other:  Position: Location:  
10 [x]  Vasopneumatic Device Pressure:       [x] lo [] med [] hi  
Temperature: [x] lo [] med [] hi  
[] Skin assessment post-treatment:  []intact []redness- no adverse reaction 
  []redness  adverse reaction:  
 
 
25 min Therapeutic Exercise:  [] See flow sheet :  
 Rationale: increase ROM and increase strength to improve the patients ability to perform daily tasks and ADLs 10 min Manual Therapy:  Right STJ mobs, PROM right shoulder, EMIELE stretching Rationale: increase ROM and increase tissue extensibility to improve self care and ADL ease With 
 [] TE 
 [] TA 
 [] neuro 
 [] other: Patient Education: [x] Review HEP [] Progressed/Changed HEP based on:  
[] positioning   [] body mechanics   [] transfers   [] heat/ice application   
[] other:   
 
Other Objective/Functional Measures: pt progressing with tolerance to AAROM evidenced by increased repetitions. Still intermittent reports of discomfort that subside Pain Level (0-10 scale) post treatment: 4 
 
ASSESSMENT/Changes in Function: Progress gradually with AAROM against gravity as well as increased AROM repetitions. Progress into AROM against gravity as tolerated, pt reports continued pain but remaining consistent pre- during and post-therapy. Patient will continue to benefit from skilled PT services to modify and progress therapeutic interventions, address functional mobility deficits, address ROM deficits, address strength deficits, analyze and address soft tissue restrictions, analyze and cue movement patterns and analyze and modify body mechanics/ergonomics to attain remaining goals. []  See Plan of Care 
[]  See progress note/recertification 
[]  See Discharge Summary Updated Goals: to be achieved in 4 weeks: 1. Pt will improve FOTO score to 49 to demonstrate improved quality of life. Near met increased to 40 10/9/18 2. Pt will right shoulder ER PROM to 45 deg for improved self care and grooming ease. Progressing at 90 deg ABD 10/4/18; Met 10/11/18 3. Pt will demonstrate AROM flexion right shoulder to 110 deg for improved ease of ADLs. Nearing in s/l 10/11/18 met 130 deg in supine 10/16/18 4.  Pt will improve right shoulder strength to 4-/5 for improved ease of daily tasks. Not met 10/16/18 PLAN [x]  Upgrade activities as tolerated     [x]  Continue plan of care 
[]  Update interventions per flow sheet      
[]  Discharge due to:_ 
[]  Other:_ Marcelina Cr DPT, CMTPT 10/16/2018  10:31 AM 
 
Future Appointments Date Time Provider Colt Jovel 11/6/2018 1:00 PM HAMZAH [address]  
5/20/2019 1:40 PM Casper Vega MD [address] [place] [address]

## 2018-10-19 ENCOUNTER — HOSPITAL ENCOUNTER (OUTPATIENT)
Dept: PHYSICAL THERAPY | Age: 48
Discharge: HOME | End: 2018-10-19
Payer: MEDICAID

## 2018-10-19 PROCEDURE — 97110 THERAPEUTIC EXERCISES: CPT

## 2018-10-19 PROCEDURE — 97016 VASOPNEUMATIC DEVICE THERAPY: CPT

## 2018-10-19 PROCEDURE — 97140 MANUAL THERAPY 1/> REGIONS: CPT

## 2018-10-19 NOTE — PROGRESS NOTES
PT DAILY TREATMENT NOTE 10-18 Patient Name: Radha Bellamy 
Date:10/19/2018 : 1970 [x]  Patient  Verified Payor: [address] [place] [address] / Plan: 1500 / Product Type: Medicaid / In time:11:03  Out time:11:58 Total Treatment Time (min): 55 Visit #: 5 of 8 Treatment Area: Complete rotator cuff tear or rupture of right shoulder, not specified as traumatic [M75.121] SUBJECTIVE Pain Level (0-10 scale): 6 Any medication changes, allergies to medications, adverse drug reactions, diagnosis change, or new procedure performed?: [x] No    [] Yes (see summary sheet for update) Subjective functional status/changes:   [] No changes reported Pt reports sleeping wrong last night and now her shoulder blade is really hurting OBJECTIVE Modality rationale: decrease pain to improve the patients ability to increase ease with ADLs Min Type Additional Details  
 [] Estim:  []Unatt       []IFC  []Premod []Other:  []w/ice   []w/heat Position: Location:  
 [] Estim: []Att    []TENS instruct  []NMES []Other:  []w/US   []w/ice   []w/heat Position: Location:  
 []  Traction: [] Cervical       []Lumbar 
                     [] Prone          []Supine []Intermittent   []Continuous Lbs: 
[] before manual 
[] after manual  
 []  Ultrasound: []Continuous   [] Pulsed []1MHz   []3MHz W/cm2: 
Location:  
 []  Iontophoresis with dexamethasone Location: [] Take home patch  
[] In clinic  
 []  Ice     []  heat 
[]  Ice massage 
[]  Laser  
[]  Anodyne Position: Location:  
 []  Laser with stim 
[]  Other:  Position: Location:  
10 [x]  Vasopneumatic Device Pressure:       [x] lo [] med [] hi  
Temperature: [x] lo [] med [] hi  
[] Skin assessment post-treatment:  []intact []redness- no adverse reaction 
  []redness  adverse reaction:  
 
35 min Therapeutic Exercise:  [x] See flow sheet :  
 Rationale: increase ROM and increase strength to improve the patients ability to perform ADLs 10 min Manual Therapy:  R STJ mobs, DTM @ medial border of scap, PROM Rationale: decrease pain, increase ROM and increase tissue extensibility to increase ease with ADLs With 
 [] TE 
 [] TA 
 [] neuro 
 [] other: Patient Education: [x] Review HEP [] Progressed/Changed HEP based on:  
[] positioning   [] body mechanics   [] transfers   [] heat/ice application   
[] other:   
 
Other Objective/Functional Measures:  
TTP @ [place] and [place] Pain Level (0-10 scale) post treatment: 7 ASSESSMENT/Changes in Function: Pt performed well with therex today with incr'd reports of pain throughout treatment related to \"sleeping wrong\" last night. Patient will continue to benefit from skilled PT services to modify and progress therapeutic interventions, address functional mobility deficits, address ROM deficits, address strength deficits, analyze and address soft tissue restrictions, analyze and cue movement patterns and assess and modify postural abnormalities to attain remaining goals. []  See Plan of Care 
[]  See progress note/recertification 
[]  See Discharge Summary Progress towards goals / Updated goals: 1. Pt will improve FOTO score to 49 to demonstrate improved quality of life. Near met increased to 40 10/9/18 2. Pt will right shoulder ER PROM to 45 deg for improved self care and grooming ease. Progressing at 90 deg ABD 10/4/18; Met 10/11/18 3. Pt will demonstrate AROM flexion right shoulder to 110 deg for improved ease of ADLs. Nearing in s/l 10/11/18 met 130 deg in supine 10/16/18 4. Pt will improve right shoulder strength to 4-/5 for improved ease of daily tasks. Not met 10/16/18 PLAN [x]  Upgrade activities as tolerated     [x]  Continue plan of care 
[]  Update interventions per flow sheet      
[]  Discharge due to:_ 
[]  Other:_   
 
 Sherine Flynn, PTA 10/19/2018  11:20 AM 
 
Future Appointments Date Time Provider Colt Jovel 10/23/2018  2:00 PM Aye Polanco PTA [facility]  
10/26/2018 12:00 PM RON DIEZ [facility]  
10/30/2018 12:00 PM Rafa Siddiqui PT [facility]  
11/1/2018 11:30 AM [place] Cheese [facility]  
11/6/2018 10:30 AM Tejal Cheese [facility]  
11/6/2018  1:00 PM HAMZAH Mcintyre [facility]  
11/8/2018 11:30 AM Tejal Cheese [facility]  
11/13/2018 11:00 AM [place] [facility]  
5/20/2019  1:40 PM Landry Morrow MD [address] [place] [address]

## 2018-10-23 ENCOUNTER — HOSPITAL ENCOUNTER (OUTPATIENT)
Dept: PHYSICAL THERAPY | Age: 48
Discharge: HOME | End: 2018-10-23
Payer: MEDICAID

## 2018-10-23 PROCEDURE — 97140 MANUAL THERAPY 1/> REGIONS: CPT

## 2018-10-23 PROCEDURE — 97016 VASOPNEUMATIC DEVICE THERAPY: CPT

## 2018-10-23 PROCEDURE — 97110 THERAPEUTIC EXERCISES: CPT

## 2018-10-23 NOTE — PROGRESS NOTES
PT DAILY TREATMENT NOTE 10-18 Patient Name: Lydia Matson 
Date:10/23/2018 : 1970 [x]  Patient  Verified Payor: [address] [place] [address] / Plan: 1500 / Product Type: Medicaid / In time:2:00  Out time:2:50 Total Treatment Time (min): 50 Visit #: 6 of 8 Medicare/BCBS Only Total Timed Codes (min):  40 1:1 Treatment Time:  44 Treatment Area: Complete rotator cuff tear or rupture of right shoulder, not specified as traumatic [M75.121] SUBJECTIVE Pain Level (0-10 scale): 5 Any medication changes, allergies to medications, adverse drug reactions, diagnosis change, or new procedure performed?: [x] No    [] Yes (see summary sheet for update) Subjective functional status/changes:   [] No changes reported Pt reports feeling about the same OBJECTIVE Modality rationale: decrease pain to improve the patients ability to increase ease with ADLs Min Type Additional Details  
 [] Estim:  []Unatt       []IFC  []Premod []Other:  []w/ice   []w/heat Position: Location:  
 [] Estim: []Att    []TENS instruct  []NMES []Other:  []w/US   []w/ice   []w/heat Position: Location:  
 []  Traction: [] Cervical       []Lumbar 
                     [] Prone          []Supine []Intermittent   []Continuous Lbs: 
[] before manual 
[] after manual  
 []  Ultrasound: []Continuous   [] Pulsed []1MHz   []3MHz W/cm2: 
Location:  
 []  Iontophoresis with dexamethasone Location: [] Take home patch  
[] In clinic  
 []  Ice     []  heat 
[]  Ice massage 
[]  Laser  
[]  Anodyne Position: Location:  
 []  Laser with stim 
[]  Other:  Position: Location:  
10 [x]  Vasopneumatic Device Pressure:       [x] lo [] med [] hi  
Temperature: [x] lo [] med [] hi  
[] Skin assessment post-treatment:  []intact []redness- no adverse reaction 
  []redness  adverse reaction: 30 min Therapeutic Exercise:  [x] See flow sheet :  
Rationale: increase ROM and increase strength to improve the patients ability to perform ADLs 10 min Manual Therapy:  scap mobs, DTM medial border of scarp, R shoulder PROM Rationale: decrease pain, increase ROM and increase tissue extensibility to increase ease with ADLs With 
 [] TE 
 [] TA 
 [] neuro 
 [] other: Patient Education: [x] Review HEP [] Progressed/Changed HEP based on:  
[] positioning   [] body mechanics   [] transfers   [] heat/ice application   
[] other:   
 
Other Objective/Functional Measures: Added UBE Pain Level (0-10 scale) post treatment: 5 
 
ASSESSMENT/Changes in Function: Pt performed well with added ex's, min reports of soreness after UBE. vc's to avoid UT compensation with shoulder elevation and improve scapulohumeral rhythm. Patient will continue to benefit from skilled PT services to modify and progress therapeutic interventions, address functional mobility deficits, address ROM deficits, address strength deficits, analyze and address soft tissue restrictions, analyze and cue movement patterns, analyze and modify body mechanics/ergonomics and assess and modify postural abnormalities to attain remaining goals. []  See Plan of Care 
[]  See progress note/recertification 
[]  See Discharge Summary Progress towards goals / Updated goals: 1. Pt will improve FOTO score to 49 to demonstrate improved quality of life. Near met increased to 40 10/9/18 2. Pt will right shoulder ER PROM to 45 deg for improved self care and grooming ease. Progressing at 90 deg ABD 10/4/18; Met 10/11/18 3. Pt will demonstrate AROM flexion right shoulder to 110 deg for improved ease of ADLs. Nearing in s/l 10/11/18 met 130 deg in supine 10/16/18 4.  Pt will improve right shoulder strength to 4-/5 for improved ease of daily tasks. Not met 10/16/18  
 
 
 
PLAN 
 [x]  Upgrade activities as tolerated     [x]  Continue plan of care 
[]  Update interventions per flow sheet      
[]  Discharge due to:_ 
[]  Other:_ Stephanie Parrish, PTA 10/23/2018  1:54 PM 
 
Future Appointments Date Time Provider Colt Jovel 10/23/2018  2:00 PM Aye Polanco PTA [facility]  
10/26/2018 12:00 PM RON DIEZ HBV [facility]  
10/30/2018 12:00 PM Bea Valenzuela, PT [facility]  
11/1/2018 11:30 AM Leonidas Jama [facility]  
11/6/2018 10:30 AM Leonidas Jama [facility]  
11/6/2018  1:00 PM HAMZAH Quintero [facility]  
11/8/2018 11:30 AM Leonidas Jama [facility]  
11/13/2018 11:00 AM Leonidas Jama [facility]  
5/20/2019  1:40 PM Debbie Barksdale MD [address] [place] [address]

## 2018-10-26 ENCOUNTER — HOSPITAL ENCOUNTER (OUTPATIENT)
Dept: PHYSICAL THERAPY | Age: 48
Discharge: HOME | End: 2018-10-26
Payer: MEDICAID

## 2018-10-26 PROCEDURE — 97110 THERAPEUTIC EXERCISES: CPT

## 2018-10-26 PROCEDURE — 97140 MANUAL THERAPY 1/> REGIONS: CPT

## 2018-10-26 NOTE — PROGRESS NOTES
In [address]  [place] [address] [place], [address] 
[phone] fax Physical Therapy Progress Note Patient name: Derrek Vargas Start of Care: 2018 Referral source: [address],* : 1970 Medical/Treatment Diagnosis: Complete rotator cuff tear or rupture of right shoulder, not specified as traumatic [M75.121] Onset Date: 2018      
  
Prior Hospitalization: see medical history Provider#: [id number] Medications: Verified on Patient Summary List   
Comorbidities: previous failed right RTC repair x 2, depression, arthritis, asthma, tobacco use Prior Level of Function: Pt reports sedentary lifestyle but I with ADLs and self care Visits from Start of Care: 24    Missed Visits: 0 Key Functional Changes:   Patient stated that 2 days ago she wasn't thinking and went to  a half filled case of water bottles and has had increased right anterior shoulder pain since. Patient denies hearing a pop or feeling a tear at the time, but had an immediate increase in pain. Patient stated her normal pain had been a 4-5/10 and now it's a 6-7/10. Patient describes pain as a constant throbbing/aching, and prior to the incident 2 days ago she stated her pain was more intermittent. Right shoulder AAROM: flex: 115deg Scap: 108deg Right shoulder AROM: flex: 90deg scap:  80deg with report of pain.  
  
Progress towards goals / Updated goals: 1. Pt will improve FOTO score to 49 to demonstrate improved quality of life. Near met increased to 40 10/9/18; FOTO: 41 (10/26/18) 2. Pt will right shoulder ER PROM to 45 deg for improved self care and grooming ease. Progressing at 90 deg ABD 10/4/18; Met 10/11/18 3. Pt will demonstrate AROM flexion right shoulder to 110 deg for improved ease of ADLs. Nearing in s/l 10/11/18 met 130 deg in supine 10/16/18. Right shoulder AROM: flex: 90deg (10/26/18) 4. Pt will improve right shoulder strength to 4-/5 for improved ease of daily tasks. Not tested secondary to elevated pain (10/26/18) Updated Goals: to be achieved in 3 weeks: 1. Pt will improve FOTO score to 49 to demonstrate improved quality of life. 2. Pt will demonstrate AROM flexion right shoulder to 110 deg for improved ease of ADLs 3. Pt will improve right shoulder strength to 3+/5 for improved ease of daily tasks ASSESSMENT/RECOMMENDATIONS: Patient will continue to benefit from skilled PT services to modify and progress therapeutic interventions, address functional mobility deficits, address ROM deficits, address strength deficits, analyze and address soft tissue restrictions, analyze and cue movement patterns and assess and modify postural abnormalities to attain remaining goals. []Continue therapy per initial plan/protocol at a frequency of  2 x per week for 3 weeks []Continue therapy with the following recommended changes:_____________________      _____________________________________________________________________ []Discontinue therapy progressing towards or have reached established goals []Discontinue therapy due to lack of appreciable progress towards goals []Discontinue therapy due to lack of attendance or compliance []Await Physician's recommendations/decisions regarding therapy []Other:________________________________________________________________ Thank you for this referral.   
Home Watkins, PT 10/26/2018 12:48 PM 
NOTE TO PHYSICIAN:  PLEASE COMPLETE THE ORDERS BELOW AND  
FAX TO [facility] Physical Therapy: [phone] If you are unable to process this request in 24 hours please contact our office: [phone] ? I have read the above report and request that my patient continue as recommended.  
? I have read the above report and request that my patient continue therapy with the following changes/special instructions:__________________________________________________________ ? I have read the above report and request that my patient be discharged from therapy. Physicians signature: ______________________________Date: ______Time:______

## 2018-10-26 NOTE — PROGRESS NOTES
PT DAILY TREATMENT NOTE 10-18 Patient Name: Albert Hogan 
Date:10/26/2018 : 1970 [x]  Patient  Verified Payor: [address] [place] [address] / Plan: 1500  / Product Type: Medicaid / In time: 12:00  Out time:12:46 Total Treatment Time (min): 46 Visit #: 7 of 8 Treatment Area: Complete rotator cuff tear or rupture of right shoulder, not specified as traumatic [M75.121] SUBJECTIVE Pain Level (0-10 scale): 6/10 Any medication changes, allergies to medications, adverse drug reactions, diagnosis change, or new procedure performed?: [x] No    [] Yes (see summary sheet for update) Subjective functional status/changes:   [] No changes reported Patient stated that 2 days ago she wasn't thinking and went to  a case of water bottles and has had increased right shoulder pain since. Patient denies hearing a pop or feeling a tear at the time. Patient stated her normal pain had bene a 4-5/10 and now its a 6-7/10. Patient describes pain as a constant throbbing/aching, and prior to the incident 2 days ago she stated her pain was more intermittent. OBJECTIVE Modality rationale: decrease inflammation and decrease pain to improve the patients ability to perform ADls. Min Type Additional Details  
 [] Estim:  []Unatt       []IFC  []Premod []Other:  []w/ice   []w/heat Position: Location:  
 [] Estim: []Att    []TENS instruct  []NMES []Other:  []w/US   []w/ice   []w/heat Position: Location:  
 []  Traction: [] Cervical       []Lumbar 
                     [] Prone          []Supine []Intermittent   []Continuous Lbs: 
[] before manual 
[] after manual  
 []  Ultrasound: []Continuous   [] Pulsed []1MHz   []3MHz W/cm2: 
Location:  
 []  Iontophoresis with dexamethasone Location: [] Take home patch  
[] In clinic  
 []  Ice     []  heat 
[]  Ice massage 
[]  Laser []  Anodyne Position: Location:  
 []  Laser with stim 
[]  Other:  Position: Location:  
10 [x]  Vasopneumatic Device Pressure:       [x] lo [] med [] hi  
Temperature: [x] lo [] med [] hi  
[] Skin assessment post-treatment:  []intact []redness- no adverse reaction 
  []redness  adverse reaction:  
 
*26 min Therapeutic Exercise:  [x] See flow sheet :  
Rationale: increase ROM and increase strength to improve the patients ability to perform ADLs. 10 min Manual Therapy:  STM along vertebral border of scap; scap mobs, PROM Rationale: decrease pain, increase ROM and increase tissue extensibility to increase ease with ADLs. With 
 [] TE 
 [] TA 
 [] neuro 
 [] other: Patient Education: [x] Review HEP [] Progressed/Changed HEP based on:  
[] positioning   [] body mechanics   [] transfers   [] heat/ice application   
[] other:   
 
Other Objective/Functional Measures:  
Right shoulder AAROM: flex: 115deg Scap: 108deg Right shoulder AROM: flex: 90deg scap:  80deg with report of pain. FOTO: 41  
Pain Level (0-10 scale) post treatment: 6/10 ASSESSMENT/Changes in Function: Held exercises per flow sheet due to elevated right shoulder pain. Patient reported no change in pain at end of the session. Patient will continue to benefit from skilled PT services to modify and progress therapeutic interventions, address functional mobility deficits, address ROM deficits, address strength deficits, analyze and address soft tissue restrictions, analyze and cue movement patterns and assess and modify postural abnormalities to attain remaining goals. []  See Plan of Care [x]  See progress note/recertification 
[]  See Discharge Summary Progress towards goals / Updated goals: 
Progress towards goals / Updated goals: 1. Pt will improve FOTO score to 49 to demonstrate improved quality of life. Near met increased to 40 10/9/18; FOTO: 41 (10/26/18) 2. Pt will right shoulder ER PROM to 45 deg for improved self care and grooming ease. Progressing at 90 deg ABD 10/4/18; Met 10/11/18 3. Pt will demonstrate AROM flexion right shoulder to 110 deg for improved ease of ADLs. Nearing in s/l 10/11/18 met 130 deg in supine 10/16/18. Right shoulder AROM: flex: 90deg scap:  80deg with report of pain. (10/26/18) 4. Pt will improve right shoulder strength to 4-/5 for improved ease of daily tasks. Not tested secondary to elevated pain (10/26/18)  
  
PLAN 
[]  Upgrade activities as tolerated     [x]  Continue plan of care 
[]  Update interventions per flow sheet      
[]  Discharge due to:_ 
[]  Other:_   
 
Yolanda Gold, PT 10/26/2018  12:00 PM 
 
Future Appointments Date Time Provider Colt Jovel 10/30/2018 12:00 PM Norma Rosario PT [facility]  
11/1/2018 11:30 AM Markel Crump [facility]  
11/6/2018 10:30 AM Markel Crump [facility]  
11/6/2018  1:00 PM HAMZAH Shearer [facility]  
11/8/2018 11:30 AM Markel Crump [facility]  
11/13/2018 11:00 AM Markel Crump [facility]  
5/20/2019  1:40 PM Tereza Tirado MD [address] [place] [address]

## 2018-10-29 DIAGNOSIS — M12.811 ROTATOR CUFF TEAR ARTHROPATHY, RIGHT: ICD-10-CM

## 2018-10-29 DIAGNOSIS — G89.18 POST-OP PAIN: ICD-10-CM

## 2018-10-29 DIAGNOSIS — M75.101 ROTATOR CUFF TEAR ARTHROPATHY, RIGHT: ICD-10-CM

## 2018-10-29 RX ORDER — HYDROCODONE BITARTRATE AND ACETAMINOPHEN 7.5; 325 MG/1; MG/1
1 TABLET ORAL
Qty: 28 TAB | Refills: 0 | Status: SHIPPED | OUTPATIENT
Start: 2018-10-29 | End: 2019-07-29

## 2018-10-29 NOTE — TELEPHONE ENCOUNTER
Last Visit: 09/25/2018 with HAMZAH Anthony   Date of Surgery: 07/18/2018 Right reverse total shoulder arthroplasty  Next Appointment: 11/06/2018 with HAMZAH Donaldson   Previous Refill Encounters: 10/16/2018 per HAMZAH Anthony #[id number]    Requested Prescriptions     Pending Prescriptions Disp Refills    HYDROcodone-acetaminophen (NORCO) 7.5-325 mg per tablet 28 Tab 0     Sig: Take 1 Tab by mouth every eight (8) hours as needed for Pain. Max Daily Amount: 3 Tabs.

## 2018-10-30 ENCOUNTER — APPOINTMENT (OUTPATIENT)
Dept: PHYSICAL THERAPY | Age: 48
End: 2018-10-30
Payer: MEDICAID

## 2018-11-01 ENCOUNTER — HOSPITAL ENCOUNTER (OUTPATIENT)
Dept: PHYSICAL THERAPY | Age: 48
Discharge: HOME | End: 2018-11-01
Payer: SELF-PAY

## 2018-11-01 PROCEDURE — 97140 MANUAL THERAPY 1/> REGIONS: CPT

## 2018-11-01 PROCEDURE — 97110 THERAPEUTIC EXERCISES: CPT

## 2018-11-01 NOTE — PROGRESS NOTES
PT DAILY TREATMENT NOTE  Patient Name: Mere Mcmillan 
Date:2018 : 1970 [x]  Patient  Verified Payor: [address] [place] [address] / Plan: 1500 / Product Type: Medicaid / In time:11:30  Out time: 12:20 Total Treatment Time (min): 50 Visit #: 1 of 6 Treatment Area: Complete rotator cuff tear or rupture of right shoulder, not specified as traumatic [M75.121] SUBJECTIVE Pain Level (0-10 scale): 5 Any medication changes, allergies to medications, adverse drug reactions, diagnosis change, or new procedure performed?: [x] No    [] Yes (see summary sheet for update) Subjective functional status/changes:   [] No changes reported Pt reports her pain has decreased some since lifting a case of water OBJECTIVE Modality rationale: decrease inflammation and decrease pain to improve the patients ability to perform ADLs Type Additional Details  
[] Estim:  []Unatt       []IFC  []Premod []Other:  []w/ice   []w/heat Position: Location:  
[] Estim: []Att    []TENS instruct  []NMES []Other:  []w/US   []w/ice   []w/heat Position: Location:  
[]  Traction: [] Cervical       []Lumbar 
                     [] Prone          []Supine []Intermittent   []Continuous Lbs: 
[] before manual 
[] after manual  
[]  Ultrasound: []Continuous   [] Pulsed []1MHz   []3MHz W/cm2: 
Location:  
[]  Iontophoresis with dexamethasone Location: [] Take home patch  
[] In clinic [x]  Ice (10 min)     []  heat 
[]  Ice massage 
[]  Laser  
[]  Anodyne Position: seated Location: right shoulder  
[]  Laser with stim 
[]  Other:  Position: Location:  
[]  Vasopneumatic Device Pressure:       [] lo [] med [] hi  
Temperature: [] lo [] med [] hi  
[] Skin assessment post-treatment:  []intact []redness- no adverse reaction 
  []redness  adverse reaction: 30 min Therapeutic Exercise:  [] See flow sheet :  
Rationale: increase ROM and increase strength to improve the patients ability to perform daily tasks and ADLs 10 min Manual Therapy:  Right STJ mobs, PROM right shoulder scaption/ABD/ER&IR Rationale: increase ROM and increase tissue extensibility to improve ease of ADLs and self care With 
 [] TE 
 [] TA 
 [] neuro 
 [] other: Patient Education: [x] Review HEP [] Progressed/Changed HEP based on:  
[] positioning   [] body mechanics   [] transfers   [] heat/ice application   
[] other:   
 
Other Objective/Functional Measures: 124 deg right shoulder flexion AROM in supine Pain Level (0-10 scale) post treatment: 4 
 
ASSESSMENT/Changes in Function: Pt is progressing well with AROM at this time, still challenged against gravity but her pain has come down since flare up last week. Will continue to to progress AROM against gravity and strength at this time. Patient will continue to benefit from skilled PT services to modify and progress therapeutic interventions, address functional mobility deficits, address ROM deficits, address strength deficits, analyze and address soft tissue restrictions, analyze and cue movement patterns and analyze and modify body mechanics/ergonomics to attain remaining goals. []  See Plan of Care 
[]  See progress note/recertification 
[]  See Discharge Summary Updated Goals: to be achieved in 3 weeks: 1. Pt will improve FOTO score to 49 to demonstrate improved quality of life. 2. Pt will demonstrate AROM flexion right shoulder to 110 deg for improved ease of ADLs met in supine 11/1/18 3. Pt will improve right shoulder strength to 3+/5 for improved ease of daily tasks PLAN [x]  Upgrade activities as tolerated     []  Continue plan of care 
[]  Update interventions per flow sheet      
[]  Discharge due to:_ 
[]  Other:_   
 
 Karyle Real, DPT, CMTPT 11/1/2018  11:46 AM 
 
Future Appointments Date Time Provider Colt Jovel 11[facility]6/2018 10:30 [facility]  
11/6/2018  1:00 PM HAMZAH Gomez [address]  
11/8/2018 11:30 AM [facility]  
11/13/2018 11:00 AM [facility]  
5/20/2019  1:40 PM Helena Rodriguez MD [address] [place] [address]

## 2018-11-06 ENCOUNTER — HOSPITAL ENCOUNTER (OUTPATIENT)
Dept: PHYSICAL THERAPY | Age: 48
Discharge: HOME | End: 2018-11-06
Payer: SELF-PAY

## 2018-11-06 ENCOUNTER — OFFICE VISIT (OUTPATIENT)
Dept: ORTHOPEDIC SURGERY | Age: 48
End: 2018-11-06

## 2018-11-06 VITALS
SYSTOLIC BLOOD PRESSURE: 109 MMHG | HEART RATE: 67 BPM | TEMPERATURE: 98 F | BODY MASS INDEX: 34.91 KG/M2 | RESPIRATION RATE: 16 BRPM | WEIGHT: 217.2 LBS | OXYGEN SATURATION: 95 % | DIASTOLIC BLOOD PRESSURE: 63 MMHG | HEIGHT: 66 IN

## 2018-11-06 DIAGNOSIS — M12.811 ROTATOR CUFF TEAR ARTHROPATHY, RIGHT: Primary | ICD-10-CM

## 2018-11-06 DIAGNOSIS — M75.101 ROTATOR CUFF TEAR ARTHROPATHY, RIGHT: Primary | ICD-10-CM

## 2018-11-06 PROCEDURE — 97110 THERAPEUTIC EXERCISES: CPT

## 2018-11-06 PROCEDURE — 97016 VASOPNEUMATIC DEVICE THERAPY: CPT

## 2018-11-06 PROCEDURE — 97140 MANUAL THERAPY 1/> REGIONS: CPT

## 2018-11-06 NOTE — PROGRESS NOTES
PT DAILY TREATMENT NOTE  Patient Name: Zachary Mendieta 
Date:2018 : 1970 [x]  Patient  Verified Payor: [address] [place] [place] [address] / Plan:     / Product Type: Medicaid / In time:11:30  Out time:12:19 Total Treatment Time (min): 49 Visit #: 2 of 6 Treatment Area: Complete rotator cuff tear or rupture of right shoulder, not specified as traumatic [M75.121] SUBJECTIVE Pain Level (0-10 scale): 6 Any medication changes, allergies to medications, adverse drug reactions, diagnosis change, or new procedure performed?: [x] No    [] Yes (see summary sheet for update) Subjective functional status/changes:   [] No changes reported Pt reports no new changes at this time, slight pain elevation but no specific complaints given OBJECTIVE Modality rationale: decrease inflammation and decrease pain to improve the patients ability to improve ease of ADLs Min Type Additional Details  
 [] Estim:  []Unatt       []IFC  []Premod []Other:  []w/ice   []w/heat Position: Location:  
 [] Estim: []Att    []TENS instruct  []NMES []Other:  []w/US   []w/ice   []w/heat Position: Location:  
 []  Traction: [] Cervical       []Lumbar 
                     [] Prone          []Supine []Intermittent   []Continuous Lbs: 
[] before manual 
[] after manual  
 []  Ultrasound: []Continuous   [] Pulsed []1MHz   []3MHz Location: 
W/cm2:  
 []  Iontophoresis with dexamethasone Location: [] Take home patch  
[] In clinic  
 []  Ice     []  heat 
[]  Ice massage 
[]  Laser  
[]  Anodyne Position: Location:  
 []  Laser with stim 
[]  Other: Position: Location:  
10 [x]  Vasopneumatic Device Pressure:       [x] lo [] med [] hi  
Temperature: [x] lo [] med [] hi  
[] Skin assessment post-treatment:  []intact []redness- no adverse reaction 
  []redness  adverse reaction: 31 min Therapeutic Exercise:  [] See flow sheet :  
Rationale: increase ROM and increase strength to improve the patients ability to perform daily tasks and self care 8 min Manual Therapy:  Right STJ mobs, PROM right shoulder flex/abd/ER&IR Rationale: increase ROM and increase tissue extensibility to improve ease of ADLs and self care With 
 [] TE 
 [] TA 
 [] neuro 
 [] other: Patient Education: [x] Review HEP [] Progressed/Changed HEP based on:  
[] positioning   [] body mechanics   [] transfers   [] heat/ice application   
[] other:   
 
Other Objective/Functional Measures: progressed wand AAROM to standing today with good overall tolerance, some discomfort with abduction Pain Level (0-10 scale) post treatment: 6 
 
ASSESSMENT/Changes in Function: Pt progressing well with overall good pain control. She does have some discomfort with abduction AAROM in standing otherwise tolerated progressions well today. Continue with A/AROM progression as tolerated Patient will continue to benefit from skilled PT services to modify and progress therapeutic interventions, address functional mobility deficits, address ROM deficits, address strength deficits, analyze and address soft tissue restrictions, analyze and cue movement patterns and analyze and modify body mechanics/ergonomics to attain remaining goals. []  See Plan of Care 
[]  See progress note/recertification 
[]  See Discharge Summary Updated Goals: to be achieved in 3 weeks: 
            1. Pt will improve FOTO score to 49 to demonstrate improved quality of life. 
            2. Pt will demonstrate AROM flexion right shoulder to 110 deg for improved ease of ADLs met in supine 11/1/18 
            3. Pt will improve right shoulder strength to 3+/5 for improved ease of daily tasks PLAN [x]  Upgrade activities as tolerated     [x]  Continue plan of care 
[]  Update interventions per flow sheet      
[]  Discharge due to:_ 
 []  Other:_ Nallely Russell DPT, CMTPT 11/6/2018  11:38 AM 
 
Future Appointments Date Time Provider Colt Jovel 11/6/2018  1:00 PM HAMZAH Waite [facility]  
11/8/2018 [facility]  
11/13/2018 11:00 AM [facility]  
5/20/2019  1:40 PM Elie Gonzales MD [address] [place] [address]

## 2018-11-06 NOTE — PROGRESS NOTES
Giana Mendosa  1970     HISTORY OF PRESENT ILLNESS  Giana Mendosa is a 46 y.o. female who presents today for evaluation s/p Right reverse total shoulder arthroplasty on 7/18/18. Patient is back in PT. She feels like she is overall making improvements. She able to reach to the top of her head. Does note that she lifting up a case of water and had some pain. Pain has decrease dhowever. Pain is a 6/10. Patient denies any fever, chills, chest pain, shortness of breath or calf pain. The remainder of the review of systems is negative. There are no new illness or injuries to report since last seen in the office. No changes in medications, allergies, social or family history. PHYSICAL EXAM:   Visit Vitals  /63   Pulse 67   Temp 98 °F (36.7 °C) (Oral)   Resp 16   Ht 5' 6\" (1.676 m)   Wt 217 lb 3.2 oz (98.5 kg)   SpO2 95%   BMI 35.06 kg/m²      The patient is a well-developed, well-nourished female in no acute distress. The patient is alert and oriented times three. The patient appears to be well groomed. Mood and affect are normal.  LYMPHATIC: lymph nodes are not enlarged and are within normal limits  SKIN: normal in color and non tender to palpation. There are no bruises or abrasions noted. NEUROLOGICAL: Motor sensory exam is within normal limits. Reflexes are equal bilaterally. There is normal sensation to pinprick and light touch   ORTHOPEDIC EXAM of right shoulder:  Inspection: swelling not present,  Bruising not present  Incision well healed  Passive glenohumeral abduction 0-90 degrees, able to reach to top of her head  Stability: Stable  Strength: 4/5  2+ distal pulses    IMPRESSION:  S/P Right reverse total shoulder arthroplasty    PLAN:   1. She is improving slowly post operatively. Will cont with PT transitioning to HEP when ready  Risk factors include: smoker  2. No cortisone injection indicated today   3. Yes Physical/Occupational Therapy indicated today: R shoulder  4.  No diagnostic test indicated today:   5. No durable medical equipment indicated today  6. No referral indicated today   7. No medications indicated today:   8.  No Narcotic indicated today   RTC 2 months        ERIC Melvin [facility]

## 2018-11-08 ENCOUNTER — HOSPITAL ENCOUNTER (OUTPATIENT)
Dept: PHYSICAL THERAPY | Age: 48
Discharge: HOME | End: 2018-11-08
Payer: SELF-PAY

## 2018-11-08 PROCEDURE — 97110 THERAPEUTIC EXERCISES: CPT

## 2018-11-08 PROCEDURE — 97140 MANUAL THERAPY 1/> REGIONS: CPT

## 2018-11-08 PROCEDURE — 97016 VASOPNEUMATIC DEVICE THERAPY: CPT

## 2018-11-08 NOTE — PROGRESS NOTES
PT DAILY TREATMENT NOTE  Patient Name: Cecilia Guidry 
Date:2018 : 1970 [x]  Patient  Verified Payor: [address] [place] [address] / Plan: 1500 / Product Type: Medicaid / In time:11:30  Out time:12:19 Total Treatment Time (min): 49 Visit #: 3 of 6 Treatment Area: Complete rotator cuff tear or rupture of right shoulder, not specified as traumatic [M75.121] SUBJECTIVE Pain Level (0-10 scale): 5 Any medication changes, allergies to medications, adverse drug reactions, diagnosis change, or new procedure performed?: [x] No    [] Yes (see summary sheet for update) Subjective functional status/changes:   [] No changes reported Pt reports her MD f/u went well. Pt presents with new script from surgeon stating continue PT for 4 weeks progressing towards HEP OBJECTIVE Modality rationale: decrease inflammation and decrease pain to improve the patients ability to perform ADLs Min Type Additional Details  
 [] Estim:  []Unatt       []IFC  []Premod []Other:  []w/ice   []w/heat Position: Location:  
 [] Estim: []Att    []TENS instruct  []NMES []Other:  []w/US   []w/ice   []w/heat Position: Location:  
 []  Traction: [] Cervical       []Lumbar 
                     [] Prone          []Supine []Intermittent   []Continuous Lbs: 
[] before manual 
[] after manual  
 []  Ultrasound: []Continuous   [] Pulsed []1MHz   []3MHz Location: 
W/cm2:  
 []  Iontophoresis with dexamethasone Location: [] Take home patch  
[] In clinic  
 []  Ice     []  heat 
[]  Ice massage 
[]  Laser  
[]  Anodyne Position: Location:  
 []  Laser with stim 
[]  Other: Position: Location:  
10 [x]  Vasopneumatic Device Pressure:       [x] lo [] med [] hi  
Temperature: [x] lo [] med [] hi  
[] Skin assessment post-treatment:  []intact []redness- no adverse reaction 
  []redness  adverse reaction: 31 min Therapeutic Exercise:  [] See flow sheet :  
Rationale: increase ROM and increase strength to improve the patients ability to perform daily tasks and ADLs 8 min Manual Therapy:  Right STJ mobs, PROM right shoulder flex/abd/ER&IR, capsule stretching Rationale: increase ROM and increase tissue extensibility to improve ease of ADLs and self care With 
 [] TE 
 [] TA 
 [] neuro 
 [] other: Patient Education: [x] Review HEP [] Progressed/Changed HEP based on:  
[] positioning   [] body mechanics   [] transfers   [] heat/ice application   
[] other:   
 
Other Objective/Functional Measures: added wall p/u today with good tolerance Pain Level (0-10 scale) post treatment: 5 
 
ASSESSMENT/Changes in Function: Pt progressing well with AAROM interventions towards AROM. She would benefit from further PT progressing into AROM against gravity resistance towards HEP management Patient will continue to benefit from skilled PT services to modify and progress therapeutic interventions, address functional mobility deficits, address ROM deficits, address strength deficits, analyze and address soft tissue restrictions, analyze and cue movement patterns and analyze and modify body mechanics/ergonomics to attain remaining goals. []  See Plan of Care 
[]  See progress note/recertification 
[]  See Discharge Summary Updated Goals: to be achieved in 3 weeks: 
            1. Pt will improve FOTO score to 49 to demonstrate improved quality of life. 
            2. Pt will demonstrate AROM flexion right shoulder to 110 deg for improved ease of ADLs met in supine 11/1/18 
            3. Pt will improve right shoulder strength to 3+/5 for improved ease of daily tasks progressing with various positions not formally assessed 11/8/18 PLAN [x]  Upgrade activities as tolerated     [x]  Continue plan of care 
[]  Update interventions per flow sheet      
[]  Discharge due to:_ 
[]  Other:_   
 
 BILLY ValadezT, CMTPT 11/8/2018  11:55 AM 
 
Future Appointments Date Time Provider Colt Jovel 11/13/2018 11:00 AM [place], [address] [facility]  
1/7/2019  1:00 PM HAMZAH Eduardo [address]  
5/20/2019  1:40 PM Prisca Monroy MD [address] [place] [address]

## 2018-11-13 ENCOUNTER — HOSPITAL ENCOUNTER (OUTPATIENT)
Dept: PHYSICAL THERAPY | Age: 48
Discharge: HOME | End: 2018-11-13
Payer: SELF-PAY

## 2018-11-13 PROCEDURE — 97110 THERAPEUTIC EXERCISES: CPT

## 2018-11-13 PROCEDURE — 97016 VASOPNEUMATIC DEVICE THERAPY: CPT

## 2018-11-13 PROCEDURE — 97140 MANUAL THERAPY 1/> REGIONS: CPT

## 2018-11-14 DIAGNOSIS — M12.811 ROTATOR CUFF TEAR ARTHROPATHY, RIGHT: ICD-10-CM

## 2018-11-14 DIAGNOSIS — G89.18 POST-OP PAIN: ICD-10-CM

## 2018-11-14 DIAGNOSIS — M75.101 ROTATOR CUFF TEAR ARTHROPATHY, RIGHT: ICD-10-CM

## 2018-11-14 RX ORDER — HYDROCODONE BITARTRATE AND ACETAMINOPHEN 7.5; 325 MG/1; MG/1
1 TABLET ORAL
Qty: 28 TAB | Refills: 0 | OUTPATIENT
Start: 2018-11-14

## 2018-11-14 RX ORDER — TRAMADOL HYDROCHLORIDE 50 MG/1
50 TABLET ORAL
Qty: 40 TAB | Refills: 0 | Status: SHIPPED | OUTPATIENT
Start: 2018-11-14 | End: 2018-11-28 | Stop reason: SDUPTHER

## 2018-11-14 NOTE — TELEPHONE ENCOUNTER
Last Visit: 11/06/2018 with HAMZAH Casey  Date of Surgery: 07/18/2018 Right reverse total shoulder arthroplasty  Next Appointment: noted to f/u in 2 months  Previous Refill Encounter(s): 10/29/2018 per HAMZAH Casey #28    Requested Prescriptions     Pending Prescriptions Disp Refills    HYDROcodone-acetaminophen (NORCO) 7.5-325 mg per tablet 28 Tab 0     Sig: Take 1 Tab by mouth every eight (8) hours as needed for Pain. Max Daily Amount: 3 Tabs.

## 2018-11-15 ENCOUNTER — HOSPITAL ENCOUNTER (OUTPATIENT)
Dept: PHYSICAL THERAPY | Age: 48
Discharge: HOME | End: 2018-11-15
Payer: SELF-PAY

## 2018-11-15 ENCOUNTER — APPOINTMENT (OUTPATIENT)
Dept: PHYSICAL THERAPY | Age: 48
End: 2018-11-15
Payer: SELF-PAY

## 2018-11-15 PROCEDURE — 97016 VASOPNEUMATIC DEVICE THERAPY: CPT

## 2018-11-15 PROCEDURE — 97140 MANUAL THERAPY 1/> REGIONS: CPT

## 2018-11-15 PROCEDURE — 97110 THERAPEUTIC EXERCISES: CPT

## 2018-11-15 NOTE — PROGRESS NOTES
PT DAILY TREATMENT NOTE  Patient Name: Martinez Quintanilla 
Date:11/15/2018 : 1970 [x]  Patient  Verified Payor: [address] [place] [address] / Plan:     / Product Type: Medicaid / In time:11:00  Out time:11:47 Total Treatment Time (min): 47 Visit #: 5 of 6 Treatment Area: Complete rotator cuff tear or rupture of right shoulder, not specified as traumatic [M75.121] SUBJECTIVE Pain Level (0-10 scale): 5 Any medication changes, allergies to medications, adverse drug reactions, diagnosis change, or new procedure performed?: [x] No    [] Yes (see summary sheet for update) Subjective functional status/changes:   [] No changes reported \"Doing ok\" OBJECTIVE Modality rationale: decrease inflammation and decrease pain to improve the patients ability to perform ADLs Min Type Additional Details  
 [] Estim:  []Unatt       []IFC  []Premod []Other:  []w/ice   []w/heat Position: Location:  
 [] Estim: []Att    []TENS instruct  []NMES []Other:  []w/US   []w/ice   []w/heat Position: Location:  
 []  Traction: [] Cervical       []Lumbar 
                     [] Prone          []Supine []Intermittent   []Continuous Lbs: 
[] before manual 
[] after manual  
 []  Ultrasound: []Continuous   [] Pulsed []1MHz   []3MHz Location: 
W/cm2:  
 []  Iontophoresis with dexamethasone Location: [] Take home patch  
[] In clinic  
 []  Ice     []  heat 
[]  Ice massage 
[]  Laser  
[]  Anodyne Position: Location:  
 []  Laser with stim 
[]  Other: Position: Location:  
10 [x]  Vasopneumatic Device Pressure:       [x] lo [] med [] hi  
Temperature: [x] lo [] med [] hi  
[] Skin assessment post-treatment:  []intact []redness- no adverse reaction 
  []redness  adverse reaction:  
 
29 min Therapeutic Exercise:  [] See flow sheet :  
 Rationale: increase ROM and increase strength to improve the patients ability to perform daily tasks and ADLs 8 min Manual Therapy:  Capsule stretching right shoulder Rationale: increase ROM and increase tissue extensibility to improve ease of self care and grooming With 
 [] TE 
 [] TA 
 [] neuro 
 [] other: Patient Education: [x] Review HEP [] Progressed/Changed HEP based on:  
[] positioning   [] body mechanics   [] transfers   [] heat/ice application   
[] other:   
 
Other Objective/Functional Measures:   
 
Pain Level (0-10 scale) post treatment: 5 
 
ASSESSMENT/Changes in Function: Pt progressing with ROM, challenged by progression to AROM against gravity. Continue to improve ROM and strength in functional positions. Patient will continue to benefit from skilled PT services to modify and progress therapeutic interventions, address functional mobility deficits, address ROM deficits, address strength deficits, analyze and address soft tissue restrictions, analyze and cue movement patterns and analyze and modify body mechanics/ergonomics to attain remaining goals. []  See Plan of Care 
[]  See progress note/recertification 
[]  See Discharge Summary Updated Goals: to be achieved in 3 weeks: 
            1. Pt will improve FOTO score to 49 to demonstrate improved quality of life. Met 55% 11/15/18 
            2. Pt will demonstrate AROM flexion right shoulder to 110 deg for improved ease of ADLs met in supine 11/1/18 92 deg in sitting 11/13/18 
            3. Pt will improve right shoulder strength to 3+/5 for improved ease of daily tasks progressing with various positions not formally assessed 11/8/18 PLAN [x]  Upgrade activities as tolerated     [x]  Continue plan of care 
[]  Update interventions per flow sheet      
[]  Discharge due to:_ 
[]  Other:_ Boom Jones DPT, CMTPT 11/15/2018  11:21 AM 
 
Future Appointments Date Time Provider Colt Jovel 11/20/2018  5:30 PM [place], [address] [facility]  
11/28/2018 11:00 AM Barb Drew [facility]  
11/30/2018 11:30 AM Barb Drew [facility]  
12/4/2018 11:00 AM Barb Russell [facility]  
12/6/2018 11:30 AM Barb Russell [facility]  
12/18/2018 11:00 AM Barb Drew [facility]  
12/20/2018 11:00 AM Barb Russell [facility]  
1/7/2019  1:00 PM HAMZAH Demar[address]  
5/20/2019  1:40 PM Kenton Arroyo MD [address] [place] [address]

## 2018-11-16 ENCOUNTER — TELEPHONE (OUTPATIENT)
Dept: ORTHOPEDIC SURGERY | Age: 48
End: 2018-11-16

## 2018-11-16 NOTE — TELEPHONE ENCOUNTER
Patient called in states that she dropped off her rx for traMADol (ULTRAM) 50 mg tablet at her pharmacy and she states that a prior [place]on [place] is needed. Please advise patient at [phone].

## 2018-11-20 ENCOUNTER — HOSPITAL ENCOUNTER (OUTPATIENT)
Dept: PHYSICAL THERAPY | Age: 48
Discharge: HOME | End: 2018-11-20
Payer: SELF-PAY

## 2018-11-20 PROCEDURE — 97140 MANUAL THERAPY 1/> REGIONS: CPT

## 2018-11-20 PROCEDURE — 97016 VASOPNEUMATIC DEVICE THERAPY: CPT

## 2018-11-20 PROCEDURE — 97110 THERAPEUTIC EXERCISES: CPT

## 2018-11-20 NOTE — PROGRESS NOTES
In [address]  Edgard Lyles [address] [place], [address] 
[phone] fax Physical Therapy Progress Note Patient name: Earnestine Gonzalez Start of Care: 2018 Referral source: Camacho Lewis,* : 1970 Medical/Treatment Diagnosis: Complete rotator cuff tear or rupture of right shoulder, not specified as traumatic [M75.121] Onset Date: 2018      
   
Prior Hospitalization: see medical history Provider#: [id number] Medications: Verified on Patient Summary List    
Comorbidities: previous failed right RTC repair x 2, depression, arthritis, asthma, tobacco use Prior Level of Function: Pt reports sedentary lifestyle but I with ADLs and self care Visits from Start of Care: 30    Missed Visits: 1 Established Goals:        Excellent         Good         Limited            None 
[x] Increased ROM   []  [x]  []  [] 
[x] Increased Strength  []  []  [x]  [] 
[] Increased Mobility  []  []  []  []  
[] Decreased Pain   []  []  []  [] 
[] Decreased Swelling  []  []  []  [] 
 
Key Functional Changes:  
Updated Goals: to be achieved in 3 weeks: 
            1. Pt will improve FOTO score to 49 to demonstrate improved quality of life. Met 55% 11/15/18 
            2. Pt will demonstrate AROM flexion right shoulder to 110 deg for improved ease of ADLs met in supine 18 92 deg in sitting 18 
            3. Pt will improve right shoulder strength to 3+/5 for improved ease of daily tasks progressing with various positions not formally assessed 18 met 18 Updated Goals: to be achieved in 4 weeks: 1. Pt will demonstrate 4/5 right shoulder strength for improved ease of ADLs. 2. Pt will demonstrate 110 deg right shoulder AROM flexion for improved self care. 3. Pt will demonstrate EMILEE of right shoulder to occiput for improved grooming. ASSESSMENT/RECOMMENDATIONS: Pt progressing slowly with AROM at this time. She does demonstrate improved overall mobility and strength. Pt would benefit from continued PT for further AROM/strength improvements. [x]Continue therapy per initial plan/protocol at a frequency of  2 x per week for 4 weeks []Continue therapy with the following recommended changes:_____________________      _____________________________________________________________________ []Discontinue therapy progressing towards or have reached established goals []Discontinue therapy due to lack of appreciable progress towards goals []Discontinue therapy due to lack of attendance or compliance []Await Physician's recommendations/decisions regarding therapy []Other:________________________________________________________________ Thank you for this referral.   
Marcelina Cr DPT, CMTPT 11/20/2018 9:17 AM 
NOTE TO PHYSICIAN:  PLEASE COMPLETE THE ORDERS BELOW AND  
FAX TO [facility] Physical Therapy: [phone] If you are unable to process this request in 24 hours please contact our office: [phone] ? I have read the above report and request that my patient continue as recommended. ? I have read the above report and request that my patient continue therapy with the following changes/special instructions:__________________________________________________________ ? I have read the above report and request that my patient be discharged from therapy. Physicians signature: ______________________________Date: ______Time:______

## 2018-11-20 NOTE — PROGRESS NOTES
PT DAILY TREATMENT NOTE  Patient Name: Tamar Stallworth 
Date:2018 : 1970 [x]  Patient  Verified Payor: [address] [place] [address] / Plan: 1500 / Product Type: Medicaid / In time:8:30  Out time:9:17 Total Treatment Time (min): 47 Visit #: 6 of 6 Treatment Area: Complete rotator cuff tear or rupture of right shoulder, not specified as traumatic [M75.121] SUBJECTIVE Pain Level (0-10 scale): 4 Any medication changes, allergies to medications, adverse drug reactions, diagnosis change, or new procedure performed?: [x] No    [] Yes (see summary sheet for update) Subjective functional status/changes:   [] No changes reported Pt reports her shoulder did well over the weekend OBJECTIVE Modality rationale: decrease inflammation and decrease pain to improve the patients ability to perform ADLs Min Type Additional Details  
 [] Estim:  []Unatt       []IFC  []Premod []Other:  []w/ice   []w/heat Position: Location:  
 [] Estim: []Att    []TENS instruct  []NMES []Other:  []w/US   []w/ice   []w/heat Position: Location:  
 []  Traction: [] Cervical       []Lumbar 
                     [] Prone          []Supine []Intermittent   []Continuous Lbs: 
[] before manual 
[] after manual  
 []  Ultrasound: []Continuous   [] Pulsed []1MHz   []3MHz Location: 
W/cm2:  
 []  Iontophoresis with dexamethasone Location: [] Take home patch  
[] In clinic  
 []  Ice     []  heat 
[]  Ice massage 
[]  Laser  
[]  Anodyne Position: Location:  
 []  Laser with stim 
[]  Other: Position: Location:  
10 [x]  Vasopneumatic Device Pressure:       [x] lo [] med [] hi  
Temperature: [x] lo [] med [] hi  
[] Skin assessment post-treatment:  []intact []redness- no adverse reaction 
  []redness  adverse reaction:  
 
29 min Therapeutic Exercise:  [] See flow sheet :  
 Rationale: increase ROM and increase strength to improve the patients ability to perform daily tasks 8 min Manual Therapy:  Right scap mobs, PROM flex/abd/ER&IR Rationale: increase ROM and increase tissue extensibility to improve ADLs and self care With 
 [] TE 
 [] TA 
 [] neuro 
 [] other: Patient Education: [x] Review HEP [] Progressed/Changed HEP based on:  
[] positioning   [] body mechanics   [] transfers   [] heat/ice application   
[] other:   
 
Other Objective/Functional Measures: 3+/5 right shoulder strength Pain Level (0-10 scale) post treatment: 5 
 
ASSESSMENT/Changes in Function: Pt progressing slowly with AROM at this time. She does demonstrate improved overall mobility and strength. Pt would benefit from continued PT for further AROM/strength improvements. Patient will continue to benefit from skilled PT services to modify and progress therapeutic interventions, address functional mobility deficits, address ROM deficits, address strength deficits, analyze and address soft tissue restrictions, analyze and cue movement patterns and analyze and modify body mechanics/ergonomics to attain remaining goals. []  See Plan of Care [x]  See progress note/recertification 
[]  See Discharge Summary Updated Goals: to be achieved in 3 weeks: 
            1. Pt will improve FOTO score to 49 to demonstrate improved quality of life. Met 55% 11/15/18 
            2. Pt will demonstrate AROM flexion right shoulder to 110 deg for improved ease of ADLs met in supine 11/1/18 92 deg in sitting 11/13/18 
            3. Pt will improve right shoulder strength to 3+/5 for improved ease of daily tasks progressing with various positions not formally assessed 11/8/18 met 11/20/18 PLAN [x]  Upgrade activities as tolerated     []  Continue plan of care 
[]  Update interventions per flow sheet      
[]  Discharge due to:_ 
[]  Other:_ Alvarenga Sprain, DPT, CMTPT 11/20/2018  8:40 AM 
 
 Future Appointments Date Time Provider Colt Jovel 11/28/2018 11:00 AM Carmen Rivera [facility]  
11/30/2018 11:30 AM Carmen Rivera [facility]  
12/4/2018 11:00 AM Carmen Rivera [facility]  
12/6/2018 11:30 AM Carmen Rivera [facility]  
12/18/2018 11:00 AM Carmen Rivera [facility]  
12/20/2018 11:00 AM Carmen Rivera [facility]  
1/7/2019  1:00 PM HAMZAH Caicedo [address]  
5/20/2019  1:40 PM Marcelle Figueroa MD [address] [place] [address]

## 2018-11-28 ENCOUNTER — HOSPITAL ENCOUNTER (OUTPATIENT)
Dept: PHYSICAL THERAPY | Age: 48
Discharge: HOME | End: 2018-11-28
Payer: SELF-PAY

## 2018-11-28 DIAGNOSIS — M75.101 ROTATOR CUFF TEAR ARTHROPATHY, RIGHT: ICD-10-CM

## 2018-11-28 DIAGNOSIS — G89.18 POST-OP PAIN: ICD-10-CM

## 2018-11-28 DIAGNOSIS — M12.811 ROTATOR CUFF TEAR ARTHROPATHY, RIGHT: ICD-10-CM

## 2018-11-28 PROCEDURE — 97110 THERAPEUTIC EXERCISES: CPT

## 2018-11-28 PROCEDURE — 97016 VASOPNEUMATIC DEVICE THERAPY: CPT

## 2018-11-28 PROCEDURE — 97140 MANUAL THERAPY 1/> REGIONS: CPT

## 2018-11-28 RX ORDER — TRAMADOL HYDROCHLORIDE 50 MG/1
50 TABLET ORAL
Qty: 40 TAB | Refills: 0 | OUTPATIENT
Start: 2018-11-28 | End: 2019-07-29

## 2018-11-28 NOTE — PROGRESS NOTES
PT DAILY TREATMENT NOTE  Patient Name: Oneyda Maravilla 
Date:2018 : 1970 [x]  Patient  Verified Payor: [address] [place] [address] / Plan: 1500 / Product Type: Medicaid / In time:11:00  Out time:11:47 Total Treatment Time (min): 47 Visit #: 1 of 8 Treatment Area: Complete rotator cuff tear or rupture of right shoulder, not specified as traumatic [M75.121] SUBJECTIVE Pain Level (0-10 scale): 4 Any medication changes, allergies to medications, adverse drug reactions, diagnosis change, or new procedure performed?: [x] No    [] Yes (see summary sheet for update) Subjective functional status/changes:   [] No changes reported \"The colder it gets the more it hurts\" OBJECTIVE Modality rationale: decrease inflammation and decrease pain to improve the patients ability to perform ADLs Min Type Additional Details  
 [] Estim:  []Unatt       []IFC  []Premod []Other:  []w/ice   []w/heat Position: Location:  
 [] Estim: []Att    []TENS instruct  []NMES []Other:  []w/US   []w/ice   []w/heat Position: Location:  
 []  Traction: [] Cervical       []Lumbar 
                     [] Prone          []Supine []Intermittent   []Continuous Lbs: 
[] before manual 
[] after manual  
 []  Ultrasound: []Continuous   [] Pulsed []1MHz   []3MHz Location: 
W/cm2:  
 []  Iontophoresis with dexamethasone Location: [] Take home patch  
[] In clinic  
 []  Ice     []  heat 
[]  Ice massage 
[]  Laser  
[]  Anodyne Position: Location:  
 []  Laser with stim 
[]  Other: Position: Location:  
10 [x]  Vasopneumatic Device Pressure:       [x] lo [] med [] hi  
Temperature: [x] lo [] med [] hi  
[] Skin assessment post-treatment:  []intact []redness- no adverse reaction 
  []redness  adverse reaction:  
 
29 min Therapeutic Exercise:  [] See flow sheet :  
 Rationale: increase ROM and increase strength to improve the patients ability to perform ADLs and self care 8 min Manual Therapy:  Right shoulder PROM/capsule stretching, resisted shoulder flexion in supine Rationale: increase ROM and isokinetic muscle strength to improve ease of ADLs With 
 [] TE 
 [] TA 
 [] neuro 
 [] other: Patient Education: [x] Review HEP [] Progressed/Changed HEP based on:  
[] positioning   [] body mechanics   [] transfers   [] heat/ice application   
[] other:   
 
Other Objective/Functional Measures: improving A/AROM against gravity Pain Level (0-10 scale) post treatment: 6 
 
ASSESSMENT/Changes in Function: Pt progressing with mobility against gravity at this time. Still fatigues with repetition and limited ER mobility. Progress ROM and strength as tolerated. Patient will continue to benefit from skilled PT services to modify and progress therapeutic interventions, address functional mobility deficits, address ROM deficits, address strength deficits, analyze and address soft tissue restrictions, analyze and cue movement patterns and analyze and modify body mechanics/ergonomics to attain remaining goals. []  See Plan of Care 
[]  See progress note/recertification 
[]  See Discharge Summary Updated Goals: to be achieved in 4 weeks: 1. Pt will demonstrate 4/5 right shoulder strength for improved ease of ADLs. 2. Pt will demonstrate 110 deg right shoulder AROM flexion for improved self care. 3. Pt will demonstrate EMILEE of right shoulder to occiput for improved grooming. PLAN [x]  Upgrade activities as tolerated     []  Continue plan of care 
[]  Update interventions per flow sheet      
[]  Discharge due to:_ 
[]  Other:_ Karyle Real, DPT, [place] 11/28/2018  11:12 AM 
 
Future Appointments Date Time Provider Colt Jovel 11/30/2018 11:30 AM [facility]  
12/4/2018 11:00 AM [facility]  
 12/6/2018 11:30 AM Demetri Sandoval [facility]  
12/18/2018 11:00 AM Demetri Sandoval [facility]  
12/20/2018 11:00 AM Naga Sharma[address] [facility]  
1/7/2019  1:00 PM HAMZAH Li [address]  
5/20/2019  1:40 PM Miller Stringer MD [address] [place] [address]

## 2018-11-28 NOTE — TELEPHONE ENCOUNTER
PHONE IN RX    Last Visit: 11/06/2018 with HAMZAH Sandoval   Date of Surgery: 07/18/2018 Right reverse total shoulder arthroplasty  Next Appointment: 01/07/2019 with HAMZAH Donaldson  Previous Refill Encounter(s): 11/14/2018 per HAMZAH Sandoval #40    Requested Prescriptions     Pending Prescriptions Disp Refills    traMADol (ULTRAM) 50 mg tablet 40 Tab 0     Sig: Take 1 Tab by mouth every eight (8) hours as needed for Pain. Max Daily Amount: 150 mg.

## 2018-11-30 ENCOUNTER — HOSPITAL ENCOUNTER (OUTPATIENT)
Dept: PHYSICAL THERAPY | Age: 48
Discharge: HOME | End: 2018-11-30
Payer: SELF-PAY

## 2018-11-30 PROCEDURE — 97110 THERAPEUTIC EXERCISES: CPT

## 2018-11-30 PROCEDURE — 97016 VASOPNEUMATIC DEVICE THERAPY: CPT

## 2018-11-30 PROCEDURE — 97140 MANUAL THERAPY 1/> REGIONS: CPT

## 2018-11-30 NOTE — PROGRESS NOTES
PT DAILY TREATMENT NOTE  Patient Name: Jasson Alvarado 
Date:2018 : 1970 [x]  Patient  Verified Payor: [address] [place] [address] / Plan: 1500 / Product Type: Medicaid / In time:11:30  Out time:12:15 Total Treatment Time (min): 45 Visit #: 2 of 8 Treatment Area: Complete rotator cuff tear or rupture of right shoulder, not specified as traumatic [M75.121] SUBJECTIVE Pain Level (0-10 scale): 5 Any medication changes, allergies to medications, adverse drug reactions, diagnosis change, or new procedure performed?: [x] No    [] Yes (see summary sheet for update) Subjective functional status/changes:   [] No changes reported Pt reports unsure whether anything specific caused her increase in pain at end of last session, \"maybe the weights\" referring to increase from 1# to 2# s/l shoulder AROM OBJECTIVE Modality rationale: decrease inflammation and decrease pain to improve the patients ability to perform ADLs Min Type Additional Details  
 [] Estim:  []Unatt       []IFC  []Premod []Other:  []w/ice   []w/heat Position: Location:  
 [] Estim: []Att    []TENS instruct  []NMES []Other:  []w/US   []w/ice   []w/heat Position: Location:  
 []  Traction: [] Cervical       []Lumbar 
                     [] Prone          []Supine []Intermittent   []Continuous Lbs: 
[] before manual 
[] after manual  
 []  Ultrasound: []Continuous   [] Pulsed []1MHz   []3MHz Location: 
W/cm2:  
 []  Iontophoresis with dexamethasone Location: [] Take home patch  
[] In clinic  
 []  Ice     []  heat 
[]  Ice massage 
[]  Laser  
[]  Anodyne Position: Location:  
 []  Laser with stim 
[]  Other: Position: Location:  
10 []  Vasopneumatic Device Pressure:       [x] lo [] med [] hi  
Temperature: [x] lo [] med [] hi  
[] Skin assessment post-treatment:  []intact []redness- no adverse reaction 
  []redness  adverse reaction:  
 
27 min Therapeutic Exercise:  [] See flow sheet :  
Rationale: increase ROM and increase strength to improve the patients ability to perform daily tasks with increased ease 8 min Manual Therapy:  Right STJ mobs, right shoulder PROM flex/abd/ER&IR Rationale: increase ROM and increase tissue extensibility to improve ease of ADLs and self care With 
 [] TE 
 [] TA 
 [] neuro 
 [] other: Patient Education: [x] Review HEP [] Progressed/Changed HEP based on:  
[] positioning   [] body mechanics   [] transfers   [] heat/ice application   
[] other:   
 
Other Objective/Functional Measures: decreased repetitions with s/l shoulder AROM maintaining 2# weight, good tolerance noted Pain Level (0-10 scale) post treatment: 4 
 
ASSESSMENT/Changes in Function: Pt demonstrates good PROM overall, limited by shoulder strength against gravity at this time. Progress as tolerated with shoulder mobility in functional positions Patient will continue to benefit from skilled PT services to modify and progress therapeutic interventions, address functional mobility deficits, address ROM deficits, address strength deficits, analyze and address soft tissue restrictions, analyze and cue movement patterns and analyze and modify body mechanics/ergonomics to attain remaining goals. []  See Plan of Care 
[]  See progress note/recertification 
[]  See Discharge Summary Updated Goals: to be achieved in 4 weeks: 1. Pt will demonstrate 4/5 right shoulder strength for improved ease of ADLs. 2. Pt will demonstrate 110 deg right shoulder AROM flexion for improved self care. Progressing ~95 deg in standing 11/29/18 3. Pt will demonstrate EMILEE of right shoulder to occiput for improved grooming. PLAN [x]  Upgrade activities as tolerated     []  Continue plan of care 
[]  Update interventions per flow sheet      
[]  Discharge due to:_ 
[]  Other:_   
 
 Jose Alfredo Camarena, BILLYT, CMTPT 11/30/2018  11:30 AM 
 
Future Appointments Date Time Provider Colt Jovel 12/4/2018 11:00 AM [facility]  
12/6/2018 11:30 AM [facility]  
12/18/2018 11:00 AM [facility]  
12/20/2018 11:00 AM [place], [address] [facility]  
1/7/2019  1:00 PM HAMZAH Cornejo [place] [address]  
5/20/2019  1:40 PM Terell Russell MD [address] [place] [address]

## 2018-12-04 ENCOUNTER — HOSPITAL ENCOUNTER (OUTPATIENT)
Dept: PHYSICAL THERAPY | Age: 48
Discharge: HOME | End: 2018-12-04
Payer: MEDICAID

## 2018-12-04 PROCEDURE — 97140 MANUAL THERAPY 1/> REGIONS: CPT

## 2018-12-04 PROCEDURE — 97110 THERAPEUTIC EXERCISES: CPT

## 2018-12-04 PROCEDURE — 97016 VASOPNEUMATIC DEVICE THERAPY: CPT

## 2018-12-04 NOTE — PROGRESS NOTES
PT DAILY TREATMENT NOTE  Patient Name: Unique Blair 
Date:2018 : 1970 [x]  Patient  Verified Payor: [address] [place] [address] / Plan:     / Product Type: Medicaid / In time:11:00  Out time:11:47 Total Treatment Time (min): 47 Visit #: 3 of 8 Treatment Area: Complete rotator cuff tear or rupture of right shoulder, not specified as traumatic [M75.121] SUBJECTIVE Pain Level (0-10 scale): 4 Any medication changes, allergies to medications, adverse drug reactions, diagnosis change, or new procedure performed?: [x] No    [] Yes (see summary sheet for update) Subjective functional status/changes:   [] No changes reported \"Doing alright\" OBJECTIVE Modality rationale: decrease inflammation and decrease pain to improve the patients ability to perform ADLs Type Additional Details  
[] Estim:  []Unatt       []IFC  []Premod []Other:  []w/ice   []w/heat Position: Location:  
[] Estim: []Att    []TENS instruct  []NMES []Other:  []w/US   []w/ice   []w/heat Position: Location:  
[]  Traction: [] Cervical       []Lumbar 
                     [] Prone          []Supine []Intermittent   []Continuous Lbs: 
[] before manual 
[] after manual  
[]  Ultrasound: []Continuous   [] Pulsed []1MHz   []3MHz W/cm2: 
Location:  
[]  Iontophoresis with dexamethasone Location: [] Take home patch  
[] In clinic  
[]  Ice     []  heat 
[]  Ice massage 
[]  Laser  
[]  Anodyne Position: Location:  
[]  Laser with stim 
[]  Other:  Position: Location:  
[x]  Vasopneumatic Device (10min) Pressure:      X- lo - med - hi  
Temperature: X lo - med - hi  
[] Skin assessment post-treatment:  []intact []redness- no adverse reaction 
  []redness  adverse reaction:  
 
 
29 min Therapeutic Exercise:  [] See flow sheet :  
Rationale: increase ROM and increase strength to improve the patients ability to perform daily tasks 8 min Manual Therapy:  Right STJ mobs, capsule stretching ABD/ER/IR Rationale: increase ROM and increase tissue extensibility to improve ease of self care With 
 [] TE 
 [] TA 
 [] neuro 
 [] other: Patient Education: [x] Review HEP [] Progressed/Changed HEP based on:  
[] positioning   [] body mechanics   [] transfers   [] heat/ice application   
[] other:   
 
Other Objective/Functional Measures: right shoulder EMILEE to temporal region Pt reports some anterior shoulder discomfort with ABD A/AROM Pain Level (0-10 scale) post treatment: 5 
 
ASSESSMENT/Changes in Function: Pt challenged with activities against gravity, especially abduction plane. She is limited with ER mobility as well. Will continue progressing AROM/strength as tolerated towards D/C Patient will continue to benefit from skilled PT services to modify and progress therapeutic interventions, address functional mobility deficits, address ROM deficits, address strength deficits, analyze and address soft tissue restrictions, analyze and cue movement patterns and analyze and modify body mechanics/ergonomics to attain remaining goals. []  See Plan of Care 
[]  See progress note/recertification 
[]  See Discharge Summary Updated Goals: to be achieved in 4 weeks: 1. Pt will demonstrate 4/5 right shoulder strength for improved ease of ADLs. 2. Pt will demonstrate 110 deg right shoulder AROM flexion for improved self care. Progressing ~95 deg in standing 11/29/18 3. Pt will demonstrate EMILEE of right shoulder to occiput for improved grooming. Not met limited to temporal region 12/4/18 PLAN [x]  Upgrade activities as tolerated     []  Continue plan of care 
[]  Update interventions per flow sheet      
[]  Discharge due to:_ 
[]  Other:_ Delvin Spear DPT, CMTPT 12/4/2018  11:08 AM 
 
Future Appointments Date Time Provider Colt Jovel 12/6/2018 11:30 AM [place] [facility]  
12/18/2018 11:00 AM [place] [place] [facility]  
12/20/2018 11:00 AM Zachary [address] [facility]  
1/7/2019  1:00 PM HAMZAH [place] [address]  
5/20/2019  1:40 PM Marilou Martinez MD [address] [place] [address]

## 2018-12-06 ENCOUNTER — HOSPITAL ENCOUNTER (OUTPATIENT)
Dept: PHYSICAL THERAPY | Age: 48
Discharge: HOME | End: 2018-12-06
Payer: MEDICAID

## 2018-12-06 PROCEDURE — 97110 THERAPEUTIC EXERCISES: CPT

## 2018-12-06 PROCEDURE — 97598 DBRDMT OPN WND ADDL 20CM/<: CPT

## 2018-12-06 PROCEDURE — 97140 MANUAL THERAPY 1/> REGIONS: CPT

## 2018-12-06 PROCEDURE — 97016 VASOPNEUMATIC DEVICE THERAPY: CPT

## 2018-12-06 NOTE — PROGRESS NOTES
PT DAILY TREATMENT NOTE  Patient Name: Nick Norris 
Date:2018 : 1970 [x]  Patient  Verified Payor: [address] [place] [address] / Plan:     / Product Type: Medicaid / In time:11:30  Out time:12:18 Total Treatment Time (min): 48 Visit #: 4 of 8 Treatment Area: Complete rotator cuff tear or rupture of right shoulder, not specified as traumatic [M75.121] SUBJECTIVE Pain Level (0-10 scale): 4 Any medication changes, allergies to medications, adverse drug reactions, diagnosis change, or new procedure performed?: [x] No    [] Yes (see summary sheet for update) Subjective functional status/changes:   [] No changes reported Pt reports no new complaints at this time OBJECTIVE Modality rationale: decrease inflammation and decrease pain to improve the patients ability to perform ADLs Min Type Additional Details  
 [] Estim:  []Unatt       []IFC  []Premod []Other:  []w/ice   []w/heat Position: Location:  
 [] Estim: []Att    []TENS instruct  []NMES []Other:  []w/US   []w/ice   []w/heat Position: Location:  
 []  Traction: [] Cervical       []Lumbar 
                     [] Prone          []Supine []Intermittent   []Continuous Lbs: 
[] before manual 
[] after manual  
 []  Ultrasound: []Continuous   [] Pulsed []1MHz   []3MHz W/cm2: 
Location:  
 []  Iontophoresis with dexamethasone Location: [] Take home patch  
[] In clinic  
 []  Ice     []  heat 
[]  Ice massage 
[]  Laser  
[]  Anodyne Position: Location:  
 []  Laser with stim 
[]  Other:  Position: Location:  
10 [x]  Vasopneumatic Device Pressure:       [x] lo [] med [] hi  
Temperature: [x] lo [] med [] hi  
[] Skin assessment post-treatment:  []intact []redness- no adverse reaction 
  []redness  adverse reaction:  
 
 
 
30 min Therapeutic Exercise:  [] See flow sheet :  
 Rationale: increase ROM and increase strength to improve the patients ability to perform ADLs and self care 8 min Manual Therapy:  Right STJ mobs, PROM right shoulder, capsule stretching Rationale: increase ROM and increase tissue extensibility to improve ease of ADLs With 
 [] TE 
 [] TA 
 [] neuro 
 [] other: Patient Education: [x] Review HEP [] Progressed/Changed HEP based on:  
[] positioning   [] body mechanics   [] transfers   [] heat/ice application   
[] other:   
 
Other Objective/Functional Measures: 104 deg right shoulder flexion AROM Pain Level (0-10 scale) post treatment: 3 
 
ASSESSMENT/Changes in Function: Pt shows some progression with shoulder ROM in standing. Will progress further against gravity in functional planes for improved ADL ease Patient will continue to benefit from skilled PT services to modify and progress therapeutic interventions, address functional mobility deficits, address ROM deficits, address strength deficits, analyze and address soft tissue restrictions, analyze and cue movement patterns and analyze and modify body mechanics/ergonomics to attain remaining goals. []  See Plan of Care 
[]  See progress note/recertification 
[]  See Discharge Summary Updated Goals: to be achieved in 4 weeks: 1. Pt will demonstrate 4/5 right shoulder strength for improved ease of ADLs. 2. Pt will demonstrate 110 deg right shoulder AROM flexion for improved self care. Progressing ~95 deg in standing 11/29/18; improved to 104 deg 12/6/18 3. Pt will demonstrate EMILEE of right shoulder to occiput for improved grooming. Not met limited to temporal region 12/4/18 PLAN [x]  Upgrade activities as tolerated     []  Continue plan of care 
[]  Update interventions per flow sheet      
[]  Discharge due to:_ 
[]  Other:_ Avni Durant DPT, CMTPT 12/6/2018  11:40 AM 
 
Future Appointments Date Time Provider Colt Jovel 12/18/2018 11:00 AM Zach[facility] [address] [facility]  
12/20/2018 11:00 AM Zachary [address] [facility]  
1/7/2019  1:00 PM HAMZAH Mar[place] [address] [facility]  
5/20/2019  1:40 PM Alvaro Calderón MD [address] [place] [address]

## 2018-12-18 ENCOUNTER — HOSPITAL ENCOUNTER (OUTPATIENT)
Dept: PHYSICAL THERAPY | Age: 48
Discharge: HOME | End: 2018-12-18
Payer: MEDICAID

## 2018-12-18 DIAGNOSIS — M75.101 ROTATOR CUFF TEAR ARTHROPATHY, RIGHT: ICD-10-CM

## 2018-12-18 DIAGNOSIS — G89.18 POST-OP PAIN: ICD-10-CM

## 2018-12-18 DIAGNOSIS — M12.811 ROTATOR CUFF TEAR ARTHROPATHY, RIGHT: ICD-10-CM

## 2018-12-18 PROCEDURE — 97016 VASOPNEUMATIC DEVICE THERAPY: CPT

## 2018-12-18 PROCEDURE — 97110 THERAPEUTIC EXERCISES: CPT

## 2018-12-18 PROCEDURE — 97140 MANUAL THERAPY 1/> REGIONS: CPT

## 2018-12-18 RX ORDER — TRAMADOL HYDROCHLORIDE 50 MG/1
50 TABLET ORAL
Qty: 40 TAB | Refills: 0 | OUTPATIENT
Start: 2018-12-18

## 2018-12-18 NOTE — TELEPHONE ENCOUNTER
PHONE IN [facility] reports the last fill date for Ultram as 1970. There appears to be no inconsistencies in regards to the prescribing of this medication. Last Visit: 11/06/2018 with HAMZAH Reddy  Date of Surgery: 07/18/2018 Right Reverse total shoulder arthroplasty  Next Appointment: 01/08/2019 with HAMZAH Donaldson  Previous Refill Encounter(s): 11/28/2018 per HAMZAH Reddy #[id number]    Requested Prescriptions     Pending Prescriptions Disp Refills    traMADol (ULTRAM) 50 mg tablet 40 Tab 0     Sig: Take 1 Tab by mouth every eight (8) hours as needed for Pain. Max Daily Amount: 150 mg.

## 2018-12-18 NOTE — PROGRESS NOTES
PT DAILY TREATMENT NOTE     Patient Name: Filiberto Bennett  Date:2018  : 1970  [x]  Patient  Verified  Payor: [place] / Plan: 1500 / Product Type: Medicaid /    In time:11:00  Out time:11:48  Total Treatment Time (min): 48  Visit #: 5 of 8    Treatment Area: Complete rotator cuff tear or rupture of right shoulder, not specified as traumatic [M75.121]    SUBJECTIVE  Pain Level (0-10 scale): 3  Any medication changes, allergies to medications, adverse drug reactions, diagnosis change, or new procedure performed?: [x] No    [] Yes (see summary sheet for update)  Subjective functional status/changes:   [] No changes reported  \"Selena come a long way, but not as far as I hoped\"    OBJECTIVE    Modality rationale: decrease inflammation and decrease pain to improve the patients ability to perform ADLs   Min Type Additional Details    [] Estim:  []Unatt       []IFC  []Premod                        []Other:  []w/ice   []w/heat  Position:  Location:    [] Estim: []Att    []TENS instruct  []NMES                    []Other:  []w/US   []w/ice   []w/heat  Position:  Location:    []  Traction: [] Cervical       []Lumbar                       [] Prone          []Supine                       []Intermittent   []Continuous Lbs:  [] before manual  [] after manual    []  Ultrasound: []Continuous   [] Pulsed                           []1MHz   []3MHz W/cm2:  Location:    []  Iontophoresis with dexamethasone         Location: [] Take home patch   [] In clinic    []  Ice     []  heat  []  Ice massage  []  Laser   []  Anodyne Position:  Location:    []  Laser with stim  []  Other:  Position:  Location:   10 [x]  Vasopneumatic Device Pressure:       [x] lo [] med [] hi   Temperature: [x] lo [] med [] hi   [] Skin assessment post-treatment:  []intact []redness- no adverse reaction    []redness  adverse reaction:       30 min Therapeutic Exercise:  [] See flow sheet :   Rationale: increase ROM and increase strength to improve the patients ability to perform ADLs and self care    8 min Manual Therapy:  Right scap mobs, PROM right shoulder flex/abd/ER&IR   Rationale: increase ROM and increase tissue extensibility to perform ADLs and self care          With   [] TE   [] TA   [] neuro   [] other: Patient Education: [x] Review HEP    [] Progressed/Changed HEP based on:   [] positioning   [] body mechanics   [] transfers   [] heat/ice application    [] other:      Other Objective/Functional Measures: Right shoulder flexion AROM 110 deg      Pain Level (0-10 scale) post treatment: 4    ASSESSMENT/Changes in Function: Pt demonstrates improved shoulder AROM elevation today, she is still limited with shoulder ABD at this time. Will continue PT for 2 more weeks progressing AROM, especially ABD and ER, towards likely D/C to HEP strengthening continuation    Patient will continue to benefit from skilled PT services to modify and progress therapeutic interventions, address functional mobility deficits, address ROM deficits, address strength deficits, analyze and address soft tissue restrictions, analyze and cue movement patterns and analyze and modify body mechanics/ergonomics to attain remaining goals. []  See Plan of Care  []  See progress note/recertification  []  See Discharge Summary         Updated Goals: to be achieved in 4 weeks:  1. Pt will demonstrate 4/5 right shoulder strength for improved ease of ADLs. 2. Pt will demonstrate 110 deg right shoulder AROM flexion for improved self care. Progressing ~95 deg in standing 11/29/18; improved to 104 deg 12/6/18 Met 110 deg 12/18/18  3.  Pt will demonstrate EMILEE of right shoulder to occiput for improved grooming. Not met limited to temporal region 12/4/18      PLAN  [x]  Upgrade activities as tolerated     []  Continue plan of care  []  Update interventions per flow sheet       []  Discharge due to:_  []  Other:_      Marcelina Cr DPT, [place] 12/18/2018  11:12 AM    Future Appointments   Date Time Provider Colt Jovel   12/20/2018 11:00 AM [place], [address] [facility]   1/8/2019 10:45 AM [facility]    5/20/2019  1:40 PM Bryan Washington MD [address] [place] [address]

## 2018-12-20 ENCOUNTER — HOSPITAL ENCOUNTER (OUTPATIENT)
Dept: PHYSICAL THERAPY | Age: 48
Discharge: HOME | End: 2018-12-20
Payer: MEDICAID

## 2018-12-20 PROCEDURE — 97110 THERAPEUTIC EXERCISES: CPT

## 2018-12-20 PROCEDURE — 97016 VASOPNEUMATIC DEVICE THERAPY: CPT

## 2018-12-20 NOTE — PROGRESS NOTES
PT DAILY TREATMENT NOTE     Patient Name: Keshav Alegre  Date:2018  : 1970  [x]  Patient  Verified  Payor: [place] / Plan: 1500 / Product Type: Medicaid /    In time:11:00  Out time:11:42  Total Treatment Time (min): 42  Visit #: 6 of 8    Treatment Area: Complete rotator cuff tear or rupture of right shoulder, not specified as traumatic [M75.121]    SUBJECTIVE  Pain Level (0-10 scale): 4  Any medication changes, allergies to medications, adverse drug reactions, diagnosis change, or new procedure performed?: [x] No    [] Yes (see summary sheet for update)  Subjective functional status/changes:   [] No changes reported  \"The rain doesn't help\"    OBJECTIVE    Modality rationale: decrease inflammation and decrease pain to improve the patients ability to perform ADLs   Type Additional Details   [] Estim:  []Unatt       []IFC  []Premod                        []Other:  []w/ice   []w/heat  Position:  Location:   [] Estim: []Att    []TENS instruct  []NMES                    []Other:  []w/US   []w/ice   []w/heat  Position:  Location:   []  Traction: [] Cervical       []Lumbar                       [] Prone          []Supine                       []Intermittent   []Continuous Lbs:  [] before manual  [] after manual   []  Ultrasound: []Continuous   [] Pulsed                           []1MHz   []3MHz W/cm2:  Location:   []  Iontophoresis with dexamethasone         Location: [] Take home patch   [] In clinic   []  Ice     []  heat  []  Ice massage  []  Laser   []  Anodyne Position:  Location:   []  Laser with stim  []  Other:  Position:  Location:   [x]  Vasopneumatic Device   [] Skin assessment post-treatment:  []intact []redness- no adverse reaction    []redness  adverse reaction:       32 min Therapeutic Exercise:  [] See flow sheet :   Rationale: increase ROM and increase strength to improve the patients ability to perform daily tasks and self care          With   [] TE   [] TA   [] neuro   [] other: Patient Education: [x] Review HEP    [] Progressed/Changed HEP based on:   [] positioning   [] body mechanics   [] transfers   [] heat/ice application    [] other:      Other Objective/Functional Measures: pt reports anterior right shoulder discomfort with ABD strength testing, thus some repetitions limited following     Pain Level (0-10 scale) post treatment: 4    ASSESSMENT/Changes in Function: Pt still demonstrating strength limitations in right shoulder at this time but does demonstrate improved functional ER. Will continue PT for 2 weeks to progress strength towards HEP management. Patient will continue to benefit from skilled PT services to modify and progress therapeutic interventions, address functional mobility deficits, address ROM deficits, address strength deficits, analyze and address soft tissue restrictions, analyze and cue movement patterns and analyze and modify body mechanics/ergonomics to attain remaining goals. []  See Plan of Care  [x]  See progress note/recertification  []  See Discharge Summary    Updated Goals: to be achieved in 4 weeks:  1. Pt will demonstrate 4/5 right shoulder strength for improved ease of ADLs. Not met 4-/5 flexion and ABD 12/20/18  2. Pt will demonstrate 110 deg right shoulder AROM flexion for improved self care. Progressing ~95 deg in standing 11/29/18; improved to 104 deg 12/6/18 Met 110 deg 12/18/18  3. Pt will demonstrate EMILEE of right shoulder to occiput for improved grooming. Not met limited to temporal region 12/4/18;  Met EMILEE to occiput 12/20/18      PLAN  [x]  Upgrade activities as tolerated     []  Continue plan of care  []  Update interventions per flow sheet       []  Discharge due to:_  []  Other:_      Augustus Michael DPT, CMTPT 12/20/2018  11:18 AM    Future Appointments   Date Time Provider Colt Jovel   1/8/2019 10:45 AM HAMZAH [place] [address]   5/20/2019  1:40 PM Tereza Tirado MD [facility] [address]

## 2018-12-20 NOTE — PROGRESS NOTES
[address]  [facility]  [place], [address]  [phone] fax    Physical Therapy Progress Note  Patient name: [facility] of Care: 7/25/2018   Referral Kevin Conte,* UUT: 3/91/9509   Medical/Treatment Diagnosis: Complete rotator cuff tear or rupture of right shoulder, not specified as traumatic [M75.121] Onset Date: 7/20/2018           Prior Hospitalization: see medical history Provider#: [id number]   Medications: Verified on Patient Summary List     Comorbidities: previous failed right RTC repair x 2, depression, arthritis, asthma, tobacco use  Prior Level of Function: Pt reports sedentary lifestyle but I with ADLs and self care  Visits from Start of Care: 36    Missed Visits: 1      Established Goals:        Excellent         Good         Limited            None  [x] Increased ROM   []  [x]  []  []  [x] Increased Strength  []  []  [x]  []  [] Increased Mobility  []  []  []  []   [] Decreased Pain   []  []  []  []  [] Decreased Swelling  []  []  []  []    Key Functional Changes: improved shoulder PROM/AROM  Updated Goals: to be achieved in 4 weeks:  1. Pt will demonstrate 4/5 right shoulder strength for improved ease of ADLs. Not met 4-/5 flexion and ABD 12/20/18  2. Pt will demonstrate 110 deg right shoulder AROM flexion for improved self care. Progressing ~95 deg in standing 11/29/18; improved to 104 deg 12/6/18 Met 110 deg 12/18/18  3. Pt will demonstrate EMILEE of right shoulder to occiput for improved grooming. Not met limited to temporal region 12/4/18; Met EMILEE to occiput 12/20/18    Updated Goals: to be achieved in 2 weeks:  1. Pt will demonstrate 4/5 right shoulder strength for improved ease of ADLs. 2. Pt will demonstrate independence with updated HEP for further progression of strengthening post D/C.     ASSESSMENT/RECOMMENDATIONS: Pt still demonstrating strength limitations in right shoulder at this time but does demonstrate improved functional ER. Will continue PT for 2 weeks to progress strength towards HEP management. [x]Continue therapy per initial plan/protocol at a frequency of  2 x per week for 2 weeks  []Continue therapy with the following recommended changes:_____________________      _____________________________________________________________________  []Discontinue therapy progressing towards or have reached established goals  []Discontinue therapy due to lack of appreciable progress towards goals  []Discontinue therapy due to lack of attendance or compliance  []Await Physician's recommendations/decisions regarding therapy  []Other:________________________________________________________________    Thank you for this referral.    Jaja Leon 12/20/2018 12:50 PM  NOTE TO PHYSICIAN:  PLEASE COMPLETE THE ORDERS BELOW AND   FAX TO [facility] Physical [facility]: [phone]  If you are unable to process this request in 24 hours please contact our office: [phone]    ? I have read the above report and request that my patient continue as recommended. ? I have read the above report and request that my patient continue therapy with the following changes/special instructions:__________________________________________________________  ? I have read the above report and request that my patient be discharged from therapy.     Physicians signature: ______________________________Date: ______Time:______

## 2019-01-04 ENCOUNTER — HOSPITAL ENCOUNTER (OUTPATIENT)
Dept: PHYSICAL THERAPY | Age: 49
Discharge: HOME | End: 2019-01-04
Payer: MEDICAID

## 2019-01-04 PROCEDURE — 97110 THERAPEUTIC EXERCISES: CPT

## 2019-01-04 PROCEDURE — 97140 MANUAL THERAPY 1/> REGIONS: CPT

## 2019-01-04 NOTE — PROGRESS NOTES
PT DAILY TREATMENT NOTE  Patient Name: Agnieszka Camilo 
Date:2019 : 1970 [x]  Patient  Verified Payor: [address] [place] [address] / Plan: 1500  / Product Type: Medicaid / In time:3:30  Out time:4:02 Total Treatment Time (min): 32 Visit #: 1 of 4 Treatment Area: Complete rotator cuff tear or rupture of right shoulder, not specified as traumatic [M75.121] SUBJECTIVE Pain Level (0-10 scale): 3 Any medication changes, allergies to medications, adverse drug reactions, diagnosis change, or new procedure performed?: [x] No    [] Yes (see summary sheet for update) Subjective functional status/changes:   [] No changes reported Pt reports she feels she is getting a little stronger OBJECTIVE Modality rationale: PD to improve the patients ability to Min Type Additional Details  
 [] Estim:  []Unatt       []IFC  []Premod []Other:  []w/ice   []w/heat Position: Location:  
 [] Estim: []Att    []TENS instruct  []NMES []Other:  []w/US   []w/ice   []w/heat Position: Location:  
 []  Traction: [] Cervical       []Lumbar 
                     [] Prone          []Supine []Intermittent   []Continuous Lbs: 
[] before manual 
[] after manual  
 []  Ultrasound: []Continuous   [] Pulsed []1MHz   []3MHz W/cm2: 
Location:  
 []  Iontophoresis with dexamethasone Location: [] Take home patch  
[] In clinic  
 []  Ice     []  heat 
[]  Ice massage 
[]  Laser  
[]  Anodyne Position: Location:  
 []  Laser with stim 
[]  Other:  Position: Location:  
 []  Vasopneumatic Device Pressure:       [] lo [] med [] hi  
Temperature: [] lo [] med [] hi  
[] Skin assessment post-treatment:  []intact []redness- no adverse reaction 
  []redness  adverse reaction:  
 
 
24 min Therapeutic Exercise:  [] See flow sheet :  
Rationale: increase ROM and increase strength to improve the patients ability to perform daily tasks and self care 8 min Manual Therapy:  Right scap mobs, PROM right shoulder flex/abd/ER&IR Rationale: increase ROM and increase tissue extensibility to improve ease of ADL performance With 
 [] TE 
 [] TA 
 [] neuro 
 [] other: Patient Education: [x] Review HEP [] Progressed/Changed HEP based on:  
[] positioning   [] body mechanics   [] transfers   [] heat/ice application   
[] other:   
 
Other Objective/Functional Measures: 4-/5 right shoulder strength Pain Level (0-10 scale) post treatment: 4 
 
ASSESSMENT/Changes in Function: Pt progressing with shoulder strength at this time, good tolerance to therex aside from fatigue. Continue to progress AROM and strength towards D/C Patient will continue to benefit from skilled PT services to modify and progress therapeutic interventions, address functional mobility deficits, address ROM deficits, address strength deficits, analyze and address soft tissue restrictions, analyze and cue movement patterns and analyze and modify body mechanics/ergonomics to attain remaining goals. []  See Plan of Care 
[]  See progress note/recertification 
[]  See Discharge Summary Updated Goals: to be achieved in 2 weeks: 1. Pt will demonstrate 4/5 right shoulder strength for improved ease of ADLs. Progressing 4-/5 1/4/19 2. Pt will demonstrate independence with updated HEP for further progression of strengthening post D/C. PLAN [x]  Upgrade activities as tolerated     []  Continue plan of care 
[]  Update interventions per flow sheet      
[]  Discharge due to:_ 
[]  Other:_ Atul Cabezas DPT, CMTPT 1/4/2019  3:36 PM 
 
Future Appointments Date Time Provider Colt Sanchezi 1/8/2019 10:45 AM HAMZAH Fierro [facility]MY SCHED  
1/8/2019 11:30 AM Shay Moore [facility]  
1/10/2019 12:00 PM [place], [address] [facility]  
1/15/2019 11:00 AM Shay Moore [facility]  
 5/20/2019  1:40 PM Clinton Helms MD [address] [place] [address]

## 2019-01-08 ENCOUNTER — HOSPITAL ENCOUNTER (OUTPATIENT)
Dept: PHYSICAL THERAPY | Age: 49
Discharge: HOME | End: 2019-01-08
Payer: MEDICAID

## 2019-01-08 ENCOUNTER — OFFICE VISIT (OUTPATIENT)
Dept: ORTHOPEDIC SURGERY | Age: 49
End: 2019-01-08

## 2019-01-08 VITALS
DIASTOLIC BLOOD PRESSURE: 59 MMHG | SYSTOLIC BLOOD PRESSURE: 112 MMHG | TEMPERATURE: 96.3 F | WEIGHT: 211 LBS | BODY MASS INDEX: 33.91 KG/M2 | RESPIRATION RATE: 16 BRPM | HEIGHT: 66 IN | HEART RATE: 66 BPM | OXYGEN SATURATION: 98 %

## 2019-01-08 DIAGNOSIS — G89.18 POST-OP PAIN: Primary | ICD-10-CM

## 2019-01-08 DIAGNOSIS — M25.511 RIGHT SHOULDER PAIN, UNSPECIFIED CHRONICITY: ICD-10-CM

## 2019-01-08 PROCEDURE — 97016 VASOPNEUMATIC DEVICE THERAPY: CPT

## 2019-01-08 PROCEDURE — 97110 THERAPEUTIC EXERCISES: CPT

## 2019-01-08 PROCEDURE — 97140 MANUAL THERAPY 1/> REGIONS: CPT

## 2019-01-08 NOTE — PROGRESS NOTES
I have individually seen and examined Atiya Araya in addition to the physician assistant. Assessment:    ICD-10-CM ICD-9-CM    1. Post-op pain G89.18 338.18    2. Right shoulder pain, unspecified chronicity M25.511 719.41 AMB POC XRAY, SHOULDER; COMPLETE, 2+        Plan: 1. The patient has persistent pain s/p total shoulder arthroplasty. 2. She has good mobility, but still pain with certain movements of the arm. 3. I would like her to follow up with pain management and continue to work on mobility.       Scribed by Doroteo Elliott ([address]) as dictated by Vargas Madrigal MD     I, Dr. Vargas Madrigal, confirm that all documentation is accurate.     Vargas Madrigal M.D.   [facility]

## 2019-01-08 NOTE — PROGRESS NOTES
1. Have you been to the ER, urgent care clinic since your last visit? Hospitalized since your last visit? No    2. Have you seen or consulted any other health care providers outside of the [address] [place] [address] since your last visit? Include any pap smears or colon screening.  No

## 2019-01-08 NOTE — PROGRESS NOTES
Quita Monge  1970     HISTORY OF PRESENT ILLNESS  Quita Monge is a 46 y.o. female who presents today for evaluation s/p Right reverse total shoulder arthroplasty on 7/18/18. Patient is back in PT. She feels like she is overall making improvements. She able to reach to the top of her head. She states that she is in constant pain. The pain is anterior in location. She notes that her pain has not improved in the last 2 months. Pain is a 5/10. Patient denies any fever, chills, chest pain, shortness of breath or calf pain. The remainder of the review of systems is negative. There are no new illness or injuries to report since last seen in the office. No changes in medications, allergies, social or family history. PHYSICAL EXAM:   Visit Vitals  /59   Pulse 66   Temp 96.3 °F (35.7 °C) (Oral)   Resp 16   Ht 5' 6\" (1.676 m)   Wt 211 lb (95.7 kg)   SpO2 98%   BMI 34.06 kg/m²      The patient is a well-developed, well-nourished female in no acute distress. The patient is alert and oriented times three. The patient appears to be well groomed. Mood and affect are normal.  LYMPHATIC: lymph nodes are not enlarged and are within normal limits  SKIN: normal in color and non tender to palpation. There are no bruises or abrasions noted. NEUROLOGICAL: Motor sensory exam is within normal limits. Reflexes are equal bilaterally. There is normal sensation to pinprick and light touch   ORTHOPEDIC EXAM of right shoulder:  Inspection: swelling not present,  Bruising not present  Incision well healed  Passive glenohumeral abduction 0-90 degrees, able to reach to top of her head  Stability: Stable  Strength: 4/5  2+ distal pulses    IMAGING: 3 view xray images of right shoulder on 1/8/2019 read and reviewed by myself reveal reverse total shoulder arthroplasty hardware in excellent position     IMPRESSION:  S/P Right reverse total shoulder arthroplasty    PLAN:   1. She is improving slowly post operatively.  Will cont with PT transitioning to HEP when ready. Encouraged her to use arm more with ADLs. Recommend pain management as well  Risk factors include: smoker  2. No cortisone injection indicated today   3. Yes Physical/Occupational Therapy indicated today: R shoulder transition to HEP after next few sessions  4. No diagnostic test indicated today:   5. No durable medical equipment indicated today  6. No referral indicated today   7. No medications indicated today:   8.  No Narcotic indicated today   RTC 2 months with Dr. John Boateng    Patient seen and evaluated by Dr. John Boateng today who agrees with treatment plan       Hugo Bhakta PA-C  [address] [place] [address] and Spine Specialist

## 2019-01-08 NOTE — PROGRESS NOTES
PT DAILY TREATMENT NOTE  Patient Name: Meredith Calderon 
Date:2019 : 1970 [x]  Patient  Verified Payor: [address] [place] [address] / Plan: 1500  / Product Type: Medicaid / In time:11:33  Out time:12:09 Total Treatment Time (min): 36 Visit #: 2 of 4 Treatment Area: Complete rotator cuff tear or rupture of right shoulder, not specified as traumatic [M75.121] SUBJECTIVE Pain Level (0-10 scale): 5 Any medication changes, allergies to medications, adverse drug reactions, diagnosis change, or new procedure performed?: [x] No    [] Yes (see summary sheet for update) Subjective functional status/changes:   [] No changes reported Pt presents following MD f/u reporting imaging taken showing good component placement. Reports MD wants her to f/u in 2 months OBJECTIVE Modality rationale: decrease inflammation and decrease pain to improve the patients ability to perform ADLs Min Type Additional Details  
 [] Estim:  []Unatt       []IFC  []Premod []Other:  []w/ice   []w/heat Position: Location:  
 [] Estim: []Att    []TENS instruct  []NMES []Other:  []w/US   []w/ice   []w/heat Position: Location:  
 []  Traction: [] Cervical       []Lumbar 
                     [] Prone          []Supine []Intermittent   []Continuous Lbs: 
[] before manual 
[] after manual  
 []  Ultrasound: []Continuous   [] Pulsed []1MHz   []3MHz W/cm2: 
Location:  
 []  Iontophoresis with dexamethasone Location: [] Take home patch  
[] In clinic  
 []  Ice     []  heat 
[]  Ice massage 
[]  Laser  
[]  Anodyne Position: Location:  
 []  Laser with stim 
[]  Other:  Position: Location:  
10 [x]  Vasopneumatic Device Pressure:       [x] lo [] med [] hi  
Temperature: [x] lo [] med [] hi  
[] Skin assessment post-treatment:  []intact []redness- no adverse reaction 
  []redness  adverse reaction: 18 min Therapeutic Exercise:  [] See flow sheet :  
Rationale: increase ROM and increase strength to improve the patients ability to perform daily tasks 8 min Manual Therapy:  PROM right shoulder flex/abd capsule stretching Rationale: increase ROM and increase tissue extensibility to improve ease of self care and ADLs With 
 [] TE 
 [] TA 
 [] neuro 
 [] other: Patient Education: [x] Review HEP [] Progressed/Changed HEP based on:  
[] positioning   [] body mechanics   [] transfers   [] heat/ice application   
[] other:   
 
Other Objective/Functional Measures: attempted to progress s/l shoulder resistance to 3# but pt demonstrates difficulty thus returned to 2# Pain Level (0-10 scale) post treatment: 5 
 
ASSESSMENT/Changes in Function: Held some interventions today as pt reports fatigue following her MD visit prior to therapy. Will continue to focus on improved strength at this time progressing towards D/C to HEP Patient will continue to benefit from skilled PT services to modify and progress therapeutic interventions, address functional mobility deficits, address ROM deficits, address strength deficits, analyze and address soft tissue restrictions, analyze and cue movement patterns and analyze and modify body mechanics/ergonomics to attain remaining goals. []  See Plan of Care 
[]  See progress note/recertification 
[]  See Discharge Summary Progress towards goals / Updated goals: 
Updated Goals: to be achieved in 2 weeks: 1. Pt will demonstrate 4/5 right shoulder strength for improved ease of ADLs. Progressing 4-/5 1/4/19 2. Pt will demonstrate independence with updated HEP for further progression of strengthening post D/C. PLAN [x]  Upgrade activities as tolerated     [x]  Continue plan of care 
[]  Update interventions per flow sheet      
[]  Discharge due to:_ 
[]  Other:_ Carissa Hall DPT CMTPT 1/8/2019  11:38 AM 
 
Future Appointments Date Time Provider Colt Jovel 1/10/2019 12:00 PM [place], [address] HBV  
1/15/2019 11:00 AM Sanjuana Heading [id number]  
3/11/2019  1:10 PM Jean Carlos Brennan MD [facility] [address]  
5/20/2019  1:40 PM Fauzia Jimenez MD [address] [place] [address]

## 2019-01-10 ENCOUNTER — HOSPITAL ENCOUNTER (OUTPATIENT)
Dept: PHYSICAL THERAPY | Age: 49
Discharge: HOME | End: 2019-01-10
Payer: MEDICAID

## 2019-01-10 PROCEDURE — 97140 MANUAL THERAPY 1/> REGIONS: CPT

## 2019-01-10 PROCEDURE — 97016 VASOPNEUMATIC DEVICE THERAPY: CPT

## 2019-01-10 PROCEDURE — 97110 THERAPEUTIC EXERCISES: CPT

## 2019-01-10 NOTE — PROGRESS NOTES
PT DAILY TREATMENT NOTE  Patient Name: Diego [place] 
Date:1/10/2019 : 1970 [x]  Patient  Verified Payor: [address] [place] [address] / Plan:     / Product Type: Medicaid / In time:12:00  Out time:12:45 Total Treatment Time (min): 45 Visit #: 3 of 4 Treatment Area: Complete rotator cuff tear or rupture of right shoulder, not specified as traumatic [M75.121] SUBJECTIVE Pain Level (0-10 scale): 4 Any medication changes, allergies to medications, adverse drug reactions, diagnosis change, or new procedure performed?: [x] No    [] Yes (see summary sheet for update) Subjective functional status/changes:   [] No changes reported \"Tired, that bike tires me out\" OBJECTIVE Modality rationale: decrease inflammation and decrease pain to improve the patients ability to perform daily tasks Min Type Additional Details  
 [] Estim:  []Unatt       []IFC  []Premod []Other:  []w/ice   []w/heat Position: Location:  
 [] Estim: []Att    []TENS instruct  []NMES []Other:  []w/US   []w/ice   []w/heat Position: Location:  
 []  Traction: [] Cervical       []Lumbar 
                     [] Prone          []Supine []Intermittent   []Continuous Lbs: 
[] before manual 
[] after manual  
 []  Ultrasound: []Continuous   [] Pulsed []1MHz   []3MHz W/cm2: 
Location:  
 []  Iontophoresis with dexamethasone Location: [] Take home patch  
[] In clinic  
 []  Ice     []  heat 
[]  Ice massage 
[]  Laser  
[]  Anodyne Position: Location:  
 []  Laser with stim 
[]  Other:  Position: Location:  
10 [x]  Vasopneumatic Device Pressure:       [x] lo [] med [] hi  
Temperature: [x] lo [] med [] hi  
[] Skin assessment post-treatment:  []intact []redness- no adverse reaction 
  []redness  adverse reaction:  
 
 
 
27 min Therapeutic Exercise:  [] See flow sheet :  
 Rationale: increase ROM and increase strength to improve the patients ability to perform ADLs 8 min Manual Therapy:  Right scap mobs, PROM right shoulder flex/abd/ER&IR Rationale: increase ROM and increase tissue extensibility to improve ease of self care and ADLs With 
 [] TE 
 [] TA 
 [] neuro 
 [] other: Patient Education: [x] Review HEP [] Progressed/Changed HEP based on:  
[] positioning   [] body mechanics   [] transfers   [] heat/ice application   
[] other:   
 
Other Objective/Functional Measures:   
 
Pain Level (0-10 scale) post treatment: 5 
 
ASSESSMENT/Changes in Function: Pt still demonstrates excellent PROM at this time, challenged with strength and AROM against gravity. She demonstrates good functional mobility though and is limited only by reports of pain consistently. Will D/C at next visit to HEP Patient will continue to benefit from skilled PT services to modify and progress therapeutic interventions, address functional mobility deficits, address ROM deficits, address strength deficits, analyze and address soft tissue restrictions, analyze and cue movement patterns and analyze and modify body mechanics/ergonomics to attain remaining goals. []  See Plan of Care 
[]  See progress note/recertification 
[]  See Discharge Summary Updated Goals: to be achieved in 2 weeks: 1. Pt will demonstrate 4/5 right shoulder strength for improved ease of ADLs.  Progressing 4-/5 1/4/19 2. Pt will demonstrate independence with updated HEP for further progression of strengthening post D/C. PLAN [x]  Upgrade activities as tolerated     []  Continue plan of care 
[]  Update interventions per flow sheet      
[]  Discharge due to:_ 
[]  Other:_ Ariadne Larios, TIMBO, CMTPT 1/10/2019  12:31 PM 
 
Future Appointments Date Time Provider Colt Becka 1/15/2019 11:00 AM [place], [address] [facility]  
3/11/2019  1:10 PM Keron Beaulieu MD [facility] [address]  
 5/20/2019  1:40 PM Leslie Guallpa MD [address] [place] [address]

## 2019-01-15 ENCOUNTER — HOSPITAL ENCOUNTER (OUTPATIENT)
Dept: PHYSICAL THERAPY | Age: 49
Discharge: HOME | End: 2019-01-15
Payer: MEDICAID

## 2019-01-15 PROCEDURE — 97535 SELF CARE MNGMENT TRAINING: CPT

## 2019-01-15 PROCEDURE — 97110 THERAPEUTIC EXERCISES: CPT

## 2019-01-15 PROCEDURE — 97016 VASOPNEUMATIC DEVICE THERAPY: CPT

## 2019-01-15 NOTE — PROGRESS NOTES
PT DISCHARGE DAILY NOTE AND [id number] Date:1/15/2019 Patient name: Hedy Marcus of Care: 2018 Referral Bacilio Yeh,* [facility]:  Medical/Treatment Diagnosis: Complete rotator cuff tear or rupture of right shoulder, not specified as traumatic [M75.121] Onset Date: 2018      
   
Prior Hospitalization: see medical history Provider#: [id number] Medications: Verified on Patient Summary List    
Comorbidities: previous failed right RTC repair x 2, depression, arthritis, asthma, tobacco use Prior Level of Function: Pt reports sedentary lifestyle but I with ADLs and self care Visits from Start of Care: 40    Missed Visits: 1 Reporting Period : 2018 to 1/15/2019 Date:1/15/2019 : 1970 [x]  Patient  Verified Payor: [address] [place] [address] / Plan: 1500  / Product Type: Medicaid / In time:11:00  Out time:11:43 Total Treatment Time (min): 43 Visit #: 4 of 4 SUBJECTIVE Pain Level (0-10 scale): 5 Any medication changes, allergies to medications, adverse drug reactions, diagnosis change, or new procedure performed?: [x] No    [] Yes (see summary sheet for update) Subjective functional status/changes:   [] No changes reported \"about the same\" OBJECTIVE Modality rationale: decrease inflammation and decrease pain to improve the patients ability to perform ADLs Type Additional Details  
[] Estim:  []Unatt       []IFC  []Premod []Other:  []w/ice   []w/heat Position: Location:  
[] Estim: []Att    []TENS instruct  []NMES []Other:  []w/US   []w/ice   []w/heat Position: Location:  
[]  Traction: [] Cervical       []Lumbar 
                     [] Prone          []Supine []Intermittent   []Continuous Lbs: 
[] before manual 
[] after manual  
[]  Ultrasound: []Continuous   [] Pulsed []1MHz   []3MHz W/cm2: 
Location: []  Iontophoresis with dexamethasone Location: [] Take home patch  
[] In clinic  
[]  Ice     []  heat 
[]  Ice massage 
[]  Laser  
[]  Anodyne Position: Location:  
[]  Laser with stim 
[]  Other:  Position: Location:  
[x]  Vasopneumatic Device Pressure:       [x] lo [] med [] hi  
Temperature: [x] lo [] med [] hi  
[] Skin assessment post-treatment:  []intact []redness- no adverse reaction 
  []redness  adverse reaction:  
 
 
 
25 min Therapeutic Exercise:  [] See flow sheet :  
Rationale: increase ROM and increase strength to improve the patients ability to perform ADLs and self care 8 min Therapeutic Activity:  []  See flow sheet :  
Rationale: self care and pt education  to improve the patients ability to progress to HEP self management With 
 [] TE 
 [] TA 
 [] neuro 
 [] other: Patient Education: [x] Review HEP [] Progressed/Changed HEP based on:  
[] positioning   [] body mechanics   [] transfers   [] heat/ice application   
[] other:   
 
Other Objective/Functional Measures: 4-/5 right shoulder flexion and ABD Pain Level (0-10 scale) post treatment: 5 Summary of Care: 
Updated Goals: to be achieved in 2 weeks: 1. Pt will demonstrate 4/5 right shoulder strength for improved ease of ADLs.  Progressing 4-/5 1/4/19 not met still 4-/5 1/15/19 2. Pt will demonstrate independence with updated HEP for further progression of strengthening post D/C. Met pt issued updated HEP with no questions/concerns reporting 1/15/19 ASSESSMENT/Changes in Function: Pt has progressed well overall in regards to right shoulder mobility and functional use. She does still demonstrate some strength limitations mostly into ABD and ER. Provided updated HEP for further strengthening, will D/C at this time from PT Thank you for this referral! 
   
PLAN [x]Discontinue therapy: [x]Patient has reached or is progressing toward set goals []Patient is non-compliant or has abdicated []Due to lack of appreciable progress towards set goals Octavio Duran DPT, CMTPT 1/15/2019  11:10 AM

## 2019-03-20 ENCOUNTER — OFFICE VISIT (OUTPATIENT)
Dept: ORTHOPEDIC SURGERY | Age: 49
End: 2019-03-20

## 2019-03-20 VITALS
HEIGHT: 66 IN | HEART RATE: 70 BPM | DIASTOLIC BLOOD PRESSURE: 73 MMHG | WEIGHT: 210.6 LBS | SYSTOLIC BLOOD PRESSURE: 116 MMHG | OXYGEN SATURATION: 99 % | TEMPERATURE: 98 F | BODY MASS INDEX: 33.85 KG/M2

## 2019-03-20 DIAGNOSIS — Z96.611 S/P REVERSE TOTAL SHOULDER ARTHROPLASTY, RIGHT: Primary | ICD-10-CM

## 2019-03-20 NOTE — PROGRESS NOTES
Delmar Robison  1970     HISTORY OF PRESENT ILLNESS  Delmar Robison is a 46 y.o. female who presents today for evaluation s/p Right reverse total shoulder arthroplasty on 7/18/18. Pain is a 4/10. She feels like she is overall making improvements. She able to reach to the top of her head and reports the pain is not as severe as it used to be. 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 Patient denies any fever, chills, chest pain, shortness of breath or calf pain. The remainder of the review of systems is negative. There are no new illness or injuries to report since last seen in the office. No changes in medications, allergies, social or family history. Pain Assessment  3/20/2019   Location of Pain Shoulder   Location Modifiers Right   Severity of Pain 4   Quality of Pain Sharp   Duration of Pain A few hours   Frequency of Pain Several times daily   Aggravating Factors -   Aggravating Factors Comment -   Limiting Behavior Some   Relieving Factors Rest   Relieving Factors Comment -   Result of Injury -   Work-Related Injury -   Type of Injury -     PHYSICAL EXAM:   Visit Vitals  /73   Pulse 70   Temp 98 °F (36.7 °C) (Oral)   Ht 5' 6\" (1.676 m)   Wt 210 lb 9.6 oz (95.5 kg)   SpO2 99%   BMI 33.99 kg/m²      The patient is a well-developed, well-nourished female in no acute distress. The patient is alert and oriented times three. The patient appears to be well groomed. Mood and affect are normal.  LYMPHATIC: lymph nodes are not enlarged and are within normal limits  SKIN: normal in color and non tender to palpation. There are no bruises or abrasions noted. NEUROLOGICAL: Motor sensory exam is within normal limits. Reflexes are equal bilaterally.  There is normal sensation to pinprick and light touch   ORTHOPEDIC EXAM of right shoulder:  Inspection: swelling not present,  Bruising not present  Incision well healed  Passive glenohumeral abduction 0-90 degrees, able to reach to top of her head  Stability: Stable  Strength: 4/5  2+ distal pulses    IMAGING: 3 view xray images of right shoulder on 1/8/19 read and reviewed by myself reveal reverse total shoulder arthroplasty hardware in excellent position     IMPRESSION:  S/P Right reverse total shoulder arthroplasty    PLAN:   1. She is improving slowly post operatively. I would like her to continue her HEP and make sure she is not pushing and pulling at work. Risk factors include: smoker  2. No cortisone injection indicated today   3. No Physical/Occupational Therapy indicated today:   4. No diagnostic test indicated today:   5. No durable medical equipment indicated today  6. No referral indicated today   7. No medications indicated today:   8. No Narcotic indicated today     RTC 6 months    Scribed by Flaco Stafford [address] [place] [address] [address]) as dictated by Annabelle Rogers MD    I, Dr. Annabelle Rogers, confirm that all documentation is accurate.     Annabelle Rogers M.D.   [facility]

## 2019-05-20 ENCOUNTER — OFFICE VISIT (OUTPATIENT)
Dept: PAIN MANAGEMENT | Age: 49
End: 2019-05-20

## 2019-05-20 VITALS
OXYGEN SATURATION: 95 % | SYSTOLIC BLOOD PRESSURE: 91 MMHG | TEMPERATURE: 97.9 F | WEIGHT: 206 LBS | BODY MASS INDEX: 33.11 KG/M2 | DIASTOLIC BLOOD PRESSURE: 70 MMHG | HEART RATE: 71 BPM | HEIGHT: 66 IN | RESPIRATION RATE: 16 BRPM

## 2019-05-20 DIAGNOSIS — R29.3 ABNORMAL POSTURE: ICD-10-CM

## 2019-05-20 DIAGNOSIS — M25.511 RIGHT SHOULDER PAIN, UNSPECIFIED CHRONICITY: Primary | ICD-10-CM

## 2019-05-20 DIAGNOSIS — Z96.611 HISTORY OF TOTAL SHOULDER REPLACEMENT, RIGHT: ICD-10-CM

## 2019-05-20 RX ORDER — DIAPER,BRIEF,INFANT-TODD,DISP
1 EACH MISCELLANEOUS 2 TIMES DAILY
COMMUNITY

## 2019-05-20 RX ORDER — PSEUDOEPHEDRINE HCL 30 MG
TABLET ORAL
Qty: 453.6 G | Refills: 2 | Status: SHIPPED | OUTPATIENT
Start: 2019-05-20 | End: 2019-06-19

## 2019-05-20 NOTE — PATIENT INSTRUCTIONS
Learning About Benefits From Quitting Smoking  How does quitting smoking make you healthier? If you're thinking about quitting smoking, you may have a few reasons to be smoke-free. Your health may be one of them. · When you quit smoking, you lower your risks for cancer, lung disease, heart attack, stroke, blood vessel disease, and blindness from macular degeneration. · When you're smoke-free, you get sick less often, and you heal faster. You are less likely to get colds, flu, bronchitis, and pneumonia. · As a nonsmoker, you may find that your mood is better and you are less stressed. When and how will you feel healthier? Quitting has real health benefits that start from day 1 of being smoke-free. And the longer you stay smoke-free, the healthier you get and the better you feel. The first hours  · After just 20 minutes, your blood pressure and heart rate go down. That means there's less stress on your heart and blood vessels. · Within 12 hours, the level of carbon monoxide in your blood drops back to normal. That makes room for more oxygen. With more oxygen in your body, you may notice that you have more energy than when you smoked. After 2 weeks  · Your lungs start to work better. · Your risk of heart attack starts to drop. After 1 month  · When your lungs are clear, you cough less and breathe deeper, so it's easier to be active. · Your sense of taste and smell return. That means you can enjoy food more than you have since you started smoking. Over the years  · After 1 year, your risk of heart disease is half what it would be if you kept smoking. · After 5 years, your risk of stroke starts to shrink. Within a few years after that, it's about the same as if you'd never smoked. · After 10 years, your risk of dying from lung cancer is cut by about half. And your risk for many other types of cancer is lower too. How would quitting help others in your life?   When you quit smoking, you improve the health of everyone who now breathes in your smoke. · Their heart, lung, and cancer risks drop, much like yours. · They are sick less. For babies and small children, living smoke-free means they're less likely to have ear infections, pneumonia, and bronchitis. · If you're a woman who is or will be pregnant someday, quitting smoking means a healthier . · Children who are close to you are less likely to become adult smokers. Where can you learn more? Go to http://lonnie-gagan.info/. Enter 052 [id number] 72 11 in the search box to learn more about \"Learning About Benefits From Quitting Smoking. \"  Current as of: 2018  Content Version: 11.9  © 7484-8008 [employer]. Care instructions adapted under license by [employer] (which disclaims liability or warranty for this information). If you have questions about a medical condition or this instruction, always ask your healthcare professional. [place] [address] any warranty or liability for your use of this information. Learning About Sleeping Well  What does sleeping well mean? Sleeping well means getting enough sleep. How much sleep is enough varies among people. The number of hours you sleep is not as important as how you feel when you wake up. If you do not feel refreshed, you probably need more sleep. Another sign of not getting enough sleep is feeling tired during the day. The average total nightly sleep time is 7½ to 8 hours. Healthy adults may need a little more or a little less than this. Why is getting enough sleep important? Getting enough quality sleep is a basic part of good health. When your sleep suffers, your mood and your thoughts can suffer too. You may find yourself feeling more grumpy or stressed. Not getting enough sleep also can lead to serious problems, including injury, accidents, anxiety, and depression. What might cause poor sleeping?   Many things can cause sleep problems, including:  · Stress. Stress can be caused by fear about a single event, such as giving a speech. Or you may have ongoing stress, such as worry about work or school. · Depression, anxiety, and other mental or emotional conditions. · Changes in your sleep habits or surroundings. This includes changes that happen where you sleep, such as noise, light, or sleeping in a different bed. It also includes changes in your sleep pattern, such as having jet lag or working a late shift. · Health problems, such as pain, breathing problems, and restless legs syndrome. · Lack of regular exercise. How can you help yourself? Here are some tips that may help you sleep more soundly and wake up feeling more refreshed. Your sleeping area  · Use your bedroom only for sleeping and sex. A bit of light reading may help you fall asleep. But if it doesn't, do your reading elsewhere in the house. Don't watch TV in bed. · Be sure your bed is big enough to stretch out comfortably, especially if you have a sleep partner. · Keep your bedroom quiet, dark, and cool. Use curtains, blinds, or a sleep mask to block out light. To block out noise, use earplugs, soothing music, or a \"white noise\" machine. Your evening and bedtime routine  · Create a relaxing bedtime routine. You might want to take a warm shower or bath, listen to soothing music, or drink a cup of noncaffeinated tea. · Go to bed at the same time every night. And get up at the same time every morning, even if you feel tired. What to avoid  · Limit caffeine (coffee, tea, caffeinated sodas) during the day, and don't have any for at least 4 to 6 hours before bedtime. · Don't drink alcohol before bedtime. Alcohol can cause you to wake up more often during the night. · Don't smoke or use tobacco, especially in the evening. Nicotine can keep you awake. · Don't take naps during the day, especially close to bedtime. · Don't lie in bed awake for too long.  If you can't fall asleep, or if you wake up in the middle of the night and can't get back to sleep within 15 minutes or so, get out of bed and go to another room until you feel sleepy. · Don't take medicine right before bed that may keep you awake or make you feel hyper or energized. Your doctor can tell you if your medicine may do this and if you can take it earlier in the day. If you can't sleep  · Imagine yourself in a peaceful, pleasant scene. Focus on the details and feelings of being in a place that is relaxing. · Get up and do a quiet or boring activity until you feel sleepy. · Don't drink any liquids after 6 p.m. if you wake up often because you have to go to the bathroom. Where can you learn more? Go to http://lonnie-gagan.info/. Enter J901 in the search box to learn more about \"Learning About Sleeping Well. \"  Current as of: September 11, 2018  Content Version: 11.9  © 2885-8006 [employer]. Care instructions adapted under license by [employer] (which disclaims liability or warranty for this information). If you have questions about a medical condition or this instruction, always ask your healthcare professional. [address] any warranty or liability for your use of this information.

## 2019-05-20 NOTE — PROGRESS NOTES
Nursing Notes    Patient presents to the office today for a new pt consult with Dr. Alexia Gibbs for her chronic right shoulder pain. Patient rates her pain at 4/10 on the numerical pain scale.  reviewed YES  Any aberrancies noted on  NO  Last opioid agreement N/A  Last urine drug screen N/A    Comments:     POC UDS was not performed in office today    Any new labs or imaging since last appointment? NO    Have you been to an emergency room (ER) or urgent care clinic since your last visit? NO            Have you been hospitalized since your last visit? NO     If yes, where, when, and reason for visit? Have you seen or consulted any other health care providers outside of the [address] [place] [address]  since your last visit? NO     If yes, where, when, and reason for visit? Ms. Dana Pittman has a reminder for a \"due or due soon\" health maintenance. I have asked that she contact her primary care provider for follow-up on this health maintenance. Fall Risk Assessment, last 12 mths 5/20/2019   Able to walk? Yes   Fall in past 12 months? Yes   Fall with injury? No   Number of falls in past 12 months 2   Fall Risk Score 2     Fall prevention reference put in pt AVS.    Abuse Screening Questionnaire 5/20/2019   Do you ever feel afraid of your partner? N   Are you in a relationship with someone who physically or mentally threatens you? N   Is it safe for you to go home?  Y     3 most recent PHQ Screens 5/20/2019   PHQ Not Done -   Little interest or pleasure in doing things Several days   Feeling down, depressed, irritable, or hopeless Several days   Total Score PHQ 2 2   Trouble falling or staying asleep, or sleeping too much Several days   Feeling tired or having little energy Several days   Poor appetite, weight loss, or overeating Not at all   Feeling bad about yourself - or that you are a failure or have let yourself or your family down Several days   Trouble concentrating on things such as school, work, reading, or watching TV Several days   Moving or speaking so slowly that other people could have noticed; or the opposite being so fidgety that others notice Not at all   Thoughts of being better off dead, or hurting yourself in some way Not at all   PHQ 9 Score 6   How difficult have these problems made it for you to do your work, take care of your home and get along with others Somewhat difficult     Pt is being seen by psychiatry for her depression. Provider made aware of the above score.

## 2019-05-20 NOTE — PROGRESS NOTES
JONNATHAN -     HPI:  Bernard Owens is a 46 y.o. right-hand-dominant female here for initial visit referred by Dr Quincy Cruz for evaluation of right shoulder pain following rotator cuff repair. Pain began with acute onset secondary to a fall in 2016 which led to a rotator cuff tear. She had her first attempted rotator cuff repair done in 2016 and this failed leading to a revision in 2017. This was also ineffective and led to a total shoulder arthroplasty in July 2018. Her pain now persists anterior right shoulder region just distal to the acromion on and posteriorly just inferior to the lateral aspect of the scapular spine. The pain is described as a constant stabbing pain which ranges from 1-9 over 10. Her symptoms worsen with lifting, right upper extremity elevation, work duties, side-lying on the right, and with grooming her hair. Symptoms improve with relative rest, ibuprofen, ice, Tylenol, tramadol, Norco.  She has not tried heat, TENS unit, topical agents, injections. She just completed her most recent course of physical therapy in January 2019 and states that it was very helpful. --Baclofen unclear if this helps with pain. --tramadol was previously helpful for pain  --nOrco was previously helpful for pain  --clonezepam good for anxiety  --lunesta helps with sleep  --trazadone helps with sleep    ADLs negatively affected by the pain include all, especially overhead activities      ROS:Review of systems is negative for fever, chills, nausea, vomiting, diarrhea, constipation, chest pain, shortness of breath, abdominal pain, weakness, trouble swallowing, acute changes in vision, acute changes in hearing, falls, dizziness, bladder incontinence, bowel incontinence, suicidal ideation, homicidal ideation, alcohol use. Review of systems positive for depression, anxiety.        Opioid specific risk: depression, benzodiazepine use, polypharmacy, history of gastric bypass, asthma, GERD, obesity, PTSD    Imaging: Only point-of-care images have been taken since her surgery in July. Vitals:    05/20/19 1355   BP: 91/70   Pulse: 71   Resp: 16   Temp: 97.9 °F (36.6 °C)   TempSrc: Oral   SpO2: 95%   Weight: 93.4 kg (206 lb)   Height: 5' 6\" (1.676 m)   PainSc:   4   PainLoc: Shoulder        PE:  AFVSS, no acute distress, endomorphic body habitus. A&OXs 3.  normocephalic, atraumatic. Conjugate gaze, clear sclerae. Breathing is nonlabored  Speech is appropriate. Mood is pleasant and appropriate. Patient is cooperative. Posture with bilateral rounded shoulders and forward head. Prominent acromion on the right shoulder. Multiple incisional scars located around the right shoulder. Tender to palpation along the anterior and posterior aspects of the proximal right humerus and at the remaining right clavicle. She is able to actively reach the back of her hair with her right hand. There is however significant decrease in active range of motion on the right shoulder in all planes. Gait is within functional limits. Balance is within functional limits. Sensation to light touch is intact in the right upper extremity. .      Primary Care Physician  [address] [place] [address]  [phone]      PHQ -- .  3 most recent PHQ Screens 1/8/2019   PHQ Not Done -   Little interest or pleasure in doing things Not at all   Feeling down, depressed, irritable, or hopeless Not at all   Total Score PHQ 2 0     PHQ 9 score was 6 and somewhat difficult. Assessment/Plan:     ICD-10-CM ICD-9-CM    1. Right shoulder pain, unspecified chronicity M25.511 719.41 XR SHOULDER RT AP/LAT MIN 2 V      menthol 2 % topical gel      AMB SUPPLY ORDER   2. History of total shoulder replacement, right Z96.611 V43.61 XR SHOULDER RT AP/LAT MIN 2 V      menthol 2 % topical gel      AMB SUPPLY ORDER   3.  Abnormal posture R29.3 781.92       --We will request updated right shoulder plain films to assess the location of the hardware prior to consideration of interventions. - There is a clear window we will have the patient return for a trial of right suprascapular nerve block with ultrasound guidance. --We discussed possibility of peripheral nerve stimulator, pulsed radiofrequency treatment, and introduced her to spinal cord stimulator. --Patient was instructed on gentle postural correction exercises to help correct to the bilateral rounded shoulders to place the upper extremities in a more functional position.  - She was given educational handouts on sleep hygiene. --We will arrange for the patient to have a trial of H wave therapy. - Patient advised to try topical menthol over the right shoulder. Later, consider trial of capsaicin versus compounded agent if needed. --I do not recommend long-term opioid therapy for this person's chronic pain. I believe the risks outweigh any potential benefits. Pt will return in 2 months for f/u with [facility]/    GOALS:  To establish complementary and integrative plan of care to address chronic pain issues while minimizing pharmaceuticals to maximize patient's function improve quality of life. A total of 45 minutes were spent with the patient, of which more than half of the time was spent counseling and/or coordinating care. F/u:. Follow-up and Dispositions    · Return in about 2 months (around 7/20/2019) for 30 min.               Social History     Socioeconomic History    Marital status:      Spouse name: Not on file    Number of children: Not on file    Years of education: Not on file    Highest education level: Not on file   Occupational History    Not on file   Social Needs    Financial resource strain: Not on file    Food insecurity:     Worry: Not on file     Inability: Not on file    Transportation needs:     Medical: Not on file     Non-medical: Not on file   Tobacco Use    Smoking status: Current Every Day Smoker     Packs/day: 0.50 Years: 20.00     Pack years: 10.00    Smokeless tobacco: Never Used   Substance and Sexual Activity    Alcohol use: No    Drug use: No    Sexual activity: Yes     Partners: Male     Birth control/protection: None   Lifestyle    Physical activity:     Days per week: Not on file     Minutes per session: Not on file    Stress: Not on file   Relationships    Social connections:     Talks on phone: Not on file     Gets together: Not on file     Attends Nondenominational service: Not on file     Active member of club or organization: Not on file     Attends meetings of clubs or organizations: Not on file     Relationship status: Not on file    Intimate partner violence:     Fear of current or ex partner: Not on file     Emotionally abused: Not on file     Physically abused: Not on file     Forced sexual activity: Not on file   Other Topics Concern    Not on file   Social History Narrative    Not on file     Family History   Problem Relation Age of Onset    Diabetes Mother     Uterine Cancer Mother     Lung Cancer Father     No Known Problems Sister     Heart Disease Neg Hx     Hypertension Neg Hx     Stroke Neg Hx      Allergies   Allergen Reactions    Amoxicillin Other (comments)     Does no work    Codeine Nausea and Vomiting     Past Medical History:   Diagnosis Date    Arthritis     Asthma     Bipolar disorder ([address]     GERD (gastroesophageal reflux disease)     Obesity (BMI 30.0-34.9) 3/1/2017    Psychiatric disorder     depression    Psychotic disorder ([address]     PTSD (post-traumatic stress disorder)     Reflux      Past Surgical History:   Procedure Laterality Date    ABDOMEN SURGERY PROC UNLISTED  2008    gastric bypass    HX CHOLECYSTECTOMY      HX GASTRIC BYPASS  2008    HX HEENT      tonsilectomy      HX ORTHOPAEDIC      right shoulder     HX SHOULDER REPLACEMENT Right 07/25/2018    [place] ANESTH,SURGERY OF SHOULDER Right 05/2017    rotator cuff     Current Outpatient Medications on File Prior to Visit   Medication Sig    biotin 10,000 mcg cap Take 1 Cap by mouth two (2) times a day.  multivitamin (ONE A DAY) tablet Take 1 Tab by mouth daily.  cariprazine (VRAYLAR) 6 mg capsule Take  by mouth nightly.  OXcarbazepine (TRILEPTAL) 300 mg tablet Take 300 mg by mouth two (2) times a day.  CLONAZEPAM (KLONOPIN PO) Take 0.5 mg by mouth two (2) times a day. Indications: anxious    omeprazole (PRILOSEC) 20 mg capsule Take 20 mg by mouth daily.  eszopiclone (LUNESTA) 3 mg tablet Take 3 mg by mouth nightly.  lamoTRIgine (LAMICTAL) 100 mg tablet Take  by mouth daily. 100 mg am and 200mg q hs   Indications: BIPOLAR DISORDER IN REMISSION    traZODone (DESYREL) 300 mg tablet Take 300 mg by mouth nightly.  CYANOCOBALAMIN, VITAMIN B-12, (VITAMIN B-12 PO) Take  by mouth daily.  ERGOCALCIFEROL, VITAMIN D2, (VITAMIN D2 PO) Take 50,000 Units by mouth every seven (7) days.  traMADol (ULTRAM) 50 mg tablet Take 1 Tab by mouth every eight (8) hours as needed for Pain. Max Daily Amount: 150 mg.    HYDROcodone-acetaminophen (NORCO) 7.5-325 mg per tablet Take 1 Tab by mouth every eight (8) hours as needed for Pain. Max Daily Amount: 3 Tabs.  chlorproMAZINE (THORAZINE) 100 mg tablet Take 100 mg by mouth two (2) times a day.  baclofen (LIORESAL) 10 mg tablet Take  by mouth three (3) times daily.  multivitamin with iron (FLINTSTONES) chewable tablet Take 1 Tab by mouth daily. No current facility-administered medications on file prior to visit.

## 2019-05-22 ENCOUNTER — DOCUMENTATION ONLY (OUTPATIENT)
Dept: PAIN MANAGEMENT | Age: 49
End: 2019-05-22

## 2019-06-20 ENCOUNTER — HOSPITAL ENCOUNTER (OUTPATIENT)
Dept: GENERAL RADIOLOGY | Age: 49
Discharge: HOME | End: 2019-06-20
Payer: MEDICAID

## 2019-06-20 DIAGNOSIS — Z96.611 HISTORY OF TOTAL SHOULDER REPLACEMENT, RIGHT: ICD-10-CM

## 2019-06-20 DIAGNOSIS — M25.511 RIGHT SHOULDER PAIN, UNSPECIFIED CHRONICITY: ICD-10-CM

## 2019-06-20 PROCEDURE — 73030 X-RAY EXAM OF SHOULDER: CPT

## 2019-06-21 NOTE — PROGRESS NOTES
X-ray of the right shoulder was reviewed. Please inform the patient that there was evidence of potential loosening of the prosthetic component. Please have her make follow-up with Dr. Giovana Gong as soon as available.   Thank you

## 2019-06-21 NOTE — PROGRESS NOTES
I called and spoke to the pt. The pt was identified using 2 pt identifiers. She was made aware of the x-ray results for her shoulder x-ray. The pt was instructed to make an appt with Dr. Savanna Vazquez at her earliest convenience per Dr. Sammie Robertson recommendations. She verbalized understanding and has no questions at this time.

## 2019-07-02 ENCOUNTER — OFFICE VISIT (OUTPATIENT)
Dept: ORTHOPEDIC SURGERY | Age: 49
End: 2019-07-02

## 2019-07-02 VITALS
BODY MASS INDEX: 33.49 KG/M2 | HEIGHT: 66 IN | TEMPERATURE: 97.9 F | DIASTOLIC BLOOD PRESSURE: 72 MMHG | OXYGEN SATURATION: 95 % | SYSTOLIC BLOOD PRESSURE: 106 MMHG | WEIGHT: 208.4 LBS | HEART RATE: 84 BPM

## 2019-07-02 DIAGNOSIS — M12.811 ROTATOR CUFF TEAR ARTHROPATHY, RIGHT: Primary | ICD-10-CM

## 2019-07-02 DIAGNOSIS — M75.101 ROTATOR CUFF TEAR ARTHROPATHY, RIGHT: Primary | ICD-10-CM

## 2019-07-02 DIAGNOSIS — Z96.611 S/P REVERSE TOTAL SHOULDER ARTHROPLASTY, RIGHT: ICD-10-CM

## 2019-07-02 NOTE — PROGRESS NOTES
Jesenia Thornton  1970     HISTORY OF PRESENT ILLNESS  Jesenia Thornton is a 44 y.o. female who presents today for evaluation s/p Right reverse total shoulder arthroplasty on 7/18/18. Pain is a 2/10. She feels like her pain is worsening and she has lost some of her range of motion. Denies an injury. Patient denies any fever, chills, chest pain, shortness of breath or calf pain. The remainder of the review of systems is negative. There are no new illness or injuries to report since last seen in the office. No changes in medications, allergies, social or family history. Pain Assessment  7/2/2019   Location of Pain Shoulder   Location Modifiers Right   Severity of Pain 2   Quality of Pain Dull;Aching   Duration of Pain A few hours   Frequency of Pain Several times daily   Aggravating Factors -   Aggravating Factors Comment -   Limiting Behavior Some   Relieving Factors Rest   Relieving Factors Comment -   Result of Injury No   Work-Related Injury -   Type of Injury -     PHYSICAL EXAM:   Visit Vitals  /72   Pulse 84   Temp 97.9 °F (36.6 °C) (Oral)   Ht 5' 6\" (1.676 m)   Wt 208 lb 6.4 oz (94.5 kg)   SpO2 95%   BMI 33.64 kg/m²      The patient is a well-developed, well-nourished female in no acute distress. The patient is alert and oriented times three. The patient appears to be well groomed. Mood and affect are normal.  LYMPHATIC: lymph nodes are not enlarged and are within normal limits  SKIN: normal in color and non tender to palpation. There are no bruises or abrasions noted. NEUROLOGICAL: Motor sensory exam is within normal limits. Reflexes are equal bilaterally.  There is normal sensation to pinprick and light touch   ORTHOPEDIC EXAM of right shoulder:  Inspection: swelling not present,  Bruising not present  Incision well healed  Passive glenohumeral abduction 0-90 degrees, able to reach to top of her head  Stability: Stable  Strength: 4+/5  2+ distal pulses    IMAGING: 3 view xray images of right shoulder on 1/8/19 read and reviewed by myself reveal reverse total shoulder arthroplasty hardware in excellent position     EXAM:  XR SHOULDER RT AP/LAT MIN 2 V     INDICATION:   right shoulder pain, hx right total shoulder.     COMPARISON: 1/30/2017     FINDINGS:      Prior right glenohumeral arthroplasty. Lucency seen around the humeral  prosthetic component raising the possibility of loosening of this device. No  fracture or dislocation. Widened AC joint similar to that seen on chest  radiograph dated 7/6/2018, presumably a postoperative finding.     IMPRESSION  IMPRESSION:     Suspected loosening of the right humeral prosthetic component. IMPRESSION:  S/P Right reverse total shoulder arthroplasty    PLAN:   1. Patient with increase of pain recently. She has concerns of loosening of her prosthesis. Will order 3 phase bone scan to r/o loosening of prosthesis. Told her to limit lifting based on pain. Risk factors include: smoker  2. No cortisone injection indicated today   3. No Physical/Occupational Therapy indicated today:   4. YES diagnostic test indicated today: 3 phase bone scan  5. No durable medical equipment indicated today  6. No referral indicated today   7. No medications indicated today:   8.  No Narcotic indicated today     RTC after bone scan    ERIC [facility]

## 2019-07-17 ENCOUNTER — HOSPITAL ENCOUNTER (OUTPATIENT)
Dept: NUCLEAR MEDICINE | Age: 49
Discharge: HOME | End: 2019-07-17
Attending: PHYSICIAN ASSISTANT
Payer: MEDICAID

## 2019-07-17 DIAGNOSIS — M12.811 ROTATOR CUFF TEAR ARTHROPATHY, RIGHT: ICD-10-CM

## 2019-07-17 DIAGNOSIS — Z96.611 S/P REVERSE TOTAL SHOULDER ARTHROPLASTY, RIGHT: ICD-10-CM

## 2019-07-17 DIAGNOSIS — M75.101 ROTATOR CUFF TEAR ARTHROPATHY, RIGHT: ICD-10-CM

## 2019-07-17 PROCEDURE — 78315 BONE IMAGING 3 PHASE: CPT

## 2019-07-22 ENCOUNTER — OFFICE VISIT (OUTPATIENT)
Dept: ORTHOPEDIC SURGERY | Age: 49
End: 2019-07-22

## 2019-07-22 VITALS
RESPIRATION RATE: 10 BRPM | HEART RATE: 99 BPM | SYSTOLIC BLOOD PRESSURE: 107 MMHG | OXYGEN SATURATION: 97 % | TEMPERATURE: 96.6 F | BODY MASS INDEX: 34.07 KG/M2 | WEIGHT: 212 LBS | HEIGHT: 66 IN | DIASTOLIC BLOOD PRESSURE: 61 MMHG

## 2019-07-22 DIAGNOSIS — M75.101 ROTATOR CUFF TEAR ARTHROPATHY, RIGHT: Primary | ICD-10-CM

## 2019-07-22 DIAGNOSIS — M12.811 ROTATOR CUFF TEAR ARTHROPATHY, RIGHT: Primary | ICD-10-CM

## 2019-07-22 DIAGNOSIS — Z96.611 S/P REVERSE TOTAL SHOULDER ARTHROPLASTY, RIGHT: ICD-10-CM

## 2019-07-22 NOTE — PROGRESS NOTES
1. Have you been to the ER, urgent care clinic since your last visit? Hospitalized since your last visit? No    2. Have you seen or consulted any other health care providers outside of the [address] [place] [address] since your last visit? Include any pap smears or colon screening.  No

## 2019-07-22 NOTE — PROGRESS NOTES
Irena Anna  1970     HISTORY OF PRESENT ILLNESS  Irena Anna is a 44 y.o. female who presents today for evaluation s/p Right reverse total shoulder arthroplasty on 7/18/18. Pain is a 2/10. She notes constant dull ache in her shoulder. Feel weak. Sometimes has difficulties with reaching. Overall feels better than before her first rotator cuff surgery. Patient denies any fever, chills, chest pain, shortness of breath or calf pain. The remainder of the review of systems is negative. There are no new illness or injuries to report since last seen in the office. No changes in medications, allergies, social or family history. Pain Assessment  7/22/2019   Location of Pain Shoulder   Location Modifiers Right   Severity of Pain 2   Quality of Pain Dull; Sharp   Duration of Pain A few hours   Frequency of Pain Intermittent   Aggravating Factors Stretching   Aggravating Factors Comment -   Limiting Behavior -   Relieving Factors Nothing   Relieving Factors Comment -   Result of Injury No   Work-Related Injury -   Type of Injury -     PHYSICAL EXAM:   Visit Vitals  /61   Pulse 99   Temp 96.6 °F (35.9 °C) (Oral)   Resp 10   Ht 5' 6\" (1.676 m)   Wt 212 lb (96.2 kg)   SpO2 97%   BMI 34.22 kg/m²      The patient is a well-developed, well-nourished female in no acute distress. The patient is alert and oriented times three. The patient appears to be well groomed. Mood and affect are normal.  LYMPHATIC: lymph nodes are not enlarged and are within normal limits  SKIN: normal in color and non tender to palpation. There are no bruises or abrasions noted. NEUROLOGICAL: Motor sensory exam is within normal limits. Reflexes are equal bilaterally.  There is normal sensation to pinprick and light touch   ORTHOPEDIC EXAM of right shoulder:  Inspection: swelling not present,  Bruising not present  Incision well healed  Passive glenohumeral abduction 0-90 degrees, able to reach to top of her head  Stability: Stable  Strength: 4+/5  2+ distal pulses    IMAGING: 3 view xray images of right shoulder on 1/8/19 read and reviewed by myself reveal reverse total shoulder arthroplasty hardware in excellent position     EXAM:  XR SHOULDER RT AP/LAT MIN 2 V     INDICATION:   right shoulder pain, hx right total shoulder.     COMPARISON: 1/30/2017     FINDINGS:      Prior right glenohumeral arthroplasty. Lucency seen around the humeral  prosthetic component raising the possibility of loosening of this device. No  fracture or dislocation. Widened AC joint similar to that seen on chest  radiograph dated 7/6/2018, presumably a postoperative finding.     IMPRESSION  IMPRESSION:     Suspected loosening of the right humeral prosthetic component. 3 phase bone scan: 1. No evidence of asymmetric abnormal increased flow phase or blood pool phase  increased activity.     2. Increased uptake in the right glenoid. Whether this implies loosening of  the hardware is unclear. Probably more favorable for expected increased uptake  from altered mechanics. Doubtful for hardware loosening.     3.  Greater degree of increased uptake in the left glenohumeral joint region,  probably from osteoarthritis.       IMPRESSION:  S/P Right reverse total shoulder arthroplasty    PLAN:   1. Patient with no evidence of infection or prosthesis loosening. Reassured patient. Cont with all activities as tolerated  Risk factors include: smoker  2. No cortisone injection indicated today   3. No Physical/Occupational Therapy indicated today:   4. No diagnostic test indicated today:   5. No durable medical equipment indicated today  6. No referral indicated today   7. No medications indicated today:   8.  No Narcotic indicated today     RTC PRN    ERIC [facility]

## 2019-07-29 ENCOUNTER — HOSPITAL ENCOUNTER (EMERGENCY)
Age: 49
Discharge: HOME | End: 2019-07-29
Attending: EMERGENCY MEDICINE | Admitting: EMERGENCY MEDICINE
Payer: MEDICAID

## 2019-07-29 VITALS
HEIGHT: 66 IN | TEMPERATURE: 98.8 F | OXYGEN SATURATION: 95 % | HEART RATE: 75 BPM | SYSTOLIC BLOOD PRESSURE: 111 MMHG | BODY MASS INDEX: 32.14 KG/M2 | RESPIRATION RATE: 18 BRPM | DIASTOLIC BLOOD PRESSURE: 60 MMHG | WEIGHT: 200 LBS

## 2019-07-29 DIAGNOSIS — M54.50 ACUTE BILATERAL LOW BACK PAIN WITHOUT SCIATICA: ICD-10-CM
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
## 2019-07-29 DIAGNOSIS — S16.1XXA ACUTE STRAIN OF NECK MUSCLE, INITIAL ENCOUNTER: Primary | ICD-10-CM

## 2019-07-29 PROCEDURE — 96372 THER/PROPH/DIAG INJ SC/IM: CPT

## 2019-07-29 PROCEDURE — 99283 EMERGENCY DEPT VISIT LOW MDM: CPT

## 2019-07-29 PROCEDURE — 74011250636 HC RX REV CODE- 250/636: Performed by: PHYSICIAN ASSISTANT

## 2019-07-29 PROCEDURE — 74011250637 HC RX REV CODE- 250/637: Performed by: PHYSICIAN ASSISTANT

## 2019-07-29 RX ORDER — CYCLOBENZAPRINE HCL 10 MG
10 TABLET ORAL
Status: COMPLETED | OUTPATIENT
Start: 2019-07-29 | End: 2019-07-29

## 2019-07-29 RX ORDER — KETOROLAC TROMETHAMINE 30 MG/ML
60 INJECTION, SOLUTION INTRAMUSCULAR; INTRAVENOUS
Status: COMPLETED | OUTPATIENT
Start: 2019-07-29 | End: 2019-07-29

## 2019-07-29 RX ORDER — CYCLOBENZAPRINE HCL 10 MG
10 TABLET ORAL
Qty: 15 TAB | Refills: 0 | OUTPATIENT
Start: 2019-07-29 | End: 2020-05-31

## 2019-07-29 RX ADMIN — CYCLOBENZAPRINE HYDROCHLORIDE 10 MG: 10 TABLET, FILM COATED ORAL at 13:22

## 2019-07-29 RX ADMIN — KETOROLAC TROMETHAMINE 60 MG: 30 INJECTION, SOLUTION INTRAMUSCULAR at 13:21
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
## 2019-08-06 ENCOUNTER — OFFICE VISIT (OUTPATIENT)
Dept: PAIN MANAGEMENT | Age: 49
End: 2019-08-06

## 2019-08-06 VITALS
BODY MASS INDEX: 32.14 KG/M2 | HEIGHT: 66 IN | RESPIRATION RATE: 14 BRPM | OXYGEN SATURATION: 91 % | TEMPERATURE: 96.9 F | HEART RATE: 64 BPM | WEIGHT: 200 LBS | DIASTOLIC BLOOD PRESSURE: 72 MMHG | SYSTOLIC BLOOD PRESSURE: 118 MMHG

## 2019-08-06 DIAGNOSIS — M25.511 RIGHT SHOULDER PAIN, UNSPECIFIED CHRONICITY: Primary | ICD-10-CM

## 2019-08-06 DIAGNOSIS — Z96.611 HISTORY OF TOTAL SHOULDER REPLACEMENT, RIGHT: ICD-10-CM

## 2019-08-06 RX ORDER — TRIAMCINOLONE ACETONIDE 40 MG/ML
40 INJECTION, SUSPENSION INTRA-ARTICULAR; INTRAMUSCULAR ONCE
Qty: 1 ML | Refills: 0
Start: 2019-08-06 | End: 2019-08-06

## 2019-08-06 RX ORDER — LIDOCAINE HYDROCHLORIDE 10 MG/ML
6 INJECTION INFILTRATION; PERINEURAL ONCE
Qty: 6 ML | Refills: 0
Start: 2019-08-06 | End: 2019-08-06

## 2019-08-06 NOTE — PROGRESS NOTES
HPI:  Masoud Corley is a 44 y.o. female here for right suprascapular nerve block. She denies interval changes in the character or distribution of her symptoms. She still has a constant achy pain located at the right shoulder that ranges from 1-6 over 10. Symptoms are worsened with any right upper extremity activity especially right shoulder external rotation and internal rotation and elevation. GIC-4 and 3      Allergies   Allergen Reactions    Amoxicillin Other (comments)     Does no work    Codeine Nausea and Vomiting     Current Outpatient Medications on File Prior to Visit   Medication Sig    cyclobenzaprine (FLEXERIL) 10 mg tablet Take 1 Tab by mouth three (3) times daily as needed for Muscle Spasm(s). (Patient taking differently: Take 10 mg by mouth daily as needed for Muscle Spasm(s). )    biotin 10,000 mcg cap Take 1 Cap by mouth two (2) times a day.  multivitamin (ONE A DAY) tablet Take 1 Tab by mouth daily.  cariprazine (VRAYLAR) 6 mg capsule Take 6 mg by mouth nightly.  OXcarbazepine (TRILEPTAL) 300 mg tablet Take 300 mg by mouth two (2) times a day.  CLONAZEPAM (KLONOPIN PO) Take 0.5 mg by mouth two (2) times a day. Indications: anxious    omeprazole (PRILOSEC) 20 mg capsule Take 20 mg by mouth daily.  eszopiclone (LUNESTA) 3 mg tablet Take 3 mg by mouth nightly.  lamoTRIgine (LAMICTAL) 100 mg tablet Take  by mouth daily. 100 mg am and 200mg q hs   Indications: BIPOLAR DISORDER IN REMISSION    chlorproMAZINE (THORAZINE) 100 mg tablet Take 100 mg by mouth two (2) times a day.  traZODone (DESYREL) 300 mg tablet Take 300 mg by mouth nightly.  multivitamin with iron (FLINTSTONES) chewable tablet Take 1 Tab by mouth daily.  CYANOCOBALAMIN, VITAMIN B-12, (VITAMIN B-12 PO) Take 500 mcg by mouth daily.  ERGOCALCIFEROL, VITAMIN D2, (VITAMIN D2 PO) Take 50,000 Units by mouth every seven (7) days.  baclofen (LIORESAL) 10 mg tablet Take  by mouth three (3) times daily. No current facility-administered medications on file prior to visit. ROS:  Review of systems is negative for fever, chills, nausea, vomiting, , constipation, chest pain, shortness of breath, abdominal pain,  trouble swallowing, acute changes in vision, acute changes in hearing, falls, dizziness, bladder incontinence, bowel incontinence,  suicidal ideation, homicidal ideation, alcohol use. Review of systems positive for diarrhea, weakness, depression, anxiety    Vitals:    08/06/19 1131   BP: 118/72   Pulse: 64   Resp: 14   Temp: 96.9 °F (36.1 °C)   TempSrc: Oral   SpO2: 91%   Weight: 90.7 kg (200 lb)   Height: 5' 6\" (1.676 m)   PainSc:   2   PainLoc: Shoulder          PE:  AFVSS, no acute distress, endomorphic body habitus. A&OXs 3.  normocephalic, atraumatic. Conjugate gaze, clear sclerae    Significant atrophy noted of right deltoid. Decreased active range of motion for right shoulder flexion, abduction, extension, internal rotation, external rotation. Increased pain with overhead activity, internal rotation, external rotation. Gait is within functional limits. Balance is within functional limits. Primary Care Physician  Ashley Fuentes 14  [phone]    Assessment/Plan:     ICD-10-CM ICD-9-CM    1. Right shoulder pain, unspecified chronicity M25.511 719.41 NV INJECT NERV BLCK,SUPRASCAP N.      AMB POC US, SONO GUIDE NEEDLE      TRIAMCINOLONE ACETONIDE INJ      triamcinolone acetonide (KENALOG) 40 mg/mL injection      lidocaine (XYLOCAINE) 10 mg/mL (1 %) injection      LIDOCAINE INJECTION   2.  History of total shoulder replacement, right Z96.611 V43.61 NV INJECT NERV BLCK,SUPRASCAP N.      AMB POC US, SONO GUIDE NEEDLE      TRIAMCINOLONE ACETONIDE INJ      triamcinolone acetonide (KENALOG) 40 mg/mL injection      lidocaine (XYLOCAINE) 10 mg/mL (1 %) injection      LIDOCAINE INJECTION      Patient presents for a trial of right diagnostic/therapeutic suprascapular nerve block. Risks, benefits, alternatives were discussed and all questions were answered. Patient agrees to proceed with the intervention. She tolerated procedure without complaints. There were no immediate complications. F/u:. Follow-up and Dispositions    · Return if symptoms worsen or fail to improve. Follow-up via telephone as instructed. Follow-up as needed for procedure related concerns. Maintain regularly scheduled office visit. [facility]  OFFICE PROCEDURE PROGRESS NOTE        Chart reviewed for the following:   Chloe LINDA DO, have reviewed the History, Physical and updated the Allergic reactions for [address] [place] [address] performed immediately prior to start of procedure:   Chloe LINDA DO, have performed the following reviews on [facility] prior to the start of the procedure:            * Patient was identified by name and date of birth   * Agreement on procedure being performed was verified  * Risks and Benefits explained to the patient  * Procedure site verified and marked as necessary  * Patient was positioned for comfort  * Consent was signed and verified     Time: 1200    Date of procedure: 8/6/2019    Procedure performed by:  Chloe Collins DO    Provider assisted by: Lexi Maier LPN    Patient assisted by: self    Post Procedural Pain Scale: See procedure note. Comments: none, See note for details. Procedure Note    Right suprascapular nerve block with ultrasound guidance    Consent was obtained. Timeout was performed. With ultrasound guidance suprascapular notch and suprascapular nerve were identified. The skin underlying the ultrasound head and at the injection site for cleaned with ChloraPrep. Injection site was anesthetized with vapo coolant spray followed by 1 mL of 1% lidocaine at the injection site and along the needle track.     Sterile syringe containing 5 mL of solution of 1% lidocaine and 40 mg of Kenalog was attached to 4 inch 21-gauge echogenic needle using aseptic technique. The needle was inserted through the skin approximately 1 cm from the medial border of the transducer and was advanced in plane with the needle trajectory adjusted under real-time ultrasound guidance so that the needle tip rested in the proximity of the suprascapular nerve within the suprascapular notch. Color Doppler was used to identify the suprascapular vein and artery which were located cranially to the needle tip. Following negative aspiration a small amount of solution was injected under real-time visualization to confirm the needle tip was in adequate position. After proper needle tip placement was confirmed the remainder of the solution was slowly injected without resistance. The needle was flushed and removed intact. There was no bleeding. The patient tolerated the procedure without complaints. There were no immediate complications. Postprocedure pain relief with provocation was 90%.
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
## 2019-08-06 NOTE — PATIENT INSTRUCTIONS
Post Injection Instructions    If you develop any abnormal symptoms, such as itching, swelling, redness, rash, or shortness of breath, please call our office at [phone]. Normally, these are temporary symptoms, which resolve within several hours to a day, but our office is more than happy to answer any questions you may have. The provider would like you to observe the injection area for redness, swelling, or increased heat. If any or all of these reactions occur, please call our office as soon as possible. This reaction may indicate the first signs of infection. Although very rare, it is  best if caught early. I have reviewed these instructions and the patient verbalizes understanding.

## 2019-08-12 ENCOUNTER — OFFICE VISIT (OUTPATIENT)
Dept: PAIN MANAGEMENT | Age: 49
End: 2019-08-12

## 2019-08-12 VITALS
SYSTOLIC BLOOD PRESSURE: 105 MMHG | BODY MASS INDEX: 32.14 KG/M2 | RESPIRATION RATE: 16 BRPM | OXYGEN SATURATION: 94 % | HEART RATE: 69 BPM | TEMPERATURE: 99.8 F | DIASTOLIC BLOOD PRESSURE: 72 MMHG | HEIGHT: 66 IN | WEIGHT: 200 LBS

## 2019-08-12 DIAGNOSIS — M25.511 RIGHT SHOULDER PAIN, UNSPECIFIED CHRONICITY: Primary | ICD-10-CM

## 2019-08-12 DIAGNOSIS — Z96.611 HISTORY OF TOTAL SHOULDER REPLACEMENT, RIGHT: ICD-10-CM

## 2019-08-12 DIAGNOSIS — R29.3 ABNORMAL POSTURE: ICD-10-CM

## 2019-08-12 NOTE — PROGRESS NOTES
Referral date 5/19, source Dr. Anibal Mason and for right shoulder pain following rotator cuff repair.  date checked today, findings consistent      Today   Last Visit  Prior Visit(s)  ORT -        PGIC -7 and 1  4 and 3     JONNATHAN -N/A       COMM - 5           HPI:  Kiara Mora is a 44 y.o. female here for f/u visit for ongoing evaluation of right shoulder pain following rotator cuff repair. Pt was last seen here on 5/20/2019. Pt denies interval changes on the character or distribution of pain. Pain is located right shoulder. The pain is described as burning. Pain at its best is 0/10. Pain at its worse is 2/10. The pain is worsened by lifting, right upper extremity elevation, work duties, side-lying on the right, and with grooming her hair. Symptoms are improved by stopping acitivites that aggravate her shoulder. Patient returns today after recent right suprascapular nerve block with Dr. Darnell Lundy and reports she is still receiving relief from this procedure that was performed last week. Patiently currently treats with psychiatrist for her depression and anxiety. Interventional Pain Procedures:  8/6/2019 right suprascapular nerve block patient reports she is still having relief from shoulder injections    ROS:  Positive findings include nausea, diarrhea, weakness, depression and anxiety  Denies fever, chills,  vomiting, constipation, abdominal pain, chest pain, shortness or breath/trouble breathing,  trouble swallowing, changes in vision, changes in hearing, falls, dizziness, bladder incontinence, bowel incontinence, suicidal ideations, homicidal ideations or alcohol use. Opioid specific risk: depression, benzodiazepine use, polypharmacy, history of gastric bypass, asthma, GERD, obesity, PTSD.     Vitals:    08/12/19 1430   BP: 105/72   Pulse: 69   Resp: 16   Temp: 99.8 °F (37.7 °C)   TempSrc: Oral   SpO2: 94%   Weight: 90.7 kg (200 lb)   Height: 5' 6\" (1.676 m)   PainSc:   1   PainLoc: Shoulder Physical exam:  Constitutional  -  AFVSS, no acute distress, overweight body habitus. A&OXs 3. Speech is clear and appropriate. HEENT -  Normocephalic, atraumatic. Conjugate gaze, clear sclerae. EOM intact. Neuro/Psych -  Mood is appropriate and patient is cooperative. Gait is within functional limits. Balance is within functional limits. Respiratory -  Non-labored breathing. Even rise of chest wall. Primary Care Physician  Kaitlynn VegasSofiyalibra Bundy 14  [phone]      PHQ -- .  3 most recent PHQ Screens 8/12/2019   PHQ Not Done -   Little interest or pleasure in doing things Several days   Feeling down, depressed, irritable, or hopeless Several days   Total Score PHQ 2 2   Trouble falling or staying asleep, or sleeping too much Several days   Feeling tired or having little energy Not at all   Poor appetite, weight loss, or overeating Not at all   Feeling bad about yourself - or that you are a failure or have let yourself or your family down Not at all   Trouble concentrating on things such as school, work, reading, or watching TV Not at all   Moving or speaking so slowly that other people could have noticed; or the opposite being so fidgety that others notice Not at all   Thoughts of being better off dead, or hurting yourself in some way Not at all   PHQ 9 Score 3   How difficult have these problems made it for you to do your work, take care of your home and get along with others Somewhat difficult         Assessment/Plan:     ICD-10-CM ICD-9-CM    1. Right shoulder pain, unspecified chronicity M25.511 719.41    2. History of total shoulder replacement, right Z96.611 V43.61    3. Abnormal posture R29.3 781.92      --Recommend patient follow-up with PCP regarding positive findings on review of systems.     1) Medications (opiod and non-opiod)  --I do not recommend long term opioid therapy for this patient at this time for their chronic pain; the risks outweigh the potential benefits.     - Patient discontinued topical menthol as she felt it was not beneficial, will consider  trial of capsaicin versus compounded agent if needed     --Continue ibuprofen and flexeril as needed. --If patient starts to experience pain in her right shoulder she will trial Tylenol thousand milligrams up to 3 times daily as needed. Max daily dose 3000 mg in a 24-hour period. 2) Restorative Therapies  --Reorder H wave for home use with no substitution. --Patient to continue gentle postural correction exercises given at last office visit to help correct her bilateral rounded shoulders to place the upper extremities in a more functional position. 3) Interventional Procedures  --Patient will call as needed for right suprascapular nerve block with ultrasound guidance. .  --Ongoing consideration for possible  peripheral nerve stimulator, pulsed radiofrequency treatment, and introduced her to spinal cord stimulator. 4) Behavorial Health Approaches  --Patient may benefit from pain psychology referral in the future. 5) Complementary & Integrative Health  --Patient may benefit from acupuncture therapy in the future. --Follow up ongoing assessment and ongoing development of integrative and comprehensive plan of care for chronic pain. Goals: To establish complementary and integrative plan of care to address chronic pain issues while minimizing pharmaceuticals to maximize patient's function improve quality of life. Education  Handouts given regarding sleep health. Follow-up and Dispositions    · Return if symptoms worsen or fail to improve.               Social History     Socioeconomic History    Marital status:      Spouse name: Not on file    Number of children: Not on file    Years of education: Not on file    Highest education level: Not on file   Occupational History    Not on file   Social Needs    Financial resource strain: Not on file   [place] insecurity:     Worry: Not on file     Inability: Not on file    Transportation needs:     Medical: Not on file     Non-medical: Not on file   Tobacco Use    Smoking status: Current Every Day Smoker     Packs/day: 0.50     Years: 20.00     Pack years: 10.00    Smokeless tobacco: Never Used   Substance and Sexual Activity    Alcohol use: No    Drug use: No    Sexual activity: Yes     Partners: Male     Birth control/protection: None   Lifestyle    Physical activity:     Days per week: Not on file     Minutes per session: Not on file    Stress: Not on file   Relationships    Social connections:     Talks on phone: Not on file     Gets together: Not on file     Attends Zoroastrian service: Not on file     Active member of club or organization: Not on file     Attends meetings of clubs or organizations: Not on file     Relationship status: Not on file    Intimate partner violence:     Fear of current or ex partner: Not on file     Emotionally abused: Not on file     Physically abused: Not on file     Forced sexual activity: Not on file   Other Topics Concern    Not on file   Social History Narrative    Not on file     Family History   Problem Relation Age of Onset    Diabetes Mother     Uterine Cancer Mother     Lung Cancer Father     No Known Problems Sister     Heart Disease Neg Hx     Hypertension Neg Hx     Stroke Neg Hx      Allergies   Allergen Reactions    Amoxicillin Other (comments)     Does no work  Does not work.     Codeine Nausea and Vomiting and Other (comments)     Past Medical History:   Diagnosis Date    Arthritis     Asthma     Bipolar disorder (HCC)     GERD (gastroesophageal reflux disease)     Obesity (BMI 30.0-34.9) 3/1/2017    Psychiatric disorder     depression    Psychotic disorder ([facility] [address]     PTSD (post-traumatic stress disorder)     Reflux      Past Surgical History:   Procedure Laterality Date    ABDOMEN SURGERY PROC UNLISTED  2008    gastric bypass    HX CHOLECYSTECTOMY      HX GASTRIC BYPASS  2008    HX HEENT      tonsilectomy      HX ORTHOPAEDIC      right shoulder     HX SHOULDER REPLACEMENT Right 07/25/2018    PA ANESTH,SURGERY OF SHOULDER Right 05/2017    rotator cuff     Current Outpatient Medications on File Prior to Visit   Medication Sig    cyclobenzaprine (FLEXERIL) 10 mg tablet Take 1 Tab by mouth three (3) times daily as needed for Muscle Spasm(s). (Patient taking differently: Take 10 mg by mouth daily as needed for Muscle Spasm(s). )    biotin 10,000 mcg cap Take 1 Cap by mouth two (2) times a day.  multivitamin (ONE A DAY) tablet Take 1 Tab by mouth daily.  cariprazine (VRAYLAR) 6 mg capsule Take 6 mg by mouth nightly.  OXcarbazepine (TRILEPTAL) 300 mg tablet Take 300 mg by mouth two (2) times a day.  CLONAZEPAM (KLONOPIN PO) Take 0.5 mg by mouth two (2) times a day. Indications: anxious    omeprazole (PRILOSEC) 20 mg capsule Take 20 mg by mouth daily.  eszopiclone (LUNESTA) 3 mg tablet Take 3 mg by mouth nightly.  lamoTRIgine (LAMICTAL) 100 mg tablet Take  by mouth daily. 100 mg am and 200mg q hs   Indications: BIPOLAR DISORDER IN REMISSION    chlorproMAZINE (THORAZINE) 100 mg tablet Take 100 mg by mouth two (2) times a day.  traZODone (DESYREL) 300 mg tablet Take 300 mg by mouth nightly.  multivitamin with iron (FLINTSTONES) chewable tablet Take 1 Tab by mouth daily.  CYANOCOBALAMIN, VITAMIN B-12, (VITAMIN B-12 PO) Take 500 mcg by mouth daily.  ERGOCALCIFEROL, VITAMIN D2, (VITAMIN D2 PO) Take 50,000 Units by mouth every seven (7) days. No current facility-administered medications on file prior to visit. [address] was used for portions of this report. Unintended errors may occur.

## 2019-08-12 NOTE — PATIENT INSTRUCTIONS
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
## 2019-08-21 ENCOUNTER — OFFICE VISIT (OUTPATIENT)
Dept: CARDIOLOGY CLINIC | Age: 49
End: 2019-08-21

## 2019-08-21 VITALS
BODY MASS INDEX: 33.91 KG/M2 | WEIGHT: 211 LBS | OXYGEN SATURATION: 98 % | DIASTOLIC BLOOD PRESSURE: 62 MMHG | SYSTOLIC BLOOD PRESSURE: 112 MMHG | HEIGHT: 66 IN | HEART RATE: 67 BPM

## 2019-08-21 DIAGNOSIS — R94.31 ABNORMAL EKG: Primary | ICD-10-CM

## 2019-08-21 DIAGNOSIS — R60.9 DEPENDENT EDEMA: ICD-10-CM

## 2019-08-21 DIAGNOSIS — Z72.0 TOBACCO ABUSE: ICD-10-CM

## 2019-08-21 NOTE — PROGRESS NOTES
Rama Fuentes presents today for   Chief Complaint   Patient presents with    Chest Pain     1 year follow up - no cardiac complaints        Ramaelizabeth Fuentes preferred language for health care discussion is english/other. Is someone accompanying this pt? no    Is the patient using any DME equipment during [address]? no    Depression Screening:  3 most recent PHQ Screens 8/21/2019   PHQ Not Done -   Little interest or pleasure in doing things Not at all   Feeling down, depressed, irritable, or hopeless Not at all   Total Score PHQ 2 0   Trouble falling or staying asleep, or sleeping too much -   Feeling tired or having little energy -   Poor appetite, weight loss, or overeating -   Feeling bad about yourself - or that you are a failure or have let yourself or your family down -   Trouble concentrating on things such as school, work, reading, or watching TV -   Moving or speaking so slowly that other people could have noticed; or the opposite being so fidgety that others notice -   Thoughts of being better off dead, or hurting yourself in some way -   PHQ 9 Score -   How difficult have these problems made it for you to do your work, take care of your home and get along with others -       Learning Assessment:  Learning Assessment 8/21/2019   PRIMARY LEARNER Patient   PRIMARY LANGUAGE ENGLISH   LEARNER PREFERENCE PRIMARY DEMONSTRATION   ANSWERED BY Patient   RELATIONSHIP SELF       Abuse Screening:  Abuse Screening Questionnaire 8/21/2019   Do you ever feel afraid of your partner? N   Are you in a relationship with someone who physically or mentally threatens you? N   Is it safe for you to go home? Y       Fall Risk  Fall Risk Assessment, last 12 mths 8/21/2019   Able to walk? Yes   Fall in past 12 months? No   Fall with injury? -   Number of falls in past 12 months -   Fall Risk Score -       Pt currently taking Anticoagulant therapy? no    Coordination of Care:  1.  Have you been to the ER, urgent care clinic since your last visit? Hospitalized since your last visit? no    2. Have you seen or consulted any other health care providers outside of the [address] [place] [address] since your last visit? Include any pap smears or colon screening.  no

## 2019-08-21 NOTE — PROGRESS NOTES
HISTORY OF PRESENT ILLNESS  Corazon Lazar is a 44 y.o. female. Follow-up     Leg Swelling         Patient presents for a follow-up office visit. She was initially referred here for evaluation of an abnormal EKG which is done as part of a preoperative assessment. She does have a long-standing history of tobacco use with a 20-pack-year smoking history. Patient subsequently underwent an echocardiogram in January 2018 which was essentially normal.  EF 55%, no regional wall motion abnormalities, normal diastolic function, no valvular heart disease. The patient was last seen in the office 15 months ago. She underwent her shoulder surgery last summer without any complication. She is been doing well since last visit. No new chest pain or shortness of breath. No change in her leg swelling. No orthopnea or PND. She is still smoking about the same amount of cigarettes. Past Medical History:   Diagnosis Date    Arthritis     Asthma     Bipolar disorder ([address]     GERD (gastroesophageal reflux disease)     Obesity (BMI 30.0-34.9) 3/1/2017    Psychiatric disorder     depression    Psychotic disorder (HCC)     PTSD (post-traumatic stress disorder)     Reflux      Current Outpatient Medications   Medication Sig Dispense Refill    cyclobenzaprine (FLEXERIL) 10 mg tablet Take 1 Tab by mouth three (3) times daily as needed for Muscle Spasm(s). (Patient taking differently: Take 10 mg by mouth daily as needed for Muscle Spasm(s).) 15 Tab 0    biotin 10,000 mcg cap Take 1 Cap by mouth two (2) times a day.  multivitamin (ONE A DAY) tablet Take 1 Tab by mouth daily.  cariprazine (VRAYLAR) 6 mg capsule Take 6 mg by mouth nightly.  OXcarbazepine (TRILEPTAL) 300 mg tablet Take 300 mg by mouth two (2) times a day.  CLONAZEPAM (KLONOPIN PO) Take 0.5 mg by mouth two (2) times a day. Indications: anxious      omeprazole (PRILOSEC) 20 mg capsule Take 20 mg by mouth daily.       eszopiclone (LUNESTA) 3 mg tablet Take 3 mg by mouth nightly.  lamoTRIgine (LAMICTAL) 100 mg tablet Take  by mouth daily. 100 mg am and 200mg q hs   Indications: BIPOLAR DISORDER IN REMISSION      chlorproMAZINE (THORAZINE) 100 mg tablet Take 100 mg by mouth two (2) times a day.  traZODone (DESYREL) 300 mg tablet Take 300 mg by mouth nightly.  multivitamin with iron (FLINTSTONES) chewable tablet Take 1 Tab by mouth daily.  CYANOCOBALAMIN, VITAMIN B-12, (VITAMIN B-12 PO) Take 500 mcg by mouth daily.  ERGOCALCIFEROL, VITAMIN D2, (VITAMIN D2 PO) Take 50,000 Units by mouth every seven (7) days. Allergies   Allergen Reactions    Amoxicillin Other (comments)     Does no work  Does not work.  Codeine Nausea and Vomiting and Other (comments)      Social History     Tobacco Use    Smoking status: Current Every Day Smoker     Packs/day: 0.50     Years: 20.00     Pack years: 10.00    Smokeless tobacco: Never Used   Substance Use Topics    Alcohol use: No    Drug use: No     Family History   Problem Relation Age of Onset    Diabetes Mother     Uterine Cancer Mother     Lung Cancer Father     No Known Problems Sister     Heart Disease Neg Hx     Hypertension Neg Hx     Stroke Neg Hx          Review of Systems   Constitutional: Negative for chills and weight loss. HENT: Negative for nosebleeds. Eyes: Negative for blurred vision and double vision. Respiratory: Negative for wheezing. Cardiovascular: Positive for leg swelling. Gastrointestinal: Negative for heartburn. Genitourinary: Negative for dysuria and hematuria. Musculoskeletal: Positive for joint pain. Negative for falls and myalgias. Skin: Negative for rash. Neurological: Negative for focal weakness. Endo/Heme/Allergies: Does not bruise/bleed easily. Psychiatric/Behavioral: Negative for substance abuse.      Visit Vitals  /62 (BP 1 Location: Left arm, BP Patient Position: Sitting)   Pulse 67   Ht 5' 6\" (1.676 m)   Wt 95.7 kg (211 lb)   SpO2 98%   BMI 34.06 kg/m²       Physical Exam   Constitutional: She is oriented to person, place, and time. She appears well-developed and well-nourished. HENT:   Head: Normocephalic and atraumatic. Eyes: Conjunctivae are normal.   Neck: Neck supple. No JVD present. Carotid bruit is not present. Cardiovascular: Normal rate, regular rhythm, S1 normal, S2 normal and normal pulses. Exam reveals no gallop. No murmur heard. Pulmonary/Chest: Effort normal and breath sounds normal. She has no wheezes. She has no rales. Abdominal: Soft. Bowel sounds are normal. There is no tenderness. Musculoskeletal: She exhibits no edema, tenderness or deformity. Neurological: She is alert and oriented to person, place, and time. Skin: Skin is warm and dry. Psychiatric: She has a normal mood and affect. Her behavior is normal. Thought content normal.     EKG: Normal sinus rhythm, leftward axis, poor R-wave progression, nonspecific T-wave abnormality. No change compared to the previous EKG. ASSESSMENT and PLAN      Abnormal EKG. Patient's EKG changes are chronic in nature and have been present for well over a year. She underwent an echocardiogram in January 2018 which was essentially normal.  No further workup needed. Tobacco use disorder. Patient has nearly 20+-pack-year smoking history. She was strongly encouraged to consider quitting smoking completely. Dependent edema. No significant change. Patient was encouraged to wear compression stockings if she is to be sitting or standing for long periods of time. Patient can follow-up in the future as needed.

## 2019-08-26 ENCOUNTER — HOSPITAL ENCOUNTER (OUTPATIENT)
Dept: PHYSICAL THERAPY | Age: 49
Discharge: HOME | End: 2019-08-26
Payer: MEDICAID

## 2019-08-26 PROCEDURE — 97535 SELF CARE MNGMENT TRAINING: CPT

## 2019-08-26 PROCEDURE — 97110 THERAPEUTIC EXERCISES: CPT

## 2019-08-26 PROCEDURE — 97161 PT EVAL LOW COMPLEX 20 MIN: CPT
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
## 2019-08-28 ENCOUNTER — HOSPITAL ENCOUNTER (OUTPATIENT)
Dept: MAMMOGRAPHY | Age: 49
Discharge: HOME | End: 2019-08-28
Attending: FAMILY MEDICINE
Payer: MEDICAID

## 2019-08-28 DIAGNOSIS — Z12.31 VISIT FOR SCREENING MAMMOGRAM: ICD-10-CM

## 2019-08-28 PROCEDURE — 77067 SCR MAMMO BI INCL CAD: CPT

## 2019-08-30 ENCOUNTER — HOSPITAL ENCOUNTER (OUTPATIENT)
Dept: PHYSICAL THERAPY | Age: 49
Discharge: HOME | End: 2019-08-30
Payer: MEDICAID

## 2019-08-30 PROCEDURE — 97110 THERAPEUTIC EXERCISES: CPT

## 2019-08-30 PROCEDURE — 97140 MANUAL THERAPY 1/> REGIONS: CPT

## 2019-08-30 NOTE — PROGRESS NOTES
PT DAILY TREATMENT NOTE 10-18    Patient Name: Danielle Graham  Date:2019  : 1970  [x]  Patient  Verified  Payor: [facility] MEDICAID / Plan: ODILIA WHYTE [facility] / Product Type: Managed Care Medicaid /    In time:3:30  Out time:4:25  Total Treatment Time (min): 55  Visit #:2 of 8    Medicare/BCBS Only   Total Timed Codes (min):  45 1:1 Treatment Time:  36       Treatment Area: Low back pain [M54.5]  Thoracic back pain [M54.6]    SUBJECTIVE  Pain Level (0-10 scale): 5  Any medication changes, allergies to medications, adverse drug reactions, diagnosis change, or new procedure performed?: [x] No    [] Yes (see summary sheet for update)  Subjective functional status/changes:   [] No changes reported  Pt reports that she has started doing the exercises and she is feeling a little better already. OBJECTIVE    Modality rationale: decrease pain and increase tissue extensibility to improve the patients ability to perform ADL's with ease.    Min Type Additional Details    [] Estim:  []Unatt       []IFC  []Premod                        []Other:  []w/ice   []w/heat  Position:  Location:    [] Estim: []Att    []TENS instruct  []NMES                    []Other:  []w/US   []w/ice   []w/heat  Position:  Location:    []  Traction: [] Cervical       []Lumbar                       [] Prone          []Supine                       []Intermittent   []Continuous Lbs:  [] before manual  [] after manual    []  Ultrasound: []Continuous   [] Pulsed                           []1MHz   []3MHz W/cm2:  Location:    []  Iontophoresis with dexamethasone         Location: [] Take home patch   [] In clinic   10 []  Ice     [x]  heat  []  Ice massage  []  Laser   []  Anodyne Position: S/L  Location: right hip    []  Laser with stim  []  Other:  Position:  Location:    []  Vasopneumatic Device Pressure:       [] lo [] med [] hi   Temperature: [] lo [] med [] hi   [x] Skin assessment post-treatment:  [x]intact []redness- no adverse reaction    []redness  adverse reaction:       37 min Therapeutic Exercise:  [x] See flow sheet :   Rationale: increase ROM and increase strength to improve the patients ability to perform functional task with ease. 8 min Manual Therapy:  STM/DTM to left glute/pirif   Rationale: decrease pain, increase ROM and increase tissue extensibility to improve functional mobility. With   [] TE   [] TA   [] neuro   [] other: Patient Education: [x] Review HEP    [] Progressed/Changed HEP based on:   [] positioning   [] body mechanics   [] transfers   [] heat/ice application    [] other:      Other Objective/Functional Measures:      Pain Level (0-10 scale) post treatment: 4    ASSESSMENT/Changes in Function:   Pt put forth good effort with therex but displays limited mobility and is slow moving with exercises due to pain in the left hip. HEP reviewed for understanding and compliance. Pt noted that all the exercises hurt when done at home so HEP reviewed for proper form. Pt instructed to stop exercises if pain increases. Patient will continue to benefit from skilled PT services to modify and progress therapeutic interventions, address functional mobility deficits, address ROM deficits, address strength deficits, analyze and address soft tissue restrictions, analyze and cue movement patterns, analyze and modify body mechanics/ergonomics and assess and modify postural abnormalities to attain remaining goals. [x]  See Plan of Care  []  See progress note/recertification  []  See Discharge Summary         Progress towards goals / Updated goals:    Short Term Goals: To be accomplished in 2 weeks:               1. Patient will report performance of HEP at least 2 times per day to facilitate improved outcomes and improved self management. Progressing 8/30/2019- Pt reports she only does the exercises 1x a day.     Long Term Goals:  To be accomplished in 4 weeks:               1. Patient will report FOTO score of 53 or better to indicate significant improvement in functional status. 2. Pt will demonstrate WNL trunk AROM in all planes to improve ease of daily activity. 3. Pt will demonstrate ability to perform KB squat lift x 5 with 10# void of pain exacerbation to improve ease of household lifting tasks. 4. Pt will report pain 2/10 or better to improve QOL.   PLAN  []  Upgrade activities as tolerated     [x]  Continue plan of care  []  Update interventions per flow sheet       []  Discharge due to:_  []  Other:_      Jacquie Sandoval, DAYAMI 8/30/2019  1:23 PM    Future Appointments   Date Time Provider Colt Jovel   8/30/2019  3:30 PM Alvester Daunt, PTA [facility]   9/4/2019  2:30 PM [facility]   9/5/2019  2:30 PM [facility]   9/10/2019  2:00 PM Vanessa Payne [facility]   9/12/2019  2:30 PM [facility]   9/16/2019  3:00 PM [facility]   9/18/2019  2:30 PM [facility]   9/23/2019  1:00 PM [place] [id number]

## 2019-09-04 ENCOUNTER — HOSPITAL ENCOUNTER (OUTPATIENT)
Dept: PHYSICAL THERAPY | Age: 49
Discharge: HOME | End: 2019-09-04
Payer: MEDICAID

## 2019-09-04 PROCEDURE — 97110 THERAPEUTIC EXERCISES: CPT

## 2019-09-04 PROCEDURE — 97140 MANUAL THERAPY 1/> REGIONS: CPT

## 2019-09-04 NOTE — PROGRESS NOTES
PT DAILY TREATMENT NOTE 10-18    Patient Name: Masoud Corley  Date:2019  : 1970  [x]  Patient  Verified  Payor: [facility] MEDICAID / Plan: [employer]IL[employer] WHYTE [facility] / Product Type: Managed Care Medicaid /    In time:2:29  Out time:3:20  Total Treatment Time (min): 51  Visit #: 3 of 8    Medicare/BCBS Only   Total Timed Codes (min):  41 1:1 Treatment Time:  37       Treatment Area: Low back pain [M54.5]  Thoracic back pain [M54.6]    SUBJECTIVE  Pain Level (0-10 scale): 4  Any medication changes, allergies to medications, adverse drug reactions, diagnosis change, or new procedure performed?: [x] No    [] Yes (see summary sheet for update)  Subjective functional status/changes:   [] No changes reported  Pt reports she didn't feel to bad after her last session.     OBJECTIVE    Modality rationale: decrease pain and increase tissue extensibility to improve the patients ability to perform    Min Type Additional Details    [] Estim:  []Unatt       []IFC  []Premod                        []Other:  []w/ice   []w/heat  Position:  Location:    [] Estim: []Att    []TENS instruct  []NMES                    []Other:  []w/US   []w/ice   []w/heat  Position:  Location:    []  Traction: [] Cervical       []Lumbar                       [] Prone          []Supine                       []Intermittent   []Continuous Lbs:  [] before manual  [] after manual    []  Ultrasound: []Continuous   [] Pulsed                           []1MHz   []3MHz W/cm2:  Location:    []  Iontophoresis with dexamethasone         Location: [] Take home patch   [] In clinic   10 []  Ice     [x]  heat  []  Ice massage  []  Laser   []  Anodyne Position:sidelying  Location:right hip    []  Laser with stim  []  Other:  Position:  Location:    []  Vasopneumatic Device Pressure:       [] lo [] med [] hi   Temperature: [] lo [] med [] hi   [x] Skin assessment post-treatment:  [x]intact []redness- no adverse reaction    []redness  adverse reaction:       27 min Therapeutic Exercise:  [x] See flow sheet :   Rationale: increase ROM and increase strength to improve the patients ability to perform functional activities with ease. 8 min Manual Therapy:  STM /DTM to left glute/pirif   Rationale: decrease pain, increase ROM and increase tissue extensibility to improve functional mobility. With   [] TE   [] TA   [] neuro   [] other: Patient Education: [x] Review HEP    [] Progressed/Changed HEP based on:   [] positioning   [] body mechanics   [] transfers   [] heat/ice application    [] other:      Other Objective/Functional Measures:      Pain Level (0-10 scale) post treatment: 3    ASSESSMENT/Changes in Function:   Pt challenged well with therex noting fatigue throughout exercises. Pt requires moderate cueing for form with most exercises. Pt had no complaints post treatment and reported a slight decrease in pain. Patient will continue to benefit from skilled PT services to modify and progress therapeutic interventions, address functional mobility deficits, address ROM deficits, address strength deficits, analyze and address soft tissue restrictions, analyze and cue movement patterns, analyze and modify body mechanics/ergonomics and assess and modify postural abnormalities to attain remaining goals. [x]  See Plan of Care  []  See progress note/recertification  []  See Discharge Summary         Progress towards goals / Updated goals:  Short Term Goals: To be accomplished in 2 weeks:               1. Patient will report performance of HEP at least 2 times per day to facilitate improved outcomes and improved self management. Progressing 8/30/2019- Pt reports she only does the exercises 1x a day.     Long Term Goals: To be accomplished in 4 weeks:               1. Patient will report FOTO score of 53 or better to indicate significant improvement in functional status.                2. Pt will demonstrate WNL trunk AROM in all planes to improve ease of daily activity.              3. Pt will demonstrate ability to perform KB squat lift x 5 with 10# void of pain exacerbation to improve ease of household lifting tasks.               4. Pt will report pain 2/10 or better to improve QOL.     PLAN  []  Upgrade activities as tolerated     [x]  Continue plan of care  []  Update interventions per flow sheet       []  Discharge due to:_  []  Other:_      Rashaun Zleaya, DAYAMI 9/4/2019  2:40 PM    Future Appointments   Date Time Provider Colt Jovel   9/5/2019  2:30 PM Maury Qureshi PTA [facility]   9/10/2019  2:00 PM Biju Yonug [facility]   9/12/2019  2:30 PM Maury Qureshi PTA [facility]   9/16/2019  3:00 PM Maury Qureshi PTA [facility]   9/18/2019  2:30 PM [facility]   9/23/2019  1:00 PM Fouzia Woody MD [facility] 69

## 2019-09-05 ENCOUNTER — HOSPITAL ENCOUNTER (OUTPATIENT)
Dept: PHYSICAL THERAPY | Age: 49
Discharge: HOME | End: 2019-09-05
Payer: MEDICAID

## 2019-09-05 PROCEDURE — 97140 MANUAL THERAPY 1/> REGIONS: CPT

## 2019-09-05 PROCEDURE — 97110 THERAPEUTIC EXERCISES: CPT

## 2019-09-05 NOTE — PROGRESS NOTES
PT DAILY TREATMENT NOTE 10-18    Patient Name: Cecy Ireland  Date:2019  : 1970  [x]  Patient  Verified  Payor: [facility] MEDICAID / Plan: ODILIA WHYTE [facility] / Product Type: Managed Care Medicaid /    In time:2:34  Out time:3:28  Total Treatment Time (min): 54  Visit #: 4 of 8    Medicare/BCBS Only   Total Timed Codes (min):  44 1:1 Treatment Time:  37       Treatment Area: Low back pain [M54.5]  Thoracic back pain [M54.6]    SUBJECTIVE  Pain Level (0-10 scale): 3  Any medication changes, allergies to medications, adverse drug reactions, diagnosis change, or new procedure performed?: [x] No    [] Yes (see summary sheet for update)  Subjective functional status/changes:   [] No changes reported  PT reports she took a pain pill before coming to therapy so her pain is not that bad. OBJECTIVE    Modality rationale: decrease pain and increase tissue extensibility to improve the patients ability to perform ADL's with ease.    Min Type Additional Details    [] Estim:  []Unatt       []IFC  []Premod                        []Other:  []w/ice   []w/heat  Position:  Location:    [] Estim: []Att    []TENS instruct  []NMES                    []Other:  []w/US   []w/ice   []w/heat  Position:  Location:    []  Traction: [] Cervical       []Lumbar                       [] Prone          []Supine                       []Intermittent   []Continuous Lbs:  [] before manual  [] after manual    []  Ultrasound: []Continuous   [] Pulsed                           []1MHz   []3MHz W/cm2:  Location:    []  Iontophoresis with dexamethasone         Location: [] Take home patch   [] In clinic   10 []  Ice     [x]  heat  []  Ice massage  []  Laser   []  Anodyne Position:sidelying  Location:right hip    []  Laser with stim  []  Other:  Position:  Location:    []  Vasopneumatic Device Pressure:       [] lo [] med [] hi   Temperature: [] lo [] med [] hi   [] Skin assessment post-treatment:  []intact []redness- no adverse reaction []redness  adverse reaction:       36 min Therapeutic Exercise:  [x] See flow sheet :   Rationale: increase ROM and increase strength to improve the patients ability to perform functional task with ease. 8 min Manual Therapy:  STM /DTM to left glute/pirif   Rationale: decrease pain, increase ROM and increase tissue extensibility to improve functional mobility. With   [] TE   [] TA   [] neuro   [] other: Patient Education: [x] Review HEP    [] Progressed/Changed HEP based on:   [] positioning   [] body mechanics   [] transfers   [] heat/ice application    [] other:      Other Objective/Functional Measures:   S/l Clamshells 10x5\"   Bent knee fall outs w/ TA- 10x     Pain Level (0-10 scale) post treatment: 2    ASSESSMENT/Changes in Function:   Pt reports improved mobility since [facility] but difficulty maintaining decreased pain levels at home following her treatment sessions. Reviewed HEP with pt again for compliance and stressed the importance of performing the HEP at home within a pain free range. Pt performed well with therex and reports a slight decrease in pain following treatment. Patient will continue to benefit from skilled PT services to modify and progress therapeutic interventions, address functional mobility deficits, address ROM deficits, address strength deficits, analyze and address soft tissue restrictions, analyze and cue movement patterns, analyze and modify body mechanics/ergonomics and assess and modify postural abnormalities to attain remaining goals. [x]  See Plan of Care  []  See progress note/recertification  []  See Discharge Summary         Progress towards goals / Updated goals:  Short Term Goals: To be accomplished in 2 weeks:               1.  Patient will report performance of HEP at least 2 times per day to facilitate improved outcomes and improved self management. Progressing 8/30/2019- Pt reports she only does the exercises 1x a day.     Long Term Goals: To be accomplished in 4 weeks:               1. Patient will report FOTO score of 53 or better to indicate significant improvement in functional status.              2. Pt will demonstrate WNL trunk AROM in all planes to improve ease of daily activity.              3. Pt will demonstrate ability to perform KB squat lift x 5 with 10# void of pain exacerbation to improve ease of household lifting tasks.               4. Pt will report pain 2/10 or better to improve QOL. Progressing 9/5/19- Pt reports the pain on average she is about a 4/10.     PLAN  []  Upgrade activities as tolerated     [x]  Continue plan of care  []  Update interventions per flow sheet       []  Discharge due to:_  []  Other:_      Haskell Paget, PTA 9/5/2019  1:47 PM    Future Appointments   Date Time Provider Colt Jovel   9/5/2019  2:30 PM Jie Perez PTA [facility]   9/10/2019  2:00 PM Chandrakant Lutz [facility]   9/12/2019  2:30 PM Jie Perez PTA [facility]   9/16/2019  3:00 PM Jie Perez PTA [facility]   9/18/2019  2:30 PM Jie Perez PTA [facility]   9/23/2019  1:00 PM Martin Rivera MD [id number] [facility] [place]

## 2019-09-10 ENCOUNTER — HOSPITAL ENCOUNTER (OUTPATIENT)
Dept: PHYSICAL THERAPY | Age: 49
Discharge: HOME | End: 2019-09-10
Payer: MEDICAID

## 2019-09-10 PROCEDURE — 97110 THERAPEUTIC EXERCISES: CPT

## 2019-09-10 PROCEDURE — 97140 MANUAL THERAPY 1/> REGIONS: CPT

## 2019-09-10 NOTE — PROGRESS NOTES
PT DAILY TREATMENT NOTE     Patient Name: Simeon Lopez  Date:9/10/2019  : 1970  [x]  Patient  Verified  Payor: [facility] MEDICAID / Plan: ODILIA WHYTE [facility] / Product Type: Managed Care Medicaid /    In time:2:01pm  Out time:2:54pm  Total Treatment Time (min): 53  Visit #: 5 of 8    Treatment Area: Low back pain [M54.5]  Thoracic back pain [M54.6]    SUBJECTIVE  Pain Level (0-10 scale): 5/10  Any medication changes, allergies to medications, adverse drug reactions, diagnosis change, or new procedure performed?: [x] No    [] Yes (see summary sheet for update)  Subjective functional status/changes:   [] No changes reported  \"I've had better days. \"    OBJECTIVE  35 min Therapeutic Exercise:  [x] See flow sheet :   Rationale: increase ROM and increase strength to improve the patients ability to improve ease of daily activity. 8 min Manual Therapy:  STM right L/S paraspinals   Rationale: decrease pain, increase ROM and increase tissue extensibility to improve functional mobility. Modality rationale: decrease pain to improve the patients ability to improve ease of daily activity.    Min Type Additional Details    [] Estim:  []Unatt       []IFC  []Premod                        []Other:  []w/ice   []w/heat  Position:  Location:    [] Estim: []Att    []TENS instruct  []NMES                    []Other:  []w/US   []w/ice   []w/heat  Position:  Location:    []  Traction: [] Cervical       []Lumbar                       [] Prone          []Supine                       []Intermittent   []Continuous Lbs:  [] before manual  [] after manual    []  Ultrasound: []Continuous   [] Pulsed                           []1MHz   []3MHz Location:  W/cm2:    []  Iontophoresis with dexamethasone         Location: [] Take home patch   [] In clinic   10 []  Ice     [x]  heat  []  Ice massage  []  Laser   []  Anodyne Position:  Sidelying on left  Location:  Right hip and lower back    []  Laser with stim  []  Other: Position:  Location:    []  Vasopneumatic Device Pressure:       [] lo [] med [] hi   Temperature: [] lo [] med [] hi   [] Skin assessment post-treatment:  []intact []redness- no adverse reaction    []redness  adverse reaction:           With   [] TE   [] TA   [] neuro   [] other: Patient Education: [x] Review HEP    [] Progressed/Changed HEP based on:   [] positioning   [] body mechanics   [] transfers   [] heat/ice application    [] other:      Other Objective/Functional Measures: TTP right inf aspect of quadratus lumborum     Discomfort with right hip ER ROM    Pain Level (0-10 scale) post treatment: 4    ASSESSMENT/Changes in Function: Pt reports limited change to date, though some small relief reported post treatment today. She demonstrates TTP right inf aspect of lumbar paraspinals and discomfort with end range hip ER. Continue per POC with gentle lumbar and hip ROM and gradual progression into functional strengthening as tolerated. Patient will continue to benefit from skilled PT services to modify and progress therapeutic interventions, address functional mobility deficits, address ROM deficits, address strength deficits, analyze and address soft tissue restrictions, analyze and cue movement patterns and instruct in home and community integration to attain remaining goals. []  See Plan of Care  []  See progress note/recertification  []  See Discharge Summary         Progress towards goals / Updated goals:  Short Term Goals: To be accomplished in 2 weeks:               1. Patient will report performance of HEP at least 2 times per day to facilitate improved outcomes and improved self management. Progressing 8/30/2019- Pt reports she only does the exercises 1x a day.     Long Term Goals: To be accomplished in 4 weeks:               1. Patient will report FOTO score of 53 or better to indicate significant improvement in functional status.                2. Pt will demonstrate WNL trunk AROM in all planes to improve ease of daily activity.              3. Pt will demonstrate ability to perform KB squat lift x 5 with 10# void of pain exacerbation to improve ease of household lifting tasks.               4. Pt will report pain 2/10 or better to improve QOL. Progressing 9/5/19- Pt reports the pain on average she is about a 4/10.     PLAN  []  Upgrade activities as tolerated     [x]  Continue plan of care  []  Update interventions per flow sheet       []  Discharge due to:_  []  Other:_      Court Mccord, PT, DPT,  ATC 9/10/2019  2:08 PM    Future Appointments   Date Time Provider Colt Jovel   9/12/2019  2:30 PM Zoraida James, PTA [facility]   9/16/2019  3:00 PM Zoraida James, PTA [facility]   9/18/2019  2:30 PM Zoraida James, PTA [facility]   9/23/2019  1:00 PM Carisa Zuniga MD [facility] 69

## 2019-09-12 ENCOUNTER — HOSPITAL ENCOUNTER (OUTPATIENT)
Dept: PHYSICAL THERAPY | Age: 49
Discharge: HOME | End: 2019-09-12
Payer: MEDICAID

## 2019-09-12 PROCEDURE — 97110 THERAPEUTIC EXERCISES: CPT

## 2019-09-12 PROCEDURE — 97140 MANUAL THERAPY 1/> REGIONS: CPT

## 2019-09-12 NOTE — PROGRESS NOTES
PT DAILY TREATMENT NOTE 10-18    Patient Name: Emmie Perales  Date:2019  : 1970  [x]  Patient  Verified  Payor: [facility] MEDICAID / Plan: ODILIA WHYTE [facility] / Product Type: Managed Care Medicaid /    In time:2:32  Out time:3:19  Total Treatment Time (min): 47  Visit #: 6 of 8    Medicare/BCBS Only   Total Timed Codes (min):  37 1:1 Treatment Time:  30       Treatment Area: Low back pain [M54.5]  Thoracic back pain [M54.6]    SUBJECTIVE  Pain Level (0-10 scale): 4  Any medication changes, allergies to medications, adverse drug reactions, diagnosis change, or new procedure performed?: [x] No    [] Yes (see summary sheet for update)  Subjective functional status/changes:   [] No changes reported  Pt reports \"she is getting there\". Pt states she has a little pain in her back today from carrying a few cases of water in the house. OBJECTIVE    Modality rationale: decrease pain and increase tissue extensibility to improve the patients ability to Perform ADL's with ease.    Min Type Additional Details    [] Estim:  []Unatt       []IFC  []Premod                        []Other:  []w/ice   []w/heat  Position:  Location:    [] Estim: []Att    []TENS instruct  []NMES                    []Other:  []w/US   []w/ice   []w/heat  Position:  Location:    []  Traction: [] Cervical       []Lumbar                       [] Prone          []Supine                       []Intermittent   []Continuous Lbs:  [] before manual  [] after manual    []  Ultrasound: []Continuous   [] Pulsed                           []1MHz   []3MHz W/cm2:  Location:    []  Iontophoresis with dexamethasone         Location: [] Take home patch   [] In clinic   10 []  Ice     [x]  heat  []  Ice massage  []  Laser   []  Anodyne Position:sidelying  Location:right hip    []  Laser with stim  []  Other:  Position:  Location:    []  Vasopneumatic Device Pressure:       [] lo [] med [] hi   Temperature: [] lo [] med [] hi   [x] Skin assessment post-treatment:  [x]intact []redness- no adverse reaction    []redness  adverse reaction:       29 min Therapeutic Exercise:  [x] See flow sheet :   Rationale: increase ROM and increase strength to improve the patients ability to perform functional activiteis with ease. 8 min Manual Therapy:  STM right L/S paraspinals   Rationale: decrease pain, increase ROM and increase tissue extensibility to improve functional mobility. With   [] TE   [] TA   [] neuro   [] other: Patient Education: [x] Review HEP    [] Progressed/Changed HEP based on:   [] positioning   [] body mechanics   [] transfers   [] heat/ice application    [] other:      Other Objective/Functional Measures:    Peach band added to clam shells    Pain Level (0-10 scale) post treatment: 3    ASSESSMENT/Changes in Function:   Pt continues to require cueing for correct form with exercises. Pt able to perform all exercises with no complaints of increased pain. Pt informed about a small bug that was located in her hair that was removed and killed during treatment. Pt left in no distress and reports decreased pain following treatment. Patient will continue to benefit from skilled PT services to modify and progress therapeutic interventions, address functional mobility deficits, address ROM deficits, address strength deficits, analyze and address soft tissue restrictions, analyze and cue movement patterns, analyze and modify body mechanics/ergonomics and assess and modify postural abnormalities to attain remaining goals. [x]  See Plan of Care  []  See progress note/recertification  []  See Discharge Summary         Progress towards goals / Updated goals:  Short Term Goals: To be accomplished in 2 weeks:               1.  Patient will report performance of HEP at least 2 times per day to facilitate improved outcomes and improved self management. Progressing 8/30/2019- Pt reports she only does the exercises 1x a day.     Long Term Goals: To be accomplished in 4 weeks:               1. Patient will report FOTO score of 53 or better to indicate significant improvement in functional status.              2. Pt will demonstrate WNL trunk AROM in all planes to improve ease of daily activity.              3. Pt will demonstrate ability to perform KB squat lift x 5 with 10# void of pain exacerbation to improve ease of household lifting tasks.               4. Pt will report pain 2/10 or better to improve QOL. Progressing 9/5/19- Pt reports the pain on average she is about a 4/10.     PLAN  []  Upgrade activities as tolerated     [x]  Continue plan of care  []  Update interventions per flow sheet       []  Discharge due to:_  []  Other:_      Haskell Paget, PTA 9/12/2019  1:28 PM    Future Appointments   Date Time Provider Colt Jovel   9/12/2019  2:30 PM Jie Perez PTA [facility]   9/16/2019  3:00 PM Jie Perez PTA [facility]   9/18/2019  2:30 PM Jie Perez PTA [facility]   9/23/2019  1:00 PM Martin Rivera MD [facility] 69

## 2019-09-16 ENCOUNTER — HOSPITAL ENCOUNTER (OUTPATIENT)
Dept: PHYSICAL THERAPY | Age: 49
Discharge: HOME | End: 2019-09-16
Payer: MEDICAID

## 2019-09-16 PROCEDURE — 97110 THERAPEUTIC EXERCISES: CPT

## 2019-09-16 PROCEDURE — 97140 MANUAL THERAPY 1/> REGIONS: CPT

## 2019-09-16 NOTE — PROGRESS NOTES
PT DAILY TREATMENT NOTE 10-18    Patient Name: Simeon Lopez  Date:2019  : 1970  [x]  Patient  Verified  Payor: [facility] MEDICAID / Plan: [facility] / Product Type: Managed Care Medicaid /    In time:2:58  Out time:3:56  Total Treatment Time (min):58  Visit #: 7 of 8    Medicare/BCBS Only   Total Timed Codes (min):  48 1:1 Treatment Time:  40       Treatment Area: Low back pain [M54.5]  Thoracic back pain [M54.6]    SUBJECTIVE  Pain Level (0-10 scale): 4  Any medication changes, allergies to medications, adverse drug reactions, diagnosis change, or new procedure performed?: [x] No    [] Yes (see summary sheet for update)  Subjective functional status/changes:   [] No changes reported  Pt reports she was feeling good until she got out of bed then everything started to hurt. OBJECTIVE    Modality rationale: decrease pain and increase tissue extensibility to improve the patients ability to perform ADL's with ease.    Min Type Additional Details    [] Estim:  []Unatt       []IFC  []Premod                        []Other:  []w/ice   []w/heat  Position:  Location:    [] Estim: []Att    []TENS instruct  []NMES                    []Other:  []w/US   []w/ice   []w/heat  Position:  Location:    []  Traction: [] Cervical       []Lumbar                       [] Prone          []Supine                       []Intermittent   []Continuous Lbs:  [] before manual  [] after manual    []  Ultrasound: []Continuous   [] Pulsed                           []1MHz   []3MHz W/cm2:  Location:    []  Iontophoresis with dexamethasone         Location: [] Take home patch   [] In clinic   10 []  Ice     [x]  heat  []  Ice massage  []  Laser   []  Anodyne Position:sielying  Location: Right Hip    []  Laser with stim  []  Other:  Position:  Location:    []  Vasopneumatic Device Pressure:       [] lo [] med [] hi   Temperature: [] lo [] med [] hi   [x] Skin assessment post-treatment:  [x]intact []redness- no adverse reaction    []redness  adverse reaction:       40 min Therapeutic Exercise:  [x] See flow sheet :   Rationale: increase ROM and increase strength to improve the patients ability to perform functional task with ease. 8 min Manual Therapy:  STM right L/S paraspinals   Rationale: decrease pain, increase ROM and increase tissue extensibility to improve functional mobility. With   [] TE   [] TA   [] neuro   [] other: Patient Education: [x] Review HEP    [] Progressed/Changed HEP based on:   [] positioning   [] body mechanics   [] transfers   [] heat/ice application    [] other:      Other Objective/Functional Measures:   2# added to bent knee fall outs  Stool IR/ER- 10xea     Pain Level (0-10 scale) post treatment: 4    ASSESSMENT/Changes in Function:   Pt continues to display slow progress with goals and reports she only feels about 30% better since [facility]. Pt reports continued difficulty with prolonged standing and difficulty with bed mobility due to pain in the right hip. Continued Poor compliance with HEP. Pt reports nearly void of back pain and majority of her pain stems from the right piriformis/glute. No change in pain following treatment today. Patient will continue to benefit from skilled PT services to modify and progress therapeutic interventions, address functional mobility deficits, address ROM deficits, address strength deficits, analyze and address soft tissue restrictions, analyze and cue movement patterns, analyze and modify body mechanics/ergonomics and assess and modify postural abnormalities to attain remaining goals. [x]  See Plan of Care  []  See progress note/recertification  []  See Discharge Summary         Progress towards goals / Updated goals:  Short Term Goals: To be accomplished in 2 weeks:               1.  Patient will report performance of HEP at least 2 times per day to facilitate improved outcomes and improved self management. Progressing 8/30/2019- Pt reports she only does the exercises 1x a day.     Long Term Goals: To be accomplished in 4 weeks:               1. Patient will report FOTO score of 53 or better to indicate significant improvement in functional status.              2. Pt will demonstrate WNL trunk AROM in all planes to improve ease of daily activity. Progressing 9/16/19 Pt displays AROM WNL but reports pain with flex (6/10) and ext (4/10)               3. Pt will demonstrate ability to perform KB squat lift x 5 with 10# void of pain exacerbation to improve ease of household lifting tasks. Not Met- 9/16/19- Pt reports pain                4. Pt will report pain 2/10 or better to improve QOL. Progressing 9/5/19- Pt reports the pain on average she is about a 4/10.     PLAN  []  Upgrade activities as tolerated     [x]  Continue plan of care  []  Update interventions per flow sheet       []  Discharge due to:_  []  Other:_      Martin Chong PTA 9/16/2019  1:19 PM    Future Appointments   Date Time Provider Colt Jovel   9/16/2019  3:00 PM Idris Garsia PTA [facility]   9/18/2019  3:30 PM Idris Garsia PTA [facility]   9/23/2019  1:00 PM Duaine Frankel, MD [address]

## 2019-09-16 NOTE — PROGRESS NOTES
In [address]  [address]  [place], [address]  [phone] fax    Physical Therapy Progress Note  Patient name: Thania Grande Start of Care: 19   Referral source: Mary Johnson MD : 1970   Medical/Treatment Diagnosis: Low back pain [M54.5]  Thoracic back pain [M54.6]  Payor: [facility] MEDICAID / [facility] / Product Type: Managed Care Medicaid /  Onset Date:19     Prior Hospitalization: see medical history Provider#: [id number]   Medications: Verified on Patient Summary List    Comorbidities: RTC repair x 2, Right TSA, gastric bypass, gall bladder removed, depression, anxiety  Prior Level of Function:independent with daily activity void of hip and back pain  Visits from Start of Care: 7    Missed Visits: 0      Established Goals:        Excellent         Good         Limited            None  [] Increased ROM   []  [x]  []  []  [] Increased Strength  []  []  [x]  []  [] Increased Mobility  []  []  [x]  []   [] Decreased Pain   []  []  [x]  []  [] Decreased Swelling  []  []  []  [x]      Key Functional Changes:   ASSESSMENT/Changes in Function:   Pt continues to display slow progress with goals and reports she only feels about 30% better since [facility]. Pt reports continued difficulty with prolonged standing and difficulty with bed mobility due to pain in the right hip. Pt reports nearly void of back pain and a significant decrease in hip pain but majority of her remaining pain stems from the right piriformis/glute. Pt will benefit from continued skilled PT to address remaining deficits and progress with pt goals of pain free function. Updated Goals: to be achieved in 2 weeks:   Continue with unmet goals:  Short Term Goals: To be accomplished in 2 weeks:               1.  Patient will report performance of HEP at least 2 times per day to facilitate improved outcomes and improved self management. Progressing 2019- Pt reports she only does the exercises 1x a day.     Long Term Goals: To be accomplished in 4 weeks:               1. Patient will report FOTO score of 53 or better to indicate significant improvement in functional status. Progressing 9/16/19- Foto score 43 (no change from initial score)               2. Pt will demonstrate WNL trunk AROM in all planes to improve ease of daily activity. Progressing 9/16/19 Pt displays AROM WNL but reports pain with flex (6/10) and ext (4/10)               3. Pt will demonstrate ability to perform KB squat lift x 5 with 10# void of pain exacerbation to improve ease of household lifting tasks. Not Met- 9/16/19- Pt reports pain                4. Pt will report pain 2/10 or better to improve QOL. Progressing 9/5/19- Pt reports the pain on average she is about a 4/10. ASSESSMENT/RECOMMENDATIONS:  []Continue therapy per initial plan/protocol at a frequency of  2 x per week for 2 weeks  []Continue therapy with the following recommended changes:_____________________      _____________________________________________________________________  []Discontinue therapy progressing towards or have reached established goals  []Discontinue therapy due to lack of appreciable progress towards goals  []Discontinue therapy due to lack of attendance or compliance  []Await Physician's recommendations/decisions regarding therapy  []Other:________________________________________________________________    Thank you for this referral.    Stanislaw Ackerman, PTA 9/16/2019 7:12 PM  NOTE TO PHYSICIAN:  PLEASE COMPLETE THE ORDERS BELOW AND   FAX TO [facility]: [phone]  If you are unable to process this request in 24 hours please contact our office: [phone]    ? I have read the above report and request that my patient continue as recommended.   ? I have read the above report and request that my patient continue therapy with the following changes/special instructions:__________________________________________________________  ? I have read the above report and request that my patient be discharged from therapy.     Physicians signature: ______________________________Date: ______Time:______

## 2019-09-18 ENCOUNTER — HOSPITAL ENCOUNTER (OUTPATIENT)
Dept: PHYSICAL THERAPY | Age: 49
Discharge: HOME | End: 2019-09-18
Payer: MEDICAID

## 2019-09-18 PROCEDURE — 97530 THERAPEUTIC ACTIVITIES: CPT

## 2019-09-18 PROCEDURE — 97110 THERAPEUTIC EXERCISES: CPT

## 2019-09-18 PROCEDURE — 97140 MANUAL THERAPY 1/> REGIONS: CPT

## 2019-09-18 NOTE — PROGRESS NOTES
PT DAILY TREATMENT NOTE 10-18    Patient Name: Rickey Coker  Date:2019  : 1970  [x]  Patient  Verified  Payor: [facility] MEDICAID / Plan: [facility] / Product Type: Managed Care Medicaid /    In time:3:30  Out time:4:30  Total Treatment Time (min): 60  Visit #: 8 of 8    Medicare/BCBS Only   Total Timed Codes (min):  50 1:1 Treatment Time:  40       Treatment Area: Low back pain [M54.5]  Thoracic back pain [M54.6]    SUBJECTIVE  Pain Level (0-10 scale): 6  Any medication changes, allergies to medications, adverse drug reactions, diagnosis change, or new procedure performed?: [x] No    [] Yes (see summary sheet for update)  Subjective functional status/changes:   [] No changes reported  Pt reports she doesn't know if its the weather or not but she is in pain today. OBJECTIVE    Modality rationale: decrease pain and increase tissue extensibility to improve the patients ability to perform ADL's with ease.    Min Type Additional Details    [] Estim:  []Unatt       []IFC  []Premod                        []Other:  []w/ice   []w/heat  Position:  Location:    [] Estim: []Att    []TENS instruct  []NMES                    []Other:  []w/US   []w/ice   []w/heat  Position:  Location:    []  Traction: [] Cervical       []Lumbar                       [] Prone          []Supine                       []Intermittent   []Continuous Lbs:  [] before manual  [] after manual    []  Ultrasound: []Continuous   [] Pulsed                           []1MHz   []3MHz W/cm2:  Location:    []  Iontophoresis with dexamethasone         Location: [] Take home patch   [] In clinic   10 []  Ice     [x]  heat  []  Ice massage  []  Laser   []  Anodyne Position:sidelying  Location: right hip    []  Laser with stim  []  Other:  Position:  Location:    []  Vasopneumatic Device Pressure:       [] lo [] med [] hi   Temperature: [] lo [] med [] hi   [] Skin assessment post-treatment:  []intact []redness- no adverse reaction []redness  adverse reaction:       27 min Therapeutic Exercise:  [x] See flow sheet :   Rationale: increase ROM and increase strength to improve the patients ability to perform functional task with ease. 15 min Therapeutic Activity:  [x]  See flow sheet :   Rationale: increase ROM, increase strength and improve coordination  to improve the patients ability to perform functional activities with ease. 8 min Manual Therapy:  STM right L/S paraspinals/QL   Rationale: decrease pain, increase ROM and increase tissue extensibility to improve functional mobility. With   [] TE   [] TA   [] neuro   [] other: Patient Education: [x] Review HEP    [] Progressed/Changed HEP based on:   [] positioning   [] body mechanics   [] transfers   [] heat/ice application    [] other:      Other Objective/Functional Measures:      Pain Level (0-10 scale) post treatment: 5    ASSESSMENT/Changes in Function:   Pt puts forth good effort with exercises with no reports of increasing pain throughout therex. Pt reports improved mobility and some decreased pain post treatment but notes poor carryover of pain management once leaving her therapy session. Continued progression of therex per pt tolerance for remaining sessions to decrease right hip pain and improve mobility. Patient will continue to benefit from skilled PT services to modify and progress therapeutic interventions, address functional mobility deficits, address ROM deficits, address strength deficits, analyze and address soft tissue restrictions, analyze and cue movement patterns, analyze and modify body mechanics/ergonomics and assess and modify postural abnormalities to attain remaining goals. [x]  See Plan of Care  []  See progress note/recertification  []  See Discharge Summary         Progress towards goals / Updated goals:  Short Term Goals: To be accomplished in 2 weeks:               1.  Patient will report performance of HEP at least 2 times per day to facilitate improved outcomes and improved self management. Progressing 8/30/2019- Pt reports she only does the exercises 1x a day.     Long Term Goals: To be accomplished in 4 weeks:               1. Patient will report FOTO score of 53 or better to indicate significant improvement in functional status. Progressing 9/16/19- Foto score 43 (no change from initial score)               2. Pt will demonstrate WNL trunk AROM in all planes to improve ease of daily activity. Progressing 9/16/19 Pt displays AROM WNL but reports pain with flex (6/10) and ext (4/10)               3. Pt will demonstrate ability to perform KB squat lift x 5 with 10# void of pain exacerbation to improve ease of household lifting tasks. Not Met- 9/16/19- Pt reports pain                4. Pt will report pain 2/10 or better to improve QOL.  Progressing 9/5/19- Pt reports the pain on average she is about a 4/10.     PLAN  []  Upgrade activities as tolerated     [x]  Continue plan of care  []  Update interventions per flow sheet       []  Discharge due to:_  []  Other:_      Martin Chong PTA 9/18/2019  4:41 PM    Future Appointments   Date Time Provider Colt Jovel   9/23/2019  1:00 PM [facility]el[facility]   9/23/2019  3:30 PM Idris Neither, PTA [facility]   9/26/2019  2:30 PM Idris Neither, PTA [facility]   9/30/2019  2:00 PM Idris Neither, PTA [facility]   10/3/2019  2:00 PM Idris Neither, PTA [facility]

## 2019-09-23 ENCOUNTER — APPOINTMENT (OUTPATIENT)
Dept: PHYSICAL THERAPY | Age: 49
End: 2019-09-23
Payer: MEDICAID

## 2019-09-23 ENCOUNTER — OFFICE VISIT (OUTPATIENT)
Dept: ORTHOPEDIC SURGERY | Age: 49
End: 2019-09-23

## 2019-09-23 VITALS
TEMPERATURE: 96.3 F | OXYGEN SATURATION: 99 % | BODY MASS INDEX: 34.17 KG/M2 | HEIGHT: 66 IN | WEIGHT: 212.6 LBS | HEART RATE: 81 BPM | RESPIRATION RATE: 16 BRPM | DIASTOLIC BLOOD PRESSURE: 44 MMHG | SYSTOLIC BLOOD PRESSURE: 96 MMHG

## 2019-09-23 DIAGNOSIS — M12.811 ROTATOR CUFF TEAR ARTHROPATHY, RIGHT: Primary | ICD-10-CM

## 2019-09-23 DIAGNOSIS — M75.101 ROTATOR CUFF TEAR ARTHROPATHY, RIGHT: Primary | ICD-10-CM

## 2019-09-23 RX ORDER — ZOLPIDEM TARTRATE 5 MG/1
TABLET ORAL
COMMUNITY

## 2019-09-23 RX ORDER — IBUPROFEN 800 MG/1
TABLET ORAL
COMMUNITY
End: 2020-05-31

## 2019-09-23 NOTE — PROGRESS NOTES
1. Have you been to the ER, urgent care clinic since your last visit? Hospitalized since your last visit? No    2. Have you seen or consulted any other health care providers outside of the [address] [place] [address] since your last visit? Include any pap smears or colon screening.  No

## 2019-09-23 NOTE — PROGRESS NOTES
Joshua Vega  1970   Chief Complaint   Patient presents with    Shoulder Pain     Right        HISTORY OF PRESENT ILLNESS  Joshua Vega is a 44 y.o. female who presents today for reevaluation of right shoulder pain. Patient rates pain as 3/10 today. Pt is s/p Right reverse total shoulder arthroplasty on 7/18/18. Pt reports she is doing better today. She has good ROM in the office today. Pt notes some dizziness on occasion when standing up. Patient denies any fever, chills, chest pain, shortness of breath or calf pain. The remainder of the review of systems is negative. There are no new illness or injuries to report since last seen in the office. There are no changes to medications, allergies, family or social history. Pain Assessment  9/23/2019   Location of Pain Shoulder   Location Modifiers Right   Severity of Pain 3   Quality of Pain Aching   Duration of Pain A few hours   Frequency of Pain Intermittent   Aggravating Factors -   Aggravating Factors Comment -   Limiting Behavior -   Relieving Factors -   Relieving Factors Comment -   Result of Injury -   Work-Related Injury -   Type of Injury -     PHYSICAL EXAM:   Visit Vitals  BP 96/44 (BP 1 Location: Left arm, BP Patient Position: Sitting)   Pulse 81   Temp 96.3 °F (35.7 °C) (Oral)   Resp 16   Ht 5' 6\" (1.676 m)   Wt 212 lb 9.6 oz (96.4 kg)   SpO2 99% Comment: RA   BMI 34.31 kg/m²     The patient is a well-developed, well-nourished female   in no acute distress. The patient is alert and oriented times three. The patient is alert and oriented times three. Mood and affect are normal.  LYMPHATIC: lymph nodes are not enlarged and are within normal limits  SKIN: normal in color and non tender to palpation. There are no bruises or abrasions noted. NEUROLOGICAL: Motor sensory exam is within normal limits. Reflexes are equal bilaterally.  There is normal sensation to pinprick and light touch  MUSCULOSKELETAL:  Examination Right shoulder   Skin Intact   AC joint tenderness -   Biceps tenderness -   Forward flexion/Elevation    Active abduction    Glenohumeral abduction 80   External rotation ROM 30   Internal rotation ROM 30   Apprehension -   Tadeos Relocation -   Jerk -   Load and Shift -   Obriens -   Speeds -   Impingement sign -   Supraspinatus/Empty Can -, 5/5   External Rotation Strength -, 5/5   Lift Off/Belly Press -, 5/5   Neurovascular Intact       IMAGING:  3 view xray images of right shoulder on 1/8/19 read and reviewed by HAMZAH Leung, reveal reverse total shoulder arthroplasty hardware in excellent position      EXAM:  XR SHOULDER RT AP/LAT MIN 2 V     INDICATION:   right shoulder pain, hx right total shoulder.     COMPARISON: 1/30/2017     FINDINGS:      Prior right glenohumeral arthroplasty. Lucency seen around the humeral  prosthetic component raising the possibility of loosening of this device. No fracture or dislocation. Widened AC joint similar to that seen on chest radiograph dated 7/6/2018, presumably a postoperative finding.     IMPRESSION  IMPRESSION:     Suspected loosening of the right humeral prosthetic component.     3 phase bone scan: 1.  No evidence of asymmetric abnormal increased flow phase or blood pool phase  increased activity. 2.  Increased uptake in the right glenoid.  Whether this implies loosening of the hardware is unclear.  Probably more favorable for expected increased uptake from altered mechanics.  Doubtful for hardware loosening. 3.  Greater degree of increased uptake in the left glenohumeral joint region, probably from osteoarthritis. IMPRESSION:      ICD-10-CM ICD-9-CM    1. Rotator cuff tear arthropathy, right M75.101 716.81     M12.811          PLAN:   1. Pt presents today s/p Right reverse total shoulder arthroplasty on 7/18/18. Pt is doing well today and I would like to see her back in one year. Risk factors include: Smoker  2. No ultrasound exam indicated today  3.  No cortisone injection indicated today   4. No Physical/Occupational Therapy indicated today  5. No diagnostic test indicated today:   6. No durable medical equipment indicated today  7. No referral indicated today   8. No medications indicated today:   9. No Narcotic indicated today       RTC 1 year      Scribed by Nikole Teague[address]) as dictated by Graylon Callander, MD    I, Dr. Graylon Callander, confirm that all documentation is accurate.     Graylon Callander, M.D.   [address] [place] [address] and Spine Specialist

## 2019-09-26 ENCOUNTER — HOSPITAL ENCOUNTER (OUTPATIENT)
Dept: PHYSICAL THERAPY | Age: 49
Discharge: HOME | End: 2019-09-26
Payer: MEDICAID

## 2019-09-26 PROCEDURE — 97110 THERAPEUTIC EXERCISES: CPT

## 2019-09-26 PROCEDURE — 97140 MANUAL THERAPY 1/> REGIONS: CPT

## 2019-09-30 ENCOUNTER — APPOINTMENT (OUTPATIENT)
Dept: PHYSICAL THERAPY | Age: 49
End: 2019-09-30
Payer: MEDICAID

## 2019-10-01 ENCOUNTER — HOSPITAL ENCOUNTER (OUTPATIENT)
Dept: PHYSICAL THERAPY | Age: 49
Discharge: HOME | End: 2019-10-01
Payer: MEDICAID

## 2019-10-01 PROCEDURE — 97110 THERAPEUTIC EXERCISES: CPT

## 2019-10-01 PROCEDURE — 97140 MANUAL THERAPY 1/> REGIONS: CPT

## 2019-10-01 NOTE — PROGRESS NOTES
PT DAILY TREATMENT NOTE 10-18    Patient Name: Adam Woody  Date:10/1/2019  : 1970  [x]  Patient  Verified  Payor: [facility] MEDICAID / Plan: ODILIA WHYTE [facility] / Product Type: Managed Care Medicaid /    In time:157  Out time:256  Total Treatment Time (min): 55  Visit #: 2 of 4    Medicare/BCBS Only   Total Timed Codes (min):  45 1:1 Treatment Time:  43       Treatment Area: Low back pain [M54.5]  Thoracic back pain [M54.6]    SUBJECTIVE  Pain Level (0-10 scale):  2  Any medication changes, allergies to medications, adverse drug reactions, diagnosis change, or new procedure performed?: [x] No    [] Yes (see summary sheet for update)  Subjective functional status/changes:   [x] No changes reported      OBJECTIVE    Modality rationale: decrease pain to improve the patients ability to tolerate post exercise soreness   Min Type Additional Details    [] Estim:  []Unatt       []IFC  []Premod                        []Other:  []w/ice   []w/heat  Position:  Location:    [] Estim: []Att    []TENS instruct  []NMES                    []Other:  []w/US   []w/ice   []w/heat  Position:  Location:    []  Traction: [] Cervical       []Lumbar                       [] Prone          []Supine                       []Intermittent   []Continuous Lbs:  [] before manual  [] after manual    []  Ultrasound: []Continuous   [] Pulsed                           []1MHz   []3MHz W/cm2:  Location:    []  Iontophoresis with dexamethasone         Location: [] Take home patch   [] In clinic   10 []  Ice     [x]  heat  []  Ice massage  []  Laser   []  Anodyne Position:  Location:    []  Laser with stim  []  Other:  Position:  Location:    []  Vasopneumatic Device Pressure:       [] lo [] med [] hi   Temperature: [] lo [] med [] hi   [] Skin assessment post-treatment:  []intact []redness- no adverse reaction    []redness  adverse reaction:         37 min Therapeutic Exercise:  [] See flow sheet :   Rationale: increase ROM and increase strength to improve the patients ability to perform daily activities     8 min Manual Therapy:  Per flow sheet   Rationale: decrease pain, increase ROM, increase tissue extensibility and decrease trigger points to perform daily activities          With   [] TE   [] TA   [] neuro   [] other: Patient Education: [x] Review HEP    [] Progressed/Changed HEP based on:   [] positioning   [] body mechanics   [] transfers   [] heat/ice application    [] other:      Other Objective/Functional Measures:      Pain Level (0-10 scale) post treatment: 2    ASSESSMENT/Changes in Function: Progressing slowly towards goals. Reassess FOTO next visit; possibly D/C. Patient will continue to benefit from skilled PT services to modify and progress therapeutic interventions, address functional mobility deficits, address strength deficits, analyze and address soft tissue restrictions and analyze and cue movement patterns to attain remaining goals. []  See Plan of Care  []  See progress note/recertification  []  See Discharge Summary         Progress towards goals / Updated goals:  Short Term Goals: To be accomplished in 2 weeks:               1. Patient will report performance of HEP at least 2 times per day to facilitate improved outcomes and improved self management. Progressing 8/30/2019- Pt reports she only does the exercises 1x a day.     Long Term Goals: To be accomplished in 4 weeks:               1. Patient will report FOTO score of 53 or better to indicate significant improvement in functional status. Progressing 9/16/19- Foto score 43 (no change from initial score)               2. Pt will demonstrate WNL trunk AROM in all planes to improve ease of daily activity. Progressing 9/16/19 Pt displays AROM WNL but reports pain with flex (6/10) and ext (4/10)               3. Pt will demonstrate ability to perform KB squat lift x 5 with 10# void of pain exacerbation to improve ease of household lifting tasks.  Not Met- 9/16/19- Pt reports pain                4. Pt will report pain 2/10 or better to improve QOL.  Progressing 9/5/19- Pt reports the pain on average she is about a 4/10.       PLAN  []  Upgrade activities as tolerated     [x]  Continue plan of care  []  Update interventions per flow sheet       []  Discharge due to:_  []  Other:_      MARISSA Duenas, CMTPT 10/1/2019  12:58 PM    Future Appointments   Date Time Provider Colt Jovel   10/1/2019  2:00 PM [facility]   10/3/2019  2:00 PM [facility]

## 2019-10-03 ENCOUNTER — HOSPITAL ENCOUNTER (OUTPATIENT)
Dept: PHYSICAL THERAPY | Age: 49
Discharge: HOME | End: 2019-10-03
Payer: MEDICAID

## 2019-10-03 PROCEDURE — 97140 MANUAL THERAPY 1/> REGIONS: CPT

## 2019-10-03 PROCEDURE — 97110 THERAPEUTIC EXERCISES: CPT

## 2019-10-03 NOTE — PROGRESS NOTES
PT DAILY TREATMENT NOTE 10-18    Patient Name: Warren Keating  Date:10/3/2019  : 1970  [x]  Patient  Verified  Payor: [facility] MEDICAID / Plan: ODILIA WHYTE [facility] CCCP / Product Type: Managed Care Medicaid /    In time:5:32  Out time:6:27  Total Treatment Time (min): 55  Visit #: 3 of 4    Medicare/BCBS Only   Total Timed Codes (min):  45 1:1 Treatment Time:  38       Treatment Area: Low back pain [M54.5]  Thoracic back pain [M54.6]    SUBJECTIVE  Pain Level (0-10 scale): 5  Any medication changes, allergies to medications, adverse drug reactions, diagnosis change, or new procedure performed?: [x] No    [] Yes (see summary sheet for update)  Subjective functional status/changes:   [] No changes reported  Pt reports she did a lot of walking this morning and it has her right side of her back and hip in pain. OBJECTIVE    Modality rationale: decrease pain and increase tissue extensibility to improve the patients ability to perform ADL's with ease.    Min Type Additional Details    [] Estim:  []Unatt       []IFC  []Premod                        []Other:  []w/ice   []w/heat  Position:  Location:    [] Estim: []Att    []TENS instruct  []NMES                    []Other:  []w/US   []w/ice   []w/heat  Position:  Location:    []  Traction: [] Cervical       []Lumbar                       [] Prone          []Supine                       []Intermittent   []Continuous Lbs:  [] before manual  [] after manual    []  Ultrasound: []Continuous   [] Pulsed                           []1MHz   []3MHz W/cm2:  Location:    []  Iontophoresis with dexamethasone         Location: [] Take home patch   [] In clinic   10 []  Ice     [x]  heat  []  Ice massage  []  Laser   []  Anodyne Position:left s/l  Location:right hip    []  Laser with stim  []  Other:  Position:  Location:    []  Vasopneumatic Device Pressure:       [] lo [] med [] hi   Temperature: [] lo [] med [] hi   [x] Skin assessment post-treatment:  [x]intact []redness- no adverse reaction    []redness  adverse reaction:       37 min Therapeutic Exercise:  [x] See flow sheet :   Rationale: increase ROM, increase strength and improve balance to improve the patients ability to perform functional task with ease. 8 min Manual Therapy:  STM right L/S paraspinals/QL   Rationale: decrease pain, increase ROM and increase tissue extensibility to improve functional mobility. With   [] TE   [] TA   [] neuro   [] other: Patient Education: [x] Review HEP    [] Progressed/Changed HEP based on:   [] positioning   [] body mechanics   [] transfers   [] heat/ice application    [] other:      Other Objective/Functional Measures:   S/l hip abd- 10x  3# increase for bent knee fallout     Pain Level (0-10 scale) post treatment: 4    ASSESSMENT/Changes in Function:    Pt displays fatigue throughout therex this visit requiring mod cueing for form. Pt tolerated progressed therex well with no complaints of increased pain. Prepare for discharge NV. Patient will continue to benefit from skilled PT services to modify and progress therapeutic interventions, address functional mobility deficits, address ROM deficits, address strength deficits, analyze and address soft tissue restrictions, analyze and cue movement patterns, analyze and modify body mechanics/ergonomics and assess and modify postural abnormalities to attain remaining goals. [x]  See Plan of Care  []  See progress note/recertification  []  See Discharge Summary         Progress towards goals / Updated goals:  Short Term Goals: To be accomplished in 2 weeks:               1. Patient will report performance of HEP at least 2 times per day to facilitate improved outcomes and improved self management. Progressing 8/30/2019- Pt reports she only does the exercises 1x a day.     Long Term Goals: To be accomplished in 4 weeks:               1.  Patient will report FOTO score of 53 or better to indicate significant improvement in functional status. Progressing 9/16/19- Foto score 43 (no change from initial score)               2. Pt will demonstrate WNL trunk AROM in all planes to improve ease of daily activity. Progressing 9/16/19 Pt displays AROM WNL but reports pain with flex (6/10) and ext (4/10)               3. Pt will demonstrate ability to perform KB squat lift x 5 with 10# void of pain exacerbation to improve ease of household lifting tasks. Not Met- 9/16/19- Pt reports pain                4. Pt will report pain 2/10 or better to improve QOL. Progressing 9/5/19- Pt reports the pain on average she is about a 4/10.       PLAN  []  Upgrade activities as tolerated     [x]  Continue plan of care  []  Update interventions per flow sheet       []  Discharge due to:_  []  Other:_      Parker Ojeda PTA 10/3/2019  5:56 PM    No future appointments.

## 2019-10-09 ENCOUNTER — APPOINTMENT (OUTPATIENT)
Dept: PHYSICAL THERAPY | Age: 49
End: 2019-10-09
Payer: MEDICAID

## 2019-10-11 ENCOUNTER — HOSPITAL ENCOUNTER (OUTPATIENT)
Dept: PHYSICAL THERAPY | Age: 49
Discharge: HOME | End: 2019-10-11
Payer: MEDICAID

## 2019-10-11 PROCEDURE — 97110 THERAPEUTIC EXERCISES: CPT

## 2019-10-11 PROCEDURE — 97140 MANUAL THERAPY 1/> REGIONS: CPT

## 2019-10-11 NOTE — PROGRESS NOTES
Physical Therapy Discharge Instructions      [facility]  [address]  [place], [address]  [phone] fax    Patient: Breann Parham  : 1970      Continue Home Exercise Program 2 times per day for 3-4 weeks, then decrease to 2-3 times per week      Continue with    [] Ice  as needed 2-3times per day     [x] Heat           Follow up with MD:     [x] Upon completion of therapy     [x] As needed      Recommendations:     [x]   Return to activity with home program    []   Return to activity with the following modifications:       []Post Rehab Program    []Join Independent aquatic program     []Return to/join local gym        Additional Comments:           Evens Fagan PTA 10/11/2019 5:05 PM
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
## 2019-10-21 ENCOUNTER — OFFICE VISIT (OUTPATIENT)
Dept: ORTHOPEDIC SURGERY | Facility: CLINIC | Age: 49
End: 2019-10-21

## 2019-10-21 VITALS
SYSTOLIC BLOOD PRESSURE: 119 MMHG | OXYGEN SATURATION: 97 % | WEIGHT: 214.8 LBS | BODY MASS INDEX: 34.52 KG/M2 | TEMPERATURE: 98 F | HEIGHT: 66 IN | DIASTOLIC BLOOD PRESSURE: 71 MMHG | HEART RATE: 68 BPM | RESPIRATION RATE: 18 BRPM

## 2019-10-21 DIAGNOSIS — M54.41 ACUTE RIGHT-SIDED LOW BACK PAIN WITH RIGHT-SIDED SCIATICA: ICD-10-CM

## 2019-10-21 DIAGNOSIS — M47.26 OSTEOARTHRITIS OF SPINE WITH RADICULOPATHY, LUMBAR REGION: ICD-10-CM

## 2019-10-21 DIAGNOSIS — E66.9 OBESITY (BMI 30.0-34.9): ICD-10-CM

## 2019-10-21 DIAGNOSIS — M47.816 LUMBAR SPONDYLOSIS: ICD-10-CM

## 2019-10-21 DIAGNOSIS — M54.50 LOW BACK PAIN, UNSPECIFIED BACK PAIN LATERALITY, UNSPECIFIED CHRONICITY, UNSPECIFIED WHETHER SCIATICA PRESENT: Primary | ICD-10-CM

## 2019-10-21 DIAGNOSIS — M53.86 DECREASED RANGE OF MOTION OF LUMBAR SPINE: ICD-10-CM
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
## 2019-10-21 DIAGNOSIS — M47.817 DJD (DEGENERATIVE JOINT DISEASE), LUMBOSACRAL: ICD-10-CM

## 2019-10-21 RX ORDER — HYDROCODONE BITARTRATE AND ACETAMINOPHEN 5; 325 MG/1; MG/1
1 TABLET ORAL
Qty: 21 TAB | Refills: 0 | Status: SHIPPED | OUTPATIENT
Start: 2019-10-21 | End: 2019-10-29 | Stop reason: SDUPTHER
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
## 2019-10-21 NOTE — PROGRESS NOTES
1. Have you been to the ER, urgent care clinic since your last visit? Hospitalized since your last visit? No    2. Have you seen or consulted any other health care providers outside of the [address] [place] [address] since your last visit? Include any pap smears or colon screening.  No

## 2019-10-21 NOTE — LETTER
NOTIFICATION RETURN TO WORK  
 
10/21/2019 Ms. Pedro Jacob [address] [id number] To Whom It May Concern: 
 
Pedro Jacob is currently under the care of [address] [place] [address]. She will remain out of work until November 1, 2019. If there are questions or concerns please have the patient contact our office.  
 
 
 
Sincerely, 
 
 
Kendra Xavier PA-C

## 2019-10-23 ENCOUNTER — TELEPHONE (OUTPATIENT)
Dept: ORTHOPEDIC SURGERY | Age: 49
End: 2019-10-23

## 2019-10-23 NOTE — TELEPHONE ENCOUNTER
Patient called to advise she needs another work note to give her boss. She advised the original note Sidra Yanez wrote expires on 11/1/19. Patient advised her MRI is scheduled for 11/1/19 and the diag f/u appt is scheduled for 11/4/19. Patient advised she will need a note stating she is out of work until at least 11/4/19 so she can see Sidra Yanez after the MRI. Patient would like to  this note ASAP at the [facility] location if possible.      Patient can be reached at: [phone]

## 2019-10-23 NOTE — LETTER
NOTIFICATION RETURN TO WORK / SCHOOL 
 
10/24/2019 10:39 AM 
 
Ms. Liz Watson [address] [address] To Whom It May Concern: 
 
Liz Watson is currently under the care of [address] [place] [address] Bernard Stark. She will return to work on November 4, 2019. If there are questions or concerns please have the patient contact our office.  
 
 
 
Sincerely, 
 
 
Butch Chin PA-C

## 2019-10-24 ENCOUNTER — OFFICE VISIT (OUTPATIENT)
Dept: ORTHOPEDIC SURGERY | Facility: CLINIC | Age: 49
End: 2019-10-24

## 2019-10-24 VITALS
RESPIRATION RATE: 18 BRPM | BODY MASS INDEX: 34.17 KG/M2 | WEIGHT: 212.6 LBS | HEIGHT: 66 IN | TEMPERATURE: 97.5 F | OXYGEN SATURATION: 98 % | HEART RATE: 72 BPM | DIASTOLIC BLOOD PRESSURE: 81 MMHG | SYSTOLIC BLOOD PRESSURE: 119 MMHG

## 2019-10-24 DIAGNOSIS — M47.816 LUMBAR SPONDYLOSIS: ICD-10-CM

## 2019-10-24 DIAGNOSIS — M47.817 DJD (DEGENERATIVE JOINT DISEASE), LUMBOSACRAL: ICD-10-CM

## 2019-10-24 DIAGNOSIS — M47.26 OSTEOARTHRITIS OF SPINE WITH RADICULOPATHY, LUMBAR REGION: ICD-10-CM

## 2019-10-24 DIAGNOSIS — M54.50 LOW BACK PAIN, UNSPECIFIED BACK PAIN LATERALITY, UNSPECIFIED CHRONICITY, UNSPECIFIED WHETHER SCIATICA PRESENT: ICD-10-CM

## 2019-10-24 DIAGNOSIS — M19.072 PRIMARY OSTEOARTHRITIS OF LEFT ANKLE: ICD-10-CM

## 2019-10-24 DIAGNOSIS — S93.602A FOOT SPRAIN, LEFT, INITIAL ENCOUNTER: Primary | ICD-10-CM

## 2019-10-24 RX ORDER — BETAMETHASONE SODIUM PHOSPHATE AND BETAMETHASONE ACETATE 3; 3 MG/ML; MG/ML
6 INJECTION, SUSPENSION INTRA-ARTICULAR; INTRALESIONAL; INTRAMUSCULAR; SOFT TISSUE ONCE
Qty: 0.5 ML | Refills: 0
Start: 2019-10-24 | End: 2019-10-24

## 2019-10-24 NOTE — PROGRESS NOTES
Verbal order given by Dr. Cynthia Duran to draw up 4 mL 0.25% Sensorcaine and 0.5 mL 30 mg/5mL Betamethasone.
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
## 2019-10-29 DIAGNOSIS — M54.50 LOW BACK PAIN, UNSPECIFIED BACK PAIN LATERALITY, UNSPECIFIED CHRONICITY, UNSPECIFIED WHETHER SCIATICA PRESENT: ICD-10-CM

## 2019-10-29 NOTE — TELEPHONE ENCOUNTER
Last Visit: 10/24/19 with MD Joey Kumari  Next Appointment: 11/4/19 with HAMZAH Soto  Previous Refill Encounter(s): 10/21/19 #21    Requested Prescriptions     Pending Prescriptions Disp Refills    HYDROcodone-acetaminophen (NORCO) 5-325 mg per tablet 21 Tab 0     Sig: Take 1 Tab by mouth every eight (8) hours as needed for Pain for up to 7 days. Max Daily Amount: 3 Tabs.

## 2019-10-30 RX ORDER — HYDROCODONE BITARTRATE AND ACETAMINOPHEN 5; 325 MG/1; MG/1
1 TABLET ORAL
Qty: 21 TAB | Refills: 0 | Status: SHIPPED | OUTPATIENT
Start: 2019-10-30 | End: 2019-11-06

## 2019-11-01 ENCOUNTER — HOSPITAL ENCOUNTER (OUTPATIENT)
Age: 49
Discharge: HOME | End: 2019-11-01
Attending: PHYSICIAN ASSISTANT
Payer: MEDICAID

## 2019-11-01 DIAGNOSIS — M53.86 DECREASED RANGE OF MOTION OF LUMBAR SPINE: ICD-10-CM

## 2019-11-01 DIAGNOSIS — M54.50 LOW BACK PAIN, UNSPECIFIED BACK PAIN LATERALITY, UNSPECIFIED CHRONICITY, UNSPECIFIED WHETHER SCIATICA PRESENT: ICD-10-CM

## 2019-11-01 PROCEDURE — 72148 MRI LUMBAR SPINE W/O DYE: CPT

## 2019-11-04 ENCOUNTER — OFFICE VISIT (OUTPATIENT)
Dept: ORTHOPEDIC SURGERY | Facility: CLINIC | Age: 49
End: 2019-11-04

## 2019-11-04 ENCOUNTER — DOCUMENTATION ONLY (OUTPATIENT)
Dept: ORTHOPEDIC SURGERY | Facility: CLINIC | Age: 49
End: 2019-11-04

## 2019-11-04 VITALS
DIASTOLIC BLOOD PRESSURE: 81 MMHG | RESPIRATION RATE: 16 BRPM | HEIGHT: 66 IN | BODY MASS INDEX: 33.43 KG/M2 | SYSTOLIC BLOOD PRESSURE: 100 MMHG | WEIGHT: 208 LBS | TEMPERATURE: 95.7 F | HEART RATE: 64 BPM

## 2019-11-04 DIAGNOSIS — M47.816 FACET ARTHROPATHY, LUMBAR: ICD-10-CM

## 2019-11-04 DIAGNOSIS — M47.816 LUMBAR SPONDYLOSIS: ICD-10-CM

## 2019-11-04 DIAGNOSIS — M48.061 FORAMINAL STENOSIS OF LUMBAR REGION: ICD-10-CM

## 2019-11-04 DIAGNOSIS — M54.50 LOW BACK PAIN, UNSPECIFIED BACK PAIN LATERALITY, UNSPECIFIED CHRONICITY, UNSPECIFIED WHETHER SCIATICA PRESENT: Primary | ICD-10-CM

## 2019-11-04 DIAGNOSIS — M47.817 DJD (DEGENERATIVE JOINT DISEASE), LUMBOSACRAL: ICD-10-CM

## 2019-11-04 NOTE — PROGRESS NOTES
HISTORY OF PRESENT ILLNESS:  Flora Matais returns to the office for MRI followup. She is still experiencing pain in the low back with radiation into the left lower extremity of radicular type symptoms with occasional sharp and stabbing pains associated ending on the top of her left foot. She is currently off work. PLAN:   In light of the MRI findings consistent below, I am referring her to Spine for assessment and advanced treatment to include but no limited to epidural steroid injections versus surgical intervention. Today, all her questions were answered to her satisfaction. A copy of her MRI was reviewed and provided. Study Result     Description:  MRI lumbar spine without contrast     TECHNIQUE: Multiplanar, multisequence MR imaging of the lumbar spine without  contrast     Clinical Indication:  Back pain with bilateral leg pain right greater than left  for several months     Comparison: None.     Findings:       There is low grade 1 retrolisthesis, L1-L2 and L2-L3. Mild degenerative marrow  signal changes. The conus medullaris terminates at L1. Visualized cord is unremarkable.     Paravertebral soft tissues appear normal.     T12-L1, mild diffuse disc bulge without canal or foraminal stenosis. L1-L2, moderate loss of disc space height with low grade 1 retrolisthesis. No  significant facet arthropathy. There is diffuse disc bulge which asymmetrically  extends to the right foraminal position causing moderate right foraminal  stenosis. The canal remains patent. Left neural foramen is patent. L2-L3, mild loss of disc space height with low grade 1 retrolisthesis. No  significant facet arthropathy. No canal or foraminal stenosis. L3-L4 slight diffuse disc bulge. No canal or foraminal stenosis. L4-L5, mild diffuse disc bulge. Mild facet arthropathy. Canal and foramen  patent.   L5-S1, grade 1 retrolisthesis with moderate diffuse disc bulge with slight  asymmetric extension to the left foraminal position causing moderate to marked  left foraminal stenosis with likely impingement of the exiting left L5 nerve (as  on sagittal, 3). There is moderate right foraminal stenosis. Moderate facet  arthropathy bilaterally.     IMPRESSION  Impression:       Marked left foraminal stenosis at L5-S1 associated with asymmetric disc bulge. Probable impingement of the exiting L5 nerve noted.     Moderate degenerative disc disease at L1-L2 with grade 1 retrolisthesis. Asymmetric extension of the disc to the right contributes to moderate foraminal  stenosis.     Otherwise, multilevel degenerative changes overall mild. No other areas of canal  or foraminal stenosis are seen.

## 2019-11-04 NOTE — PROGRESS NOTES
Patient informed that she has an appt with Dr. Paulette Vega on 11/12/19 and needs to check in at 2 pm.  Patient verbalized understanding. She was also informed that Mariella Ahuja would not give her an Rx for narcotics, he recommends tylenol/motrin per manufacturers recommendation.

## 2019-11-04 NOTE — PROGRESS NOTES
1. Have you been to the ER, urgent care clinic since your last visit? Hospitalized since your last visit? NO    2. Have you seen or consulted any other health care providers outside of the [address] [place] [address] since your last visit? Include any pap smears or colon screening.    NO

## 2019-11-05 ENCOUNTER — OFFICE VISIT (OUTPATIENT)
Dept: ORTHOPEDIC SURGERY | Age: 49
End: 2019-11-05

## 2019-11-05 VITALS
SYSTOLIC BLOOD PRESSURE: 99 MMHG | RESPIRATION RATE: 16 BRPM | HEART RATE: 66 BPM | BODY MASS INDEX: 33.57 KG/M2 | HEIGHT: 66 IN | OXYGEN SATURATION: 99 % | DIASTOLIC BLOOD PRESSURE: 59 MMHG | TEMPERATURE: 97.9 F

## 2019-11-05 DIAGNOSIS — S93.492A SPRAIN OF ANTERIOR TALOFIBULAR LIGAMENT OF LEFT ANKLE, INITIAL ENCOUNTER: ICD-10-CM

## 2019-11-05 DIAGNOSIS — S90.32XA CONTUSION OF LEFT FOOT, INITIAL ENCOUNTER: ICD-10-CM

## 2019-11-05 DIAGNOSIS — M19.079 ARTHRITIS OF MIDFOOT: Primary | ICD-10-CM

## 2019-11-05 NOTE — PATIENT INSTRUCTIONS
You have been provided with an order for durable medical equipment that you may  at an outside facility as our office does not carry the equipment you need. You may pick it up at any medical supply company you like.  Listed below are a few different locations for your convenience:    [address] [place] [address] Street  Phone: [phone]

## 2019-11-05 NOTE — PROGRESS NOTES
AMBULATORY PROGRESS NOTE      Patient: Isabela Queen             MRN: [id number]     SSN: [id number] Body mass index is 33.57 kg/m². YOB: 1970     AGE: 44 y.o. SEX: female    PCP: Yonatan Justice MD     IMPRESSION/DIAGNOSIS AND TREATMENT PLAN     DIAGNOSES  1. Arthritis of midfoot    2. Sprain of anterior talofibular ligament of left ankle, initial encounter    3. Contusion of left foot, initial encounter        Orders Placed This Encounter    Generic Supply Order      Isabela uQeen understands her diagnoses and the proposed plan. X-rays[facility], show OA of the left ankle and a bone spur at the anterior portion of the ankle along the fibula and midfoot TMT joint OA, four and five cuboid articulation OA. There is some bone spur seen to the dorsal part of the foot at the talonavicular and navicular cuneiform regions, OA of the number two and three TMT joint articulation but no acute fracture, subluxation, or dislocation. My impression was midfoot OA and left ankle OA with history of tripping injury to the left ankle and foot contusion with ankle sprain. Her x-rays were from [facility] dated October 17, 2019. Plan:    1) DME Order: Spenco firm medial arch supports (LMS). 2) Continue activity modification as directed. RTO - 2 weeks // PLEASE OBTAIN X-RAYS OF: left ankle 3 VIEWS      HPI AND EXAMINATION     Isabela Queen IS A 44 y.o. female who presents to my outpatient office complaining of:  Left ankle pain referred by Dr. Miranda Barnhart. Pt states she tripped over her neighbor's dog. She saw Dr. Miranda Barnhart who placed her in the CAM walker. She notes she then tripped over the dog again in the house out of her CAM walker. She states she decided to sleep in her walker 4 nights as a result. Patient states her pain worsened after the second fall.  She reports her family doctor suggested she hyperextended her foot. Date of injury: 10/15/19    Visit Vitals  BP 99/59   Pulse 66   Temp 97.9 °F (36.6 °C) (Oral)   Resp 16   Ht 5' 6\" (1.676 m)   SpO2 99%   BMI 33.57 kg/m²     Appearance: Alert, well appearing and pleasant patient who is in no distress, oriented to person, place/time, and who follows commands. This patient is accompanied in the examination room by her self. Dementia: no dementia  Psychiatric: Affect and mood are appropriate. Patient arrives to office via: with assistive device: CAM walker  HEENT: Head normocephalic & atraumatic. Both pupils are round, non icteric sclera   Eye: EOM are intact and sclera are clear    Neck: ROM WNL and JVD neck is not present     Hard of hearing, does not require hearing aid device  Respiratory: Breathing is unlabored without accessory chest muscle use  Cardiovascular/Peripheral Vascular: Normal Pulses to each foot      ANKLE/FOOT left    Gait: Normal, with CAM walker  Tenderness: Mild tenderness of navicular tuberosity. Mild tenderness to the medial malleolus and medial deltoid ligament    Non tender plantar fasca  Cutaneous: Mild swelling to the anteromedial and anterolateral ankle. Palpable bone spurs to the anterior distal fibula   Joint Motion: WNL. Joint / Tendon Stability: No Ankle or Subtalar instability or joint laxity. No peroneal sublux ability or dislocation  Alignment: Forefoot, Midfoot, Hindfoot WNL. Neuro Motor/Sensory: NL/NL. Vascular: NL foot/ankle pulses. Lymphatics: No extremity lymphedema, No calf swelling, no tenderness to calf muscles.     CHART REVIEW     Past Medical History:   Diagnosis Date    Arthritis     Asthma     Bipolar disorder ([facility] [address]     GERD (gastroesophageal reflux disease)     Obesity (BMI 30.0-34.9) 3/1/2017    Psychiatric disorder     depression    Psychotic disorder (HCC)     PTSD (post-traumatic stress disorder)     Reflux      Current Outpatient Medications Medication Sig    HYDROcodone-acetaminophen (NORCO) 5-325 mg per tablet Take 1 Tab by mouth every eight (8) hours as needed for Pain for up to 7 days. Max Daily Amount: 3 Tabs.  ibuprofen (MOTRIN) 800 mg tablet Take  by mouth.  zolpidem (AMBIEN) 5 mg tablet Take  by mouth nightly as needed for Sleep.  cyclobenzaprine (FLEXERIL) 10 mg tablet Take 1 Tab by mouth three (3) times daily as needed for Muscle Spasm(s). (Patient taking differently: Take 10 mg by mouth daily as needed for Muscle Spasm(s). )    biotin 10,000 mcg cap Take 1 Cap by mouth two (2) times a day.  multivitamin (ONE A DAY) tablet Take 1 Tab by mouth daily.  cariprazine (VRAYLAR) 6 mg capsule Take 6 mg by mouth nightly.  OXcarbazepine (TRILEPTAL) 300 mg tablet Take 300 mg by mouth two (2) times a day.  CLONAZEPAM (KLONOPIN PO) Take 0.5 mg by mouth two (2) times a day. Indications: anxious    omeprazole (PRILOSEC) 20 mg capsule Take 20 mg by mouth daily.  eszopiclone (LUNESTA) 3 mg tablet Take 3 mg by mouth nightly.  lamoTRIgine (LAMICTAL) 100 mg tablet Take  by mouth daily. 100 mg am and 200mg q hs   Indications: BIPOLAR DISORDER IN REMISSION    chlorproMAZINE (THORAZINE) 100 mg tablet Take 100 mg by mouth two (2) times a day.  traZODone (DESYREL) 300 mg tablet Take 300 mg by mouth nightly.  multivitamin with iron (FLINTSTONES) chewable tablet Take 1 Tab by mouth daily.  CYANOCOBALAMIN, VITAMIN B-12, (VITAMIN B-12 PO) Take 500 mcg by mouth daily.  ERGOCALCIFEROL, VITAMIN D2, (VITAMIN D2 PO) Take 50,000 Units by mouth every seven (7) days. No current facility-administered medications for this visit. Allergies   Allergen Reactions    Amoxicillin Other (comments)     Does no work  Does not work.     Codeine Nausea and Vomiting and Other (comments)     Past Surgical History:   Procedure Laterality Date    ABDOMEN SURGERY PROC UNLISTED  2008    gastric bypass    HX CHOLECYSTECTOMY      HX GASTRIC BYPASS  2008    HX HEENT      tonsilectomy      HX ORTHOPAEDIC      right shoulder     HX SHOULDER REPLACEMENT Right 07/25/2018    [place] ANESTH,SURGERY OF SHOULDER Right 05/2017    rotator cuff     Social History     Occupational History    Not on file   Tobacco Use    Smoking status: Current Every Day Smoker     Packs/day: 0.50     Years: 20.00     Pack years: 10.00    Smokeless tobacco: Never Used   Substance and Sexual Activity    Alcohol use: No    Drug use: No    Sexual activity: Yes     Partners: Male     Birth control/protection: None     Family History   Problem Relation Age of Onset    Diabetes Mother     Uterine Cancer Mother     Lung Cancer Father     No Known Problems Sister     Heart Disease Neg Hx     Hypertension Neg Hx     Stroke Neg Hx         REVIEW OF SYSTEMS : 11/5/2019  ALL BELOW ARE Negative except : SEE HPI      REVIEW OF SYSTEMS : 11/5/2019  ALL BELOW ARE Negative except : SEE HPI     General: Negative for fever and chills. No unexpected change in weight. Denies fatigue. No change in appetite. Dermatologic: Negative for rash or itching, dry skin, hair changes, rash or skin lesion changes  HEENT: Negative for congestion, sore throat, neck pain and neck stiffness. No change in vision or hearing. Hasn't noted any enlarged lymph nodes in the neck. Cardiovascular:  Negative for chest pain and palpitations. Has not noted pedal edema. Peripheral Vascular: No calf pain, vascular vein tenderness to calf pain           No calf throbbing, posterior knee throbbing pain   Respiratory: Negative for cough, colds, sinus, hemoptysis, shortness of breath and wheezing. Gastrointestinal: Negative for nausea and vomiting, rectal bleeding, coffee ground emesis, abdominal pain, diarrhea and constipation. Genitourinary: Negative for dysuria, frequency urgency, or burning on micturition. No flank pain, no foul smelling urine, no difficulty with initiating urination.    Hematological: Negative for bleeding or easy bruising. Musculoskeletal: Negative  for arthralgias, back pain or neck pain. Neurological: Negative for dizziness, seizures or syncopal episodes. Denies headaches. Endocrine: Denies excessive thirst.  No heat/cold intolerance. Psychiatric: Negative for depression or insomnia. DIAGNOSTIC IMAGING     No notes on file    Please see above section of this report. I have personally reviewed the results of the above study. The interpretation of this study is my professional opinion. Written by Alex Richards, as dictated by Dr. Daniela Tang. I, Dr. Daniela Tang, confirm that all documentation is accurate.

## 2019-11-05 NOTE — PROGRESS NOTES
1. Have you been to the ER, urgent care clinic since your last visit? Hospitalized since your last visit? No    2. Have you seen or consulted any other health care providers outside of the [address] [place] [address] since your last visit? Include any pap smears or colon screening.  No

## 2019-11-12 ENCOUNTER — OFFICE VISIT (OUTPATIENT)
Dept: ORTHOPEDIC SURGERY | Age: 49
End: 2019-11-12

## 2019-11-12 VITALS
SYSTOLIC BLOOD PRESSURE: 110 MMHG | HEART RATE: 76 BPM | DIASTOLIC BLOOD PRESSURE: 71 MMHG | TEMPERATURE: 98.6 F | RESPIRATION RATE: 16 BRPM | BODY MASS INDEX: 33.57 KG/M2 | HEIGHT: 66 IN

## 2019-11-12 DIAGNOSIS — M48.061 FORAMINAL STENOSIS OF LUMBAR REGION: Primary | ICD-10-CM

## 2019-11-12 DIAGNOSIS — M51.36 BULGING OF LUMBAR INTERVERTEBRAL DISC: ICD-10-CM

## 2019-11-12 DIAGNOSIS — M54.16 LUMBAR RADICULOPATHY: ICD-10-CM

## 2019-11-12 RX ORDER — GABAPENTIN 300 MG/1
300 CAPSULE ORAL 3 TIMES DAILY
Qty: 90 CAP | Refills: 2 | OUTPATIENT
Start: 2019-11-12 | End: 2020-05-31

## 2019-11-12 RX ORDER — NAPROXEN 500 MG/1
500 TABLET, DELAYED RELEASE ORAL 2 TIMES DAILY WITH MEALS
Qty: 60 TAB | Refills: 2 | OUTPATIENT
Start: 2019-11-12 | End: 2020-05-31

## 2019-11-12 NOTE — PATIENT INSTRUCTIONS
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
## 2020-05-31 ENCOUNTER — HOSPITAL ENCOUNTER (EMERGENCY)
Age: 50
Discharge: HOME | End: 2020-05-31
Attending: EMERGENCY MEDICINE
Payer: MEDICAID

## 2020-05-31 ENCOUNTER — APPOINTMENT (OUTPATIENT)
Dept: VASCULAR SURGERY | Age: 50
End: 2020-05-31
Attending: PHYSICIAN ASSISTANT
Payer: MEDICAID

## 2020-05-31 ENCOUNTER — APPOINTMENT (OUTPATIENT)
Dept: GENERAL RADIOLOGY | Age: 50
End: 2020-05-31
Attending: PHYSICIAN ASSISTANT
Payer: MEDICAID

## 2020-05-31 VITALS
DIASTOLIC BLOOD PRESSURE: 63 MMHG | TEMPERATURE: 98.2 F | BODY MASS INDEX: 35.36 KG/M2 | SYSTOLIC BLOOD PRESSURE: 115 MMHG | RESPIRATION RATE: 16 BRPM | HEART RATE: 62 BPM | WEIGHT: 220 LBS | OXYGEN SATURATION: 100 % | HEIGHT: 66 IN

## 2020-05-31 DIAGNOSIS — M25.561 ACUTE PAIN OF RIGHT KNEE: Primary | ICD-10-CM

## 2020-05-31 LAB — D DIMER PPP FEU-MCNC: 0.58 UG/ML(FEU)

## 2020-05-31 PROCEDURE — 74011250637 HC RX REV CODE- 250/637: Performed by: PHYSICIAN ASSISTANT

## 2020-05-31 PROCEDURE — 73564 X-RAY EXAM KNEE 4 OR MORE: CPT

## 2020-05-31 PROCEDURE — 93971 EXTREMITY STUDY: CPT

## 2020-05-31 PROCEDURE — 85379 FIBRIN DEGRADATION QUANT: CPT

## 2020-05-31 PROCEDURE — 99283 EMERGENCY DEPT VISIT LOW MDM: CPT

## 2020-05-31 RX ORDER — ACETAMINOPHEN 500 MG
1000 TABLET ORAL
Status: COMPLETED | OUTPATIENT
Start: 2020-05-31 | End: 2020-05-31

## 2020-05-31 RX ORDER — NAPROXEN 500 MG/1
500 TABLET ORAL 2 TIMES DAILY WITH MEALS
Qty: 20 TAB | Refills: 0 | Status: SHIPPED | OUTPATIENT
Start: 2020-05-31 | End: 2020-06-10

## 2020-05-31 RX ORDER — LIDOCAINE 4 G/100G
PATCH TOPICAL
Qty: 1 PACKAGE | Refills: 0 | Status: SHIPPED | OUTPATIENT
Start: 2020-05-31 | End: 2020-11-02

## 2020-05-31 RX ADMIN — ACETAMINOPHEN 1000 MG: 500 TABLET ORAL at 15:23

## 2020-05-31 NOTE — ED PROVIDER NOTES
EMERGENCY DEPARTMENT HISTORY AND PHYSICAL EXAM    3:29 PM      Date: 5/31/2020  Patient Name: Arcelia Medel    History of Presenting Illness     Chief Complaint   Patient presents with    Leg Pain         History Provided By: Patient    Additional History (Context): Arcelia Medel is a 44 y.o. female with hx of asthma, PTSD, bipolar d/o, arthritis who presents with complaint of right posterior knee pain x3 days. Patient notes pain is worse with certain movements. Patient denies injury or trauma, numbness or tingling, leg swelling or discoloration. Denies history of DVT or PE, hemoptysis, recent surgery or travel. Notes she has tried over-the-counter medication for symptoms without relief. PCP: Tolu Murillo MD    Current Outpatient Medications   Medication Sig Dispense Refill    naproxen (Naprosyn) 500 mg tablet Take 1 Tab by mouth two (2) times daily (with meals) for 10 days. 20 Tab 0    lidocaine 4 % patch Apply for 12 hours, remove for 12 hours 1 Package 0    zolpidem (AMBIEN) 5 mg tablet Take  by mouth nightly as needed for Sleep.  biotin 10,000 mcg cap Take 1 Cap by mouth two (2) times a day.  multivitamin (ONE A DAY) tablet Take 1 Tab by mouth daily.  cariprazine (VRAYLAR) 6 mg capsule Take 6 mg by mouth nightly.  OXcarbazepine (TRILEPTAL) 300 mg tablet Take 300 mg by mouth two (2) times a day.  CLONAZEPAM (KLONOPIN PO) Take 0.5 mg by mouth two (2) times a day. Indications: anxious      omeprazole (PRILOSEC) 20 mg capsule Take 20 mg by mouth daily.  eszopiclone (LUNESTA) 3 mg tablet Take 3 mg by mouth nightly.  lamoTRIgine (LAMICTAL) 100 mg tablet Take  by mouth daily. 100 mg am and 200mg q hs   Indications: BIPOLAR DISORDER IN REMISSION      chlorproMAZINE (THORAZINE) 100 mg tablet Take 100 mg by mouth two (2) times a day.  traZODone (DESYREL) 300 mg tablet Take 300 mg by mouth nightly.       multivitamin with iron (FLINTSTONES) chewable tablet Take 1 Tab by mouth daily.  CYANOCOBALAMIN, VITAMIN B-12, (VITAMIN B-12 PO) Take 500 mcg by mouth daily.  ERGOCALCIFEROL, VITAMIN D2, (VITAMIN D2 PO) Take 50,000 Units by mouth every seven (7) days. Past History     Past Medical History:  Past Medical History:   Diagnosis Date    Arthritis     Asthma     Bipolar disorder ([address]     GERD (gastroesophageal reflux disease)     Obesity (BMI 30.0-34.9) 3/1/2017    Psychiatric disorder     depression    Psychotic disorder (HCC)     PTSD (post-traumatic stress disorder)     Reflux        Past Surgical History:  Past Surgical History:   Procedure Laterality Date    ABDOMEN SURGERY PROC UNLISTED  2008    gastric bypass    HX CHOLECYSTECTOMY      HX GASTRIC BYPASS  2008    HX HEENT      tonsilectomy      HX ORTHOPAEDIC      right shoulder     HX SHOULDER REPLACEMENT Right 07/25/2018    NH ANESTH,SURGERY OF SHOULDER Right 05/2017    rotator cuff       Family History:  Family History   Problem Relation Age of Onset    Diabetes Mother     Uterine Cancer Mother     Lung Cancer Father     No Known Problems Sister     Heart Disease Neg Hx     Hypertension Neg Hx     Stroke Neg Hx        Social History:  Social History     Tobacco Use    Smoking status: Current Every Day Smoker     Packs/day: 0.50     Years: 20.00     Pack years: 10.00    Smokeless tobacco: Never Used   Substance Use Topics    Alcohol use: No    Drug use: No       Allergies: Allergies   Allergen Reactions    Amoxicillin Other (comments)     Does no work  Does not work.  Codeine Nausea and Vomiting and Other (comments)         Review of Systems       Review of Systems   Constitutional: Negative for chills and fever. Respiratory: Negative for shortness of breath. Cardiovascular: Negative for chest pain. Gastrointestinal: Negative for abdominal pain, nausea and vomiting. Musculoskeletal: Positive for arthralgias and myalgias. Skin: Negative for rash. Neurological: Negative for weakness. All other systems reviewed and are negative. Physical Exam     Visit Vitals  /63 (BP 1 Location: Left arm, BP Patient Position: At rest)   Pulse 62   Temp 98.2 °F (36.8 °C)   Resp 16   Ht 5' 6\" (1.676 m)   Wt 99.8 kg (220 lb)   SpO2 100%   BMI 35.51 kg/m²         Physical Exam  Vitals signs and nursing note reviewed. Constitutional:       General: She is not in acute distress. Appearance: She is well-developed. She is not diaphoretic. HENT:      Head: Normocephalic and atraumatic. Neck:      Musculoskeletal: Normal range of motion and neck supple. Cardiovascular:      Rate and Rhythm: Normal rate and regular rhythm. Heart sounds: Normal heart sounds. No murmur. No friction rub. No gallop. Pulmonary:      Effort: Pulmonary effort is normal. No respiratory distress. Breath sounds: Normal breath sounds. No wheezing or rales. Musculoskeletal: Normal range of motion. Right knee: She exhibits normal range of motion, no swelling, no effusion, no ecchymosis and no deformity. Tenderness (lateral, posterior knee TTP) found. No medial joint line and no lateral joint line tenderness noted. Comments: Calf non-tender to palpation, dorsalis pedis 2+    Skin:     General: Skin is warm. Findings: No rash. Neurological:      Mental Status: She is alert. Diagnostic Study Results     Labs -  Recent Results (from the past 12 hour(s))   D DIMER    Collection Time: 05/31/20  3:25 PM   Result Value Ref Range    D DIMER 0.58 (H) <0.46 ug/ml(FEU)       Radiologic Studies -   XR KNEE RT MIN 4 V    (Results Pending)   arthritis, no acute process. Doppler: negative per tech. Medical Decision Making   I am the first provider for this patient. I reviewed the vital signs, available nursing notes, past medical history, past surgical history, family history and social history.     Vital Signs-Reviewed the patient's vital signs. Records Reviewed: Nursing Notes and Old Medical Records (Time of Review: 3:29 PM)    ED Course: Progress Notes, Reevaluation, and Consults:  5:59 PM  Reviewed results with patient. Discussed need for close outpatient follow-up this week for reassessment. Discussed strict return precautions, including leg swelling, discoloration, or any other medical concerns. Provider Notes (Medical Decision Making): 57-year-old female who presents to the ED due to right posterior knee pain x3 days. Afebrile, nontoxic-appearing, looks well. No evidence of septic joint, cellulitis, trauma, fracture, DVT. Extremity neurovascularly intact. Stable for discharge with symptomatic management and close outpatient follow-up for further assessment    Diagnosis     Clinical Impression:   1. Acute pain of right knee        Disposition: home     Follow-up Information     Follow up With Specialties Details Why [facility] EMERGENCY DEPT Emergency Medicine  If symptoms worsen [address] Alda Rasmussen MD Family [facility] In 2 days  [address]  [phone]             Patient's Medications   Start Taking    LIDOCAINE 4 % [facility]    Apply for 12 hours, remove for 12 hours    NAPROXEN (NAPROSYN) 500 MG TABLET    Take 1 Tab by mouth two (2) times daily (with meals) for 10 days. Continue Taking    BIOTIN 10,000 MCG CAP    Take 1 Cap by mouth two (2) times a day. CARIPRAZINE (VRAYLAR) 6 MG CAPSULE    Take 6 mg by mouth nightly. CHLORPROMAZINE (THORAZINE) 100 MG TABLET    Take 100 mg by mouth two (2) times a day. CLONAZEPAM (KLONOPIN PO)    Take 0.5 mg by mouth two (2) times a day. Indications: anxious    CYANOCOBALAMIN, VITAMIN B-12, (VITAMIN B-12 PO)    Take 500 mcg by mouth daily. ERGOCALCIFEROL, VITAMIN D2, (VITAMIN D2 PO)    Take 50,000 Units by mouth every seven (7) days.     ESZOPICLONE (LUNESTA) 3 MG TABLET Take 3 mg by mouth nightly. LAMOTRIGINE (LAMICTAL) 100 MG TABLET    Take  by mouth daily. 100 mg am and 200mg q hs   Indications: BIPOLAR DISORDER IN REMISSION    MULTIVITAMIN (ONE A DAY) TABLET    Take 1 Tab by mouth daily. MULTIVITAMIN WITH IRON (FLINTSTONES) CHEWABLE TABLET    Take 1 Tab by mouth daily. OMEPRAZOLE (PRILOSEC) 20 MG CAPSULE    Take 20 mg by mouth daily. OXCARBAZEPINE (TRILEPTAL) 300 MG TABLET    Take 300 mg by mouth two (2) times a day. TRAZODONE (DESYREL) 300 MG TABLET    Take 300 mg by mouth nightly. ZOLPIDEM (AMBIEN) 5 MG TABLET    Take  by mouth nightly as needed for Sleep. These Medications have changed    No medications on file   Stop Taking    CYCLOBENZAPRINE (FLEXERIL) 10 MG TABLET    Take 1 Tab by mouth three (3) times daily as needed for Muscle Spasm(s). GABAPENTIN (NEURONTIN) 300 MG CAPSULE    Take 1 Cap by mouth three (3) times daily. Max Daily Amount: 900 mg. IBUPROFEN (MOTRIN) 800 MG TABLET    Take  by mouth. NAPROXEN EC (NAPROSYN EC) 500 MG EC TABLET    Take 1 Tab by mouth two (2) times daily (with meals). Dictation disclaimer:  Please note that this dictation was completed with [employer], the computer voice recognition software. Quite often unanticipated grammatical, syntax, homophones, and other interpretive errors are inadvertently transcribed by the computer software. Please disregard these errors. Please excuse any errors that have escaped final proofreading.

## 2020-05-31 NOTE — DISCHARGE INSTRUCTIONS
Patient Education      Take medication as prescribed. Follow-up with your primary care physician within 2 days for reassessment. Bring the results from this visit with you for their review. Return to the ED immediately for any new, worsening, or persistent symptoms, including leg swelling, discoloration, or any other medical concerns. Knee Pain or Injury: Care Instructions  Your Care Instructions     Injuries are a common cause of knee problems. Sudden (acute) injuries may be caused by a direct blow to the knee. They can also be caused by abnormal twisting, bending, or falling on the knee. Pain, bruising, or swelling may be severe, and may start within minutes of the injury. Overuse is another cause of knee pain. Other causes are climbing stairs, kneeling, and other activities that use the knee. Everyday wear and tear, especially as you get older, also can cause knee pain. Rest, along with home treatment, often relieves pain and allows your knee to heal. If you have a serious knee injury, you may need tests and treatment. Follow-up care is a key part of your treatment and safety. Be sure to make and go to all appointments, and call your doctor if you are having problems. It's also a good idea to know your test results and keep a list of the medicines you take. How can you care for yourself at home? · Be safe with medicines. Read and follow all instructions on the label. ? If the doctor gave you a prescription medicine for pain, take it as prescribed. ? If you are not taking a prescription pain medicine, ask your doctor if you can take an over-the-counter medicine. · Rest and protect your knee. Take a break from any activity that may cause pain. · Put ice or a cold pack on your knee for 10 to 20 minutes at a time. Put a thin cloth between the ice and your skin. · Prop up a sore knee on a pillow when you ice it or anytime you sit or lie down for the next 3 days.  Try to keep it above the level of your heart. This will help reduce swelling. · If your knee is not swollen, you can put moist heat, a heating pad, or a warm cloth on your knee. · If your doctor recommends an elastic bandage, sleeve, or other type of support for your knee, wear it as directed. · Follow your doctor's instructions about how much weight you can put on your leg. Use a cane, crutches, or a walker as instructed. · Follow your doctor's instructions about activity during your healing process. If you can do mild exercise, slowly increase your activity. · Reach and stay at a healthy weight. Extra weight can strain the joints, especially the knees and hips, and make the pain worse. Losing even a few pounds may help. When should you call for help? [id number] anytime you think you may need emergency care. For example, call if:  · You have symptoms of a blood clot in your lung (called a pulmonary embolism). These may include:  ? Sudden chest pain. ? Trouble breathing. ? Coughing up blood. Call your doctor now or seek immediate medical care if:  · You have severe or increasing pain. · Your leg or foot turns cold or changes color. · You cannot stand or put weight on your knee. · Your knee looks twisted or bent out of shape. · You cannot move your knee. · You have signs of infection, such as:  ? Increased pain, swelling, warmth, or redness. ? Red streaks leading from the knee. ? Pus draining from a place on your knee. ? A fever. · You have signs of a blood clot in your leg (called a deep vein thrombosis), such as:  ? Pain in your calf, back of the knee, thigh, or groin. ? Redness and swelling in your leg or groin. Watch closely for changes in your health, and be sure to contact your doctor if:  · You have tingling, weakness, or numbness in your knee. · You have any new symptoms, such as swelling. · You have bruises from a knee injury that last longer than 2 weeks. · You do not get better as expected. Where can you learn more?   Go to http://lonnie-gagan.info/  Enter K195 in the search box to learn more about \"Knee Pain or Injury: Care Instructions. \"  Current as of: June 26, 2019               Content Version: 12.5  © 9926-0647 [employer], Inc[employer]. Care instructions adapted under license by [employer] (which disclaims liability or warranty for this information). If you have questions about a medical condition or this instruction, always ask your healthcare professional. [address] any warranty or liability for your use of this information.

## 2020-05-31 NOTE — ED NOTES
Una Stokes PA-C reviewed discharge instructions with the patient. The patient verbalized understanding. Patient armband removed and shredded.

## 2020-06-16 ENCOUNTER — OFFICE VISIT (OUTPATIENT)
Dept: ORTHOPEDIC SURGERY | Age: 50
End: 2020-06-16

## 2020-06-16 VITALS
DIASTOLIC BLOOD PRESSURE: 70 MMHG | HEART RATE: 67 BPM | SYSTOLIC BLOOD PRESSURE: 106 MMHG | RESPIRATION RATE: 16 BRPM | BODY MASS INDEX: 35.84 KG/M2 | WEIGHT: 223 LBS | OXYGEN SATURATION: 100 % | HEIGHT: 66 IN | TEMPERATURE: 97.1 F

## 2020-06-16 DIAGNOSIS — M17.11 PRIMARY OSTEOARTHRITIS OF RIGHT KNEE: Primary | ICD-10-CM

## 2020-06-16 RX ORDER — TRIAMCINOLONE ACETONIDE 40 MG/ML
40 INJECTION, SUSPENSION INTRA-ARTICULAR; INTRAMUSCULAR ONCE
Qty: 1 ML | Refills: 0
Start: 2020-06-16 | End: 2020-06-16

## 2020-06-16 NOTE — PROGRESS NOTES
1. Have you been to the ER, urgent care clinic since your last visit? Hospitalized since your last visit? No    2. Have you seen or consulted any other health care providers outside of the [address] [place] [address] since your last visit? Include any pap smears or colon screening.  No

## 2020-06-16 NOTE — PROGRESS NOTES
Rasheed Bedoya  1970   Chief Complaint   Patient presents with    Knee Pain     Right knee pain        HISTORY OF PRESENT ILLNESS  Rasheed Bedoya is a 44 y.o. female who presents today for evaluation of right knee. Patient rates pain as 5/10 today. Pain has been present since 5/29/2020 after getting out of her car. Pt went to ER on 5/31/2020. Pt notes she went to get out of her car and felt severe pain at the back of the leg with bearing weight. Pt had a Doppler US which was negative for DVT. Pt localizes pain to the medial and lateral sides of the knee. Pt is also s/p Right reverse total shoulder arthroplasty on 7/18/18. Patient denies any fever, chills, chest pain, shortness of breath or calf pain. The remainder of the review of systems is negative. There are no new illness or injuries to report since last seen in the office. There are no changes to medications, allergies, family or social history. Pain Assessment  6/16/2020   Location of Pain Knee   Location Modifiers Right   Severity of Pain 5   Quality of Pain Throbbing; Sharp;Burning;Aching   Quality of Pain Comment -   Duration of Pain A few hours   Frequency of Pain Intermittent   Aggravating Factors Walking   Aggravating Factors Comment -   Limiting Behavior -   Relieving Factors Rest;Elevation   Relieving Factors Comment -   Result of Injury No   Work-Related Injury -   Type of Injury -   Type of Injury Comment -       PHYSICAL EXAM:   Visit Vitals  /70   Pulse 67   Temp 97.1 °F (36.2 °C) (Oral)   Resp 16   Ht 5' 6\" (1.676 m)   Wt 223 lb (101.2 kg)   SpO2 100%   BMI 35.99 kg/m²     The patient is a well-developed, well-nourished female   in no acute distress. The patient is alert and oriented times three. The patient is alert and oriented times three. Mood and affect are normal.  LYMPHATIC: lymph nodes are not enlarged and are within normal limits  SKIN: normal in color and non tender to palpation.  There are no bruises or abrasions noted. NEUROLOGICAL: Motor sensory exam is within normal limits. Reflexes are equal bilaterally. There is normal sensation to pinprick and light touch  MUSCULOSKELETAL:  Examination Right knee   Skin Intact   Range of motion 0-130   Effusion   +   Medial joint line tenderness  +   Lateral joint line tenderness  +   Tenderness Pes Bursa -   Tenderness insertion MCL -   Tenderness insertion LCL -   Tays -   Patella crepitus  +   Patella grind -   Lachman -   Pivot shift -   Anterior drawer -   Posterior drawer -   Varus stress -   Valgus stress -   Neurovascular Intact   Calf Swelling and Tenderness to Palpation -   Joyce's Test -   Hamstring Cord Tightness -     PROCEDURE: After sterile prep, 4 cc of Xylocaine and 1 cc of Kenalog were injected into the right knee.        [address] [facility]  OFFICE PROCEDURE PROGRESS NOTE        Chart reviewed for the following:  I, Nichole Cornelius MD, have reviewed the History, Physical and updated the Allergic reactions for [address] performed immediately prior to start of procedure:  Elizabeth Simpson MD, have performed the following reviews on Diana Blake prior to the start of the procedure:            * Patient was identified by name and date of birth   * Agreement on procedure being performed was verified  * Risks and Benefits explained to the patient  * Procedure site verified and marked as necessary  * Patient was positioned for comfort  * Consent was signed and verified     Time: 10:48 AM    Date of procedure: 6/16/2020    Procedure performed by:  Nichole Cornelius MD    Provider assisted by: (see medication administration)    How tolerated by patient: tolerated the procedure well with no complications    Comments: none      IMAGING: XR of right knee from [facility] dated 5/31/2020 was reviewed and read by Dr. Lina Mcneill:   IMPRESSION:  Moderate to severe tricompartment osteoarthritis. Multiple loose bodies noted. MRI can be obtained for further evaluation. IMPRESSION:      ICD-10-CM ICD-9-CM    1. Primary osteoarthritis of right knee M17.11 715.16 TRIAMCINOLONE ACETONIDE INJ      triamcinolone acetonide (Kenalog) 40 mg/mL injection      DRAIN/INJECT LARGE JOINT/BURSA        PLAN:   1. Pt presents today with right knee pain due to exacerbation of primary OA and I would like to try an injection today. Risk factors include: BMI>30  2. No ultrasound exam indicated today  3. Yes cortisone injection indicated today R KNEE  4. No Physical/Occupational Therapy indicated today  5. No diagnostic test indicated today:   6. No durable medical equipment indicated today  7. No referral indicated today   8. No medications indicated today:   9. No Narcotic indicated today        RTC 3 weeks if pain continues      Scribed by Shanna Vigil) as dictated by Marlon Easton MD    I, Dr. Marlon Easton, confirm that all documentation is accurate.     Marlon Easton M.D.   [facility] and [facility] Specialist

## 2020-09-29 ENCOUNTER — OFFICE VISIT (OUTPATIENT)
Dept: ORTHOPEDIC SURGERY | Age: 50
End: 2020-09-29
Payer: MEDICAID

## 2020-09-29 VITALS
OXYGEN SATURATION: 96 % | HEIGHT: 66 IN | SYSTOLIC BLOOD PRESSURE: 106 MMHG | WEIGHT: 224 LBS | HEART RATE: 72 BPM | BODY MASS INDEX: 36 KG/M2 | DIASTOLIC BLOOD PRESSURE: 62 MMHG | TEMPERATURE: 97.5 F | RESPIRATION RATE: 16 BRPM

## 2020-09-29 DIAGNOSIS — M79.604 PAIN AND SWELLING OF RIGHT LOWER EXTREMITY: Primary | ICD-10-CM

## 2020-09-29 DIAGNOSIS — M79.89 PAIN AND SWELLING OF RIGHT LOWER EXTREMITY: Primary | ICD-10-CM

## 2020-09-29 DIAGNOSIS — M17.11 PRIMARY OSTEOARTHRITIS OF RIGHT KNEE: ICD-10-CM

## 2020-09-29 PROCEDURE — 99213 OFFICE O/P EST LOW 20 MIN: CPT | Performed by: ORTHOPAEDIC SURGERY

## 2020-09-29 NOTE — PROGRESS NOTES
Dinora Maurice  1970   Chief Complaint   Patient presents with    Knee Pain     right knee f/u         HISTORY OF PRESENT ILLNESS  Dinora Maurice is a 44 y.o. female who presents today for reevaluation of right knee. Patient rates pain as 6/10 today. Pain has been present since 5/29/2020 after getting out of her car. Pt went to ER on 5/31/2020. Pt notes she went to get out of her car and felt severe pain at the back of the leg with bearing weight. Pt had a Doppler US which was negative for DVT. At last OV on 6/16/2020, patient had a right knee cortisone injection which provided relief up until 1 week ago. She notes calf pain today. Has had trouble with walking. Patient denies any fever, chills, chest pain, shortness of breath or calf pain. The remainder of the review of systems is negative. There are no new illness or injuries to report since last seen in the office. There are no changes to medications, allergies, family or social history. Pain Assessment  9/29/2020   Location of Pain Knee   Location Modifiers Right   Severity of Pain 6   Quality of Pain Dull;Aching;Burning   Quality of Pain Comment -   Duration of Pain Persistent   Frequency of Pain Constant   Aggravating Factors Walking;Standing   Aggravating Factors Comment -   Limiting Behavior Some   Relieving Factors Rest;Elevation   Relieving Factors Comment -   Result of Injury No   Work-Related Injury -   Type of Injury -   Type of Injury Comment -       PHYSICAL EXAM:   Visit Vitals  /62   Pulse 72   Temp 97.5 °F (36.4 °C) (Temporal)   Resp 16   Ht 5' 6\" (1.676 m)   Wt 224 lb (101.6 kg)   SpO2 96%   BMI 36.15 kg/m²     The patient is a well-developed, well-nourished female   in no acute distress. The patient is alert and oriented times three. The patient is alert and oriented times three.  Mood and affect are normal.  LYMPHATIC: lymph nodes are not enlarged and are within normal limits  SKIN: normal in color and non tender to palpation. There are no bruises or abrasions noted. NEUROLOGICAL: Motor sensory exam is within normal limits. Reflexes are equal bilaterally. There is normal sensation to pinprick and light touch  MUSCULOSKELETAL:  Examination Right knee   Skin Intact   Range of motion 0-130   Effusion   +   Medial joint line tenderness  +   Lateral joint line tenderness  +   Tenderness Pes Bursa -   Tenderness insertion MCL -   Tenderness insertion LCL -   Tays -   Patella crepitus  +   Patella grind -   Lachman -   Pivot shift -   Anterior drawer -   Posterior drawer -   Varus stress -   Valgus stress -   Neurovascular Intact   Calf Swelling and Tenderness to Palpation -   Joyce's Test +   Hamstring Cord Tightness -       IMAGING: XR of right knee from [facility] dated 5/31/2020 was reviewed and read by Dr. Noemy Neely:   IMPRESSION:  Moderate to severe tricompartment osteoarthritis. Multiple loose bodies noted. MRI can be obtained for further evaluation. IMPRESSION:      ICD-10-CM ICD-9-CM    1. Pain and swelling of right lower extremity  M79.604 729.5 DUPLEX LOWER EXT VENOUS RIGHT    M79.89 729.81    2. Primary osteoarthritis of right knee  M17.11 715.16         PLAN:   1. Pt complains of right calf pain today and I am ordering a STAT Doppler US to r/o a DVT. Risk factors include: BMI>30  2. No ultrasound exam indicated today  3. No cortisone injection indicated today   4. No Physical/Occupational Therapy indicated today  5. Yes diagnostic test indicated today: [address]  6. No durable medical equipment indicated today  7. No referral indicated today   8. No medications indicated today:   9. No Narcotic indicated today        RTC following Doppler US Exam      Scribed by Charley George) as dictated by Pippa Parkinson MD    I, Dr. Pippa Parkinson, confirm that all documentation is accurate.     Pippa Parkinson M.D.   [address] [place] [address] and Spine Specialist

## 2020-09-30 ENCOUNTER — HOSPITAL ENCOUNTER (OUTPATIENT)
Dept: VASCULAR SURGERY | Age: 50
Discharge: HOME | End: 2020-09-30
Attending: ORTHOPAEDIC SURGERY
Payer: MEDICAID

## 2020-09-30 DIAGNOSIS — M79.604 PAIN AND SWELLING OF RIGHT LOWER EXTREMITY: ICD-10-CM

## 2020-09-30 DIAGNOSIS — M79.89 PAIN AND SWELLING OF RIGHT LOWER EXTREMITY: ICD-10-CM

## 2020-09-30 PROCEDURE — 93971 EXTREMITY STUDY: CPT

## 2020-10-21 ENCOUNTER — OFFICE VISIT (OUTPATIENT)
Dept: ORTHOPEDIC SURGERY | Age: 50
End: 2020-10-21
Payer: MEDICAID

## 2020-10-21 VITALS
HEIGHT: 66 IN | HEART RATE: 73 BPM | TEMPERATURE: 97.5 F | SYSTOLIC BLOOD PRESSURE: 108 MMHG | RESPIRATION RATE: 12 BRPM | BODY MASS INDEX: 36.48 KG/M2 | WEIGHT: 227 LBS | DIASTOLIC BLOOD PRESSURE: 67 MMHG | OXYGEN SATURATION: 97 %

## 2020-10-21 DIAGNOSIS — M79.89 PAIN AND SWELLING OF RIGHT LOWER EXTREMITY: ICD-10-CM

## 2020-10-21 DIAGNOSIS — M79.604 PAIN AND SWELLING OF RIGHT LOWER EXTREMITY: ICD-10-CM

## 2020-10-21 DIAGNOSIS — M17.11 PRIMARY OSTEOARTHRITIS OF RIGHT KNEE: Primary | ICD-10-CM

## 2020-10-21 PROCEDURE — 99213 OFFICE O/P EST LOW 20 MIN: CPT | Performed by: ORTHOPAEDIC SURGERY

## 2020-10-21 PROCEDURE — 20611 DRAIN/INJ JOINT/BURSA W/US: CPT | Performed by: ORTHOPAEDIC SURGERY

## 2020-10-21 PROCEDURE — 96372 THER/PROPH/DIAG INJ SC/IM: CPT | Performed by: ORTHOPAEDIC SURGERY

## 2020-10-21 RX ORDER — TRIAMCINOLONE ACETONIDE 40 MG/ML
40 INJECTION, SUSPENSION INTRA-ARTICULAR; INTRAMUSCULAR ONCE
Qty: 1 ML | Refills: 0
Start: 2020-10-21 | End: 2020-10-21

## 2020-10-21 NOTE — PROGRESS NOTES
Latasha Collins  1970   Chief Complaint   Patient presents with    Knee Pain     right        HISTORY OF PRESENT ILLNESS  Latasha Collins is a 44 y.o. female who presents today for reevaluation of right knee. Patient rates pain as 6/10 today. Pain has been present since 5/29/2020 after getting out of her car. Pt went to ER on 5/31/2020. Pt notes she went to get out of her car and felt severe pain at the back of the leg with bearing weight. Pt had a Doppler US which was negative for DVT. The patient had a right knee cortisone injection on 6/16/2020 which provided relief up until 1 month ago. Had Doppler US after last OV which was negative for DVT but she is still having pain in the knee and lower leg. Patient denies any fever, chills, chest pain, shortness of breath or calf pain. The remainder of the review of systems is negative. There are no new illness or injuries to report since last seen in the office. There are no changes to medications, allergies, family or social history. Pain Assessment  10/21/2020   Location of Pain Knee   Location Modifiers Right   Severity of Pain 6   Quality of Pain Sharp   Quality of Pain Comment -   Duration of Pain Persistent   Frequency of Pain Constant   Aggravating Factors Walking;Standing   Aggravating Factors Comment -   Limiting Behavior Yes   Relieving Factors Rest;Elevation   Relieving Factors Comment -   Result of Injury No   Work-Related Injury -   Type of Injury -   Type of Injury Comment -       PHYSICAL EXAM:   Visit Vitals  /67 (BP 1 Location: Right arm, BP Patient Position: Sitting)   Pulse 73   Temp 97.5 °F (36.4 °C)   Resp 12   Ht 5' 6\" (1.676 m)   Wt 227 lb (103 kg)   SpO2 97%   BMI 36.64 kg/m²     The patient is a well-developed, well-nourished female   in no acute distress. The patient is alert and oriented times three. The patient is alert and oriented times three.  Mood and affect are normal.  LYMPHATIC: lymph nodes are not enlarged and are within normal limits  SKIN: normal in color and non tender to palpation. There are no bruises or abrasions noted. NEUROLOGICAL: Motor sensory exam is within normal limits. Reflexes are equal bilaterally. There is normal sensation to pinprick and light touch  MUSCULOSKELETAL:  Examination Right knee   Skin Intact   Range of motion 0-130   Effusion   +   Medial joint line tenderness  +   Lateral joint line tenderness  +   Tenderness Pes Bursa -   Tenderness insertion MCL -   Tenderness insertion LCL -   Tays -   Patella crepitus  +   Patella grind -   Lachman -   Pivot shift -   Anterior drawer -   Posterior drawer -   Varus stress -   Valgus stress -   Neurovascular Intact   Calf Swelling and Tenderness to Palpation -   Joyce's Test +   Hamstring Cord Tightness -       PROCEDURE: Right Knee Injection with Ultrasound Guidance  Indication:Right Knee pain/swelling    After sterile prep, 4 cc of Xylocaine and 1 cc of Kenalog were injected into the right knee. Ultrasound images captured using [employer] Loop Ultrasound machine and scanned into patient's chart.        [facility] - [facility]  OFFICE PROCEDURE PROGRESS NOTE        Chart reviewed for the following:  Consuelo Pendleton M.D, have reviewed the History, Physical and updated the Allergic reactions for [address] performed immediately prior to start of procedure:  Consuelo Pendleton M.D, have performed the following reviews on Dominic Climes prior to the start of the procedure:            * Patient was identified by name and date of birth   * Agreement on procedure being performed was verified  * Risks and Benefits explained to the patient  * Procedure site verified and marked as necessary  * Patient was positioned for comfort  * Consent was signed and verified     Time: 2:23 PM     Date of procedure: 10/21/2020    Procedure performed by:  Tsering Rangel M.D    Provider assisted by: (see medication administration)    How tolerated by patient: tolerated the procedure well with no complications    Comments: none        IMAGING: Doppler US of RLE dated 9/30/2020 was reviewed and read by Dr. Qi Jimenez: No evidence of DVT        XR of right knee from [facility] dated 5/31/2020 was reviewed and read by Dr. Qi Jimenez:   IMPRESSION:  Moderate to severe tricompartment osteoarthritis. Multiple loose bodies noted. MRI can be obtained for further evaluation. IMPRESSION:      ICD-10-CM ICD-9-CM    1. Primary osteoarthritis of right knee  M17.11 715.16 TRIAMCINOLONE ACETONIDE INJ      triamcinolone acetonide (Kenalog) 40 mg/mL injection      US GUIDE INJ/ASP/ARTHRO LG JNT/BURSA   2. Pain and swelling of right lower extremity  M79.604 729.5 REFERRAL TO VASCULAR SURGERY    M79.89 729.81         PLAN:   1. Pt presents today complaining of right knee pain due to primary OA and I would like to try a repeat cortisone injection. She still complains of lower leg pain and I am referring her to Vascular to address this. Risk factors include: BMI>30  2. No ultrasound exam indicated today  3. Yes cortisone injection indicated today R KNEE US  4. No Physical/Occupational Therapy indicated today  5. No diagnostic test indicated today  6. No durable medical equipment indicated today  7. Yes referral indicated today VASCULAR  8. No medications indicated today:   9. No Narcotic indicated today        RTC 3 weeks if pain continues      Scribed by Michaela Yuong [address]) as dictated by Chetan Bedoya MD    I, Dr. Chetan Bedoya, confirm that all documentation is accurate.     Chetan Bedoya M.D.   [facility]

## 2020-11-02 ENCOUNTER — HOSPITAL ENCOUNTER (EMERGENCY)
Age: 50
Discharge: HOME | End: 2020-11-02
Attending: EMERGENCY MEDICINE
Payer: MEDICAID

## 2020-11-02 VITALS
SYSTOLIC BLOOD PRESSURE: 116 MMHG | HEIGHT: 66 IN | BODY MASS INDEX: 36.16 KG/M2 | TEMPERATURE: 97.6 F | WEIGHT: 225 LBS | HEART RATE: 78 BPM | OXYGEN SATURATION: 96 % | DIASTOLIC BLOOD PRESSURE: 70 MMHG | RESPIRATION RATE: 18 BRPM

## 2020-11-02 DIAGNOSIS — J20.9 ACUTE BRONCHITIS, UNSPECIFIED ORGANISM: Primary | ICD-10-CM

## 2020-11-02 PROCEDURE — 99282 EMERGENCY DEPT VISIT SF MDM: CPT

## 2020-11-02 RX ORDER — DOXYCYCLINE 100 MG/1
100 CAPSULE ORAL 2 TIMES DAILY
Qty: 20 CAP | Refills: 0 | Status: SHIPPED | OUTPATIENT
Start: 2020-11-02 | End: 2020-11-12

## 2020-11-02 NOTE — DISCHARGE INSTRUCTIONS
Patient Education        Bronchitis: Care Instructions  Your Care Instructions     Bronchitis is inflammation of the bronchial tubes, which carry air to the lungs. The tubes swell and produce mucus, or phlegm. The mucus and inflamed bronchial tubes make you cough. You may have trouble breathing. Most cases of bronchitis are caused by viruses like those that cause colds. Antibiotics usually do not help and they may be harmful. Bronchitis usually develops rapidly and lasts about 2 to 3 weeks in otherwise healthy people. Follow-up care is a key part of your treatment and safety. Be sure to make and go to all appointments, and call your doctor if you are having problems. It's also a good idea to know your test results and keep a list of the medicines you take. How can you care for yourself at home? · Take all medicines exactly as prescribed. Call your doctor if you think you are having a problem with your medicine. · Get some extra rest.  · Take an over-the-counter pain medicine, such as acetaminophen (Tylenol), ibuprofen (Advil, Motrin), or naproxen (Aleve) to reduce fever and relieve body aches. Read and follow all instructions on the label. · Do not take two or more pain medicines at the same time unless the doctor told you to. Many pain medicines have acetaminophen, which is Tylenol. Too much acetaminophen (Tylenol) can be harmful. · Take an over-the-counter cough medicine that contains dextromethorphan to help quiet a dry, hacking cough so that you can sleep. Avoid cough medicines that have more than one active ingredient. Read and follow all instructions on the label. · Breathe moist air from a humidifier, hot shower, or sink filled with hot water. The heat and moisture will thin mucus so you can cough it out. · Do not smoke. Smoking can make bronchitis worse. If you need help quitting, talk to your doctor about stop-smoking programs and medicines.  These can increase your chances of quitting for good.  When should you call for help? Call 911 anytime you think you may need emergency care. For example, call if:    · You have severe trouble breathing. Call your doctor now or seek immediate medical care if:    · You have new or worse trouble breathing.     · You cough up dark brown or bloody mucus (sputum).     · You have a new or higher fever.     · You have a new rash. Watch closely for changes in your health, and be sure to contact your doctor if:    · You cough more deeply or more often, especially if you notice more mucus or a change in the color of your mucus.     · You are not getting better as expected. Where can you learn more? Go to http://www.gray.com/  Enter H333 in the search box to learn more about \"Bronchitis: Care Instructions. \"  Current as of: February 24, 2020               Content Version: 12.6  © 8794-3399 [employer]. Care instructions adapted under license by [employer] (which disclaims liability or warranty for this information). If you have questions about a medical condition or this instruction, always ask your healthcare professional. [address] any warranty or liability for your use of this information.

## 2020-11-02 NOTE — ED PROVIDER NOTES
HPI patient complains of a 3 to 4-day history of chest congestion and cough. She says over the last 2 days her cough has become more productive. She also complains of a low-grade fever with a temperature of about 100. She denies any nausea vomiting. She denies abdominal pain or changes in bowel or bladder habits. She says she is a daily smoker. She says her  has a similar symptoms. Patient denies any known exposure to COVID-19 positive individuals . No other complaints given at this time.     Past Medical History:   Diagnosis Date    Arthritis     Asthma     Bipolar disorder (HCC)     GERD (gastroesophageal reflux disease)     Obesity (BMI 30.0-34.9) 3/1/2017    Psychiatric disorder     depression    Psychotic disorder (HCC)     PTSD (post-traumatic stress disorder)     Reflux        Past Surgical History:   Procedure Laterality Date    ABDOMEN SURGERY PROC UNLISTED  2008    gastric bypass    HX CHOLECYSTECTOMY      HX GASTRIC BYPASS  2008    HX HEENT      tonsilectomy      HX ORTHOPAEDIC      right shoulder     HX SHOULDER REPLACEMENT Right 07/25/2018    [place] ANESTH,SURGERY OF SHOULDER Right 05/2017    rotator cuff         Family History:   Problem Relation Age of Onset    Diabetes Mother     Uterine Cancer Mother     Lung Cancer Father     No Known Problems Sister     Heart Disease Neg Hx     Hypertension Neg Hx     Stroke Neg Hx        Social History     Socioeconomic History    Marital status:      Spouse name: Not on file    Number of children: Not on file    Years of education: Not on file    Highest education level: Not on file   Occupational History    Not on file   Social Needs    Financial resource strain: Not on file    Food insecurity     Worry: Not on file     Inability: Not on file    Transportation needs     Medical: Not on file     Non-medical: Not on file   Tobacco Use    Smoking status: Current Every Day Smoker     Packs/day: 0.50     Years: 20.00 Pack years: 10.00    Smokeless tobacco: Never Used   Substance and Sexual Activity    Alcohol use: No    Drug use: No    Sexual activity: Yes     Partners: Male     Birth control/protection: None   Lifestyle    Physical activity     Days per week: Not on file     Minutes per session: Not on file    Stress: Not on file   Relationships    Social connections     Talks on phone: Not on file     Gets together: Not on file     Attends Baptism service: Not on file     Active member of club or organization: Not on file     Attends meetings of clubs or organizations: Not on file     Relationship status: Not on file    Intimate partner violence     Fear of current or ex partner: Not on file     Emotionally abused: Not on file     Physically abused: Not on file     Forced sexual activity: Not on file   Other Topics Concern    Not on file   Social History Narrative    Not on file         ALLERGIES: Amoxicillin and Codeine    Review of Systems   Constitutional: Positive for fatigue. HENT: Positive for congestion and rhinorrhea. Eyes: Negative. Respiratory: Positive for cough. Cardiovascular: Negative. Gastrointestinal: Negative. Genitourinary: Negative. Musculoskeletal: Negative. Skin: Negative. Neurological: Negative. Psychiatric/Behavioral: Negative. Vitals:    11/02/20 0827 11/02/20 0838   BP: 116/70    Pulse: 78    Resp: 18    Temp: 97.6 °F (36.4 °C)    SpO2: 96% 96%   Weight: 102.1 kg (225 lb)    Height: 5' 6\" (1.676 m)             Physical Exam  Vitals signs and nursing note reviewed. Constitutional:       Appearance: She is well-developed. HENT:      Head: Normocephalic and atraumatic. Eyes:      Conjunctiva/sclera: Conjunctivae normal.      Pupils: Pupils are equal, round, and reactive to light. Neck:      Musculoskeletal: Normal range of motion and neck supple. Cardiovascular:      Rate and Rhythm: Normal rate and regular rhythm.    Pulmonary:      Effort: Pulmonary effort is normal.      Breath sounds: Rhonchi present. Comments: Mild to moderate rhonchi bilaterally with good air movement  Musculoskeletal: Normal range of motion. Skin:     General: Skin is warm and dry. Neurological:      Mental Status: She is alert and oriented to person, place, and time.           MDM       Procedures

## 2020-11-02 NOTE — ED TRIAGE NOTES
Pt presents with productive cough, nasal congestion and bilateral ear pain x 1 week. Per pt reports chest pain with cough only x 3 days. Pt denies n/v/d, reports highest recorded temp of 99 at home. Denies tylenol or ibuprofen use in last 4-6 hours.

## 2020-12-01 ENCOUNTER — HOSPITAL ENCOUNTER (OUTPATIENT)
Dept: MAMMOGRAPHY | Age: 50
Discharge: HOME | End: 2020-12-01
Attending: FAMILY MEDICINE
Payer: MEDICAID

## 2020-12-01 DIAGNOSIS — Z12.31 VISIT FOR SCREENING MAMMOGRAM: ICD-10-CM

## 2020-12-01 PROCEDURE — 77067 SCR MAMMO BI INCL CAD: CPT

## 2020-12-26 ENCOUNTER — HOSPITAL ENCOUNTER (EMERGENCY)
Age: 50
Discharge: HOME | End: 2020-12-26
Attending: EMERGENCY MEDICINE
Payer: MEDICAID

## 2020-12-26 VITALS
DIASTOLIC BLOOD PRESSURE: 64 MMHG | SYSTOLIC BLOOD PRESSURE: 126 MMHG | HEIGHT: 66 IN | WEIGHT: 225 LBS | HEART RATE: 87 BPM | OXYGEN SATURATION: 100 % | RESPIRATION RATE: 17 BRPM | BODY MASS INDEX: 36.16 KG/M2 | TEMPERATURE: 98.5 F

## 2020-12-26 DIAGNOSIS — H60.501 ACUTE OTITIS EXTERNA OF RIGHT EAR, UNSPECIFIED TYPE: Primary | ICD-10-CM

## 2020-12-26 PROCEDURE — 99282 EMERGENCY DEPT VISIT SF MDM: CPT

## 2020-12-26 RX ORDER — NEOMYCIN SULFATE, POLYMYXIN B SULFATE, HYDROCORTISONE 3.5; 10000; 1 MG/ML; [USP'U]/ML; MG/ML
3-4 SOLUTION/ DROPS AURICULAR (OTIC) 4 TIMES DAILY
Qty: 10 ML | Refills: 0 | Status: SHIPPED | OUTPATIENT
Start: 2020-12-26

## 2020-12-26 NOTE — ED TRIAGE NOTES
Pt reports right ear pain with yellow drainage onset today. States was treated for ear infection 10 days ago by [facility] with Clindamycin and took last dose today. Denies any other concerns and presents in NAD.

## 2020-12-26 NOTE — ED PROVIDER NOTES
68-year-old female history of asthma, bipolar disorder, GERD presents for evaluation of drainage from the right ear. Reports seen at an urgent care center for ear pain and told that she had a significant ear infection. Was placed on clindamycin which she took for 10 days. Has noted recurrent discomfort now with drainage from the right ear canal.  No fever. Mild sinus congestion.            Past Medical History:   Diagnosis Date    Arthritis     Asthma     Bipolar disorder (HCC)     GERD (gastroesophageal reflux disease)     Obesity (BMI 30.0-34.9) 3/1/2017    Psychiatric disorder     depression    Psychotic disorder (HCC)     PTSD (post-traumatic stress disorder)     Reflux        Past Surgical History:   Procedure Laterality Date    ABDOMEN SURGERY PROC UNLISTED  2008    gastric bypass    HX CHOLECYSTECTOMY      HX GASTRIC BYPASS  2008    HX HEENT      tonsilectomy      HX ORTHOPAEDIC      right shoulder     HX SHOULDER REPLACEMENT Right 07/25/2018    [place] ANESTH,SURGERY OF SHOULDER Right 05/2017    rotator cuff         Family History:   Problem Relation Age of Onset    Diabetes Mother     Uterine Cancer Mother     Lung Cancer Father     No Known Problems Sister     Heart Disease Neg Hx     Hypertension Neg Hx     Stroke Neg Hx        Social History     Socioeconomic History    Marital status:      Spouse name: Not on file    Number of children: Not on file    Years of education: Not on file    Highest education level: Not on file   Occupational History    Not on file   Social Needs    Financial resource strain: Not on file    Food insecurity     Worry: Not on file     Inability: Not on file    Transportation needs     Medical: Not on file     Non-medical: Not on file   Tobacco Use    Smoking status: Current Every Day Smoker     Packs/day: 0.50     Years: 20.00     Pack years: 10.00    Smokeless tobacco: Never Used   Substance and Sexual Activity    Alcohol use: No    Drug use: No    Sexual activity: Yes     Partners: Male     Birth control/protection: None   Lifestyle    Physical activity     Days per week: Not on file     Minutes per session: Not on file    Stress: Not on file   Relationships    Social connections     Talks on phone: Not on file     Gets together: Not on file     Attends Shinto service: Not on file     Active member of club or organization: Not on file     Attends meetings of clubs or organizations: Not on file     Relationship status: Not on file    Intimate partner violence     Fear of current or ex partner: Not on file     Emotionally abused: Not on file     Physically abused: Not on file     Forced sexual activity: Not on file   Other Topics Concern    Not on file   Social History Narrative    Not on file         ALLERGIES: Amoxicillin and Codeine    Review of Systems   Constitutional: Negative for fever. HENT: Positive for congestion, ear discharge and ear pain. Respiratory: Negative for cough. Vitals:    12/26/20 1047   BP: 126/64   Pulse: 87   Resp: 17   Temp: 98.5 °F (36.9 °C)   SpO2: 100%   Weight: 102.1 kg (225 lb)   Height: 5' 6\" (1.676 m)            Physical Exam  Vitals signs and nursing note reviewed. Constitutional:       General: She is not in acute distress. Appearance: She is well-developed. HENT:      Head: Normocephalic and atraumatic. Ears:      Comments: Fluid posterior to both TMs. No ongoing erythema. No actual drainage seen within the right ear canal.  No TM perforation noted. Eyes:      General: No scleral icterus. Cardiovascular:      Rate and Rhythm: Normal rate. Pulmonary:      Effort: Pulmonary effort is normal.   Skin:     General: Skin is warm and dry. Neurological:      Mental Status: She is alert and oriented to person, place, and time.           MDM  Number of Diagnoses or Management Options  Acute otitis externa of right ear, unspecified type  Diagnosis management comments: Impression: Otitis externa. Procedures    Diagnosis:   1. Acute otitis externa of right ear, unspecified type          Disposition: home    Follow-up Information     Follow up With Specialties Details Why Contact Info    Roman Aguilar MD Family Medicine In 1 week As needed, for recheck of ongoing symptoms [facility]  [phone]            Patient's Medications   Start Taking    NEOMYCIN-POLYMYXIN-HYDROCORTISONE (CORTISPORIN) OTIC SOLUTION    Administer 3-4 Drops in right ear four (4) times daily. Continue Taking    BIOTIN 10,000 MCG CAP    Take 1 Cap by mouth two (2) times a day. CARIPRAZINE (VRAYLAR) 6 MG CAPSULE    Take 6 mg by mouth nightly. CHLORPROMAZINE (THORAZINE) 100 MG TABLET    Take 100 mg by mouth two (2) times a day. CLONAZEPAM (KLONOPIN PO)    Take 0.5 mg by mouth two (2) times a day. Indications: anxious    CYANOCOBALAMIN, VITAMIN B-12, (VITAMIN B-12 PO)    Take 500 mcg by mouth daily. ERGOCALCIFEROL, VITAMIN D2, (VITAMIN D2 PO)    Take 50,000 Units by mouth every seven (7) days. ESZOPICLONE (LUNESTA) 3 MG TABLET    Take 3 mg by mouth nightly. LAMOTRIGINE (LAMICTAL) 100 MG TABLET    Take  by mouth daily. 100 mg am and 200mg q hs   Indications: BIPOLAR DISORDER IN REMISSION    MULTIVITAMIN (ONE A DAY) TABLET    Take 1 Tab by mouth daily. OMEPRAZOLE (PRILOSEC) 20 MG CAPSULE    Take 20 mg by mouth daily. OXCARBAZEPINE (TRILEPTAL) 300 MG TABLET    Take 300 mg by mouth two (2) times a day. TRAZODONE (DESYREL) 300 MG TABLET    Take 300 mg by mouth nightly. ZOLPIDEM (AMBIEN) 5 MG TABLET    Take  by mouth nightly as needed for Sleep.    These Medications have changed    No medications on file   Stop Taking    No medications on file

## 2021-02-25 NOTE — PROGRESS NOTES
Mercedez Martínez  1970     HISTORY OF PRESENT ILLNESS  Mercedez Martínez is a 46 y.o. female who presents today for evaluation s/p Right reverse total shoulder arthroplasty on 7/18/18. Patient has been going to PT. Describes pain as a 7/10. Has been taking roxicodone for pain. Still has night pain. Patient denies any fever, chills, chest pain, shortness of breath or calf pain. There are no new illness or injuries to report since last seen in the office. PHYSICAL EXAM:   Visit Vitals    /65    Pulse 64    Temp 97.1 °F (36.2 °C) (Oral)    Resp 16    Ht 5' 6\" (1.676 m)    Wt 217 lb (98.4 kg)    SpO2 97%    BMI 35.02 kg/m2      The patient is a well-developed, well-nourished female in no acute distress. The patient is alert and oriented times three. The patient appears to be well groomed. Mood and affect are normal.  ORTHOPEDIC EXAM of right shoulder:  Inspection: swelling present,  Bruising present  Incision, clean, dry, intact, sutures in place  Passive glenohumeral abduction 0-40 degrees  Stability: Stable  Strength: n/a  2+ distal pulses    IMAGING:  3 view xray images of right shoulder read and reviewed by myself reveal reverse total shoulder arthroplasty in excellent position     IMPRESSION:  S/P Right reverse total shoulder arthroplasty    PLAN:   Incisions cleaned. Surgery was discussed at length today. Patient to continue with PROM and PT. Continue wearing sling. Stressed to patient that nothing causes an increase in pain.   RTC 3 weeks    [facility]
Priscilla Best

## 2021-05-13 ENCOUNTER — OFFICE VISIT (OUTPATIENT)
Dept: ORTHOPEDIC SURGERY | Age: 51
End: 2021-05-13
Payer: MEDICAID

## 2021-05-13 VITALS
WEIGHT: 239 LBS | HEIGHT: 66 IN | OXYGEN SATURATION: 100 % | BODY MASS INDEX: 38.41 KG/M2 | TEMPERATURE: 97.1 F | HEART RATE: 78 BPM

## 2021-05-13 DIAGNOSIS — M25.562 ACUTE PAIN OF BOTH KNEES: ICD-10-CM

## 2021-05-13 DIAGNOSIS — M25.561 ACUTE PAIN OF BOTH KNEES: ICD-10-CM

## 2021-05-13 DIAGNOSIS — M17.0 PRIMARY OSTEOARTHRITIS OF BOTH KNEES: Primary | ICD-10-CM

## 2021-05-13 PROCEDURE — 99214 OFFICE O/P EST MOD 30 MIN: CPT | Performed by: ORTHOPAEDIC SURGERY

## 2021-05-13 PROCEDURE — 73560 X-RAY EXAM OF KNEE 1 OR 2: CPT | Performed by: ORTHOPAEDIC SURGERY

## 2021-05-13 PROCEDURE — 20611 DRAIN/INJ JOINT/BURSA W/US: CPT | Performed by: ORTHOPAEDIC SURGERY

## 2021-05-13 RX ORDER — TRIAMCINOLONE ACETONIDE 40 MG/ML
80 INJECTION, SUSPENSION INTRA-ARTICULAR; INTRAMUSCULAR ONCE
Status: COMPLETED | OUTPATIENT
Start: 2021-05-13 | End: 2021-05-13

## 2021-05-13 RX ADMIN — TRIAMCINOLONE ACETONIDE 80 MG: 40 INJECTION, SUSPENSION INTRA-ARTICULAR; INTRAMUSCULAR at 08:46

## 2021-05-13 NOTE — PROGRESS NOTES
Sola Keys  1970   Chief Complaint   Patient presents with    Knee Pain     bilateral        HISTORY OF PRESENT ILLNESS  Sola Keys is a 44 y.o. female who presents today for reevaluation of b/l knee pain. Patient rates pain as 5/10 today. Pt fell around 1 month ago. Now having pain in both knees. Last had a right knee cortisone injection in October 2020. Patient denies any fever, chills, chest pain, shortness of breath or calf pain. The remainder of the review of systems is negative. There are no new illness or injuries to report since last seen in the office. There are no changes to medications, allergies, family or social history. Pain Assessment  5/13/2021   Location of Pain Knee   Location Modifiers Right;Left   Severity of Pain 5   Quality of Pain Aching   Quality of Pain Comment -   Duration of Pain Persistent   Frequency of Pain Constant   Aggravating Factors Standing;Walking;Stairs   Aggravating Factors Comment -   Limiting Behavior Yes   Relieving Factors Rest   Relieving Factors Comment -   Result of Injury No   Work-Related Injury -   Type of Injury -   Type of Injury Comment -       PHYSICAL EXAM:   Visit Vitals  Pulse 78   Temp 97.1 °F (36.2 °C) (Skin)   Ht 5' 6\" (1.676 m)   Wt 239 lb (108.4 kg)   SpO2 100%   BMI 38.58 kg/m²     The patient is a well-developed, well-nourished female   in no acute distress. The patient is alert and oriented times three. The patient is alert and oriented times three. Mood and affect are normal.  LYMPHATIC: lymph nodes are not enlarged and are within normal limits  SKIN: normal in color and non tender to palpation. There are no bruises or abrasions noted. NEUROLOGICAL: Motor sensory exam is within normal limits. Reflexes are equal bilaterally.  There is normal sensation to pinprick and light touch  MUSCULOSKELETAL:   Examination Left knee   Skin Intact   Range of motion    Effusion +   Medial joint line tenderness +   Lateral joint line tenderness +   Tenderness Pes Bursa -   Tenderness insertion MCL -   Tenderness insertion LCL -   Tays -   Patella crepitus +   Patella grind +   Lachman -   Pivot shift -   Anterior drawer -   Posterior drawer -   Varus stress -   Valgus stress -   Neurovascular Intact   Calf Swelling and Tenderness to Palpation -   Joyce's Test -   Hamstring Cord Tightness -     Examination Right knee   Skin Intact   Range of motion    Effusion    +   Medial joint line tenderness  +   Lateral joint line tenderness  +   Tenderness Pes Bursa -   Tenderness insertion MCL -   Tenderness insertion LCL -   Tays -   Patella crepitus  +   Patella grind -   Lachman -   Pivot shift -   Anterior drawer -   Posterior drawer -   Varus stress -   Valgus stress -   Neurovascular Intact   Calf Swelling and Tenderness to Palpation -   Joyce's Test +   Hamstring Cord Tightness -        PROCEDURE:  Bilateral Knee Injection with Ultrasound Guidance    Indication:Bilateral Knee pain/swelling    After sterile prep, 4 cc of Xylocaine and 1 cc of Kenalog were injected into the bilateral  Knee. Intra-articular Ultrasound images captured using [address] [place] [address] Loop Ultrasound machine using a frequency of 10 MHz with a linear transducer and scanned into patient's chart.            [facility] - [facility]  OFFICE PROCEDURE PROGRESS NOTE        Chart reviewed for the following:  Nato Carney M.D, have reviewed the History, Physical and updated the Allergic reactions for [address] performed immediately prior to start of procedure:  Nato Carney M.D, have performed the following reviews on Lulu Maldonado prior to the start of the procedure:            * Patient was identified by name and date of birth   * Agreement on procedure being performed was verified  * Risks and Benefits explained to the patient  * Procedure site verified and marked as necessary  * Patient was positioned for comfort  * Needle placement confirmed by ultrasound  * Consent was signed and verified     Time: 8:35 AM     Date of procedure: 5/13/2021    Procedure performed by:  Jonathan Osman M.D    Provider assisted by: (see medication administration)    How tolerated by patient: tolerated the procedure well with no complications    Comments: none      IMAGING: XR of left knee with 2 views obtained in the office dated 5/13/2021 was reviewed and read by Dr. Zenaida Polanco: Severe degenerative arthritis with valgus angulation with severe degenerative changes in all 3 compartments        XR of right knee with 2 views obtained in the office dated 5/13/2021 was reviewed and read by Dr. Zenaida Polanco: Marked degenerative arthritis with loss of medial compartment space and varus angulation and severe degenerative changes in all 3 compartments      IMPRESSION:      ICD-10-CM ICD-9-CM    1. Primary osteoarthritis of both knees  M17.0 715.16 triamcinolone acetonide (KENALOG-40) 40 mg/mL injection 80 mg      ARTHROCENTESIS ASPIR&/INJ MAJOR JT/BURSA W/US   2. Acute pain of both knees  M25.561 338.19 AMB POC XRAY, KNEE; 1/2 VIEWS    M25.562 719.46 AMB POC XRAY, KNEE; 1/2 VIEWS        PLAN:   1. Pt presents today with b/l knee pain due to exacerbation of primary OA and I would like to try injecting both knees with cortisone. Risk factors include: BMI>35  2. No ultrasound exam indicated today  3. Yes cortisone injection indicated today B/L KNEE US  4. No Physical/Occupational Therapy indicated today  5. No diagnostic test indicated today:   6. No durable medical equipment indicated today  7. No referral indicated today   8. No medications indicated today:   9. No Narcotic indicated today      RTC 3 weeks if pain continues      Scribed by Sweetie Teague[address]) as dictated by Jonathan Osman MD    I, Dr. Jonathan Osman, confirm that all documentation is accurate.     Jonathan Osman M.D. [facility]

## 2021-05-17 ENCOUNTER — OFFICE VISIT (OUTPATIENT)
Dept: ORTHOPEDIC SURGERY | Age: 51
End: 2021-05-17
Payer: MEDICAID

## 2021-05-17 VITALS
HEART RATE: 69 BPM | HEIGHT: 66 IN | BODY MASS INDEX: 37.93 KG/M2 | WEIGHT: 236 LBS | RESPIRATION RATE: 16 BRPM | TEMPERATURE: 97.5 F | OXYGEN SATURATION: 96 %

## 2021-05-17 DIAGNOSIS — M25.511 ACUTE PAIN OF RIGHT SHOULDER: ICD-10-CM

## 2021-05-17 DIAGNOSIS — Z98.890 HISTORY OF REVERSE TOTAL REPLACEMENT OF RIGHT SHOULDER JOINT: ICD-10-CM

## 2021-05-17 DIAGNOSIS — M25.511 ACUTE PAIN OF RIGHT SHOULDER: Primary | ICD-10-CM

## 2021-05-17 PROCEDURE — 99214 OFFICE O/P EST MOD 30 MIN: CPT | Performed by: PHYSICIAN ASSISTANT

## 2021-05-17 PROCEDURE — 73030 X-RAY EXAM OF SHOULDER: CPT | Performed by: PHYSICIAN ASSISTANT

## 2021-05-17 RX ORDER — CELECOXIB 200 MG/1
200 CAPSULE ORAL 2 TIMES DAILY WITH MEALS
Qty: 60 CAP | Refills: 2 | Status: SHIPPED | OUTPATIENT
Start: 2021-05-17 | End: 2021-08-09

## 2021-05-17 NOTE — PROGRESS NOTES
Elías Cerda  1970   Chief Complaint   Patient presents with    Shoulder Pain     RIGHT SHOULDER        HISTORY OF PRESENT ILLNESS  Elías Cerda is a 44 y.o. female who presents today for reevaluation of right shoulder pain. Pt is s/p Right reverse total shoulder arthroplasty on 7/18/18. She states that she fell about 1 month ago. She has pain with reaching and lifting. Notes a new popping sensation. Has significant night pain. Pain is a 4/10. Patient denies any fever, chills, chest pain, shortness of breath or calf pain. The remainder of the review of systems is negative. There are no new illness or injuries to report since last seen in the office. No changes in medications, allergies, social or family history. PHYSICAL EXAM:   Visit Vitals  Pulse 69   Temp 97.5 °F (36.4 °C) (Temporal)   Resp 16   Ht 5' 6\" (1.676 m)   Wt 236 lb (107 kg)   SpO2 96%   BMI 38.09 kg/m²     The patient is a well-developed, well-nourished female   in no acute distress. The patient is alert and oriented times three. The patient is alert and oriented times three. Mood and affect are normal.  LYMPHATIC: lymph nodes are not enlarged and are within normal limits  SKIN: normal in color and non tender to palpation. There are no bruises or abrasions noted. NEUROLOGICAL: Motor sensory exam is within normal limits. Reflexes are equal bilaterally.  There is normal sensation to pinprick and light touch  MUSCULOSKELETAL:  Examination Right shoulder   Skin Intact   AC joint tenderness -   Biceps tenderness -   Forward flexion/Elevation    Active abduction    Glenohumeral abduction 85   External rotation ROM 70   Internal rotation ROM 50   Apprehension -   Tadeos Relocation -   Jerk -   Load and Shift -   Obriens -   Speeds -   Impingement sign +   Supraspinatus/Empty Can +   External Rotation Strength -, 5/5   Lift Off/Belly Press -, 5/5   Neurovascular Intact          IMAGING: 3 view xray images of right shoulder taken in office on 5/17/2021 read and reviewed by myself reveal reverse total shoulder arthroplasty with no acute abnormalities     IMPRESSION:      ICD-10-CM ICD-9-CM    1. Acute pain of right shoulder  M25.511 719.41 AMB POC XRAY, SHOULDER; COMPLETE, 2+   2. 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 Patient with worsening right shoulder pain with no response to NSAIDs and rest.  Concerns for loosening of hardware. Will order a CT scan with metal reducing software. Prescription for Celebrex sent to pharmacy today. Encouraged gentle range of motion but avoiding lifting pushing and pulling  Risk factors include: smoker  2. No cortisone injection indicated today   3. No Physical/Occupational Therapy indicated today  4. Yes diagnostic test indicated today:   5. No durable medical equipment indicated today  6. No referral indicated today   7. Yes medications indicated today:   8.  No Narcotic indicated today     RTC after CT scan       ERIC [facility]

## 2021-05-18 ENCOUNTER — TELEPHONE (OUTPATIENT)
Dept: ORTHOPEDIC SURGERY | Age: 51
End: 2021-05-18

## 2021-05-18 NOTE — TELEPHONE ENCOUNTER
PA is required for Celecoxib    Cover My Meds Key:  Christella Hammans Key: [id number] - PA Case ID: [id number] - Rx #: [id number]

## 2021-06-02 ENCOUNTER — HOSPITAL ENCOUNTER (OUTPATIENT)
Dept: CT IMAGING | Age: 51
Discharge: HOME | End: 2021-06-02
Attending: PHYSICIAN ASSISTANT
Payer: MEDICAID

## 2021-06-02 PROCEDURE — 73200 CT UPPER EXTREMITY W/O DYE: CPT

## 2021-06-08 ENCOUNTER — OFFICE VISIT (OUTPATIENT)
Dept: ORTHOPEDIC SURGERY | Age: 51
End: 2021-06-08
Payer: MEDICAID

## 2021-06-08 VITALS
HEIGHT: 66 IN | RESPIRATION RATE: 15 BRPM | HEART RATE: 68 BPM | OXYGEN SATURATION: 97 % | WEIGHT: 244 LBS | BODY MASS INDEX: 39.21 KG/M2

## 2021-06-08 DIAGNOSIS — Z96.611 STATUS POST TOTAL SHOULDER ARTHROPLASTY, RIGHT: Primary | ICD-10-CM

## 2021-06-08 PROCEDURE — 99213 OFFICE O/P EST LOW 20 MIN: CPT | Performed by: ORTHOPAEDIC SURGERY

## 2021-06-08 NOTE — PROGRESS NOTES
Nicole Bradford  1970   Chief Complaint   Patient presents with    Shoulder Pain     f/u with Right shoulder test results        HISTORY OF PRESENT ILLNESS  Nicole Bradford is a 46 y.o. female who presents today for reevaluation of right shoulder pain and CT review. Pt rates pain as 4/10 today. Pt is s/p Right reverse total shoulder arthroplasty on 7/18/18. She states that she fell over 1 month ago. She has pain with reaching and lifting. Notes a new popping sensation. Has significant night pain. Patient denies any fever, chills, chest pain, shortness of breath or calf pain. The remainder of the review of systems is negative. There are no new illness or injuries to report since last seen in the office. No changes in medications, allergies, social or family history. Pain Assessment  6/8/2021   Location of Pain Shoulder   Location Modifiers Right   Severity of Pain 4   Quality of Pain Dull;Burning   Quality of Pain Comment -   Duration of Pain A few minutes   Frequency of Pain Several times daily   Aggravating Factors Bending;Stretching;Straightening   Aggravating Factors Comment -   Limiting Behavior Some   Relieving Factors -   Relieving Factors Comment -   Result of Injury -   Work-Related Injury -   Type of Injury -   Type of Injury Comment -       PHYSICAL EXAM:   Visit Vitals  Pulse 68   Resp 15   Ht 5' 6\" (1.676 m)   Wt 244 lb (110.7 kg)   SpO2 97%   BMI 39.38 kg/m²     The patient is a well-developed, well-nourished female   in no acute distress. The patient is alert and oriented times three. The patient is alert and oriented times three. Mood and affect are normal.  LYMPHATIC: lymph nodes are not enlarged and are within normal limits  SKIN: normal in color and non tender to palpation. There are no bruises or abrasions noted. NEUROLOGICAL: Motor sensory exam is within normal limits. Reflexes are equal bilaterally.  There is normal sensation to pinprick and light touch  MUSCULOSKELETAL:  Examination Right shoulder   Skin Intact   AC joint tenderness -   Biceps tenderness -   Forward flexion/Elevation    Active abduction    Glenohumeral abduction 85   External rotation ROM 70   Internal rotation ROM 50   Apprehension -   Tadeos Relocation -   Jerk -   Load and Shift -   Obriens -   Speeds -   Impingement sign +   Supraspinatus/Empty Can +   External Rotation Strength -, 5/5   Lift Off/Belly Press -, 5/5   Neurovascular Intact       IMAGING: CT of right shoulder dated 6/02/2021 was reviewed and read by Dr. Jalyn Joseph:   IMPRESSION:  1. No acute abnormalities regarding the shoulder joint replacement hardware. Not convincing for hardware loosening with regards to the humeral stem. 2.  Atrophy of rotator cuff muscles. 3 view xray images of right shoulder taken in office on 5/17/2021 read and reviewed by myself reveal reverse total shoulder arthroplasty with no acute abnormalities       IMPRESSION:      ICD-10-CM ICD-9-CM    1. Status post total shoulder arthroplasty, right  Z96.611 V43.61         PLAN:   1. Patient with worsening right shoulder pain and is s/p right total shoulder arthroplasty in 2018. The CT does not reveal any evidence of hardware loosening. Pt can return as needed. Risk factors include: smoker  2. No cortisone injection indicated today   3. No Physical/Occupational Therapy indicated today  4. No diagnostic test indicated today:   5. No durable medical equipment indicated today  6. No referral indicated today   7. No medications indicated today:   8. No Narcotic indicated today     RTC prn      Scribed by Marycarmen Juan as dictated by MD MADDI Garcia, Dr. Ambika Ramírez, confirm that all documentation is accurate.     Ambika Ramírez M.D.   [facility]

## 2021-08-09 RX ORDER — CELECOXIB 200 MG/1
200 CAPSULE ORAL 2 TIMES DAILY WITH MEALS
Qty: 60 CAPSULE | Refills: 2 | Status: SHIPPED | OUTPATIENT
Start: 2021-08-09 | End: 2021-11-15

## 2021-11-15 RX ORDER — CELECOXIB 200 MG/1
200 CAPSULE ORAL 2 TIMES DAILY WITH MEALS
Qty: 60 CAPSULE | Refills: 2 | Status: SHIPPED | OUTPATIENT
Start: 2021-11-15 | End: 2022-02-07 | Stop reason: SDUPTHER

## 2021-11-15 NOTE — TELEPHONE ENCOUNTER
Last Visit: 6/8/21 with MD Leon Shall  Next Appointment: Advised to follow-up PRN  Previous Refill Encounter(s): 8/9/21 #60 with 2 refills    Requested Prescriptions     Pending Prescriptions Disp Refills    celecoxib (CELEBREX) 200 mg capsule [Pharmacy Med Name: CELECOXIB 200 MG CAPSULE] 60 Capsule 2     Sig: TAKE 1 CAP BY MOUTH TWO (2) TIMES DAILY (WITH MEALS).

## 2021-12-15 ENCOUNTER — OFFICE VISIT (OUTPATIENT)
Dept: ORTHOPEDIC SURGERY | Age: 51
End: 2021-12-15
Payer: MEDICAID

## 2021-12-15 VITALS
TEMPERATURE: 97.5 F | HEIGHT: 66 IN | WEIGHT: 251 LBS | HEART RATE: 77 BPM | OXYGEN SATURATION: 98 % | BODY MASS INDEX: 40.34 KG/M2

## 2021-12-15 DIAGNOSIS — M17.0 PRIMARY OSTEOARTHRITIS OF BOTH KNEES: Primary | ICD-10-CM

## 2021-12-15 PROCEDURE — 20611 DRAIN/INJ JOINT/BURSA W/US: CPT | Performed by: ORTHOPAEDIC SURGERY

## 2021-12-15 PROCEDURE — 99214 OFFICE O/P EST MOD 30 MIN: CPT | Performed by: ORTHOPAEDIC SURGERY

## 2021-12-15 RX ORDER — TRIAMCINOLONE ACETONIDE 40 MG/ML
80 INJECTION, SUSPENSION INTRA-ARTICULAR; INTRAMUSCULAR ONCE
Status: COMPLETED | OUTPATIENT
Start: 2021-12-15 | End: 2021-12-15

## 2021-12-15 RX ADMIN — TRIAMCINOLONE ACETONIDE 80 MG: 40 INJECTION, SUSPENSION INTRA-ARTICULAR; INTRAMUSCULAR at 16:34

## 2021-12-15 NOTE — PROGRESS NOTES
Dash Duque  1970   Chief Complaint   Patient presents with    Knee Pain     bilat        HISTORY OF PRESENT ILLNESS  Dash Duque is a 46 y.o. female who presents today for reevaluation of b/l knee pain. Patient rates pain as 8/10 today. At last OV in May 2021, patient had a b/l knee cortisone injection which provided relief but the pain has returned. She is requesting a repeat b/l knee injection today. Pt initial pain was in May 2021. Patient denies any fever, chills, chest pain, shortness of breath or calf pain. The remainder of the review of systems is negative. There are no new illness or injuries to report since last seen in the office. There are no changes to medications, allergies, family or social history. Pain Assessment  12/15/2021   Location of Pain Knee   Location Modifiers Right;Left   Severity of Pain 8   Quality of Pain Sharp   Quality of Pain Comment -   Duration of Pain Persistent   Frequency of Pain Constant   Aggravating Factors Walking;Kneeling   Aggravating Factors Comment -   Limiting Behavior Yes   Relieving Factors Rest   Relieving Factors Comment -   Result of Injury No   Work-Related Injury -   Type of Injury -   Type of Injury Comment -       PHYSICAL EXAM:   Visit Vitals  Pulse 77   Temp 97.5 °F (36.4 °C)   Ht 5' 6\" (1.676 m)   Wt 251 lb (113.9 kg)   SpO2 98%   BMI 40.51 kg/m²     The patient is a well-developed, well-nourished female   in no acute distress. The patient is alert and oriented times three. The patient is alert and oriented times three. Mood and affect are normal.  LYMPHATIC: lymph nodes are not enlarged and are within normal limits  SKIN: normal in color and non tender to palpation. There are no bruises or abrasions noted. NEUROLOGICAL: Motor sensory exam is within normal limits. Reflexes are equal bilaterally.  There is normal sensation to pinprick and light touch  MUSCULOSKELETAL:   Examination Left knee   Skin Intact   Range of motion    Effusion +   Medial joint line tenderness +   Lateral joint line tenderness +   Tenderness Pes Bursa -   Tenderness insertion MCL -   Tenderness insertion LCL -   Tays -   Patella crepitus +   Patella grind +   Lachman -   Pivot shift -   Anterior drawer -   Posterior drawer -   Varus stress -   Valgus stress -   Neurovascular Intact   Calf Swelling and Tenderness to Palpation -   Joyce's Test -   Hamstring Cord Tightness -     Examination Right knee   Skin Intact   Range of motion    Effusion    +   Medial joint line tenderness  +   Lateral joint line tenderness  +   Tenderness Pes Bursa -   Tenderness insertion MCL -   Tenderness insertion LCL -   Tays -   Patella crepitus  +   Patella grind -   Lachman -   Pivot shift -   Anterior drawer -   Posterior drawer -   Varus stress -   Valgus stress -   Neurovascular Intact   Calf Swelling and Tenderness to Palpation -   Joyce's Test +   Hamstring Cord Tightness -        PROCEDURE:  Bilateral Knee Injection with Ultrasound Guidance    Indication:Bilateral Knee pain/swelling    After sterile prep, 4 cc of Xylocaine and 1 cc of Kenalog were injected into the bilateral  Knee. Intra-articular Ultrasound images captured using [address] [place] [address] Ultrasound machine using a frequency of 10 MHz with a linear transducer and scanned into patient's chart.            [facility] - [facility]  OFFICE PROCEDURE PROGRESS NOTE        Chart reviewed for the following:  Damir Cordero M.D, have reviewed the History, Physical and updated the Allergic reactions for [address] performed immediately prior to start of procedure:  Damir Cordero M.D, have performed the following reviews on [place] prior to the start of the procedure:            * Patient was identified by name and date of birth   * Agreement on procedure being performed was verified  * Risks and Benefits explained to the patient  * Procedure site verified and marked as necessary  * Patient was positioned for comfort  * Needle placement confirmed by ultrasound  * Consent was signed and verified     Time: 4:17AM     Date of procedure: 12/15/2021    Procedure performed by:  Lazaro Marcial M.D    Provider assisted by: (see medication administration)    How tolerated by patient: tolerated the procedure well with no complications    Comments: none      IMAGING: XR of left knee with 2 views obtained in the office dated 5/13/2021 was reviewed and read by Dr. Zhang Males: Severe degenerative arthritis with valgus angulation with severe degenerative changes in all 3 compartments        XR of right knee with 2 views obtained in the office dated 5/13/2021 was reviewed and read by Dr. Zhang Males: Marked degenerative arthritis with loss of medial compartment space and varus angulation and severe degenerative changes in all 3 compartments      IMPRESSION:      ICD-10-CM ICD-9-CM    1. Primary osteoarthritis of both knees  M17.0 715.16         PLAN:   1. Pt presents today with b/l knee pain due to exacerbation of primary OA and I would like to try injecting both knees with cortisone. Return in 3 weeks if pain continues. Risk factors include: BMI>35  2. No ultrasound exam indicated today  3. Yes cortisone injection indicated today B/L KNEE US  4. No Physical/Occupational Therapy indicated today  5. No diagnostic test indicated today:   6. No durable medical equipment indicated today  7. No referral indicated today   8. No medications indicated today:   9. No Narcotic indicated today      RTC 3 weeks if pain continues      Scribed by Ashely Hernandez [address]) as dictated by Lazaro Marcial MD    I, Dr. Lazaro Marcial, confirm that all documentation is accurate.     Lazaro Marcial M.D.   [facility]

## 2022-01-28 NOTE — PROGRESS NOTES
Will discuss more next visit. Double O-Z Plasty Text: The defect edges were debeveled with a #15 scalpel blade.  Given the location of the defect, shape of the defect and the proximity to free margins a Double O-Z plasty (double transposition flap) was deemed most appropriate.  Using a sterile surgical marker, the appropriate transposition flaps were drawn incorporating the defect and placing the expected incisions within the relaxed skin tension lines where possible. The area thus outlined was incised deep to adipose tissue with a #15 scalpel blade.  The skin margins were undermined to an appropriate distance in all directions utilizing iris scissors.  Hemostasis was achieved with electrocautery.  The flaps were then transposed into place, one clockwise and the other counterclockwise, and anchored with interrupted buried subcutaneous sutures.

## 2022-02-07 RX ORDER — CELECOXIB 200 MG/1
200 CAPSULE ORAL 2 TIMES DAILY WITH MEALS
Qty: 60 CAPSULE | Refills: 2 | Status: SHIPPED | OUTPATIENT
Start: 2022-02-07 | End: 2022-05-31

## 2022-03-18 PROBLEM — Z72.0 TOBACCO ABUSE: Status: ACTIVE | Noted: 2017-10-23

## 2022-03-18 PROBLEM — E66.9 OBESITY (BMI 30.0-34.9): Status: ACTIVE | Noted: 2017-03-01

## 2022-03-19 PROBLEM — R94.31 ABNORMAL EKG: Status: ACTIVE | Noted: 2017-10-23

## 2022-03-19 PROBLEM — R60.9 DEPENDENT EDEMA: Status: ACTIVE | Noted: 2019-08-21

## 2022-03-19 PROBLEM — Z98.890 S/P ROTATOR CUFF REPAIR: Status: ACTIVE | Noted: 2017-05-16

## 2022-03-20 PROBLEM — M75.101 ROTATOR CUFF TEAR ARTHROPATHY, RIGHT: Status: ACTIVE | Noted: 2018-07-18

## 2022-03-20 PROBLEM — M12.811 ROTATOR CUFF TEAR ARTHROPATHY, RIGHT: Status: ACTIVE | Noted: 2018-07-18

## 2022-04-05 ENCOUNTER — TRANSCRIBE ORDER (OUTPATIENT)
Dept: SCHEDULING | Age: 52
End: 2022-04-05

## 2022-04-05 DIAGNOSIS — N63.0 BREAST NODULE: Primary | ICD-10-CM

## 2022-05-09 ENCOUNTER — HOSPITAL ENCOUNTER (OUTPATIENT)
Dept: ULTRASOUND IMAGING | Age: 52
Discharge: HOME | End: 2022-05-09
Attending: FAMILY MEDICINE
Payer: MEDICAID

## 2022-05-09 ENCOUNTER — HOSPITAL ENCOUNTER (OUTPATIENT)
Dept: MAMMOGRAPHY | Age: 52
Discharge: HOME | End: 2022-05-09
Attending: FAMILY MEDICINE
Payer: MEDICAID

## 2022-05-09 DIAGNOSIS — N63.0 BREAST NODULE: ICD-10-CM

## 2022-05-09 PROCEDURE — 76642 ULTRASOUND BREAST LIMITED: CPT

## 2022-05-09 PROCEDURE — 77066 DX MAMMO INCL CAD BI: CPT

## 2022-05-18 ENCOUNTER — TRANSCRIBE ORDER (OUTPATIENT)
Dept: SCHEDULING | Age: 52
End: 2022-05-18

## 2022-05-18 DIAGNOSIS — H70.12 CHRONIC MASTOIDITIS, LEFT EAR: Primary | ICD-10-CM

## 2022-05-24 ENCOUNTER — OFFICE VISIT (OUTPATIENT)
Dept: ORTHOPEDIC SURGERY | Age: 52
End: 2022-05-24
Payer: MEDICAID

## 2022-05-24 VITALS
OXYGEN SATURATION: 99 % | TEMPERATURE: 97.1 F | BODY MASS INDEX: 34.72 KG/M2 | HEIGHT: 66 IN | WEIGHT: 216 LBS | HEART RATE: 67 BPM

## 2022-05-24 DIAGNOSIS — M17.0 PRIMARY OSTEOARTHRITIS OF BOTH KNEES: Primary | ICD-10-CM

## 2022-05-24 PROCEDURE — 20611 DRAIN/INJ JOINT/BURSA W/US: CPT | Performed by: PHYSICIAN ASSISTANT

## 2022-05-24 RX ORDER — TRIAMCINOLONE ACETONIDE 40 MG/ML
40 INJECTION, SUSPENSION INTRA-ARTICULAR; INTRAMUSCULAR ONCE
Status: COMPLETED | OUTPATIENT
Start: 2022-05-24 | End: 2022-05-24

## 2022-05-24 RX ADMIN — TRIAMCINOLONE ACETONIDE 40 MG: 40 INJECTION, SUSPENSION INTRA-ARTICULAR; INTRAMUSCULAR at 15:38

## 2022-05-24 NOTE — PROGRESS NOTES
Ferny Roach  1970   No chief complaint on file. HISTORY OF PRESENT ILLNESS  Ferny Roach is a 46 y.o. female who presents today for reevaluation of b/l knee pain. Patient rates pain as 8/10 today. At last OV in 12/15/2021, patient had a b/l knee cortisone injection which provided some relief until a month ago. She is requesting a repeat b/l knee injection today. Pt initial pain was in May 2021. Patient denies any fever, chills, chest pain, shortness of breath or calf pain. The remainder of the review of systems is negative. There are no new illness or injuries to report since last seen in the office. There are no changes to medications, allergies, family or social history. Pain Assessment  12/15/2021   Location of Pain Knee   Location Modifiers Right;Left   Severity of Pain 8   Quality of Pain Sharp   Quality of Pain Comment -   Duration of Pain Persistent   Frequency of Pain Constant   Aggravating Factors Walking;Kneeling   Aggravating Factors Comment -   Limiting Behavior Yes   Relieving Factors Rest   Relieving Factors Comment -   Result of Injury No   Work-Related Injury -   Type of Injury -   Type of Injury Comment -       PHYSICAL EXAM:   There were no vitals taken for this visit. The patient is a well-developed, well-nourished female   in no acute distress. The patient is alert and oriented times three. The patient is alert and oriented times three. Mood and affect are normal.  LYMPHATIC: lymph nodes are not enlarged and are within normal limits  SKIN: normal in color and non tender to palpation. There are no bruises or abrasions noted. NEUROLOGICAL: Motor sensory exam is within normal limits. Reflexes are equal bilaterally.  There is normal sensation to pinprick and light touch  MUSCULOSKELETAL:   Examination Left knee   Skin Intact   Range of motion    Effusion +   Medial joint line tenderness +   Lateral joint line tenderness +   Tenderness Pes Bursa - Tenderness insertion MCL -   Tenderness insertion LCL -   Tays -   Patella crepitus +   Patella grind +   Lachman -   Pivot shift -   Anterior drawer -   Posterior drawer -   Varus stress -   Valgus stress -   Neurovascular Intact   Calf Swelling and Tenderness to Palpation -   Joyce's Test -   Hamstring Cord Tightness -     Examination Right knee   Skin Intact   Range of motion    Effusion    +   Medial joint line tenderness  +   Lateral joint line tenderness  +   Tenderness Pes Bursa -   Tenderness insertion MCL -   Tenderness insertion LCL -   Tays -   Patella crepitus  +   Patella grind -   Lachman -   Pivot shift -   Anterior drawer -   Posterior drawer -   Varus stress -   Valgus stress -   Neurovascular Intact   Calf Swelling and Tenderness to Palpation -   Joyce's Test +   Hamstring Cord Tightness -        PROCEDURE:  Bilateral knee Injection with Ultrasound Guidance    Indication:Bilateral Knee pain/swelling    After sterile prep, 4 cc of Xylocaine and 1 cc of Kenalog were injected into the bilateral  Knee. Intra-articular Ultrasound images captured using [address] Ultrasound machine using a frequency of 10 MHz with a linear transducer and scanned into patient's chart.      [facility] - [facility]  OFFICE PROCEDURE PROGRESS NOTE        Chart reviewed for the following:  IFranc PA, have reviewed the History, Physical and updated the Allergic reactions for [address] performed immediately prior to start of procedure:  Franc LINDA PA-C, have performed the following reviews on At[facility] Failing prior to the start of the procedure:            * Patient was identified by name and date of birth   * Agreement on procedure being performed was verified  * Risks and Benefits explained to the patient  * Procedure site verified and marked as necessary  * Patient was positioned for comfort  * Consent was signed and verified     Time: 3:52 PM    Date of procedure: 5/24/2022    Procedure performed by:  HAMZAH Anderson    Provider assisted by: (see medication administration)    How tolerated by patient: tolerated the procedure well with no complications    Comments: none      IMAGING: XR of left knee with 2 views obtained in the office dated 5/13/2021 was reviewed and read by Dr. Rosemary Kumari: Severe degenerative arthritis with valgus angulation with severe degenerative changes in all 3 compartments        XR of right knee with 2 views obtained in the office dated 5/13/2021 was reviewed and read by Dr. Rosemary Kumari: Marked degenerative arthritis with loss of medial compartment space and varus angulation and severe degenerative changes in all 3 compartments      IMPRESSION:    No diagnosis found. PLAN:   1. Pt presents today with b/l knee pain due to exacerbation of primary OA and I would like to try injecting both knees with cortisone. Return in 3 weeks if pain continues. Risk factors include: BMI>35  2. No ultrasound exam indicated today  3. Yes cortisone injection indicated today B/L KNEE US  4. No Physical/Occupational Therapy indicated today  5. No diagnostic test indicated today:   6. No durable medical equipment indicated today  7. No referral indicated today   8. No medications indicated today:   9. No Narcotic indicated today      RTC 3 weeks if pain continues      Scribed by Buddy Nieto) as dictated by Bruno Hodges MD    I, Dr. Bruno Hodges, confirm that all documentation is accurate.     Bruno Hodges M.D.   [facility]

## 2022-05-31 RX ORDER — CELECOXIB 200 MG/1
CAPSULE ORAL
Qty: 60 CAPSULE | Refills: 2 | Status: SHIPPED | OUTPATIENT
Start: 2022-05-31 | End: 2022-09-02

## 2022-06-02 ENCOUNTER — HOSPITAL ENCOUNTER (OUTPATIENT)
Age: 52
Discharge: HOME | End: 2022-06-02
Attending: OTOLARYNGOLOGY
Payer: MEDICAID

## 2022-06-02 DIAGNOSIS — H70.12 CHRONIC MASTOIDITIS, LEFT EAR: ICD-10-CM

## 2022-06-02 PROCEDURE — 70551 MRI BRAIN STEM W/O DYE: CPT

## 2022-06-03 ENCOUNTER — TELEPHONE (OUTPATIENT)
Dept: ORTHOPEDIC SURGERY | Age: 52
End: 2022-06-03

## 2022-06-03 NOTE — TELEPHONE ENCOUNTER
PA is required for Celecoxib    Cover My Meds Key:   [address] in place:    Recommendation Provided To:    Intervention Detail: New Rx: 1, reason: Patient Preference   Gap Closed?:    Intervention Accepted By:   Vicky Torres Time Spent (min): 5

## 2022-06-06 ENCOUNTER — TRANSCRIBE ORDER (OUTPATIENT)
Dept: SCHEDULING | Age: 52
End: 2022-06-06

## 2022-06-06 DIAGNOSIS — R35.0 URINARY FREQUENCY: Primary | ICD-10-CM

## 2022-06-14 ENCOUNTER — TRANSCRIBE ORDER (OUTPATIENT)
Dept: SCHEDULING | Age: 52
End: 2022-06-14

## 2022-06-14 DIAGNOSIS — R35.0 URINARY FREQUENCY: Primary | ICD-10-CM

## 2022-06-22 ENCOUNTER — HOSPITAL ENCOUNTER (OUTPATIENT)
Dept: ULTRASOUND IMAGING | Age: 52
Discharge: HOME | End: 2022-06-22
Attending: FAMILY MEDICINE
Payer: MEDICAID

## 2022-06-22 DIAGNOSIS — R35.0 URINARY FREQUENCY: ICD-10-CM

## 2022-06-22 PROCEDURE — 76857 US EXAM PELVIC LIMITED: CPT

## 2022-07-29 ENCOUNTER — TELEPHONE (OUTPATIENT)
Dept: ORTHOPEDIC SURGERY | Age: 52
End: 2022-07-29

## 2022-07-29 NOTE — TELEPHONE ENCOUNTER
Pt called pac to say she went to urgent care about her knee 7/28/22 and they wanted her seen asap. There is nothing available sooner then her 8/11/22 appt.  Please call pt to advise if she can be fit in sooner
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
## 2022-08-11 ENCOUNTER — OFFICE VISIT (OUTPATIENT)
Dept: ORTHOPEDIC SURGERY | Age: 52
End: 2022-08-11
Payer: MEDICAID

## 2022-08-11 VITALS — BODY MASS INDEX: 34.72 KG/M2 | HEIGHT: 66 IN | WEIGHT: 216 LBS | TEMPERATURE: 97.7 F

## 2022-08-11 DIAGNOSIS — M17.0 PRIMARY OSTEOARTHRITIS OF BOTH KNEES: Primary | ICD-10-CM

## 2022-08-11 PROCEDURE — 20611 DRAIN/INJ JOINT/BURSA W/US: CPT | Performed by: PHYSICIAN ASSISTANT

## 2022-08-11 RX ORDER — TRIAMCINOLONE ACETONIDE 40 MG/ML
40 INJECTION, SUSPENSION INTRA-ARTICULAR; INTRAMUSCULAR ONCE
Status: COMPLETED | OUTPATIENT
Start: 2022-08-11 | End: 2022-08-11

## 2022-08-11 RX ADMIN — TRIAMCINOLONE ACETONIDE 40 MG: 40 INJECTION, SUSPENSION INTRA-ARTICULAR; INTRAMUSCULAR at 12:01

## 2022-08-11 NOTE — PROGRESS NOTES
Dash Duque  1970   Chief Complaint   Patient presents with    Knee Pain     Rt         HISTORY OF PRESENT ILLNESS  Dash Duque is a 45 y.o. female who presents today for reevaluation of b/l knee pain. Patient rates pain as 8/10 today. Had an injection in May, which provided some relief until a month ago. She is requesting a repeat b/l knee injection today. Pt initial pain was in May 2021. Patient denies any fever, chills, chest pain, shortness of breath or calf pain. The remainder of the review of systems is negative. There are no new illness or injuries to report since last seen in the office. There are no changes to medications, allergies, family or social history. Pain Assessment  8/11/2022   Location of Pain Knee   Location Modifiers Right   Severity of Pain 8   Quality of Pain Throbbing;Aching   Quality of Pain Comment -   Duration of Pain Persistent   Frequency of Pain Constant   Date Pain First Started (No Data)   Date Pain First Started Comment f.u   Aggravating Factors Stretching;Bending;Exercise   Aggravating Factors Comment -   Limiting Behavior Yes   Relieving Factors Nothing   Relieving Factors Comment -   Result of Injury No   Work-Related Injury -   Type of Injury -   Type of Injury Comment -       PHYSICAL EXAM:   Visit Vitals  Temp 97.7 °F (36.5 °C) (Temporal)   Ht 5' 6\" (1.676 m)   Wt 216 lb (98 kg)   BMI 34.86 kg/m²     The patient is a well-developed, well-nourished female   in no acute distress. The patient is alert and oriented times three. The patient is alert and oriented times three. Mood and affect are normal.  LYMPHATIC: lymph nodes are not enlarged and are within normal limits  SKIN: normal in color and non tender to palpation. There are no bruises or abrasions noted. NEUROLOGICAL: Motor sensory exam is within normal limits. Reflexes are equal bilaterally.  There is normal sensation to pinprick and light touch  MUSCULOSKELETAL:   Examination Left knee Skin Intact   Range of motion    Effusion +   Medial joint line tenderness +   Lateral joint line tenderness +   Tenderness Pes Bursa -   Tenderness insertion MCL -   Tenderness insertion LCL -   Tays -   Patella crepitus +   Patella grind +   Lachman -   Pivot shift -   Anterior drawer -   Posterior drawer -   Varus stress -   Valgus stress -   Neurovascular Intact   Calf Swelling and Tenderness to Palpation -   Joyce's Test -   Hamstring Cord Tightness -     Examination Right knee   Skin Intact   Range of motion    Effusion    +   Medial joint line tenderness  +   Lateral joint line tenderness  +   Tenderness Pes Bursa -   Tenderness insertion MCL -   Tenderness insertion LCL -   Tays -   Patella crepitus  +   Patella grind -   Lachman -   Pivot shift -   Anterior drawer -   Posterior drawer -   Varus stress -   Valgus stress -   Neurovascular Intact   Calf Swelling and Tenderness to Palpation -   Joyce's Test +   Hamstring Cord Tightness -        PROCEDURE:  Bilateral knee Injection with Ultrasound Guidance    Indication:Bilateral Knee pain/swelling    After sterile prep, 4 cc of Xylocaine and 1 cc of Kenalog were injected into the bilateral  Knee. Intra-articular Ultrasound images captured using [address] [place] [address] Ultrasound machine using a frequency of 10 MHz with a linear transducer and scanned into patient's chart.      [facility] - [facility]  OFFICE PROCEDURE PROGRESS NOTE        Chart reviewed for the following:  Blanco LINDA PA, have reviewed the History, Physical and updated the Allergic reactions for [address] performed immediately prior to start of procedure:  Blanco LINDA PA-C, have performed the following reviews on [facility] prior to the start of the procedure:            * Patient was identified by name and date of birth   * Agreement on procedure being performed was verified  * Risks and Benefits explained to the patient  * Procedure site verified and marked as necessary  * Patient was positioned for comfort  * Consent was signed and verified     Time: 12:17 PM    Date of procedure: 8/11/2022    Procedure performed by:  HAMZAH Blanco    Provider assisted by: (see medication administration)    How tolerated by patient: tolerated the procedure well with no complications    Comments: none        IMAGING: XR of left knee with 2 views obtained in the office dated 5/13/2021 was reviewed and read by Dr. Rivers Erb: Severe degenerative arthritis with valgus angulation with severe degenerative changes in all 3 compartments        XR of right knee with 2 views obtained in the office dated 5/13/2021 was reviewed and read by Dr. Tita Kong: Marked degenerative arthritis with loss of medial compartment space and varus angulation and severe degenerative changes in all 3 compartments      IMPRESSION:      ICD-10-CM ICD-9-CM    1. Primary osteoarthritis of both knees  M17.0 715.16 triamcinolone acetonide (KENALOG-40) 40 mg/mL injection 40 mg      ARTHROCENTESIS ASPIR&/INJ MAJOR JT/BURSA W/US      PROCEDURE AUTHORIZATION TO            PLAN:   1. Pt presents today with b/l knee pain due to exacerbation of primary OA and I would like to try injecting both knees with cortisone. We will auth viscosupplentation. Return in 3 weeks if pain continues. Risk factors include: BMI>35  2. No ultrasound exam indicated today  3. Yes cortisone injection indicated today B/L KNEE US  4. No Physical/Occupational Therapy indicated today  5. No diagnostic test indicated today:   6. No durable medical equipment indicated today  7. No referral indicated today   8. No medications indicated today:   9.  No Narcotic indicated today      RTC when viscosupplementation [address] [facility]

## 2022-09-02 RX ORDER — CELECOXIB 200 MG/1
CAPSULE ORAL
Qty: 60 CAPSULE | Refills: 2 | Status: SHIPPED | OUTPATIENT
Start: 2022-09-02

## 2022-09-06 ENCOUNTER — DOCUMENTATION ONLY (OUTPATIENT)
Dept: ORTHOPEDIC SURGERY | Age: 52
End: 2022-09-06

## 2022-09-06 NOTE — PROGRESS NOTES
Per Shiv, unable to approve Euflexxa. Patient needs to try medications such as Nsaids, Tyelnol. Tramadol, and topical analgesics. Would also like patient to try PT, bracing, orthotics, exercise, or weight loss.

## 2022-10-21 ENCOUNTER — OFFICE VISIT (OUTPATIENT)
Dept: ORTHOPEDIC SURGERY | Age: 52
End: 2022-10-21
Payer: MEDICAID

## 2022-10-21 VITALS — WEIGHT: 224.8 LBS | HEIGHT: 66 IN | BODY MASS INDEX: 36.13 KG/M2

## 2022-10-21 DIAGNOSIS — G89.29 CHRONIC PAIN OF RIGHT KNEE: ICD-10-CM

## 2022-10-21 DIAGNOSIS — M25.561 CHRONIC PAIN OF RIGHT KNEE: ICD-10-CM

## 2022-10-21 DIAGNOSIS — M17.11 PRIMARY OSTEOARTHRITIS OF RIGHT KNEE: Primary | ICD-10-CM

## 2022-10-21 DIAGNOSIS — R21 SKIN RASH: ICD-10-CM

## 2022-10-21 PROCEDURE — 99214 OFFICE O/P EST MOD 30 MIN: CPT | Performed by: ORTHOPAEDIC SURGERY

## 2022-10-21 PROCEDURE — 73564 X-RAY EXAM KNEE 4 OR MORE: CPT | Performed by: ORTHOPAEDIC SURGERY

## 2022-10-21 RX ORDER — CLOTRIMAZOLE AND BETAMETHASONE DIPROPIONATE 10; .64 MG/G; MG/G
CREAM TOPICAL
Qty: 45 G | Refills: 1 | Status: SHIPPED | OUTPATIENT
Start: 2022-10-21 | End: 2022-10-21 | Stop reason: SDUPTHER

## 2022-10-21 RX ORDER — CLOTRIMAZOLE AND BETAMETHASONE DIPROPIONATE 10; .64 MG/G; MG/G
CREAM TOPICAL
Qty: 45 G | Refills: 1 | Status: SHIPPED | OUTPATIENT
Start: 2022-10-21

## 2022-10-21 NOTE — PROGRESS NOTES
Patient: Makenzie Boston                MRN: [id number]       SSN: [id number]  YOB: 1970        AGE: 45 y.o. SEX: female  Body mass index is 36.28 kg/m². REVIEW OF SYSTEMS:      Mrs. Jeff Quinteros presents with her  with regards to bilateral knee pain both are bad and she like to have her knees replaced the left ones got into valgus the right ones varus and she used to weigh over 500 pounds and apparently they have a flea problem in the house and she has multiple bug bites in the lower extremities I think she has touch of Candida rash as well we will treat her with some Lotrisone cream and were getting get vascular to see her for peripheral edema injections do not work anymore she has night pain pain with with daily living less than a 5-minute level walking tolerance cannot go around the grocery store and she had a gastric bypass which was very helpful for all sorts of injections were tried as well and the exam today very nice lady does smell profusely of tobacco over and walks independently right sides in varus left sides and valgus calf nontender Homans' sign is negative.    -3 degrees extension bends to 105 degrees.     4 views of the right knee today on 10/21/2022 confirms x-rays are arthritis of both knees severe    Risks and benefits described including but not limited to infection DVT pulmonary embolism anesthetic complications blood loss requiring transfusion chronic pain instability implant longevity arthrofibrosis and also the need for bending and straightening knee on an hourly basis to avoid complications    Try with some Lotrisone cream I advised her to quit smoking to have her see vascular and have explained that we have to get her skin cleared up prior to having surgery all risk and benefits described we will see them back in about a month after she is seen vascular after she is tried the low zone cream and will have a look at her bug bites at that point as well thank you      CON: negative  EYE: negative   ENT: negative  RESP: negative  GI:    negative   :  negative  MSK: Positive  A twelve point review of systems was completed, positives noted and all other systems were reviewed and are negative          Past Medical History:   Diagnosis Date    Arthritis     Asthma     Bipolar disorder (HCC)     GERD (gastroesophageal reflux disease)     Obesity (BMI 30.0-34.9) 3/1/2017    Psychiatric disorder     depression    Psychotic disorder ([facility] [address]     PTSD (post-traumatic stress disorder)     Reflux        Family History   Problem Relation Age of Onset    Diabetes Mother     Uterine Cancer Mother     Lung Cancer Father     No Known Problems Sister     Heart Disease Neg Hx     Hypertension Neg Hx     Stroke Neg Hx        Current Outpatient Medications   Medication Sig Dispense Refill    celecoxib (CELEBREX) 200 mg capsule TAKE 1 CAPSULE BY MOUTH TWICE DAILY WITH MEALS **NOT COVERED 60 Capsule 2    neomycin-polymyxin-hydrocortisone (CORTISPORIN) otic solution Administer 3-4 Drops in right ear four (4) times daily. 10 mL 0    zolpidem (AMBIEN) 5 mg tablet Take  by mouth nightly as needed for Sleep.      biotin 10,000 mcg cap Take 1 Cap by mouth two (2) times a day. multivitamin (ONE A DAY) tablet Take 1 Tab by mouth daily. cariprazine (VRAYLAR) 6 mg capsule Take 6 mg by mouth nightly. OXcarbazepine (TRILEPTAL) 300 mg tablet Take 300 mg by mouth two (2) times a day. CLONAZEPAM (KLONOPIN PO) Take 0.5 mg by mouth two (2) times a day. Indications: anxious      omeprazole (PRILOSEC) 20 mg capsule Take 20 mg by mouth daily. eszopiclone (LUNESTA) 3 mg tablet Take 3 mg by mouth nightly. lamoTRIgine (LaMICtal) 100 mg tablet Take  by mouth daily. 100 mg am and 200mg q hs   Indications: BIPOLAR DISORDER IN REMISSION      chlorproMAZINE (THORAZINE) 100 mg tablet Take 100 mg by mouth two (2) times a day. traZODone (DESYREL) 300 mg tablet Take 300 mg by mouth nightly. CYANOCOBALAMIN, VITAMIN B-12, (VITAMIN B-12 PO) Take 500 mcg by mouth daily. ERGOCALCIFEROL, VITAMIN D2, (VITAMIN D2 PO) Take 50,000 Units by mouth every seven (7) days. Allergies   Allergen Reactions    Amoxicillin Other (comments)     Does no work  Does not work. Codeine Nausea and Vomiting and Other (comments)       Past Surgical History:   Procedure Laterality Date    HX CHOLECYSTECTOMY      HX GASTRIC BYPASS  2008    HX HEENT      tonsilectomy      HX ORTHOPAEDIC      right shoulder     HX SHOULDER REPLACEMENT Right 07/25/2018    [place] ABDOMEN SURGERY PROC UNLISTED  2008    gastric bypass    [place] ANESTH,SURGERY OF SHOULDER Right 05/2017    rotator cuff       Social History     Socioeconomic History    Marital status:      Spouse name: Not on file    Number of children: Not on file    Years of education: Not on file    Highest education level: Not on file   Occupational History    Not on file   Tobacco Use    Smoking status: Every Day     Packs/day: 0.50     Years: 20.00     Pack years: 10.00     Types: Cigarettes    Smokeless tobacco: Never   Substance and Sexual Activity    Alcohol use: No    Drug use: No    Sexual activity: Yes     Partners: Male     Birth control/protection: None   Other Topics Concern    Not on file   Social History Narrative    Not on file     Social Determinants of Health     Financial Resource Strain: Not on file   Food Insecurity: Not on file   Transportation Needs: Not on file   Physical Activity: Not on file   Stress: Not on file   Social Connections: Not on file   Intimate Partner Violence: Not on file   Housing Stability: Not on file       Visit Vitals  Ht 5' 6\" (1.676 m)   Wt 102 kg (224 lb 12.8 oz)   BMI 36.28 kg/m²         PHYSICAL EXAMINATION:  GENERAL: Alert and oriented x3, in no acute distress, well-developed, well-nourished, afebrile. HEART: No JVD.   EYES: No scleral icterus   NECK: No significant lymphadenopathy   LUNGS: No respiratory compromise or indrawing  ABDOMEN: Soft, non-tender, non-distended. Note: This note was completed using voice recognition software. Any typographical/name errors or mistakes are unintentional.    Electronically signed by:  Sanchez Jasso MD

## 2022-12-01 ENCOUNTER — OFFICE VISIT (OUTPATIENT)
Dept: ORTHOPEDIC SURGERY | Age: 52
End: 2022-12-01
Payer: MEDICAID

## 2022-12-01 VITALS — BODY MASS INDEX: 35.51 KG/M2 | WEIGHT: 220 LBS

## 2022-12-01 DIAGNOSIS — M17.11 PRIMARY OSTEOARTHRITIS OF RIGHT KNEE: ICD-10-CM

## 2022-12-01 DIAGNOSIS — R21 SKIN RASH: Primary | ICD-10-CM

## 2022-12-01 RX ORDER — PERMETHRIN 50 MG/G
CREAM TOPICAL
Qty: 60 G | Refills: 0 | Status: SHIPPED | OUTPATIENT
Start: 2022-12-01 | End: 2022-12-01

## 2022-12-01 NOTE — PROGRESS NOTES
[address] [place] [address]  [phone]           Patient: Richi Taveras                MRN: [id number]       SSN: [id number]  YOB: 1970        AGE: 45 y.o. SEX: female  Body mass index is 35.51 kg/m². PCP: Ashley Collado MD  12/01/22      This office note has been dictated. REVIEW OF SYSTEMS:  Constitutional: Negative for fever, chills, weight loss and malaise/fatigue. HENT: Negative. Eyes: Negative. Respiratory: Negative. Cardiovascular: Negative. Gastrointestinal: No bowel incontinence or constipation. Genitourinary: No bladder incontinence or saddle anesthesia. Skin: Negative. Neurological: Negative. Endo/Heme/Allergies: Negative. Psychiatric/Behavioral: Negative. Musculoskeletal: As per HPI above. Past Medical History:   Diagnosis Date    Arthritis     Asthma     Bipolar disorder ([facility] [address]     GERD (gastroesophageal reflux disease)     Obesity (BMI 30.0-34.9) 3/1/2017    Psychiatric disorder     depression    Psychotic disorder (HCC)     PTSD (post-traumatic stress disorder)     Reflux          Current Outpatient Medications:     permethrin (ACTICIN) 5 % topical cream, Apply  to affected area now for 1 dose. apply sparingly as directed, Disp: 60 g, Rfl: 0    clotrimazole-betamethasone (Lotrisone) topical cream, Apply to affected area bid. Dispense 1 tube, Disp: 45 g, Rfl: 1    celecoxib (CELEBREX) 200 mg capsule, TAKE 1 CAPSULE BY MOUTH TWICE DAILY WITH MEALS **NOT COVERED, Disp: 60 Capsule, Rfl: 2    neomycin-polymyxin-hydrocortisone (CORTISPORIN) otic solution, Administer 3-4 Drops in right ear four (4) times daily. , Disp: 10 mL, Rfl: 0    zolpidem (AMBIEN) 5 mg tablet, Take  by mouth nightly as needed for Sleep., Disp: , Rfl:     biotin 10,000 mcg cap, Take 1 Cap by mouth two (2) times a day., Disp: , Rfl:     multivitamin (ONE A DAY) tablet, Take 1 Tab by mouth daily. , Disp: , Rfl:     cariprazine (VRAYLAR) 6 mg capsule, Take 6 mg by mouth nightly., Disp: , Rfl:     OXcarbazepine (TRILEPTAL) 300 mg tablet, Take 300 mg by mouth two (2) times a day., Disp: , Rfl:     CLONAZEPAM (KLONOPIN PO), Take 0.5 mg by mouth two (2) times a day. Indications: anxious, Disp: , Rfl:     omeprazole (PRILOSEC) 20 mg capsule, Take 20 mg by mouth daily. , Disp: , Rfl:     eszopiclone (LUNESTA) 3 mg tablet, Take 3 mg by mouth nightly., Disp: , Rfl:     lamoTRIgine (LaMICtal) 100 mg tablet, Take  by mouth daily. 100 mg am and 200mg q hs   Indications: BIPOLAR DISORDER IN REMISSION, Disp: , Rfl:     chlorproMAZINE (THORAZINE) 100 mg tablet, Take 100 mg by mouth two (2) times a day., Disp: , Rfl:     traZODone (DESYREL) 300 mg tablet, Take 300 mg by mouth nightly., Disp: , Rfl:     CYANOCOBALAMIN, VITAMIN B-12, (VITAMIN B-12 PO), Take 500 mcg by mouth daily. , Disp: , Rfl:     ERGOCALCIFEROL, VITAMIN D2, (VITAMIN D2 PO), Take 50,000 Units by mouth every seven (7) days. , Disp: , Rfl:     Allergies   Allergen Reactions    Amoxicillin Other (comments)     Does no work  Does not work.     Codeine Nausea and Vomiting and Other (comments)       Social History     Socioeconomic History    Marital status:      Spouse name: Not on file    Number of children: Not on file    Years of education: Not on file    Highest education level: Not on file   Occupational History    Not on file   Tobacco Use    Smoking status: Every Day     Packs/day: 0.50     Years: 20.00     Pack years: 10.00     Types: Cigarettes    Smokeless tobacco: Never   Substance and Sexual Activity    Alcohol use: No    Drug use: No    Sexual activity: Yes     Partners: Male     Birth control/protection: None   Other Topics Concern    Not on file   Social History Narrative    Not on file     Social Determinants of Health     Financial Resource Strain: Not on file   Food Insecurity: Not on file   Transportation Needs: Not on file   Physical Activity: Not on file   Stress: Not on file   Social Connections: Not on file   Intimate Partner Violence: Not on file   Housing Stability: Not on file       Past Surgical History:   Procedure Laterality Date    HX CHOLECYSTECTOMY      HX GASTRIC BYPASS  2008    HX HEENT      tonsilectomy      HX ORTHOPAEDIC      right shoulder     HX SHOULDER REPLACEMENT Right 07/25/2018    MT ABDOMEN SURGERY PROC UNLISTED  2008    gastric bypass    MT ANESTH,SURGERY OF SHOULDER Right 05/2017    rotator cuff         Patient seen evaluated today for bilateral lower extremities. She does have known osteoarthritic knees and scheduled for surgery in April. She had a rash on her legs which she has tried steroid cream as well as Lotrimin. Has not helped. Does report itching. She reports that some of it might be flea bites as her animals do have some fleas. Patient denies recent fevers, chills, chest pain, SOB, or injuries. No recent systemic changes noted. A 12-point review of systems is performed today. Pertinent positives are noted. All other systems reviewed and otherwise are negative. Physical exam: General: Alert and oriented x3, nad.  well-developed, well nourished. normal affect, AF. NC/AT, EOMI, neck supple, trachea midline, no JVD present. Breathing is non-labored. Examination lower extremities reveals pain-free range of motion the hips. There is no pain to palpation trochanter bursa. Neck straight leg raise. Negative calf tenderness. Negative Homans. No signs of DVT present. She does have a papular type rash to the lower extremities no wound breakdown noted. Findings of the knees are consistent with advanced arthritis. Assessment: Bilateral knee advanced arthritis, bilateral lower extremity rash    Plan: She does have some triamcinolone in which she was given by the dermatologist previously. We will get a try some permethrin cream to see if it has some good effects for the rash.   If this does not work she will go back and use her triamcinolone as well as follow-up with the dermatologist.  We will see her back in a month's time for evaluation to see if the rash is resolved.             JR Royce GUIDO, ERIC, ATC

## 2022-12-07 RX ORDER — CELECOXIB 200 MG/1
CAPSULE ORAL
Qty: 60 CAPSULE | Refills: 2 | Status: SHIPPED | OUTPATIENT
Start: 2022-12-07

## 2023-01-12 ENCOUNTER — OFFICE VISIT (OUTPATIENT)
Dept: ORTHOPEDIC SURGERY | Age: 53
End: 2023-01-12
Payer: MEDICAID

## 2023-01-12 DIAGNOSIS — R21 SKIN RASH: ICD-10-CM

## 2023-01-12 DIAGNOSIS — M25.561 CHRONIC PAIN OF RIGHT KNEE: ICD-10-CM

## 2023-01-12 DIAGNOSIS — M25.562 CHRONIC PAIN OF LEFT KNEE: ICD-10-CM

## 2023-01-12 DIAGNOSIS — G89.29 CHRONIC PAIN OF RIGHT KNEE: ICD-10-CM

## 2023-01-12 DIAGNOSIS — G89.29 CHRONIC PAIN OF LEFT KNEE: ICD-10-CM

## 2023-01-12 DIAGNOSIS — M17.0 PRIMARY OSTEOARTHRITIS OF BOTH KNEES: Primary | ICD-10-CM

## 2023-01-12 RX ORDER — BETAMETHASONE SODIUM PHOSPHATE AND BETAMETHASONE ACETATE 3; 3 MG/ML; MG/ML
12 INJECTION, SUSPENSION INTRA-ARTICULAR; INTRALESIONAL; INTRAMUSCULAR; SOFT TISSUE ONCE
Status: COMPLETED | OUTPATIENT
Start: 2023-01-12 | End: 2023-01-12

## 2023-01-12 RX ADMIN — BETAMETHASONE SODIUM PHOSPHATE AND BETAMETHASONE ACETATE 12 MG: 3; 3 INJECTION, SUSPENSION INTRA-ARTICULAR; INTRALESIONAL; INTRAMUSCULAR; SOFT TISSUE at 16:36

## 2023-01-12 NOTE — PROGRESS NOTES
Patient: Kyra Graf                MRN: [id number]       SSN: [id number]  YOB: 1970        AGE: 45 y.o. SEX: female  There is no height or weight on file to calculate BMI. PCP: Kateryna Brooks MD  01/12/23      Kyra Graf presents today for bilateral leg pain. She states that she has flea bites on bilateral legs. On exam, she has a rash on her bilateral legs. She states her right leg is better than her left. She reports that the fleas are better since her last visit. She is interested in doing bilateral knee injections today. She states that she does have handicap plates and requests a cane     Patient today I do not see any active fleas the that her rash is much improved the body habitus is still little less than ideal the hips rotate adequately half plus pitting edema distally calf nontender Homans' sign is negative and slight effusion no infection of the knees and Homans' sign is negative    Overall impression is relatively severe arthritis both knees I think we should continue to manage her nonoperatively for the time being and that was her decision together therefore both knees injected and well-tolerated we will going to return to see us in 3 months time for follow-up assessment thank you        I personally reviewed the previous  4-view x-rays of the right knee, obtained 10/21/22. Severe arthritis bilateral knees. PLAN:   - Discussion regarding surgery, decided that it is not recommended at this time. - Cane    REVIEW OF SYSTEMS:      CON: negative  EYE: negative   ENT: negative  RESP: negative  GI:    negative   :  negative  MSK: Positive  A twelve point review of systems was completed, positives noted and all other systems were reviewed and are negative      I, Kenneth Simpson M.D., have reviewed the history, physical, and have updated the allergic reactions for Kyra Graf.     TIME OUT performed immediately prior to the start of procedure:  Dainelle Mullins M.D., have performed the following reviews on Jaime Ely prior to the start of the procedure:    - Patient was identified by name and date of birth  - Agreement on procedure being performed was verified  - Risks and benefits explained to the patient  - Patient was positioned for comfort  - Consent was signed and verified  - Patient was advised regarding risks of bruising, bleeding, infection and pain    Time: 4:31 PM     Body Part: intra-articular injection of bilateral  knees    Medication and Dose: 1mL Celestone preparation, i.e. 6 mg, and 3 mL 1% lidocaine, to each knee    Date of Procedure: 01/12/23    PROCEDURE PERFORMED BY : Blanca Yeh M.D., [facility])    Provider Assisted by: Shakira Castellanos    Patient assisted by: self    Patient tolerated procedure well with no complications              Past Medical History:   Diagnosis Date    Arthritis     Asthma     Bipolar disorder ([facility] [address]     GERD (gastroesophageal reflux disease)     Obesity (BMI 30.0-34.9) 3/1/2017    Psychiatric disorder     depression    Psychotic disorder ([address]     PTSD (post-traumatic stress disorder)     Reflux        Family History   Problem Relation Age of Onset    Diabetes Mother     Uterine Cancer Mother     Lung Cancer Father     No Known Problems Sister     Heart Disease Neg Hx     Hypertension Neg Hx     Stroke Neg Hx        Current Outpatient Medications   Medication Sig Dispense Refill    celecoxib (CELEBREX) 200 mg capsule TAKE 1 CAPSULE BY MOUTH TWICE DAILY WITH MEALS **NOT COVERED 60 Capsule 2    clotrimazole-betamethasone (Lotrisone) topical cream Apply to affected area bid. Dispense 1 tube 45 g 1    neomycin-polymyxin-hydrocortisone (CORTISPORIN) otic solution Administer 3-4 Drops in right ear four (4) times daily. 10 mL 0    zolpidem (AMBIEN) 5 mg tablet Take  by mouth nightly as needed for Sleep.      biotin 10,000 mcg cap Take 1 Cap by mouth two (2) times a day.       multivitamin (ONE A DAY) tablet Take 1 Tab by mouth daily. cariprazine (VRAYLAR) 6 mg capsule Take 6 mg by mouth nightly. OXcarbazepine (TRILEPTAL) 300 mg tablet Take 300 mg by mouth two (2) times a day. CLONAZEPAM (KLONOPIN PO) Take 0.5 mg by mouth two (2) times a day. Indications: anxious      omeprazole (PRILOSEC) 20 mg capsule Take 20 mg by mouth daily. eszopiclone (LUNESTA) 3 mg tablet Take 3 mg by mouth nightly. lamoTRIgine (LaMICtal) 100 mg tablet Take  by mouth daily. 100 mg am and 200mg q hs   Indications: BIPOLAR DISORDER IN REMISSION      chlorproMAZINE (THORAZINE) 100 mg tablet Take 100 mg by mouth two (2) times a day. traZODone (DESYREL) 300 mg tablet Take 300 mg by mouth nightly. CYANOCOBALAMIN, VITAMIN B-12, (VITAMIN B-12 PO) Take 500 mcg by mouth daily. ERGOCALCIFEROL, VITAMIN D2, (VITAMIN D2 PO) Take 50,000 Units by mouth every seven (7) days. Allergies   Allergen Reactions    Amoxicillin Other (comments)     Does no work  Does not work.     Codeine Nausea and Vomiting and Other (comments)       Past Surgical History:   Procedure Laterality Date    HX CHOLECYSTECTOMY      HX GASTRIC BYPASS  2008    HX HEENT      tonsilectomy      HX ORTHOPAEDIC      right shoulder     HX SHOULDER REPLACEMENT Right 07/25/2018    MO ANES NRV MUSC TNDN FSCIA BURSA SHOULDER & AXILLA Right 05/2017    rotator cuff    MO UNLISTED PROCEDURE ABDOMEN PERITONEUM & OMENTUM  2008    gastric bypass       Social History     Socioeconomic History    Marital status:      Spouse name: Not on file    Number of children: Not on file    Years of education: Not on file    Highest education level: Not on file   Occupational History    Not on file   Tobacco Use    Smoking status: Every Day     Packs/day: 0.50     Years: 20.00     Pack years: 10.00     Types: Cigarettes    Smokeless tobacco: Never   Substance and Sexual Activity    Alcohol use: No    Drug use: No    Sexual activity: Yes     Partners: Male     Birth control/protection: None   Other Topics Concern    Not on file   Social History Narrative    Not on file     Social Determinants of Health     Financial Resource Strain: Not on file   Food Insecurity: Not on file   Transportation Needs: Not on file   Physical Activity: Not on file   Stress: Not on file   Social Connections: Not on file   Intimate Partner Violence: Not on file   Housing Stability: Not on file       There were no vitals taken for this visit. PHYSICAL EXAMINATION:  GENERAL: Alert and oriented x3, in no acute distress, well-developed, well-nourished, afebrile. HEART: No JVD. EYES: No scleral icterus   NECK: No significant lymphadenopathy   LUNGS: No respiratory compromise or indrawing  ABDOMEN: Soft, non-tender, non-distended. Note: This note was completed using voice recognition software.  Any typographical/name errors or mistakes are unintentional.    Written by: Tita Bo, as dictated by Abdon Cabezas MD.    Electronically signed by: Negar Atkins

## 2023-01-31 DIAGNOSIS — R35.0 URINARY FREQUENCY: Primary | ICD-10-CM

## 2023-02-03 DIAGNOSIS — R35.0 URINARY FREQUENCY: Primary | ICD-10-CM

## 2023-03-11 DIAGNOSIS — M17.11 PRIMARY OSTEOARTHRITIS OF RIGHT KNEE: ICD-10-CM

## 2023-03-11 DIAGNOSIS — Z01.818 PRE-OP TESTING: Primary | ICD-10-CM

## 2023-03-20 ENCOUNTER — NURSE ONLY (OUTPATIENT)
Age: 53
End: 2023-03-20
Payer: MEDICAID

## 2023-03-20 ENCOUNTER — HOSPITAL ENCOUNTER (OUTPATIENT)
Facility: HOSPITAL | Age: 53
Discharge: HOME | End: 2023-03-23
Payer: MEDICAID

## 2023-03-20 ENCOUNTER — HOSPITAL ENCOUNTER (OUTPATIENT)
Facility: HOSPITAL | Age: 53
Discharge: HOME | End: 2023-03-23

## 2023-03-20 DIAGNOSIS — Z01.818 PRE-OP TESTING: ICD-10-CM

## 2023-03-20 DIAGNOSIS — M17.11 PRIMARY OSTEOARTHRITIS OF RIGHT KNEE: ICD-10-CM

## 2023-03-20 DIAGNOSIS — M17.11 UNILATERAL PRIMARY OSTEOARTHRITIS, RIGHT KNEE: Primary | ICD-10-CM

## 2023-03-20 LAB
ABO + RH BLD: NORMAL
ALBUMIN SERPL-MCNC: 3.7 G/DL (ref 3.4–5)
ANION GAP SERPL CALC-SCNC: 7 MMOL/L (ref 3–18)
APPEARANCE UR: CLEAR
APTT PPP: 26.9 SEC (ref 23–36.4)
BACTERIA URNS QL MICRO: ABNORMAL /HPF
BILIRUB UR QL: NEGATIVE
BLOOD GROUP ANTIBODIES SERPL: NORMAL
BUN SERPL-MCNC: 7 MG/DL (ref 7–18)
BUN/CREAT SERPL: 15 (ref 12–20)
CALCIUM SERPL-MCNC: 9 MG/DL (ref 8.5–10.1)
CHLORIDE SERPL-SCNC: 97 MMOL/L (ref 100–111)
CO2 SERPL-SCNC: 27 MMOL/L (ref 21–32)
COLOR UR: YELLOW
CREAT SERPL-MCNC: 0.47 MG/DL (ref 0.6–1.3)
EPITH CASTS URNS QL MICRO: ABNORMAL /LPF (ref 0–5)
ERYTHROCYTE [DISTWIDTH] IN BLOOD BY AUTOMATED COUNT: 13.1 % (ref 11.6–14.5)
EST. AVERAGE GLUCOSE BLD GHB EST-MCNC: 94 MG/DL
GLUCOSE SERPL-MCNC: 80 MG/DL (ref 74–99)
GLUCOSE UR STRIP.AUTO-MCNC: NEGATIVE MG/DL
HBA1C MFR BLD: 4.9 % (ref 4.2–5.6)
HCT VFR BLD AUTO: 42 % (ref 35–45)
HGB BLD-MCNC: 14.5 G/DL (ref 12–16)
HGB UR QL STRIP: NEGATIVE
INR PPP: 1 (ref 0.8–1.2)
KETONES UR QL STRIP.AUTO: NEGATIVE MG/DL
LEUKOCYTE ESTERASE UR QL STRIP.AUTO: ABNORMAL
MCH RBC QN AUTO: 31.5 PG (ref 24–34)
MCHC RBC AUTO-ENTMCNC: 34.5 G/DL (ref 31–37)
MCV RBC AUTO: 91.3 FL (ref 78–100)
NITRITE UR QL STRIP.AUTO: NEGATIVE
NRBC # BLD: 0 K/UL (ref 0–0.01)
NRBC BLD-RTO: 0 PER 100 WBC
PH UR STRIP: 6.5 (ref 5–8)
PLATELET # BLD AUTO: 263 K/UL (ref 135–420)
PMV BLD AUTO: 10.5 FL (ref 9.2–11.8)
POTASSIUM SERPL-SCNC: 4.6 MMOL/L (ref 3.5–5.5)
PROT UR STRIP-MCNC: NEGATIVE MG/DL
PROTHROMBIN TIME: 13.2 SEC (ref 11.5–15.2)
RBC # BLD AUTO: 4.6 M/UL (ref 4.2–5.3)
RBC #/AREA URNS HPF: ABNORMAL /HPF (ref 0–5)
SODIUM SERPL-SCNC: 131 MMOL/L (ref 136–145)
SP GR UR REFRACTOMETRY: 1.01 (ref 1–1.03)
SPECIMEN EXP DATE BLD: NORMAL
UROBILINOGEN UR QL STRIP.AUTO: 1 EU/DL (ref 0.2–1)
WBC # BLD AUTO: 4.7 K/UL (ref 4.6–13.2)
WBC URNS QL MICRO: ABNORMAL /HPF (ref 0–4)

## 2023-03-20 PROCEDURE — 81001 URINALYSIS AUTO W/SCOPE: CPT

## 2023-03-20 PROCEDURE — 83036 HEMOGLOBIN GLYCOSYLATED A1C: CPT

## 2023-03-20 PROCEDURE — 85610 PROTHROMBIN TIME: CPT

## 2023-03-20 PROCEDURE — 36415 COLL VENOUS BLD VENIPUNCTURE: CPT

## 2023-03-20 PROCEDURE — 71046 X-RAY EXAM CHEST 2 VIEWS: CPT

## 2023-03-20 PROCEDURE — 87086 URINE CULTURE/COLONY COUNT: CPT

## 2023-03-20 PROCEDURE — 80048 BASIC METABOLIC PNL TOTAL CA: CPT

## 2023-03-20 PROCEDURE — 85730 THROMBOPLASTIN TIME PARTIAL: CPT

## 2023-03-20 PROCEDURE — 85027 COMPLETE CBC AUTOMATED: CPT

## 2023-03-20 PROCEDURE — 93005 ELECTROCARDIOGRAM TRACING: CPT

## 2023-03-20 PROCEDURE — 73564 X-RAY EXAM KNEE 4 OR MORE: CPT | Performed by: PHYSICIAN ASSISTANT

## 2023-03-20 PROCEDURE — 86901 BLOOD TYPING SEROLOGIC RH(D): CPT

## 2023-03-20 PROCEDURE — 82040 ASSAY OF SERUM ALBUMIN: CPT

## 2023-03-21 LAB
BACTERIA SPEC CULT: NORMAL
CC UR VC: NORMAL
EKG ATRIAL RATE: 60 BPM
EKG DIAGNOSIS: NORMAL
EKG P AXIS: 16 DEGREES
EKG P-R INTERVAL: 140 MS
EKG Q-T INTERVAL: 452 MS
EKG QRS DURATION: 96 MS
EKG QTC CALCULATION (BAZETT): 452 MS
EKG R AXIS: -11 DEGREES
EKG T AXIS: 21 DEGREES
EKG VENTRICULAR RATE: 60 BPM
SERVICE CMNT-IMP: NORMAL

## 2023-03-21 PROCEDURE — 93010 ELECTROCARDIOGRAM REPORT: CPT | Performed by: INTERNAL MEDICINE

## 2023-03-22 ENCOUNTER — OFFICE VISIT (OUTPATIENT)
Age: 53
End: 2023-03-22
Payer: MEDICAID

## 2023-03-22 VITALS
HEIGHT: 66 IN | DIASTOLIC BLOOD PRESSURE: 85 MMHG | BODY MASS INDEX: 36 KG/M2 | TEMPERATURE: 97.1 F | OXYGEN SATURATION: 99 % | SYSTOLIC BLOOD PRESSURE: 130 MMHG | HEART RATE: 87 BPM | WEIGHT: 224 LBS

## 2023-03-22 DIAGNOSIS — M17.11 UNILATERAL PRIMARY OSTEOARTHRITIS, RIGHT KNEE: ICD-10-CM

## 2023-03-22 DIAGNOSIS — Z01.818 PRE-OP EXAM: Primary | ICD-10-CM

## 2023-03-22 PROCEDURE — 99214 OFFICE O/P EST MOD 30 MIN: CPT | Performed by: PHYSICIAN ASSISTANT

## 2023-03-22 RX ORDER — NITROFURANTOIN 25; 75 MG/1; MG/1
100 CAPSULE ORAL 2 TIMES DAILY
Qty: 10 CAPSULE | Refills: 0 | Status: SHIPPED | OUTPATIENT
Start: 2023-03-22 | End: 2023-03-27

## 2023-03-22 RX ORDER — DEXAMETHASONE SODIUM PHOSPHATE 4 MG/ML
8 INJECTION, SOLUTION INTRA-ARTICULAR; INTRALESIONAL; INTRAMUSCULAR; INTRAVENOUS; SOFT TISSUE
OUTPATIENT
Start: 2023-03-22 | End: 2023-03-22

## 2023-03-22 RX ORDER — ACETAMINOPHEN 325 MG/1
1000 TABLET ORAL
OUTPATIENT
Start: 2023-03-22 | End: 2023-03-22

## 2023-03-22 RX ORDER — CELECOXIB 100 MG/1
200 CAPSULE ORAL
OUTPATIENT
Start: 2023-03-22 | End: 2023-03-22

## 2023-03-22 RX ORDER — PREGABALIN 25 MG/1
75 CAPSULE ORAL
OUTPATIENT
Start: 2023-03-22 | End: 2023-03-22

## 2023-03-22 NOTE — PATIENT INSTRUCTIONS
Patient: Val Mckinnon                MRN: [id number]       SSN:   YOB: 1970        AGE: 45 y.o. SEX: female  There is no height or weight on file to calculate BMI.    03/22/23    DO:  1:  Sit with the leg out straight 5-10 min every hour while awake. Keep the toes pointed  up towards the gi. 2.  Bend your knee 5-10 min every hour. Bend it to the point of pain and hold it for 5-10 Min. 3.  ICE your knee 20 min every hour    4. You can expect to have swelling and discomfort in your thigh down to your knee. Swelling may extend to your foot. You may have bruising from the thigh to the foot as well. Some numbness can be expected to the outside part of the knee. Wear the compression stockings and elevate your leg. DO NOT:    1. Do not place anything under your knee to prop it up  2. Do not sit in the recliner chair.

## 2023-03-22 NOTE — H&P
HISTORY & PHYSICAL      Patient: Phyllis Jean                MRN: [id number]       SSN: [id number]  YOB: 1970        AGE: 45 y.o. SEX: female  Body mass index is 36.15 kg/m². PCP: Ludwin Roth MD  03/22/23      CC: right knee end stage OA  Problem List Items Addressed This Visit    None  Visit Diagnoses       Pre-op exam    -  Primary    Relevant Medications    nitrofurantoin, macrocrystal-monohydrate, (MACROBID) 100 MG capsule    Unilateral primary osteoarthritis, right knee                  HPI:  The patient is a pleasant 45 y. o. whom has end stage OA of their right knee and has failed conservative treatment including but not limited to NSAIDS, cortisone injections, viscosupplementation, PT, and pain medicine. Due to the current findings and affected activities of daily living, surgical intervention is indicated. The alternatives, risks, complications, as well as expected outcome were discussed. These include but are not limited to infection, blood loss, need for blood transfusion, neurovascular damage, DVT, PE,  post-op stiffness and pain, leg length discrepancy, dislocation, anesthetic complications, prothesis longevity, need for more surgery, MI, stroke, and even death. The patient understands and wishes to proceed with surgery.       Past Medical History:   Diagnosis Date    Arthritis     Asthma     Bipolar disorder ([facility] [address]     GERD (gastroesophageal reflux disease)     Obesity (BMI 30.0-34.9) 3/1/2017    Psychiatric disorder     depression    Psychotic disorder (HCC)     PTSD (post-traumatic stress disorder)     Reflux          Current Outpatient Medications:     nitrofurantoin, macrocrystal-monohydrate, (MACROBID) 100 MG capsule, Take 1 capsule by mouth 2 times daily for 5 days, Disp: 10 capsule, Rfl: 0    Biotin 10 MG CAPS, Take 1 capsule by mouth 2 times daily, Disp: , Rfl:     Cariprazine HCl (VRAYLAR) 6 MG CAPS capsule, Take 6 mg by mouth, Disp: , Rfl:

## 2023-03-22 NOTE — H&P (VIEW-ONLY)
History:   Procedure Laterality Date    ANESTH,SURGERY OF SHOULDER Right 05/2017    rotator cuff    CHOLECYSTECTOMY      GASTRIC BYPASS SURGERY  2008    HEENT      tonsilectomy      ORTHOPEDIC SURGERY      right shoulder     IA UNLISTED PROCEDURE ABDOMEN PERITONEUM & OMENTUM  2008    gastric bypass    SHOULDER ARTHROPLASTY Right 07/25/2018       Family History:  Non-contributory. Vitals:    03/22/23 0858   BP: 130/85   Pulse: 87   Temp: 97.1 °F (36.2 °C)   SpO2: 99%   Weight: 224 lb (101.6 kg)   Height: 5' 6\" (1.676 m)      Body mass index is 36.15 kg/m². PE:  /85   Pulse 87   Temp 97.1 °F (36.2 °C)   Ht 5' 6\" (1.676 m)   Wt 224 lb (101.6 kg)   SpO2 99%   BMI 36.15 kg/m²   A&O X3, NAD, well develop, well nourished  Heart: S1-S2, rrr  Lungs: CTA bilat  Abd: soft, nt, nt, + bs in all quadrants  Ext:  Pos distal pulses to DP, PT      X-ray: Radiographs of the right knee done 3/20/2023 at the [facility] and the [facility] location including AP, lateral, skyline, 3 for standing views confirms end-stage arthritis of bone to bone eburnation and malalignment. Labs: labs were reviewed and wnl. Ua pos, treated with macrobid    A:  right  knee end stage OA    P:  At this point we will move forward with surgery. Again, the alternatives, risks, complications, as well as expected outcome were discussed and the patient wishes to proceed with surgery. Pt has been instructed to stop aspirin, nsaids, rheumatologic medications and blood thinners. They have also been instructed to continue on any heart and bp meds and to take them the morning of surgery with sips of water. The patient was counseled at length about the risks of conrado Covid-19 during their perioperative period and any recovery window from their procedure. The patient was made aware that conrado Covid-19  may worsen their prognosis for recovering from their procedure  and lend to a higher morbidity and/or mortality risk.   All material risks, benefits,

## 2023-03-29 RX ORDER — VITAMIN B COMPLEX
1 CAPSULE ORAL
COMMUNITY

## 2023-03-29 RX ORDER — SOLIFENACIN SUCCINATE 10 MG/1
10 TABLET, FILM COATED ORAL
COMMUNITY
Start: 2023-03-05

## 2023-03-29 RX ORDER — ERGOCALCIFEROL 1.25 MG/1
50000 CAPSULE ORAL WEEKLY
COMMUNITY
Start: 2023-03-05

## 2023-03-29 RX ORDER — CLONAZEPAM 0.5 MG/1
0.5 TABLET ORAL DAILY
COMMUNITY
Start: 2023-03-05

## 2023-03-29 RX ORDER — CYCLOBENZAPRINE HCL 10 MG
10 TABLET ORAL EVERY 8 HOURS PRN
COMMUNITY
Start: 2020-08-11

## 2023-03-29 NOTE — PERIOP NOTE
surgical area on the second floor. If you have any questions or concerns, please do not hesitate to call:     (Prior to the day of surgery) [facility] department:  [phone]   (Day of surgery) Pre-Op department:  [phone]    These surgical instructions were reviewed with Claude Crane during the [facility] phone call.

## 2023-03-31 ENCOUNTER — ANESTHESIA EVENT (OUTPATIENT)
Facility: HOSPITAL | Age: 53
End: 2023-03-31
Payer: MEDICAID

## 2023-04-03 ENCOUNTER — APPOINTMENT (OUTPATIENT)
Facility: HOSPITAL | Age: 53
End: 2023-04-03
Attending: ORTHOPAEDIC SURGERY
Payer: MEDICAID

## 2023-04-03 ENCOUNTER — ANESTHESIA (OUTPATIENT)
Facility: HOSPITAL | Age: 53
End: 2023-04-03
Payer: MEDICAID

## 2023-04-03 ENCOUNTER — HOSPITAL ENCOUNTER (OUTPATIENT)
Facility: HOSPITAL | Age: 53
Setting detail: OBSERVATION
Discharge: HOME | End: 2023-04-03
Attending: ORTHOPAEDIC SURGERY | Admitting: ORTHOPAEDIC SURGERY
Payer: MEDICAID

## 2023-04-03 ENCOUNTER — HOME HEALTH ADMISSION (OUTPATIENT)
Age: 53
End: 2023-04-03
Payer: MEDICAID

## 2023-04-03 VITALS
BODY MASS INDEX: 35.2 KG/M2 | SYSTOLIC BLOOD PRESSURE: 134 MMHG | WEIGHT: 219 LBS | HEART RATE: 61 BPM | DIASTOLIC BLOOD PRESSURE: 52 MMHG | OXYGEN SATURATION: 100 % | RESPIRATION RATE: 17 BRPM | HEIGHT: 66 IN | TEMPERATURE: 97.6 F

## 2023-04-03 DIAGNOSIS — M17.11 ARTHRITIS OF KNEE, RIGHT: Primary | ICD-10-CM

## 2023-04-03 PROBLEM — M25.361 KNEE INSTABILITY, RIGHT: Status: ACTIVE | Noted: 2023-04-03

## 2023-04-03 PROBLEM — M21.161 GENU VARUM OF RIGHT LOWER EXTREMITY: Status: ACTIVE | Noted: 2023-04-03

## 2023-04-03 PROBLEM — M17.10 ARTHRITIS OF KNEE: Status: ACTIVE | Noted: 2023-04-03

## 2023-04-03 PROBLEM — E66.01 CLASS 2 SEVERE OBESITY DUE TO EXCESS CALORIES WITH SERIOUS COMORBIDITY AND BODY MASS INDEX (BMI) OF 35.0 TO 35.9 IN ADULT (HCC): Status: ACTIVE | Noted: 2023-04-03

## 2023-04-03 LAB — HCG SERPL QL: NEGATIVE

## 2023-04-03 PROCEDURE — 2500000003 HC RX 250 WO HCPCS: Performed by: NURSE ANESTHETIST, CERTIFIED REGISTERED

## 2023-04-03 PROCEDURE — 2709999900 HC NON-CHARGEABLE SUPPLY: Performed by: ORTHOPAEDIC SURGERY

## 2023-04-03 PROCEDURE — C9290 INJ, BUPIVACAINE LIPOSOME: HCPCS | Performed by: ORTHOPAEDIC SURGERY

## 2023-04-03 PROCEDURE — G0378 HOSPITAL OBSERVATION PER HR: HCPCS

## 2023-04-03 PROCEDURE — 3700000001 HC ADD 15 MINUTES (ANESTHESIA): Performed by: ORTHOPAEDIC SURGERY

## 2023-04-03 PROCEDURE — 27447 TOTAL KNEE ARTHROPLASTY: CPT | Performed by: ORTHOPAEDIC SURGERY

## 2023-04-03 PROCEDURE — 6360000002 HC RX W HCPCS: Performed by: PHYSICIAN ASSISTANT

## 2023-04-03 PROCEDURE — 3600000013 HC SURGERY LEVEL 3 ADDTL 15MIN: Performed by: ORTHOPAEDIC SURGERY

## 2023-04-03 PROCEDURE — 2500000003 HC RX 250 WO HCPCS: Performed by: ORTHOPAEDIC SURGERY

## 2023-04-03 PROCEDURE — 6370000000 HC RX 637 (ALT 250 FOR IP): Performed by: NURSE ANESTHETIST, CERTIFIED REGISTERED

## 2023-04-03 PROCEDURE — 6360000002 HC RX W HCPCS: Performed by: ANESTHESIOLOGY

## 2023-04-03 PROCEDURE — 6360000002 HC RX W HCPCS: Performed by: NURSE ANESTHETIST, CERTIFIED REGISTERED

## 2023-04-03 PROCEDURE — C1776 JOINT DEVICE (IMPLANTABLE): HCPCS | Performed by: ORTHOPAEDIC SURGERY

## 2023-04-03 PROCEDURE — A4217 STERILE WATER/SALINE, 500 ML: HCPCS | Performed by: ORTHOPAEDIC SURGERY

## 2023-04-03 PROCEDURE — 7100000001 HC PACU RECOVERY - ADDTL 15 MIN: Performed by: ORTHOPAEDIC SURGERY

## 2023-04-03 PROCEDURE — 2580000003 HC RX 258: Performed by: NURSE ANESTHETIST, CERTIFIED REGISTERED

## 2023-04-03 PROCEDURE — 64447 NJX AA&/STRD FEMORAL NRV IMG: CPT | Performed by: ANESTHESIOLOGY

## 2023-04-03 PROCEDURE — 97161 PT EVAL LOW COMPLEX 20 MIN: CPT

## 2023-04-03 PROCEDURE — 7100000000 HC PACU RECOVERY - FIRST 15 MIN: Performed by: ORTHOPAEDIC SURGERY

## 2023-04-03 PROCEDURE — 6360000002 HC RX W HCPCS: Performed by: ORTHOPAEDIC SURGERY

## 2023-04-03 PROCEDURE — 2580000003 HC RX 258: Performed by: ORTHOPAEDIC SURGERY

## 2023-04-03 PROCEDURE — 3700000000 HC ANESTHESIA ATTENDED CARE: Performed by: ORTHOPAEDIC SURGERY

## 2023-04-03 PROCEDURE — 27447 TOTAL KNEE ARTHROPLASTY: CPT | Performed by: PHYSICIAN ASSISTANT

## 2023-04-03 PROCEDURE — 84703 CHORIONIC GONADOTROPIN ASSAY: CPT

## 2023-04-03 PROCEDURE — 3600000003 HC SURGERY LEVEL 3 BASE: Performed by: ORTHOPAEDIC SURGERY

## 2023-04-03 PROCEDURE — C1713 ANCHOR/SCREW BN/BN,TIS/BN: HCPCS | Performed by: ORTHOPAEDIC SURGERY

## 2023-04-03 PROCEDURE — 2580000003 HC RX 258: Performed by: PHYSICIAN ASSISTANT

## 2023-04-03 PROCEDURE — 2720000010 HC SURG SUPPLY STERILE: Performed by: ORTHOPAEDIC SURGERY

## 2023-04-03 PROCEDURE — 73560 X-RAY EXAM OF KNEE 1 OR 2: CPT

## 2023-04-03 PROCEDURE — 6370000000 HC RX 637 (ALT 250 FOR IP): Performed by: PHYSICIAN ASSISTANT

## 2023-04-03 PROCEDURE — 6370000000 HC RX 637 (ALT 250 FOR IP): Performed by: ORTHOPAEDIC SURGERY

## 2023-04-03 DEVICE — LEGION PS HIGH FLEX XLPE SZ 5-6 12MM
Type: IMPLANTABLE DEVICE | Site: KNEE | Status: FUNCTIONAL
Brand: LEGION

## 2023-04-03 DEVICE — CEMENT BNE 20ML 41GM FULL DOSE PMMA W/ TOBRA M VISC RADPQ: Type: IMPLANTABLE DEVICE | Site: KNEE | Status: FUNCTIONAL

## 2023-04-03 DEVICE — LEGION NARROW POSTERIOR STABILIZED                                    OXINIUM SIZE 6N RIGHT
Type: IMPLANTABLE DEVICE | Site: KNEE | Status: FUNCTIONAL
Brand: LEGION

## 2023-04-03 DEVICE — GENESIS TROCHLEAR PIN 1/8 X 3
Type: IMPLANTABLE DEVICE | Status: FUNCTIONAL
Brand: GENESIS

## 2023-04-03 DEVICE — GENESIS II NON-POROUS TIBIAL                                    BASEPLATE SIZE 5 RIGHT
Type: IMPLANTABLE DEVICE | Site: KNEE | Status: FUNCTIONAL
Brand: GENESIS II

## 2023-04-03 DEVICE — IMPLANT PATELLAR DIA35MM THK10MM X3 ASYMMETRIC TRIATHLON: Type: IMPLANTABLE DEVICE | Site: PATELLA | Status: FUNCTIONAL

## 2023-04-03 RX ORDER — SODIUM CHLORIDE 9 MG/ML
INJECTION, SOLUTION INTRAVENOUS CONTINUOUS
Status: DISCONTINUED | OUTPATIENT
Start: 2023-04-03 | End: 2023-04-03 | Stop reason: HOSPADM

## 2023-04-03 RX ORDER — CELECOXIB 100 MG/1
200 CAPSULE ORAL
Status: COMPLETED | OUTPATIENT
Start: 2023-04-03 | End: 2023-04-03

## 2023-04-03 RX ORDER — HYDROMORPHONE HCL 110MG/55ML
PATIENT CONTROLLED ANALGESIA SYRINGE INTRAVENOUS PRN
Status: DISCONTINUED | OUTPATIENT
Start: 2023-04-03 | End: 2023-04-03 | Stop reason: SDUPTHER

## 2023-04-03 RX ORDER — SODIUM CHLORIDE 0.9 % (FLUSH) 0.9 %
5-40 SYRINGE (ML) INJECTION PRN
Status: DISCONTINUED | OUTPATIENT
Start: 2023-04-03 | End: 2023-04-03 | Stop reason: HOSPADM

## 2023-04-03 RX ORDER — DEXAMETHASONE SODIUM PHOSPHATE 4 MG/ML
INJECTION, SOLUTION INTRA-ARTICULAR; INTRALESIONAL; INTRAMUSCULAR; INTRAVENOUS; SOFT TISSUE PRN
Status: DISCONTINUED | OUTPATIENT
Start: 2023-04-03 | End: 2023-04-03 | Stop reason: SDUPTHER

## 2023-04-03 RX ORDER — ONDANSETRON 2 MG/ML
INJECTION INTRAMUSCULAR; INTRAVENOUS PRN
Status: DISCONTINUED | OUTPATIENT
Start: 2023-04-03 | End: 2023-04-03 | Stop reason: SDUPTHER

## 2023-04-03 RX ORDER — ROPIVACAINE HYDROCHLORIDE 2 MG/ML
INJECTION, SOLUTION EPIDURAL; INFILTRATION; PERINEURAL
Status: COMPLETED | OUTPATIENT
Start: 2023-04-03 | End: 2023-04-03

## 2023-04-03 RX ORDER — ONDANSETRON 2 MG/ML
4 INJECTION INTRAMUSCULAR; INTRAVENOUS EVERY 6 HOURS PRN
Status: DISCONTINUED | OUTPATIENT
Start: 2023-04-03 | End: 2023-04-03 | Stop reason: HOSPADM

## 2023-04-03 RX ORDER — POLYETHYLENE GLYCOL 3350 17 G/17G
17 POWDER, FOR SOLUTION ORAL DAILY PRN
Status: DISCONTINUED | OUTPATIENT
Start: 2023-04-03 | End: 2023-04-03 | Stop reason: HOSPADM

## 2023-04-03 RX ORDER — LABETALOL HYDROCHLORIDE 5 MG/ML
INJECTION, SOLUTION INTRAVENOUS PRN
Status: DISCONTINUED | OUTPATIENT
Start: 2023-04-03 | End: 2023-04-03 | Stop reason: SDUPTHER

## 2023-04-03 RX ORDER — PROPOFOL 10 MG/ML
INJECTION, EMULSION INTRAVENOUS PRN
Status: DISCONTINUED | OUTPATIENT
Start: 2023-04-03 | End: 2023-04-03 | Stop reason: SDUPTHER

## 2023-04-03 RX ORDER — LIDOCAINE HYDROCHLORIDE 10 MG/ML
1 INJECTION, SOLUTION EPIDURAL; INFILTRATION; INTRACAUDAL; PERINEURAL
Status: COMPLETED | OUTPATIENT
Start: 2023-04-03 | End: 2023-04-03

## 2023-04-03 RX ORDER — SODIUM CHLORIDE 9 MG/ML
INJECTION, SOLUTION INTRAVENOUS PRN
Status: DISCONTINUED | OUTPATIENT
Start: 2023-04-03 | End: 2023-04-03 | Stop reason: HOSPADM

## 2023-04-03 RX ORDER — ACETAMINOPHEN 500 MG
1000 TABLET ORAL EVERY 6 HOURS
Status: DISCONTINUED | OUTPATIENT
Start: 2023-04-03 | End: 2023-04-03 | Stop reason: HOSPADM

## 2023-04-03 RX ORDER — TROSPIUM CHLORIDE 20 MG/1
20 TABLET, FILM COATED ORAL
Status: DISCONTINUED | OUTPATIENT
Start: 2023-04-03 | End: 2023-04-03 | Stop reason: HOSPADM

## 2023-04-03 RX ORDER — GLYCOPYRROLATE 0.2 MG/ML
INJECTION INTRAMUSCULAR; INTRAVENOUS PRN
Status: DISCONTINUED | OUTPATIENT
Start: 2023-04-03 | End: 2023-04-03 | Stop reason: SDUPTHER

## 2023-04-03 RX ORDER — SENNA AND DOCUSATE SODIUM 50; 8.6 MG/1; MG/1
1 TABLET, FILM COATED ORAL 2 TIMES DAILY
Status: DISCONTINUED | OUTPATIENT
Start: 2023-04-03 | End: 2023-04-03 | Stop reason: HOSPADM

## 2023-04-03 RX ORDER — PREGABALIN 75 MG/1
75 CAPSULE ORAL
Status: COMPLETED | OUTPATIENT
Start: 2023-04-03 | End: 2023-04-03

## 2023-04-03 RX ORDER — OXYCODONE HYDROCHLORIDE 5 MG/1
5 TABLET ORAL
Status: DISCONTINUED | OUTPATIENT
Start: 2023-04-03 | End: 2023-04-03 | Stop reason: HOSPADM

## 2023-04-03 RX ORDER — SODIUM CHLORIDE 0.9 % (FLUSH) 0.9 %
5-40 SYRINGE (ML) INJECTION EVERY 12 HOURS SCHEDULED
Status: DISCONTINUED | OUTPATIENT
Start: 2023-04-03 | End: 2023-04-03 | Stop reason: HOSPADM

## 2023-04-03 RX ORDER — KETOROLAC TROMETHAMINE 15 MG/ML
15 INJECTION, SOLUTION INTRAMUSCULAR; INTRAVENOUS EVERY 6 HOURS
Status: DISCONTINUED | OUTPATIENT
Start: 2023-04-03 | End: 2023-04-03 | Stop reason: HOSPADM

## 2023-04-03 RX ORDER — ASPIRIN 81 MG/1
81 TABLET ORAL 2 TIMES DAILY
Qty: 60 TABLET | Refills: 1 | Status: SHIPPED | OUTPATIENT
Start: 2023-04-03

## 2023-04-03 RX ORDER — PANTOPRAZOLE SODIUM 40 MG/1
40 TABLET, DELAYED RELEASE ORAL
Status: DISCONTINUED | OUTPATIENT
Start: 2023-04-03 | End: 2023-04-03 | Stop reason: HOSPADM

## 2023-04-03 RX ORDER — OXCARBAZEPINE 300 MG/1
300 TABLET, FILM COATED ORAL 2 TIMES DAILY
Status: DISCONTINUED | OUTPATIENT
Start: 2023-04-03 | End: 2023-04-03 | Stop reason: HOSPADM

## 2023-04-03 RX ORDER — NEOSTIGMINE METHYLSULFATE 1 MG/ML
INJECTION, SOLUTION INTRAVENOUS PRN
Status: DISCONTINUED | OUTPATIENT
Start: 2023-04-03 | End: 2023-04-03 | Stop reason: SDUPTHER

## 2023-04-03 RX ORDER — DOCUSATE SODIUM 100 MG/1
100 CAPSULE, LIQUID FILLED ORAL 2 TIMES DAILY
Qty: 60 CAPSULE | Refills: 1 | Status: SHIPPED | OUTPATIENT
Start: 2023-04-03 | End: 2023-05-03

## 2023-04-03 RX ORDER — MEPERIDINE HYDROCHLORIDE 25 MG/ML
12.5 INJECTION INTRAMUSCULAR; INTRAVENOUS; SUBCUTANEOUS EVERY 5 MIN PRN
Status: DISCONTINUED | OUTPATIENT
Start: 2023-04-03 | End: 2023-04-03 | Stop reason: HOSPADM

## 2023-04-03 RX ORDER — SUCCINYLCHOLINE/SOD CL,ISO/PF 100 MG/5ML
SYRINGE (ML) INTRAVENOUS PRN
Status: DISCONTINUED | OUTPATIENT
Start: 2023-04-03 | End: 2023-04-03 | Stop reason: SDUPTHER

## 2023-04-03 RX ORDER — DEXAMETHASONE SODIUM PHOSPHATE 4 MG/ML
8 INJECTION, SOLUTION INTRA-ARTICULAR; INTRALESIONAL; INTRAMUSCULAR; INTRAVENOUS; SOFT TISSUE
Status: COMPLETED | OUTPATIENT
Start: 2023-04-03 | End: 2023-04-03

## 2023-04-03 RX ORDER — ASPIRIN 81 MG/1
81 TABLET ORAL 2 TIMES DAILY
Status: DISCONTINUED | OUTPATIENT
Start: 2023-04-04 | End: 2023-04-03 | Stop reason: HOSPADM

## 2023-04-03 RX ORDER — CELECOXIB 100 MG/1
200 CAPSULE ORAL 2 TIMES DAILY
Status: DISCONTINUED | OUTPATIENT
Start: 2023-04-03 | End: 2023-04-03 | Stop reason: HOSPADM

## 2023-04-03 RX ORDER — CYCLOBENZAPRINE HCL 10 MG
5 TABLET ORAL 3 TIMES DAILY PRN
Status: DISCONTINUED | OUTPATIENT
Start: 2023-04-03 | End: 2023-04-03 | Stop reason: HOSPADM

## 2023-04-03 RX ORDER — CELECOXIB 200 MG/1
200 CAPSULE ORAL 2 TIMES DAILY
Qty: 60 CAPSULE | Refills: 2 | Status: SHIPPED | OUTPATIENT
Start: 2023-04-03

## 2023-04-03 RX ORDER — ONDANSETRON 4 MG/1
4 TABLET, ORALLY DISINTEGRATING ORAL EVERY 6 HOURS PRN
Status: DISCONTINUED | OUTPATIENT
Start: 2023-04-03 | End: 2023-04-03 | Stop reason: HOSPADM

## 2023-04-03 RX ORDER — ACETAMINOPHEN 500 MG
1000 TABLET ORAL
Status: COMPLETED | OUTPATIENT
Start: 2023-04-03 | End: 2023-04-03

## 2023-04-03 RX ORDER — TRAZODONE HYDROCHLORIDE 100 MG/1
100 TABLET ORAL NIGHTLY PRN
Status: DISCONTINUED | OUTPATIENT
Start: 2023-04-03 | End: 2023-04-03 | Stop reason: HOSPADM

## 2023-04-03 RX ORDER — FENTANYL CITRATE 50 UG/ML
25 INJECTION, SOLUTION INTRAMUSCULAR; INTRAVENOUS EVERY 5 MIN PRN
Status: DISCONTINUED | OUTPATIENT
Start: 2023-04-03 | End: 2023-04-03 | Stop reason: HOSPADM

## 2023-04-03 RX ORDER — FAMOTIDINE 20 MG/1
20 TABLET, FILM COATED ORAL ONCE
Status: COMPLETED | OUTPATIENT
Start: 2023-04-03 | End: 2023-04-03

## 2023-04-03 RX ORDER — OXYCODONE HYDROCHLORIDE 5 MG/1
10 TABLET ORAL
Status: DISCONTINUED | OUTPATIENT
Start: 2023-04-03 | End: 2023-04-03 | Stop reason: HOSPADM

## 2023-04-03 RX ORDER — FENTANYL CITRATE 50 UG/ML
100 INJECTION, SOLUTION INTRAMUSCULAR; INTRAVENOUS
Status: COMPLETED | OUTPATIENT
Start: 2023-04-03 | End: 2023-04-03

## 2023-04-03 RX ORDER — LAMOTRIGINE 100 MG/1
100 TABLET ORAL DAILY
Status: DISCONTINUED | OUTPATIENT
Start: 2023-04-03 | End: 2023-04-03 | Stop reason: HOSPADM

## 2023-04-03 RX ORDER — CELECOXIB 200 MG/1
CAPSULE ORAL
Qty: 60 CAPSULE | Refills: 2 | Status: SHIPPED | OUTPATIENT
Start: 2023-04-03

## 2023-04-03 RX ORDER — ONDANSETRON 4 MG/1
4 TABLET, FILM COATED ORAL EVERY 8 HOURS PRN
Qty: 30 TABLET | Refills: 2 | Status: SHIPPED | OUTPATIENT
Start: 2023-04-03

## 2023-04-03 RX ORDER — ROPIVACAINE HYDROCHLORIDE 2 MG/ML
30 INJECTION, SOLUTION EPIDURAL; INFILTRATION; PERINEURAL ONCE
Status: COMPLETED | OUTPATIENT
Start: 2023-04-03 | End: 2023-04-03

## 2023-04-03 RX ORDER — FAMOTIDINE 20 MG/1
20 TABLET, FILM COATED ORAL 2 TIMES DAILY
Status: DISCONTINUED | OUTPATIENT
Start: 2023-04-03 | End: 2023-04-03 | Stop reason: HOSPADM

## 2023-04-03 RX ORDER — ROCURONIUM BROMIDE 10 MG/ML
INJECTION, SOLUTION INTRAVENOUS PRN
Status: DISCONTINUED | OUTPATIENT
Start: 2023-04-03 | End: 2023-04-03 | Stop reason: SDUPTHER

## 2023-04-03 RX ORDER — METOCLOPRAMIDE HYDROCHLORIDE 5 MG/ML
10 INJECTION INTRAMUSCULAR; INTRAVENOUS
Status: DISCONTINUED | OUTPATIENT
Start: 2023-04-03 | End: 2023-04-03 | Stop reason: HOSPADM

## 2023-04-03 RX ORDER — SODIUM CHLORIDE, SODIUM LACTATE, POTASSIUM CHLORIDE, CALCIUM CHLORIDE 600; 310; 30; 20 MG/100ML; MG/100ML; MG/100ML; MG/100ML
INJECTION, SOLUTION INTRAVENOUS CONTINUOUS
Status: DISCONTINUED | OUTPATIENT
Start: 2023-04-03 | End: 2023-04-03 | Stop reason: HOSPADM

## 2023-04-03 RX ORDER — MIDAZOLAM HYDROCHLORIDE 2 MG/2ML
2 INJECTION, SOLUTION INTRAMUSCULAR; INTRAVENOUS
Status: COMPLETED | OUTPATIENT
Start: 2023-04-03 | End: 2023-04-03

## 2023-04-03 RX ORDER — PROCHLORPERAZINE EDISYLATE 5 MG/ML
5 INJECTION INTRAMUSCULAR; INTRAVENOUS
Status: DISCONTINUED | OUTPATIENT
Start: 2023-04-03 | End: 2023-04-03 | Stop reason: HOSPADM

## 2023-04-03 RX ORDER — CEFADROXIL 500 MG/1
500 CAPSULE ORAL 2 TIMES DAILY
Qty: 20 CAPSULE | Refills: 0 | Status: SHIPPED | OUTPATIENT
Start: 2023-04-03 | End: 2023-04-13

## 2023-04-03 RX ORDER — OXYCODONE AND ACETAMINOPHEN 7.5; 325 MG/1; MG/1
1-2 TABLET ORAL EVERY 6 HOURS PRN
Qty: 56 TABLET | Refills: 0 | Status: SHIPPED | OUTPATIENT
Start: 2023-04-03 | End: 2023-04-10

## 2023-04-03 RX ORDER — FENTANYL CITRATE 50 UG/ML
INJECTION, SOLUTION INTRAMUSCULAR; INTRAVENOUS PRN
Status: DISCONTINUED | OUTPATIENT
Start: 2023-04-03 | End: 2023-04-03 | Stop reason: SDUPTHER

## 2023-04-03 RX ADMIN — FENTANYL CITRATE 50 MCG: 50 INJECTION INTRAMUSCULAR; INTRAVENOUS at 09:34

## 2023-04-03 RX ADMIN — FENTANYL CITRATE 50 MCG: 50 INJECTION, SOLUTION INTRAMUSCULAR; INTRAVENOUS at 10:42

## 2023-04-03 RX ADMIN — ACETAMINOPHEN 1000 MG: 500 TABLET ORAL at 08:37

## 2023-04-03 RX ADMIN — CEFAZOLIN 2000 MG: 1 INJECTION, POWDER, FOR SOLUTION INTRAMUSCULAR; INTRAVENOUS at 17:47

## 2023-04-03 RX ADMIN — Medication 100 MG: at 10:11

## 2023-04-03 RX ADMIN — HYDROMORPHONE HYDROCHLORIDE 0.25 MG: 2 INJECTION INTRAMUSCULAR; INTRAVENOUS; SUBCUTANEOUS at 11:26

## 2023-04-03 RX ADMIN — DEXAMETHASONE SODIUM PHOSPHATE 8 MG: 4 INJECTION, SOLUTION INTRAMUSCULAR; INTRAVENOUS at 08:40

## 2023-04-03 RX ADMIN — HYDROMORPHONE HYDROCHLORIDE 0.5 MG: 1 INJECTION, SOLUTION INTRAMUSCULAR; INTRAVENOUS; SUBCUTANEOUS at 13:32

## 2023-04-03 RX ADMIN — PREGABALIN 75 MG: 75 CAPSULE ORAL at 08:36

## 2023-04-03 RX ADMIN — ONDANSETRON 4 MG: 2 INJECTION INTRAMUSCULAR; INTRAVENOUS at 10:44

## 2023-04-03 RX ADMIN — ACETAMINOPHEN 1000 MG: 500 TABLET ORAL at 15:59

## 2023-04-03 RX ADMIN — DEXAMETHASONE SODIUM PHOSPHATE 8 MG: 4 INJECTION, SOLUTION INTRAMUSCULAR; INTRAVENOUS at 10:43

## 2023-04-03 RX ADMIN — ROCURONIUM BROMIDE 10 MG: 10 INJECTION, SOLUTION INTRAVENOUS at 10:11

## 2023-04-03 RX ADMIN — LABETALOL HYDROCHLORIDE 10 MG: 5 INJECTION, SOLUTION INTRAVENOUS at 10:37

## 2023-04-03 RX ADMIN — SODIUM CHLORIDE, SODIUM LACTATE, POTASSIUM CHLORIDE, AND CALCIUM CHLORIDE: 600; 310; 30; 20 INJECTION, SOLUTION INTRAVENOUS at 10:59

## 2023-04-03 RX ADMIN — SODIUM CHLORIDE, SODIUM LACTATE, POTASSIUM CHLORIDE, AND CALCIUM CHLORIDE: 600; 310; 30; 20 INJECTION, SOLUTION INTRAVENOUS at 08:34

## 2023-04-03 RX ADMIN — LAMOTRIGINE 100 MG: 100 TABLET ORAL at 16:00

## 2023-04-03 RX ADMIN — PANTOPRAZOLE SODIUM 40 MG: 40 TABLET, DELAYED RELEASE ORAL at 15:59

## 2023-04-03 RX ADMIN — LABETALOL HYDROCHLORIDE 5 MG: 5 INJECTION, SOLUTION INTRAVENOUS at 10:49

## 2023-04-03 RX ADMIN — ROPIVACAINE HYDROCHLORIDE 30 ML: 2 INJECTION EPIDURAL; INFILTRATION; PERINEURAL at 09:35

## 2023-04-03 RX ADMIN — KETOROLAC TROMETHAMINE 15 MG: 15 INJECTION, SOLUTION INTRAMUSCULAR; INTRAVENOUS at 15:58

## 2023-04-03 RX ADMIN — FENTANYL CITRATE 50 MCG: 50 INJECTION, SOLUTION INTRAMUSCULAR; INTRAVENOUS at 10:19

## 2023-04-03 RX ADMIN — PROPOFOL 150 MG: 10 INJECTION, EMULSION INTRAVENOUS at 10:11

## 2023-04-03 RX ADMIN — ROCURONIUM BROMIDE 20 MG: 10 INJECTION, SOLUTION INTRAVENOUS at 10:26

## 2023-04-03 RX ADMIN — GLYCOPYRROLATE 0.4 MG: 0.2 INJECTION INTRAMUSCULAR; INTRAVENOUS at 11:47

## 2023-04-03 RX ADMIN — TRANEXAMIC ACID 1 G: 100 INJECTION, SOLUTION INTRAVENOUS at 10:15

## 2023-04-03 RX ADMIN — WATER 1000 MG: 1 INJECTION, SOLUTION INTRAMUSCULAR; INTRAVENOUS; SUBCUTANEOUS at 10:17

## 2023-04-03 RX ADMIN — TRANEXAMIC ACID 1 G: 100 INJECTION, SOLUTION INTRAVENOUS at 11:32

## 2023-04-03 RX ADMIN — HYDROMORPHONE HYDROCHLORIDE 0.5 MG: 1 INJECTION, SOLUTION INTRAMUSCULAR; INTRAVENOUS; SUBCUTANEOUS at 13:00

## 2023-04-03 RX ADMIN — HYDROMORPHONE HYDROCHLORIDE 0.25 MG: 2 INJECTION INTRAMUSCULAR; INTRAVENOUS; SUBCUTANEOUS at 10:56

## 2023-04-03 RX ADMIN — LIDOCAINE HYDROCHLORIDE 1 ML: 10 INJECTION, SOLUTION EPIDURAL; INFILTRATION; INTRACAUDAL; PERINEURAL at 09:34

## 2023-04-03 RX ADMIN — OXYCODONE HYDROCHLORIDE 5 MG: 5 TABLET ORAL at 17:47

## 2023-04-03 RX ADMIN — ROPIVACAINE HYDROCHLORIDE 25 ML: 2 INJECTION, SOLUTION EPIDURAL; INFILTRATION at 09:29

## 2023-04-03 RX ADMIN — FAMOTIDINE 20 MG: 20 TABLET, FILM COATED ORAL at 08:36

## 2023-04-03 RX ADMIN — CELECOXIB 200 MG: 100 CAPSULE ORAL at 08:36

## 2023-04-03 RX ADMIN — MIDAZOLAM 2 MG: 1 INJECTION INTRAMUSCULAR; INTRAVENOUS at 09:34

## 2023-04-03 RX ADMIN — NEOSTIGMINE METHYLSULFATE 2 MG: 1 INJECTION, SOLUTION INTRAVENOUS at 11:47

## 2023-04-03 ASSESSMENT — PAIN DESCRIPTION - LOCATION
LOCATION: KNEE

## 2023-04-03 ASSESSMENT — PAIN DESCRIPTION - ORIENTATION
ORIENTATION: RIGHT

## 2023-04-03 ASSESSMENT — PAIN SCALES - GENERAL
PAINLEVEL_OUTOF10: 7
PAINLEVEL_OUTOF10: 7
PAINLEVEL_OUTOF10: 8
PAINLEVEL_OUTOF10: 7
PAINLEVEL_OUTOF10: 6
PAINLEVEL_OUTOF10: 8

## 2023-04-03 ASSESSMENT — PAIN DESCRIPTION - DESCRIPTORS
DESCRIPTORS: ACHING
DESCRIPTORS: ACHING
DESCRIPTORS: SHARP
DESCRIPTORS: ACHING
DESCRIPTORS: SORE
DESCRIPTORS: SHARP

## 2023-04-03 ASSESSMENT — PAIN - FUNCTIONAL ASSESSMENT
PAIN_FUNCTIONAL_ASSESSMENT: ACTIVITIES ARE NOT PREVENTED
PAIN_FUNCTIONAL_ASSESSMENT: 0-10

## 2023-04-03 ASSESSMENT — LIFESTYLE VARIABLES: SMOKING_STATUS: 1

## 2023-04-03 ASSESSMENT — PAIN DESCRIPTION - PAIN TYPE
TYPE: SURGICAL PAIN

## 2023-04-03 NOTE — HOME CARE
Received home health referral for [facility] for (SN, PT per protocol). Call received from surgical coordinator - patient to discharge today. Awaiting for PT/OT visit and discharge order. Spoke with patient in room;  patient identifiers verified. Explained home health care services and routines. Demographics verified including insurance, phone and address confirmed. Patient has the following DME: has available rolling walker, cane, TTB, raised toilet seat. Caregivers available; resides with spouse and will be primary caregiver`.   Orders noted and arranged to be processed by central intake      ---   Amanda Jj LPN  [facility] - INPATIENT Liaison

## 2023-04-03 NOTE — ANESTHESIA POSTPROCEDURE EVALUATION
Department of Anesthesiology  Postprocedure Note    Patient: Amrit Constantino  MRN: [id number]  YOB: 1970  Date of evaluation: 4/3/2023      Procedure Summary     Date: 04/03/23 Room / Location: [facility] MAIN 07 / [facility] MAIN OR    Anesthesia Start: 1003 Anesthesia Stop: 5167    Procedure: RIGHT TOTAL KNEE ARTHROPLASTY (Right: Knee) Diagnosis:       Osteoarthritis of right knee, unspecified osteoarthritis type      (Osteoarthritis of right knee, unspecified osteoarthritis type [M17.11])    Surgeons: Shyann Jaing MD Responsible Provider: Adeola Singh MD    Anesthesia Type: Regional, General ASA Status: 3          Anesthesia Type: Regional, General    Jose Angel Phase I: Jose Angel Score: 8    Jose Angel Phase II:        Anesthesia Post Evaluation    Patient location during evaluation: bedside  Patient participation: complete - patient participated  Level of consciousness: responsive to verbal stimuli  Airway patency: patent  Nausea & Vomiting: no nausea  Complications: no  Respiratory status: acceptable  Hydration status: euvolemic

## 2023-04-03 NOTE — NURSE NAVIGATOR
Rounded on patient s/p right total knee replacement with Dr. Estelle Pineda, [facility] 04/03/2023. Patient observed to be alert and oriented x 3, sitting up in bed. She denies chest pain, shortness of breath, nausea, vomiting or calf pain. She is rating pain 9/10 at this time to right upper thigh, nurse aware and is medicating now. She has full feeling in right lower extremity and is able to perform straight leg raise. Ace wrap noted to right lower extremity, dressing underneath observed to be clean, dry and intact. Ice pack applied for comfort. Total knee replacement education book provided to patient along with education regarding the use of incentive spirometry. Encouraged use 10 times hourly to keep lungs expanded. Reviewed postoperative showering instructions with patient. Reminded patient that ace wrap may be removed tomorrow as dressing underneath is waterproof. No tubs or submersion in water is allowed. Reminded patient that dressing is only to be removed by home health or Dr Estelle Pineda. Encouraged hourly ambulation 10 minutes hourly during waking hours followed by 20 minutes of icing, not to be placed directly on her skin. Reminded patient not to place pillows underneath her knee. Patient verbalized understanding of all information provided. Patient would like to discharge home today with home health and home physical therapy once cleared safe by PT/OT. She has all required DME at home and her  is her support system. Will continue to monitor.

## 2023-04-03 NOTE — PROGRESS NOTES
conducted an initial consultation and Spiritual Assessment for Neville Lyon, who is a 45 y.o.,female. Patient's Primary Language is: [place]. According to the patient's EMR Latter-day Affiliation is: [facility].     The reason the Patient came to the hospital is:   Patient Active Problem List    Diagnosis Date Noted    Arthritis of knee, right 04/03/2023    Class 2 severe obesity due to excess calories with serious comorbidity and body mass index (BMI) of 35.0 to 35.9 in adult ([facility] [address] 04/03/2023    Knee instability, right 04/03/2023    Genu varum of right lower extremity 04/03/2023    Dependent edema 08/21/2019    Rotator cuff tear arthropathy, right 07/18/2018    Tobacco abuse 10/23/2017    Abnormal EKG 10/23/2017    S/P rotator cuff repair 05/16/2017    Obesity (BMI 30.0-34.9) 03/01/2017    Bilateral sciatica 02/04/2014        The  provided the following Interventions:  Initiated a relationship of care and support. Explored issues of ovi, belief, spirituality and Sikhism/ritual needs while hospitalized. Listened empathically. Provided information about Spiritual Care Services. Chart reviewed. The following outcomes where achieved:   confirmed Patient's Latter-day Affiliation. Patient expressed gratitude for 's visit. Assessment:  Patient does not have any Sikhism/cultural needs that will affect patient's preferences in health care. There are no spiritual or Sikhism issues which require intervention at this time. Plan:  Chaplains will continue to follow and will provide pastoral care on an as needed/requested basis.  recommends bedside caregivers page  on duty if patient shows signs of acute spiritual or emotional distress.     [address]   [phone]
1530- Patient arrived to from  PACU. Patient  stated she wanted to go  home. Vital signs were taken. Patient  waiting to be seen by PT. Henrik trevino within reach. Will continue to  monitor. 1600- Patient ambulate to bathroom with  walker. Sitting up in  recliner. Will continue to monitor. 1800- Patient cleared by PT and discharge home. IV was removed pressure dressing applied. All questions and  concerns were answered accordingly. Patient  transported in wheelchair  by staff. No other issues noted.
patient have 2-3 sessions per week of Physical Therapy at d/c to increase the patient's independence. This AMPAC score should be considered in conjunction with interdisciplinary team recommendations to determine the most appropriate discharge setting. Patient's social support, diagnosis, medical stability, and prior level of function should also be taken into consideration. SUBJECTIVE:   Patient stated I have 3 dogs at home.     OBJECTIVE DATA SUMMARY:     Past Medical History:   Diagnosis Date    Arthritis     Asthma     Bipolar disorder ([address]     GERD (gastroesophageal reflux disease)     [place] (hard of hearing)     Hearing aid right ear . Deaf in left ear    Obesity (BMI 30.0-34.9) 3/1/2017    PTSD (post-traumatic stress disorder)      Past Surgical History:   Procedure Laterality Date    CHOLECYSTECTOMY      GASTRIC BYPASS SURGERY  2008    MASTOIDECTOMY Left 2008    ROTATOR CUFF REPAIR Right 2017    SHOULDER ARTHROPLASTY Right 2020    TONSILLECTOMY         Home Situation:  Social/Functional History  Lives With: Spouse, Family  Type of Home: House  Home Layout: One level  Home Access: Ramped entrance  Home Equipment: Lida Harden, rolling  Critical Behavior:  Orientation  Overall Orientation Status: Within Normal Limits  Orientation Level: Oriented X4    Strength:    Strength:  Within functional limits    Tone & Sensation:   Tone: Normal  Sensation: Intact    Range Of Motion:  AROM: Generally decreased, functional (RLE TKA)    Functional Mobility:  Bed Mobility:     Bed Mobility Training  Bed Mobility Training: Yes  Scooting: Modified independent  Transfers:     Transfer Training  Transfer Training: Yes  Sit to Stand: Modified independent  Stand to Sit: Modified independent  Balance:     Balance  Sitting: Intact  Standing: Intact    Ambulation/Gait Training:    Gait  Overall Level of Assistance: Supervision  Base of Support: Shift to left  Speed/Hailee: Slow  Step Length: Right shortened;Left

## 2023-04-03 NOTE — BRIEF OP NOTE
Brief Postoperative Note      Patient: Guillermo Garcia  YOB: 1970  MRN: [id number]    Date of Procedure: 4/3/2023    Pre-Op Diagnosis: Osteoarthritis of right knee, unspecified osteoarthritis type [M17.11]    Post-Op Diagnosis: Same       Procedure(s):  RIGHT TOTAL KNEE ARTHROPLASTY    Surgeon(s):  Neto Higgins MD    Assistant:  Surgical Assistant: Adel Rubinstein  Physician Assistant: Noelle Fiore PA-C    Anesthesia: General    Estimated Blood Loss (mL): less than 50     Complications: None    Specimens:   * No specimens in log *    Implants:  Implant Name Type Inv. Item Serial No.  Lot No. LRB No. Used Action   CEMENT BNE 20ML 41GM FULL DOSE PMMA W/ Tori Grosser VISC RADPQ - [id number]  CEMENT BNE 20ML 41GM FULL DOSE PMMA W/ TOBRA M VISC RADPQ  [employer] [facility] [id number] Right 2 Implanted   PIN FIX L3IN DIA1/8IN [facility]L [facility] [id number]  PIN FIX L3IN DIA1/8IN S STL TR CNTR PNT [facility]  Right 1 Implanted   BASEPLATE TIB SZ 5 [id number] ML74MM THK2. 3MM R KNEE TI ALLY NP - [id number]  BASEPLATE TIB SZ 5 [id number] ML74MM THK2. 3MM R KNEE TI ALLY NP  [facility] AND [facility] [id number] Right 1 Implanted   COMPONENT FEM SZ 6 LYNDSEY R KNEE OXINIUM POST STBL JESSICA [facility] - [id number]  COMPONENT FEM SZ 6 LYNDSEY R KNEE OXINIUM POST STBL JESSICA [facility] [id number] Right 1 Implanted   IMPLANT PATELLAR [id number] [id number] X3 ASYMMETRIC TRIATHLON - [id number]  IMPLANT PATELLAR [id number] X3 ASYMMETRIC TRIATHLON  [employer] [facility] [id number] Right 1 Implanted   INSERT TIB SZ 5-6 [id number] POST KNEE POLYETH POST STBL HI - [id number]  INSERT TIB SZ 5-6 [id number] POST KNEE POLYETH POST STBL [facility] AND NEPH Jeison Terri [id number] Right 1 Implanted         Drains: * No LDAs found *    Findings: same    Electronically signed by Rob Hendrix MD on 4/3/2023 at 11:34 AM

## 2023-04-03 NOTE — INTERVAL H&P NOTE
Update History & Physical    The patient's History and Physical of April 3, 2023 was reviewed with the patient and I examined the patient. There was no change. The surgical site was confirmed by the patient and me. Plan: The risks, benefits, expected outcome, and alternative to the recommended procedure have been discussed with the patient. Patient understands and wants to proceed with the procedure.      Electronically signed by Nesha Souza MD on 4/3/2023 at 9:21 AM

## 2023-04-03 NOTE — DISCHARGE SUMMARY
4/3/2023  7:16 AM    4/3/2023, 11:58 AM    Primary Dx:right Orthopedic / Rheumatologic: Total Knee Replacement  Secondary Dx: Etiological Diagnoses: none    HPI:  Pt has end stage OA of their right knee and had failed conservative treatment. Due to the current findings and affected activity of daily living surgical intervention is indicated. The alternatives, risks, complications as well as expected outcome were discussed, the patient understands and wishes to proceed with surgery    Past Medical History:   Diagnosis Date    Arthritis     Asthma     Bipolar disorder ([address]     GERD (gastroesophageal reflux disease)     [place] (hard of hearing)     Hearing aid right ear .  Deaf in left ear    Obesity (BMI 30.0-34.9) 3/1/2017    PTSD (post-traumatic stress disorder)          Current Facility-Administered Medications:     lactated ringers IV soln infusion, , IntraVENous, Continuous, KIMBERLI Bui CRNA, Last Rate: 125 mL/hr at 04/03/23 1003, New Bag at 04/03/23 1059    sodium chloride flush 0.9 % injection 5-40 mL, 5-40 mL, IntraVENous, 2 times per day, KIMBERLI Bui CRNA    sodium chloride flush 0.9 % injection 5-40 mL, 5-40 mL, IntraVENous, PRN, KIMBERLI Bui CRNA    bupivacaine liposome (EXPAREL) 1.3 % injection 66.5 mg, 5 mL, SubCUTAneous, Once, [employer], PA-C    sod chloride IRR soln 0.9 % 1,000 mL irrigation, , , PRN, Joseline Monsivais MD, 750 mL at 04/03/23 1030    sodium chloride 0.9 % 1,000 mL with vancomycin (VANCOCIN) 1,000 mg, polymyxin B 500,000 Units, , , PRN, Joseline Monsivais MD, Given at 04/03/23 1030    sod chloride IRR soln 0.9 % 1,000 mL with povidone-iodine (BETADINE) 10 % 17.5 mL irrigation, , , PRN, Joseline Monsivais MD, 250 mL at 04/03/23 1130    sodium chloride 0.9 % 50 mL with bupivacaine (PF) (MARCAINE) 0.5 % 25 mL, bupivacaine liposome (EXPAREL) 20 mL, EPINEPHrine 1 MG/ML 0.5 mg, ketorolac (TORADOL) 30 MG/ML 30 mg, , , PRN, Joseline Monsivais MD, 97.5 mL at 04/03/23

## 2023-04-03 NOTE — ANESTHESIA PRE PROCEDURE
Department of Anesthesiology  Preprocedure Note       Name:  Kelly Delaney   Age:  45 y.o.  :  1970                                          MRN:  [id number]         Date:  4/3/2023      Surgeon: Andres Roberts):  Melissa Herron MD    Procedure: Procedure(s):  RIGHT TOTAL KNEE ARTHROPLASTY; BEASLEY & NEPHEW; Adilia Calender TO ASSIST; NERVE BLOCK; 23 HR    Medications prior to admission:   Prior to Admission medications    Medication Sig Start Date End Date Taking? Authorizing Provider   Pediatric Multiple Vitamins (FLINTSTONES MULTIVITAMIN) CHEW Take 1 tablet by mouth in the morning and at bedtime   Yes Historical Provider, MD   b complex vitamins capsule Take 1 capsule by mouth three times a week Bedtime   Yes Historical Provider, MD   cyclobenzaprine (FLEXERIL) 10 MG tablet Take 1 tablet by mouth every 8 hours as needed 20  Yes Historical Provider, MD   celecoxib (CELEBREX) 200 MG capsule TAKE 1 CAPSULE BY MOUTH TWICE DAILY WITH MEALS **NOT COVERED 4/3/23   Donnie Sher PA-C   clonazePAM (KLONOPIN) 0.5 MG tablet Take 1 tablet by mouth daily.  3/5/23   Historical Provider, MD   vitamin D (ERGOCALCIFEROL) 1.25 MG (21490 UT) CAPS capsule Take 1 capsule by mouth once a week 3/5/23   Historical Provider, MD   solifenacin (VESICARE) 10 MG tablet Take 1 tablet by mouth nightly 3/5/23   Historical Provider, MD   Biotin 10 MG CAPS Take 1 capsule by mouth daily    Ar Automatic Reconciliation   Cariprazine HCl (VRAYLAR) 6 MG CAPS capsule Take 1 capsule by mouth nightly    Ar Automatic Reconciliation   lamoTRIgine (LAMICTAL) 100 MG tablet Take 1 tablet by mouth daily    Ar Automatic Reconciliation   omeprazole (PRILOSEC) 20 MG delayed release capsule Take 1 capsule by mouth daily    Ar Automatic Reconciliation   OXcarbazepine (TRILEPTAL) 300 MG tablet Take 1 tablet by mouth 2 times daily    Ar Automatic Reconciliation   traZODone (DESYREL) 300 MG tablet Take 1 tablet by mouth nightly    Ar Automatic Reconciliation

## 2023-04-03 NOTE — CARE COORDINATION
04/03/23 1704   Service Assessment   Patient Orientation Alert and Oriented   Cognition Alert   History Provided By Patient   Primary Caregiver Self   Support Systems Spouse/Significant Other   Patient's Healthcare Decision Maker is: Legal Next of Kin   PCP Verified by CM Yes   Last Visit to PCP Within last 3 months   Prior Functional Level Independent in ADLs/IADLs   Current Functional Level Assistance with the following:;Mobility   Can patient return to prior living arrangement Yes   Ability to make needs known: Good   Family able to assist with home care needs: Yes   Would you like for me to discuss the discharge plan with any other family members/significant others, and if so, who? Yes  (spouse)   Social/Functional History   Lives With Spouse; Family   Type of [address] One level   [place], [address] [facility] [address]   Transfer Assistance Independent   Discharge Planning   Type of [facility]   Patient expects to be discharged to: [address] Discharge   Transition of [address] ([address] [facility] [address] [facility]   Mode of Transport at Discharge Other (see comment)  (Patient spouse)   Confirm Follow Up Transport Family   Condition of Participation: Discharge Planning   The Plan for Transition of Care is related to the following treatment goals: home with home health   Freedom of Choice list was provided with basic dialogue that supports the patient's individualized plan of care/goals, treatment preferences, and shares the quality data associated with the providers? Yes     Patient is agreeable to [facility].  CM confirmed with Amanda Oliver [facility]) they

## 2023-04-04 ENCOUNTER — HOME CARE VISIT (OUTPATIENT)
Age: 53
End: 2023-04-04
Payer: MEDICAID

## 2023-04-04 VITALS
SYSTOLIC BLOOD PRESSURE: 128 MMHG | OXYGEN SATURATION: 98 % | RESPIRATION RATE: 17 BRPM | HEART RATE: 75 BPM | DIASTOLIC BLOOD PRESSURE: 71 MMHG | TEMPERATURE: 97.7 F

## 2023-04-04 PROCEDURE — G0151 HHCP-SERV OF PT,EA 15 MIN: HCPCS

## 2023-04-04 PROCEDURE — G0299 HHS/HOSPICE OF RN EA 15 MIN: HCPCS

## 2023-04-04 ASSESSMENT — ENCOUNTER SYMPTOMS
DYSPNEA ACTIVITY LEVEL: AFTER AMBULATING MORE THAN 20 FT
PAIN LOCATION - PAIN QUALITY: THROBBING

## 2023-04-04 NOTE — HOME HEALTH
prevention  Patient/caregiver degree of understanding: good, able to verbalize with vc, will benefit reinforcement  Home exercise program/Homework provided: 10 to 15 reps each BLE 2- 3x/daily    Supine: AP, QS, GS    Seated:  heel slides  hip flex   Pt/Caregiver instructed on plan of care and are  agreeable to plan of care at this time. Patient's response to treatment: able to participate with therapyverbalized increased pain with gait and after HEP  Plan of care and admission to home health status called to attending physician: Romina Garcias PA-C. Discharge planning discussed with patient and caregiver. Discharge planning as follows: DC HHPT to self/caregiver under supervision of MD  when goals are met or max benefits achieved. Pt/Caregiver did verbalize understanding of discharge planning. Next MD appointment 20 April (date) with ortho  MD/NP/PA. Patient/caregiver encouraged/instructed to keep appointment as lack of follow through with physician appointment could result in discontinuation of home care services for non-compliance.

## 2023-04-04 NOTE — Clinical Note
44 y/o female with recent surgical Right Total Knee Arthoplasty, with PMH Bipolar disorder ([address] GERD (gastroesophageal reflux di sease), Obesity. Intial evaluation completed. Patient is A&Ox 4 and pleasant, Right Total Knee Arthoplasty, no s/s of infection, with staples, covered with Aquacel ag. Pictures taken and measurements taken under wound assessment. Respiratory rate regular, no sign of shortness of breath or cough, clear and bilateral lung fields. Heart rate regular, no chest pain or heart palpitations. Right lower leg edema due to recent surgery. Bowel sounds present x4, no  N/V/D/C or s/s of UTI. Medication rec performed Take oxycodone with food. Swallow the capsule or tablet whole to avoid exposure to a potentially fatal overdose. Do not crush, chew, break, open, or dissolve. Monitor for respiratory depression and only take as directed by your dr. Side effects include Nausea, vomiting, constipation, lightheadedness, dizziness, or drowsiness may occur. Do not drink alcohol with this medication, do not operate heavy machinery. Patient educated on fall precautions to include Use good lighting in all rooms, make sure you use nightlights, Place frequently used items in easy-to-reach places, Set up furniture so that there are clear paths around it, Remove throw rugs and other tripping hazards from the floors, Avoid walking on wet floors, Keep moving. Physical activity can go a long way toward fall prevention, Wear sensible shoes with backs on them, Use assistive devices. Patient shows no signs or symptoms of infection currently. Will continue to monitor patient. Antibiotics given as ordered. pt is encouraged to quit smoking at this time- educated on the benefits of smoking cessation and to seek support groups if needed.

## 2023-04-05 ENCOUNTER — HOME CARE VISIT (OUTPATIENT)
Age: 53
End: 2023-04-05
Payer: MEDICAID

## 2023-04-05 ENCOUNTER — FOLLOWUP TELEPHONE ENCOUNTER (OUTPATIENT)
Facility: HOSPITAL | Age: 53
End: 2023-04-05

## 2023-04-05 VITALS
TEMPERATURE: 98 F | DIASTOLIC BLOOD PRESSURE: 74 MMHG | HEART RATE: 79 BPM | RESPIRATION RATE: 18 BRPM | SYSTOLIC BLOOD PRESSURE: 110 MMHG | OXYGEN SATURATION: 97 %

## 2023-04-05 VITALS
DIASTOLIC BLOOD PRESSURE: 77 MMHG | RESPIRATION RATE: 14 BRPM | HEART RATE: 88 BPM | OXYGEN SATURATION: 94 % | TEMPERATURE: 97.7 F | SYSTOLIC BLOOD PRESSURE: 110 MMHG

## 2023-04-05 PROCEDURE — G0157 HHC PT ASSISTANT EA 15: HCPCS

## 2023-04-05 ASSESSMENT — ENCOUNTER SYMPTOMS
PAIN LOCATION - PAIN QUALITY: SHARP, THROBBING
STOOL DESCRIPTION: FORMED
PAIN LOCATION - PAIN QUALITY: THROBBING

## 2023-04-05 NOTE — TELEPHONE ENCOUNTER
Call placed to patient, ID verified x 2. Patient is s/p right total knee replacement with Dr. Yohana Adam, [facility] 04/03/2023. She denies chest pain, shortness of breath, nausea, vomiting, fever, chills or calf pain. She state that pain is controlled with pain medication that she is taking scheduled as prescribed, but it is worse today that yesterday. Education provided that all numbing medication has worn off at this point that this is normal. Encouraged icing 20 minutes hourly not to be placed directly on hr skin. Patient states that she is ambulating ad angely with her rolling walker hourly. She denies any numbness or tingling to her right lower extremity. She denies any difficulty with bowel or bladder. Per patient home health came out to see her yesterday. Physical therapy will be coming out to see her today. Her dressing is described as dry and intact, but per patient the bottom 2 inches are peeling up. Per patient she thinks that this happened when she was changing her pants. Reassurance provided that Physical therapy will change dressing if they see that it is not intact. Overall patient feels she is doing well. She has no questions or concerns at this time. She will follow up with Dr. Yohana Adam in two weeks or sooner if needed.

## 2023-04-06 ENCOUNTER — HOME CARE VISIT (OUTPATIENT)
Age: 53
End: 2023-04-06
Payer: MEDICAID

## 2023-04-06 PROCEDURE — G0157 HHC PT ASSISTANT EA 15: HCPCS

## 2023-04-06 NOTE — HOME HEALTH
reasons Requires considerable and taxing effort to leave the home , Requires the assistance of 1 or more persons to leave the home  and Only leaves the home for medical reasons or [employer] services and are infrequent and of short duration for other reasons . Caregiver: relative. Caregiver assists with IADLs and ADLs. Medications reconciled and all medications are available in the home this visit. The following education was provided regarding medications: all medications reviewed and educated on to include: name, dose, route, frequency, side effects and effectiveness  Medications  are to early to tell at this time. High risk medication teaching regarding anticoagulants, hyperglycemic agents or opiod narcotics performed (specify) aspirin and oxycodone. Take oxycodone with food. Swallow the capsule or tablet whole to avoid exposure to a potentially fatal overdose. Do not crush, chew, break, open, or dissolve. Monitor for respiratory depression and only take as directed by your dr. Side effects include Nausea, vomiting, constipation, lightheadedness, dizziness, or drowsiness may occur. Do not drink alcohol with this medication, do not operate heavy machinery. Patients made aware to report bleeding to their practitioner including blood in the urine or stools and bleeding from minor cuts and scratches that won't stop. Patient also made aware to recognize the signs and symptoms of significant bleeding that can include unsteady gait, dizziness, new headache, and nausea and vomiting. Nicholas RECIO MD notified of any discrepancies/look a like medications/medication interactions: lamoTRIgine and OXcarbazepine    Home health supplies by type and quantity ordered/delivered this visit include: Aquacel ag surgical    Patient education provided this visit to include: see interventions. Patient level of understanding of education provided: patient verbalized understanding but need additional education.

## 2023-04-06 NOTE — HOME HEALTH
SUBJECTIVE: My dressing is falling off  CAREGIVER INVOLVEMENT/ASSISTANCE NEEDED FOR: Lives with  who can assist with ADLs and driving  . OBJECTIVE:  See interventions. PATIENT EDUCATION PROVIDED THIS VISIT: 1. Walking every hour during waking hours with FWW  2. Supine AP, ankle circles, quad sets, heel slides, SAQ, x15 reps, 3x a day  3. Seated LAQ, heel slides, x15 reps x3 x a day    PATIENT RESPONSE TO EDUCATION PROVIDED: verbalized understanding  PATIENT RESPONSE TO TREATMENT:No increase in pain with completion of seated R LE exercises. Change R knee dressing due to not attached to skin. Placed Mepilex over incision and took pictures to be uploaded to her chart. Some drainage noted. No smell or staples movement. .  ASSESSMENT OF PROGRESS TOWARD GOALS: Patient is progressing towards goals as previously set in plan of care with skilled home health physical therapy services at this time made apparent by  therapeutic exercises completion and education handouts for fall prevention, infection control and Pain medication side effects. Bill Patella PLAN FOR NEXT VISIT: gait training and R LE ROM exercises and stretch  THE FOLLOWING DISCHARGE PLANNING WAS DISCUSSED WITH THE PATIENT/CAREGIVER: Discussed with patient eventual discharge once patient has met goals and/or maximum benefit from home health skilled Physical Therapy services, patient understands and agreeable to goals. At this time needs continued skilled home health physical therapy to address deficits, reduce fall risk and to obtain goals previously established per plan of care. daily 14 1x1 visit are left.

## 2023-04-07 ENCOUNTER — HOME CARE VISIT (OUTPATIENT)
Age: 53
End: 2023-04-07
Payer: MEDICAID

## 2023-04-07 VITALS
TEMPERATURE: 97.5 F | RESPIRATION RATE: 15 BRPM | OXYGEN SATURATION: 98 % | SYSTOLIC BLOOD PRESSURE: 101 MMHG | HEART RATE: 70 BPM | DIASTOLIC BLOOD PRESSURE: 69 MMHG

## 2023-04-07 VITALS
RESPIRATION RATE: 14 BRPM | DIASTOLIC BLOOD PRESSURE: 65 MMHG | SYSTOLIC BLOOD PRESSURE: 103 MMHG | OXYGEN SATURATION: 96 % | TEMPERATURE: 97.7 F | HEART RATE: 84 BPM

## 2023-04-07 PROCEDURE — G0157 HHC PT ASSISTANT EA 15: HCPCS

## 2023-04-07 ASSESSMENT — ENCOUNTER SYMPTOMS
PAIN LOCATION - PAIN QUALITY: SHARP
PAIN LOCATION - PAIN QUALITY: THROBBING

## 2023-04-07 NOTE — HOME HEALTH
SUBJECTIVE: It feels better today  CAREGIVER INVOLVEMENT/ASSISTANCE NEEDED FOR: Lives with  who can assist with IADLs and driving  . OBJECTIVE:  See interventions. PATIENT EDUCATION PROVIDED THIS VISIT: Continue with B MANJIT seated HEP and walk every hour  PATIENT RESPONSE TO EDUCATION PROVIDED: verbalized understanding  PATIENT RESPONSE TO TREATMENT: No increase pain with gait training or exercises  . ASSESSMENT OF PROGRESS TOWARD GOALS: Patient is progressing towards goals as previously set in plan of care with skilled home health physical therapy services at this time made apparent by improving ambulation and therapeutic exercises completion. Moderate fatigue noted during treatment with seated rest breaks needed throughout. Nedra Sprague PLAN FOR NEXT VISIT: Standing exercises and increase gait distance  THE FOLLOWING DISCHARGE PLANNING WAS DISCUSSED WITH THE PATIENT/CAREGIVER:  At this time needs continued skilled home health physical therapy to address deficits, reduce fall risk and to obtain goals previously established per plan of care. daily 11 1x1 visit are left.

## 2023-04-07 NOTE — HOME HEALTH
SUBJECTIVE: my knee hurts at night  CAREGIVER INVOLVEMENT/ASSISTANCE NEEDED FOR: Lives with  who can assist with IADLs and driving  . OBJECTIVE:  See interventions. PATIENT EDUCATION PROVIDED THIS VISIT: Complete B LE standing and seated HEP and walk within home 3 x a day. Handout with both standing and seated HEP given. PATIENT RESPONSE TO EDUCATION PROVIDED: verbalized understanding and complete each exercise for correct form  PATIENT RESPONSE TO TREATMENT: No increase in pain noted. Demonstrates good AROM in R LE   .  ASSESSMENT OF PROGRESS TOWARD GOALS: Patient is progressing towards goals as previously set in plan of care with skilled home health physical therapy services at this time made apparent by increasing therapeutic exercises to include standing and seated B LE. Moderate fatigue noted during treatment with seated rest breaks needed throughout with completion of sets. Luis Dos Santos PLAN FOR NEXT VISIT: Increase gait distance and complete sit<>stand transfers with weight bearing through [address] [place] [address] THE PATIENT/CAREGIVER: At this time needs continued skilled home health physical therapy to address deficits, reduce fall risk and to obtain goals previously established per plan of care. daily x10 1x1 visit are left.

## 2023-04-08 ENCOUNTER — HOME CARE VISIT (OUTPATIENT)
Age: 53
End: 2023-04-08
Payer: MEDICAID

## 2023-04-08 VITALS
DIASTOLIC BLOOD PRESSURE: 60 MMHG | RESPIRATION RATE: 17 BRPM | SYSTOLIC BLOOD PRESSURE: 100 MMHG | HEART RATE: 76 BPM | OXYGEN SATURATION: 99 %

## 2023-04-08 PROCEDURE — G0157 HHC PT ASSISTANT EA 15: HCPCS

## 2023-04-08 NOTE — HOME HEALTH
Patient's Subjective: She reports being in a lot of pain. The dog jumped on her leg while in the bed this a.m. Falls since last session:  none  Pain/Discomfort ? Yes, michael wharton  New Meds: no  Upcoming MD appointments: 4/20/2023 3:40 PM Asuncion Donato PA-C   . Caregiver involvement/assistance needed for: The patient's caregiver assists with meals, transportation, chores  . Home health supplies by type and quantity ordered/delivered this visit include:  none  . Objective:  See interventions. R knee ROM: 3 - 96     Patient response to treatment:  Fair tolerance today due to higher pain levels since her dog got on her R leg. Patient level of understanding of education provided:  Recommended increasing to 20 min/hour for cold therapy due to higher pain levels. She reports she will increase it as instructed. Assess of progress towards goals:   She reports compliance with HEP 3x/day, but requires corrections for all to ensure correct performance for best outcomes. R Knee ROM: 3 - 96. Progressing towards goal of 0 - 95. Low BP that is within defined parameters. She reports a Hx of low BP. Continued need for the following skills: Skilled interventions required for increasing her strength, ROM - R knee, stability, transfers to decrease her fall risk and improve her overall functional Ind. Plan for next visit: Progress with ROM exercises. The following discharge was discussed with the patient/caregiver :4/19/2023 MARIA DEL CARMEN Bryson, PT   [facility] [employer] [place] [facility] required?  Medicaid

## 2023-04-14 ENCOUNTER — TELEPHONE (OUTPATIENT)
Age: 53
End: 2023-04-14

## 2023-04-17 ENCOUNTER — HOME CARE VISIT (OUTPATIENT)
Age: 53
End: 2023-04-17
Payer: MEDICAID

## 2023-04-17 VITALS
SYSTOLIC BLOOD PRESSURE: 146 MMHG | DIASTOLIC BLOOD PRESSURE: 90 MMHG | TEMPERATURE: 98 F | HEART RATE: 69 BPM | RESPIRATION RATE: 15 BRPM | OXYGEN SATURATION: 96 %

## 2023-04-17 PROCEDURE — G0157 HHC PT ASSISTANT EA 15: HCPCS

## 2023-04-17 ASSESSMENT — ENCOUNTER SYMPTOMS: PAIN LOCATION - PAIN QUALITY: SHARP

## 2023-04-17 NOTE — HOME HEALTH
SUBJECTIVE: Thank you for all your help  CAREGIVER INVOLVEMENT/ASSISTANCE NEEDED FOR: Lives with  and mother.  can assist with IADLs and driving  . OBJECTIVE:  See interventions. PATIENT EDUCATION PROVIDED THIS VISIT: Continue with B LE seated and standing HEP, walk 3x a day  PATIENT RESPONSE TO EDUCATION PROVIDED: verbalized understanding  PATIENT RESPONSE TO TREATMENT: No increase in pain with car transfer gait training and standing exercises  . Assessment and Summary of Care:  Patient's current functional status before discharge is as follows  ROM: -3-110* R knee  Bed Mobility: Independent  Transfers: Mod independent transfer with FWW  Gait/WC mobility: Mod independent to walk 150ft with FWW  Stairs: N/A ramp Mod independent with FWW to complete  Recommendations: Has script for OPPT     ASSESSMENT OF PROGRESS TOWARD GOALS: [facility]  . PLAN FOR NEXT VISIT: discharge from [address] [place] [address] THE PATIENT/CAREGIVER: Patient has been educated to call OPPT to continue her TKR recovery to reach PLOF and independent in the community.

## 2023-04-18 ENCOUNTER — HOME CARE VISIT (OUTPATIENT)
Age: 53
End: 2023-04-18
Payer: MEDICAID

## 2023-04-18 PROCEDURE — G0299 HHS/HOSPICE OF RN EA 15 MIN: HCPCS

## 2023-04-19 ENCOUNTER — HOME CARE VISIT (OUTPATIENT)
Age: 53
End: 2023-04-19
Payer: MEDICAID

## 2023-04-19 VITALS
RESPIRATION RATE: 18 BRPM | OXYGEN SATURATION: 97 % | DIASTOLIC BLOOD PRESSURE: 77 MMHG | SYSTOLIC BLOOD PRESSURE: 119 MMHG | HEART RATE: 78 BPM | TEMPERATURE: 98.3 F

## 2023-04-19 VITALS
HEART RATE: 68 BPM | RESPIRATION RATE: 16 BRPM | DIASTOLIC BLOOD PRESSURE: 78 MMHG | SYSTOLIC BLOOD PRESSURE: 120 MMHG | OXYGEN SATURATION: 96 % | TEMPERATURE: 97.8 F

## 2023-04-19 PROCEDURE — G0151 HHCP-SERV OF PT,EA 15 MIN: HCPCS

## 2023-04-19 ASSESSMENT — ENCOUNTER SYMPTOMS
PAIN LOCATION - PAIN QUALITY: SORENESS
STOOL DESCRIPTION: FORMED
PAIN LOCATION - PAIN QUALITY: SHARP

## 2023-04-19 NOTE — HOME HEALTH
md if edema exceeds normal limits for patient, right knee and right lower leg noted at this time. Patient reports falling off the porch about yesterday evening. She tried to stand up and reach for her walker. She lost balance and fell backwards off the porch and landed on on her back. She did not hit her head or her R knee or head. Patient stated \"that she did not go to the emergency room or get treated\". Agency Progress toward goals: goals/teaching reviewed. patient is progressing towards goals at this time, skilled need to continue monitoring wound. Patient's Progress towards personal goals: pt reporting they are progressing towards their goals at this time, feeling better every day. Home exercise program: patient instructed to perform sob hep 4-5 x daily and prn for sob, to promote lung expansion. pt also encouraged to use ICS q 2 hours and to perform sob hep during therapy. Continued need for the following skills: Physicial therapy. Plan for next visit: Follow up with PCP    Patient and/or caregiver notified and agrees to changes in the Plan of Care: Yes. The following discharge planning was discussed with the pt/caregiver:     Patient discharged is completed, Pt meeting all SN goals at this time and is clinically discharged and documentation finalized for completion of discipline discharge. Please continue with other therapies and complete final DC. Thank you!

## 2023-04-20 ENCOUNTER — HOSPITAL ENCOUNTER (OUTPATIENT)
Facility: HOSPITAL | Age: 53
Setting detail: RECURRING SERIES
Discharge: HOME | End: 2023-04-23
Payer: MEDICAID

## 2023-04-20 ENCOUNTER — OFFICE VISIT (OUTPATIENT)
Age: 53
End: 2023-04-20

## 2023-04-20 DIAGNOSIS — Z96.641 PRESENCE OF RIGHT ARTIFICIAL HIP JOINT: Primary | ICD-10-CM

## 2023-04-20 PROCEDURE — 97162 PT EVAL MOD COMPLEX 30 MIN: CPT

## 2023-04-20 PROCEDURE — 99024 POSTOP FOLLOW-UP VISIT: CPT | Performed by: PHYSICIAN ASSISTANT

## 2023-04-20 RX ORDER — OXYCODONE AND ACETAMINOPHEN 7.5; 325 MG/1; MG/1
1-2 TABLET ORAL EVERY 8 HOURS PRN
Qty: 42 TABLET | Refills: 0 | Status: SHIPPED | OUTPATIENT
Start: 2023-04-20 | End: 2023-04-27

## 2023-04-20 RX ORDER — CEPHALEXIN 500 MG/1
500 CAPSULE ORAL 4 TIMES DAILY
Qty: 28 CAPSULE | Refills: 0 | Status: SHIPPED | OUTPATIENT
Start: 2023-04-20 | End: 2023-04-27

## 2023-04-20 NOTE — HOME HEALTH
order to Brian in [facility]. Next f/u visit with Dr. Jasmin Stover is on 4/27/23.

## 2023-04-20 NOTE — PROGRESS NOTES
MOUTH TWICE DAILY WITH MEALS **NOT COVERED, Disp: 60 capsule, Rfl: 2    aspirin EC 81 MG EC tablet, Take 1 tablet by mouth 2 times daily, Disp: 60 tablet, Rfl: 1    celecoxib (CELEBREX) 200 MG capsule, Take 1 capsule by mouth 2 times daily, Disp: 60 capsule, Rfl: 2    docusate sodium (COLACE) 100 MG capsule, Take 1 capsule by mouth 2 times daily, Disp: 60 capsule, Rfl: 1    ondansetron (ZOFRAN) 4 MG tablet, Take 1 tablet by mouth every 8 hours as needed for Nausea or Vomiting, Disp: 30 tablet, Rfl: 2    clonazePAM (KLONOPIN) 0.5 MG tablet, Take 1 tablet by mouth daily. , Disp: , Rfl:     vitamin D (ERGOCALCIFEROL) 1.25 MG (91993 UT) CAPS capsule, Take 1 capsule by mouth once a week, Disp: , Rfl:     solifenacin (VESICARE) 10 MG tablet, Take 1 tablet by mouth nightly, Disp: , Rfl:     Pediatric Multiple Vitamins (FLINTSTONES MULTIVITAMIN) CHEW, Take 1 tablet by mouth in the morning and at bedtime, Disp: , Rfl:     b complex vitamins capsule, Take 1 capsule by mouth three times a week Bedtime, Disp: , Rfl:     cyclobenzaprine (FLEXERIL) 10 MG tablet, Take 1 tablet by mouth every 8 hours as needed, Disp: , Rfl:     Biotin 10 MG CAPS, Take 1 capsule by mouth daily, Disp: , Rfl:     Cariprazine HCl (VRAYLAR) 6 MG CAPS capsule, Take 1 capsule by mouth nightly, Disp: , Rfl:     lamoTRIgine (LAMICTAL) 100 MG tablet, Take 1 tablet by mouth daily, Disp: , Rfl:     omeprazole (PRILOSEC) 20 MG delayed release capsule, Take 1 capsule by mouth daily, Disp: , Rfl:     OXcarbazepine (TRILEPTAL) 300 MG tablet, Take 1 tablet by mouth 2 times daily, Disp: , Rfl:     traZODone (DESYREL) 300 MG tablet, Take 1 tablet by mouth nightly, Disp: , Rfl:     zolpidem (AMBIEN) 5 MG tablet, Take 1 tablet by mouth nightly., Disp: , Rfl:     Allergies   Allergen Reactions    Amoxicillin Other (See Comments)     Does no work  Does not work. Other reaction(s): other/intolerance  Does not work.     Codeine Nausea And Vomiting and Other (See Comments)

## 2023-04-21 ENCOUNTER — TELEPHONE (OUTPATIENT)
Age: 53
End: 2023-04-21

## 2023-04-21 DIAGNOSIS — G89.18 POST-OP PAIN: Primary | ICD-10-CM

## 2023-04-21 DIAGNOSIS — Z96.641 PRESENCE OF RIGHT ARTIFICIAL HIP JOINT: ICD-10-CM

## 2023-04-21 RX ORDER — HYDROCODONE BITARTRATE AND ACETAMINOPHEN 10; 325 MG/1; MG/1
1-2 TABLET ORAL EVERY 8 HOURS PRN
Qty: 42 TABLET | Refills: 0 | Status: SHIPPED | OUTPATIENT
Start: 2023-04-21 | End: 2023-04-28

## 2023-04-21 NOTE — TELEPHONE ENCOUNTER
Attempted to contact pt at her home number.   Message left to inform her of prescription was sent to the pharmacy

## 2023-04-21 NOTE — TELEPHONE ENCOUNTER
Patient called and is requesting a different pain meds since the percocet is on back order       Please call and advise patient   [phone]

## 2023-04-24 ENCOUNTER — TELEPHONE (OUTPATIENT)
Age: 53
End: 2023-04-24

## 2023-04-24 NOTE — TELEPHONE ENCOUNTER
Lab orders faxed to [facility] at [phone].   Prior auth initiated via Cover My Meds. 4/24/2023      Received the following:  Approvedtoday  PA Case: [id number], Status: Approved, Coverage Starts on: 4/24/2023 12:00:00 AM, Coverage Ends on: 5/24/2023 12:00:00 AM.    Patient called and made aware. No further action required at this time.

## 2023-04-24 NOTE — TELEPHONE ENCOUNTER
Patient called and stated that she's in pain and [facility] pharmacy -[facility] informed her that her medication (Norco  mg) needs a pre-auth and keeps asking how long will it take. Please advise. Patient can be reached at [phone].

## 2023-04-27 ENCOUNTER — OFFICE VISIT (OUTPATIENT)
Age: 53
End: 2023-04-27
Payer: MEDICAID

## 2023-04-27 VITALS — HEIGHT: 66 IN | BODY MASS INDEX: 35.84 KG/M2 | WEIGHT: 223 LBS

## 2023-04-27 DIAGNOSIS — M17.12 UNILATERAL PRIMARY OSTEOARTHRITIS, LEFT KNEE: ICD-10-CM

## 2023-04-27 DIAGNOSIS — Z96.651 PRESENCE OF RIGHT ARTIFICIAL KNEE JOINT: Primary | ICD-10-CM

## 2023-04-27 PROCEDURE — 99213 OFFICE O/P EST LOW 20 MIN: CPT | Performed by: PHYSICIAN ASSISTANT

## 2023-04-27 PROCEDURE — 99024 POSTOP FOLLOW-UP VISIT: CPT | Performed by: PHYSICIAN ASSISTANT

## 2023-04-27 NOTE — PROGRESS NOTES
Patient: Terry Haney                MRN: [id number]       SSN: [id number]  YOB: 1970        AGE: 45 y.o. SEX: female  Body mass index is 35.99 kg/m². PCP: Maria Esther Caldwell MD  04/27/23      This office note has been dictated. REVIEW OF SYSTEMS:  Constitutional: Negative for fever, chills, weight loss and malaise/fatigue. HENT: Negative. Eyes: Negative. Respiratory: Negative. Cardiovascular: Negative. Gastrointestinal: No bowel incontinence or constipation. Genitourinary: No bladder incontinence or saddle anesthesia. Skin: Negative. Neurological: Negative. Endo/Heme/Allergies: Negative. Psychiatric/Behavioral: Negative. Musculoskeletal: As per HPI above. Past Medical History:   Diagnosis Date    Arthritis     Asthma     Bipolar disorder ([facility] [address]     GERD (gastroesophageal reflux disease)     [place] (hard of hearing)     Hearing aid right ear . Deaf in left ear    Obesity (BMI 30.0-34.9) 3/1/2017    PTSD (post-traumatic stress disorder)          Current Outpatient Medications:     HYDROcodone-acetaminophen (NORCO)  MG per tablet, Take 1-2 tablets by mouth every 8 hours as needed for Pain for up to 7 days. Max Daily Amount: 6 tablets, Disp: 42 tablet, Rfl: 0    cephALEXin (KEFLEX) 500 MG capsule, Take 1 capsule by mouth 4 times daily for 7 days, Disp: 28 capsule, Rfl: 0    oxyCODONE-acetaminophen (PERCOCET) 7.5-325 MG per tablet, Take 1-2 tablets by mouth every 8 hours as needed for Pain for up to 7 days.  Max Daily Amount: 6 tablets, Disp: 42 tablet, Rfl: 0    ibuprofen (ADVIL;MOTRIN) 600 MG tablet, Take 1 tablet by mouth 4 times daily as needed for Pain, Disp: 40 tablet, Rfl: 0    lidocaine 4 % external patch, Place 1 patch onto the skin daily, Disp: 30 patch, Rfl: 0    magnesium oxide (MAG-OX) 400 (240 Mg) MG tablet, Take 1 tablet by mouth daily as needed (muscle spasm), Disp: 30 tablet, Rfl: 0    celecoxib (CELEBREX) 200 MG capsule, TAKE 1 CAPSULE

## 2023-05-02 ENCOUNTER — TELEPHONE (OUTPATIENT)
Age: 53
End: 2023-05-02

## 2023-05-02 DIAGNOSIS — G89.18 POST-OP PAIN: ICD-10-CM

## 2023-05-02 DIAGNOSIS — Z96.651 PRESENCE OF RIGHT ARTIFICIAL KNEE JOINT: Primary | ICD-10-CM

## 2023-05-02 RX ORDER — HYDROCODONE BITARTRATE AND ACETAMINOPHEN 10; 325 MG/1; MG/1
1-2 TABLET ORAL EVERY 8 HOURS PRN
Qty: 42 TABLET | Refills: 0 | Status: SHIPPED | OUTPATIENT
Start: 2023-05-02 | End: 2023-05-09

## 2023-05-02 NOTE — TELEPHONE ENCOUNTER
Patient is asking for a refill of Hydrocodone. Patient uses [facility] on [address], and can be reached at [phone].

## 2023-05-08 ENCOUNTER — TELEPHONE (OUTPATIENT)
Age: 53
End: 2023-05-08

## 2023-05-08 ENCOUNTER — HOSPITAL ENCOUNTER (OUTPATIENT)
Facility: HOSPITAL | Age: 53
Discharge: HOME | End: 2023-05-11

## 2023-05-08 DIAGNOSIS — Z96.651 PRESENCE OF RIGHT ARTIFICIAL KNEE JOINT: ICD-10-CM

## 2023-05-08 DIAGNOSIS — G89.18 POST-OP PAIN: Primary | ICD-10-CM

## 2023-05-08 DIAGNOSIS — G89.18 POST-OP PAIN: ICD-10-CM

## 2023-05-08 LAB — LABCORP SPECIMEN COLLECTION: NORMAL

## 2023-05-08 PROCEDURE — 99001 SPECIMEN HANDLING PT-LAB: CPT

## 2023-05-08 NOTE — TELEPHONE ENCOUNTER
Patient came into office today c/o right knee pain. Patient is 5 weeks knee s/p knee replacement. Discussed with LOIS Nicolas, patient is to d/c blood thinners and any NSAIDS. Infectious labs entered into chart. Patient instructed to follow up on Wednesday.

## 2023-05-09 ENCOUNTER — APPOINTMENT (OUTPATIENT)
Facility: HOSPITAL | Age: 53
End: 2023-05-09
Payer: MEDICAID

## 2023-05-09 LAB
BASOPHILS # BLD AUTO: 0 X10E3/UL (ref 0–0.2)
BASOPHILS NFR BLD AUTO: 0 %
CCP IGA+IGG SERPL IA-ACNC: 2 UNITS (ref 0–19)
CK SERPL-CCNC: 51 U/L (ref 32–182)
CRP SERPL-MCNC: 12 MG/L (ref 0–10)
EOSINOPHIL # BLD AUTO: 0.1 X10E3/UL (ref 0–0.4)
EOSINOPHIL NFR BLD AUTO: 1 %
ERYTHROCYTE [DISTWIDTH] IN BLOOD BY AUTOMATED COUNT: 12.4 % (ref 11.7–15.4)
ERYTHROCYTE [SEDIMENTATION RATE] IN BLOOD BY WESTERGREN METHOD: 2 MM/HR (ref 0–40)
HCT VFR BLD AUTO: 36.8 % (ref 34–46.6)
HGB BLD-MCNC: 12.9 G/DL (ref 11.1–15.9)
IMM GRANULOCYTES # BLD AUTO: 0 X10E3/UL (ref 0–0.1)
IMM GRANULOCYTES NFR BLD AUTO: 0 %
LYMPHOCYTES # BLD AUTO: 1.7 X10E3/UL (ref 0.7–3.1)
LYMPHOCYTES NFR BLD AUTO: 27 %
MCH RBC QN AUTO: 31.9 PG (ref 26.6–33)
MCHC RBC AUTO-ENTMCNC: 35.1 G/DL (ref 31.5–35.7)
MCV RBC AUTO: 91 FL (ref 79–97)
MONOCYTES # BLD AUTO: 0.4 X10E3/UL (ref 0.1–0.9)
MONOCYTES NFR BLD AUTO: 6 %
NEUTROPHILS # BLD AUTO: 4.3 X10E3/UL (ref 1.4–7)
NEUTROPHILS NFR BLD AUTO: 66 %
PLATELET # BLD AUTO: 390 X10E3/UL (ref 150–450)
RBC # BLD AUTO: 4.04 X10E6/UL (ref 3.77–5.28)
URATE SERPL-MCNC: 2.5 MG/DL (ref 3–7.2)
WBC # BLD AUTO: 6.5 X10E3/UL (ref 3.4–10.8)

## 2023-05-10 ENCOUNTER — OFFICE VISIT (OUTPATIENT)
Age: 53
End: 2023-05-10

## 2023-05-10 VITALS — BODY MASS INDEX: 33.73 KG/M2 | WEIGHT: 209 LBS | TEMPERATURE: 98.2 F

## 2023-05-10 DIAGNOSIS — Z96.651 PRESENCE OF RIGHT ARTIFICIAL KNEE JOINT: Primary | ICD-10-CM

## 2023-05-10 DIAGNOSIS — M25.461 FLUID IN RIGHT KNEE JOINT: ICD-10-CM

## 2023-05-10 DIAGNOSIS — M25.561 ACUTE PAIN OF RIGHT KNEE: ICD-10-CM

## 2023-05-10 DIAGNOSIS — R20.8 WARMNESS OF SKIN: ICD-10-CM

## 2023-05-10 RX ORDER — OXYCODONE AND ACETAMINOPHEN 7.5; 325 MG/1; MG/1
1-2 TABLET ORAL EVERY 8 HOURS PRN
Qty: 42 TABLET | Refills: 0 | Status: SHIPPED | OUTPATIENT
Start: 2023-05-10 | End: 2023-05-17

## 2023-05-10 NOTE — PROGRESS NOTES
Patient: Padmini Hoang                MRN: [id number]       SSN: [id number]  YOB: 1970        AGE: 45 y.o. SEX: female  Body mass index is 33.73 kg/m². PCP: Jake Guerrero MD  05/10/23            REVIEW OF SYSTEMS:  Constitutional: Negative for fever, chills, weight loss and malaise/fatigue. HENT: Negative. Eyes: Negative. Respiratory: Negative. Cardiovascular: Negative. Gastrointestinal: No bowel incontinence or constipation. Genitourinary: No bladder incontinence or saddle anesthesia. Skin: Negative. Neurological: Negative. Endo/Heme/Allergies: Negative. Psychiatric/Behavioral: Negative. Musculoskeletal: As per HPI above. Past Medical History:   Diagnosis Date    Arthritis     Asthma     Bipolar disorder ([address]     GERD (gastroesophageal reflux disease)     [place] (hard of hearing)     Hearing aid right ear . Deaf in left ear    Obesity (BMI 30.0-34.9) 3/1/2017    PTSD (post-traumatic stress disorder)          Current Outpatient Medications:     ibuprofen (ADVIL;MOTRIN) 600 MG tablet, Take 1 tablet by mouth 4 times daily as needed for Pain, Disp: 40 tablet, Rfl: 0    lidocaine 4 % external patch, Place 1 patch onto the skin daily, Disp: 30 patch, Rfl: 0    magnesium oxide (MAG-OX) 400 (240 Mg) MG tablet, Take 1 tablet by mouth daily as needed (muscle spasm), Disp: 30 tablet, Rfl: 0    celecoxib (CELEBREX) 200 MG capsule, TAKE 1 CAPSULE BY MOUTH TWICE DAILY WITH MEALS **NOT COVERED, Disp: 60 capsule, Rfl: 2    aspirin EC 81 MG EC tablet, Take 1 tablet by mouth 2 times daily, Disp: 60 tablet, Rfl: 1    celecoxib (CELEBREX) 200 MG capsule, Take 1 capsule by mouth 2 times daily, Disp: 60 capsule, Rfl: 2    ondansetron (ZOFRAN) 4 MG tablet, Take 1 tablet by mouth every 8 hours as needed for Nausea or Vomiting, Disp: 30 tablet, Rfl: 2    clonazePAM (KLONOPIN) 0.5 MG tablet, Take 1 tablet by mouth daily. , Disp: , Rfl:     vitamin D (ERGOCALCIFEROL) 1.25 MG

## 2023-05-11 LAB — SPECIMEN STATUS REPORT: NORMAL

## 2023-05-12 ENCOUNTER — TELEPHONE (OUTPATIENT)
Age: 53
End: 2023-05-12

## 2023-05-12 ENCOUNTER — HOSPITAL ENCOUNTER (OUTPATIENT)
Facility: HOSPITAL | Age: 53
Setting detail: RECURRING SERIES
Discharge: HOME | End: 2023-05-15
Payer: MEDICAID

## 2023-05-12 LAB
GRAM STN SPEC: NORMAL
GRAM STN SPEC: NORMAL

## 2023-05-12 PROCEDURE — 97116 GAIT TRAINING THERAPY: CPT

## 2023-05-12 PROCEDURE — 97112 NEUROMUSCULAR REEDUCATION: CPT

## 2023-05-12 PROCEDURE — 97110 THERAPEUTIC EXERCISES: CPT

## 2023-05-12 NOTE — TELEPHONE ENCOUNTER
Post op patient called and said that the pharmacy told her that a Prior Auth is needed for the Oxycodone medication. [facility] on [facility]   Tel. [phone]    Patient tel. [phone]. Note : patient was prescribed the Oxycodone on 5/10/23 by LOIS Baron. Patient has an appt to see LOIS Baron on 5/18/23 for Right Knee.

## 2023-05-12 NOTE — TELEPHONE ENCOUNTER
Prior Dave Cornelius has been approved at this time . Pt has been informed of the approval for the prior auth.

## 2023-05-14 LAB — BACTERIA FLD CULT: NORMAL

## 2023-05-15 LAB
APPEARANCE FLD: ABNORMAL
CIL COLUMNAR LINING CELLS FLD: 0 %
COLOR FLD: ABNORMAL
EOSINOPHIL NFR FLD MANUAL: 0 %
LYMPHOCYTES NFR FLD MANUAL: 12 %
MACROPHAGES NFR FLD MANUAL: 0 %
NEUTROPHILS NFR FLD MANUAL: 88 %
NUC CELL # FLD: 1064 CELLS/UL (ref 0–200)
RBC # FLD AUTO: ABNORMAL /UL

## 2023-05-16 ENCOUNTER — HOSPITAL ENCOUNTER (OUTPATIENT)
Facility: HOSPITAL | Age: 53
Setting detail: RECURRING SERIES
Discharge: HOME | End: 2023-05-19
Payer: MEDICAID

## 2023-05-16 PROCEDURE — 97116 GAIT TRAINING THERAPY: CPT

## 2023-05-16 PROCEDURE — 97112 NEUROMUSCULAR REEDUCATION: CPT

## 2023-05-16 PROCEDURE — 97110 THERAPEUTIC EXERCISES: CPT

## 2023-05-16 NOTE — PROGRESS NOTES
PHYSICAL / OCCUPATIONAL THERAPY - DAILY TREATMENT NOTE (updated )    Patient Name: Ariela Rivera    Date: 2023    : 1970  Insurance: Payor: Luciana Chen / Plan: Jt Gustafson / Product Type: *No Product type* /      Patient  verified Yes     Visit #   Current / Total 3 24   Time   In / Out 1030 1123   Pain   In / Out 6 7   Subjective Functional Status/Changes: Patient reports increased p! In her right lateral quad; patient states that it feels like it may be \"inside the leg bone\". She states that she underwent a test last Thursday and she is awaiting the test results this Thursday. Changes to:  Meds, Allergies, Med Hx, Sx Hx?   If yes, update Summary List no             TREATMENT AREA =  Right knee pain [M25.561]  Modality rationale: decrease inflammation and decrease pain to improve the patients ability to tolerate ADLs post PT   Min Type Additional Details    [] Estim:  []Unatt       []IFC  []Premod                        []Other:  []w/ice   []w/heat  Position:  Location:    [] Estim: []Att    []TENS instruct  []NMES                    []Other:  []w/US   []w/ice   []w/heat  Position:  Location:    []  Traction: [] Cervical       []Lumbar                       [] Prone          []Supine                       []Intermittent   []Continuous Lbs:  [] before manual  [] after manual    []  Ultrasound: []Continuous   [] Pulsed                           []1MHz   []3MHz Location:  W/cm2:    []  Iontophoresis with dexamethasone         Location: [] Take home patch   [] In clinic   10 [x]  Ice     []  heat  []  Ice massage  []  Laser   []  Anodyne Position:long sitting  Location:right lateral quad    []  Laser with stim  []  Other: Position:  Location:    []  Vasopneumatic Device  Pre-treatment girth:  Post-treatment girth:  Measured at (location):  Pressure:       [] lo [] med [] hi   Temperature: [] lo [] med [] hi   [x] Skin assessment post-treatment:

## 2023-05-17 LAB — BACTERIA FLD CULT: ABNORMAL

## 2023-05-18 ENCOUNTER — HOSPITAL ENCOUNTER (OUTPATIENT)
Facility: HOSPITAL | Age: 53
Setting detail: RECURRING SERIES
Discharge: HOME | End: 2023-05-21
Payer: MEDICAID

## 2023-05-18 DIAGNOSIS — Z96.651 PRESENCE OF RIGHT ARTIFICIAL KNEE JOINT: Primary | ICD-10-CM

## 2023-05-18 PROCEDURE — 97116 GAIT TRAINING THERAPY: CPT

## 2023-05-18 PROCEDURE — 97110 THERAPEUTIC EXERCISES: CPT

## 2023-05-18 PROCEDURE — 97112 NEUROMUSCULAR REEDUCATION: CPT

## 2023-05-18 RX ORDER — LEVOFLOXACIN 250 MG/1
500 TABLET ORAL DAILY
Qty: 20 TABLET | Refills: 0 | Status: SHIPPED | OUTPATIENT
Start: 2023-05-18 | End: 2023-05-28

## 2023-05-26 ENCOUNTER — OFFICE VISIT (OUTPATIENT)
Age: 53
End: 2023-05-26

## 2023-05-26 VITALS — WEIGHT: 209 LBS | HEIGHT: 66 IN | BODY MASS INDEX: 33.59 KG/M2

## 2023-05-26 DIAGNOSIS — Z96.651 PRESENCE OF RIGHT ARTIFICIAL KNEE JOINT: Primary | ICD-10-CM

## 2023-05-26 PROCEDURE — 99024 POSTOP FOLLOW-UP VISIT: CPT | Performed by: PHYSICIAN ASSISTANT

## 2023-05-26 RX ORDER — CEPHALEXIN 500 MG/1
500 CAPSULE ORAL 4 TIMES DAILY
Qty: 20 CAPSULE | Refills: 0 | Status: CANCELLED | OUTPATIENT
Start: 2023-05-26

## 2023-05-26 RX ORDER — LEVOFLOXACIN 500 MG/1
500 TABLET, FILM COATED ORAL DAILY
Qty: 14 TABLET | Refills: 0 | Status: SHIPPED | OUTPATIENT
Start: 2023-05-26 | End: 2023-06-09

## 2023-05-26 RX ORDER — OXYCODONE AND ACETAMINOPHEN 7.5; 325 MG/1; MG/1
1-2 TABLET ORAL EVERY 8 HOURS PRN
Qty: 42 TABLET | Refills: 0 | Status: SHIPPED | OUTPATIENT
Start: 2023-05-26 | End: 2023-06-02

## 2023-05-26 NOTE — PROGRESS NOTES
Patient: Hallie Nunez                MRN: [id number]       SSN: [id number]  YOB: 1970        AGE: 45 y.o. SEX: female  Body mass index is 33.73 kg/m². PCP: Phong Germain MD  05/26/23      This office note has been dictated. REVIEW OF SYSTEMS:  Constitutional: Negative for fever, chills, weight loss and malaise/fatigue. HENT: Negative. Eyes: Negative. Respiratory: Negative. Cardiovascular: Negative. Gastrointestinal: No bowel incontinence or constipation. Genitourinary: No bladder incontinence or saddle anesthesia. Skin: Negative. Neurological: Negative. Endo/Heme/Allergies: Negative. Psychiatric/Behavioral: Negative. Musculoskeletal: As per HPI above. Past Medical History:   Diagnosis Date    Arthritis     Asthma     Bipolar disorder ([facility] [address]     GERD (gastroesophageal reflux disease)     [place] (hard of hearing)     Hearing aid right ear . Deaf in left ear    Obesity (BMI 30.0-34.9) 3/1/2017    PTSD (post-traumatic stress disorder)          Current Outpatient Medications:     levoFLOXacin (LEVAQUIN) 250 MG tablet, Take 2 tablets by mouth daily for 10 days, Disp: 20 tablet, Rfl: 0    ibuprofen (ADVIL;MOTRIN) 600 MG tablet, Take 1 tablet by mouth 4 times daily as needed for Pain, Disp: 40 tablet, Rfl: 0    magnesium oxide (MAG-OX) 400 (240 Mg) MG tablet, Take 1 tablet by mouth daily as needed (muscle spasm), Disp: 30 tablet, Rfl: 0    celecoxib (CELEBREX) 200 MG capsule, TAKE 1 CAPSULE BY MOUTH TWICE DAILY WITH MEALS **NOT COVERED, Disp: 60 capsule, Rfl: 2    aspirin EC 81 MG EC tablet, Take 1 tablet by mouth 2 times daily, Disp: 60 tablet, Rfl: 1    celecoxib (CELEBREX) 200 MG capsule, Take 1 capsule by mouth 2 times daily, Disp: 60 capsule, Rfl: 2    ondansetron (ZOFRAN) 4 MG tablet, Take 1 tablet by mouth every 8 hours as needed for Nausea or Vomiting, Disp: 30 tablet, Rfl: 2    clonazePAM (KLONOPIN) 0.5 MG tablet, Take 1 tablet by mouth daily. ,

## 2023-05-31 ENCOUNTER — HOSPITAL ENCOUNTER (OUTPATIENT)
Facility: HOSPITAL | Age: 53
Setting detail: RECURRING SERIES
Discharge: HOME | End: 2023-06-03
Payer: MEDICAID

## 2023-05-31 PROCEDURE — 97530 THERAPEUTIC ACTIVITIES: CPT

## 2023-05-31 PROCEDURE — 97112 NEUROMUSCULAR REEDUCATION: CPT

## 2023-05-31 PROCEDURE — 97110 THERAPEUTIC EXERCISES: CPT

## 2023-05-31 NOTE — PROGRESS NOTES
PHYSICAL / OCCUPATIONAL THERAPY - DAILY TREATMENT NOTE (updated )    Patient Name: Ana M Velasquez    Date: 2023    : 1970  Insurance: Payor: Karyn Ambrocio / Plan: Lauren Collado / Product Type: *No Product type* /      Patient  verified Yes     Visit #   Current / Total 5 32   Time   In / Out 11:18 12:10   Pain   In / Out 5 6   Subjective Functional Status/Changes: Patient stated she is seeing infectious disease MD on . Patient stated MD is fine with her continuing with PT   Changes to:  Meds, Allergies, Med Hx, Sx Hx? If yes, update Summary List no       TREATMENT AREA =  Right knee pain [M25.561]    OBJECTIVE    Modalities Rationale:     decrease edema, decrease inflammation, and decrease pain to improve patient's ability to progress to PLOF and address remaining functional goals. min [] Estim Unattended, type/location:                                      []  w/ice    []  w/heat    min [] Estim Attended, type/location:                                     []  w/US     []  w/ice    []  w/heat    []  TENS insruct      min []  Mechanical Traction: type/lbs                   []  pro   []  sup   []  int   []  cont    []  before manual    []  after manual    min []  Ultrasound, settings/location:      min []  Iontophoresis w/ dexamethasone, location:                                               []  take home patch       []  in clinic     10   min  unbilled [x]  Ice     []  Heat    location/position:  Reclined; right knee    min []  Paraffin,  details:     min []  Vasopneumatic Device, press/temp:     min []  Mirella Figueroa / Josie Glad:     If using vaso (only need to measure limb vaso being performed on)      pre-treatment girth :       post-treatment girth :       measured at (landmark location) :      min []  Other:    Skin assessment post-treatment (if applicable):    []  intact    []  redness- no adverse reaction                 []redness - adverse reaction:

## 2023-06-06 ENCOUNTER — HOSPITAL ENCOUNTER (OUTPATIENT)
Facility: HOSPITAL | Age: 53
Setting detail: RECURRING SERIES
Discharge: HOME | End: 2023-06-09
Payer: MEDICAID

## 2023-06-06 DIAGNOSIS — Z96.651 PRESENCE OF RIGHT ARTIFICIAL KNEE JOINT: Primary | ICD-10-CM

## 2023-06-06 PROCEDURE — 97110 THERAPEUTIC EXERCISES: CPT

## 2023-06-06 PROCEDURE — 97112 NEUROMUSCULAR REEDUCATION: CPT

## 2023-06-06 NOTE — PROGRESS NOTES
PHYSICAL / OCCUPATIONAL THERAPY - DAILY TREATMENT NOTE (updated )    Patient Name: Amrit Constantino    Date: 2023    : 1970  Insurance: Payor: King Lancaster / Plan: SHELBY Banner [employer]US / Product Type: *No Product type* /      Patient  verified Yes     Visit #   Current / Total 6 32   Time   In / Out 235 319   Pain   In / Out 5 5.5   Subjective Functional Status/Changes: Per patient, infectious disease MD told her that she can continue PT but that Dr. Lillian Brand will need to \"go back in to clean out the knee\". She is unsure of when. Patient arrived with flip flops donned today because her dog urinated in her tennis shoes. She's to take antibiotics for another 3 weeks. Changes to:  Meds, Allergies, Med Hx, Sx Hx? If yes, update Summary List no       TREATMENT AREA =  Right knee pain [M25.561]    OBJECTIVE    Modalities Rationale:     decrease edema, decrease inflammation, and decrease pain to improve patient's ability to progress to PLOF and address remaining functional goals. min [] Estim Unattended, type/location:                                      []  w/ice    []  w/heat    min [] Estim Attended, type/location:                                     []  w/US     []  w/ice    []  w/heat    []  TENS insruct      min []  Mechanical Traction: type/lbs                   []  pro   []  sup   []  int   []  cont    []  before manual    []  after manual    min []  Ultrasound, settings/location:      min []  Iontophoresis w/ dexamethasone, location:                                               []  take home patch       []  in clinic     10   min  unbilled [x]  Ice     []  Heat    location/position:  Reclined; right knee    min []  Paraffin,  details:     min []  Vasopneumatic Device, press/temp:     min []  Hiram Aamir / Jac Emir:     If using vaso (only need to measure limb vaso being performed on)      pre-treatment girth :       post-treatment girth :       measured at (landmark

## 2023-06-06 NOTE — TELEPHONE ENCOUNTER
Patient is requesting a refill on hydrocodone sent to her [facility] pharmacy on [address]        Please call and advise patient when medication is sent in   [phone]

## 2023-06-07 RX ORDER — HYDROCODONE BITARTRATE AND ACETAMINOPHEN 10; 325 MG/1; MG/1
1 TABLET ORAL EVERY 8 HOURS PRN
Qty: 21 TABLET | Refills: 0 | Status: SHIPPED | OUTPATIENT
Start: 2023-06-07 | End: 2023-06-14

## 2023-06-08 ENCOUNTER — TELEPHONE (OUTPATIENT)
Age: 53
End: 2023-06-08

## 2023-06-08 RX ORDER — LEVOFLOXACIN 500 MG/1
500 TABLET, FILM COATED ORAL DAILY
Qty: 10 TABLET | Refills: 0 | Status: SHIPPED | OUTPATIENT
Start: 2023-06-08 | End: 2023-06-18

## 2023-06-08 NOTE — TELEPHONE ENCOUNTER
Prior auth initiated via Cover My Meds. Received authorization.     6/8/2023    Key: [id number]    Patient made aware

## 2023-06-15 ENCOUNTER — HOSPITAL ENCOUNTER (OUTPATIENT)
Facility: HOSPITAL | Age: 53
Setting detail: RECURRING SERIES
Discharge: HOME | End: 2023-06-18
Payer: MEDICAID

## 2023-06-15 PROCEDURE — 97530 THERAPEUTIC ACTIVITIES: CPT

## 2023-06-15 PROCEDURE — 97110 THERAPEUTIC EXERCISES: CPT

## 2023-06-15 PROCEDURE — 97112 NEUROMUSCULAR REEDUCATION: CPT

## 2023-06-20 ENCOUNTER — APPOINTMENT (OUTPATIENT)
Facility: HOSPITAL | Age: 53
End: 2023-06-20
Payer: MEDICAID

## 2023-06-21 ENCOUNTER — HOSPITAL ENCOUNTER (OUTPATIENT)
Facility: HOSPITAL | Age: 53
Setting detail: RECURRING SERIES
Discharge: HOME | End: 2023-06-24
Payer: MEDICAID

## 2023-06-21 PROCEDURE — 97112 NEUROMUSCULAR REEDUCATION: CPT

## 2023-06-21 PROCEDURE — 97530 THERAPEUTIC ACTIVITIES: CPT

## 2023-06-21 PROCEDURE — 97110 THERAPEUTIC EXERCISES: CPT

## 2023-06-21 NOTE — PROGRESS NOTES
PHYSICAL / OCCUPATIONAL THERAPY - DAILY TREATMENT NOTE (updated )    Patient Name: Celina Siddiqui    Date: 2023    : 1970  Insurance: Payor: Cristy Scheuermann / Plan: Euell Low / Product Type: *No Product type* /      Patient  verified Yes     Visit #   Current / Total 9 40   Time   In / Out 312 404   Pain   In / Out 6.5 7.5   Subjective Functional Status/Changes: I am hurting today. My mom fell on my legs last night when I was helping her get out of the bed. I'll try the exercises. Changes to:  Meds, Allergies, Med Hx, Sx Hx? If yes, update Summary List no       TREATMENT AREA =  Right knee pain [M25.561]    OBJECTIVE    Modalities Rationale:     decrease pain to improve patient's ability to progress to PLOF and address remaining functional goals. min [] Estim Unattended, type/location:                                      []  w/ice    []  w/heat    min [] Estim Attended, type/location:                                     []  w/US     []  w/ice    []  w/heat    []  TENS insruct      min []  Mechanical Traction: type/lbs                   []  pro   []  sup   []  int   []  cont    []  before manual    []  after manual    min []  Ultrasound, settings/location:      min []  Iontophoresis w/ dexamethasone, location:                                               []  take home patch       []  in clinic   10     min  unbilled [x]  Ice     []  Heat    location/position: Supine with wedge; right knee    min []  Paraffin,  details:     min []  Vasopneumatic Device, press/temp:     min []  Juan Garrett / Maryjo Petersen:     If using vaso (only need to measure limb vaso being performed on)      pre-treatment girth :       post-treatment girth :       measured at (landmark location) :      min []  Other:    Skin assessment post-treatment (if applicable):    []  intact    []  redness- no adverse reaction                 []redness - adverse reaction:         Therapeutic

## 2023-06-22 ENCOUNTER — APPOINTMENT (OUTPATIENT)
Facility: HOSPITAL | Age: 53
End: 2023-06-22
Payer: MEDICAID

## 2023-06-23 ENCOUNTER — HOSPITAL ENCOUNTER (OUTPATIENT)
Facility: HOSPITAL | Age: 53
Discharge: HOME | End: 2023-06-26

## 2023-06-23 ENCOUNTER — OFFICE VISIT (OUTPATIENT)
Age: 53
End: 2023-06-23

## 2023-06-23 VITALS — BODY MASS INDEX: 33.59 KG/M2 | HEIGHT: 66 IN | WEIGHT: 209 LBS

## 2023-06-23 DIAGNOSIS — M25.561 ACUTE PAIN OF RIGHT KNEE: ICD-10-CM

## 2023-06-23 DIAGNOSIS — Z96.651 PRESENCE OF RIGHT ARTIFICIAL KNEE JOINT: Primary | ICD-10-CM

## 2023-06-23 LAB — LABCORP SPECIMEN COLLECTION: NORMAL

## 2023-06-23 RX ORDER — HYDROCODONE BITARTRATE AND ACETAMINOPHEN 7.5; 325 MG/1; MG/1
1 TABLET ORAL EVERY 6 HOURS PRN
Qty: 28 TABLET | Refills: 0 | Status: SHIPPED | OUTPATIENT
Start: 2023-06-23 | End: 2023-06-30

## 2023-06-23 NOTE — PROGRESS NOTES
Patient: Shaquille Ibarra                MRN: [id number]       SSN: [id number]  YOB: 1970        AGE: 52 y.o. SEX: female  Body mass index is 33.73 kg/m². PCP: Salvador Miller MD  06/23/23      This office note has been dictated. REVIEW OF SYSTEMS:  Constitutional: Negative for fever, chills, weight loss and malaise/fatigue. HENT: Negative. Eyes: Negative. Respiratory: Negative. Cardiovascular: Negative. Gastrointestinal: No bowel incontinence or constipation. Genitourinary: No bladder incontinence or saddle anesthesia. Skin: Negative. Neurological: Negative. Endo/Heme/Allergies: Negative. Psychiatric/Behavioral: Negative. Musculoskeletal: As per HPI above. Past Medical History:   Diagnosis Date    Arthritis     Asthma     Bipolar disorder ([facility] [address]     GERD (gastroesophageal reflux disease)     [place] (hard of hearing)     Hearing aid right ear . Deaf in left ear    Obesity (BMI 30.0-34.9) 3/1/2017    PTSD (post-traumatic stress disorder)          Current Outpatient Medications:     HYDROcodone-acetaminophen (NORCO)  MG per tablet, Take 1 tablet by mouth every 8 hours as needed for Pain for up to 7 days.  Intended supply: 30 days Max Daily Amount: 3 tablets, Disp: 21 tablet, Rfl: 0    ibuprofen (ADVIL;MOTRIN) 600 MG tablet, Take 1 tablet by mouth 4 times daily as needed for Pain, Disp: 40 tablet, Rfl: 0    magnesium oxide (MAG-OX) 400 (240 Mg) MG tablet, Take 1 tablet by mouth daily as needed (muscle spasm), Disp: 30 tablet, Rfl: 0    celecoxib (CELEBREX) 200 MG capsule, TAKE 1 CAPSULE BY MOUTH TWICE DAILY WITH MEALS **NOT COVERED, Disp: 60 capsule, Rfl: 2    aspirin EC 81 MG EC tablet, Take 1 tablet by mouth 2 times daily, Disp: 60 tablet, Rfl: 1    celecoxib (CELEBREX) 200 MG capsule, Take 1 capsule by mouth 2 times daily, Disp: 60 capsule, Rfl: 2    ondansetron (ZOFRAN) 4 MG tablet, Take 1 tablet by mouth every 8 hours as needed for Nausea or

## 2023-06-24 LAB
CRP SERPL-MCNC: 11 MG/L (ref 0–10)
ERYTHROCYTE [DISTWIDTH] IN BLOOD BY AUTOMATED COUNT: 12.5 % (ref 11.7–15.4)
ERYTHROCYTE [SEDIMENTATION RATE] IN BLOOD BY WESTERGREN METHOD: 5 MM/HR (ref 0–40)
HCT VFR BLD AUTO: 41 % (ref 34–46.6)
HGB BLD-MCNC: 14 G/DL (ref 11.1–15.9)
MCH RBC QN AUTO: 30 PG (ref 26.6–33)
MCHC RBC AUTO-ENTMCNC: 34.1 G/DL (ref 31.5–35.7)
MCV RBC AUTO: 88 FL (ref 79–97)
PLATELET # BLD AUTO: 340 X10E3/UL (ref 150–450)
RBC # BLD AUTO: 4.66 X10E6/UL (ref 3.77–5.28)
URATE SERPL-MCNC: 2.9 MG/DL (ref 3–7.2)
WBC # BLD AUTO: 5.7 X10E3/UL (ref 3.4–10.8)

## 2023-06-26 LAB — IL6 SERPL-MCNC: 10.8 PG/ML (ref 0–13)

## 2023-06-27 ENCOUNTER — HOSPITAL ENCOUNTER (OUTPATIENT)
Facility: HOSPITAL | Age: 53
Setting detail: RECURRING SERIES
Discharge: HOME | End: 2023-06-30
Payer: MEDICAID

## 2023-06-27 PROCEDURE — 97112 NEUROMUSCULAR REEDUCATION: CPT

## 2023-06-27 PROCEDURE — 97110 THERAPEUTIC EXERCISES: CPT

## 2023-06-27 PROCEDURE — 97530 THERAPEUTIC ACTIVITIES: CPT

## 2023-06-29 ENCOUNTER — HOSPITAL ENCOUNTER (OUTPATIENT)
Facility: HOSPITAL | Age: 53
Setting detail: RECURRING SERIES
End: 2023-06-29
Payer: MEDICAID

## 2023-06-29 PROCEDURE — 97110 THERAPEUTIC EXERCISES: CPT

## 2023-06-29 PROCEDURE — 97116 GAIT TRAINING THERAPY: CPT

## 2023-06-29 PROCEDURE — 97112 NEUROMUSCULAR REEDUCATION: CPT

## 2023-07-05 ENCOUNTER — TELEPHONE (OUTPATIENT)
Age: 53
End: 2023-07-05

## 2023-07-10 ENCOUNTER — TELEPHONE (OUTPATIENT)
Facility: HOSPITAL | Age: 53
End: 2023-07-10

## 2023-07-11 ENCOUNTER — APPOINTMENT (OUTPATIENT)
Facility: HOSPITAL | Age: 53
End: 2023-07-11
Payer: MEDICAID

## 2023-07-13 ENCOUNTER — OFFICE VISIT (OUTPATIENT)
Age: 53
End: 2023-07-13
Payer: MEDICAID

## 2023-07-13 ENCOUNTER — APPOINTMENT (OUTPATIENT)
Facility: HOSPITAL | Age: 53
End: 2023-07-13
Payer: MEDICAID

## 2023-07-13 VITALS — BODY MASS INDEX: 33.59 KG/M2 | HEIGHT: 66 IN | WEIGHT: 209 LBS

## 2023-07-13 DIAGNOSIS — M17.12 UNILATERAL PRIMARY OSTEOARTHRITIS, LEFT KNEE: Primary | ICD-10-CM

## 2023-07-13 DIAGNOSIS — Z96.651 PRESENCE OF RIGHT ARTIFICIAL KNEE JOINT: ICD-10-CM

## 2023-07-13 PROCEDURE — 99214 OFFICE O/P EST MOD 30 MIN: CPT | Performed by: PHYSICIAN ASSISTANT

## 2023-07-13 RX ORDER — HYDROCODONE BITARTRATE AND ACETAMINOPHEN 5; 325 MG/1; MG/1
1 TABLET ORAL EVERY 6 HOURS PRN
Qty: 28 TABLET | Refills: 0 | Status: SHIPPED | OUTPATIENT
Start: 2023-07-13 | End: 2023-07-20

## 2023-07-13 NOTE — PROGRESS NOTES
Resource Strain: Not on file   Food Insecurity: Not on file   Transportation Needs: Not on file   Physical Activity: Not on file   Stress: Not on file   Social Connections: Not on file   Intimate Partner Violence: Not on file   Housing Stability: Not on file       Past Surgical History:   Procedure Laterality Date    CHOLECYSTECTOMY      GASTRIC BYPASS SURGERY  2008    MASTOIDECTOMY Left 2008    [address] Right 2017    SHOULDER ARTHROPLASTY Right 2020    TONSILLECTOMY      TOTAL KNEE ARTHROPLASTY Right 4/3/2023    RIGHT TOTAL KNEE ARTHROPLASTY performed by Mary Jane Gant MD at [address] [place] [address]         Patient seen evaluated today for her right total replacement. She is now approximately 3-month status post surgery. She did have an aspiration of her knee in the past which was normal however did grow some bacteria in broth only. She is treated by infectious disease. She has had a stumble and felt a pop in her knee. She is able to extend her leg. She reports some soreness and stiffness in her knee at times. She denies any fevers or chills. Patient denies recent fevers, chills, chest pain, SOB, or injuries. No recent systemic changes noted. A 12-point review of systems is performed today. Pertinent positives are noted. All other systems reviewed and otherwise are negative. Physical exam: General: Alert and oriented x3, nad.  well-developed, well nourished. normal affect, AF. NC/AT, EOMI, neck supple, trachea midline, no JVD present. Breathing is non-labored. Examination of the lower extremities reveals pain-free range of motion the hips. There is no pain to palpation the trochanter bursa. Negative straight leg raise. Negative calf tenderness. Negative Homans. No signs of DVT present. The right knee reveals skin intact. There is no erythema, ecchymosis or warmth. There are no signs for infection or cellulitis present. She is able to hold a straight leg raise.   There are no defects

## 2023-07-17 ENCOUNTER — TELEPHONE (OUTPATIENT)
Age: 53
End: 2023-07-17

## 2023-07-17 ENCOUNTER — HOSPITAL ENCOUNTER (OUTPATIENT)
Facility: HOSPITAL | Age: 53
Setting detail: RECURRING SERIES
Discharge: HOME | End: 2023-07-20
Payer: MEDICAID

## 2023-07-17 PROCEDURE — 97110 THERAPEUTIC EXERCISES: CPT

## 2023-07-17 PROCEDURE — 97112 NEUROMUSCULAR REEDUCATION: CPT

## 2023-07-17 NOTE — TELEPHONE ENCOUNTER
Patient kimeld and said that a Prior Auth is needed for the Hydrocodone medication that was prescribed by LOIS Hou on 7/13/23. [facility] on [facility]  Tel. [phone]. Patient tel. [phone]. Note : patient last seen on 7/13/23 for the left knee.

## 2023-07-17 NOTE — PROGRESS NOTES
PHYSICAL / OCCUPATIONAL THERAPY - DAILY TREATMENT NOTE (updated )    Patient Name: Piper Rg    Date: 2023    : 1970  Insurance: Payor: Gurvinder Mejia / Plan: Britton Harper / Product Type: *No Product type* /      Patient  verified Yes     Visit #   Current / Total 12 40   Time   In / Out 233 309   Pain   In / Out 7 7   Subjective Functional Status/Changes: Pt reports she has an appointment with her PA this week to schedule an MRI for her right knee. Changes to: Allergies, Med Hx, Sx Hx?   no       TREATMENT AREA =  Right knee pain [M25.561]    OBJECTIVE    Therapeutic Procedures: Tx Min Billable or 1:1 Min (if diff from Tx Min) Procedure, Rationale, Specifics   20  50566 Therapeutic Exercise (timed):  increase ROM, strength, coordination, balance, and proprioception to improve patient's ability to progress to PLOF and address remaining functional goals. (see flow sheet as applicable)     Details if applicable:       8  63675 Neuromuscular Re-Education (timed):  improve balance, coordination, kinesthetic sense, posture, core stability and proprioception to improve patient's ability to develop conscious control of individual muscles and awareness of position of extremities in order to progress to PLOF and address remaining functional goals. (see flow sheet as applicable)     Details if applicable:            Details if applicable:     8  31086 Self Care/Home Management (timed):  improve patient knowledge and understanding of pain reducing techniques, positioning, posture/ergonomics, home safety, and activity modification  to improve patient's ability to progress to PLOF and address remaining functional goals.   (see flow sheet as applicable)     Details if applicable:  FOTO, HEP update and review, discharge instructions          Details if applicable:     39  MC BC Totals Reminder: bill using total billable min of TIMED therapeutic procedures (example: do
strength noted      5. Patient to ambulate void of assistive device and gait deviations for improved gait efficiency in the household. Evaluation: ambulates with RW; gait characterized by minimal heel strike and toe off resulting in minimal right knee flexion in swing phase  PN status: progressing no AD however continued cueing required for increased step width and length as well as improved heel strike   DC: Ambulates w/o any AD with Verbal cues about correct gait pattern and weight shifts with SBA/CGA     6. Patient to improve FOTO score to 68 to indicate improvement in functional capacity and progression towards PLOF. Evaluation: 47  PN status: no change FOTO score 52%  DC: progressing FOTO score 65%    Assessment/ Summary of Care:   Slow progress with treatment to the right knee since I.E. The pt continues to report pain at about a 6-7/10 and continues to ambulate with an antalgic, unsteady gait. The pt was able to reach about 120 deg of right knee flexion and was also able to reach TKE of the right knee. The pt continues to display some weakness in the right knee secondary to pain and continues to display minor quad lag with SLR's. The pt reports she has an appointment this week to schedule an MRI for the right knee due to her remaining high pain levels and due to reports of her knee cap being \"off center\". Due to pt displaying slow progress with little change, the pt was discharged to refer back to her MD. No change in pain post treatment.     RECOMMENDATIONS:  [x]Discontinue therapy: []Patient has reached or is progressing toward set goals      []Patient is non-compliant or has abdicated      [x]Due to lack of appreciable progress towards set 100 [facility] 7/17/2023 4:02 PM

## 2023-07-19 ENCOUNTER — APPOINTMENT (OUTPATIENT)
Facility: HOSPITAL | Age: 53
End: 2023-07-19
Payer: MEDICAID

## 2023-07-24 ENCOUNTER — APPOINTMENT (OUTPATIENT)
Facility: HOSPITAL | Age: 53
End: 2023-07-24
Payer: MEDICAID

## 2023-07-25 ENCOUNTER — HOSPITAL ENCOUNTER (OUTPATIENT)
Facility: HOSPITAL | Age: 53
Discharge: HOME | End: 2023-07-28
Payer: MEDICAID

## 2023-07-25 DIAGNOSIS — Z96.651 PRESENCE OF RIGHT ARTIFICIAL KNEE JOINT: ICD-10-CM

## 2023-07-25 PROCEDURE — 73721 MRI JNT OF LWR EXTRE W/O DYE: CPT

## 2023-08-07 ENCOUNTER — OFFICE VISIT (OUTPATIENT)
Age: 53
End: 2023-08-07
Payer: MEDICAID

## 2023-08-07 VITALS — WEIGHT: 207 LBS | BODY MASS INDEX: 33.27 KG/M2 | HEIGHT: 66 IN

## 2023-08-07 DIAGNOSIS — M22.8X1 PATELLAR MALTRACKING, RIGHT: Primary | ICD-10-CM

## 2023-08-07 DIAGNOSIS — M25.561 ACUTE PAIN OF RIGHT KNEE: ICD-10-CM

## 2023-08-07 DIAGNOSIS — M17.12 UNILATERAL PRIMARY OSTEOARTHRITIS, LEFT KNEE: ICD-10-CM

## 2023-08-07 DIAGNOSIS — Z96.651 PRESENCE OF RIGHT ARTIFICIAL KNEE JOINT: ICD-10-CM

## 2023-08-07 PROCEDURE — 99214 OFFICE O/P EST MOD 30 MIN: CPT | Performed by: PHYSICIAN ASSISTANT

## 2023-08-07 NOTE — PROGRESS NOTES
cellulitis present. The quad tendon is intact. She does have a mild defect to the medial retinaculum. Stability is quite good. Slight tilt of the patella is noted with range of motion. Review of MRI right knee:  IMPRESSION:  Post total knee arthroplasty with associated metallic susceptibility artifact. Findings suggest prior recent lateral patellar dislocation injury with  reciprocal marrow edema of the lateral femoral condyle and medial patella. Essentially full-thickness insertional tear of the medial patellar retinaculum. Large knee joint effusion. 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 I have recommended revision right knee replacement for patellar maltracking and soft tissue repair. The alternatives, risk, complications, expected recovery discussed. The patient understands and wished to proceed.             JR Audie ROQUE PA-C, ATC

## 2023-08-15 DIAGNOSIS — M25.561 ACUTE PAIN OF RIGHT KNEE: ICD-10-CM

## 2023-08-15 DIAGNOSIS — M22.8X1 PATELLAR MALTRACKING, RIGHT: ICD-10-CM

## 2023-08-15 DIAGNOSIS — Z96.651 PRESENCE OF RIGHT ARTIFICIAL KNEE JOINT: Primary | ICD-10-CM

## 2023-08-30 ENCOUNTER — HOSPITAL ENCOUNTER (OUTPATIENT)
Facility: HOSPITAL | Age: 53
Discharge: HOME | End: 2023-09-02

## 2023-08-30 ENCOUNTER — HOSPITAL ENCOUNTER (OUTPATIENT)
Facility: HOSPITAL | Age: 53
Discharge: HOME | End: 2023-09-02
Payer: MEDICAID

## 2023-08-30 DIAGNOSIS — M22.8X1 PATELLAR MALTRACKING, RIGHT: ICD-10-CM

## 2023-08-30 DIAGNOSIS — Z96.651 PRESENCE OF RIGHT ARTIFICIAL KNEE JOINT: ICD-10-CM

## 2023-08-30 DIAGNOSIS — M25.561 ACUTE PAIN OF RIGHT KNEE: ICD-10-CM

## 2023-08-30 LAB — LABCORP SPECIMEN COLLECTION: NORMAL

## 2023-08-30 PROCEDURE — 73700 CT LOWER EXTREMITY W/O DYE: CPT

## 2023-08-31 LAB
BASOPHILS # BLD AUTO: 0 X10E3/UL (ref 0–0.2)
BASOPHILS NFR BLD AUTO: 0 %
CCP IGA+IGG SERPL IA-ACNC: <1 UNITS (ref 0–19)
CK SERPL-CCNC: 45 U/L (ref 32–182)
CRP SERPL-MCNC: 5 MG/L (ref 0–10)
EOSINOPHIL # BLD AUTO: 0.1 X10E3/UL (ref 0–0.4)
EOSINOPHIL NFR BLD AUTO: 1 %
ERYTHROCYTE [DISTWIDTH] IN BLOOD BY AUTOMATED COUNT: 13.4 % (ref 11.7–15.4)
ERYTHROCYTE [SEDIMENTATION RATE] IN BLOOD BY WESTERGREN METHOD: 2 MM/HR (ref 0–40)
HCT VFR BLD AUTO: 40 % (ref 34–46.6)
HGB BLD-MCNC: 13.7 G/DL (ref 11.1–15.9)
IMM GRANULOCYTES # BLD AUTO: 0 X10E3/UL (ref 0–0.1)
IMM GRANULOCYTES NFR BLD AUTO: 0 %
LYMPHOCYTES # BLD AUTO: 2 X10E3/UL (ref 0.7–3.1)
LYMPHOCYTES NFR BLD AUTO: 31 %
MCH RBC QN AUTO: 30 PG (ref 26.6–33)
MCHC RBC AUTO-ENTMCNC: 34.3 G/DL (ref 31.5–35.7)
MCV RBC AUTO: 88 FL (ref 79–97)
MONOCYTES # BLD AUTO: 0.3 X10E3/UL (ref 0.1–0.9)
MONOCYTES NFR BLD AUTO: 5 %
NEUTROPHILS # BLD AUTO: 4 X10E3/UL (ref 1.4–7)
NEUTROPHILS NFR BLD AUTO: 63 %
PLATELET # BLD AUTO: 289 X10E3/UL (ref 150–450)
RBC # BLD AUTO: 4.56 X10E6/UL (ref 3.77–5.28)
SPECIMEN STATUS REPORT: NORMAL
URATE SERPL-MCNC: 2.6 MG/DL (ref 3–7.2)
WBC # BLD AUTO: 6.4 X10E3/UL (ref 3.4–10.8)

## 2023-09-01 ENCOUNTER — OFFICE VISIT (OUTPATIENT)
Age: 53
End: 2023-09-01

## 2023-09-01 VITALS — BODY MASS INDEX: 33.41 KG/M2 | HEIGHT: 66 IN

## 2023-09-01 DIAGNOSIS — Z96.651 PRESENCE OF RIGHT ARTIFICIAL KNEE JOINT: Primary | ICD-10-CM

## 2023-09-01 DIAGNOSIS — M22.8X1 PATELLAR MALTRACKING, RIGHT: ICD-10-CM

## 2023-09-01 DIAGNOSIS — M25.561 ACUTE PAIN OF RIGHT KNEE: ICD-10-CM

## 2023-09-01 LAB — IL6 SERPL-MCNC: 6.1 PG/ML (ref 0–13)

## 2023-09-14 NOTE — OP NOTE
[address] [place] [address]   OPERATIVE REPORT    Name:  Faith Patiño  MR#:   [id number]  :  1970  ACCOUNT #:  [de-identified]  DATE OF SERVICE:  2023    PREOPERATIVE DIAGNOSES:  End-staged arthritis of the right knee, obesity, BMI of 35.4 as well as varus malalignment, and mid flexion instability, chronic. POSTOPERATIVE DIAGNOSES:  End-staged arthritis of the right knee, obesity, BMI of 35.4 as well as varus malalignment, and mid flexion instability, chronic. PROCEDURE PERFORMED:  1. Right total knee replacement using the [employer] system with a size 6 narrow right posterior-stabilized Legion Oxinium femoral component, size 5 tibia, right, a 12 mm size 5/6 Legion posterior-stabilized high flexion articular insert, and a 35 asymmetric patella. 2.  Rebalancing of knee. SURGEON:  Sherry Emmanuel MD.    FIRST ASSISTANT:  Ildefonso De Los Santos. SECOND ASSISTANT:  Lani Arriaga. ANESTHETIST:  Dr. Abhijit Faulkner, anesthetist.    ANESTHESIA:  Preoperative nerve block with light general.    COMPLICATIONS:  None. SPECIMENS REMOVED:  None. IMPLANTS:  As above mentioned. ESTIMATED BLOOD LOSS:  Less than 48.    Ildefonso De Los Santos was the first assistant who assisted with all phases of the surgery commencing with patient positioning, patient prep, patient drape, leg positioning during the surgery, retracting, assisting with the surgery itself, closure, dressing placement, and transfer. DESCRIPTION OF PROCEDURE:  After the anesthetic was successfully induced, it was confirmed the patient did receive her antibiotics and a time-out was performed. Midline incision. Knee debrided in the usual fashion. Femoral canal was aspirated, lavaged, and re-aspirated prior to instrumentation. Crab claw was utilized to prevent undercutting. All cuts were checked for trueness and squareness and all soft tissue structures were protected during the sawing process. A modified gap balancing technique would be performed.   We Phone rang a few times and then disconnected. Please mail trying to reach you letter.

## 2023-09-15 ENCOUNTER — OFFICE VISIT (OUTPATIENT)
Age: 53
End: 2023-09-15

## 2023-09-15 VITALS — HEIGHT: 66 IN | BODY MASS INDEX: 33.41 KG/M2 | RESPIRATION RATE: 18 BRPM

## 2023-09-15 DIAGNOSIS — M25.561 CHRONIC PAIN OF RIGHT KNEE: ICD-10-CM

## 2023-09-15 DIAGNOSIS — M76.31 TENDINITIS OF ILIOTIBIAL BAND, RIGHT: ICD-10-CM

## 2023-09-15 DIAGNOSIS — M22.8X1 PATELLAR MALTRACKING, RIGHT: ICD-10-CM

## 2023-09-15 DIAGNOSIS — G89.29 CHRONIC PAIN OF RIGHT KNEE: ICD-10-CM

## 2023-09-15 DIAGNOSIS — Z96.651 PRESENCE OF RIGHT ARTIFICIAL KNEE JOINT: Primary | ICD-10-CM

## 2023-09-15 NOTE — PROGRESS NOTES
the trochanter bursa. Negative straight leg raise. Negative calf tenderness. Negative Homans. No signs of DVT present. She has full range of motion. Very good stability. Patella tracks with a little bit of lateralness to it. There is a small defect noted to the medial retinaculum. Negative calf tenderness. Negative Homans. No signs of DVT present. Review of CT scan right knee:     IMPRESSION:     Post total knee arthroplasty. No evident hardware complication. Large knee joint effusion with small coalescing osteochondral fragments at the  suprapatellar joint recess. Two sub-5 mm radiopaque foreign objects along the anterior margin of the  quadriceps tendon-presumably postsurgical in nature. 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 The patient is very functional with her knee replacement however still having just a little bit of weakness, subjectively which certainly could be related to the medial retinacular rent and lateral tilt she has of the patella. I have recommended repair. They are having an insurance issue upcoming and have given them a couple names who could possibly help fix it otherwise we discussed changing insurances so she can continue with us and Dr. Rob Pope can move forward with repairing the rent. CT scan reviewed with patient. Care plan outlined and precautions discussed. Radiographs and Results were reviewed with the patient. Medications were reviewed with the patient. All of pt's questions and concerns were addressed. Alarm symptoms and return precautions associated with chief complaint and evaluation were reviewed with the patient in detail. The patient demonstrated adequate understanding. The patient expresses willing compliance with the treatment plan.           JR Audie ROQUE, PAMILAGRO, ATC

## 2023-10-06 ENCOUNTER — OFFICE VISIT (OUTPATIENT)
Age: 53
End: 2023-10-06

## 2023-10-06 VITALS — HEIGHT: 66 IN | BODY MASS INDEX: 34.72 KG/M2 | RESPIRATION RATE: 14 BRPM | WEIGHT: 216 LBS

## 2023-10-06 DIAGNOSIS — M25.562 CHRONIC PAIN OF LEFT KNEE: Primary | ICD-10-CM

## 2023-10-06 DIAGNOSIS — Z96.651 PRESENCE OF RIGHT ARTIFICIAL KNEE JOINT: ICD-10-CM

## 2023-10-06 DIAGNOSIS — G89.29 CHRONIC PAIN OF LEFT KNEE: Primary | ICD-10-CM

## 2023-10-06 DIAGNOSIS — Z91.81 STATUS POST FALL: ICD-10-CM

## 2023-10-06 DIAGNOSIS — S82.025A CLOSED NONDISPLACED LONGITUDINAL FRACTURE OF LEFT PATELLA, INITIAL ENCOUNTER: ICD-10-CM

## 2023-10-06 NOTE — PROGRESS NOTES
History     Socioeconomic History    Marital status:      Spouse name: Not on file    Number of children: Not on file    Years of education: Not on file    Highest education level: Not on file   Occupational History    Not on file   Tobacco Use    Smoking status: Every Day     Packs/day: .5     Types: Cigarettes    Smokeless tobacco: Never   Vaping Use    Vaping Use: Never used   Substance and Sexual Activity    Alcohol use: Yes     Comment: Rare    Drug use: Never    Sexual activity: Not on file   Other Topics Concern    Not on file   Social History Narrative    Not on file     Social Determinants of Health     Financial Resource Strain: Not on file   Food Insecurity: Not on file   Transportation Needs: No Transportation Needs (4/19/2023)    OASIS : Transportation     Lack of Transportation (Medical): No     Lack of Transportation (Non-Medical): No     Patient Unable or Declines to Respond: No   Physical Activity: Not on file   Stress: Not on file   Social Connections: Feeling Socially Integrated (4/19/2023)    OASIS : Social Isolation     Frequency of experiencing loneliness or isolation: Rarely   Intimate Partner Violence: Not on file   Housing Stability: Not on file       There were no vitals taken for this visit. PHYSICAL EXAMINATION:  GENERAL: Alert and oriented x3, in no acute distress, well-developed, well-nourished, afebrile. HEART: No JVD. EYES: No scleral icterus   NECK: No significant lymphadenopathy   LUNGS: No respiratory compromise or indrawing  ABDOMEN: Soft, non-tender, non-distended. Note: This note was completed using voice recognition software.  Any typographical/name errors or mistakes are unintentional.    Electronically signed by: Victoriano Pollard MA

## 2023-10-12 DIAGNOSIS — F17.210 CIGARETTE NICOTINE DEPENDENCE WITHOUT COMPLICATION: Primary | ICD-10-CM

## 2023-10-27 ENCOUNTER — OFFICE VISIT (OUTPATIENT)
Age: 53
End: 2023-10-27

## 2023-10-27 VITALS — BODY MASS INDEX: 34.72 KG/M2 | WEIGHT: 216 LBS | HEIGHT: 66 IN

## 2023-10-27 DIAGNOSIS — F17.200 SMOKER: ICD-10-CM

## 2023-10-27 DIAGNOSIS — M21.062 ACQUIRED GENU VALGUM OF LEFT KNEE: ICD-10-CM

## 2023-10-27 DIAGNOSIS — Z96.651 PRESENCE OF RIGHT ARTIFICIAL KNEE JOINT: Primary | ICD-10-CM

## 2023-10-27 DIAGNOSIS — G89.29 CHRONIC PAIN OF RIGHT KNEE: ICD-10-CM

## 2023-10-27 DIAGNOSIS — M25.561 CHRONIC PAIN OF RIGHT KNEE: ICD-10-CM

## 2023-10-27 DIAGNOSIS — M25.562 CHRONIC PAIN OF LEFT KNEE: ICD-10-CM

## 2023-10-27 DIAGNOSIS — M17.12 PRIMARY OSTEOARTHRITIS OF LEFT KNEE: ICD-10-CM

## 2023-10-27 DIAGNOSIS — G89.29 CHRONIC PAIN OF LEFT KNEE: ICD-10-CM

## 2023-12-06 ENCOUNTER — OFFICE VISIT (OUTPATIENT)
Age: 53
End: 2023-12-06
Payer: COMMERCIAL

## 2023-12-06 VITALS — WEIGHT: 222 LBS | BODY MASS INDEX: 35.83 KG/M2

## 2023-12-06 DIAGNOSIS — M25.561 CHRONIC PAIN OF RIGHT KNEE: ICD-10-CM

## 2023-12-06 DIAGNOSIS — F17.200 SMOKER: ICD-10-CM

## 2023-12-06 DIAGNOSIS — M25.562 CHRONIC PAIN OF LEFT KNEE: ICD-10-CM

## 2023-12-06 DIAGNOSIS — G89.29 CHRONIC PAIN OF LEFT KNEE: ICD-10-CM

## 2023-12-06 DIAGNOSIS — Z96.651 PRESENCE OF RIGHT ARTIFICIAL KNEE JOINT: Primary | ICD-10-CM

## 2023-12-06 DIAGNOSIS — M17.12 PRIMARY OSTEOARTHRITIS OF LEFT KNEE: ICD-10-CM

## 2023-12-06 DIAGNOSIS — G89.29 CHRONIC PAIN OF RIGHT KNEE: ICD-10-CM

## 2023-12-06 DIAGNOSIS — M70.51 PES ANSERINUS BURSITIS OF RIGHT KNEE: ICD-10-CM

## 2023-12-06 PROCEDURE — 20610 DRAIN/INJ JOINT/BURSA W/O US: CPT | Performed by: ORTHOPAEDIC SURGERY

## 2023-12-06 PROCEDURE — 99214 OFFICE O/P EST MOD 30 MIN: CPT | Performed by: ORTHOPAEDIC SURGERY

## 2023-12-06 RX ORDER — BETAMETHASONE SODIUM PHOSPHATE AND BETAMETHASONE ACETATE 3; 3 MG/ML; MG/ML
3 INJECTION, SUSPENSION INTRA-ARTICULAR; INTRALESIONAL; INTRAMUSCULAR; SOFT TISSUE ONCE
Status: COMPLETED | OUTPATIENT
Start: 2023-12-06 | End: 2023-12-06

## 2023-12-06 RX ADMIN — BETAMETHASONE SODIUM PHOSPHATE AND BETAMETHASONE ACETATE 3 MG: 3; 3 INJECTION, SUSPENSION INTRA-ARTICULAR; INTRALESIONAL; INTRAMUSCULAR; SOFT TISSUE at 15:22

## 2023-12-06 NOTE — PROGRESS NOTES
(gastroesophageal reflux disease)     [place] (hard of hearing)     Hearing aid right ear . Deaf in left ear    Obesity (BMI 30.0-34.9) 3/1/2017    PTSD (post-traumatic stress disorder)        Family History   Problem Relation Age of Onset    Hypertension Neg Hx     Stroke Neg Hx     Heart Disease Neg Hx     No Known Problems Sister     Lung Cancer Father     Uterine Cancer Mother     Diabetes Mother        Current Outpatient Medications   Medication Sig Dispense Refill    ibuprofen (ADVIL;MOTRIN) 600 MG tablet Take 1 tablet by mouth 4 times daily as needed for Pain 40 tablet 0    magnesium oxide (MAG-OX) 400 (240 Mg) MG tablet Take 1 tablet by mouth daily as needed (muscle spasm) 30 tablet 0    celecoxib (CELEBREX) 200 MG capsule TAKE 1 CAPSULE BY MOUTH TWICE DAILY WITH MEALS **NOT COVERED 60 capsule 2    aspirin EC 81 MG EC tablet Take 1 tablet by mouth 2 times daily 60 tablet 1    celecoxib (CELEBREX) 200 MG capsule Take 1 capsule by mouth 2 times daily 60 capsule 2    ondansetron (ZOFRAN) 4 MG tablet Take 1 tablet by mouth every 8 hours as needed for Nausea or Vomiting 30 tablet 2    clonazePAM (KLONOPIN) 0.5 MG tablet Take 1 tablet by mouth daily.       vitamin D (ERGOCALCIFEROL) 1.25 MG (06355 UT) CAPS capsule Take 1 capsule by mouth once a week      solifenacin (VESICARE) 10 MG tablet Take 1 tablet by mouth nightly      Pediatric Multiple Vitamins (FLINTSTONES MULTIVITAMIN) CHEW Take 1 tablet by mouth in the morning and at bedtime      b complex vitamins capsule Take 1 capsule by mouth three times a week Bedtime      cyclobenzaprine (FLEXERIL) 10 MG tablet Take 1 tablet by mouth every 8 hours as needed      Biotin 10 MG CAPS Take 1 capsule by mouth daily      Cariprazine HCl (VRAYLAR) 6 MG CAPS capsule Take 1 capsule by mouth nightly      lamoTRIgine (LAMICTAL) 100 MG tablet Take 1 tablet by mouth daily      omeprazole (PRILOSEC) 20 MG delayed release capsule Take 1 capsule by mouth daily      OXcarbazepine

## 2023-12-12 ENCOUNTER — HOSPITAL ENCOUNTER (OUTPATIENT)
Facility: HOSPITAL | Age: 53
Discharge: HOME | End: 2023-12-15
Attending: ORTHOPAEDIC SURGERY
Payer: COMMERCIAL

## 2023-12-12 DIAGNOSIS — Z96.651 PRESENCE OF RIGHT ARTIFICIAL KNEE JOINT: ICD-10-CM

## 2023-12-12 DIAGNOSIS — G89.29 CHRONIC PAIN OF RIGHT KNEE: ICD-10-CM

## 2023-12-12 DIAGNOSIS — M25.561 CHRONIC PAIN OF RIGHT KNEE: ICD-10-CM

## 2023-12-12 DIAGNOSIS — M25.562 CHRONIC PAIN OF LEFT KNEE: ICD-10-CM

## 2023-12-12 DIAGNOSIS — M17.12 PRIMARY OSTEOARTHRITIS OF LEFT KNEE: ICD-10-CM

## 2023-12-12 DIAGNOSIS — F17.200 SMOKER: ICD-10-CM

## 2023-12-12 DIAGNOSIS — G89.29 CHRONIC PAIN OF LEFT KNEE: ICD-10-CM

## 2023-12-12 PROCEDURE — 77080 DXA BONE DENSITY AXIAL: CPT

## 2023-12-27 DIAGNOSIS — M22.8X1 PATELLAR MALTRACKING, RIGHT: ICD-10-CM

## 2023-12-27 DIAGNOSIS — Z01.818 PREOPERATIVE TESTING: Primary | ICD-10-CM

## 2024-01-03 ENCOUNTER — HOSPITAL ENCOUNTER (OUTPATIENT)
Facility: HOSPITAL | Age: 54
Discharge: HOME | End: 2024-01-06
Payer: MEDICAID

## 2024-01-03 DIAGNOSIS — Z01.818 PREOPERATIVE TESTING: ICD-10-CM

## 2024-01-03 DIAGNOSIS — M22.8X1 PATELLAR MALTRACKING, RIGHT: ICD-10-CM

## 2024-01-03 LAB
ANION GAP SERPL CALC-SCNC: 3 MMOL/L (ref 3–18)
APTT PPP: 26.1 SEC (ref 23–36.4)
BASOPHILS # BLD: 0 K/UL (ref 0–0.1)
BASOPHILS NFR BLD: 0 % (ref 0–2)
BUN SERPL-MCNC: 9 MG/DL (ref 7–18)
BUN/CREAT SERPL: 11 (ref 12–20)
CALCIUM SERPL-MCNC: 9.6 MG/DL (ref 8.5–10.1)
CHLORIDE SERPL-SCNC: 105 MMOL/L (ref 100–111)
CO2 SERPL-SCNC: 28 MMOL/L (ref 21–32)
CREAT SERPL-MCNC: 0.82 MG/DL (ref 0.6–1.3)
DIFFERENTIAL METHOD BLD: ABNORMAL
EOSINOPHIL # BLD: 0 K/UL (ref 0–0.4)
EOSINOPHIL NFR BLD: 0 % (ref 0–5)
ERYTHROCYTE [DISTWIDTH] IN BLOOD BY AUTOMATED COUNT: 13.3 % (ref 11.6–14.5)
GLUCOSE SERPL-MCNC: 110 MG/DL (ref 74–99)
HCT VFR BLD AUTO: 44.4 % (ref 35–45)
HGB BLD-MCNC: 15.1 G/DL (ref 12–16)
IMM GRANULOCYTES # BLD AUTO: 0 K/UL (ref 0–0.04)
IMM GRANULOCYTES NFR BLD AUTO: 0 % (ref 0–0.5)
INR PPP: 1.1 (ref 0.9–1.1)
LYMPHOCYTES # BLD: 0.5 K/UL (ref 0.9–3.6)
LYMPHOCYTES NFR BLD: 6 % (ref 21–52)
MCH RBC QN AUTO: 31.3 PG (ref 24–34)
MCHC RBC AUTO-ENTMCNC: 34 G/DL (ref 31–37)
MCV RBC AUTO: 92.1 FL (ref 78–100)
MONOCYTES # BLD: 0.1 K/UL (ref 0.05–1.2)
MONOCYTES NFR BLD: 1 % (ref 3–10)
NEUTS SEG # BLD: 8.9 K/UL (ref 1.8–8)
NEUTS SEG NFR BLD: 93 % (ref 40–73)
NRBC # BLD: 0 K/UL (ref 0–0.01)
NRBC BLD-RTO: 0 PER 100 WBC
PLATELET # BLD AUTO: 253 K/UL (ref 135–420)
PMV BLD AUTO: 11 FL (ref 9.2–11.8)
POTASSIUM SERPL-SCNC: 4.5 MMOL/L (ref 3.5–5.5)
PROTHROMBIN TIME: 14 SEC (ref 11.9–14.7)
RBC # BLD AUTO: 4.82 M/UL (ref 4.2–5.3)
SODIUM SERPL-SCNC: 136 MMOL/L (ref 136–145)
WBC # BLD AUTO: 9.6 K/UL (ref 4.6–13.2)

## 2024-01-03 PROCEDURE — 85025 COMPLETE CBC W/AUTO DIFF WBC: CPT

## 2024-01-03 PROCEDURE — 36415 COLL VENOUS BLD VENIPUNCTURE: CPT

## 2024-01-03 PROCEDURE — 85610 PROTHROMBIN TIME: CPT

## 2024-01-03 PROCEDURE — 80048 BASIC METABOLIC PNL TOTAL CA: CPT

## 2024-01-03 PROCEDURE — 85730 THROMBOPLASTIN TIME PARTIAL: CPT

## 2024-01-05 RX ORDER — PREDNISONE 20 MG/1
TABLET ORAL
COMMUNITY
Start: 2024-01-02 | End: 2024-01-18 | Stop reason: HOSPADM

## 2024-01-05 NOTE — PERIOP NOTE
-- DO NOT REPLY / DO NOT REPLY ALL --  -- Message is from the [facility]--    COVID-19 Universal Screening: N/A - Not about scheduling    General Patient Message      Reason for Call: Patient is requesting a call in regards to discuss taking over the counter medication prevagen with her current  prescribed medication. Patient can be reached at : [phone]       Caller Information       Type Contact Phone    04/22/2021 12:03 PM CDT Phone (Incoming) Selene Reyes (Self) [phone] (H)          Alternative phone number: N/A     Turnaround time given to caller:   \"This message will be sent to [state Provider's name]. The clinical team will fulfill your request as soon as they review your message.\"     Instructions for your surgery at [facility]      Today's Date:  1/5/2024      Patient's Name:  Grazyna Mitchell           Surgery Date:  1/18/2024              Please enter the main entrance of the hospital and check-in at the  located in the lobby. Once checked in at the , you will take the elevators to the second floor, and report to the waiting room on the left. The room will say Procedure Registration.    Do NOT eat or drink anything, including candy, gum, or ice chips after midnight prior to your surgery, unless you have specific instructions from your surgeon or anesthesia provider to do so.  Brush your teeth before coming to the hospital. You may swish with water, but do not swallow.  No smoking/Vaping/E-Cigarettes 24 hours prior to the day of surgery.  No alcohol 24 hours prior to the day of surgery.  No recreational drugs for one week prior to the day of surgery.  Bring Photo ID, Insurance information, and Co-pay if required on day of surgery.  Bring in pertinent legal documents, such as, Medical Power of , DNR, Advance Directive, etc.  Leave all valuables, including money/purse, at home.  Remove all jewelry, including ALL body piercings, nail polish, acrylic nails, and makeup (including mascara); no lotions, powders, deodorant, or perfume/cologne/after shave on the skin.  Follow instruction for Hibiclens washes and CHG wipes from surgeon's office.   Glasses and dentures may be worn to the hospital. They must be removed prior to surgery. Please bring case/container for glasses or dentures.   Contact lenses should not be worn on day of surgery.   Call your doctor's office if symptoms of a cold or illness develop within 24-48 hours prior to your surgery.  Call your doctor's office if you have any questions concerning insurance or co-pays.  15. AN ADULT (relative or friend 18 years or older) MUST DRIVE YOU HOME AFTER YOUR SURGERY.  16. Please make

## 2024-01-10 ENCOUNTER — OFFICE VISIT (OUTPATIENT)
Age: 54
End: 2024-01-10
Payer: MEDICAID

## 2024-01-10 VITALS
HEIGHT: 66 IN | BODY MASS INDEX: 35.03 KG/M2 | SYSTOLIC BLOOD PRESSURE: 116 MMHG | WEIGHT: 218 LBS | TEMPERATURE: 97.3 F | DIASTOLIC BLOOD PRESSURE: 79 MMHG

## 2024-01-10 DIAGNOSIS — M22.8X1 PATELLAR MALTRACKING, RIGHT: Primary | ICD-10-CM

## 2024-01-10 DIAGNOSIS — Z01.818 PREOP EXAMINATION: ICD-10-CM

## 2024-01-10 DIAGNOSIS — Z96.651 PRESENCE OF RIGHT ARTIFICIAL KNEE JOINT: ICD-10-CM

## 2024-01-10 PROCEDURE — 73564 X-RAY EXAM KNEE 4 OR MORE: CPT | Performed by: PHYSICIAN ASSISTANT

## 2024-01-10 RX ORDER — PREGABALIN 25 MG/1
75 CAPSULE ORAL 2 TIMES DAILY
OUTPATIENT
Start: 2024-01-10

## 2024-01-10 RX ORDER — ACETAMINOPHEN 325 MG/1
1000 TABLET ORAL
OUTPATIENT
Start: 2024-01-10 | End: 2024-01-10

## 2024-01-10 RX ORDER — DEXAMETHASONE SODIUM PHOSPHATE 4 MG/ML
8 INJECTION, SOLUTION INTRA-ARTICULAR; INTRALESIONAL; INTRAMUSCULAR; INTRAVENOUS; SOFT TISSUE
OUTPATIENT
Start: 2024-01-10 | End: 2024-01-10

## 2024-01-10 RX ORDER — TAMSULOSIN HYDROCHLORIDE 0.4 MG/1
0.4 CAPSULE ORAL DAILY
OUTPATIENT
Start: 2024-01-10

## 2024-01-10 NOTE — PATIENT INSTRUCTIONS
Patient: Grazyna Mitchell                MRN: [id number]       SSN:   YOB: 1970        AGE: 53 y.o.        SEX: female  Body mass index is 35.19 kg/m².    01/10/24    DO:  1:  Sit with the leg out straight 5-10 min every hour while awake.  Keep the toes pointed  up towards the gi.             2.  Bend your knee 5-10 min every hour.  Bend it to the point of pain and hold it for 5-10 Min.            3.  ICE your knee 20 min every hour    4.  You can expect to have swelling and discomfort in your thigh down to your knee.  Swelling may extend to your foot.  You may have bruising from the thigh to the foot as well.  Some numbness can be expected to the outside part of the knee.  Wear the compression stockings and elevate your leg.      DO NOT:    1.  Do not place anything under your knee to prop it up  2.  Do not sit in the recliner chair.

## 2024-01-10 NOTE — H&P
116/79   Temp: 97.3 °F (36.3 °C)   Weight: 98.9 kg (218 lb)   Height: 1.676 m (5' 6\")      Body mass index is 35.19 kg/m².     PE:  /79   Temp 97.3 °F (36.3 °C)   Ht 1.676 m (5' 6\")   Wt 98.9 kg (218 lb)   BMI 35.19 kg/m²   A&O X3, NAD, well develop, well nourished  Heart: S1-S2, rrr  Lungs: CTA bilat  Abd: soft, nt, nt, + bs in all quadrants  Ext:  Pos distal pulses to DP, PT      X-ray: Radiographs of the right knee done 1/10/2024 at the [facility] location include AP, lateral, skyline, distal femur, proximal tibia show the total knee components to be well-fixed without evidence for loosening or fracture noted.  Does have slight tilt in all tracking noted at the patella on the skyline view.    Labs: labs were reviewed and wnl.      A:  right  knee, patellar maltracking, sp TKA     P:  At this point we will move forward with surgery.  Again, the alternatives, risks, complications, as well as expected outcome were discussed and the patient wishes to proceed with surgery.  Pt has been instructed to stop aspirin, nsaids, rheumatologic medications and blood thinners.  They have also been instructed to continue on any heart and bp meds and to take them the morning of surgery with sips of water.     The patient was counseled at length about the risks of conrado Covid-19 during their perioperative period and any recovery window from their procedure.  The patient was made aware that conrado Covid-19  may worsen their prognosis for recovering from their procedure  and lend to a higher morbidity and/or mortality risk.  All material risks, benefits, and reasonable alternatives including postponing the procedure were discussed. The patient does wish to proceed with the procedure at this time.                JR Audie Fuentes

## 2024-01-16 ENCOUNTER — TRANSCRIBE ORDERS (OUTPATIENT)
Facility: HOSPITAL | Age: 54
End: 2024-01-16

## 2024-01-16 DIAGNOSIS — Z12.31 VISIT FOR SCREENING MAMMOGRAM: Primary | ICD-10-CM

## 2024-01-17 ENCOUNTER — TELEPHONE (OUTPATIENT)
Facility: HOSPITAL | Age: 54
End: 2024-01-17

## 2024-01-17 ENCOUNTER — ANESTHESIA EVENT (OUTPATIENT)
Facility: HOSPITAL | Age: 54
DRG: 325 | End: 2024-01-17
Payer: MEDICAID

## 2024-01-17 NOTE — TELEPHONE ENCOUNTER
Call placed to patient, ID verified x 2. Patient  has decided with their surgeon to have a knee revision  to decrease  pain and improve mobility . Topics discussed included surgery preparation, what to expect the day of surgery, medications, physical and occupational therapy, and discharge planning.  It was discussed that this is considered an elective surgery and that prior to the surgery  decisions such as arranging for help at home once they are discharged needs to be made.Patient agreed to get home ready for surgery and to have a ride arranged to go home. She identifies her  as her support system, has a walker at home and anticipates discharging day of surgery. She lives in a one story home with a ramp to enter, so no stair navigation is required.  Instructions were given for CHG bathing. Patient will complete the procedure the morning of surgery. She denies any rashes, bruises or open areas to her skin at this time.  Patient was reminded not to apply any deoderant, perfumes, makeup or lotions to skin the morning of surgery. She will remain NPO after midnight the night before surgery and  will take only the medications as instructed to take by her surgeon the morning of surgery with a sip of water. Patient states that she does not take any medication for her blood pressure so she was instructed to take her anxiety/ depression medications with just a sip of water the morning of surgery.  Education regarding the importance of early and frequent ambulation to avoid surgical complications and to assist with pain was provided. Recommended the use of ice to assist with pain and swelling post op for 20 minutes an hour, not to be placed directly on her skin. Patient verbalized understanding of all information provided.  Opportunity was given to ask questions and phone number of the Orthopaedic   was given for any questions or concerns that may arise later.

## 2024-01-18 ENCOUNTER — HOSPITAL ENCOUNTER (INPATIENT)
Facility: HOSPITAL | Age: 54
LOS: 1 days | Discharge: HOME | DRG: 325 | End: 2024-01-18
Attending: ORTHOPAEDIC SURGERY | Admitting: ORTHOPAEDIC SURGERY
Payer: MEDICAID

## 2024-01-18 ENCOUNTER — ANESTHESIA (OUTPATIENT)
Facility: HOSPITAL | Age: 54
DRG: 325 | End: 2024-01-18
Payer: MEDICAID

## 2024-01-18 ENCOUNTER — HOME HEALTH ADMISSION (OUTPATIENT)
Age: 54
End: 2024-01-18
Payer: MEDICAID

## 2024-01-18 ENCOUNTER — APPOINTMENT (OUTPATIENT)
Facility: HOSPITAL | Age: 54
DRG: 325 | End: 2024-01-18
Attending: ORTHOPAEDIC SURGERY
Payer: MEDICAID

## 2024-01-18 VITALS
BODY MASS INDEX: 35.13 KG/M2 | OXYGEN SATURATION: 95 % | HEIGHT: 66 IN | TEMPERATURE: 98.7 F | WEIGHT: 218.6 LBS | RESPIRATION RATE: 18 BRPM | DIASTOLIC BLOOD PRESSURE: 68 MMHG | HEART RATE: 87 BPM | SYSTOLIC BLOOD PRESSURE: 112 MMHG

## 2024-01-18 DIAGNOSIS — M22.8X1 PATELLAR MALTRACKING, RIGHT: Primary | ICD-10-CM

## 2024-01-18 PROBLEM — S80.01XA CONTUSION OF RIGHT KNEE: Status: ACTIVE | Noted: 2024-01-18

## 2024-01-18 PROBLEM — T84.012S FAILED TOTAL KNEE, RIGHT, SEQUELA: Status: ACTIVE | Noted: 2024-01-18

## 2024-01-18 PROBLEM — R29.6 FALLS FREQUENTLY: Status: ACTIVE | Noted: 2024-01-18

## 2024-01-18 LAB — HCG UR QL: NEGATIVE

## 2024-01-18 PROCEDURE — 6360000002 HC RX W HCPCS: Performed by: PHYSICIAN ASSISTANT

## 2024-01-18 PROCEDURE — 3700000001 HC ADD 15 MINUTES (ANESTHESIA): Performed by: ORTHOPAEDIC SURGERY

## 2024-01-18 PROCEDURE — C1776 JOINT DEVICE (IMPLANTABLE): HCPCS | Performed by: ORTHOPAEDIC SURGERY

## 2024-01-18 PROCEDURE — 64447 NJX AA&/STRD FEMORAL NRV IMG: CPT | Performed by: ANESTHESIOLOGY

## 2024-01-18 PROCEDURE — 3700000000 HC ANESTHESIA ATTENDED CARE: Performed by: ORTHOPAEDIC SURGERY

## 2024-01-18 PROCEDURE — 6360000002 HC RX W HCPCS: Performed by: NURSE ANESTHETIST, CERTIFIED REGISTERED

## 2024-01-18 PROCEDURE — G0378 HOSPITAL OBSERVATION PER HR: HCPCS

## 2024-01-18 PROCEDURE — 6370000000 HC RX 637 (ALT 250 FOR IP): Performed by: NURSE ANESTHETIST, CERTIFIED REGISTERED

## 2024-01-18 PROCEDURE — 2720000010 HC SURG SUPPLY STERILE: Performed by: ORTHOPAEDIC SURGERY

## 2024-01-18 PROCEDURE — 6360000002 HC RX W HCPCS: Performed by: ORTHOPAEDIC SURGERY

## 2024-01-18 PROCEDURE — 0SPC09Z REMOVAL OF LINER FROM RIGHT KNEE JOINT, OPEN APPROACH: ICD-10-PCS | Performed by: ORTHOPAEDIC SURGERY

## 2024-01-18 PROCEDURE — 7100000010 HC PHASE II RECOVERY - FIRST 15 MIN: Performed by: ORTHOPAEDIC SURGERY

## 2024-01-18 PROCEDURE — 27486 REVISE/REPLACE KNEE JOINT: CPT | Performed by: PHYSICIAN ASSISTANT

## 2024-01-18 PROCEDURE — A4216 STERILE WATER/SALINE, 10 ML: HCPCS | Performed by: ORTHOPAEDIC SURGERY

## 2024-01-18 PROCEDURE — 6370000000 HC RX 637 (ALT 250 FOR IP): Performed by: PHYSICIAN ASSISTANT

## 2024-01-18 PROCEDURE — 2580000003 HC RX 258: Performed by: PHYSICIAN ASSISTANT

## 2024-01-18 PROCEDURE — 7100000001 HC PACU RECOVERY - ADDTL 15 MIN: Performed by: ORTHOPAEDIC SURGERY

## 2024-01-18 PROCEDURE — 0QBD0ZZ EXCISION OF RIGHT PATELLA, OPEN APPROACH: ICD-10-PCS | Performed by: ORTHOPAEDIC SURGERY

## 2024-01-18 PROCEDURE — 6360000002 HC RX W HCPCS: Performed by: ANESTHESIOLOGY

## 2024-01-18 PROCEDURE — 7100000000 HC PACU RECOVERY - FIRST 15 MIN: Performed by: ORTHOPAEDIC SURGERY

## 2024-01-18 PROCEDURE — 0SUV09Z SUPPLEMENT RIGHT KNEE JOINT, TIBIAL SURFACE WITH LINER, OPEN APPROACH: ICD-10-PCS | Performed by: ORTHOPAEDIC SURGERY

## 2024-01-18 PROCEDURE — 2709999900 HC NON-CHARGEABLE SUPPLY: Performed by: ORTHOPAEDIC SURGERY

## 2024-01-18 PROCEDURE — 2500000003 HC RX 250 WO HCPCS: Performed by: NURSE ANESTHETIST, CERTIFIED REGISTERED

## 2024-01-18 PROCEDURE — L1810 KO ELASTIC WITH JOINTS: HCPCS | Performed by: ORTHOPAEDIC SURGERY

## 2024-01-18 PROCEDURE — 2580000003 HC RX 258: Performed by: NURSE ANESTHETIST, CERTIFIED REGISTERED

## 2024-01-18 PROCEDURE — 7100000011 HC PHASE II RECOVERY - ADDTL 15 MIN: Performed by: ORTHOPAEDIC SURGERY

## 2024-01-18 PROCEDURE — 2580000003 HC RX 258: Performed by: ORTHOPAEDIC SURGERY

## 2024-01-18 PROCEDURE — 6370000000 HC RX 637 (ALT 250 FOR IP): Performed by: ORTHOPAEDIC SURGERY

## 2024-01-18 PROCEDURE — 3600000013 HC SURGERY LEVEL 3 ADDTL 15MIN: Performed by: ORTHOPAEDIC SURGERY

## 2024-01-18 PROCEDURE — 2500000003 HC RX 250 WO HCPCS: Performed by: PHYSICIAN ASSISTANT

## 2024-01-18 PROCEDURE — 1100000000 HC RM PRIVATE

## 2024-01-18 PROCEDURE — 27486 REVISE/REPLACE KNEE JOINT: CPT | Performed by: ORTHOPAEDIC SURGERY

## 2024-01-18 PROCEDURE — 3600000003 HC SURGERY LEVEL 3 BASE: Performed by: ORTHOPAEDIC SURGERY

## 2024-01-18 PROCEDURE — 97116 GAIT TRAINING THERAPY: CPT

## 2024-01-18 PROCEDURE — 81025 URINE PREGNANCY TEST: CPT

## 2024-01-18 PROCEDURE — 97161 PT EVAL LOW COMPLEX 20 MIN: CPT

## 2024-01-18 DEVICE — LEGION POSTERIOR STABILIZED HIGH                                    FLEX HIGHLY CROSS LINKED                                    POLYETHYLENE SIZE 5-6 13MM
Type: IMPLANTABLE DEVICE | Site: KNEE | Status: FUNCTIONAL
Brand: LEGION

## 2024-01-18 RX ORDER — LAMOTRIGINE 100 MG/1
100 TABLET ORAL DAILY
Status: DISCONTINUED | OUTPATIENT
Start: 2024-01-19 | End: 2024-01-18 | Stop reason: HOSPADM

## 2024-01-18 RX ORDER — LIDOCAINE HYDROCHLORIDE 10 MG/ML
1 INJECTION, SOLUTION EPIDURAL; INFILTRATION; INTRACAUDAL; PERINEURAL
Status: DISCONTINUED | OUTPATIENT
Start: 2024-01-18 | End: 2024-01-18 | Stop reason: HOSPADM

## 2024-01-18 RX ORDER — PREGABALIN 75 MG/1
75 CAPSULE ORAL 2 TIMES DAILY
Status: DISCONTINUED | OUTPATIENT
Start: 2024-01-18 | End: 2024-01-18 | Stop reason: HOSPADM

## 2024-01-18 RX ORDER — SODIUM CHLORIDE 0.9 % (FLUSH) 0.9 %
5-40 SYRINGE (ML) INJECTION PRN
Status: DISCONTINUED | OUTPATIENT
Start: 2024-01-18 | End: 2024-01-18 | Stop reason: HOSPADM

## 2024-01-18 RX ORDER — FENTANYL CITRATE 50 UG/ML
50 INJECTION, SOLUTION INTRAMUSCULAR; INTRAVENOUS EVERY 5 MIN PRN
Status: DISCONTINUED | OUTPATIENT
Start: 2024-01-18 | End: 2024-01-18 | Stop reason: HOSPADM

## 2024-01-18 RX ORDER — PANTOPRAZOLE SODIUM 40 MG/1
40 TABLET, DELAYED RELEASE ORAL DAILY
Status: DISCONTINUED | OUTPATIENT
Start: 2024-01-19 | End: 2024-01-18 | Stop reason: HOSPADM

## 2024-01-18 RX ORDER — ASPIRIN 81 MG/1
81 TABLET ORAL 2 TIMES DAILY
Status: DISCONTINUED | OUTPATIENT
Start: 2024-01-19 | End: 2024-01-18 | Stop reason: HOSPADM

## 2024-01-18 RX ORDER — SODIUM CHLORIDE, SODIUM LACTATE, POTASSIUM CHLORIDE, CALCIUM CHLORIDE 600; 310; 30; 20 MG/100ML; MG/100ML; MG/100ML; MG/100ML
INJECTION, SOLUTION INTRAVENOUS CONTINUOUS
Status: DISCONTINUED | OUTPATIENT
Start: 2024-01-18 | End: 2024-01-18 | Stop reason: HOSPADM

## 2024-01-18 RX ORDER — DEXAMETHASONE SODIUM PHOSPHATE 4 MG/ML
INJECTION, SOLUTION INTRA-ARTICULAR; INTRALESIONAL; INTRAMUSCULAR; INTRAVENOUS; SOFT TISSUE PRN
Status: DISCONTINUED | OUTPATIENT
Start: 2024-01-18 | End: 2024-01-18 | Stop reason: SDUPTHER

## 2024-01-18 RX ORDER — ALBUTEROL SULFATE 90 UG/1
AEROSOL, METERED RESPIRATORY (INHALATION) PRN
Status: DISCONTINUED | OUTPATIENT
Start: 2024-01-18 | End: 2024-01-18 | Stop reason: SDUPTHER

## 2024-01-18 RX ORDER — ASPIRIN 81 MG/1
81 TABLET ORAL 2 TIMES DAILY
Qty: 60 TABLET | Refills: 3 | Status: SHIPPED | OUTPATIENT
Start: 2024-01-18

## 2024-01-18 RX ORDER — KETOROLAC TROMETHAMINE 15 MG/ML
15 INJECTION, SOLUTION INTRAMUSCULAR; INTRAVENOUS EVERY 6 HOURS
Status: DISCONTINUED | OUTPATIENT
Start: 2024-01-18 | End: 2024-01-18 | Stop reason: HOSPADM

## 2024-01-18 RX ORDER — POLYETHYLENE GLYCOL 3350 17 G/17G
17 POWDER, FOR SOLUTION ORAL DAILY PRN
Status: DISCONTINUED | OUTPATIENT
Start: 2024-01-18 | End: 2024-01-18 | Stop reason: HOSPADM

## 2024-01-18 RX ORDER — CELECOXIB 200 MG/1
200 CAPSULE ORAL 2 TIMES DAILY
Qty: 60 CAPSULE | Refills: 2 | Status: SHIPPED | OUTPATIENT
Start: 2024-01-18

## 2024-01-18 RX ORDER — OXYCODONE HYDROCHLORIDE 10 MG/1
10 TABLET ORAL
Status: DISCONTINUED | OUTPATIENT
Start: 2024-01-18 | End: 2024-01-18 | Stop reason: HOSPADM

## 2024-01-18 RX ORDER — IODINE SOLUTION STRONG 5% (LUGOL'S) 5 %
SOLUTION ORAL PRN
Status: DISCONTINUED | OUTPATIENT
Start: 2024-01-18 | End: 2024-01-18 | Stop reason: ALTCHOICE

## 2024-01-18 RX ORDER — BENZTROPINE MESYLATE 0.5 MG/1
0.5 TABLET ORAL DAILY
COMMUNITY
Start: 2024-01-06 | End: 2024-02-05

## 2024-01-18 RX ORDER — SODIUM CHLORIDE 9 MG/ML
INJECTION, SOLUTION INTRAVENOUS PRN
Status: DISCONTINUED | OUTPATIENT
Start: 2024-01-18 | End: 2024-01-18 | Stop reason: HOSPADM

## 2024-01-18 RX ORDER — TAMSULOSIN HYDROCHLORIDE 0.4 MG/1
0.4 CAPSULE ORAL DAILY
Status: DISCONTINUED | OUTPATIENT
Start: 2024-01-18 | End: 2024-01-18 | Stop reason: HOSPADM

## 2024-01-18 RX ORDER — OXCARBAZEPINE 300 MG/1
300 TABLET, FILM COATED ORAL 2 TIMES DAILY
Status: DISCONTINUED | OUTPATIENT
Start: 2024-01-18 | End: 2024-01-18 | Stop reason: HOSPADM

## 2024-01-18 RX ORDER — DOCUSATE SODIUM 100 MG/1
100 CAPSULE, LIQUID FILLED ORAL 2 TIMES DAILY
Qty: 60 CAPSULE | Refills: 1 | Status: SHIPPED | OUTPATIENT
Start: 2024-01-18 | End: 2024-03-18

## 2024-01-18 RX ORDER — PROCHLORPERAZINE EDISYLATE 5 MG/ML
10 INJECTION INTRAMUSCULAR; INTRAVENOUS
Status: DISCONTINUED | OUTPATIENT
Start: 2024-01-18 | End: 2024-01-18 | Stop reason: HOSPADM

## 2024-01-18 RX ORDER — ROPIVACAINE HYDROCHLORIDE 5 MG/ML
30 INJECTION, SOLUTION EPIDURAL; INFILTRATION; PERINEURAL ONCE
Status: DISCONTINUED | OUTPATIENT
Start: 2024-01-18 | End: 2024-01-18 | Stop reason: HOSPADM

## 2024-01-18 RX ORDER — ONDANSETRON 2 MG/ML
4 INJECTION INTRAMUSCULAR; INTRAVENOUS
Status: DISCONTINUED | OUTPATIENT
Start: 2024-01-18 | End: 2024-01-18 | Stop reason: HOSPADM

## 2024-01-18 RX ORDER — HYDRALAZINE HYDROCHLORIDE 20 MG/ML
INJECTION INTRAMUSCULAR; INTRAVENOUS PRN
Status: DISCONTINUED | OUTPATIENT
Start: 2024-01-18 | End: 2024-01-18 | Stop reason: SDUPTHER

## 2024-01-18 RX ORDER — VANCOMYCIN HYDROCHLORIDE 1 G/20ML
INJECTION, POWDER, LYOPHILIZED, FOR SOLUTION INTRAVENOUS PRN
Status: DISCONTINUED | OUTPATIENT
Start: 2024-01-18 | End: 2024-01-18 | Stop reason: ALTCHOICE

## 2024-01-18 RX ORDER — OXYCODONE HYDROCHLORIDE 5 MG/1
5 TABLET ORAL
Status: DISCONTINUED | OUTPATIENT
Start: 2024-01-18 | End: 2024-01-18 | Stop reason: HOSPADM

## 2024-01-18 RX ORDER — DEXMEDETOMIDINE HYDROCHLORIDE 100 UG/ML
INJECTION, SOLUTION INTRAVENOUS PRN
Status: DISCONTINUED | OUTPATIENT
Start: 2024-01-18 | End: 2024-01-18 | Stop reason: SDUPTHER

## 2024-01-18 RX ORDER — CELECOXIB 100 MG/1
200 CAPSULE ORAL 2 TIMES DAILY
Status: DISCONTINUED | OUTPATIENT
Start: 2024-01-18 | End: 2024-01-18 | Stop reason: HOSPADM

## 2024-01-18 RX ORDER — SODIUM CHLORIDE 0.9 % (FLUSH) 0.9 %
5-40 SYRINGE (ML) INJECTION EVERY 12 HOURS SCHEDULED
Status: DISCONTINUED | OUTPATIENT
Start: 2024-01-18 | End: 2024-01-18 | Stop reason: HOSPADM

## 2024-01-18 RX ORDER — PROPOFOL 10 MG/ML
INJECTION, EMULSION INTRAVENOUS PRN
Status: DISCONTINUED | OUTPATIENT
Start: 2024-01-18 | End: 2024-01-18 | Stop reason: SDUPTHER

## 2024-01-18 RX ORDER — ACETAMINOPHEN 500 MG
1000 TABLET ORAL EVERY 6 HOURS
Status: DISCONTINUED | OUTPATIENT
Start: 2024-01-18 | End: 2024-01-18 | Stop reason: HOSPADM

## 2024-01-18 RX ORDER — FENTANYL CITRATE 50 UG/ML
100 INJECTION, SOLUTION INTRAMUSCULAR; INTRAVENOUS ONCE
Status: COMPLETED | OUTPATIENT
Start: 2024-01-18 | End: 2024-01-18

## 2024-01-18 RX ORDER — LIDOCAINE HYDROCHLORIDE 20 MG/ML
INJECTION, SOLUTION EPIDURAL; INFILTRATION; INTRACAUDAL; PERINEURAL PRN
Status: DISCONTINUED | OUTPATIENT
Start: 2024-01-18 | End: 2024-01-18 | Stop reason: SDUPTHER

## 2024-01-18 RX ORDER — ONDANSETRON 4 MG/1
4 TABLET, ORALLY DISINTEGRATING ORAL EVERY 6 HOURS PRN
Status: DISCONTINUED | OUTPATIENT
Start: 2024-01-18 | End: 2024-01-18 | Stop reason: HOSPADM

## 2024-01-18 RX ORDER — ACETAMINOPHEN 500 MG
1000 TABLET ORAL
Status: COMPLETED | OUTPATIENT
Start: 2024-01-18 | End: 2024-01-18

## 2024-01-18 RX ORDER — CEFADROXIL 500 MG/1
500 CAPSULE ORAL 2 TIMES DAILY
Qty: 10 CAPSULE | Refills: 0 | Status: SHIPPED | OUTPATIENT
Start: 2024-01-18 | End: 2024-01-23

## 2024-01-18 RX ORDER — ROCURONIUM BROMIDE 10 MG/ML
INJECTION, SOLUTION INTRAVENOUS PRN
Status: DISCONTINUED | OUTPATIENT
Start: 2024-01-18 | End: 2024-01-18 | Stop reason: SDUPTHER

## 2024-01-18 RX ORDER — SENNA AND DOCUSATE SODIUM 50; 8.6 MG/1; MG/1
1 TABLET, FILM COATED ORAL 2 TIMES DAILY
Status: DISCONTINUED | OUTPATIENT
Start: 2024-01-18 | End: 2024-01-18 | Stop reason: HOSPADM

## 2024-01-18 RX ORDER — MIDAZOLAM HYDROCHLORIDE 2 MG/2ML
2 INJECTION, SOLUTION INTRAMUSCULAR; INTRAVENOUS ONCE
Status: COMPLETED | OUTPATIENT
Start: 2024-01-18 | End: 2024-01-18

## 2024-01-18 RX ORDER — OXYCODONE HYDROCHLORIDE 5 MG/1
5-10 TABLET ORAL EVERY 8 HOURS PRN
Qty: 42 TABLET | Refills: 0 | Status: SHIPPED | OUTPATIENT
Start: 2024-01-18 | End: 2024-01-25

## 2024-01-18 RX ORDER — ROPIVACAINE HYDROCHLORIDE 5 MG/ML
INJECTION, SOLUTION EPIDURAL; INFILTRATION; PERINEURAL
Status: COMPLETED | OUTPATIENT
Start: 2024-01-18 | End: 2024-01-18

## 2024-01-18 RX ORDER — ONDANSETRON 2 MG/ML
4 INJECTION INTRAMUSCULAR; INTRAVENOUS EVERY 6 HOURS PRN
Status: DISCONTINUED | OUTPATIENT
Start: 2024-01-18 | End: 2024-01-18 | Stop reason: HOSPADM

## 2024-01-18 RX ORDER — SODIUM CHLORIDE 9 MG/ML
INJECTION, SOLUTION INTRAVENOUS CONTINUOUS
Status: DISCONTINUED | OUTPATIENT
Start: 2024-01-18 | End: 2024-01-18 | Stop reason: HOSPADM

## 2024-01-18 RX ORDER — FENTANYL CITRATE 50 UG/ML
INJECTION, SOLUTION INTRAMUSCULAR; INTRAVENOUS PRN
Status: DISCONTINUED | OUTPATIENT
Start: 2024-01-18 | End: 2024-01-18 | Stop reason: SDUPTHER

## 2024-01-18 RX ORDER — ONDANSETRON 4 MG/1
4 TABLET, FILM COATED ORAL EVERY 8 HOURS PRN
Qty: 30 TABLET | Refills: 2 | Status: SHIPPED | OUTPATIENT
Start: 2024-01-18

## 2024-01-18 RX ORDER — FAMOTIDINE 20 MG/1
20 TABLET, FILM COATED ORAL ONCE
Status: DISCONTINUED | OUTPATIENT
Start: 2024-01-18 | End: 2024-01-18 | Stop reason: HOSPADM

## 2024-01-18 RX ORDER — DEXAMETHASONE SODIUM PHOSPHATE 4 MG/ML
8 INJECTION, SOLUTION INTRA-ARTICULAR; INTRALESIONAL; INTRAMUSCULAR; INTRAVENOUS; SOFT TISSUE
Status: DISCONTINUED | OUTPATIENT
Start: 2024-01-18 | End: 2024-01-18 | Stop reason: HOSPADM

## 2024-01-18 RX ORDER — ONDANSETRON 2 MG/ML
INJECTION INTRAMUSCULAR; INTRAVENOUS PRN
Status: DISCONTINUED | OUTPATIENT
Start: 2024-01-18 | End: 2024-01-18 | Stop reason: SDUPTHER

## 2024-01-18 RX ORDER — SUCCINYLCHOLINE/SOD CL,ISO/PF 100 MG/5ML
SYRINGE (ML) INTRAVENOUS PRN
Status: DISCONTINUED | OUTPATIENT
Start: 2024-01-18 | End: 2024-01-18 | Stop reason: SDUPTHER

## 2024-01-18 RX ORDER — CLONAZEPAM 0.5 MG/1
0.5 TABLET ORAL 2 TIMES DAILY
Status: DISCONTINUED | OUTPATIENT
Start: 2024-01-19 | End: 2024-01-18 | Stop reason: HOSPADM

## 2024-01-18 RX ADMIN — DEXMEDETOMIDINE 4 MCG: 100 INJECTION, SOLUTION, CONCENTRATE INTRAVENOUS at 12:11

## 2024-01-18 RX ADMIN — FENTANYL CITRATE 50 MCG: 50 INJECTION INTRAMUSCULAR; INTRAVENOUS at 12:32

## 2024-01-18 RX ADMIN — WATER 3000 MG: 1 INJECTION, SOLUTION INTRAMUSCULAR; INTRAVENOUS; SUBCUTANEOUS at 12:12

## 2024-01-18 RX ADMIN — ALBUTEROL SULFATE 2 PUFF: 108 AEROSOL, METERED RESPIRATORY (INHALATION) at 12:54

## 2024-01-18 RX ADMIN — DEXMEDETOMIDINE 4 MCG: 100 INJECTION, SOLUTION, CONCENTRATE INTRAVENOUS at 12:35

## 2024-01-18 RX ADMIN — PROPOFOL 50 MG: 10 INJECTION, EMULSION INTRAVENOUS at 12:26

## 2024-01-18 RX ADMIN — OXYCODONE HYDROCHLORIDE 10 MG: 10 TABLET ORAL at 14:18

## 2024-01-18 RX ADMIN — ONDANSETRON 4 MG: 2 INJECTION INTRAMUSCULAR; INTRAVENOUS at 11:58

## 2024-01-18 RX ADMIN — DEXMEDETOMIDINE 4 MCG: 100 INJECTION, SOLUTION, CONCENTRATE INTRAVENOUS at 12:26

## 2024-01-18 RX ADMIN — LIDOCAINE HYDROCHLORIDE 60 MG: 20 INJECTION, SOLUTION EPIDURAL; INFILTRATION; INTRACAUDAL; PERINEURAL at 12:02

## 2024-01-18 RX ADMIN — TRANEXAMIC ACID 1000 MG: 100 INJECTION, SOLUTION INTRAVENOUS at 12:16

## 2024-01-18 RX ADMIN — ROCURONIUM BROMIDE 30 MG: 10 INJECTION, SOLUTION INTRAVENOUS at 12:11

## 2024-01-18 RX ADMIN — TRANEXAMIC ACID 1000 MG: 100 INJECTION, SOLUTION INTRAVENOUS at 12:48

## 2024-01-18 RX ADMIN — FENTANYL CITRATE 50 MCG: 50 INJECTION INTRAMUSCULAR; INTRAVENOUS at 12:10

## 2024-01-18 RX ADMIN — ROCURONIUM BROMIDE 20 MG: 10 INJECTION, SOLUTION INTRAVENOUS at 12:24

## 2024-01-18 RX ADMIN — ROPIVACAINE HYDROCHLORIDE 20 ML: 5 INJECTION, SOLUTION EPIDURAL; INFILTRATION; PERINEURAL at 11:43

## 2024-01-18 RX ADMIN — SUGAMMADEX 200 MG: 100 INJECTION, SOLUTION INTRAVENOUS at 13:04

## 2024-01-18 RX ADMIN — LIDOCAINE HYDROCHLORIDE 40 MG: 20 INJECTION, SOLUTION EPIDURAL; INFILTRATION; INTRACAUDAL; PERINEURAL at 12:28

## 2024-01-18 RX ADMIN — ACETAMINOPHEN 1000 MG: 500 TABLET ORAL at 11:09

## 2024-01-18 RX ADMIN — FENTANYL CITRATE 50 MCG: 50 INJECTION INTRAMUSCULAR; INTRAVENOUS at 12:28

## 2024-01-18 RX ADMIN — MIDAZOLAM 1 MG: 1 INJECTION INTRAMUSCULAR; INTRAVENOUS at 11:45

## 2024-01-18 RX ADMIN — PROPOFOL 150 MG: 10 INJECTION, EMULSION INTRAVENOUS at 12:02

## 2024-01-18 RX ADMIN — SODIUM CHLORIDE, SODIUM LACTATE, POTASSIUM CHLORIDE, AND CALCIUM CHLORIDE: 600; 310; 30; 20 INJECTION, SOLUTION INTRAVENOUS at 11:11

## 2024-01-18 RX ADMIN — FENTANYL CITRATE 50 MCG: 50 INJECTION INTRAMUSCULAR; INTRAVENOUS at 12:02

## 2024-01-18 RX ADMIN — SODIUM CHLORIDE, SODIUM LACTATE, POTASSIUM CHLORIDE, AND CALCIUM CHLORIDE: 600; 310; 30; 20 INJECTION, SOLUTION INTRAVENOUS at 13:07

## 2024-01-18 RX ADMIN — HYDRALAZINE HYDROCHLORIDE 10 MG: 20 INJECTION INTRAMUSCULAR; INTRAVENOUS at 12:38

## 2024-01-18 RX ADMIN — PREGABALIN 75 MG: 75 CAPSULE ORAL at 11:11

## 2024-01-18 RX ADMIN — TAMSULOSIN HYDROCHLORIDE 0.4 MG: 0.4 CAPSULE ORAL at 11:09

## 2024-01-18 RX ADMIN — FENTANYL CITRATE 50 MCG: 50 INJECTION, SOLUTION INTRAMUSCULAR; INTRAVENOUS at 11:45

## 2024-01-18 RX ADMIN — Medication 100 MG: at 12:02

## 2024-01-18 RX ADMIN — DEXAMETHASONE SODIUM PHOSPHATE 8 MG: 4 INJECTION INTRA-ARTICULAR; INTRALESIONAL; INTRAMUSCULAR; INTRAVENOUS; SOFT TISSUE at 12:13

## 2024-01-18 ASSESSMENT — PAIN DESCRIPTION - LOCATION
LOCATION: KNEE

## 2024-01-18 ASSESSMENT — PAIN DESCRIPTION - ORIENTATION
ORIENTATION: RIGHT

## 2024-01-18 ASSESSMENT — PAIN SCALES - GENERAL
PAINLEVEL_OUTOF10: 6
PAINLEVEL_OUTOF10: 5
PAINLEVEL_OUTOF10: 0

## 2024-01-18 ASSESSMENT — LIFESTYLE VARIABLES: SMOKING_STATUS: 1

## 2024-01-18 ASSESSMENT — PAIN DESCRIPTION - DESCRIPTORS
DESCRIPTORS: ACHING;BURNING
DESCRIPTORS: ACHING;BURNING;SHARP;THROBBING

## 2024-01-18 ASSESSMENT — PAIN - FUNCTIONAL ASSESSMENT: PAIN_FUNCTIONAL_ASSESSMENT: 0-10

## 2024-01-18 NOTE — PROGRESS NOTES
Discharge note:    Patient is being discharged from [facility] to home or residence of choice. Patient is noted to be in stable condition for discharge and released by medical provider per order.     IV removed from left hand, tolerated well.     Discharge instructions reviewed with patient and was able to verbalize understanding of instructions given.     No questions or concerns at this time.

## 2024-01-18 NOTE — PROGRESS NOTES
Report note:    Verbal report received from Lavonne on Ms. Mitchell being transferred for routine post-op care.     Report consisted of patient's Situation, Background, Assessment and Recommendations (SBAR).      Information from the following report(s) Nurse Handoff Report, Adult Overview, Surgery Report, and Intake/Output was reviewed with the transferring nurse.

## 2024-01-18 NOTE — HOME CARE
Received home health referral for [facility] for (PT per protocol).  Spoke with patient's spouse via phone;  patient identifiers verified.  Explained home health care services and routines.  Demographics verified including insurance, phone and address confirmed.  Patient has the following DME: has rolling walker available.  Caregivers available spouse as primary.  Orders noted and arranged by TRUONGWPaul and sent to central intake and scheduling.

## 2024-01-18 NOTE — PROGRESS NOTES
Physical Therapy  PHYSICAL THERAPY EVALUATION/DISCHARGE    Patient: Grazyna Mitchell (53 y.o. 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  Pt was independent with all mobility and transfers, has RW at home.       ASSESSMENT AND RECOMMENDATIONS:  Patient is 54 yo female admitted to hospital for revision of R knee addressing patellar tracking issues and presents today 0 post op and agreeable to therapy.  Pt with knee extension brace locked in position from 0-70 degrees knee flexion. Patient was educated on weight bearing status, role of therapy, TE (see below), and equipment in room including role and ice sleeve. Patient demonstrated good SLR and transferred to sitting edge of recliner for objective assessment. Patient was given demo with instruction on sit <> stand transfer and gait training. Patient transferred to standing with RW and ambulated 200 feet.  Pt educated on detection of DVT and infection at site of incision.  Pt educated to get up and walk ~ 5 min every hour at the minimum upon returning home.  At conclusion of session patient transferred to sitting in recliner and was left resting with call bell by the side and  present in the room. Patient instructed to call for assistance if they needed to get up for any reason and denied need for further assistance. Pt is cleared from PT services in acute care setting.     Patient does not require further skilled physical therapy intervention at this level of care.    Further Equipment Recommendations for Discharge:  n/a has all necessary

## 2024-01-18 NOTE — PROGRESS NOTES
Therapeutic Substitution per the P&T Committee approved Therapeutic Interchanges Policy     Nonformulary Medication Formulary Interchange   Omeprazole 20 mg  Pantoprazole 40 mg      Signed,  Gilberto Black [facility]       No

## 2024-01-18 NOTE — BRIEF OP NOTE
Brief Postoperative Note      Patient: Grazyna Mitchell  YOB: 1970  MRN: [id number]    Date of Procedure: 1/18/2024    Pre-Op Diagnosis Codes:     * Maltracking of right patella [M22.8X1], multiple falls, instability    Post-Op Diagnosis: Same       Procedure(s):  RIGHT KNEE REVISION, PATELLAR MALTRACKING; BANG TO ASSIST; [SMITH&NEPHEW ORTHO}; NERVE BLOCK; 23 HR    Surgeon(s):  Braxton Fuentes MD    Assistant:  Surgical Assistant: Maximus Nino  Physician Assistant: Naun Bang PA-C    Anesthesia: General    Estimated Blood Loss (mL): less than 50     Complications: None    Specimens:   * No specimens in log *    Implants:  Implant Name Type Inv. Item Serial No.  Lot No. LRB No. Used Action   BASEPLATE TIB SZ 5 AP52MM ML74MM THK2.3MM R KNEE TI ALLY NP - [id number]  BASEPLATE TIB SZ 5 AP52MM ML74MM THK2.3MM R KNEE TI ALLY NP  [employer]WD [id number] Right 1 Explanted   INSERT TIB SZ 5-6 [id number] KNEE XLPE POST STBL HI FLX LEGION - [id number]  INSERT TIB SZ 5-6 [id number] KNEE XLPE POST STBL HI FLX LEGION [id number] [employer]WD [id number] Right 1 Implanted         Drains: * No LDAs found *    Findings: same      Electronically signed by BRAXTON FUENTES MD on 1/18/2024 at 12:50 PM

## 2024-01-18 NOTE — DISCHARGE SUMMARY
1/18/2024  9:59 AM    1/18/2024, 1:32 PM    Primary Dx:right Orthopedic / Rheumatologic: Total Knee Replacement, revision  Secondary Dx: Etiological Diagnoses: none    HPI:  Pt had a TKA of their right knee and has had multiple falls.  She developed patellar maltracking and disrupted the medial retinaculum.  She  had failed conservative treatment.  Due to the current findings and affected activity of daily living surgical intervention is indicated.  The alternatives, risks, complications as well as expected outcome were discussed, the patient understands and wishes to proceed with surgery    Past Medical History:   Diagnosis Date    Arthritis     Asthma     Bipolar disorder (HCC)     GERD (gastroesophageal reflux disease)     [place] (hard of hearing)     Hearing aid right ear . Deaf in left ear    Obesity (BMI 30.0-34.9) 3/1/2017    PTSD (post-traumatic stress disorder)     Sleep apnea     resolved with weight loss         Current Facility-Administered Medications:     lidocaine PF 1 % injection 1 mL, 1 mL, IntraDERmal, Once PRN, Brein, Pella, APRN - CRNA    famotidine (PEPCID) tablet 20 mg, 20 mg, Oral, Once, Brein, Pella, APRN - CRNA    lactated ringers IV soln infusion, , IntraVENous, Continuous, Brein, Pella, APRN - CRNA, Last Rate: 50 mL/hr at 01/18/24 1157, New Bag at 01/18/24 1307    ROPivacaine (NAROPIN) 0.5% injection 30 mL, 30 mL, Other, Once, Brein, Pella, APRN - CRNA    dexAMETHasone (DECADRON) injection 8 mg, 8 mg, IntraVENous, On Call to OR, Naun Bronson PA-C    pregabalin (LYRICA) capsule 75 mg, 75 mg, Oral, BID, Naun Bronson PA-C, 75 mg at 01/18/24 1111    tamsulosin (FLOMAX) capsule 0.4 mg, 0.4 mg, Oral, Daily, Naun Bronson PA-C, 0.4 mg at 01/18/24 1109    sodium chloride (PF) 0.9 % 8.4 mL with EPINEPHrine 0.6 mg, ketorolac (TORADOL) 30 MG/ML 30 mg, ROPivacaine (NAROPIN) 0.5% 40 mL, , , PRN, Braxton Fuentes MD, 30 mL at 01/18/24 1239    sod chloride IRR soln 0.9 % 1,000 mL with neomycin-polymyxin B  tablet by mouth in the morning and at bedtime  Qty: 60 tablet, Refills: 3      celecoxib (CELEBREX) 200 MG capsule Take 1 capsule by mouth 2 times daily  Qty: 60 capsule, Refills: 2           CONTINUE these medications which have NOT CHANGED    Details   benztropine (COGENTIN) 0.5 MG tablet Take 1 tablet by mouth daily      magnesium oxide (MAG-OX) 400 (240 Mg) MG tablet Take 1 tablet by mouth daily as needed (muscle spasm)  Qty: 30 tablet, Refills: 0      !! ondansetron (ZOFRAN) 4 MG tablet Take 1 tablet by mouth every 8 hours as needed for Nausea or Vomiting  Qty: 30 tablet, Refills: 2      clonazePAM (KLONOPIN) 0.5 MG tablet Take 1 tablet by mouth in the morning and at bedtime.      vitamin D (ERGOCALCIFEROL) 1.25 MG (71962 UT) CAPS capsule Take 1 capsule by mouth once a week      solifenacin (VESICARE) 10 MG tablet Take 1 tablet by mouth nightly      Pediatric Multiple Vitamins (FLINTSTONES MULTIVITAMIN) CHEW Take 1 tablet by mouth in the morning and at bedtime      b complex vitamins capsule Take 1 capsule by mouth three times a week Bedtime      cyclobenzaprine (FLEXERIL) 10 MG tablet Take 1 tablet by mouth every 8 hours as needed      Biotin 10 MG CAPS Take 1 capsule by mouth daily      Cariprazine HCl (VRAYLAR) 6 MG CAPS capsule Take 1 capsule by mouth nightly      lamoTRIgine (LAMICTAL) 100 MG tablet Take 1 tablet by mouth daily 100 mg in am 50 mg at night      omeprazole (PRILOSEC) 20 MG delayed release capsule Take 1 capsule by mouth daily      OXcarbazepine (TRILEPTAL) 300 MG tablet Take 1 tablet by mouth 2 times daily 300 mg in am and 150 mg at night      traZODone (DESYREL) 300 MG tablet Take 1 tablet by mouth nightly      zolpidem (AMBIEN) 5 MG tablet Take 1 tablet by mouth nightly.       !! - Potential duplicate medications found. Please discuss with provider.        STOP taking these medications       predniSONE (DELTASONE) 20 MG tablet Comments:   Reason for Stopping:         ibuprofen

## 2024-01-18 NOTE — PERIOP NOTE
Patient /Family /Designee has been informed that [facility] is not responsible for patient belongings per policy and the signed [facility] Patient Agreement document.  Personal items should be sent home or checked in with security.  Patient /Family /Designee selected the following action:                            [x]  Send personal items home with a family member or friend                                                 []  Check in personal items with security, excluding clothing                            []  Maintain personal items at the bedside, against recommendation                                 by Eulalio Liang [facility]                                   ** If patient /family /designee chooses to maintain personal items at the bedside,                                      Complete the patient belongings inventory in the EMR.   Belongings are with , Krishna Mitchell.  Number: [phone]

## 2024-01-18 NOTE — CARE COORDINATION
01/18/24 1503   Service Assessment   Patient Orientation Unable to Assess   History Provided By Patient   Primary Caregiver Self   Support Systems Spouse/Significant Other   PCP Verified by CM Yes   Prior Functional Level Independent in ADLs/IADLs   Current Functional Level Independent in ADLs/IADLs   Can patient return to prior living arrangement Yes   Ability to make needs known: Good   Family able to assist with home care needs: Yes   Social/Functional History   Lives With Spouse   Type of Home House   Home Layout One level   Home Access Ramped entrance   Home Equipment Walker, standard   ADL Assistance Independent   Homemaking Assistance Independent   Homemaking Responsibilities Yes   Ambulation Assistance Independent   Transfer Assistance Independent     Assessment completed with   Patient admitted for knee revision      Patient lives with  independent. DME at home: walker and bsc     to transport at discharge     Celena Gibson  Case Management

## 2024-01-18 NOTE — ANESTHESIA PRE PROCEDURE
Department of Anesthesiology  Preprocedure Note       Name:  Grazyna Mitchell   Age:  53 y.o.  :  1970                                          MRN:  [id number]         Date:  2024      Surgeon: Surgeon(s):  Braxton Fuentes MD    Procedure: Procedure(s):  RIGHT KNEE REVISION, PATELLAR MALTRACKING; BANG TO ASSIST; [SMITH&NEPHEW ORTHO}; NERVE BLOCK; 23 HR    Medications prior to admission:   Prior to Admission medications    Medication Sig Start Date End Date Taking? Authorizing Provider   ibuprofen (ADVIL;MOTRIN) 600 MG tablet Take 1 tablet by mouth 4 times daily as needed for Pain  Patient not taking: Reported on 2024   Carisa Nobles MD   magnesium oxide (MAG-OX) 400 (240 Mg) MG tablet Take 1 tablet by mouth daily as needed (muscle spasm)  Patient not taking: Reported on 2024   Carisa Nobles MD   celecoxib (CELEBREX) 200 MG capsule TAKE 1 CAPSULE BY MOUTH TWICE DAILY WITH MEALS **NOT COVERED 4/3/23   Amarjit Sher PA-C   aspirin EC 81 MG EC tablet Take 1 tablet by mouth 2 times daily  Patient not taking: Reported on 2024 4/3/23   Naun Bang PA-C   celecoxib (CELEBREX) 200 MG capsule Take 1 capsule by mouth 2 times daily 4/3/23   Naun Bang PA-C   ondansetron (ZOFRAN) 4 MG tablet Take 1 tablet by mouth every 8 hours as needed for Nausea or Vomiting 4/3/23   Naun Bang PA-C   clonazePAM (KLONOPIN) 0.5 MG tablet Take 1 tablet by mouth in the morning and at bedtime. 3/5/23   Dara Corona MD   vitamin D (ERGOCALCIFEROL) 1.25 MG (19485 UT) CAPS capsule Take 1 capsule by mouth once a week 3/5/23   Dara Corona MD   solifenacin (VESICARE) 10 MG tablet Take 1 tablet by mouth nightly 3/5/23   aDra Corona MD   Pediatric Multiple Vitamins (FLINTSTONES MULTIVITAMIN) CHEW Take 1 tablet by mouth in the morning and at bedtime    Dara Corona MD   b complex vitamins capsule Take 1 capsule by mouth three times a week Bedtime

## 2024-01-18 NOTE — ANESTHESIA POSTPROCEDURE EVALUATION
Department of Anesthesiology  Postprocedure Note    Patient: Grazyna Mitchell  MRN: [id number]  YOB: 1970  Date of evaluation: 1/18/2024    Procedure Summary       Date: 01/18/24 Room / Location: [facility] MAIN 07 / [facility] MAIN OR    Anesthesia Start: 1157 Anesthesia Stop: 1336    Procedure: RIGHT KNEE REVISION, PATELLAR MALTRACKING; BANG TO ASSIST; [SMITH&NEPHEW ORTHO}; NERVE BLOCK; 23 HR (Right: Knee) Diagnosis:       Maltracking of right patella      (Maltracking of right patella [M22.8X1])    Surgeons: Braxton Fuentes MD Responsible Provider: Dexter Lawrence Jr., MD    Anesthesia Type: General ASA Status: 2            Anesthesia Type: General    Jose Angel Phase I: Jose Angel Score: 10    Jose Angel Phase II:      Anesthesia Post Evaluation    Patient location during evaluation: bedside  Airway patency: patent  Cardiovascular status: hemodynamically stable  Respiratory status: acceptable  Hydration status: stable  Pain management: adequate    No notable events documented.

## 2024-01-18 NOTE — INTERVAL H&P NOTE
Update History & Physical    The patient's History and Physical of January 18, 2024 was reviewed with the patient and I examined the patient. There was no change. The surgical site was confirmed by the patient and me.     Plan: The risks, benefits, expected outcome, and alternative to the recommended procedure have been discussed with the patient. Patient understands and wants to proceed with the procedure.     Electronically signed by RAISA BENAVIDEZ MD on 1/18/2024 at 11:16 AM

## 2024-01-18 NOTE — ANESTHESIA POSTPROCEDURE EVALUATION
Department of Anesthesiology  Postprocedure Note    Patient: Grazyna Mitchell  MRN: [id number]  YOB: 1970  Date of evaluation: 1/18/2024    Procedure Summary       Date: 01/18/24 Room / Location: [facility] MAIN 07 / [facility] MAIN OR    Anesthesia Start: 1157 Anesthesia Stop: 1336    Procedure: RIGHT KNEE REVISION, PATELLAR MALTRACKING; BANG TO ASSIST; [SMITH&NEPHEW ORTHO}; NERVE BLOCK; 23 HR (Right: Knee) Diagnosis:       Maltracking of right patella      (Maltracking of right patella [M22.8X1])    Surgeons: Braxton Fuentes MD Responsible Provider: Dexter Lawrence Jr., MD    Anesthesia Type: General ASA Status: 2            Anesthesia Type: General    Jose Angel Phase I: Jose Angel Score: 10    Jose Angel Phase II:      Anesthesia Post Evaluation    Patient location during evaluation: bedside  Airway patency: patent  Cardiovascular status: hemodynamically stable  Respiratory status: acceptable  Hydration status: stable  Pain management: adequate    No notable events documented.

## 2024-01-18 NOTE — PROGRESS NOTES
Arrival note:    Patient was received from the PACU at 1505 for routine post-op care.      Patient oriented to room 1, use of call bell, and safety measures. She is alert and oriented x4. She was assited to the bathroom with x1 assist and walker use. She voided clear, yellow urine- 400cc.     Care on-going.

## 2024-01-18 NOTE — ANESTHESIA PROCEDURE NOTES
Peripheral Block    Patient location during procedure: holding area  Reason for block: post-op pain management and at surgeon's request  Start time: 1/18/2024 11:43 AM  End time: 1/18/2024 11:53 AM  Staffing  Performed: anesthesiologist   Anesthesiologist: Dexter Lawrence Jr., MD  Performed by: Dexter Lawrence Jr., MD  Authorized by: Dexter Lawrence Jr., MD    Preanesthetic Checklist  Completed: patient identified, IV checked, site marked, risks and benefits discussed, surgical/procedural consents, equipment checked, pre-op evaluation, timeout performed, anesthesia consent given, oxygen available, monitors applied/VS acknowledged, fire risk safety assessment completed and verbalized and blood product R/B/A discussed and consented  Peripheral Block   Patient position: supine  Prep: ChloraPrep  Provider prep: mask and sterile gloves  Patient monitoring: cardiac monitor, continuous pulse ox, frequent blood pressure checks, IV access, oxygen and responsive to questions  Block type: Femoral  Adductor canal  Laterality: right  Injection technique: single-shot  Guidance: nerve stimulator and ultrasound guided  Local infiltration: lidocaine  Infiltration strength: 1 %  Local infiltration: lidocaine  Dose: 1 mL    Needle   Needle type: insulated echogenic nerve stimulator needle   Needle gauge: 22 G  Needle localization: anatomical landmarks, nerve stimulator and ultrasound guidance  Test dose: negative  Needle length: 8 cm  Assessment   Injection assessment: negative aspiration for heme, no paresthesia on injection, local visualized surrounding nerve on ultrasound and no intravascular symptoms  Paresthesia pain: none  Slow fractionated injection: yes  Hemodynamics: stable  Real-time US image taken/store: yes  Outcomes: uncomplicated    Medications Administered  ropivacaine (NAROPIN) injection 0.5% - Perineural   20 mL - 1/18/2024 11:43:00 AM

## 2024-01-19 ENCOUNTER — HOME CARE VISIT (OUTPATIENT)
Age: 54
End: 2024-01-19
Payer: MEDICAID

## 2024-01-19 VITALS
DIASTOLIC BLOOD PRESSURE: 62 MMHG | RESPIRATION RATE: 18 BRPM | HEART RATE: 95 BPM | HEIGHT: 66 IN | SYSTOLIC BLOOD PRESSURE: 108 MMHG | TEMPERATURE: 97.1 F | BODY MASS INDEX: 34.55 KG/M2 | OXYGEN SATURATION: 93 % | WEIGHT: 215 LBS

## 2024-01-19 PROCEDURE — G0151 HHCP-SERV OF PT,EA 15 MIN: HCPCS

## 2024-01-19 ASSESSMENT — ENCOUNTER SYMPTOMS
PAIN LOCATION - PAIN QUALITY: ACHY
DYSPNEA ACTIVITY LEVEL: AFTER AMBULATING LESS THAN 10 FT

## 2024-01-19 NOTE — HOME HEALTH
PT TKR eval:  Pt is a 52 y/o female who is s/p revision R TKR on 24 by Dr. Fuentes.  Pt  is is now referred to home care PT for TKR PT protocol per Dr. Fuentes.  PMH: R TKR in 2023 by Dr. Fuentes;  personal history of multiple falls just prior revision surgery;  personal history of R shoulder replacement;  Arthritis;   Asthma; Bipolar disorder;  GERD (gastroesophageal reflux disease);  [place] (hard of hearing);   Hearing aid right ear . Deaf in left ear;   Obesity (BMI 30.0-34.9)  3/1/2017;  PTSD (post-traumatic stress disorder).       PLOF:  ind and ambulatory without AD;  retired;  lives with  in a one story house equipped with WC ramp.   PREC:  WBAT RLE;  Per Dr. Fuentes orders = Total knee protocol, wbat,  ROM within the limits of the brace.  R knee brace (set at 0-70 degrees) at all times.  House has filthy floors, dog excrement so be mindful where you place your bag.  S:  Pt denies falls since coming home from hospital and reported change in meds post op.  Pt reports that she hurt her knee due to multiple falls prior to the surgery yesterday.  Pt's goals are to get stronger and walk better.  SHe has \"a lot\" of pain on R knee.  O:   Pain:  R knee =  4-8 /10.    Integumentary:  R knee incision Aquacel dressing is dry, intact with 10% brown drainage;  Edema on R knee and thigh.  Re-applied ace wrap due to being loose and re-positioned knee brace.  Instructed pt on donning/doffing brace in case it slides down her leg again.  Aquacel  Dressing change is due on POD #7 or 24.  ROM:  R knee =  20 degrees ext and 50 degrees flexion.  R knee brace settin-70 degrees.  MMT:    R    quads and hamstrings MMT deferred due to pain.  LLE = 4+/5;  BUE = 4-5/5.  Bed mobility:  sit <> supine = mod A to support RLE.    Transfers:  sit <> stand from multiple surfaces = min A with FWW, needing verbal instructions for correct hands and feet placement.  Gait:  5 ft, FWW, CGA, exhibiting mild antalgic and flat foot

## 2024-01-19 NOTE — OP NOTE
[facility]  OPERATIVE REPORT    Name:  MIKIE PIEDRA  MR#:   [id number]  :  1970  ACCOUNT #:  [id number]  DATE OF SERVICE:  2024    PREOPERATIVE DIAGNOSES:  Patellar maltracking, instability of the knee, frequent falls, contusion of the right knee (subcutaneous).    POSTOPERATIVE DIAGNOSES:  Patellar maltracking, instability of the knee, frequent falls, contusion of the right knee (subcutaneous).    PROCEDURES PERFORMED:  1.  Revision right total knee replacement, one component, using the [employer] Legion system with a size 5/6 x 13 mm Legion posterior-stabilized high flexion articular insert.  2.  Lateral release.  3.  Correction of patellar maltracking.  4.  Debridement of scar tissue.    SURGEON:  Braxton Fuentes MD.    FIRST ASSISTANT:  SKYLAR Bronson.    SECOND ASSISTANT:  Maximus Nino.    ANESTHESIOLOGIST:  Dr. Arellano.    ANESTHESIA:  Preoperative nerve block with light general.    COMPLICATIONS:  None.    SPECIMENS REMOVED:  None.    IMPLANTS:  As above mentioned.    ESTIMATED BLOOD LOSS:  Less than 50.    SKYLAR Bronson was the first assistant who assisted with all phases of the surgery commencing with patient positioning, patient prep, patient drape, leg positioning during the surgery, retracting, assisting with the surgery itself, closure, dressing placement, and transfer.    INDICATIONS FOR PROCEDURE:  The patient has had multiple falls after her surgery.  There have been some issues with noncompliance as well.  She is a very pleasant lady.  She clinically had some patellar maltracking and I thought she had stretched the knee a little bit, so we had full revision available.  She has been previously worked up for infection.    DESCRIPTION OF PROCEDURE:  After the anesthetic was successfully induced, it was confirmed that the patient did receive her antibiotics.  The previous skin incision was marked and a time-out performed.  Antibiotics were confirmed given.  We used

## 2024-01-20 ENCOUNTER — HOME CARE VISIT (OUTPATIENT)
Age: 54
End: 2024-01-20
Payer: MEDICAID

## 2024-01-20 PROCEDURE — G0157 HHC PT ASSISTANT EA 15: HCPCS

## 2024-01-21 ENCOUNTER — HOME CARE VISIT (OUTPATIENT)
Age: 54
End: 2024-01-21
Payer: MEDICAID

## 2024-01-21 PROCEDURE — G0157 HHC PT ASSISTANT EA 15: HCPCS

## 2024-01-22 ENCOUNTER — HOME CARE VISIT (OUTPATIENT)
Age: 54
End: 2024-01-22
Payer: MEDICAID

## 2024-01-22 VITALS
SYSTOLIC BLOOD PRESSURE: 126 MMHG | TEMPERATURE: 96.5 F | RESPIRATION RATE: 14 BRPM | OXYGEN SATURATION: 98 % | HEART RATE: 61 BPM | TEMPERATURE: 97.9 F | SYSTOLIC BLOOD PRESSURE: 110 MMHG | HEART RATE: 78 BPM | OXYGEN SATURATION: 95 % | RESPIRATION RATE: 14 BRPM | DIASTOLIC BLOOD PRESSURE: 78 MMHG | DIASTOLIC BLOOD PRESSURE: 72 MMHG

## 2024-01-22 VITALS
OXYGEN SATURATION: 96 % | TEMPERATURE: 98.7 F | SYSTOLIC BLOOD PRESSURE: 120 MMHG | DIASTOLIC BLOOD PRESSURE: 80 MMHG | HEART RATE: 69 BPM

## 2024-01-22 PROCEDURE — G0157 HHC PT ASSISTANT EA 15: HCPCS

## 2024-01-22 ASSESSMENT — ENCOUNTER SYMPTOMS
PAIN LOCATION - PAIN QUALITY: SORENESS, TIGHTNESS
PAIN LOCATION - PAIN QUALITY: SORENESS, TIGHTNESS

## 2024-01-23 ENCOUNTER — HOME CARE VISIT (OUTPATIENT)
Age: 54
End: 2024-01-23
Payer: MEDICAID

## 2024-01-23 VITALS
SYSTOLIC BLOOD PRESSURE: 120 MMHG | DIASTOLIC BLOOD PRESSURE: 80 MMHG | OXYGEN SATURATION: 98 % | HEART RATE: 63 BPM | RESPIRATION RATE: 16 BRPM

## 2024-01-23 PROCEDURE — G0157 HHC PT ASSISTANT EA 15: HCPCS

## 2024-01-23 ASSESSMENT — ENCOUNTER SYMPTOMS: PAIN LOCATION - PAIN QUALITY: STIFF

## 2024-01-23 NOTE — HOME HEALTH
Patient's Subjective: The patient reports fatigue. She reports that she is doing her HEP.    Falls since last session:  no  Trips to ER since last session? no  Pain/Discomfort ?  Yes, see pain page.  New Meds: no  Upcoming MD appointments:  Feb 1st f/u  .  Caregiver involvement/assistance needed for:  The patient's caregiver assists with  meals, transportation,   .  Home health supplies by type and quantity ordered/delivered this visit include:  none  .  Objective:  See interventions.    Patient response to treatment:  Fair tolerance to her exercises. Moderate sx and fatigue with the need to sit and rest x 2.    Patient level of understanding of education provided:  - Exercises for correct technique with fair return demonstration.   - Education that smoking can hinder her healing. She reports knowing.   -Education for correct gait pattern with the patient unable to correct due to decreased R knee ROM and limitations from brace.    Assess of progress towards goals:  Pt able to perform exercises and amb. She was tired. She is compliant with her HEP and wearing of her brace. She is still smoking.   She denies any falls to date.    Continued need for the following skills:  Increasing R knee ROM, LE strengthening, stability, gait to decrease fall risk and improve functional Ind. Cont to monitor for infection and DVT.     Plan for next visit:  Assess bandage, education for opioids, antibiotics therapy, assess for constipation    The following discharge was discussed with the patient/caregiver : Estimated DC ~ 2/1/2024 Lizabeth Espinoza, PT     NOMNC required? No, pt is Medicaid

## 2024-01-23 NOTE — HOME HEALTH
Subjective: Patient complains that her knee brace continues to fall down every time she stands up and has to constantly readjust it. She states that she is in the living room today as her spouse has the puppies in the bedroom and they like to jump on her.     Objective: Skilled home health physical therapy interventions completed today listed in care plan section.  Interventions performed today to assist in return to prior level of function, address deficits as apparent upon time of initial evaluation, return to community and personal activities, and progress further towards goals as previously established in plan of care. There are not any changes to medications upon timing of this visit. Home health supplies were not ordered/delivered today. Visual inspection finds hinged knee brace loose at velcro straps and ace wrap covering surgical dressing. Readjusted tension in velcro straps on hinged knee brace to allow for brace to stay secured as intended and trained patient in proper placement of brace to better its use. Visual inspection fonud hinged knee brace too far down on LE upon arrival of therapist to home.     Assessment:  Patient is progressing towards goals as previously established in POC with skilled home health physical therapy services at this time as made apparent by ability to display improved hip flexion during swing phase of gait following cueing for corrections today. Family/caregiver spouse involvement is present/set-up at this point in time and assists with meals, transport, ADLs, transfers as needed, and general encouragement. Patient was able to keep hinged knee brace on appropriately with transfers and gait attempts after adjustment of velcro straps. Patient verbalized understanding of need for continued non skid footwear use to help reduce overall fall risk and is agreeable to doing so.    Plan:  Discharge planning discussed at this visit regarding eventual discharge once patient has met goals

## 2024-01-23 NOTE — HOME HEALTH
Patient's Subjective: She slept a little bit better.     Falls since last session:  no  Trips to ER since last session? no  Pain/Discomfort ?  See pain page  New Meds: no  Upcoming MD appointments:  Feb 1st f/u    Caregiver involvement/assistance needed for: The patient's caregiver assists with  meals, transportation,   .  Home health supplies by type and quantity ordered/delivered this visit include: none  .  Objective:  See interventions.    Patient response to treatment:  RPE post exercises 6/10, Sx increased from 4- 5/10.     Patient level of understanding of education provided:  -Patient reports understanding after being educated on possible complications to use of her narcotics as well as her antibiotics.   - Upgraded written HEP issued. Educated in expectations of her HEP 3x/day to tolerance and amb hourly when awake, She reported understanding.  -Pt able to return demonstrate her new exercises with Fair ability.     Assess of progress towards goals:   She was able to tolerate her new exercises.  She denies any falls.  No constipation. She is drinking increased fluids, per her report.   Bandage intact, no obvious s/sx of infection. She does have some bloody drainage inferior aspect along incision and small amount along superior incision. Overall ~ 10% saturation only. She is to have bandage change in next 1-2 visits.    Continued need for the following skills:  Increasing R knee ROM, LE strengthening, stability, gait to decrease fall risk and improve functional Ind. Cont to monitor for infection and DVT.      Plan for next visit:  Bandage change if hers arrives.     The following discharge was discussed with the patient/caregiver : Estimated DC ~ 2/1/2024 TBLizabeth Hassan, PT   NOMNC required? No, pt is Medicaid

## 2024-01-23 NOTE — HOME HEALTH
Subjective: Patient states that her knee brace has stayed in place well since last visit when the velcro straps were adjusted. She reports overall decreased pain intensity since last visit and is pleased that she was able to get a bowel movement since then as well.     Objective: Skilled home health physical therapy interventions completed today listed in care plan section.  Interventions performed today to assist in return to prior level of function, address deficits as apparent upon time of initial evaluation, return to community and personal activities, and progress further towards goals as previously established in plan of care. There are not any changes to medications upon timing of this visit. Home health supplies were not ordered/delivered today. Visual inspection finds intact hinged knee brace in place properly over right knee with ace wrap under it covering surgical dressing with dressing not visualized.     Assessment:  Patient is progressing towards goals as previously established in POC with skilled home health physical therapy services at this time as made apparent by reports of improving overall pain level and improving overall bowel status. She was able to display more regular improvement in hip flexion during swing phase of gait today with less frequent cueing needed for form corrections. She was able to more easily complete hamstring setting and seated hip abduction today. General fatigue noted upon treatment completion. Family/caregiver spouse involvement is present/set-up at this point in time and assists with meals, transport, ADLs as needed, transfers as needed, and general encouragement.     Plan:  Discharge planning discussed at this visit regarding eventual discharge once patient has met goals and/or met maximum benefit from home health skilled PT services with patient understanding and agreeable at this time. Continued need for skilled home health PT at this time to address deficits, reduce

## 2024-01-24 ENCOUNTER — HOME CARE VISIT (OUTPATIENT)
Age: 54
End: 2024-01-24
Payer: MEDICAID

## 2024-01-25 ENCOUNTER — TELEPHONE (OUTPATIENT)
Age: 54
End: 2024-01-25

## 2024-01-25 ENCOUNTER — HOME CARE VISIT (OUTPATIENT)
Age: 54
End: 2024-01-25
Payer: MEDICAID

## 2024-01-25 VITALS
OXYGEN SATURATION: 95 % | HEART RATE: 77 BPM | RESPIRATION RATE: 16 BRPM | DIASTOLIC BLOOD PRESSURE: 60 MMHG | SYSTOLIC BLOOD PRESSURE: 105 MMHG | TEMPERATURE: 98.3 F

## 2024-01-25 DIAGNOSIS — G89.18 POST-OP PAIN: Primary | ICD-10-CM

## 2024-01-25 PROCEDURE — G0157 HHC PT ASSISTANT EA 15: HCPCS

## 2024-01-25 RX ORDER — OXYCODONE HYDROCHLORIDE 5 MG/1
5-10 TABLET ORAL EVERY 8 HOURS PRN
Qty: 42 TABLET | Refills: 0 | Status: SHIPPED | OUTPATIENT
Start: 2024-01-25 | End: 2024-02-01

## 2024-01-25 NOTE — TELEPHONE ENCOUNTER
Patient called in to request refill of Oxycodone 5 mg.    Patient is asking to be called once the rx has been sent over so she can start calling the pharmacy.    Pharmacy:    [facility]ACY #[id number] - [place], [place] - [address] [phone] - F [phone] [[id number]]

## 2024-01-25 NOTE — HOME HEALTH
Patient's Subjective: She reports that she is in a lot of pain and cant do much. She did do her exercises earlier in the day.     Falls since last session:   none  Trips to ER since last session? none  Pain/Discomfort ?  See pain page.  New Meds: none  Upcoming MD appointments:  Feb 1st f/u     Caregiver involvement/assistance needed for: The patient's caregiver assists with  meals, transportation  .  Home health supplies by type and quantity ordered/delivered this visit include: none  .  Objective:  See interventions.    Patient response to treatment: Fair tolerance to getting to/from her vehicle.     Patient level of understanding of education provided:  Instructed to keep incision dry and clean. She reports understanding.   Instructed to wait a few hours and if sx decrease do her HEP.       Assess of progress towards goals:   Incision healing without s/sx of infection. Replaced old bandage with new Aquacel due to it being waterproof. Pt at increased risk of infection due to condition of the home.     Continued need for the following skills:  Increasing R knee ROM, LE strengthening, stability, gait to decrease fall risk and improve functional Ind. Cont to monitor for infection and DVT.       Plan for next visit:  TUG in hallway    The following discharge was discussed with the patient/caregiver : Estimated DC ~ 2/1/2024 TBD MinayaLizabeth koehler, PT   NOMNC required? No, pt is Medicaid

## 2024-01-26 ENCOUNTER — HOME CARE VISIT (OUTPATIENT)
Age: 54
End: 2024-01-26
Payer: MEDICAID

## 2024-01-26 VITALS
TEMPERATURE: 98.7 F | DIASTOLIC BLOOD PRESSURE: 80 MMHG | SYSTOLIC BLOOD PRESSURE: 110 MMHG | HEART RATE: 81 BPM | OXYGEN SATURATION: 97 %

## 2024-01-26 PROCEDURE — G0157 HHC PT ASSISTANT EA 15: HCPCS

## 2024-01-26 ASSESSMENT — ENCOUNTER SYMPTOMS: PAIN LOCATION - PAIN QUALITY: DULL ACHE

## 2024-01-27 ENCOUNTER — HOME CARE VISIT (OUTPATIENT)
Age: 54
End: 2024-01-27
Payer: MEDICAID

## 2024-01-27 PROCEDURE — G0157 HHC PT ASSISTANT EA 15: HCPCS

## 2024-01-27 NOTE — HOME HEALTH
Patient's Subjective: She reports the brace keeps slipping down.     Falls since last session:  no  Trips to ER since last session? no  Pain/Discomfort ?  see pain page  New Meds: no  Upcoming MD appointments:  Feb 1st f/u      Caregiver involvement/assistance needed for: The patient's caregiver assists with  meals, transportation  .  Home health supplies by type and quantity ordered/delivered this visit include:  none  .  Objective:  See interventions.    Patient response to treatment:  Good tolerance to her amb tests and transfers.     Patient level of understanding of education provided:  - Education on keeping the incision clean and dry. She reports she is not yet taking showers.   - Educated in amb hourly when awake and to work on increasing her distance.     Assess of progress towards goals:   TUG: Ave 13.5 seconds exceeding her goal of <=/ 30 seconds.   Amb with brace R LE, set at 0 - 70. She amb with fair silvia and reciprocal stepping, full foot clearance, level surfaces, MOD I in the home.   Pt amb today ~ 156  feet  x 1, 20 x 3, 2+ turns, indoors on even surfaces to achieve her STG of 75 feet level surfaces, Ind.  She denies any falls to date.     Continued need for the following skills:  Increasing R knee ROM, LE strengthening, stability, gait to decrease fall risk and improve functional Ind. Cont to monitor for infection and DVT.       Plan for next visit:  community amb for goal of 150 feet Ind with AD     The following discharge was discussed with the patient/caregiver : Estimated DC ~ 2/1/2024 TBLizabeth Hassan, PT   NOMNC required? No, pt is Medicaid

## 2024-01-28 ENCOUNTER — HOME CARE VISIT (OUTPATIENT)
Age: 54
End: 2024-01-28
Payer: MEDICAID

## 2024-01-28 PROCEDURE — G0157 HHC PT ASSISTANT EA 15: HCPCS

## 2024-01-29 ENCOUNTER — HOME CARE VISIT (OUTPATIENT)
Age: 54
End: 2024-01-29
Payer: MEDICAID

## 2024-01-29 VITALS
DIASTOLIC BLOOD PRESSURE: 68 MMHG | OXYGEN SATURATION: 95 % | HEART RATE: 70 BPM | HEART RATE: 68 BPM | RESPIRATION RATE: 15 BRPM | TEMPERATURE: 98.9 F | OXYGEN SATURATION: 98 % | DIASTOLIC BLOOD PRESSURE: 64 MMHG | TEMPERATURE: 98.6 F | SYSTOLIC BLOOD PRESSURE: 102 MMHG | RESPIRATION RATE: 15 BRPM | SYSTOLIC BLOOD PRESSURE: 118 MMHG

## 2024-01-29 VITALS
SYSTOLIC BLOOD PRESSURE: 120 MMHG | RESPIRATION RATE: 18 BRPM | HEART RATE: 65 BPM | OXYGEN SATURATION: 96 % | DIASTOLIC BLOOD PRESSURE: 80 MMHG

## 2024-01-29 PROCEDURE — G0157 HHC PT ASSISTANT EA 15: HCPCS

## 2024-01-29 ASSESSMENT — ENCOUNTER SYMPTOMS
PAIN LOCATION - PAIN QUALITY: SORENESS, TIGHTNESS
PAIN LOCATION - PAIN QUALITY: DULL ACHE
PAIN LOCATION - PAIN QUALITY: SORENESS, TIGHTNESS

## 2024-01-29 NOTE — HOME HEALTH
Patient's Subjective: She reports increased sx later in the day, post calf stretches.     Falls since last session: no  Trips to ER since last session? no  Pain/Discomfort ?  See pain page  New Meds: no  Upcoming MD appointments:  Feb 1st f/u       Caregiver involvement/assistance needed for: The patient's caregiver assists with  meals, transportation  .  Home health supplies by type and quantity ordered/delivered this visit include: no  .  Objective:  See interventions.    Patient response to treatment: Fair+ tolerance to new exercises. No increased sx reported by the end of performing them.     Patient level of understanding of education provided:  - Re education for correct toe raises in standing to avoid compensations. She was  just flexing at the waist vs performing DF. Some ability to correct, not fully.  Re education for correct mini squats with her unable to correct. Changed to sit/stand vs squats with the ability to perform.   - Recommended if her sx increase again post exercises, use CP x 20 minutes. She reported understanding.   -    Assess of progress towards goals:   0 - 70 at set R knee brace by surgeon.   She reports a gradual decrease in her overall sx. She did have a slight exacerbation post introduction to calf stretches.   She denies any falls.   She reports compliance and consistency with HEP.    Continued need for the following skills:  Increasing R knee ROM, LE strengthening, stability, gait to decrease fall risk and improve functional Ind. Cont to monitor for infection and DVT.        Plan for next visit:  Community amb, weather permitting for 2nd attempt.     The following discharge was discussed with the patient/caregiver : Estimated DC ~ 2/2/2024 TBLizabeth Hassan, PT     NOMNC required? No, pt is Medicaid

## 2024-01-30 ENCOUNTER — HOME CARE VISIT (OUTPATIENT)
Age: 54
End: 2024-01-30
Payer: MEDICAID

## 2024-01-30 VITALS
TEMPERATURE: 97.7 F | OXYGEN SATURATION: 96 % | HEART RATE: 64 BPM | SYSTOLIC BLOOD PRESSURE: 110 MMHG | RESPIRATION RATE: 16 BRPM | DIASTOLIC BLOOD PRESSURE: 70 MMHG

## 2024-01-30 PROCEDURE — G0157 HHC PT ASSISTANT EA 15: HCPCS

## 2024-01-30 ASSESSMENT — ENCOUNTER SYMPTOMS: PAIN LOCATION - PAIN QUALITY: SORE

## 2024-01-30 NOTE — HOME HEALTH
Subjective: Patient reports that she had some soreness after last visit. She again complains that the brace has become loose and has fallen down her leg.     Objective: Skilled home health physical therapy interventions completed today listed in care plan section.  Interventions performed today to assist in return to prior level of function, address deficits as apparent upon time of initial evaluation, return to community and personal activities, and progress further towards goals as previously established in plan of care. There are not any changes to medications upon timing of this visit. Home health supplies were not ordered/delivered today. Visual inspection finds hinged knee brace too far down right LE with multiple straps not secured. Placed hinged knee brace in appropriate position right knee and adjusted velcro straps to secure to LE without it falling. Weather did not permit outside ambulation today.    Assessment:  Patient is slowly progressing towards goals as previously established in POC with skilled home health physical therapy services at this time as made apparent by improving overall mobility with limited difficulty getting out of the chair with no training needed for corrections. Patient's knee brace continues to be not in place upon beginning of treatments, and is corrected during treatments. No noted loss of balance during gait attempts or standing exercises today. General fatigue noted upon treatment completion today. Family/caregiver spouse involvement is present/set-up at this point in time and assists with meals, transport, and general encouragement.     Plan:  Discharge planning discussed at this visit regarding eventual discharge once patient has met goals and/or met maximum benefit from home health skilled PT services with patient understanding and agreeable at this time. Continued need for skilled home health PT at this time to address deficits, reduce risk of falls, and obtain goals as

## 2024-01-30 NOTE — HOME HEALTH
Patient's Subjective: She reports some stiffness. She thinks is how she slept.     Falls since last session: no  Trips to ER since last session? no  Pain/Discomfort ?  See pain page  New Meds: no  Upcoming MD appointments:  Feb 1st f/u        Caregiver involvement/assistance needed for: The patient's caregiver assists with  meals, transportation  .  Home health supplies by type and quantity ordered/delivered this visit include:  no  .  Objective:  See interventions.    Patient response to treatment: Slight increase in sx only from 5-6.5/10. She was able to carry on a conversation throughout.    Patient level of understanding of education provided:  - Education for using her walker for a little stability vs WB at this time. She was able to loosen her  on the walker.   - CP if she is exacerbated today by the increase in amb distance. She reports understanding.    Assess of progress towards goals:   Pt amb ~ 500 feet exceeding her goal of > 150 feet with Sup un/even surfaces with RW.   R knee ROM 0 - 70 as set on brace, R knee.   She denies any falls to date.  Gradual decrease in her sx, R knee.    Continued need for the following skills:  Increasing R knee ROM, LE strengthening, stability, gait to decrease fall risk and improve functional Ind. Cont to monitor for infection and DVT.        Plan for next visit:  Prepare for upcoming DC     The following discharge was discussed with the patient/caregiver : Estimated DC ~ 2/2/2024 Lizabeth Espinoza, PT   NOMNC required? No, pt is Medicaid

## 2024-01-30 NOTE — HOME HEALTH
Subjective: Patient states that she has been unable to keep her knee brace from falling down and is afraid of hurting her knee if the brace is in the wrong position.     Objective: Skilled home health physical therapy interventions completed today listed in care plan section.  Interventions performed today to assist in return to prior level of function, address deficits as apparent upon time of initial evaluation, return to community and personal activities, and progress further towards goals as previously established in plan of care. There are not any changes to medications upon timing of this visit. Home health supplies were not ordered/delivered today. Visual inspection found hinged brace too low on right LE secured with upper two straps only. Placed hinged knee brace on right knee correctly and tightened velcro straps to ensure brace stays in place as designed.     Assessment:  Patient is slowly progressing towards goals as previously established in POC with skilled home health physical therapy services at this time as made apparent by ability to navigate uneven terrain outside as well as sloped ramp to enter/leave the home with FWW today. She needed occasional contact guard to ensure safety on uneven terrain today. Family/caregiver spouse involvement is present/set-up at this point in time and assists with transport, meals, and overall encouragement.     Plan:  Discharge planning discussed at this visit regarding eventual discharge once patient has met goals and/or met maximum benefit from home health skilled PT services with patient understanding and agreeable at this time. Continued need for skilled home health PT at this time to address deficits, reduce risk of falls, and obtain goals as previously established per plan of care. Monitor brace positioning and secure as necessary. Possibly attempt additional outside ambulation as weather permits.

## 2024-01-31 ENCOUNTER — HOME CARE VISIT (OUTPATIENT)
Age: 54
End: 2024-01-31
Payer: MEDICAID

## 2024-01-31 VITALS — SYSTOLIC BLOOD PRESSURE: 120 MMHG | DIASTOLIC BLOOD PRESSURE: 80 MMHG | OXYGEN SATURATION: 99 % | HEART RATE: 57 BPM

## 2024-01-31 PROCEDURE — G0157 HHC PT ASSISTANT EA 15: HCPCS

## 2024-01-31 NOTE — HOME HEALTH
Patient's Subjective: She had a difficult night sleeping. She was able to lay on her side though. She is very happy about that. She reports a flare up after sit/stand x 20, but it lizet also be due to her long walk in PT session yesterday. CP helped decrease her sx.     Falls since last session: no  Trips to ER since last session? no  Pain/Discomfort ?  See pain page  New Meds: no  Upcoming MD appointments:  Feb 1st f/u      .  Assessment and Summary of Care:  Patient's current functional status before discharge is as follows  Strength: Will require reassessed at DC visit.   ROM:  Brace set by surgeon to 0- 70 degrees throughout.  Bed Mobility: Ind   Transfers:  All MOD I - Ind.   Gait at best this episode: Community amb on ramp, up/dn steep driveway of concrete, on/over yard debris, paved road. She ab with RW > 500 feet in 16 minutes total. Sx increased from 5-6.5/10. She was able to carry on a conversation, SBA>   Stairs: She does not have stairs. they are covered by her ramp. She is able to amb up/dn the ramp with her RW with close SBA. She demonstrates the ability to control the walker as she amb the ramp. .   Special Tests:   TUG: Ave 13.5 seconds exceeding her goal of <=/ 30 seconds. ( 1/26/24)  5 time sit/stand 15 seconds last assessed 1/31/24  Recommendations: Cont with HEP until given a new one in OP PT.  Amb hourly when awake to tolerance.  Sit/Stand to tolerance 5-20 reps.   Standing hip flex, hip ext, ham curls within brace, single leg marches within available ROM in brace, B HR  Semi reclined: QS, GS, heel slides within available ROM in brace. all 2-3x/day to tolerance.     Plan for next visit: Reassess then DC from .     The following discharge was discussed with the patient/caregiver :DC next session. Pt plans to tell the doctor where she wants to go for OP PT.  NOMNC required? no

## 2024-02-01 ENCOUNTER — TELEPHONE (OUTPATIENT)
Age: 54
End: 2024-02-01

## 2024-02-01 ENCOUNTER — OFFICE VISIT (OUTPATIENT)
Age: 54
End: 2024-02-01

## 2024-02-01 ENCOUNTER — HOME CARE VISIT (OUTPATIENT)
Age: 54
End: 2024-02-01
Payer: MEDICAID

## 2024-02-01 DIAGNOSIS — G89.18 POST-OP PAIN: ICD-10-CM

## 2024-02-01 DIAGNOSIS — Z48.02 ENCOUNTER FOR STAPLE REMOVAL: ICD-10-CM

## 2024-02-01 DIAGNOSIS — Z48.89 ENCOUNTER FOR POSTOPERATIVE CARE: Primary | ICD-10-CM

## 2024-02-01 DIAGNOSIS — M24.661 FIBROSIS OF RIGHT KNEE JOINT: ICD-10-CM

## 2024-02-01 PROCEDURE — 99024 POSTOP FOLLOW-UP VISIT: CPT | Performed by: PHYSICIAN ASSISTANT

## 2024-02-01 RX ORDER — OXYCODONE HYDROCHLORIDE 5 MG/1
5-10 TABLET ORAL EVERY 8 HOURS PRN
Qty: 42 TABLET | Refills: 0 | Status: SHIPPED | OUTPATIENT
Start: 2024-02-01 | End: 2024-02-08

## 2024-02-01 NOTE — TELEPHONE ENCOUNTER
Patient called in stating that her insurance is requesting a prior authorization for the medication listed below.      oxyCODONE (ROXICODONE) 5 MG immediate release tablet       Please call and advise patient at   [phone]

## 2024-02-01 NOTE — PROGRESS NOTES
Rfl: 2    magnesium oxide (MAG-OX) 400 (240 Mg) MG tablet, Take 1 tablet by mouth daily as needed (muscle spasm) (Patient not taking: Reported on 1/5/2024), Disp: 30 tablet, Rfl: 0    ondansetron (ZOFRAN) 4 MG tablet, Take 1 tablet by mouth every 8 hours as needed for Nausea or Vomiting (Patient not taking: Reported on 1/18/2024), Disp: 30 tablet, Rfl: 2    clonazePAM (KLONOPIN) 0.5 MG tablet, Take 1 tablet by mouth in the morning and at bedtime., Disp: , Rfl:     vitamin D (ERGOCALCIFEROL) 1.25 MG (05956 UT) CAPS capsule, Take 1 capsule by mouth once a week, Disp: , Rfl:     solifenacin (VESICARE) 10 MG tablet, Take 1 tablet by mouth nightly, Disp: , Rfl:     Pediatric Multiple Vitamins (FLINTSTONES MULTIVITAMIN) CHEW, Take 1 tablet by mouth in the morning and at bedtime, Disp: , Rfl:     b complex vitamins capsule, Take 1 capsule by mouth three times a week Bedtime, Disp: , Rfl:     cyclobenzaprine (FLEXERIL) 10 MG tablet, Take 1 tablet by mouth every 8 hours as needed (Patient not taking: Reported on 1/5/2024), Disp: , Rfl:     Biotin 10 MG CAPS, Take 1 capsule by mouth daily, Disp: , Rfl:     Cariprazine HCl (VRAYLAR) 6 MG CAPS capsule, Take 1 capsule by mouth nightly, Disp: , Rfl:     lamoTRIgine (LAMICTAL) 100 MG tablet, Take 1 tablet by mouth daily 100 mg in am 50 mg at night, Disp: , Rfl:     omeprazole (PRILOSEC) 20 MG delayed release capsule, Take 1 capsule by mouth daily, Disp: , Rfl:     OXcarbazepine (TRILEPTAL) 300 MG tablet, Take 1 tablet by mouth 2 times daily 300 mg in am and 150 mg at night, Disp: , Rfl:     traZODone (DESYREL) 300 MG tablet, Take 1 tablet by mouth nightly, Disp: , Rfl:     zolpidem (AMBIEN) 5 MG tablet, Take 1 tablet by mouth nightly., Disp: , Rfl:     Allergies   Allergen Reactions    Amoxicillin Other (See Comments)     Does no work  Does not work.  Other reaction(s): other/intolerance  Does not work.    Codeine Nausea And Vomiting and Other (See Comments)     Other reaction(s):

## 2024-02-02 ENCOUNTER — TELEPHONE (OUTPATIENT)
Age: 54
End: 2024-02-02

## 2024-02-02 ENCOUNTER — HOME CARE VISIT (OUTPATIENT)
Age: 54
End: 2024-02-02
Payer: MEDICAID

## 2024-02-02 DIAGNOSIS — G89.18 POST-OP PAIN: Primary | ICD-10-CM

## 2024-02-02 PROCEDURE — G0151 HHCP-SERV OF PT,EA 15 MIN: HCPCS

## 2024-02-02 NOTE — TELEPHONE ENCOUNTER
Post op patient called and said that LOIS Bronson prescribed her some Oxycodone medication yesterday,  but the pharmacy said a Prior Auth is needed for the medication.    [facility] on [address]   Tel. [phone]    Patient tel. [phone].

## 2024-02-03 VITALS
DIASTOLIC BLOOD PRESSURE: 80 MMHG | OXYGEN SATURATION: 98 % | HEART RATE: 67 BPM | TEMPERATURE: 97.5 F | SYSTOLIC BLOOD PRESSURE: 112 MMHG | RESPIRATION RATE: 18 BRPM

## 2024-02-03 ASSESSMENT — ENCOUNTER SYMPTOMS: PAIN LOCATION - PAIN QUALITY: SORE

## 2024-02-03 NOTE — HOME HEALTH
when ordered by anabelle ABREU/Dr. Fuentes.    Dr. Fuentes/JR uAdie ABREU were informed of DC from home care agency/PT via [employer].  Pt was instructed to continue with her TKR HEP and ambulation program.  Next f/u visit with anabelle ABREU/Dr. Fuentes is on 2/15/24.

## 2024-02-05 ENCOUNTER — TELEPHONE (OUTPATIENT)
Age: 54
End: 2024-02-05

## 2024-02-05 RX ORDER — HYDROCODONE BITARTRATE AND ACETAMINOPHEN 7.5; 325 MG/1; MG/1
1-2 TABLET ORAL EVERY 8 HOURS PRN
Qty: 42 TABLET | Refills: 0 | Status: SHIPPED | OUTPATIENT
Start: 2024-02-05 | End: 2024-02-12

## 2024-02-05 NOTE — TELEPHONE ENCOUNTER
Patient called stating that she needs a prior authorization for the prescription listed below        HYDROcodone-acetaminophen (NORCO) 7.5-325 MG per tablet     Please call and advise patient at  [phone]

## 2024-02-07 ENCOUNTER — TELEPHONE (OUTPATIENT)
Age: 54
End: 2024-02-07

## 2024-02-07 DIAGNOSIS — G89.18 POST-OP PAIN: Primary | ICD-10-CM

## 2024-02-07 NOTE — TELEPHONE ENCOUNTER
Post op patient called and said that she has not been able to get the Hydrocodone medication from the pharmacy , due to a Prior Auth is required for the medication.    Patient said she has been out of pain medication since last week Friday 2/2/24. That she called her insurance, and they told her they have not received a Prior Auth from our office.     [facility] on [address]   Tel. [phone]    Patient tel. [phone].    Note : patient has an appt to see LOIS Bronson for the Right knee on 02/16/24.

## 2024-02-08 RX ORDER — HYDROCODONE BITARTRATE AND ACETAMINOPHEN 5; 325 MG/1; MG/1
1 TABLET ORAL EVERY 6 HOURS PRN
Qty: 28 TABLET | Refills: 0 | Status: SHIPPED | OUTPATIENT
Start: 2024-02-08 | End: 2024-02-15

## 2024-02-08 NOTE — TELEPHONE ENCOUNTER
Patient is requesting an update on her HYDROcodone-acetaminophen (NORCO) 7.5-325 MG per tablet.     Patient states that Prior Auth is needed and office note stating why she need the medication, medication is needed due to right knee surgery.    Patient tel: [phone]

## 2024-02-08 NOTE — TELEPHONE ENCOUNTER
Contacted pt and informed that the Norco was denied again by the insurance.   Passing the message on to the PA to see if any changes can be made to help get the medication approved

## 2024-02-09 NOTE — TELEPHONE ENCOUNTER
Patient states she was advised by an insurance rep  that the prior auth isn't going through because questions 7-9 aren't being answered. Patient is asking to speak to a nurse, and can be reached at [phone].

## 2024-02-12 NOTE — TELEPHONE ENCOUNTER
More information was sent to insurance in regards to the prior auth that was sent.   Awaiting for further instructions.

## 2024-02-16 ENCOUNTER — OFFICE VISIT (OUTPATIENT)
Age: 54
End: 2024-02-16

## 2024-02-16 ENCOUNTER — TELEPHONE (OUTPATIENT)
Age: 54
End: 2024-02-16

## 2024-02-16 DIAGNOSIS — G89.18 POST-OP PAIN: Primary | ICD-10-CM

## 2024-02-16 DIAGNOSIS — Z88.6 NSAID SENSITIVITY: ICD-10-CM

## 2024-02-16 DIAGNOSIS — M25.561 CHRONIC PAIN OF RIGHT KNEE: ICD-10-CM

## 2024-02-16 DIAGNOSIS — F17.200 SMOKER: ICD-10-CM

## 2024-02-16 DIAGNOSIS — G89.29 CHRONIC PAIN OF RIGHT KNEE: ICD-10-CM

## 2024-02-16 DIAGNOSIS — Z48.89 ENCOUNTER FOR POSTOPERATIVE CARE: Primary | ICD-10-CM

## 2024-02-16 DIAGNOSIS — Z96.651 PRESENCE OF RIGHT ARTIFICIAL KNEE JOINT: ICD-10-CM

## 2024-02-16 DIAGNOSIS — Z98.84 HISTORY OF GASTRIC BYPASS: ICD-10-CM

## 2024-02-16 PROCEDURE — 99024 POSTOP FOLLOW-UP VISIT: CPT | Performed by: PHYSICIAN ASSISTANT

## 2024-02-16 NOTE — PROGRESS NOTES
surgery.  The patient states she is sick of her brace.  She has been compliant wearing it however.  She has been working on strengthening activities at home.  She denies any troubles the wound.  Pain is well-controlled.    Patient denies recent fevers, chills, chest pain, SOB, or injuries.   No recent systemic changes noted.  A 12-point review of systems is performed today.  Pertinent positives are noted.  All other systems reviewed and otherwise are negative.    Physical exam: General: Alert and oriented x3, nad.  well-developed, well nourished.  normal affect, AF.  NC/AT, EOMI, neck supple, trachea midline, no JVD present. Breathing is non-labored.  Examination of the lower extremity and right knee reveals skin intact.  The surgical wound is healed nicely.  There is no erythema or ecchymosis noted.  There are no signs for infection or cellulitis present.  She is able to hold a straight leg raise.  Patella tracks nicely.  There are no defects noted in extensor mechanism or medial retinaculum.  Negative calf tenderness.  Negative Homans.  No signs of DVT present.    Assessment: Status post revision right total knee, soft tissue patella malalignment    Plan: At this point, the patient was instructed to continue the brace.  Will keep it at 0-70 for now.  She will continue with her home excise program.  We discussed her pain meds and she does not need a refill at this time.  We discussed adjunct Tylenol as directed.  She will continue ice therapy.  We will see her back in 2 weeks time for reevaluation.  X-rays of the right knee upon return.    Care plan outlined and precautions discussed. Radiographs and Results were reviewed with the patient.  Medications were reviewed with the patient. All of pt's questions and concerns were addressed.  Increasing symptoms and return precautions associated with chief complaint and evaluation were reviewed with the patient in detail.  The patient demonstrated adequate

## 2024-02-16 NOTE — TELEPHONE ENCOUNTER
Patient called stating she received letters from her insurance stating that there was insufficient documentation for the following meds:    HYDROcodone-acetaminophen (NORCO) 5-325 MG per tablet   HYDROcodone-acetaminophen (NORCO) 7.5-325 MG per tablet   oxyCODONE (ROXICODONE) 5 MG immediate release tablet     And that they will no longer cover them. Please review and advise patient with her options,    Tel. [phone].

## 2024-02-19 ENCOUNTER — TELEPHONE (OUTPATIENT)
Age: 54
End: 2024-02-19

## 2024-02-19 RX ORDER — OXYCODONE HYDROCHLORIDE 5 MG/1
5-10 TABLET ORAL EVERY 8 HOURS PRN
Qty: 42 TABLET | Refills: 0 | Status: SHIPPED | OUTPATIENT
Start: 2024-02-19 | End: 2024-02-26

## 2024-02-19 NOTE — TELEPHONE ENCOUNTER
Can a pre-auth be done  She is sp revision knee replacement    Otherwise she will have to pay out of pocket for the meds.  OTC tylenol as directed is she doesn't want to pay for meds our of pocket

## 2024-02-19 NOTE — TELEPHONE ENCOUNTER
Patient said she is willing to pay out of pocket if the medication is Oxy-5 because out of pocket that wasn't too bad.

## 2024-02-19 NOTE — TELEPHONE ENCOUNTER
Patient called requesting prior auth paperwork be done for oxyCODONE (ROXICODONE) 5 MG immediate release tablet , states she is aware that the insurance will likely deny and she will pay out of pocket but the pharmacy will not let her  the prescription until the prior auth and the result are done.    Please advise when done, Tel. [phone].

## 2024-02-23 ENCOUNTER — HOSPITAL ENCOUNTER (OUTPATIENT)
Facility: HOSPITAL | Age: 54
End: 2024-02-23
Payer: MEDICAID

## 2024-02-23 VITALS — WEIGHT: 215 LBS | BODY MASS INDEX: 34.55 KG/M2 | HEIGHT: 66 IN

## 2024-02-23 DIAGNOSIS — Z12.31 VISIT FOR SCREENING MAMMOGRAM: ICD-10-CM

## 2024-02-23 PROCEDURE — 77063 BREAST TOMOSYNTHESIS BI: CPT

## 2024-02-27 ENCOUNTER — TELEPHONE (OUTPATIENT)
Age: 54
End: 2024-02-27

## 2024-02-27 DIAGNOSIS — S82.001A CLOSED DISPLACED FRACTURE OF RIGHT PATELLA, UNSPECIFIED FRACTURE MORPHOLOGY, INITIAL ENCOUNTER: ICD-10-CM

## 2024-02-27 DIAGNOSIS — G89.18 POST-OP PAIN: Primary | ICD-10-CM

## 2024-02-27 RX ORDER — HYDROCODONE BITARTRATE AND ACETAMINOPHEN 7.5; 325 MG/1; MG/1
1 TABLET ORAL EVERY 6 HOURS PRN
Qty: 28 TABLET | Refills: 0 | Status: SHIPPED | OUTPATIENT
Start: 2024-02-27 | End: 2024-03-05

## 2024-02-27 NOTE — TELEPHONE ENCOUNTER
Patient requesting refill of   oxyCODONE (ROXICODONE) 5 MG immediate release tablet sent to [facility] pharmacy on [address].

## 2024-02-28 NOTE — TELEPHONE ENCOUNTER
Patient says she just spoke to [employer] a little while ago today and they told her to call us and let us know they are faxing a request for additional information needed regarding the refill below,  Patient could not provide the originating fax number nor the number it was being faxed to for us.  Tried to explain that authorizations are typically handled electronically, however, this message would be documented and forwarded to her doctors team for review.

## 2024-02-29 RX ORDER — NALOXONE HYDROCHLORIDE 4 MG/.1ML
1 SPRAY NASAL PRN
Qty: 1 EACH | Refills: 1 | Status: SHIPPED | OUTPATIENT
Start: 2024-02-29

## 2024-02-29 NOTE — TELEPHONE ENCOUNTER
Patient states Akanksha says that they are not approving the med in this encounter until the patient has a Narcan order and proof of said order. She is requesting an order for Narcan be prescribed.    Please review and advise patient, [phone].

## 2024-03-01 ENCOUNTER — OFFICE VISIT (OUTPATIENT)
Age: 54
End: 2024-03-01
Payer: MEDICAID

## 2024-03-01 VITALS — BODY MASS INDEX: 34.55 KG/M2 | HEIGHT: 66 IN | WEIGHT: 215 LBS

## 2024-03-01 DIAGNOSIS — M17.12 UNILATERAL PRIMARY OSTEOARTHRITIS, LEFT KNEE: ICD-10-CM

## 2024-03-01 DIAGNOSIS — Z96.651 PRESENCE OF RIGHT ARTIFICIAL KNEE JOINT: Primary | ICD-10-CM

## 2024-03-01 DIAGNOSIS — Z96.651 S/P REVISION OF TOTAL KNEE, RIGHT: ICD-10-CM

## 2024-03-01 PROCEDURE — 73562 X-RAY EXAM OF KNEE 3: CPT | Performed by: PHYSICIAN ASSISTANT

## 2024-03-01 PROCEDURE — 99024 POSTOP FOLLOW-UP VISIT: CPT | Performed by: PHYSICIAN ASSISTANT

## 2024-03-01 RX ORDER — OXYCODONE HYDROCHLORIDE 5 MG/1
5 TABLET ORAL EVERY 6 HOURS PRN
Qty: 28 TABLET | Refills: 0 | Status: SHIPPED | OUTPATIENT
Start: 2024-03-01 | End: 2024-03-08

## 2024-03-01 NOTE — TELEPHONE ENCOUNTER
Called to inform patient of RX being sent. She informed me she already has it and needs something sent to insurance to cover it

## 2024-03-08 ENCOUNTER — HOSPITAL ENCOUNTER (OUTPATIENT)
Facility: HOSPITAL | Age: 54
End: 2024-03-08
Payer: MEDICAID

## 2024-03-08 VITALS
BODY MASS INDEX: 34.55 KG/M2 | HEIGHT: 66 IN | WEIGHT: 215 LBS | DIASTOLIC BLOOD PRESSURE: 80 MMHG | SYSTOLIC BLOOD PRESSURE: 112 MMHG

## 2024-03-08 DIAGNOSIS — R94.31 LEFT AXIS DEVIATION: ICD-10-CM

## 2024-03-08 DIAGNOSIS — R01.1 CARDIAC MURMUR: ICD-10-CM

## 2024-03-08 PROCEDURE — 93306 TTE W/DOPPLER COMPLETE: CPT

## 2024-03-09 LAB
ECHO AO ROOT DIAM: 3.2 CM
ECHO AO ROOT INDEX: 1.55 CM/M2
ECHO AV AREA PEAK VELOCITY: 3.2 CM2
ECHO AV AREA/BSA PEAK VELOCITY: 1.6 CM2/M2
ECHO AV PEAK GRADIENT: 9 MMHG
ECHO AV PEAK VELOCITY: 1.5 M/S
ECHO AV VELOCITY RATIO: 1
ECHO BSA: 2.13 M2
ECHO LA VOL A-L A2C: 72 ML (ref 22–52)
ECHO LA VOL A-L A4C: 49 ML (ref 22–52)
ECHO LA VOL MOD A2C: 71 ML (ref 22–52)
ECHO LA VOL MOD A4C: 47 ML (ref 22–52)
ECHO LA VOLUME AREA LENGTH: 62 ML
ECHO LA VOLUME INDEX A-L A2C: 35 ML/M2 (ref 16–34)
ECHO LA VOLUME INDEX A-L A4C: 24 ML/M2 (ref 16–34)
ECHO LA VOLUME INDEX AREA LENGTH: 30 ML/M2 (ref 16–34)
ECHO LA VOLUME INDEX MOD A2C: 34 ML/M2 (ref 16–34)
ECHO LA VOLUME INDEX MOD A4C: 23 ML/M2 (ref 16–34)
ECHO LV E' LATERAL VELOCITY: 13 CM/S
ECHO LV E' SEPTAL VELOCITY: 10 CM/S
ECHO LV FRACTIONAL SHORTENING: 28 % (ref 28–44)
ECHO LV INTERNAL DIMENSION DIASTOLE INDEX: 2.57 CM/M2
ECHO LV INTERNAL DIMENSION DIASTOLIC: 5.3 CM (ref 3.9–5.3)
ECHO LV INTERNAL DIMENSION SYSTOLIC INDEX: 1.84 CM/M2
ECHO LV INTERNAL DIMENSION SYSTOLIC: 3.8 CM
ECHO LV IVSD: 0.9 CM (ref 0.6–0.9)
ECHO LV MASS 2D: 162.1 G (ref 67–162)
ECHO LV MASS INDEX 2D: 78.7 G/M2 (ref 43–95)
ECHO LV POSTERIOR WALL DIASTOLIC: 0.8 CM (ref 0.6–0.9)
ECHO LV RELATIVE WALL THICKNESS RATIO: 0.3
ECHO LVOT AREA: 3.5 CM2
ECHO LVOT DIAM: 2.1 CM
ECHO LVOT PEAK GRADIENT: 9 MMHG
ECHO LVOT PEAK VELOCITY: 1.5 M/S
ECHO MV A VELOCITY: 0.78 M/S
ECHO MV E DECELERATION TIME (DT): 289.2 MS
ECHO MV E VELOCITY: 0.89 M/S
ECHO MV E/A RATIO: 1.14
ECHO MV E/E' LATERAL: 6.85
ECHO MV E/E' RATIO (AVERAGED): 7.87
ECHO PULMONARY ARTERY END DIASTOLIC PRESSURE: 2 MMHG
ECHO PV MAX VELOCITY: 1 M/S
ECHO PV PEAK GRADIENT: 4 MMHG
ECHO PV REGURGITANT MAX VELOCITY: 0.7 M/S
ECHO TV REGURGITANT MAX VELOCITY: 2.5 M/S
ECHO TV REGURGITANT PEAK GRADIENT: 25 MMHG

## 2024-03-09 PROCEDURE — 93306 TTE W/DOPPLER COMPLETE: CPT | Performed by: INTERNAL MEDICINE

## 2024-03-11 ENCOUNTER — TELEPHONE (OUTPATIENT)
Age: 54
End: 2024-03-11

## 2024-03-11 NOTE — TELEPHONE ENCOUNTER
Patient called today, states she fell this morning onto her rt knee, Post op 1/18/24. She states it is painful and she is having trouble walking. She is bleeding at incision site in two different spots.  Scheduled for soonest open spot, 3/20 at 2:40 with JRM. Patient asking to be seen sooner if possible.    Please review and advise pt, [phone]

## 2024-03-11 NOTE — TELEPHONE ENCOUNTER
Spoke with patient.  Patient states bleeding is under control with a band aid.  Patient states she is unable to weight bear.

## 2024-03-12 DIAGNOSIS — Z96.651 S/P REVISION OF TOTAL KNEE, RIGHT: ICD-10-CM

## 2024-03-12 DIAGNOSIS — Z96.651 PRESENCE OF RIGHT ARTIFICIAL KNEE JOINT: Primary | ICD-10-CM

## 2024-03-12 DIAGNOSIS — G89.18 POST-OP PAIN: ICD-10-CM

## 2024-03-13 ENCOUNTER — OFFICE VISIT (OUTPATIENT)
Age: 54
End: 2024-03-13

## 2024-03-13 ENCOUNTER — TELEPHONE (OUTPATIENT)
Age: 54
End: 2024-03-13

## 2024-03-13 VITALS — WEIGHT: 215 LBS | HEIGHT: 66 IN | BODY MASS INDEX: 34.55 KG/M2

## 2024-03-13 DIAGNOSIS — Z96.651 S/P REVISION OF TOTAL KNEE, RIGHT: ICD-10-CM

## 2024-03-13 DIAGNOSIS — G89.18 POST-OP PAIN: ICD-10-CM

## 2024-03-13 DIAGNOSIS — Z96.651 PRESENCE OF RIGHT ARTIFICIAL KNEE JOINT: Primary | ICD-10-CM

## 2024-03-13 PROCEDURE — 99024 POSTOP FOLLOW-UP VISIT: CPT | Performed by: PHYSICIAN ASSISTANT

## 2024-03-13 RX ORDER — HYDROCODONE BITARTRATE AND ACETAMINOPHEN 7.5; 325 MG/1; MG/1
1 TABLET ORAL EVERY 6 HOURS PRN
Qty: 28 TABLET | Refills: 0 | Status: SHIPPED | OUTPATIENT
Start: 2024-03-13 | End: 2024-03-20

## 2024-03-13 NOTE — TELEPHONE ENCOUNTER
Patient states that her insurance company told her that an appeal is needed because the Narcan was not written on the prior Auth paperwork    Patient said that her insurance company send the prior Auth paperwork to Audie     Patient tel: [phone]

## 2024-03-13 NOTE — PROGRESS NOTES
with quad strengthening.  Will see her back in the office in about 6 weeks time for evaluation and strength check.  Prescription for Waucoma be sent to the pharmacy.  She is instructed in use of precautions.    Care plan outlined and precautions discussed. Radiographs and Results were reviewed with the patient.  Medications were reviewed with the patient. All of pt's questions and concerns were addressed.  Increasing symptoms and return precautions associated with chief complaint and evaluation were reviewed with the patient in detail.  The patient demonstrated adequate understanding.  Social determinents of health were discussed and did not alter treatment plan.              JR Audie ROQUE, PA-C, ATC

## 2024-03-14 NOTE — TELEPHONE ENCOUNTER
Medication PA was approved at this time.      Pt informed of PA being approved via the insurance at this time.

## 2024-03-20 ENCOUNTER — HOSPITAL ENCOUNTER (OUTPATIENT)
Facility: HOSPITAL | Age: 54
Setting detail: RECURRING SERIES
Discharge: HOME | End: 2024-03-23
Payer: MEDICAID

## 2024-03-20 PROCEDURE — 97163 PT EVAL HIGH COMPLEX 45 MIN: CPT

## 2024-03-20 PROCEDURE — 97162 PT EVAL MOD COMPLEX 30 MIN: CPT

## 2024-03-20 NOTE — PROGRESS NOTES
[facility]  [address] [facility] [address] [place] [address] Ph:[phone] Fx: [phone]    PLAN OF CARE/ Statement of Necessity for Physical Therapy Services           Patient name: Grazyna Mitchell Start of Care: 3/20/2024   Referral source: Naun Bronson PA-C : 1970    Medical Diagnosis: Pain in right knee [M25.561]       Onset Date: 2024   Treatment Diagnosis: M25.561  RIGHT KNEE PAIN                                      Prior Hospitalization: see medical history Provider#: [id number]   Medications: Verified on Patient Summary List     Comorbidities: Right TSR, Gastric bypass, Mastoid bone removed, Right TKR,   Prior Level of Function: The patient reports that she had difficulty walking after falling and injuring her knee after her first knee replacement.    The Plan of Care and following information is based on the information from the initial evaluation.    Assessment / key information:  The patient is a 53 year old female s/p right TKR revision with emphasis on lateral release and patella alignment on 2024. She presents with an IROM brace that is at her ankle and no AD. She reports falling twice since her surgery, with the most recent time falling on her right knee. She has had radiographs since that time which did not show evidence of fx or damaged hardware. The patient was educated on using a SPC as well as sequencing for this. Her brace was adjusted and placed on her knee (MD office recommends 5 more weeks of use per patient), until brace fit well and maintained position during gait. She presents with impairments consisting of pain, decreased ROM, decreased flexibility, decreased strength decreased ease of ambulation, and limited ADL ease. The patient will benefit from skilled PT in order to address the aforementioned impairments.    Evaluation Complexity:  History:  HIGH Complexity :3+ comorbidities / personal factors will impact

## 2024-03-20 NOTE — PROGRESS NOTES
PHYSICAL / OCCUPATIONAL THERAPY - DAILY TREATMENT NOTE     Patient Name: Grazyna Mitchell    Date: 3/20/2024    : 1970  Insurance: Payor: [facility] MEDICAID / Plan: [facility] Your [employer] PLAN([facility]) / Product Type: *No Product type* /      Patient  verified Yes     Visit #   Current / Total 1 24   Time   In / Out 12:33 1:10   Pain   In / Out 5/10 0/10   Subjective Functional Status/Changes: See IE   Changes to:  Allergies, Med Hx, Sx Hx?   no       TREATMENT AREA =  Pain in right knee [M25.561]    OBJECTIVE  Therapeutic Procedures:  Tx Min Billable or 1:1 Min (if diff from Tx Min) Procedure, Rationale, Specifics   5  48066 Therapeutic Exercise (timed):  increase ROM, strength, coordination, balance, and proprioception to improve patient's ability to progress to PLOF and address remaining functional goals. (see flow sheet as applicable)    Details if applicable:       4  27406 Self Care/Home Management (timed):  improve patient knowledge and understanding of pain reducing techniques, positioning, posture/ergonomics, home safety, activity modification, diagnosis/prognosis, and physical therapy expectations, procedures and progression  to improve patient's ability to progress to PLOF and address remaining functional goals.  (see flow sheet as applicable)    Details if applicable:  brace adjustment and fitting for providing adequate support   5  92510 Gait Training (timed):    50 feet with supervision (assistive device) over level surfaces with supervision level of assist. Cuing for SPC sequencing.  To improve safety and dynamic movement with household/community ambulation.  (see flow sheet as applicable)     Details if applicable:     14  [facility] Totals Reminder: bill using total billable min of TIMED therapeutic procedures (example: do not include dry needle or estim unattended, both untimed codes, in totals to left)  8-22 min = 1 unit; 23-37 min = 2 units; 38-52 min = 3 units; 53-67 min = 4 units; 68-82

## 2024-03-25 ENCOUNTER — TELEPHONE (OUTPATIENT)
Age: 54
End: 2024-03-25

## 2024-03-25 DIAGNOSIS — G89.18 POST-OP PAIN: Primary | ICD-10-CM

## 2024-03-25 DIAGNOSIS — Z96.651 S/P REVISION OF TOTAL KNEE, RIGHT: ICD-10-CM

## 2024-03-25 NOTE — TELEPHONE ENCOUNTER
Patient called to request a rx refill of her medication Hydrocodone.    Pharmacy:    [facility]/PHARMACY #[id number] - [place] [id number] [facility] [address] [phone] - F [phone] [[id number]]

## 2024-03-26 RX ORDER — HYDROCODONE BITARTRATE AND ACETAMINOPHEN 5; 325 MG/1; MG/1
1 TABLET ORAL EVERY 8 HOURS PRN
Qty: 21 TABLET | Refills: 0 | Status: SHIPPED | OUTPATIENT
Start: 2024-03-26 | End: 2024-04-02

## 2024-03-26 NOTE — TELEPHONE ENCOUNTER
Patient called to advise that yesterday's prescription for  HYDROcodone-acetaminophen (NORCO) 5-325 MG per tablet requires prior authorization.  Please initiate for patient and advise her once done, [phone].   pt comes in from home aftr fall in bathroom. pt has bulging disc. declined neck pain. pt not able to stand, normally uses walker. tylenol 650mg oral for pain. BP 90/50 in route.

## 2024-03-28 NOTE — TELEPHONE ENCOUNTER
Patient notified of LOIS Bronson's message.  Patient states she will pay for prescription out of pocket.

## 2024-04-03 ENCOUNTER — APPOINTMENT (OUTPATIENT)
Facility: HOSPITAL | Age: 54
End: 2024-04-03
Payer: MEDICAID

## 2024-04-08 ENCOUNTER — APPOINTMENT (OUTPATIENT)
Facility: HOSPITAL | Age: 54
End: 2024-04-08
Payer: MEDICAID

## 2024-04-10 ENCOUNTER — HOSPITAL ENCOUNTER (OUTPATIENT)
Facility: HOSPITAL | Age: 54
Setting detail: RECURRING SERIES
Discharge: HOME | End: 2024-04-13
Payer: MEDICAID

## 2024-04-10 PROCEDURE — 97530 THERAPEUTIC ACTIVITIES: CPT

## 2024-04-10 PROCEDURE — 97112 NEUROMUSCULAR REEDUCATION: CPT

## 2024-04-10 PROCEDURE — 97110 THERAPEUTIC EXERCISES: CPT

## 2024-04-10 NOTE — PROGRESS NOTES
and left with no pain.    Patient will continue to benefit from skilled PT / OT services to modify and progress therapeutic interventions, analyze and address functional mobility deficits, analyze and address ROM deficits, analyze and address strength deficits, analyze and address soft tissue restrictions, analyze and cue for proper movement patterns, analyze and modify for postural abnormalities, analyze and address imbalance/dizziness, and instruct in home and community integration to address functional deficits and attain remaining goals.    Progress toward goals / Updated goals:  []  See Progress Note/Recertification    Short Term Goals: To be accomplished in 2 weeks  The patient will demonstrate independence and compliance with HEP to maximize therapeutic benefit.              IE: issued HEP  Current:  Pt reported compliance, 04/10/2024  The patient will improve right knee extension to neutral in order to improve ease of ambulation efficiency.              IE: lacking 3 degrees  Long Term Goals: To be accomplished in 12 weeks  The patient will Improve FOTO score to 55 to improve quality of life.              IE: 40  The patient will improve quad strength of right knee to 5/5 MMT to maximize stability during ambulation.              IE: 4/5 MMT  The patient will demonstrate 6\" step ups with out compensation using 1 HR to improve ease of community ambulation.               IE: unable without significant compensation  The patient will report moderate difficulty with any usual work, housework to improve ease of chore production.              IE: quite a bit of difficulty       PLAN  Yes  Continue plan of care  []  Upgrade activities as tolerated  []  Discharge due to :  []  Other:    Lyssa Turner, PT    4/10/2024    1:52 PM    Future Appointments   Date Time Provider Department Center   4/26/2024  2:30 PM Naun Bronson PA-C [facility]

## 2024-04-17 ENCOUNTER — HOSPITAL ENCOUNTER (OUTPATIENT)
Facility: HOSPITAL | Age: 54
Setting detail: RECURRING SERIES
Discharge: HOME | End: 2024-04-20
Payer: MEDICAID

## 2024-04-17 PROCEDURE — 97110 THERAPEUTIC EXERCISES: CPT

## 2024-04-17 PROCEDURE — 97112 NEUROMUSCULAR REEDUCATION: CPT

## 2024-04-17 PROCEDURE — 97530 THERAPEUTIC ACTIVITIES: CPT

## 2024-04-17 NOTE — PROGRESS NOTES
interventions, analyze and address functional mobility deficits, analyze and address ROM deficits, analyze and address strength deficits, analyze and address soft tissue restrictions, analyze and cue for proper movement patterns, analyze and modify for postural abnormalities, analyze and address imbalance/dizziness, and instruct in home and community integration to address functional deficits and attain remaining goals.    Progress toward goals / Updated goals:  []  See Progress Note/Recertification    Short Term Goals: To be accomplished in 2 weeks  The patient will demonstrate independence and compliance with HEP to maximize therapeutic benefit.              IE: issued HEP  Current:  Pt reported compliance, 04/10/2024  The patient will improve right knee extension to neutral in order to improve ease of ambulation efficiency.              IE: lacking 3 degrees  Current: Met, right knee extension- neutral,  04/17/2024  Long Term Goals: To be accomplished in 12 weeks  The patient will Improve FOTO score to 55 to improve quality of life.              IE: 40  The patient will improve quad strength of right knee to 5/5 MMT to maximize stability during ambulation.              IE: 4/5 MMT  The patient will demonstrate 6\" step ups with out compensation using 1 HR to improve ease of community ambulation.               IE: unable without significant compensation  The patient will report moderate difficulty with any usual work, housework to improve ease of chore production.              IE: quite a bit of difficulty    PLAN  Yes  Continue plan of care  []  Upgrade activities as tolerated  []  Discharge due to :  []  Other:    Lyssa Turner PT    4/17/2024    1:08 PM    Future Appointments   Date Time Provider Department Center   4/17/2024  1:50 PM Lyssa Turner PT [facility] [place]   4/23/2024 11:10 AM [facility] [place]   4/25/2024 11:10 AM [facility] [place]   4/26/2024  2:30 PM [facility]

## 2024-04-23 ENCOUNTER — HOSPITAL ENCOUNTER (OUTPATIENT)
Facility: HOSPITAL | Age: 54
Setting detail: RECURRING SERIES
Discharge: HOME | End: 2024-04-26
Payer: MEDICAID

## 2024-04-23 PROCEDURE — 97530 THERAPEUTIC ACTIVITIES: CPT

## 2024-04-23 PROCEDURE — 97110 THERAPEUTIC EXERCISES: CPT

## 2024-04-23 PROCEDURE — 97112 NEUROMUSCULAR REEDUCATION: CPT

## 2024-04-23 NOTE — PROGRESS NOTES
Therapeutic Exercise (timed):  increase ROM, strength, coordination, balance, and proprioception to improve patient's ability to progress to PLOF and address remaining functional goals. (see flow sheet as applicable)    Details if applicable:       10  56582 Therapeutic Activity (timed):  use of dynamic activities replicating functional movements to increase ROM, strength, coordination, balance, and proprioception in order to improve patient's ability to progress to PLOF and address remaining functional goals.  (see flow sheet as applicable)    Details if applicable:     8  08324 Neuromuscular Re-Education (timed):  improve balance, coordination, kinesthetic sense, posture, core stability and proprioception to improve patient's ability to develop conscious control of individual muscles and awareness of position of extremities in order to progress to PLOF and address remaining functional goals. (see flow sheet as applicable)     Details if applicable:           Details if applicable:            Details if applicable:     31  [facility] Totals Reminder: bill using total billable min of TIMED therapeutic procedures (example: do not include dry needle or estim unattended, both untimed codes, in totals to left)  8-22 min = 1 unit; 23-37 min = 2 units; 38-52 min = 3 units; 53-67 min = 4 units; 68-82 min = 5 units   Total Total     TOTAL TREATMENT TIME:        41     [x]  Patient Education billed concurrently with other procedures   [x] Review HEP    [] Progressed/Changed HEP, detail:    [] Other detail:       Objective Information/Functional Measures/Assessment    Right quad strength assessed today, strength still the same. Pt needed cues for correct form today. Assistance provided with brace donning. Pt reported that she forget to bring the cane today as she was running late.Tolerated the session well and left with no apparent distress.    Patient will continue to benefit from skilled PT / OT services to modify and progress

## 2024-04-25 ENCOUNTER — APPOINTMENT (OUTPATIENT)
Facility: HOSPITAL | Age: 54
End: 2024-04-25
Payer: MEDICAID

## 2024-04-26 ENCOUNTER — OFFICE VISIT (OUTPATIENT)
Age: 54
End: 2024-04-26

## 2024-04-26 DIAGNOSIS — G89.18 POST-OP PAIN: Primary | ICD-10-CM

## 2024-04-26 DIAGNOSIS — Z96.651 S/P REVISION OF TOTAL KNEE, RIGHT: ICD-10-CM

## 2024-04-26 DIAGNOSIS — M17.12 PRIMARY OSTEOARTHRITIS OF LEFT KNEE: ICD-10-CM

## 2024-04-26 DIAGNOSIS — Z96.651 PRESENCE OF RIGHT ARTIFICIAL KNEE JOINT: ICD-10-CM

## 2024-04-26 DIAGNOSIS — M24.661 FIBROSIS OF RIGHT KNEE JOINT: ICD-10-CM

## 2024-04-26 RX ORDER — LIDOCAINE HYDROCHLORIDE 10 MG/ML
3 INJECTION, SOLUTION INFILTRATION; PERINEURAL ONCE
Status: COMPLETED | OUTPATIENT
Start: 2024-04-26 | End: 2024-04-26

## 2024-04-26 RX ORDER — BETAMETHASONE SODIUM PHOSPHATE AND BETAMETHASONE ACETATE 3; 3 MG/ML; MG/ML
6 INJECTION, SUSPENSION INTRA-ARTICULAR; INTRALESIONAL; INTRAMUSCULAR; SOFT TISSUE ONCE
Status: COMPLETED | OUTPATIENT
Start: 2024-04-26 | End: 2024-04-26

## 2024-04-26 RX ADMIN — BETAMETHASONE SODIUM PHOSPHATE AND BETAMETHASONE ACETATE 6 MG: 3; 3 INJECTION, SUSPENSION INTRA-ARTICULAR; INTRALESIONAL; INTRAMUSCULAR; SOFT TISSUE at 14:38

## 2024-04-26 RX ADMIN — LIDOCAINE HYDROCHLORIDE 3 ML: 10 INJECTION, SOLUTION INFILTRATION; PERINEURAL at 14:39

## 2024-04-26 NOTE — PROGRESS NOTES
Patient: Grazyna Mitchell                MRN: [id number]       SSN: [id number]  YOB: 1970        AGE: 53 y.o.        SEX: female  There is no height or weight on file to calculate BMI.    PCP: Rosa Souza MD  04/26/24            REVIEW OF SYSTEMS:  Constitutional: Negative for fever, chills, weight loss and malaise/fatigue.   HENT: Negative.    Eyes: Negative.    Respiratory: Negative.   Cardiovascular: Negative.   Gastrointestinal: No bowel incontinence or constipation.  Genitourinary: No bladder incontinence or saddle anesthesia.  Skin: Negative.   Neurological: Negative.    Endo/Heme/Allergies: Negative.    Psychiatric/Behavioral: Negative.  Musculoskeletal: As per HPI above.     Past Medical History:   Diagnosis Date    Arthritis     Asthma     Bipolar disorder (HCC)     GERD (gastroesophageal reflux disease)     [place] (hard of hearing)     Hearing aid right ear . Deaf in left ear    Obesity (BMI 30.0-34.9) 3/1/2017    PTSD (post-traumatic stress disorder)     Sleep apnea     resolved with weight loss         Current Outpatient Medications:     naloxone (NARCAN) 4 MG/0.1ML LIQD nasal spray, 1 spray by Nasal route as needed for Opioid Reversal, Disp: 1 each, Rfl: 1    cyanocobalamin ([employer] VITAMIN B12) 1000 MCG tablet, Take 1,000 mcg by mouth daily., Disp: , Rfl:     benztropine (COGENTIN) 0.5 MG tablet, Take 1 tablet by mouth daily, Disp: , Rfl:     aspirin (ASPIRIN 81) 81 MG EC tablet, Take 1 tablet by mouth in the morning and at bedtime, Disp: 60 tablet, Rfl: 3    celecoxib (CELEBREX) 200 MG capsule, Take 1 capsule by mouth 2 times daily, Disp: 60 capsule, Rfl: 2    ondansetron (ZOFRAN) 4 MG tablet, Take 1 tablet by mouth every 8 hours as needed for Nausea or Vomiting, Disp: 30 tablet, Rfl: 2    magnesium oxide (MAG-OX) 400 (240 Mg) MG tablet, Take 1 tablet by mouth daily as needed (muscle spasm) (Patient not taking: Reported on 1/5/2024), Disp: 30 tablet, Rfl: 0    ondansetron

## 2024-04-29 ENCOUNTER — HOSPITAL ENCOUNTER (OUTPATIENT)
Facility: HOSPITAL | Age: 54
Setting detail: RECURRING SERIES
Discharge: HOME | End: 2024-05-02
Payer: MEDICAID

## 2024-04-29 PROCEDURE — 97530 THERAPEUTIC ACTIVITIES: CPT

## 2024-04-29 PROCEDURE — 97112 NEUROMUSCULAR REEDUCATION: CPT

## 2024-04-29 PROCEDURE — 97110 THERAPEUTIC EXERCISES: CPT

## 2024-04-29 NOTE — PROGRESS NOTES
[facility]  [address] [facility] [address] [place] [address] - Ph: [phone]   Fx: [phone]    PHYSICAL THERAPY PROGRESS NOTE      Patient name: Grazyna Mitchell Start of Care: 3/20/2024   Referral source: Naun Bronson PA-C : 1970               Medical Diagnosis: Pain in right knee [M25.561]        Onset Date: 2024   Treatment Diagnosis: M25.561  RIGHT KNEE PAIN                                      Prior Hospitalization: see medical history Provider#: [id number]   Medications: Verified on Patient Summary List      Comorbidities: Right TSR, Gastric bypass, Mastoid bone removed, Right TKR,   Prior Level of Function: The patient reports that she had difficulty walking after falling and injuring her knee after her first knee replacement.    Visits from Start of Care: 5    Missed Visits: 0    Goals/Measure of Progress: To be achieved in 8 weeks:  Short Term Goals: To be accomplished in 2 weeks  The patient will demonstrate independence and compliance with HEP to maximize therapeutic benefit.              IE: issued HEP  PN:  Pt reported compliance  The patient will improve right knee extension to neutral in order to improve ease of ambulation efficiency.              IE: lacking 3 degrees  PN: Met, right knee extension- neutral,  Long Term Goals: To be accomplished in 12 weeks  The patient will Improve FOTO score to 55 to improve quality of life.              IE: 40              PN:  Nearly met 54  The patient will improve quad strength of right knee to 5/5 MMT to maximize stability during ambulation.              IE: 4/5 MMT  PN: Right quad strength 4+/5 MMT   The patient will demonstrate 6\" step ups with out compensation using 1 HR to improve ease of community ambulation.               IE: unable without significant compensation              PN: Must require 1 UE assist with compensations   The patient will report moderate difficulty with any 
HEP  PN:  Pt reported compliance  The patient will improve right knee extension to neutral in order to improve ease of ambulation efficiency.              IE: lacking 3 degrees  PN: Met, right knee extension- neutral,  Long Term Goals: To be accomplished in 12 weeks  The patient will Improve FOTO score to 55 to improve quality of life.              IE: 40   PN:  Nearly met 54  The patient will improve quad strength of right knee to 5/5 MMT to maximize stability during ambulation.              IE: 4/5 MMT  PN: Right quad strength 4+/5 MMT   The patient will demonstrate 6\" step ups with out compensation using 1 HR to improve ease of community ambulation.               IE: unable without significant compensation   PN: Must require 1 UE assist with compensations   The patient will report moderate difficulty with any usual work, housework to improve ease of chore production.              IE: quite a bit of difficulty   PN: The patient reports some difficulty     PLAN  Yes  Continue plan of care  []  Upgrade activities as tolerated  []  Discharge due to :  []  Other:    Milad Santos PT    4/29/2024    12:40 PM    Future Appointments   Date Time Provider Department Center   5/1/2024 11:50 AM Lyssa Turner, PT [facility] [place]   5/6/2024  9:50 AM Christy Arevalo, PT [facility] [place]   5/8/2024 11:50 AM Milad Santos, PT [facility] [place]   5/13/2024 11:10 AM Milad Santos, PT [facility] [place]   5/15/2024 11:50 AM Lyssa Turner PT [facility] [place]   5/20/2024 11:10 AM Milad Santos PT [facility] [place]   7/26/2024  2:40 PM Naun Bronson PA-C [facility]

## 2024-05-01 ENCOUNTER — HOSPITAL ENCOUNTER (OUTPATIENT)
Facility: HOSPITAL | Age: 54
Setting detail: RECURRING SERIES
Discharge: HOME | End: 2024-05-04
Payer: MEDICAID

## 2024-05-01 PROCEDURE — 97530 THERAPEUTIC ACTIVITIES: CPT

## 2024-05-01 PROCEDURE — 97112 NEUROMUSCULAR REEDUCATION: CPT

## 2024-05-01 PROCEDURE — 97110 THERAPEUTIC EXERCISES: CPT

## 2024-05-01 NOTE — PROGRESS NOTES
78524 Therapeutic Exercise (timed):  increase ROM, strength, coordination, balance, and proprioception to improve patient's ability to progress to PLOF and address remaining functional goals. (see flow sheet as applicable)    Details if applicable:       10  73932 Therapeutic Activity (timed):  use of dynamic activities replicating functional movements to increase ROM, strength, coordination, balance, and proprioception in order to improve patient's ability to progress to PLOF and address remaining functional goals.  (see flow sheet as applicable)    Details if applicable:     10  47757 Neuromuscular Re-Education (timed):  improve balance, coordination, kinesthetic sense, posture, core stability and proprioception to improve patient's ability to develop conscious control of individual muscles and awareness of position of extremities in order to progress to PLOF and address remaining functional goals. (see flow sheet as applicable)     Details if applicable:           Details if applicable:            Details if applicable:     33  [facility] Totals Reminder: bill using total billable min of TIMED therapeutic procedures (example: do not include dry needle or estim unattended, both untimed codes, in totals to left)  8-22 min = 1 unit; 23-37 min = 2 units; 38-52 min = 3 units; 53-67 min = 4 units; 68-82 min = 5 units   Total Total     TOTAL TREATMENT TIME:        43     [x]  Patient Education billed concurrently with other procedures   [x] Review HEP    [] Progressed/Changed HEP, detail:    [] Other detail:       Objective Information/Functional Measures/Assessment    Right quad strength was assessed today, strength is still the same. Pt needed cues for correct form today, reported that wearing brace for 3 months was very hard. Tolerated the session well and left with no apparent distress.    Patient will continue to benefit from skilled PT / OT services to modify and progress therapeutic interventions, analyze and address

## 2024-05-06 ENCOUNTER — HOSPITAL ENCOUNTER (OUTPATIENT)
Facility: HOSPITAL | Age: 54
Setting detail: RECURRING SERIES
End: 2024-05-06
Payer: MEDICAID

## 2024-05-08 ENCOUNTER — HOSPITAL ENCOUNTER (OUTPATIENT)
Facility: HOSPITAL | Age: 54
Setting detail: RECURRING SERIES
Discharge: HOME | End: 2024-05-11
Payer: MEDICAID

## 2024-05-08 PROCEDURE — 97110 THERAPEUTIC EXERCISES: CPT

## 2024-05-08 PROCEDURE — 97530 THERAPEUTIC ACTIVITIES: CPT

## 2024-05-08 PROCEDURE — 97112 NEUROMUSCULAR REEDUCATION: CPT

## 2024-05-08 NOTE — PROGRESS NOTES
PHYSICAL / OCCUPATIONAL THERAPY - DAILY TREATMENT NOTE     Patient Name: Grazyna Mitchell    Date: 2024    : 1970  Insurance: Payor: [facility] MEDICAID / Plan: [facility] CARDINAL CARE / Product Type: *No Product type* /      Patient  verified Yes     Visit #   Current / Total 7 24   Time   In / Out 11:55 12:35   Pain   In / Out 3/10 2/10   Subjective Functional Status/Changes: The patient states she has some pain on the outside of her knee but feels that her knee is doing well and states she does feel she can ascent curbs with greater ease now.   Changes to:  Allergies, Med Hx, Sx Hx?   no       TREATMENT AREA =  Pain in right knee [M25.561]    OBJECTIVE    Modalities Rationale:     decrease edema, decrease inflammation, and decrease pain to improve patient's ability to progress to PLOF and address remaining functional goals.      10   min  unbilled [x]  Ice     []  Heat    location/position:   Right knee; pt supine   Skin assessment post-treatment (if applicable):    []  intact    []  redness- no adverse reaction                 []redness - adverse reaction:         Therapeutic Procedures:  Tx Min Billable or 1:1 Min (if diff from Tx Min) Procedure, Rationale, Specifics   10  65394 Therapeutic Exercise (timed):  increase ROM, strength, coordination, balance, and proprioception to improve patient's ability to progress to PLOF and address remaining functional goals. (see flow sheet as applicable)    Details if applicable:       12  93947 Neuromuscular Re-Education (timed):  improve balance, coordination, kinesthetic sense, posture, core stability and proprioception to improve patient's ability to develop conscious control of individual muscles and awareness of position of extremities in order to progress to PLOF and address remaining functional goals. (see flow sheet as applicable)    Details if applicable:  step taps, TKEs with TB for quad activation   8  24575 Therapeutic Activity

## 2024-05-13 ENCOUNTER — HOSPITAL ENCOUNTER (OUTPATIENT)
Facility: HOSPITAL | Age: 54
Setting detail: RECURRING SERIES
Discharge: HOME | End: 2024-05-16
Payer: MEDICAID

## 2024-05-13 PROCEDURE — 97110 THERAPEUTIC EXERCISES: CPT

## 2024-05-13 PROCEDURE — 97112 NEUROMUSCULAR REEDUCATION: CPT

## 2024-05-13 PROCEDURE — 97530 THERAPEUTIC ACTIVITIES: CPT

## 2024-05-13 NOTE — PROGRESS NOTES
PHYSICAL / OCCUPATIONAL THERAPY - DAILY TREATMENT NOTE     Patient Name: Grazyna Mitchell    Date: 2024    : 1970  Insurance: Payor: [facility] MEDICAID / Plan: [facility] CARDINAL CARE / Product Type: *No Product type* /      Patient  verified Yes     Visit #   Current / Total 8 24   Time   In / Out 11:10  11:46   Pain   In / Out 2/10 3/10   Subjective Functional Status/Changes: The patient states she is doing well upon arrival this morning.   Changes to:  Allergies, Med Hx, Sx Hx?   no       TREATMENT AREA =  Pain in right knee [M25.561]    OBJECTIVE    Modalities Rationale:     decrease edema, decrease inflammation, and decrease pain to improve patient's ability to progress to PLOF and address remaining functional goals.      10   min  unbilled [x]  Ice     []  Heat    location/position:   Pt seated; right knee   Skin assessment post-treatment (if applicable):    []  intact    []  redness- no adverse reaction                 []redness - adverse reaction:         Therapeutic Procedures:  Tx Min Billable or 1:1 Min (if diff from Tx Min) Procedure, Rationale, Specifics   14  51960 Therapeutic Exercise (timed):  increase ROM, strength, coordination, balance, and proprioception to improve patient's ability to progress to PLOF and address remaining functional goals. (see flow sheet as applicable)    Details if applicable:       12  38511 Neuromuscular Re-Education (timed):  improve balance, coordination, kinesthetic sense, posture, core stability and proprioception to improve patient's ability to develop conscious control of individual muscles and awareness of position of extremities in order to progress to PLOF and address remaining functional goals. (see flow sheet as applicable)    Details if applicable:     10  02524 Therapeutic Activity (timed):  use of dynamic activities replicating functional movements to increase ROM, strength, coordination, balance, and proprioception in order to

## 2024-05-15 ENCOUNTER — HOSPITAL ENCOUNTER (OUTPATIENT)
Facility: HOSPITAL | Age: 54
Setting detail: RECURRING SERIES
Discharge: HOME | End: 2024-05-18
Payer: MEDICAID

## 2024-05-15 PROCEDURE — 97110 THERAPEUTIC EXERCISES: CPT

## 2024-05-15 PROCEDURE — 97530 THERAPEUTIC ACTIVITIES: CPT

## 2024-05-15 PROCEDURE — 97112 NEUROMUSCULAR REEDUCATION: CPT

## 2024-05-15 NOTE — PROGRESS NOTES
PHYSICAL / OCCUPATIONAL THERAPY - DAILY TREATMENT NOTE     Patient Name: Grazyna Mitchell    Date: 5/15/2024    : 1970  Insurance: Payor: [facility] MEDICAID / Plan: [facility] CARDINAL CARE / Product Type: *No Product type* /      Patient  verified Yes     Visit #   Current / Total 9 24   Time   In / Out 11:50 AM 12:40 PM   Pain   In / Out 3 3   Subjective Functional Status/Changes: Patient reports she is doing well today. No new changes to report at this time.    Changes to:  Allergies, Med Hx, Sx Hx?   no       TREATMENT AREA =  Pain in right knee [M25.561]    OBJECTIVE    Modalities Rationale:     decrease edema, decrease inflammation, and decrease pain to improve patient's ability to progress to PLOF and address remaining functional goals.        10    min  unbilled [x]  Ice     []  Heat    location/position:    Supine with LE elevated on wedge   Skin assessment post-treatment (if applicable):    [x]  intact    []  redness- no adverse reaction                 []redness - adverse reaction:         Therapeutic Procedures:  Tx Min Billable or 1:1 Min (if diff from Tx Min) Procedure, Rationale, Specifics   20 10 14031 Therapeutic Exercise (timed):  increase ROM, strength, coordination, balance, and proprioception to improve patient's ability to progress to PLOF and address remaining functional goals. (see flow sheet as applicable)    Details if applicable:       10 10 08934 Therapeutic Activity (timed):  use of dynamic activities replicating functional movements to increase ROM, strength, coordination, balance, and proprioception in order to improve patient's ability to progress to PLOF and address remaining functional goals.  (see flow sheet as applicable)    Details if applicable:     10 10 73094 Neuromuscular Re-Education (timed):  improve balance, coordination, kinesthetic sense, posture, core stability and proprioception to improve patient's ability to develop conscious control of

## 2024-05-20 ENCOUNTER — HOSPITAL ENCOUNTER (OUTPATIENT)
Facility: HOSPITAL | Age: 54
Setting detail: RECURRING SERIES
Discharge: HOME | End: 2024-05-23
Payer: MEDICAID

## 2024-05-20 PROCEDURE — 97110 THERAPEUTIC EXERCISES: CPT

## 2024-05-20 PROCEDURE — 97112 NEUROMUSCULAR REEDUCATION: CPT

## 2024-05-20 PROCEDURE — 97530 THERAPEUTIC ACTIVITIES: CPT

## 2024-05-20 NOTE — PROGRESS NOTES
PHYSICAL / OCCUPATIONAL THERAPY - DAILY TREATMENT NOTE     Patient Name: Grazyna Mitchell    Date: 2024    : 1970  Insurance: Payor: [facility] MEDICAID / Plan: [facility] CARDINAL CARE / Product Type: *No Product type* /      Patient  verified Yes     Visit #   Current / Total 10 24   Time   In / Out 11:10 12:59   Pain   In / Out 2/10 3/10   Subjective Functional Status/Changes: The patient denies new pain. She does state she had a tooth extracted Saturday, and she began a Z pack for this problem.   Changes to:  Allergies, Med Hx, Sx Hx?   Yes; Began Z pack Saturday.     TREATMENT AREA =  Pain in right knee [M25.561]    OBJECTIVE  Modalities Rationale:     decrease edema, decrease inflammation, and decrease pain to improve patient's ability to progress to PLOF and address remaining functional goals.      10   min  unbilled [x]  Ice     []  Heat    location/position:   Right knee, pt seated   Skin assessment post-treatment (if applicable):    []  intact    []  redness- no adverse reaction                 []redness - adverse reaction:         Therapeutic Procedures:  Tx Min Billable or 1:1 Min (if diff from Tx Min) Procedure, Rationale, Specifics   17  07110 Therapeutic Exercise (timed):  increase ROM, strength, coordination, balance, and proprioception to improve patient's ability to progress to PLOF and address remaining functional goals. (see flow sheet as applicable)    Details if applicable:       12  31814 Neuromuscular Re-Education (timed):  improve balance, coordination, kinesthetic sense, posture, core stability and proprioception to improve patient's ability to develop conscious control of individual muscles and awareness of position of extremities in order to progress to PLOF and address remaining functional goals. (see flow sheet as applicable)    Details if applicable:     10  88189 Therapeutic Activity (timed):  use of dynamic activities replicating functional movements to 
compensations               D/C: Met able to negotiate stairs with using 1 HR without compensations  The patient will report moderate difficulty with any usual work, housework to improve ease of chore production.              IE: quite a bit of difficulty              PN: The patient reports some difficulty               D/C:  Met reports moderate difficulty    Assessment/ Summary of Care:  The patient has been issued an updated HEP and left with all questions answered. Furthermore, the patient has an upcoming move that is quickly approaching in 4 weeks, thus she has limited time. She is in mutual agreement of D/C at this time. The patient left with all questions answered.     RECOMMENDATIONS:  [x]Discontinue therapy: [x]Patient has reached or is progressing toward set goals      []Patient is non-compliant or has abdicated      []Due to lack of appreciable progress towards set goals    Milad Santos, PT 5/20/2024 12:04 PM

## 2024-07-26 ENCOUNTER — OFFICE VISIT (OUTPATIENT)
Age: 54
End: 2024-07-26
Payer: MEDICAID

## 2024-07-26 DIAGNOSIS — Z96.651 S/P REVISION OF TOTAL KNEE, RIGHT: Primary | ICD-10-CM

## 2024-07-26 PROCEDURE — 99213 OFFICE O/P EST LOW 20 MIN: CPT | Performed by: PHYSICIAN ASSISTANT

## 2024-07-26 NOTE — PROGRESS NOTES
Patient: Grazyna Mitchell                MRN: [id number]       SSN: [id number]  YOB: 1970        AGE: 54 y.o.        SEX: female  There is no height or weight on file to calculate BMI.    PCP: Rosa Souza MD  07/26/24      This office note has been dictated.      REVIEW OF SYSTEMS:  Constitutional: Negative for fever, chills, weight loss and malaise/fatigue.   HENT: Negative.    Eyes: Negative.    Respiratory: Negative.   Cardiovascular: Negative.   Gastrointestinal: No bowel incontinence or constipation.  Genitourinary: No bladder incontinence or saddle anesthesia.  Skin: Negative.   Neurological: Negative.    Endo/Heme/Allergies: Negative.    Psychiatric/Behavioral: Negative.  Musculoskeletal: As per HPI above.     Past Medical History:   Diagnosis Date    Arthritis     Asthma     Bipolar disorder (HCC)     GERD (gastroesophageal reflux disease)     [place] (hard of hearing)     Hearing aid right ear . Deaf in left ear    Obesity (BMI 30.0-34.9) 3/1/2017    PTSD (post-traumatic stress disorder)     Sleep apnea     resolved with weight loss         Current Outpatient Medications:     naloxone (NARCAN) 4 MG/0.1ML LIQD nasal spray, 1 spray by Nasal route as needed for Opioid Reversal, Disp: 1 each, Rfl: 1    cyanocobalamin ([employer] VITAMIN B12) 1000 MCG tablet, Take 1,000 mcg by mouth daily., Disp: , Rfl:     benztropine (COGENTIN) 0.5 MG tablet, Take 1 tablet by mouth daily, Disp: , Rfl:     aspirin (ASPIRIN 81) 81 MG EC tablet, Take 1 tablet by mouth in the morning and at bedtime, Disp: 60 tablet, Rfl: 3    celecoxib (CELEBREX) 200 MG capsule, Take 1 capsule by mouth 2 times daily, Disp: 60 capsule, Rfl: 2    ondansetron (ZOFRAN) 4 MG tablet, Take 1 tablet by mouth every 8 hours as needed for Nausea or Vomiting, Disp: 30 tablet, Rfl: 2    magnesium oxide (MAG-OX) 400 (240 Mg) MG tablet, Take 1 tablet by mouth daily as needed (muscle spasm) (Patient not taking: Reported on 1/5/2024), Disp: 30

## 2024-09-08 NOTE — PROGRESS NOTES
TONSILLECTOMY      TOTAL KNEE ARTHROPLASTY Right 4/3/2023    RIGHT TOTAL KNEE ARTHROPLASTY performed by Braxton Fuentes MD at [facility] MAIN OR         Patient seen evaluated today for her revision right knee replacement.  She is now about 6 weeks status post soft tissue revision for patellar maltracking.  She has been compliant with her brace.  She is requesting a refill of pain medicine.  She has had no fevers or chills.  No troubles with the wound.    Patient denies recent fevers, chills, chest pain, SOB, or injuries.   No recent systemic changes noted.  A 12-point review of systems is performed today.  Pertinent positives are noted.  All other systems reviewed and otherwise are negative.    Physical exam: General: Alert and oriented x3, nad.  well-developed, well nourished.  normal affect, AF.  NC/AT, EOMI, neck supple, trachea midline, no JVD present. Breathing is non-labored.  Examination of the right knee reveals skin intact.  The surgical wound healed nicely.  There is no erythema or ecchymosis noted.  There are no signs for infection or cellulitis present.  She has full extension without lag.  No defects in sense of mechanism movement.  Patella tracks nicely.  Stability is good.  She flexes to 90.  Negative calf tenderness.  Negative Homans.  No signs of DVT present.    Radiographs obtained the office today 3/1/2024 at the [facility] location including AP, lateral, skyline of the right knee shows total knee components to be well-fixed without evidence for loosening or fracture noted.  The patella is tracking nicely on skyline.    Assessment: Status post soft tissue revision right total knee replacement    Plan: At this point, we discussed treatment options.  She will continue with the hinged brace.  Is open up 0-90.  Will get her into physical therapy for gentle strengthening and range of motion within the limits of the brace.  Refill of pain medicine was sent to her pharmacy.  She is instructed on use and  Attending Attestation (For Attendings USE Only)...

## (undated) DEVICE — 3M™ STERI-DRAPE™ INSTRUMENT POUCH 1018: Brand: STERI-DRAPE™

## (undated) DEVICE — DECANTER BAG 9": Brand: MEDLINE INDUSTRIES, INC.

## (undated) DEVICE — NEEDLE SUT SZ 2 S STL MAYO CATGUT 1/2 CIR TAPR PT

## (undated) DEVICE — KIT CLN UP BON SECOURS MARYV

## (undated) DEVICE — APPLICATOR MEDICATED 26 CC SOLUTION HI LT ORNG CHLORAPREP

## (undated) DEVICE — GOWN ,SIRUS ,NONREINFORCED 4XL: Brand: MEDLINE

## (undated) DEVICE — SUTURE ABSORBABLE ANTIBACT 1-0 CT-1 24 IN STRATAFIX PDS + SXPP1A443

## (undated) DEVICE — SYRINGE MED 3ML NDL 22GA L1 1/2IN REG BVL SFGLDE

## (undated) DEVICE — GOWN,REINFORCED,POLY,AURORA,XXLARGE,STR: Brand: MEDLINE

## (undated) DEVICE — BLADE SAW W11XL77.5MM THK1.23MM CUT THK1.17MM S STL RECIP

## (undated) DEVICE — 3M™ IOBAN™ 2 ANTIMICROBIAL INCISE DRAPE 6651EZ: Brand: IOBAN™ 2

## (undated) DEVICE — LIGHT HANDLE: Brand: DEVON

## (undated) DEVICE — KENDALL SCD EXPRESS SLEEVES, KNEE LENGTH, LARGE: Brand: KENDALL SCD

## (undated) DEVICE — Device

## (undated) DEVICE — BLADE REPROC SAW RECIP DBL SIDED FIXED PT 3.56MM 70X12.5X0.64MM

## (undated) DEVICE — GEL BAG FOR WRAPS --

## (undated) DEVICE — ELECTRODE PT RET AD L9FT HI MOIST COND ADH HYDRGEL CORDED

## (undated) DEVICE — SPONGE LAP 18X18IN STRL -- 5/PK

## (undated) DEVICE — DISPOSABLE MULTI BAG ADAPTERS Y                                    TUBING, STERILE, 2 TO A SET 6 SETS                                    PER BOX

## (undated) DEVICE — BLADE SAW W9XL31MM THK038MM CUT THK043MM REPL SAG OSC THN

## (undated) DEVICE — KENDALL SCD EXPRESS SLEEVES, KNEE LENGTH, MEDIUM: Brand: KENDALL SCD

## (undated) DEVICE — INTENDED FOR TISSUE SEPARATION, AND OTHER PROCEDURES THAT REQUIRE A SHARP SURGICAL BLADE TO PUNCTURE OR CUT.: Brand: BARD-PARKER ® STAINLESS STEEL BLADES

## (undated) DEVICE — SOLUTION IV 1000ML 0.9% SOD CHL

## (undated) DEVICE — SUTURE VCRL SZ 0 L36IN ABSRB UD L36MM CT-1 1/2 CIR J946H

## (undated) DEVICE — REM POLYHESIVE ADULT PATIENT RETURN ELECTRODE: Brand: VALLEYLAB

## (undated) DEVICE — STRYKER PERFORMANCE SERIES SAGITTAL BLADE: Brand: STRYKER PERFORMANCE SERIES

## (undated) DEVICE — SKIN MARKER,REGULAR TIP WITH RULER AND LABELS: Brand: DEVON

## (undated) DEVICE — SUTURE VCRL + SZ 2 L27IN ABSRB UD TP-1 L65MM 1/2 CIR TAPR VCP849G

## (undated) DEVICE — SOFT SILICONE HYDROCELLULAR SACRUM DRESSING WITH LOCK AWAY LAYER: Brand: ALLEVYN LIFE SACRUM (LARGE) PACK OF 10

## (undated) DEVICE — 3M™ BAIR PAWS FLEX™ WARMING GOWN, STANDARD, 20 PER CASE 81003: Brand: BAIR PAWS™

## (undated) DEVICE — INTENDED FOR TISSUE SEPARATION, AND OTHER PROCEDURES THAT REQUIRE A SHARP SURGICAL BLADE TO PUNCTURE OR CUT.: Brand: BARD-PARKER ® CARBON RIB-BACK BLADES

## (undated) DEVICE — GUIDEPIN ORTH L190MM DIA2.5MM METAGLENE CTRL FOR DELT XTEND

## (undated) DEVICE — KIT OR TURNOVER

## (undated) DEVICE — (D)PREP SKN CHLRAPRP APPL 26ML -- CONVERT TO ITEM 371833

## (undated) DEVICE — BUR RMFG SHV HLLW 6 FLUT 5.5MM --

## (undated) DEVICE — PACK SURG BSHR TOT KNEE LF

## (undated) DEVICE — SYRINGE MED 30ML STD CLR PLAS LUERLOCK TIP N CTRL DISP

## (undated) DEVICE — 3M™ MEDIPORE™ H SOFT CLOTH SURGICAL TAPE, 2863, 3 IN X 10 YD, 12/CASE: Brand: 3M™ MEDIPORE™

## (undated) DEVICE — BLADE SAW OSC 18.5X9.0X.038MM -- STRYKER 2296-3

## (undated) DEVICE — BLADE SHV 4.0MM TOMCAT --

## (undated) DEVICE — SUTURE MCRYL SZ 4-0 L27IN ABSRB UD L24MM PS-1 3/8 CIR PRIM Y935H

## (undated) DEVICE — HANDPIECE SET WITH HIGH FLOW TIP AND SUCTION TUBE: Brand: INTERPULSE

## (undated) DEVICE — BIPOLAR SEALER 23-112-1 AQM 6.0: Brand: AQUAMANTYS ®

## (undated) DEVICE — SUTURE VCRL SZ 2-0 L27IN ABSRB VLT L36MM CT-1 1/2 CIR J339H

## (undated) DEVICE — FLEX ADVANTAGE 3000CC: Brand: FLEX ADVANTAGE

## (undated) DEVICE — Device: Brand: JELCO

## (undated) DEVICE — [ARTHROSCOPY PUMP,  DO NOT USE IF PACKAGE IS DAMAGED,  KEEP DRY,  KEEP AWAY FROM SUNLIGHT,  PROTECT FROM HEAT AND RADIOACTIVE SOURCES.]: Brand: FLOSTEADY

## (undated) DEVICE — PACK PROCEDURE SURG TOT HIP BSHR LF

## (undated) DEVICE — BNDG,ELSTC,MATRIX,STRL,6"X5YD,LF,HOOK&LP: Brand: MEDLINE

## (undated) DEVICE — SUTURE VCRL SZ 2-0 L27IN ABSRB VLT L26MM CT-2 1/2 CIR J333H

## (undated) DEVICE — SUTURE VCRL + SZ 0 L27IN ABSRB UD CT-1 L36MM 1/2 CIR TAPR VCP260H

## (undated) DEVICE — KIT INSRT PERC SPEAR DIL NEEDLE W/ STYL GUIDEPIN STP DRL FOR

## (undated) DEVICE — TAPE,CLOTH/SILK,CURAD,3"X10YD,LF,40/CS: Brand: CURAD

## (undated) DEVICE — GAUZE SPONGES,16 PLY: Brand: CURITY

## (undated) DEVICE — PREP SKN CHLRAPRP APPL 10.5ML --

## (undated) DEVICE — NEEDLE SUT 1/2 CIR TAPR TIP MAYO NONSTERILE SZ 5

## (undated) DEVICE — WRAP CLD THER COMPR UNIV BLK SHLDR FOAM REUSE PREM

## (undated) DEVICE — SUTURE VCRL SZ 0 L27IN ABSRB UD L26MM CT-2 1/2 CIR J270H

## (undated) DEVICE — SUTURE VCRL SZ 0 L27IN ABSRB UD L36MM CT-1 1/2 CIR J260H

## (undated) DEVICE — 3M™ STERI-STRIP™ COMPOUND BENZOIN TINCTURE 40 BAGS/CARTON 4 CARTONS/CASE C1544: Brand: 3M™ STERI-STRIP™

## (undated) DEVICE — 4-PORT MANIFOLD: Brand: NEPTUNE 2

## (undated) DEVICE — BLADE RMFG SHVR 4.0MM TOMCAT --

## (undated) DEVICE — SUTURE MCRYL SZ 2-0 L36IN ABSRB UD L36MM CT-1 1/2 CIR Y945H

## (undated) DEVICE — SUTURE ABSORBABLE BRAIDED 2-0 CT-1 27 IN UD VICRYL J259H

## (undated) DEVICE — CANNULA THREADED FLEX 8.0 X 72MM: Brand: CLEAR-TRAC

## (undated) DEVICE — SOLUTION IRRIG 3000ML 0.9% SOD CHL FLX CONT 0797208] ICU MEDICAL INC]

## (undated) DEVICE — BOWL AND CEMENT CARTRIDGE WITH BREAKAWAY FEMORAL NOZZLE: Brand: ACM

## (undated) DEVICE — 4.0MM EGG BUR

## (undated) DEVICE — 3M™ MICROFOAM™ SURGICAL TAPE 4 ROLLS/CARTON 6 CARTONS/CASE 1528-3: Brand: 3M™ MICROFOAM™

## (undated) DEVICE — SUTURE MCRYL SZ 4-0 L18IN ABSRB UD L19MM PS-2 3/8 CIR PRIM Y496G

## (undated) DEVICE — SUTURE VCRL SZ 2 L27IN ABSRB VLT L65MM TP-1 1/2 CIR J649G

## (undated) DEVICE — INTENDED FOR TISSUE SEPARATION, AND OTHER PROCEDURES THAT REQUIRE A SHARP SURGICAL BLADE TO PUNCTURE OR CUT.: Brand: BARD-PARKER SAFETY BLADES SIZE 10, STERILE

## (undated) DEVICE — PREMIUM DRY TRAY LF: Brand: MEDLINE INDUSTRIES, INC.

## (undated) DEVICE — 2108 SERIES SAGITTAL BLADE (24.8 X 1.24 X 80.1MM)

## (undated) DEVICE — Z DISCONTINUED USE ITEM 2150868 IMMOBILIZER SHLDR M BLK BASIC ABD SLNG

## (undated) DEVICE — SUTURE FIBERWIRE SZ 2 W/ TAPERED NEEDLE BLUE L38IN NONABSORB BLU L26.5MM 1/2 CIRCLE AR7200

## (undated) DEVICE — BANDAGE COMPR W6INXL5YD WHT BGE POLY COT M E WRP WV HK AND

## (undated) DEVICE — SLIM BODY SKIN STAPLER: Brand: APPOSE ULC

## (undated) DEVICE — HOOD WITH PEEL AWAY FACE SHIELD: Brand: T7PLUS

## (undated) DEVICE — FLUID CONTROL SHOULDER DRAPE PACK: Brand: CONVERTORS

## (undated) DEVICE — INTENDED FOR TISSUE SEPARATION, AND OTHER PROCEDURES THAT REQUIRE A SHARP SURGICAL BLADE TO PUNCTURE OR CUT.: Brand: BARD-PARKER SAFETY BLADES SIZE 15, STERILE

## (undated) DEVICE — BRACE KNEE 17 27IN R L UNIV FIT UPTO 305IN THGH T SCP

## (undated) DEVICE — GENESIS PIN AND DRILL SET: Brand: GENESIS

## (undated) DEVICE — NEEDLE SPNL 22GA L3.5IN BLK HUB S STL REG WALL FIT STYL W/

## (undated) DEVICE — SYRINGE NDL 23GA 3ML L1IN TURQ PLAS NDL S STL SHLD HYPO BVL

## (undated) DEVICE — DRESSING HYDROFIBER AQUACEL AG ADVANTAGE 3.5X14 IN

## (undated) DEVICE — KERLIX BANDAGE ROLL: Brand: KERLIX

## (undated) DEVICE — ZIMMER® STERILE DISPOSABLE TOURNIQUET CUFF WITH PLC, DUAL PORT, SINGLE BLADDER, 34 IN. (86 CM)

## (undated) DEVICE — (D)DRSG BORD MPLX SACRUM 23X23 -- DISC BY MFR USE ITEM 340908

## (undated) DEVICE — HEWSON SUTURE RETRIEVER: Brand: HEWSON SUTURE RETRIEVER

## (undated) DEVICE — SYR LR LCK 1ML GRAD NSAF 30ML --

## (undated) DEVICE — IMMOBILIZER SHLDR M L15IN D8IN UNIV POLY COT W/ FOAM STRP

## (undated) DEVICE — SUPER TURBOVAC 90 INTEGRATED CABLE WAND ICW: Brand: COBLATION

## (undated) DEVICE — BLADE ASSEMB CLP HAIR FINE --

## (undated) DEVICE — SUTURE PDS II SZ 1 L36IN ABSRB VLT CTXB L48MM 1/2 CIR BLNT ZB371

## (undated) DEVICE — SHOULDER SUSPENSION KIT 6 PER BOX

## (undated) DEVICE — ROTATOR CUFF REPAIR INSTRUMENTS SUTURE RETRIEVER, SMALL: Brand: CONMED

## (undated) DEVICE — SOLUTION IRRIG 1000ML 0.9% SOD CHL USP POUR PLAS BTL

## (undated) DEVICE — ABDOMINAL PAD: Brand: DERMACEA